# Patient Record
Sex: FEMALE | Race: WHITE | NOT HISPANIC OR LATINO | Employment: OTHER | URBAN - METROPOLITAN AREA
[De-identification: names, ages, dates, MRNs, and addresses within clinical notes are randomized per-mention and may not be internally consistent; named-entity substitution may affect disease eponyms.]

---

## 2017-01-04 ENCOUNTER — COMMUNICATION - HEALTHEAST (OUTPATIENT)
Dept: RHEUMATOLOGY | Facility: CLINIC | Age: 67
End: 2017-01-04

## 2017-01-04 ENCOUNTER — COMMUNICATION - HEALTHEAST (OUTPATIENT)
Dept: INTERNAL MEDICINE | Facility: CLINIC | Age: 67
End: 2017-01-04

## 2017-01-04 DIAGNOSIS — F32.A DEPRESSION: ICD-10-CM

## 2017-01-04 DIAGNOSIS — R10.13 DYSPEPSIA: ICD-10-CM

## 2017-01-04 DIAGNOSIS — M05.9 SEROPOSITIVE RHEUMATOID ARTHRITIS (H): ICD-10-CM

## 2017-01-09 ENCOUNTER — COMMUNICATION - HEALTHEAST (OUTPATIENT)
Dept: INTERNAL MEDICINE | Facility: CLINIC | Age: 67
End: 2017-01-09

## 2017-01-09 DIAGNOSIS — E11.9 DIABETES MELLITUS, TYPE II (H): ICD-10-CM

## 2017-01-11 ENCOUNTER — OFFICE VISIT - HEALTHEAST (OUTPATIENT)
Dept: INTERNAL MEDICINE | Facility: CLINIC | Age: 67
End: 2017-01-11

## 2017-01-11 DIAGNOSIS — E11.9 DIABETES MELLITUS, TYPE 2 (H): ICD-10-CM

## 2017-01-15 ENCOUNTER — COMMUNICATION - HEALTHEAST (OUTPATIENT)
Dept: INTERNAL MEDICINE | Facility: CLINIC | Age: 67
End: 2017-01-15

## 2017-01-15 DIAGNOSIS — E11.9 DIABETES MELLITUS, TYPE 2 (H): ICD-10-CM

## 2017-01-16 ENCOUNTER — COMMUNICATION - HEALTHEAST (OUTPATIENT)
Dept: INTERNAL MEDICINE | Facility: CLINIC | Age: 67
End: 2017-01-16

## 2017-01-16 DIAGNOSIS — E11.9 DIABETES MELLITUS, TYPE 2 (H): ICD-10-CM

## 2017-01-22 ENCOUNTER — HOSPITAL ENCOUNTER (OUTPATIENT)
Dept: LAB | Age: 67
Setting detail: SPECIMEN
Discharge: HOME | End: 2017-01-22

## 2017-01-22 ENCOUNTER — OFFICE VISIT - HEALTHEAST (OUTPATIENT)
Dept: FAMILY MEDICINE | Facility: CLINIC | Age: 67
End: 2017-01-22

## 2017-01-22 DIAGNOSIS — R05.9 COUGH: ICD-10-CM

## 2017-01-22 DIAGNOSIS — R07.0 THROAT PAIN: ICD-10-CM

## 2017-02-13 ENCOUNTER — AMBULATORY - HEALTHEAST (OUTPATIENT)
Dept: LAB | Facility: CLINIC | Age: 67
End: 2017-02-13

## 2017-02-13 ENCOUNTER — AMBULATORY - HEALTHEAST (OUTPATIENT)
Dept: INTERNAL MEDICINE | Facility: CLINIC | Age: 67
End: 2017-02-13

## 2017-02-13 ENCOUNTER — COMMUNICATION - HEALTHEAST (OUTPATIENT)
Dept: INTERNAL MEDICINE | Facility: CLINIC | Age: 67
End: 2017-02-13

## 2017-02-13 DIAGNOSIS — M05.9 SEROPOSITIVE RHEUMATOID ARTHRITIS (H): ICD-10-CM

## 2017-02-13 LAB
ALT SERPL W P-5'-P-CCNC: 15 U/L (ref 0–45)
CREAT SERPL-MCNC: 1.24 MG/DL (ref 0.6–1.1)
GFR SERPL CREATININE-BSD FRML MDRD: 43 ML/MIN/1.73M2

## 2017-02-15 ENCOUNTER — OFFICE VISIT - HEALTHEAST (OUTPATIENT)
Dept: RHEUMATOLOGY | Facility: CLINIC | Age: 67
End: 2017-02-15

## 2017-02-15 ENCOUNTER — RECORDS - HEALTHEAST (OUTPATIENT)
Dept: GENERAL RADIOLOGY | Age: 67
End: 2017-02-15

## 2017-02-15 DIAGNOSIS — Z96.652 S/P TKR (TOTAL KNEE REPLACEMENT) USING CEMENT, LEFT: ICD-10-CM

## 2017-02-15 DIAGNOSIS — M05.9 RHEUMATOID ARTHRITIS WITH RHEUMATOID FACTOR, UNSPECIFIED (H): ICD-10-CM

## 2017-02-15 DIAGNOSIS — Z79.899 HIGH RISK MEDICATION USE: ICD-10-CM

## 2017-02-15 DIAGNOSIS — N18.9 CHRONIC KIDNEY DISEASE, UNSPECIFIED: ICD-10-CM

## 2017-02-15 DIAGNOSIS — M05.9 SEROPOSITIVE RHEUMATOID ARTHRITIS (H): ICD-10-CM

## 2017-02-16 ENCOUNTER — COMMUNICATION - HEALTHEAST (OUTPATIENT)
Dept: NURSING | Facility: CLINIC | Age: 67
End: 2017-02-16

## 2017-02-27 ENCOUNTER — OFFICE VISIT - HEALTHEAST (OUTPATIENT)
Dept: CARDIOLOGY | Facility: CLINIC | Age: 67
End: 2017-02-27

## 2017-02-27 DIAGNOSIS — I25.10 ATHEROSCLEROTIC CARDIOVASCULAR DISEASE: ICD-10-CM

## 2017-02-27 DIAGNOSIS — R00.2 PALPITATIONS: ICD-10-CM

## 2017-02-27 DIAGNOSIS — E78.2 MIXED HYPERLIPIDEMIA: ICD-10-CM

## 2017-02-27 DIAGNOSIS — I10 ESSENTIAL HYPERTENSION WITH GOAL BLOOD PRESSURE LESS THAN 130/85: ICD-10-CM

## 2017-02-27 ASSESSMENT — MIFFLIN-ST. JEOR: SCORE: 1696.56

## 2017-03-08 ENCOUNTER — COMMUNICATION - HEALTHEAST (OUTPATIENT)
Dept: INTERNAL MEDICINE | Facility: CLINIC | Age: 67
End: 2017-03-08

## 2017-03-10 ENCOUNTER — HOSPITAL ENCOUNTER (OUTPATIENT)
Dept: CARDIOLOGY | Facility: HOSPITAL | Age: 67
Discharge: HOME | End: 2017-03-10
Attending: INTERNAL MEDICINE

## 2017-03-10 DIAGNOSIS — I10 ESSENTIAL HYPERTENSION WITH GOAL BLOOD PRESSURE LESS THAN 130/85: ICD-10-CM

## 2017-03-10 DIAGNOSIS — R00.2 PALPITATIONS: ICD-10-CM

## 2017-03-10 LAB
BSA FOR ECHO PROCEDURE: 2.34 M2
CV BLOOD PRESSURE: NORMAL MMHG
CV ECHO HEIGHT: 72 IN
CV ECHO WEIGHT: 238 LBS
DOP CALC LVOT AREA: 3.3 CM2
DOP CALC LVOT DIAMETER: 2.05 CM
DOP CALC LVOT STROKE VOLUME: 70.6 CM3
DOP CALCLVOT PEAK VEL VTI: 21.4 CM
EJECTION FRACTION: 60 % (ref 55–75)
FRACTIONAL SHORTENING: 30.4 % (ref 28–44)
INTERVENTRICULAR SEPTUM IN END DIASTOLE: 1.14 CM (ref 0.6–0.9)
IVS/PW RATIO: 1.4
LA AREA 1: 20 CM2
LA AREA 2: 18 CM2
LEFT ATRIUM LENGTH: 4.7 CM
LEFT ATRIUM SIZE: 3.81 CM
LEFT ATRIUM VOLUME INDEX: 27.8 ML/M2
LEFT ATRIUM VOLUME: 65.1 CM3
LEFT VENTRICLE CARDIAC INDEX: 2.3 L/MIN/M2
LEFT VENTRICLE CARDIAC OUTPUT: 5.4 L/MIN
LEFT VENTRICLE DIASTOLIC VOLUME INDEX: 40.6 CM3/M2 (ref 34–74)
LEFT VENTRICLE DIASTOLIC VOLUME: 95 CM3 (ref 46–106)
LEFT VENTRICLE HEART RATE: 77 BPM
LEFT VENTRICLE MASS INDEX: 77.9 G/M2
LEFT VENTRICLE SYSTOLIC VOLUME INDEX: 16.2 CM3/M2 (ref 11–31)
LEFT VENTRICLE SYSTOLIC VOLUME: 37.8 CM3 (ref 14–42)
LEFT VENTRICULAR INTERNAL DIMENSION IN DIASTOLE: 5.14 CM (ref 3.8–5.2)
LEFT VENTRICULAR INTERNAL DIMENSION IN SYSTOLE: 3.58 CM (ref 2.2–3.5)
LEFT VENTRICULAR MASS: 182.3 G
LEFT VENTRICULAR OUTFLOW TRACT MEAN GRADIENT: 1.94 MMHG
LEFT VENTRICULAR OUTFLOW TRACT MEAN VELOCITY: 66.8 CM/S
LEFT VENTRICULAR OUTFLOW TRACT PEAK GRADIENT: 3.23 MMHG
LEFT VENTRICULAR POSTERIOR WALL IN END DIASTOLE: 0.8 CM (ref 0.6–0.9)
LV STROKE VOLUME INDEX: 30.2 ML/M2
MITRAL VALVE E/A RATIO: 0.7
MV AVERAGE E/E' RATIO: 13.5 CM/S
MV DECELERATION TIME: 0.2 S
MV E'TISSUE VEL-LAT: 5.13 CM/S
MV E'TISSUE VEL-MED: 4.92 CM/S
MV LATERAL E/E' RATIO: 13.2
MV MEDIAL E/E' RATIO: 13.8
MV PEAK A VELOCITY: 92 CM/S
MV PEAK E VELOCITY: 67.8 CM/S
NUC REST DIASTOLIC VOLUME INDEX: 3808 LBS
NUC REST SYSTOLIC VOLUME INDEX: 72 IN
TRICUSPID VALVE ANULAR PLANE SYSTOLIC EXCURSION: 2.3 CM

## 2017-03-10 ASSESSMENT — MIFFLIN-ST. JEOR: SCORE: 1696.56

## 2017-03-22 ENCOUNTER — COMMUNICATION - HEALTHEAST (OUTPATIENT)
Dept: INTERNAL MEDICINE | Facility: CLINIC | Age: 67
End: 2017-03-22

## 2017-03-22 DIAGNOSIS — F32.A DEPRESSION: ICD-10-CM

## 2017-04-03 ENCOUNTER — COMMUNICATION - HEALTHEAST (OUTPATIENT)
Dept: CARDIOLOGY | Facility: CLINIC | Age: 67
End: 2017-04-03

## 2017-04-03 DIAGNOSIS — I10 HTN (HYPERTENSION): ICD-10-CM

## 2017-04-03 DIAGNOSIS — R94.39 ABNORMAL NUCLEAR STRESS TEST: ICD-10-CM

## 2017-04-03 DIAGNOSIS — R06.09 DOE (DYSPNEA ON EXERTION): ICD-10-CM

## 2017-04-11 ENCOUNTER — RECORDS - HEALTHEAST (OUTPATIENT)
Dept: GENERAL RADIOLOGY | Age: 67
End: 2017-04-11

## 2017-04-11 ENCOUNTER — COMMUNICATION - HEALTHEAST (OUTPATIENT)
Dept: INTERNAL MEDICINE | Facility: CLINIC | Age: 67
End: 2017-04-11

## 2017-04-11 ENCOUNTER — OFFICE VISIT - HEALTHEAST (OUTPATIENT)
Dept: INTERNAL MEDICINE | Facility: CLINIC | Age: 67
End: 2017-04-11

## 2017-04-11 DIAGNOSIS — E11.9 DIABETES MELLITUS, TYPE 2 (H): ICD-10-CM

## 2017-04-11 DIAGNOSIS — M25.532 PAIN IN LEFT WRIST: ICD-10-CM

## 2017-04-11 DIAGNOSIS — G89.29 CHRONIC PAIN: ICD-10-CM

## 2017-04-11 DIAGNOSIS — M25.532 LEFT WRIST PAIN: ICD-10-CM

## 2017-04-11 DIAGNOSIS — M54.16 LUMBAR RADICULOPATHY, CHRONIC: ICD-10-CM

## 2017-04-11 LAB — HBA1C MFR BLD: 6.9 % (ref 3.5–6)

## 2017-04-13 ENCOUNTER — RECORDS - HEALTHEAST (OUTPATIENT)
Dept: ADMINISTRATIVE | Facility: OTHER | Age: 67
End: 2017-04-13

## 2017-04-13 ENCOUNTER — COMMUNICATION - HEALTHEAST (OUTPATIENT)
Dept: INTERNAL MEDICINE | Facility: CLINIC | Age: 67
End: 2017-04-13

## 2017-04-17 ENCOUNTER — COMMUNICATION - HEALTHEAST (OUTPATIENT)
Dept: NURSING | Facility: CLINIC | Age: 67
End: 2017-04-17

## 2017-04-17 ENCOUNTER — COMMUNICATION - HEALTHEAST (OUTPATIENT)
Dept: RHEUMATOLOGY | Facility: CLINIC | Age: 67
End: 2017-04-17

## 2017-04-17 DIAGNOSIS — M05.9 SEROPOSITIVE RHEUMATOID ARTHRITIS (H): ICD-10-CM

## 2017-04-18 ENCOUNTER — COMMUNICATION - HEALTHEAST (OUTPATIENT)
Dept: RHEUMATOLOGY | Facility: CLINIC | Age: 67
End: 2017-04-18

## 2017-04-18 DIAGNOSIS — M05.9 SEROPOSITIVE RHEUMATOID ARTHRITIS (H): ICD-10-CM

## 2017-04-27 ENCOUNTER — RECORDS - HEALTHEAST (OUTPATIENT)
Dept: ADMINISTRATIVE | Facility: OTHER | Age: 67
End: 2017-04-27

## 2017-05-05 ENCOUNTER — AMBULATORY - HEALTHEAST (OUTPATIENT)
Dept: LAB | Facility: CLINIC | Age: 67
End: 2017-05-05

## 2017-05-05 DIAGNOSIS — M05.9 SEROPOSITIVE RHEUMATOID ARTHRITIS (H): ICD-10-CM

## 2017-05-05 LAB
ALT SERPL W P-5'-P-CCNC: 22 U/L (ref 0–45)
CREAT SERPL-MCNC: 1.13 MG/DL (ref 0.6–1.1)
GFR SERPL CREATININE-BSD FRML MDRD: 48 ML/MIN/1.73M2

## 2017-05-09 ENCOUNTER — COMMUNICATION - HEALTHEAST (OUTPATIENT)
Dept: INTERNAL MEDICINE | Facility: CLINIC | Age: 67
End: 2017-05-09

## 2017-05-10 ENCOUNTER — COMMUNICATION - HEALTHEAST (OUTPATIENT)
Dept: INTERNAL MEDICINE | Facility: CLINIC | Age: 67
End: 2017-05-10

## 2017-05-10 ENCOUNTER — OFFICE VISIT - HEALTHEAST (OUTPATIENT)
Dept: RHEUMATOLOGY | Facility: CLINIC | Age: 67
End: 2017-05-10

## 2017-05-10 DIAGNOSIS — M05.9 SEROPOSITIVE RHEUMATOID ARTHRITIS (H): ICD-10-CM

## 2017-05-10 DIAGNOSIS — M79.641 RIGHT HAND PAIN: ICD-10-CM

## 2017-05-10 DIAGNOSIS — N18.9 CHRONIC KIDNEY DISEASE, UNSPECIFIED: ICD-10-CM

## 2017-05-10 ASSESSMENT — MIFFLIN-ST. JEOR: SCORE: 1712.89

## 2017-05-15 ENCOUNTER — COMMUNICATION - HEALTHEAST (OUTPATIENT)
Dept: INTERNAL MEDICINE | Facility: CLINIC | Age: 67
End: 2017-05-15

## 2017-05-22 ENCOUNTER — COMMUNICATION - HEALTHEAST (OUTPATIENT)
Dept: INTERNAL MEDICINE | Facility: CLINIC | Age: 67
End: 2017-05-22

## 2017-05-22 DIAGNOSIS — G47.00 INSOMNIA: ICD-10-CM

## 2017-05-30 ENCOUNTER — COMMUNICATION - HEALTHEAST (OUTPATIENT)
Dept: RHEUMATOLOGY | Facility: CLINIC | Age: 67
End: 2017-05-30

## 2017-05-30 DIAGNOSIS — M05.9 SEROPOSITIVE RHEUMATOID ARTHRITIS (H): ICD-10-CM

## 2017-06-12 ENCOUNTER — COMMUNICATION - HEALTHEAST (OUTPATIENT)
Dept: INTERNAL MEDICINE | Facility: CLINIC | Age: 67
End: 2017-06-12

## 2017-06-16 ENCOUNTER — COMMUNICATION - HEALTHEAST (OUTPATIENT)
Dept: NURSING | Facility: CLINIC | Age: 67
End: 2017-06-16

## 2017-06-21 ENCOUNTER — COMMUNICATION - HEALTHEAST (OUTPATIENT)
Dept: NURSING | Facility: CLINIC | Age: 67
End: 2017-06-21

## 2017-06-29 ENCOUNTER — COMMUNICATION - HEALTHEAST (OUTPATIENT)
Dept: NURSING | Facility: CLINIC | Age: 67
End: 2017-06-29

## 2017-07-07 ENCOUNTER — RECORDS - HEALTHEAST (OUTPATIENT)
Dept: ADMINISTRATIVE | Facility: OTHER | Age: 67
End: 2017-07-07

## 2017-07-07 ENCOUNTER — COMMUNICATION - HEALTHEAST (OUTPATIENT)
Dept: INTERNAL MEDICINE | Facility: CLINIC | Age: 67
End: 2017-07-07

## 2017-07-11 ENCOUNTER — OFFICE VISIT - HEALTHEAST (OUTPATIENT)
Dept: INTERNAL MEDICINE | Facility: CLINIC | Age: 67
End: 2017-07-11

## 2017-07-11 DIAGNOSIS — I87.2 VENOUS (PERIPHERAL) INSUFFICIENCY: ICD-10-CM

## 2017-07-11 DIAGNOSIS — R26.81 GAIT INSTABILITY: ICD-10-CM

## 2017-07-11 DIAGNOSIS — G89.29 CHRONIC PAIN: ICD-10-CM

## 2017-07-11 DIAGNOSIS — M54.2 NECK PAIN: ICD-10-CM

## 2017-07-11 DIAGNOSIS — K21.9 GERD (GASTROESOPHAGEAL REFLUX DISEASE): ICD-10-CM

## 2017-07-11 DIAGNOSIS — E11.9 DIABETES MELLITUS, TYPE 2 (H): ICD-10-CM

## 2017-07-11 LAB — HBA1C MFR BLD: 9.3 % (ref 3.5–6)

## 2017-07-12 ENCOUNTER — COMMUNICATION - HEALTHEAST (OUTPATIENT)
Dept: PALLIATIVE MEDICINE | Facility: OTHER | Age: 67
End: 2017-07-12

## 2017-07-12 DIAGNOSIS — M46.1 SACROILIITIS (H): ICD-10-CM

## 2017-07-21 ENCOUNTER — RECORDS - HEALTHEAST (OUTPATIENT)
Dept: ADMINISTRATIVE | Facility: OTHER | Age: 67
End: 2017-07-21

## 2017-07-21 ENCOUNTER — OFFICE VISIT - HEALTHEAST (OUTPATIENT)
Dept: RHEUMATOLOGY | Facility: CLINIC | Age: 67
End: 2017-07-21

## 2017-07-21 DIAGNOSIS — Z79.899 HIGH RISK MEDICATION USE: ICD-10-CM

## 2017-07-21 DIAGNOSIS — M79.641 RIGHT HAND PAIN: ICD-10-CM

## 2017-07-21 DIAGNOSIS — M05.9 SEROPOSITIVE RHEUMATOID ARTHRITIS (H): ICD-10-CM

## 2017-07-21 DIAGNOSIS — M16.0 BILATERAL PRIMARY OSTEOARTHRITIS OF HIP: ICD-10-CM

## 2017-07-21 LAB
ALT SERPL W P-5'-P-CCNC: 36 U/L (ref 0–45)
CREAT SERPL-MCNC: 1.12 MG/DL (ref 0.6–1.1)
GFR SERPL CREATININE-BSD FRML MDRD: 48 ML/MIN/1.73M2

## 2017-07-26 ENCOUNTER — COMMUNICATION - HEALTHEAST (OUTPATIENT)
Dept: PALLIATIVE MEDICINE | Facility: OTHER | Age: 67
End: 2017-07-26

## 2017-07-27 ENCOUNTER — HOSPITAL ENCOUNTER (OUTPATIENT)
Dept: PALLIATIVE MEDICINE | Facility: OTHER | Age: 67
Discharge: HOME | End: 2017-07-27
Attending: PAIN MEDICINE

## 2017-07-27 DIAGNOSIS — M54.16 LUMBAR RADICULOPATHY: ICD-10-CM

## 2017-07-27 ASSESSMENT — MIFFLIN-ST. JEOR: SCORE: 1678.42

## 2017-07-28 ENCOUNTER — OFFICE VISIT - HEALTHEAST (OUTPATIENT)
Dept: PHYSICAL THERAPY | Facility: REHABILITATION | Age: 67
End: 2017-07-28

## 2017-07-28 DIAGNOSIS — E66.9 OBESITY, UNSPECIFIED: ICD-10-CM

## 2017-07-28 DIAGNOSIS — M54.16 LUMBAR RADICULOPATHY, CHRONIC: ICD-10-CM

## 2017-07-28 DIAGNOSIS — M05.9 SEROPOSITIVE RHEUMATOID ARTHRITIS (H): ICD-10-CM

## 2017-07-28 DIAGNOSIS — M16.0 BILATERAL PRIMARY OSTEOARTHRITIS OF HIP: ICD-10-CM

## 2017-07-28 DIAGNOSIS — G47.00 INSOMNIA, UNSPECIFIED: ICD-10-CM

## 2017-07-28 DIAGNOSIS — M54.2 CHRONIC NECK PAIN: ICD-10-CM

## 2017-07-28 DIAGNOSIS — M71.50: ICD-10-CM

## 2017-07-28 DIAGNOSIS — R29.3 POOR POSTURE: ICD-10-CM

## 2017-07-28 DIAGNOSIS — D69.6 THROMBOCYTOPENIA (H): ICD-10-CM

## 2017-07-28 DIAGNOSIS — N18.9 CHRONIC KIDNEY DISEASE, UNSPECIFIED: ICD-10-CM

## 2017-07-28 DIAGNOSIS — M62.81 GENERALIZED MUSCLE WEAKNESS: ICD-10-CM

## 2017-07-28 DIAGNOSIS — Z96.652 S/P TKR (TOTAL KNEE REPLACEMENT) USING CEMENT, LEFT: ICD-10-CM

## 2017-07-28 DIAGNOSIS — I87.2 VENOUS (PERIPHERAL) INSUFFICIENCY: ICD-10-CM

## 2017-07-28 DIAGNOSIS — G89.29 CHRONIC NECK PAIN: ICD-10-CM

## 2017-08-02 ENCOUNTER — COMMUNICATION - HEALTHEAST (OUTPATIENT)
Dept: RHEUMATOLOGY | Facility: CLINIC | Age: 67
End: 2017-08-02

## 2017-08-02 DIAGNOSIS — M05.9 SEROPOSITIVE RHEUMATOID ARTHRITIS (H): ICD-10-CM

## 2017-08-08 ENCOUNTER — COMMUNICATION - HEALTHEAST (OUTPATIENT)
Dept: PALLIATIVE MEDICINE | Facility: OTHER | Age: 67
End: 2017-08-08

## 2017-08-09 ENCOUNTER — HOSPITAL ENCOUNTER (OUTPATIENT)
Dept: PALLIATIVE MEDICINE | Facility: OTHER | Age: 67
Discharge: HOME | End: 2017-08-09
Attending: PAIN MEDICINE

## 2017-08-09 DIAGNOSIS — M46.1 SACROILIITIS (H): ICD-10-CM

## 2017-08-09 ASSESSMENT — MIFFLIN-ST. JEOR: SCORE: 1678.42

## 2017-08-10 ENCOUNTER — COMMUNICATION - HEALTHEAST (OUTPATIENT)
Dept: INTERNAL MEDICINE | Facility: CLINIC | Age: 67
End: 2017-08-10

## 2017-08-25 ENCOUNTER — COMMUNICATION - HEALTHEAST (OUTPATIENT)
Dept: NURSING | Facility: CLINIC | Age: 67
End: 2017-08-25

## 2017-08-25 ENCOUNTER — OFFICE VISIT - HEALTHEAST (OUTPATIENT)
Dept: PHYSICAL THERAPY | Facility: REHABILITATION | Age: 67
End: 2017-08-25

## 2017-08-25 DIAGNOSIS — M54.2 CHRONIC NECK PAIN: ICD-10-CM

## 2017-08-25 DIAGNOSIS — M54.16 LUMBAR RADICULOPATHY, CHRONIC: ICD-10-CM

## 2017-08-25 DIAGNOSIS — M05.9 SEROPOSITIVE RHEUMATOID ARTHRITIS (H): ICD-10-CM

## 2017-08-25 DIAGNOSIS — M71.50: ICD-10-CM

## 2017-08-25 DIAGNOSIS — E66.9 OBESITY, UNSPECIFIED: ICD-10-CM

## 2017-08-25 DIAGNOSIS — Z96.652 S/P TKR (TOTAL KNEE REPLACEMENT) USING CEMENT, LEFT: ICD-10-CM

## 2017-08-25 DIAGNOSIS — N18.9 CHRONIC KIDNEY DISEASE, UNSPECIFIED: ICD-10-CM

## 2017-08-25 DIAGNOSIS — M16.0 BILATERAL PRIMARY OSTEOARTHRITIS OF HIP: ICD-10-CM

## 2017-08-25 DIAGNOSIS — G47.00 INSOMNIA, UNSPECIFIED: ICD-10-CM

## 2017-08-25 DIAGNOSIS — D69.6 THROMBOCYTOPENIA (H): ICD-10-CM

## 2017-08-25 DIAGNOSIS — M62.81 GENERALIZED MUSCLE WEAKNESS: ICD-10-CM

## 2017-08-25 DIAGNOSIS — R29.3 POOR POSTURE: ICD-10-CM

## 2017-08-25 DIAGNOSIS — I87.2 VENOUS (PERIPHERAL) INSUFFICIENCY: ICD-10-CM

## 2017-08-25 DIAGNOSIS — G89.29 CHRONIC NECK PAIN: ICD-10-CM

## 2017-08-30 ENCOUNTER — COMMUNICATION - HEALTHEAST (OUTPATIENT)
Dept: RHEUMATOLOGY | Facility: CLINIC | Age: 67
End: 2017-08-30

## 2017-08-30 DIAGNOSIS — M05.9 SEROPOSITIVE RHEUMATOID ARTHRITIS (H): ICD-10-CM

## 2017-09-01 ENCOUNTER — COMMUNICATION - HEALTHEAST (OUTPATIENT)
Dept: NURSING | Facility: CLINIC | Age: 67
End: 2017-09-01

## 2017-09-06 ENCOUNTER — COMMUNICATION - HEALTHEAST (OUTPATIENT)
Dept: NURSING | Facility: CLINIC | Age: 67
End: 2017-09-06

## 2017-09-06 ENCOUNTER — COMMUNICATION - HEALTHEAST (OUTPATIENT)
Dept: INTERNAL MEDICINE | Facility: CLINIC | Age: 67
End: 2017-09-06

## 2017-09-06 DIAGNOSIS — I10 HTN (HYPERTENSION): ICD-10-CM

## 2017-09-11 ENCOUNTER — COMMUNICATION - HEALTHEAST (OUTPATIENT)
Dept: PALLIATIVE MEDICINE | Facility: OTHER | Age: 67
End: 2017-09-11

## 2017-09-11 ENCOUNTER — COMMUNICATION - HEALTHEAST (OUTPATIENT)
Dept: INTERNAL MEDICINE | Facility: CLINIC | Age: 67
End: 2017-09-11

## 2017-09-18 ENCOUNTER — COMMUNICATION - HEALTHEAST (OUTPATIENT)
Dept: PALLIATIVE MEDICINE | Facility: OTHER | Age: 67
End: 2017-09-18

## 2017-09-19 ENCOUNTER — HOSPITAL ENCOUNTER (OUTPATIENT)
Dept: PALLIATIVE MEDICINE | Facility: OTHER | Age: 67
Discharge: HOME | End: 2017-09-19
Attending: PAIN MEDICINE

## 2017-09-19 DIAGNOSIS — M79.18 MYOFASCIAL PAIN: ICD-10-CM

## 2017-09-19 ASSESSMENT — MIFFLIN-ST. JEOR: SCORE: 1678.42

## 2017-09-20 ENCOUNTER — COMMUNICATION - HEALTHEAST (OUTPATIENT)
Dept: PALLIATIVE MEDICINE | Facility: OTHER | Age: 67
End: 2017-09-20

## 2017-09-27 ENCOUNTER — OFFICE VISIT - HEALTHEAST (OUTPATIENT)
Dept: RHEUMATOLOGY | Facility: CLINIC | Age: 67
End: 2017-09-27

## 2017-09-27 DIAGNOSIS — M05.9 SEROPOSITIVE RHEUMATOID ARTHRITIS (H): ICD-10-CM

## 2017-09-27 DIAGNOSIS — Z79.899 HIGH RISK MEDICATION USE: ICD-10-CM

## 2017-09-27 LAB
ALT SERPL W P-5'-P-CCNC: 51 U/L (ref 0–45)
CREAT SERPL-MCNC: 1.23 MG/DL (ref 0.6–1.1)
GFR SERPL CREATININE-BSD FRML MDRD: 43 ML/MIN/1.73M2

## 2017-09-27 ASSESSMENT — MIFFLIN-ST. JEOR: SCORE: 1651.2

## 2017-10-02 ENCOUNTER — COMMUNICATION - HEALTHEAST (OUTPATIENT)
Dept: NURSING | Facility: CLINIC | Age: 67
End: 2017-10-02

## 2017-10-02 ENCOUNTER — COMMUNICATION - HEALTHEAST (OUTPATIENT)
Dept: ADMINISTRATIVE | Facility: CLINIC | Age: 67
End: 2017-10-02

## 2017-10-04 ENCOUNTER — AMBULATORY - HEALTHEAST (OUTPATIENT)
Dept: CARE COORDINATION | Facility: CLINIC | Age: 67
End: 2017-10-04

## 2017-10-04 ENCOUNTER — AMBULATORY - HEALTHEAST (OUTPATIENT)
Dept: INTERNAL MEDICINE | Facility: CLINIC | Age: 67
End: 2017-10-04

## 2017-10-04 ENCOUNTER — COMMUNICATION - HEALTHEAST (OUTPATIENT)
Dept: ADMINISTRATIVE | Facility: CLINIC | Age: 67
End: 2017-10-04

## 2017-10-06 ENCOUNTER — COMMUNICATION - HEALTHEAST (OUTPATIENT)
Dept: NURSING | Facility: CLINIC | Age: 67
End: 2017-10-06

## 2017-10-11 ENCOUNTER — OFFICE VISIT - HEALTHEAST (OUTPATIENT)
Dept: INTERNAL MEDICINE | Facility: CLINIC | Age: 67
End: 2017-10-11

## 2017-10-11 ENCOUNTER — COMMUNICATION - HEALTHEAST (OUTPATIENT)
Dept: NURSING | Facility: CLINIC | Age: 67
End: 2017-10-11

## 2017-10-11 DIAGNOSIS — E78.2 MIXED HYPERLIPIDEMIA: ICD-10-CM

## 2017-10-11 DIAGNOSIS — L85.3 DRY SKIN: ICD-10-CM

## 2017-10-11 DIAGNOSIS — H61.21 IMPACTED CERUMEN OF RIGHT EAR: ICD-10-CM

## 2017-10-11 DIAGNOSIS — I10 HTN (HYPERTENSION): ICD-10-CM

## 2017-10-11 DIAGNOSIS — G89.29 CHRONIC PAIN: ICD-10-CM

## 2017-10-11 DIAGNOSIS — E11.9 DIABETES MELLITUS, TYPE 2 (H): ICD-10-CM

## 2017-10-11 DIAGNOSIS — Z12.11 COLON CANCER SCREENING: ICD-10-CM

## 2017-10-11 LAB — HBA1C MFR BLD: 9.9 % (ref 3.5–6)

## 2017-10-12 ENCOUNTER — COMMUNICATION - HEALTHEAST (OUTPATIENT)
Dept: INTERNAL MEDICINE | Facility: CLINIC | Age: 67
End: 2017-10-12

## 2017-10-18 ENCOUNTER — COMMUNICATION - HEALTHEAST (OUTPATIENT)
Dept: RHEUMATOLOGY | Facility: CLINIC | Age: 67
End: 2017-10-18

## 2017-10-18 ENCOUNTER — AMBULATORY - HEALTHEAST (OUTPATIENT)
Dept: EDUCATION SERVICES | Facility: CLINIC | Age: 67
End: 2017-10-18

## 2017-10-18 DIAGNOSIS — E11.9 DIABETES MELLITUS, TYPE 2 (H): ICD-10-CM

## 2017-10-18 DIAGNOSIS — M05.9 SEROPOSITIVE RHEUMATOID ARTHRITIS (H): ICD-10-CM

## 2017-10-23 ENCOUNTER — COMMUNICATION - HEALTHEAST (OUTPATIENT)
Dept: NURSING | Facility: CLINIC | Age: 67
End: 2017-10-23

## 2017-10-24 ENCOUNTER — AMBULATORY - HEALTHEAST (OUTPATIENT)
Dept: CARE COORDINATION | Facility: CLINIC | Age: 67
End: 2017-10-24

## 2017-10-24 ENCOUNTER — COMMUNICATION - HEALTHEAST (OUTPATIENT)
Dept: NURSING | Facility: CLINIC | Age: 67
End: 2017-10-24

## 2017-10-25 ENCOUNTER — AMBULATORY - HEALTHEAST (OUTPATIENT)
Dept: LAB | Facility: CLINIC | Age: 67
End: 2017-10-25

## 2017-10-25 ENCOUNTER — OFFICE VISIT - HEALTHEAST (OUTPATIENT)
Dept: EDUCATION SERVICES | Facility: CLINIC | Age: 67
End: 2017-10-25

## 2017-10-25 DIAGNOSIS — M05.9 SEROPOSITIVE RHEUMATOID ARTHRITIS (H): ICD-10-CM

## 2017-10-25 DIAGNOSIS — E11.9 DIABETES MELLITUS, TYPE 2 (H): ICD-10-CM

## 2017-10-25 LAB
ALT SERPL W P-5'-P-CCNC: 39 U/L (ref 0–45)
CREAT SERPL-MCNC: 0.97 MG/DL (ref 0.6–1.1)
GFR SERPL CREATININE-BSD FRML MDRD: 57 ML/MIN/1.73M2

## 2017-11-05 ENCOUNTER — RECORDS - HEALTHEAST (OUTPATIENT)
Dept: ADMINISTRATIVE | Facility: OTHER | Age: 67
End: 2017-11-05

## 2017-11-06 ENCOUNTER — COMMUNICATION - HEALTHEAST (OUTPATIENT)
Dept: INTERNAL MEDICINE | Facility: CLINIC | Age: 67
End: 2017-11-06

## 2017-11-13 ENCOUNTER — COMMUNICATION - HEALTHEAST (OUTPATIENT)
Dept: INTERNAL MEDICINE | Facility: CLINIC | Age: 67
End: 2017-11-13

## 2017-11-13 DIAGNOSIS — K21.9 GERD (GASTROESOPHAGEAL REFLUX DISEASE): ICD-10-CM

## 2017-11-20 ENCOUNTER — OFFICE VISIT - HEALTHEAST (OUTPATIENT)
Dept: EDUCATION SERVICES | Facility: CLINIC | Age: 67
End: 2017-11-20

## 2017-11-20 DIAGNOSIS — E11.9 DIABETES MELLITUS, TYPE 2 (H): ICD-10-CM

## 2017-11-27 ENCOUNTER — AMBULATORY - HEALTHEAST (OUTPATIENT)
Dept: LAB | Facility: CLINIC | Age: 67
End: 2017-11-27

## 2017-11-27 DIAGNOSIS — M05.9 SEROPOSITIVE RHEUMATOID ARTHRITIS (H): ICD-10-CM

## 2017-11-27 LAB
ALT SERPL W P-5'-P-CCNC: 33 U/L (ref 0–45)
CREAT SERPL-MCNC: 1.08 MG/DL (ref 0.6–1.1)
GFR SERPL CREATININE-BSD FRML MDRD: 50 ML/MIN/1.73M2

## 2017-11-29 ENCOUNTER — OFFICE VISIT - HEALTHEAST (OUTPATIENT)
Dept: RHEUMATOLOGY | Facility: CLINIC | Age: 67
End: 2017-11-29

## 2017-11-29 DIAGNOSIS — Z79.899 HIGH RISK MEDICATION USE: ICD-10-CM

## 2017-11-29 DIAGNOSIS — M05.9 SEROPOSITIVE RHEUMATOID ARTHRITIS (H): ICD-10-CM

## 2017-11-29 DIAGNOSIS — M25.561 ARTHRALGIA OF RIGHT LOWER LEG: ICD-10-CM

## 2017-11-29 ASSESSMENT — MIFFLIN-ST. JEOR: SCORE: 1651.2

## 2017-12-12 ENCOUNTER — COMMUNICATION - HEALTHEAST (OUTPATIENT)
Dept: INTERNAL MEDICINE | Facility: CLINIC | Age: 67
End: 2017-12-12

## 2017-12-20 ENCOUNTER — COMMUNICATION - HEALTHEAST (OUTPATIENT)
Dept: NURSING | Facility: CLINIC | Age: 67
End: 2017-12-20

## 2017-12-31 ENCOUNTER — HOME CARE/HOSPICE - HEALTHEAST (OUTPATIENT)
Dept: HOME HEALTH SERVICES | Facility: HOME HEALTH | Age: 67
End: 2017-12-31

## 2018-01-05 ENCOUNTER — COMMUNICATION - HEALTHEAST (OUTPATIENT)
Dept: INTERNAL MEDICINE | Facility: CLINIC | Age: 68
End: 2018-01-05

## 2018-01-08 ENCOUNTER — AMBULATORY - HEALTHEAST (OUTPATIENT)
Dept: GERIATRICS | Facility: CLINIC | Age: 68
End: 2018-01-08

## 2018-01-08 ENCOUNTER — COMMUNICATION - HEALTHEAST (OUTPATIENT)
Dept: INTERNAL MEDICINE | Facility: CLINIC | Age: 68
End: 2018-01-08

## 2018-01-08 ENCOUNTER — OFFICE VISIT - HEALTHEAST (OUTPATIENT)
Dept: GERIATRICS | Facility: CLINIC | Age: 68
End: 2018-01-08

## 2018-01-08 DIAGNOSIS — N17.9 ACUTE KIDNEY INJURY (H): ICD-10-CM

## 2018-01-08 DIAGNOSIS — M62.82 RHABDOMYOLYSIS: ICD-10-CM

## 2018-01-08 DIAGNOSIS — E11.9 DIABETES MELLITUS (H): ICD-10-CM

## 2018-01-08 DIAGNOSIS — N39.0 URINARY TRACT INFECTION: ICD-10-CM

## 2018-01-08 DIAGNOSIS — I10 ESSENTIAL HYPERTENSION: ICD-10-CM

## 2018-01-08 DIAGNOSIS — G89.29 ENCOUNTER FOR CHRONIC PAIN MANAGEMENT: ICD-10-CM
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
## 2018-01-09 ENCOUNTER — RECORDS - HEALTHEAST (OUTPATIENT)
Dept: LAB | Facility: CLINIC | Age: 68
End: 2018-01-09

## 2018-01-09 LAB
ALT SERPL W P-5'-P-CCNC: 30 U/L (ref 0–45)
ANION GAP SERPL CALCULATED.3IONS-SCNC: 6 MMOL/L (ref 5–18)
AST SERPL W P-5'-P-CCNC: 22 U/L (ref 0–40)
BUN SERPL-MCNC: 18 MG/DL (ref 8–22)
CALCIUM SERPL-MCNC: 9 MG/DL (ref 8.5–10.5)
CHLORIDE BLD-SCNC: 104 MMOL/L (ref 98–107)
CK SERPL-CCNC: 23 U/L (ref 30–190)
CO2 SERPL-SCNC: 29 MMOL/L (ref 22–31)
CREAT SERPL-MCNC: 1.19 MG/DL (ref 0.6–1.1)
GFR SERPL CREATININE-BSD FRML MDRD: 45 ML/MIN/1.73M2
GLUCOSE BLD-MCNC: 98 MG/DL (ref 70–125)
POTASSIUM BLD-SCNC: 4 MMOL/L (ref 3.5–5)
SODIUM SERPL-SCNC: 139 MMOL/L (ref 136–145)

## 2018-01-11 ENCOUNTER — OFFICE VISIT - HEALTHEAST (OUTPATIENT)
Dept: GERIATRICS | Facility: CLINIC | Age: 68
End: 2018-01-11

## 2018-01-11 DIAGNOSIS — N17.9 ACUTE KIDNEY INJURY (H): ICD-10-CM

## 2018-01-11 DIAGNOSIS — I10 ESSENTIAL HYPERTENSION: ICD-10-CM

## 2018-01-11 DIAGNOSIS — D46.4 RA (REFRACTORY ANEMIA) (H): ICD-10-CM

## 2018-01-11 DIAGNOSIS — N39.0 URINARY TRACT INFECTION: ICD-10-CM

## 2018-01-11 DIAGNOSIS — G89.29 CHRONIC BACK PAIN: ICD-10-CM

## 2018-01-11 DIAGNOSIS — J10.1 INFLUENZA A: ICD-10-CM

## 2018-01-11 DIAGNOSIS — E11.9 DIABETES MELLITUS (H): ICD-10-CM

## 2018-01-11 DIAGNOSIS — M54.9 CHRONIC BACK PAIN: ICD-10-CM

## 2018-01-16 ENCOUNTER — OFFICE VISIT - HEALTHEAST (OUTPATIENT)
Dept: GERIATRICS | Facility: CLINIC | Age: 68
End: 2018-01-16

## 2018-01-16 DIAGNOSIS — M06.9 RHEUMATOID ARTHRITIS (H): ICD-10-CM

## 2018-01-16 DIAGNOSIS — J10.1 INFLUENZA A: ICD-10-CM

## 2018-01-16 DIAGNOSIS — E11.9 DIABETES MELLITUS (H): ICD-10-CM

## 2018-01-16 DIAGNOSIS — G89.29 CHRONIC BACK PAIN: ICD-10-CM

## 2018-01-16 DIAGNOSIS — M54.9 CHRONIC BACK PAIN: ICD-10-CM

## 2018-01-16 DIAGNOSIS — I10 ESSENTIAL HYPERTENSION: ICD-10-CM

## 2018-01-18 ENCOUNTER — OFFICE VISIT - HEALTHEAST (OUTPATIENT)
Dept: GERIATRICS | Facility: CLINIC | Age: 68
End: 2018-01-18

## 2018-01-18 DIAGNOSIS — E11.9 DIABETES MELLITUS (H): ICD-10-CM

## 2018-01-18 DIAGNOSIS — J10.1 INFLUENZA A: ICD-10-CM

## 2018-01-18 DIAGNOSIS — I10 ESSENTIAL HYPERTENSION: ICD-10-CM

## 2018-01-18 DIAGNOSIS — M06.9 RHEUMATOID ARTHRITIS (H): ICD-10-CM

## 2018-01-18 DIAGNOSIS — M54.9 CHRONIC BACK PAIN: ICD-10-CM

## 2018-01-18 DIAGNOSIS — G89.29 CHRONIC BACK PAIN: ICD-10-CM

## 2018-01-22 ENCOUNTER — AMBULATORY - HEALTHEAST (OUTPATIENT)
Dept: GERIATRICS | Facility: CLINIC | Age: 68
End: 2018-01-22

## 2018-01-23 ENCOUNTER — COMMUNICATION - HEALTHEAST (OUTPATIENT)
Dept: GERIATRICS | Facility: CLINIC | Age: 68
End: 2018-01-23

## 2018-01-23 ENCOUNTER — COMMUNICATION - HEALTHEAST (OUTPATIENT)
Dept: SCHEDULING | Facility: CLINIC | Age: 68
End: 2018-01-23

## 2018-01-24 ENCOUNTER — AMBULATORY - HEALTHEAST (OUTPATIENT)
Dept: INTERNAL MEDICINE | Facility: CLINIC | Age: 68
End: 2018-01-24

## 2018-01-24 DIAGNOSIS — E11.9 DIABETES MELLITUS, TYPE 2 (H): ICD-10-CM

## 2018-01-25 ENCOUNTER — COMMUNICATION - HEALTHEAST (OUTPATIENT)
Dept: ENDOCRINOLOGY | Facility: CLINIC | Age: 68
End: 2018-01-25

## 2018-01-25 ENCOUNTER — RECORDS - HEALTHEAST (OUTPATIENT)
Dept: ADMINISTRATIVE | Facility: OTHER | Age: 68
End: 2018-01-25

## 2018-02-01 ENCOUNTER — COMMUNICATION - HEALTHEAST (OUTPATIENT)
Dept: NURSING | Facility: CLINIC | Age: 68
End: 2018-02-01

## 2018-02-07 ENCOUNTER — COMMUNICATION - HEALTHEAST (OUTPATIENT)
Dept: NURSING | Facility: CLINIC | Age: 68
End: 2018-02-07

## 2018-02-07 ENCOUNTER — OFFICE VISIT - HEALTHEAST (OUTPATIENT)
Dept: INTERNAL MEDICINE | Facility: CLINIC | Age: 68
End: 2018-02-07

## 2018-02-07 DIAGNOSIS — G89.29 CHRONIC PAIN: ICD-10-CM
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
## 2018-02-07 DIAGNOSIS — R53.1 WEAKNESS: ICD-10-CM

## 2018-02-07 DIAGNOSIS — Z09 HOSPITAL DISCHARGE FOLLOW-UP: ICD-10-CM

## 2018-02-07 DIAGNOSIS — M16.0 BILATERAL PRIMARY OSTEOARTHRITIS OF HIP: ICD-10-CM

## 2018-02-07 DIAGNOSIS — M19.90 OSTEOARTHRITIS: ICD-10-CM

## 2018-02-07 DIAGNOSIS — K21.9 GERD (GASTROESOPHAGEAL REFLUX DISEASE): ICD-10-CM

## 2018-02-07 DIAGNOSIS — I10 HTN (HYPERTENSION): ICD-10-CM

## 2018-02-07 DIAGNOSIS — E11.9 DIABETES MELLITUS, TYPE 2 (H): ICD-10-CM
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
## 2018-02-07 DIAGNOSIS — M05.9 SEROPOSITIVE RHEUMATOID ARTHRITIS (H): ICD-10-CM

## 2018-02-07 LAB — HBA1C MFR BLD: 5.9 % (ref 3.5–6)

## 2018-02-09 ENCOUNTER — COMMUNICATION - HEALTHEAST (OUTPATIENT)
Dept: INTERNAL MEDICINE | Facility: CLINIC | Age: 68
End: 2018-02-09

## 2018-02-13 ENCOUNTER — COMMUNICATION - HEALTHEAST (OUTPATIENT)
Dept: INTERNAL MEDICINE | Facility: CLINIC | Age: 68
End: 2018-02-13

## 2018-02-14 ENCOUNTER — OFFICE VISIT - HEALTHEAST (OUTPATIENT)
Dept: RHEUMATOLOGY | Facility: CLINIC | Age: 68
End: 2018-02-14

## 2018-02-14 DIAGNOSIS — Z79.899 HIGH RISK MEDICATION USE: ICD-10-CM

## 2018-02-14 DIAGNOSIS — M05.9 SEROPOSITIVE RHEUMATOID ARTHRITIS (H): ICD-10-CM

## 2018-02-14 DIAGNOSIS — N18.30 STAGE 3 CHRONIC KIDNEY DISEASE (H): ICD-10-CM

## 2018-02-14 ASSESSMENT — MIFFLIN-ST. JEOR: SCORE: 1614.91

## 2018-02-16 ENCOUNTER — COMMUNICATION - HEALTHEAST (OUTPATIENT)
Dept: INTERNAL MEDICINE | Facility: CLINIC | Age: 68
End: 2018-02-16

## 2018-02-21 ENCOUNTER — COMMUNICATION - HEALTHEAST (OUTPATIENT)
Dept: INTERNAL MEDICINE | Facility: CLINIC | Age: 68
End: 2018-02-21

## 2018-02-26 ENCOUNTER — RECORDS - HEALTHEAST (OUTPATIENT)
Dept: ADMINISTRATIVE | Facility: OTHER | Age: 68
End: 2018-02-26

## 2018-03-07 ENCOUNTER — HOSPITAL ENCOUNTER (OUTPATIENT)
Dept: PALLIATIVE MEDICINE | Facility: OTHER | Age: 68
Discharge: HOME | End: 2018-03-07
Attending: PAIN MEDICINE

## 2018-03-07 DIAGNOSIS — M47.816 LUMBAR SPONDYLOSIS: ICD-10-CM

## 2018-03-08 ENCOUNTER — COMMUNICATION - HEALTHEAST (OUTPATIENT)
Dept: PALLIATIVE MEDICINE | Facility: OTHER | Age: 68
End: 2018-03-08

## 2018-03-14 ENCOUNTER — AMBULATORY - HEALTHEAST (OUTPATIENT)
Dept: LAB | Facility: CLINIC | Age: 68
End: 2018-03-14

## 2018-03-14 DIAGNOSIS — M05.9 SEROPOSITIVE RHEUMATOID ARTHRITIS (H): ICD-10-CM

## 2018-03-14 LAB
ALBUMIN SERPL-MCNC: 3.2 G/DL (ref 3.5–5)
ALT SERPL W P-5'-P-CCNC: 28 U/L (ref 0–45)
CREAT SERPL-MCNC: 1.23 MG/DL (ref 0.6–1.1)
ERYTHROCYTE [DISTWIDTH] IN BLOOD BY AUTOMATED COUNT: 11.3 % (ref 11–14.5)
GFR SERPL CREATININE-BSD FRML MDRD: 43 ML/MIN/1.73M2
HCT VFR BLD AUTO: 41.1 % (ref 35–47)
HGB BLD-MCNC: 14 G/DL (ref 12–16)
MCH RBC QN AUTO: 31.4 PG (ref 27–34)
MCHC RBC AUTO-ENTMCNC: 33.9 G/DL (ref 32–36)
MCV RBC AUTO: 93 FL (ref 80–100)
PLATELET # BLD AUTO: 132 THOU/UL (ref 140–440)
PMV BLD AUTO: 8.5 FL (ref 7–10)
RBC # BLD AUTO: 4.44 MILL/UL (ref 3.8–5.4)
WBC: 6.7 THOU/UL (ref 4–11)

## 2018-03-21 ENCOUNTER — COMMUNICATION - HEALTHEAST (OUTPATIENT)
Dept: RHEUMATOLOGY | Facility: CLINIC | Age: 68
End: 2018-03-21

## 2018-03-21 ENCOUNTER — COMMUNICATION - HEALTHEAST (OUTPATIENT)
Dept: INTERNAL MEDICINE | Facility: CLINIC | Age: 68
End: 2018-03-21

## 2018-03-21 DIAGNOSIS — K21.9 GERD (GASTROESOPHAGEAL REFLUX DISEASE): ICD-10-CM

## 2018-03-21 DIAGNOSIS — M05.9 SEROPOSITIVE RHEUMATOID ARTHRITIS (H): ICD-10-CM

## 2018-03-22 ENCOUNTER — COMMUNICATION - HEALTHEAST (OUTPATIENT)
Dept: INTERNAL MEDICINE | Facility: CLINIC | Age: 68
End: 2018-03-22

## 2018-03-22 DIAGNOSIS — E78.2 MIXED HYPERLIPIDEMIA: ICD-10-CM

## 2018-03-26 ENCOUNTER — COMMUNICATION - HEALTHEAST (OUTPATIENT)
Dept: INTERNAL MEDICINE | Facility: CLINIC | Age: 68
End: 2018-03-26

## 2018-03-26 DIAGNOSIS — G89.29 CHRONIC PAIN: ICD-10-CM

## 2018-04-06 ENCOUNTER — COMMUNICATION - HEALTHEAST (OUTPATIENT)
Dept: NURSING | Facility: CLINIC | Age: 68
End: 2018-04-06

## 2018-04-09 ENCOUNTER — COMMUNICATION - HEALTHEAST (OUTPATIENT)
Dept: NURSING | Facility: CLINIC | Age: 68
End: 2018-04-09

## 2018-04-11 ENCOUNTER — COMMUNICATION - HEALTHEAST (OUTPATIENT)
Dept: NURSING | Facility: CLINIC | Age: 68
End: 2018-04-11

## 2018-04-16 ENCOUNTER — RECORDS - HEALTHEAST (OUTPATIENT)
Dept: ADMINISTRATIVE | Facility: OTHER | Age: 68
End: 2018-04-16

## 2018-04-17 ENCOUNTER — COMMUNICATION - HEALTHEAST (OUTPATIENT)
Dept: ADMINISTRATIVE | Facility: CLINIC | Age: 68
End: 2018-04-17

## 2018-05-02 ENCOUNTER — COMMUNICATION - HEALTHEAST (OUTPATIENT)
Dept: INTERNAL MEDICINE | Facility: CLINIC | Age: 68
End: 2018-05-02

## 2018-05-02 DIAGNOSIS — G89.29 CHRONIC PAIN: ICD-10-CM

## 2018-05-07 ENCOUNTER — OFFICE VISIT - HEALTHEAST (OUTPATIENT)
Dept: INTERNAL MEDICINE | Facility: CLINIC | Age: 68
End: 2018-05-07

## 2018-05-07 DIAGNOSIS — F11.90 CHRONIC, CONTINUOUS USE OF OPIOIDS: ICD-10-CM

## 2018-05-07 DIAGNOSIS — I10 HTN (HYPERTENSION): ICD-10-CM

## 2018-05-07 DIAGNOSIS — G89.29 CHRONIC PAIN: ICD-10-CM

## 2018-05-07 DIAGNOSIS — K21.9 GERD (GASTROESOPHAGEAL REFLUX DISEASE): ICD-10-CM

## 2018-05-07 DIAGNOSIS — M54.16 LUMBAR RADICULOPATHY, CHRONIC: ICD-10-CM

## 2018-05-07 DIAGNOSIS — E11.9 DIABETES MELLITUS, TYPE 2 (H): ICD-10-CM

## 2018-05-07 DIAGNOSIS — M05.9 SEROPOSITIVE RHEUMATOID ARTHRITIS (H): ICD-10-CM

## 2018-05-07 DIAGNOSIS — E66.3 OVERWEIGHT (BMI 25.0-29.9): ICD-10-CM

## 2018-05-07 DIAGNOSIS — F32.A DEPRESSION: ICD-10-CM

## 2018-05-07 LAB
ALBUMIN SERPL-MCNC: 3.3 G/DL (ref 3.5–5)
ALT SERPL W P-5'-P-CCNC: 15 U/L (ref 0–45)
CREAT SERPL-MCNC: 1.18 MG/DL (ref 0.6–1.1)
ERYTHROCYTE [DISTWIDTH] IN BLOOD BY AUTOMATED COUNT: 11.9 % (ref 11–14.5)
GFR SERPL CREATININE-BSD FRML MDRD: 45 ML/MIN/1.73M2
HBA1C MFR BLD: 7.3 % (ref 3.5–6)
HCT VFR BLD AUTO: 40.7 % (ref 35–47)
HGB BLD-MCNC: 13.6 G/DL (ref 12–16)
MCH RBC QN AUTO: 31.1 PG (ref 27–34)
MCHC RBC AUTO-ENTMCNC: 33.4 G/DL (ref 32–36)
MCV RBC AUTO: 93 FL (ref 80–100)
PLATELET # BLD AUTO: 104 THOU/UL (ref 140–440)
PMV BLD AUTO: 7.8 FL (ref 7–10)
RBC # BLD AUTO: 4.38 MILL/UL (ref 3.8–5.4)
WBC: 6.5 THOU/UL (ref 4–11)

## 2018-05-08 ENCOUNTER — COMMUNICATION - HEALTHEAST (OUTPATIENT)
Dept: INTERNAL MEDICINE | Facility: CLINIC | Age: 68
End: 2018-05-08

## 2018-05-08 ENCOUNTER — COMMUNICATION - HEALTHEAST (OUTPATIENT)
Dept: FAMILY MEDICINE | Facility: CLINIC | Age: 68
End: 2018-05-08

## 2018-05-18 ENCOUNTER — RECORDS - HEALTHEAST (OUTPATIENT)
Dept: ADMINISTRATIVE | Facility: OTHER | Age: 68
End: 2018-05-18

## 2018-05-29 ENCOUNTER — COMMUNICATION - HEALTHEAST (OUTPATIENT)
Dept: RHEUMATOLOGY | Facility: CLINIC | Age: 68
End: 2018-05-29

## 2018-05-29 DIAGNOSIS — M05.9 SEROPOSITIVE RHEUMATOID ARTHRITIS (H): ICD-10-CM

## 2018-06-18 ENCOUNTER — COMMUNICATION - HEALTHEAST (OUTPATIENT)
Dept: NURSING | Facility: CLINIC | Age: 68
End: 2018-06-18

## 2018-06-25 ENCOUNTER — RECORDS - HEALTHEAST (OUTPATIENT)
Dept: ADMINISTRATIVE | Facility: OTHER | Age: 68
End: 2018-06-25

## 2018-06-25 ENCOUNTER — COMMUNICATION - HEALTHEAST (OUTPATIENT)
Dept: INTERNAL MEDICINE | Facility: CLINIC | Age: 68
End: 2018-06-25

## 2018-06-28 ENCOUNTER — COMMUNICATION - HEALTHEAST (OUTPATIENT)
Dept: INTERNAL MEDICINE | Facility: CLINIC | Age: 68
End: 2018-06-28

## 2018-06-28 DIAGNOSIS — G47.00 INSOMNIA: ICD-10-CM

## 2018-06-28 DIAGNOSIS — I10 HTN (HYPERTENSION): ICD-10-CM

## 2018-07-02 ENCOUNTER — OFFICE VISIT - HEALTHEAST (OUTPATIENT)
Dept: INTERNAL MEDICINE | Facility: CLINIC | Age: 68
End: 2018-07-02

## 2018-07-02 ENCOUNTER — COMMUNICATION - HEALTHEAST (OUTPATIENT)
Dept: INTERNAL MEDICINE | Facility: CLINIC | Age: 68
End: 2018-07-02

## 2018-07-02 DIAGNOSIS — R29.898 BILATERAL LEG WEAKNESS: ICD-10-CM

## 2018-07-02 DIAGNOSIS — I10 HTN (HYPERTENSION): ICD-10-CM

## 2018-07-02 DIAGNOSIS — R26.81 GAIT INSTABILITY: ICD-10-CM

## 2018-07-02 DIAGNOSIS — R74.8 ELEVATED CK: ICD-10-CM

## 2018-07-02 DIAGNOSIS — E78.5 HLD (HYPERLIPIDEMIA): ICD-10-CM

## 2018-07-02 DIAGNOSIS — Z12.31 VISIT FOR SCREENING MAMMOGRAM: ICD-10-CM

## 2018-07-02 DIAGNOSIS — E11.9 DM TYPE 2 (DIABETES MELLITUS, TYPE 2) (H): ICD-10-CM

## 2018-07-02 LAB
C REACTIVE PROTEIN LHE: <0.1 MG/DL (ref 0–0.8)
CK SERPL-CCNC: 40 U/L (ref 30–190)
ERYTHROCYTE [SEDIMENTATION RATE] IN BLOOD BY WESTERGREN METHOD: 13 MM/HR (ref 0–20)

## 2018-07-04 LAB — ALDOLASE SERPL-CCNC: 4.4 U/L (ref 1.5–8.1)

## 2018-07-09 ENCOUNTER — OFFICE VISIT - HEALTHEAST (OUTPATIENT)
Dept: INTERNAL MEDICINE | Facility: CLINIC | Age: 68
End: 2018-07-09

## 2018-07-09 DIAGNOSIS — R26.81 GAIT INSTABILITY: ICD-10-CM

## 2018-07-09 DIAGNOSIS — G89.29 CHRONIC PAIN: ICD-10-CM

## 2018-07-09 DIAGNOSIS — E11.9 DIABETES MELLITUS, TYPE 2 (H): ICD-10-CM

## 2018-07-09 DIAGNOSIS — K21.9 GERD (GASTROESOPHAGEAL REFLUX DISEASE): ICD-10-CM

## 2018-07-09 DIAGNOSIS — R29.898 WEAKNESS OF BOTH LOWER EXTREMITIES: ICD-10-CM

## 2018-07-16 ENCOUNTER — OFFICE VISIT - HEALTHEAST (OUTPATIENT)
Dept: RHEUMATOLOGY | Facility: CLINIC | Age: 68
End: 2018-07-16

## 2018-07-16 DIAGNOSIS — Z96.652 S/P TKR (TOTAL KNEE REPLACEMENT) USING CEMENT, LEFT: ICD-10-CM

## 2018-07-16 DIAGNOSIS — M05.9 SEROPOSITIVE RHEUMATOID ARTHRITIS (H): ICD-10-CM

## 2018-07-16 DIAGNOSIS — Z79.899 HIGH RISK MEDICATION USE: ICD-10-CM

## 2018-07-16 DIAGNOSIS — M54.16 LUMBAR RADICULOPATHY, CHRONIC: ICD-10-CM

## 2018-07-16 DIAGNOSIS — N18.30 STAGE 3 CHRONIC KIDNEY DISEASE (H): ICD-10-CM

## 2018-07-16 LAB
ALBUMIN SERPL-MCNC: 3.5 G/DL (ref 3.5–5)
ALT SERPL W P-5'-P-CCNC: 19 U/L (ref 0–45)
CREAT SERPL-MCNC: 1.25 MG/DL (ref 0.6–1.1)
ERYTHROCYTE [DISTWIDTH] IN BLOOD BY AUTOMATED COUNT: 11.5 % (ref 11–14.5)
GFR SERPL CREATININE-BSD FRML MDRD: 42 ML/MIN/1.73M2
HCT VFR BLD AUTO: 39.1 % (ref 35–47)
HGB BLD-MCNC: 13.2 G/DL (ref 12–16)
MCH RBC QN AUTO: 31.8 PG (ref 27–34)
MCHC RBC AUTO-ENTMCNC: 33.7 G/DL (ref 32–36)
MCV RBC AUTO: 94 FL (ref 80–100)
PLATELET # BLD AUTO: 133 THOU/UL (ref 140–440)
PMV BLD AUTO: 7.9 FL (ref 7–10)
RBC # BLD AUTO: 4.14 MILL/UL (ref 3.8–5.4)
WBC: 8.6 THOU/UL (ref 4–11)

## 2018-07-23 ENCOUNTER — HOSPITAL ENCOUNTER (OUTPATIENT)
Dept: PHYSICAL MEDICINE AND REHAB | Facility: CLINIC | Age: 68
Discharge: HOME | End: 2018-07-23
Attending: PHYSICAL MEDICINE & REHABILITATION

## 2018-07-23 DIAGNOSIS — R29.898 WEAKNESS OF BOTH LOWER EXTREMITIES: ICD-10-CM

## 2018-07-24 ENCOUNTER — COMMUNICATION - HEALTHEAST (OUTPATIENT)
Dept: INTERNAL MEDICINE | Facility: CLINIC | Age: 68
End: 2018-07-24

## 2018-07-27 ENCOUNTER — COMMUNICATION - HEALTHEAST (OUTPATIENT)
Dept: RHEUMATOLOGY | Facility: CLINIC | Age: 68
End: 2018-07-27

## 2018-07-27 DIAGNOSIS — M05.9 SEROPOSITIVE RHEUMATOID ARTHRITIS (H): ICD-10-CM

## 2018-08-07 ENCOUNTER — OFFICE VISIT - HEALTHEAST (OUTPATIENT)
Dept: INTERNAL MEDICINE | Facility: CLINIC | Age: 68
End: 2018-08-07

## 2018-08-07 DIAGNOSIS — G89.29 CHRONIC PAIN: ICD-10-CM

## 2018-08-07 DIAGNOSIS — Z51.81 MEDICATION MONITORING ENCOUNTER: ICD-10-CM

## 2018-08-07 DIAGNOSIS — Z78.0 POST-MENOPAUSAL: ICD-10-CM

## 2018-08-07 DIAGNOSIS — M19.90 OSTEOARTHRITIS: ICD-10-CM

## 2018-08-07 DIAGNOSIS — I10 HTN (HYPERTENSION): ICD-10-CM

## 2018-08-07 DIAGNOSIS — B37.2 CANDIDIASIS OF SKIN: ICD-10-CM

## 2018-08-07 DIAGNOSIS — E11.9 DIABETES MELLITUS, TYPE 2 (H): ICD-10-CM

## 2018-08-07 LAB
AMPHETAMINES UR QL SCN: ABNORMAL
BARBITURATES UR QL: ABNORMAL
BENZODIAZ UR QL: ABNORMAL
CANNABINOIDS UR QL SCN: ABNORMAL
COCAINE UR QL: ABNORMAL
CREAT UR-MCNC: 121.4 MG/DL
CREAT UR-MCNC: 121.4 MG/DL
HBA1C MFR BLD: 6.4 % (ref 3.5–6)
MICROALBUMIN UR-MCNC: 5.9 MG/DL (ref 0–1.99)
MICROALBUMIN/CREAT UR: 48.6 MG/G
OPIATES UR QL SCN: ABNORMAL
OXYCODONE UR QL: ABNORMAL
PCP UR QL SCN: ABNORMAL

## 2018-08-08 ENCOUNTER — COMMUNICATION - HEALTHEAST (OUTPATIENT)
Dept: INTERNAL MEDICINE | Facility: CLINIC | Age: 68
End: 2018-08-08

## 2018-08-23 ENCOUNTER — HOSPITAL ENCOUNTER (OUTPATIENT)
Dept: PALLIATIVE MEDICINE | Facility: OTHER | Age: 68
Discharge: HOME | End: 2018-08-23
Attending: PAIN MEDICINE

## 2018-08-23 DIAGNOSIS — M54.16 LUMBAR RADICULOPATHY, CHRONIC: ICD-10-CM

## 2018-08-23 ASSESSMENT — MIFFLIN-ST. JEOR: SCORE: 1651.2

## 2018-08-24 ENCOUNTER — COMMUNICATION - HEALTHEAST (OUTPATIENT)
Dept: CARE COORDINATION | Facility: CLINIC | Age: 68
End: 2018-08-24

## 2018-08-24 ENCOUNTER — COMMUNICATION - HEALTHEAST (OUTPATIENT)
Dept: NURSING | Facility: CLINIC | Age: 68
End: 2018-08-24

## 2018-08-24 ENCOUNTER — COMMUNICATION - HEALTHEAST (OUTPATIENT)
Dept: INTERNAL MEDICINE | Facility: CLINIC | Age: 68
End: 2018-08-24

## 2018-09-18 ENCOUNTER — COMMUNICATION - HEALTHEAST (OUTPATIENT)
Dept: NURSING | Facility: CLINIC | Age: 68
End: 2018-09-18

## 2018-09-18 ENCOUNTER — AMBULATORY - HEALTHEAST (OUTPATIENT)
Dept: NURSING | Facility: CLINIC | Age: 68
End: 2018-09-18

## 2018-10-03 ENCOUNTER — COMMUNICATION - HEALTHEAST (OUTPATIENT)
Dept: CARE COORDINATION | Facility: CLINIC | Age: 68
End: 2018-10-03

## 2018-10-15 ENCOUNTER — AMBULATORY - HEALTHEAST (OUTPATIENT)
Dept: LAB | Facility: CLINIC | Age: 68
End: 2018-10-15

## 2018-10-15 DIAGNOSIS — M05.9 SEROPOSITIVE RHEUMATOID ARTHRITIS (H): ICD-10-CM

## 2018-10-15 LAB
ALBUMIN SERPL-MCNC: 3.6 G/DL (ref 3.5–5)
ALT SERPL W P-5'-P-CCNC: 14 U/L (ref 0–45)
CREAT SERPL-MCNC: 1.37 MG/DL (ref 0.6–1.1)
ERYTHROCYTE [DISTWIDTH] IN BLOOD BY AUTOMATED COUNT: 11.3 % (ref 11–14.5)
GFR SERPL CREATININE-BSD FRML MDRD: 38 ML/MIN/1.73M2
HCT VFR BLD AUTO: 40 % (ref 35–47)
HGB BLD-MCNC: 13.8 G/DL (ref 12–16)
MCH RBC QN AUTO: 31.8 PG (ref 27–34)
MCHC RBC AUTO-ENTMCNC: 34.5 G/DL (ref 32–36)
MCV RBC AUTO: 92 FL (ref 80–100)
PLATELET # BLD AUTO: 134 THOU/UL (ref 140–440)
PMV BLD AUTO: 8.2 FL (ref 7–10)
RBC # BLD AUTO: 4.34 MILL/UL (ref 3.8–5.4)
WBC: 8.1 THOU/UL (ref 4–11)

## 2018-10-26 ENCOUNTER — COMMUNICATION - HEALTHEAST (OUTPATIENT)
Dept: NURSING | Facility: CLINIC | Age: 68
End: 2018-10-26

## 2018-11-02 ENCOUNTER — COMMUNICATION - HEALTHEAST (OUTPATIENT)
Dept: INTERNAL MEDICINE | Facility: CLINIC | Age: 68
End: 2018-11-02

## 2018-11-02 DIAGNOSIS — G89.29 CHRONIC PAIN: ICD-10-CM

## 2018-11-07 ENCOUNTER — COMMUNICATION - HEALTHEAST (OUTPATIENT)
Dept: INTERNAL MEDICINE | Facility: CLINIC | Age: 68
End: 2018-11-07

## 2018-11-07 ENCOUNTER — OFFICE VISIT - HEALTHEAST (OUTPATIENT)
Dept: INTERNAL MEDICINE | Facility: CLINIC | Age: 68
End: 2018-11-07

## 2018-11-07 DIAGNOSIS — E66.3 OVERWEIGHT (BMI 25.0-29.9): ICD-10-CM

## 2018-11-07 DIAGNOSIS — E11.9 DIABETES MELLITUS, TYPE 2 (H): ICD-10-CM

## 2018-11-07 DIAGNOSIS — R29.898 LEG WEAKNESS: ICD-10-CM

## 2018-11-07 DIAGNOSIS — F32.A DEPRESSION: ICD-10-CM

## 2018-11-07 DIAGNOSIS — G89.29 CHRONIC PAIN: ICD-10-CM

## 2018-11-07 LAB — HBA1C MFR BLD: 6.7 % (ref 3.5–6)

## 2018-11-21 ENCOUNTER — RECORDS - HEALTHEAST (OUTPATIENT)
Dept: ADMINISTRATIVE | Facility: OTHER | Age: 68
End: 2018-11-21

## 2018-12-13 ENCOUNTER — COMMUNICATION - HEALTHEAST (OUTPATIENT)
Dept: CARE COORDINATION | Facility: CLINIC | Age: 68
End: 2018-12-13

## 2018-12-27 ENCOUNTER — COMMUNICATION - HEALTHEAST (OUTPATIENT)
Dept: NURSING | Facility: CLINIC | Age: 68
End: 2018-12-27

## 2019-01-03 ENCOUNTER — COMMUNICATION - HEALTHEAST (OUTPATIENT)
Dept: PALLIATIVE MEDICINE | Facility: OTHER | Age: 69
End: 2019-01-03

## 2019-01-03 ENCOUNTER — HOSPITAL ENCOUNTER (OUTPATIENT)
Dept: PALLIATIVE MEDICINE | Facility: OTHER | Age: 69
Discharge: HOME | End: 2019-01-03
Attending: PAIN MEDICINE

## 2019-01-03 DIAGNOSIS — M54.16 LUMBAR RADICULITIS: ICD-10-CM

## 2019-01-03 DIAGNOSIS — M54.16 LUMBAR RADICULOPATHY: ICD-10-CM

## 2019-01-07 ENCOUNTER — COMMUNICATION - HEALTHEAST (OUTPATIENT)
Dept: INTERNAL MEDICINE | Facility: CLINIC | Age: 69
End: 2019-01-07

## 2019-01-07 DIAGNOSIS — G89.29 CHRONIC PAIN: ICD-10-CM

## 2019-01-17 ENCOUNTER — COMMUNICATION - HEALTHEAST (OUTPATIENT)
Dept: PALLIATIVE MEDICINE | Facility: OTHER | Age: 69
End: 2019-01-17

## 2019-01-28 ENCOUNTER — COMMUNICATION - HEALTHEAST (OUTPATIENT)
Dept: NURSING | Facility: CLINIC | Age: 69
End: 2019-01-28

## 2019-01-30 ENCOUNTER — COMMUNICATION - HEALTHEAST (OUTPATIENT)
Dept: INTERNAL MEDICINE | Facility: CLINIC | Age: 69
End: 2019-01-30

## 2019-01-30 DIAGNOSIS — B37.2 CANDIDIASIS OF SKIN: ICD-10-CM

## 2019-02-01 ENCOUNTER — COMMUNICATION - HEALTHEAST (OUTPATIENT)
Dept: INTERNAL MEDICINE | Facility: CLINIC | Age: 69
End: 2019-02-01

## 2019-02-01 DIAGNOSIS — G89.29 CHRONIC PAIN: ICD-10-CM

## 2019-02-04 ENCOUNTER — COMMUNICATION - HEALTHEAST (OUTPATIENT)
Dept: CARE COORDINATION | Facility: CLINIC | Age: 69
End: 2019-02-04

## 2019-02-06 ENCOUNTER — OFFICE VISIT - HEALTHEAST (OUTPATIENT)
Dept: INTERNAL MEDICINE | Facility: CLINIC | Age: 69
End: 2019-02-06

## 2019-02-06 ENCOUNTER — COMMUNICATION - HEALTHEAST (OUTPATIENT)
Dept: NURSING | Facility: CLINIC | Age: 69
End: 2019-02-06

## 2019-02-06 DIAGNOSIS — M54.16 LUMBAR RADICULOPATHY, CHRONIC: ICD-10-CM

## 2019-02-06 DIAGNOSIS — M05.9 SEROPOSITIVE RHEUMATOID ARTHRITIS (H): ICD-10-CM

## 2019-02-06 DIAGNOSIS — Z12.31 ENCOUNTER FOR SCREENING MAMMOGRAM FOR BREAST CANCER: ICD-10-CM

## 2019-02-06 DIAGNOSIS — E11.8 TYPE 2 DIABETES MELLITUS WITH COMPLICATION, WITH LONG-TERM CURRENT USE OF INSULIN (H): ICD-10-CM

## 2019-02-06 DIAGNOSIS — E78.2 MIXED HYPERLIPIDEMIA: ICD-10-CM

## 2019-02-06 DIAGNOSIS — G89.4 CHRONIC PAIN SYNDROME: ICD-10-CM

## 2019-02-06 DIAGNOSIS — Z79.4 TYPE 2 DIABETES MELLITUS WITH COMPLICATION, WITH LONG-TERM CURRENT USE OF INSULIN (H): ICD-10-CM

## 2019-02-06 DIAGNOSIS — I10 ESSENTIAL HYPERTENSION: ICD-10-CM

## 2019-02-06 DIAGNOSIS — R29.898 WEAKNESS OF BOTH LOWER EXTREMITIES: ICD-10-CM

## 2019-02-06 LAB
ALBUMIN SERPL-MCNC: 3.5 G/DL (ref 3.5–5)
ALT SERPL W P-5'-P-CCNC: 12 U/L (ref 0–45)
CREAT SERPL-MCNC: 1.31 MG/DL (ref 0.6–1.1)
ERYTHROCYTE [DISTWIDTH] IN BLOOD BY AUTOMATED COUNT: 11.7 % (ref 11–14.5)
GFR SERPL CREATININE-BSD FRML MDRD: 40 ML/MIN/1.73M2
HCT VFR BLD AUTO: 41.5 % (ref 35–47)
HGB BLD-MCNC: 13.9 G/DL (ref 12–16)
MCH RBC QN AUTO: 31.9 PG (ref 27–34)
MCHC RBC AUTO-ENTMCNC: 33.5 G/DL (ref 32–36)
MCV RBC AUTO: 95 FL (ref 80–100)
PLATELET # BLD AUTO: 139 THOU/UL (ref 140–440)
PMV BLD AUTO: 7.7 FL (ref 7–10)
RBC # BLD AUTO: 4.35 MILL/UL (ref 3.8–5.4)
WBC: 10.5 THOU/UL (ref 4–11)

## 2019-02-07 ENCOUNTER — COMMUNICATION - HEALTHEAST (OUTPATIENT)
Dept: INTERNAL MEDICINE | Facility: CLINIC | Age: 69
End: 2019-02-07

## 2019-02-15 ENCOUNTER — OFFICE VISIT - HEALTHEAST (OUTPATIENT)
Dept: INTERNAL MEDICINE | Facility: CLINIC | Age: 69
End: 2019-02-15

## 2019-02-15 ENCOUNTER — COMMUNICATION - HEALTHEAST (OUTPATIENT)
Dept: PALLIATIVE MEDICINE | Facility: OTHER | Age: 69
End: 2019-02-15

## 2019-02-15 DIAGNOSIS — M25.531 RIGHT WRIST PAIN: ICD-10-CM

## 2019-02-15 DIAGNOSIS — M79.641 PAIN OF RIGHT HAND: ICD-10-CM

## 2019-02-15 ASSESSMENT — MIFFLIN-ST. JEOR: SCORE: 1678.42

## 2019-03-04 ENCOUNTER — COMMUNICATION - HEALTHEAST (OUTPATIENT)
Dept: PALLIATIVE MEDICINE | Facility: OTHER | Age: 69
End: 2019-03-04

## 2019-03-11 ENCOUNTER — COMMUNICATION - HEALTHEAST (OUTPATIENT)
Dept: INTERNAL MEDICINE | Facility: CLINIC | Age: 69
End: 2019-03-11

## 2019-03-11 DIAGNOSIS — G89.29 CHRONIC PAIN: ICD-10-CM

## 2019-03-15 ENCOUNTER — COMMUNICATION - HEALTHEAST (OUTPATIENT)
Dept: PALLIATIVE MEDICINE | Facility: OTHER | Age: 69
End: 2019-03-15

## 2019-03-25 ENCOUNTER — RECORDS - HEALTHEAST (OUTPATIENT)
Dept: ADMINISTRATIVE | Facility: OTHER | Age: 69
End: 2019-03-25

## 2019-03-25 ENCOUNTER — COMMUNICATION - HEALTHEAST (OUTPATIENT)
Dept: INTERNAL MEDICINE | Facility: CLINIC | Age: 69
End: 2019-03-25

## 2019-03-25 DIAGNOSIS — G47.00 INSOMNIA: ICD-10-CM

## 2019-03-26 ENCOUNTER — COMMUNICATION - HEALTHEAST (OUTPATIENT)
Dept: PALLIATIVE MEDICINE | Facility: OTHER | Age: 69
End: 2019-03-26

## 2019-03-26 DIAGNOSIS — M54.16 LUMBAR RADICULOPATHY: ICD-10-CM

## 2019-04-03 ENCOUNTER — COMMUNICATION - HEALTHEAST (OUTPATIENT)
Dept: PALLIATIVE MEDICINE | Facility: OTHER | Age: 69
End: 2019-04-03

## 2019-04-03 DIAGNOSIS — M54.16 LUMBAR RADICULOPATHY: ICD-10-CM

## 2019-04-04 ENCOUNTER — COMMUNICATION - HEALTHEAST (OUTPATIENT)
Dept: NURSING | Facility: CLINIC | Age: 69
End: 2019-04-04

## 2019-04-05 ENCOUNTER — COMMUNICATION - HEALTHEAST (OUTPATIENT)
Dept: INTERNAL MEDICINE | Facility: CLINIC | Age: 69
End: 2019-04-05

## 2019-04-05 DIAGNOSIS — M05.9 SEROPOSITIVE RHEUMATOID ARTHRITIS (H): ICD-10-CM

## 2019-04-09 ENCOUNTER — COMMUNICATION - HEALTHEAST (OUTPATIENT)
Dept: INTERNAL MEDICINE | Facility: CLINIC | Age: 69
End: 2019-04-09

## 2019-04-09 DIAGNOSIS — E78.2 MIXED HYPERLIPIDEMIA: ICD-10-CM

## 2019-04-09 DIAGNOSIS — I10 HTN (HYPERTENSION): ICD-10-CM

## 2019-04-10 ENCOUNTER — OFFICE VISIT - HEALTHEAST (OUTPATIENT)
Dept: RHEUMATOLOGY | Facility: CLINIC | Age: 69
End: 2019-04-10

## 2019-04-10 ENCOUNTER — RECORDS - HEALTHEAST (OUTPATIENT)
Dept: GENERAL RADIOLOGY | Facility: CLINIC | Age: 69
End: 2019-04-10

## 2019-04-10 DIAGNOSIS — M15.9 OSTEOARTHRITIS OF MULTIPLE JOINTS, UNSPECIFIED OSTEOARTHRITIS TYPE: ICD-10-CM

## 2019-04-10 DIAGNOSIS — Z79.899 OTHER LONG TERM (CURRENT) DRUG THERAPY: ICD-10-CM

## 2019-04-10 DIAGNOSIS — M05.9 SEROPOSITIVE RHEUMATOID ARTHRITIS (H): ICD-10-CM

## 2019-04-10 DIAGNOSIS — G89.29 CHRONIC NECK AND BACK PAIN: ICD-10-CM

## 2019-04-10 DIAGNOSIS — M15.9 POLYOSTEOARTHRITIS, UNSPECIFIED: ICD-10-CM

## 2019-04-10 DIAGNOSIS — M79.642 PAIN IN BOTH HANDS: ICD-10-CM

## 2019-04-10 DIAGNOSIS — M79.641 PAIN IN RIGHT HAND: ICD-10-CM

## 2019-04-10 DIAGNOSIS — M79.642 PAIN IN LEFT HAND: ICD-10-CM

## 2019-04-10 DIAGNOSIS — M54.9 CHRONIC NECK AND BACK PAIN: ICD-10-CM

## 2019-04-10 DIAGNOSIS — M54.2 CHRONIC NECK AND BACK PAIN: ICD-10-CM

## 2019-04-10 DIAGNOSIS — F11.90 OPIOID USE: ICD-10-CM

## 2019-04-10 DIAGNOSIS — Z79.899 HIGH RISK MEDICATION USE: ICD-10-CM

## 2019-04-10 DIAGNOSIS — N18.30 STAGE 3 CHRONIC KIDNEY DISEASE (H): ICD-10-CM

## 2019-04-10 DIAGNOSIS — M05.9 RHEUMATOID ARTHRITIS WITH RHEUMATOID FACTOR, UNSPECIFIED (H): ICD-10-CM

## 2019-04-10 DIAGNOSIS — M79.641 PAIN IN BOTH HANDS: ICD-10-CM

## 2019-04-10 DIAGNOSIS — E66.3 OVERWEIGHT: ICD-10-CM

## 2019-04-10 LAB
ALBUMIN SERPL-MCNC: 3.5 G/DL (ref 3.5–5)
ALT SERPL W P-5'-P-CCNC: 19 U/L (ref 0–45)
AST SERPL W P-5'-P-CCNC: 17 U/L (ref 0–40)
C REACTIVE PROTEIN LHE: <0.1 MG/DL (ref 0–0.8)
CREAT SERPL-MCNC: 1.24 MG/DL (ref 0.6–1.1)
ERYTHROCYTE [DISTWIDTH] IN BLOOD BY AUTOMATED COUNT: 12.2 % (ref 11–14.5)
ERYTHROCYTE [SEDIMENTATION RATE] IN BLOOD BY WESTERGREN METHOD: 16 MM/HR (ref 0–20)
GFR SERPL CREATININE-BSD FRML MDRD: 43 ML/MIN/1.73M2
HCT VFR BLD AUTO: 40.4 % (ref 35–47)
HGB BLD-MCNC: 13.6 G/DL (ref 12–16)
MCH RBC QN AUTO: 31 PG (ref 27–34)
MCHC RBC AUTO-ENTMCNC: 33.6 G/DL (ref 32–36)
MCV RBC AUTO: 92 FL (ref 80–100)
PLATELET # BLD AUTO: 159 THOU/UL (ref 140–440)
PMV BLD AUTO: 7.7 FL (ref 7–10)
RBC # BLD AUTO: 4.38 MILL/UL (ref 3.8–5.4)
WBC: 12.5 THOU/UL (ref 4–11)

## 2019-04-10 ASSESSMENT — MIFFLIN-ST. JEOR: SCORE: 1678.42

## 2019-04-12 ENCOUNTER — COMMUNICATION - HEALTHEAST (OUTPATIENT)
Dept: NURSING | Facility: CLINIC | Age: 69
End: 2019-04-12

## 2019-04-12 ENCOUNTER — COMMUNICATION - HEALTHEAST (OUTPATIENT)
Dept: PALLIATIVE MEDICINE | Facility: OTHER | Age: 69
End: 2019-04-12

## 2019-04-12 LAB
GAMMA INTERFERON BACKGROUND BLD IA-ACNC: 0.09 IU/ML
M TB IFN-G BLD-IMP: NEGATIVE
MITOGEN IGNF BCKGRD COR BLD-ACNC: -0.01 IU/ML
MITOGEN IGNF BCKGRD COR BLD-ACNC: -0.01 IU/ML
QTF INTERPRETATION: NORMAL
QTF MITOGEN - NIL: 8.43 IU/ML

## 2019-04-15 ENCOUNTER — OFFICE VISIT - HEALTHEAST (OUTPATIENT)
Dept: INTERNAL MEDICINE | Facility: CLINIC | Age: 69
End: 2019-04-15

## 2019-04-15 DIAGNOSIS — N18.30 STAGE 3 CHRONIC KIDNEY DISEASE (H): ICD-10-CM

## 2019-04-15 DIAGNOSIS — M05.9 SEROPOSITIVE RHEUMATOID ARTHRITIS (H): ICD-10-CM

## 2019-04-15 DIAGNOSIS — F51.01 PRIMARY INSOMNIA: ICD-10-CM

## 2019-04-15 DIAGNOSIS — G89.4 CHRONIC PAIN SYNDROME: ICD-10-CM

## 2019-04-15 DIAGNOSIS — R29.898 WEAKNESS OF BOTH LOWER EXTREMITIES: ICD-10-CM

## 2019-04-15 DIAGNOSIS — E11.00 TYPE 2 DIABETES MELLITUS WITH HYPEROSMOLARITY WITHOUT COMA, WITHOUT LONG-TERM CURRENT USE OF INSULIN (H): ICD-10-CM

## 2019-04-15 DIAGNOSIS — I10 ESSENTIAL HYPERTENSION: ICD-10-CM

## 2019-04-15 DIAGNOSIS — Z01.818 PREOP GENERAL PHYSICAL EXAM: ICD-10-CM

## 2019-04-15 DIAGNOSIS — E78.2 MIXED HYPERLIPIDEMIA: ICD-10-CM

## 2019-04-15 DIAGNOSIS — M54.16 LUMBAR RADICULOPATHY, CHRONIC: ICD-10-CM

## 2019-04-15 LAB
ATRIAL RATE - MUSE: 60 BPM
DIASTOLIC BLOOD PRESSURE - MUSE: NORMAL MMHG
INTERPRETATION ECG - MUSE: NORMAL
P AXIS - MUSE: 36 DEGREES
PR INTERVAL - MUSE: 174 MS
QRS DURATION - MUSE: 142 MS
QT - MUSE: 474 MS
QTC - MUSE: 474 MS
R AXIS - MUSE: 113 DEGREES
SYSTOLIC BLOOD PRESSURE - MUSE: NORMAL MMHG
T AXIS - MUSE: 74 DEGREES
VENTRICULAR RATE- MUSE: 60 BPM

## 2019-04-16 ENCOUNTER — COMMUNICATION - HEALTHEAST (OUTPATIENT)
Dept: RHEUMATOLOGY | Facility: CLINIC | Age: 69
End: 2019-04-16

## 2019-04-17 ENCOUNTER — COMMUNICATION - HEALTHEAST (OUTPATIENT)
Dept: INTERNAL MEDICINE | Facility: CLINIC | Age: 69
End: 2019-04-17

## 2019-04-17 DIAGNOSIS — G89.29 CHRONIC PAIN: ICD-10-CM

## 2019-04-23 ASSESSMENT — MIFFLIN-ST. JEOR: SCORE: 1670.48

## 2019-04-24 ENCOUNTER — ANESTHESIA - HEALTHEAST (OUTPATIENT)
Dept: SURGERY | Facility: AMBULATORY SURGERY CENTER | Age: 69
End: 2019-04-24

## 2019-04-24 ENCOUNTER — SURGERY - HEALTHEAST (OUTPATIENT)
Dept: SURGERY | Facility: CLINIC | Age: 69
End: 2019-04-24

## 2019-04-25 ENCOUNTER — SURGERY - HEALTHEAST (OUTPATIENT)
Dept: SURGERY | Facility: AMBULATORY SURGERY CENTER | Age: 69
End: 2019-04-25

## 2019-04-25 ASSESSMENT — MIFFLIN-ST. JEOR: SCORE: 1670.48

## 2019-05-02 ENCOUNTER — HOSPITAL ENCOUNTER (OUTPATIENT)
Dept: PALLIATIVE MEDICINE | Facility: OTHER | Age: 69
Discharge: HOME | End: 2019-05-02
Attending: PAIN MEDICINE

## 2019-05-02 DIAGNOSIS — M54.16 LUMBAR RADICULOPATHY: ICD-10-CM

## 2019-05-02 ASSESSMENT — MIFFLIN-ST. JEOR: SCORE: 1662.55

## 2019-05-14 ENCOUNTER — RECORDS - HEALTHEAST (OUTPATIENT)
Dept: ADMINISTRATIVE | Facility: OTHER | Age: 69
End: 2019-05-14

## 2019-05-20 ENCOUNTER — OFFICE VISIT - HEALTHEAST (OUTPATIENT)
Dept: CARDIOLOGY | Facility: CLINIC | Age: 69
End: 2019-05-20

## 2019-05-20 DIAGNOSIS — I10 ESSENTIAL HYPERTENSION: ICD-10-CM

## 2019-05-20 DIAGNOSIS — I25.10 ATHEROSCLEROTIC CARDIOVASCULAR DISEASE: ICD-10-CM

## 2019-05-20 DIAGNOSIS — E78.2 MIXED HYPERLIPIDEMIA: ICD-10-CM

## 2019-05-20 ASSESSMENT — MIFFLIN-ST. JEOR: SCORE: 1648.94

## 2019-05-21 ENCOUNTER — RECORDS - HEALTHEAST (OUTPATIENT)
Dept: ADMINISTRATIVE | Facility: OTHER | Age: 69
End: 2019-05-21

## 2019-05-21 ENCOUNTER — RECORDS - HEALTHEAST (OUTPATIENT)
Dept: HEALTH INFORMATION MANAGEMENT | Facility: CLINIC | Age: 69
End: 2019-05-21

## 2019-05-30 ENCOUNTER — HOSPITAL ENCOUNTER (OUTPATIENT)
Dept: CT IMAGING | Facility: HOSPITAL | Age: 69
Discharge: HOME | End: 2019-05-30
Attending: PSYCHIATRY & NEUROLOGY

## 2019-05-30 ENCOUNTER — RECORDS - HEALTHEAST (OUTPATIENT)
Dept: LAB | Facility: HOSPITAL | Age: 69
End: 2019-05-30

## 2019-05-30 DIAGNOSIS — R20.0 LEG NUMBNESS: ICD-10-CM

## 2019-05-30 DIAGNOSIS — G62.89 AXONAL NEUROPATHY: ICD-10-CM

## 2019-05-30 DIAGNOSIS — R27.0 ATAXIA: ICD-10-CM

## 2019-05-30 DIAGNOSIS — R29.898 LEG WEAKNESS, BILATERAL: ICD-10-CM

## 2019-05-30 LAB
CK SERPL-CCNC: 238 U/L (ref 30–190)
TSH SERPL DL<=0.005 MIU/L-ACNC: 1.93 UIU/ML (ref 0.3–5)
VIT B12 SERPL-MCNC: 1821 PG/ML (ref 213–816)

## 2019-06-03 ENCOUNTER — COMMUNICATION - HEALTHEAST (OUTPATIENT)
Dept: INTERNAL MEDICINE | Facility: CLINIC | Age: 69
End: 2019-06-03

## 2019-06-03 DIAGNOSIS — G89.29 CHRONIC PAIN: ICD-10-CM

## 2019-06-05 ENCOUNTER — COMMUNICATION - HEALTHEAST (OUTPATIENT)
Dept: INTERNAL MEDICINE | Facility: CLINIC | Age: 69
End: 2019-06-05

## 2019-06-05 DIAGNOSIS — G89.29 CHRONIC PAIN: ICD-10-CM

## 2019-06-06 ENCOUNTER — COMMUNICATION - HEALTHEAST (OUTPATIENT)
Dept: INTERNAL MEDICINE | Facility: CLINIC | Age: 69
End: 2019-06-06

## 2019-06-06 DIAGNOSIS — K21.9 GERD (GASTROESOPHAGEAL REFLUX DISEASE): ICD-10-CM

## 2019-06-12 ENCOUNTER — COMMUNICATION - HEALTHEAST (OUTPATIENT)
Dept: INTERNAL MEDICINE | Facility: CLINIC | Age: 69
End: 2019-06-12

## 2019-06-14 ENCOUNTER — COMMUNICATION - HEALTHEAST (OUTPATIENT)
Dept: NURSING | Facility: CLINIC | Age: 69
End: 2019-06-14

## 2019-06-27 ENCOUNTER — RECORDS - HEALTHEAST (OUTPATIENT)
Dept: LAB | Facility: CLINIC | Age: 69
End: 2019-06-27

## 2019-06-27 ENCOUNTER — AMBULATORY - HEALTHEAST (OUTPATIENT)
Dept: CARE COORDINATION | Facility: CLINIC | Age: 69
End: 2019-06-27

## 2019-06-27 LAB
ANION GAP SERPL CALCULATED.3IONS-SCNC: 10 MMOL/L (ref 5–18)
BUN SERPL-MCNC: 14 MG/DL (ref 8–28)
CALCIUM SERPL-MCNC: 8.8 MG/DL (ref 8.5–10.5)
CHLORIDE BLD-SCNC: 108 MMOL/L (ref 98–107)
CO2 SERPL-SCNC: 24 MMOL/L (ref 22–31)
CREAT SERPL-MCNC: 0.86 MG/DL (ref 0.6–1.1)
ERYTHROCYTE [DISTWIDTH] IN BLOOD BY AUTOMATED COUNT: 14.4 % (ref 11–14.5)
GFR SERPL CREATININE-BSD FRML MDRD: >60 ML/MIN/1.73M2
GLUCOSE BLD-MCNC: 50 MG/DL (ref 70–125)
HCT VFR BLD AUTO: 34.3 % (ref 35–47)
HGB BLD-MCNC: 11.6 G/DL (ref 12–16)
MCH RBC QN AUTO: 31.4 PG (ref 27–34)
MCHC RBC AUTO-ENTMCNC: 33.8 G/DL (ref 32–36)
MCV RBC AUTO: 93 FL (ref 80–100)
PLATELET # BLD AUTO: 196 THOU/UL (ref 140–440)
PMV BLD AUTO: 10.1 FL (ref 8.5–12.5)
POTASSIUM BLD-SCNC: 3.8 MMOL/L (ref 3.5–5)
RBC # BLD AUTO: 3.7 MILL/UL (ref 3.8–5.4)
SODIUM SERPL-SCNC: 142 MMOL/L (ref 136–145)
WBC: 14.3 THOU/UL (ref 4–11)

## 2019-07-01 ENCOUNTER — OFFICE VISIT - HEALTHEAST (OUTPATIENT)
Dept: GERIATRICS | Facility: CLINIC | Age: 69
End: 2019-07-01

## 2019-07-01 ENCOUNTER — COMMUNICATION - HEALTHEAST (OUTPATIENT)
Dept: NURSING | Facility: CLINIC | Age: 69
End: 2019-07-01

## 2019-07-01 DIAGNOSIS — I10 ESSENTIAL HYPERTENSION: ICD-10-CM

## 2019-07-01 DIAGNOSIS — G89.29 CHRONIC LOW BACK PAIN, UNSPECIFIED BACK PAIN LATERALITY, WITH SCIATICA PRESENCE UNSPECIFIED: ICD-10-CM

## 2019-07-01 DIAGNOSIS — Z79.4 TYPE 2 DIABETES MELLITUS WITH COMPLICATION, WITH LONG-TERM CURRENT USE OF INSULIN (H): ICD-10-CM

## 2019-07-01 DIAGNOSIS — M54.5 CHRONIC LOW BACK PAIN, UNSPECIFIED BACK PAIN LATERALITY, WITH SCIATICA PRESENCE UNSPECIFIED: ICD-10-CM

## 2019-07-01 DIAGNOSIS — Z87.898 HISTORY OF BACTEREMIA: ICD-10-CM

## 2019-07-01 DIAGNOSIS — N30.00 ACUTE CYSTITIS WITHOUT HEMATURIA: ICD-10-CM

## 2019-07-01 DIAGNOSIS — M06.9 RHEUMATOID ARTHRITIS INVOLVING MULTIPLE SITES, UNSPECIFIED RHEUMATOID FACTOR PRESENCE: ICD-10-CM

## 2019-07-01 DIAGNOSIS — E11.8 TYPE 2 DIABETES MELLITUS WITH COMPLICATION, WITH LONG-TERM CURRENT USE OF INSULIN (H): ICD-10-CM

## 2019-07-03 ENCOUNTER — RECORDS - HEALTHEAST (OUTPATIENT)
Dept: LAB | Facility: CLINIC | Age: 69
End: 2019-07-03

## 2019-07-03 ENCOUNTER — COMMUNICATION - HEALTHEAST (OUTPATIENT)
Dept: INTERNAL MEDICINE | Facility: CLINIC | Age: 69
End: 2019-07-03

## 2019-07-03 LAB
ANION GAP SERPL CALCULATED.3IONS-SCNC: 8 MMOL/L (ref 5–18)
BUN SERPL-MCNC: 18 MG/DL (ref 8–28)
CALCIUM SERPL-MCNC: 9.8 MG/DL (ref 8.5–10.5)
CHLORIDE BLD-SCNC: 105 MMOL/L (ref 98–107)
CO2 SERPL-SCNC: 28 MMOL/L (ref 22–31)
CREAT SERPL-MCNC: 1.11 MG/DL (ref 0.6–1.1)
ERYTHROCYTE [DISTWIDTH] IN BLOOD BY AUTOMATED COUNT: 13.9 % (ref 11–14.5)
GFR SERPL CREATININE-BSD FRML MDRD: 48 ML/MIN/1.73M2
GLUCOSE BLD-MCNC: 152 MG/DL (ref 70–125)
HCT VFR BLD AUTO: 38.7 % (ref 35–47)
HGB BLD-MCNC: 12.7 G/DL (ref 12–16)
MCH RBC QN AUTO: 31 PG (ref 27–34)
MCHC RBC AUTO-ENTMCNC: 32.8 G/DL (ref 32–36)
MCV RBC AUTO: 94 FL (ref 80–100)
PLATELET # BLD AUTO: 313 THOU/UL (ref 140–440)
PMV BLD AUTO: 9.4 FL (ref 8.5–12.5)
POTASSIUM BLD-SCNC: 4.1 MMOL/L (ref 3.5–5)
RBC # BLD AUTO: 4.1 MILL/UL (ref 3.8–5.4)
SODIUM SERPL-SCNC: 141 MMOL/L (ref 136–145)
WBC: 11.8 THOU/UL (ref 4–11)

## 2019-07-08 ENCOUNTER — RECORDS - HEALTHEAST (OUTPATIENT)
Dept: LAB | Facility: CLINIC | Age: 69
End: 2019-07-08

## 2019-07-09 LAB
ANION GAP SERPL CALCULATED.3IONS-SCNC: 8 MMOL/L (ref 5–18)
BASOPHILS # BLD AUTO: 0.1 THOU/UL (ref 0–0.2)
BASOPHILS NFR BLD AUTO: 1 % (ref 0–2)
BUN SERPL-MCNC: 23 MG/DL (ref 8–28)
CALCIUM SERPL-MCNC: 9.7 MG/DL (ref 8.5–10.5)
CHLORIDE BLD-SCNC: 106 MMOL/L (ref 98–107)
CO2 SERPL-SCNC: 30 MMOL/L (ref 22–31)
CREAT SERPL-MCNC: 1.16 MG/DL (ref 0.6–1.1)
EOSINOPHIL # BLD AUTO: 0.4 THOU/UL (ref 0–0.4)
EOSINOPHIL NFR BLD AUTO: 4 % (ref 0–6)
ERYTHROCYTE [DISTWIDTH] IN BLOOD BY AUTOMATED COUNT: 13.9 % (ref 11–14.5)
GFR SERPL CREATININE-BSD FRML MDRD: 46 ML/MIN/1.73M2
GLUCOSE BLD-MCNC: 75 MG/DL (ref 70–125)
HCT VFR BLD AUTO: 33.8 % (ref 35–47)
HGB BLD-MCNC: 10.9 G/DL (ref 12–16)
LYMPHOCYTES # BLD AUTO: 3.2 THOU/UL (ref 0.8–4.4)
LYMPHOCYTES NFR BLD AUTO: 33 % (ref 20–40)
MCH RBC QN AUTO: 30.6 PG (ref 27–34)
MCHC RBC AUTO-ENTMCNC: 32.2 G/DL (ref 32–36)
MCV RBC AUTO: 95 FL (ref 80–100)
MONOCYTES # BLD AUTO: 0.7 THOU/UL (ref 0–0.9)
MONOCYTES NFR BLD AUTO: 7 % (ref 2–10)
NEUTROPHILS # BLD AUTO: 5.2 THOU/UL (ref 2–7.7)
NEUTROPHILS NFR BLD AUTO: 54 % (ref 50–70)
PLATELET # BLD AUTO: 182 THOU/UL (ref 140–440)
PMV BLD AUTO: 9.9 FL (ref 8.5–12.5)
POTASSIUM BLD-SCNC: 4.1 MMOL/L (ref 3.5–5)
RBC # BLD AUTO: 3.56 MILL/UL (ref 3.8–5.4)
SODIUM SERPL-SCNC: 144 MMOL/L (ref 136–145)
WBC: 9.7 THOU/UL (ref 4–11)

## 2019-07-10 ENCOUNTER — OFFICE VISIT - HEALTHEAST (OUTPATIENT)
Dept: GERIATRICS | Facility: CLINIC | Age: 69
End: 2019-07-10

## 2019-07-10 ENCOUNTER — RECORDS - HEALTHEAST (OUTPATIENT)
Dept: LAB | Facility: CLINIC | Age: 69
End: 2019-07-10

## 2019-07-10 DIAGNOSIS — I87.2 VENOUS (PERIPHERAL) INSUFFICIENCY: ICD-10-CM

## 2019-07-10 DIAGNOSIS — E11.00 TYPE 2 DIABETES MELLITUS WITH HYPEROSMOLARITY WITHOUT COMA, UNSPECIFIED WHETHER LONG TERM INSULIN USE (H): ICD-10-CM

## 2019-07-10 DIAGNOSIS — N30.00 ACUTE CYSTITIS WITHOUT HEMATURIA: ICD-10-CM

## 2019-07-10 DIAGNOSIS — I10 ESSENTIAL HYPERTENSION: ICD-10-CM

## 2019-07-11 LAB
ANION GAP SERPL CALCULATED.3IONS-SCNC: 7 MMOL/L (ref 5–18)
BUN SERPL-MCNC: 20 MG/DL (ref 8–28)
CALCIUM SERPL-MCNC: 9.6 MG/DL (ref 8.5–10.5)
CHLORIDE BLD-SCNC: 106 MMOL/L (ref 98–107)
CO2 SERPL-SCNC: 28 MMOL/L (ref 22–31)
CREAT SERPL-MCNC: 1.07 MG/DL (ref 0.6–1.1)
ERYTHROCYTE [DISTWIDTH] IN BLOOD BY AUTOMATED COUNT: 13.9 % (ref 11–14.5)
GFR SERPL CREATININE-BSD FRML MDRD: 51 ML/MIN/1.73M2
GLUCOSE BLD-MCNC: 78 MG/DL (ref 70–125)
HCT VFR BLD AUTO: 35.6 % (ref 35–47)
HGB BLD-MCNC: 11.5 G/DL (ref 12–16)
MCH RBC QN AUTO: 30.5 PG (ref 27–34)
MCHC RBC AUTO-ENTMCNC: 32.3 G/DL (ref 32–36)
MCV RBC AUTO: 94 FL (ref 80–100)
PLATELET # BLD AUTO: 161 THOU/UL (ref 140–440)
PMV BLD AUTO: 10 FL (ref 8.5–12.5)
POTASSIUM BLD-SCNC: 4.2 MMOL/L (ref 3.5–5)
RBC # BLD AUTO: 3.77 MILL/UL (ref 3.8–5.4)
SODIUM SERPL-SCNC: 141 MMOL/L (ref 136–145)
WBC: 9.6 THOU/UL (ref 4–11)

## 2019-07-12 ENCOUNTER — AMBULATORY - HEALTHEAST (OUTPATIENT)
Dept: GERIATRICS | Facility: CLINIC | Age: 69
End: 2019-07-12

## 2019-07-15 ENCOUNTER — COMMUNICATION - HEALTHEAST (OUTPATIENT)
Dept: GERIATRICS | Facility: CLINIC | Age: 69
End: 2019-07-15

## 2019-07-15 ENCOUNTER — COMMUNICATION - HEALTHEAST (OUTPATIENT)
Dept: INTERNAL MEDICINE | Facility: CLINIC | Age: 69
End: 2019-07-15

## 2019-07-16 ENCOUNTER — COMMUNICATION - HEALTHEAST (OUTPATIENT)
Dept: INTERNAL MEDICINE | Facility: CLINIC | Age: 69
End: 2019-07-16

## 2019-07-17 ENCOUNTER — COMMUNICATION - HEALTHEAST (OUTPATIENT)
Dept: NURSING | Facility: CLINIC | Age: 69
End: 2019-07-17

## 2019-07-17 ENCOUNTER — COMMUNICATION - HEALTHEAST (OUTPATIENT)
Dept: SCHEDULING | Facility: CLINIC | Age: 69
End: 2019-07-17

## 2019-07-18 ENCOUNTER — COMMUNICATION - HEALTHEAST (OUTPATIENT)
Dept: FAMILY MEDICINE | Facility: CLINIC | Age: 69
End: 2019-07-18

## 2019-07-24 ENCOUNTER — COMMUNICATION - HEALTHEAST (OUTPATIENT)
Dept: INTERNAL MEDICINE | Facility: CLINIC | Age: 69
End: 2019-07-24

## 2019-07-24 DIAGNOSIS — G89.29 CHRONIC PAIN: ICD-10-CM

## 2019-07-25 ENCOUNTER — AMBULATORY - HEALTHEAST (OUTPATIENT)
Dept: NURSING | Facility: CLINIC | Age: 69
End: 2019-07-25

## 2019-07-25 ENCOUNTER — AMBULATORY - HEALTHEAST (OUTPATIENT)
Dept: LAB | Facility: CLINIC | Age: 69
End: 2019-07-25

## 2019-07-25 DIAGNOSIS — M25.561 ARTHRALGIA OF RIGHT LOWER LEG: ICD-10-CM

## 2019-07-25 DIAGNOSIS — G89.4 CHRONIC PAIN SYNDROME: ICD-10-CM

## 2019-07-25 DIAGNOSIS — Z79.899 HIGH RISK MEDICATION USE: ICD-10-CM

## 2019-07-25 DIAGNOSIS — M05.9 SEROPOSITIVE RHEUMATOID ARTHRITIS (H): ICD-10-CM

## 2019-07-25 DIAGNOSIS — M06.9 RHEUMATOID ARTHRITIS INVOLVING MULTIPLE SITES, UNSPECIFIED RHEUMATOID FACTOR PRESENCE: ICD-10-CM

## 2019-07-25 DIAGNOSIS — M19.90 OSTEOARTHRITIS: ICD-10-CM

## 2019-07-25 LAB
ALBUMIN SERPL-MCNC: 3.6 G/DL (ref 3.5–5)
ALT SERPL W P-5'-P-CCNC: 12 U/L (ref 0–45)
AST SERPL W P-5'-P-CCNC: 14 U/L (ref 0–40)
C REACTIVE PROTEIN LHE: <0.1 MG/DL (ref 0–0.8)
CREAT SERPL-MCNC: 1.19 MG/DL (ref 0.6–1.1)
ERYTHROCYTE [DISTWIDTH] IN BLOOD BY AUTOMATED COUNT: 12.7 % (ref 11–14.5)
ERYTHROCYTE [SEDIMENTATION RATE] IN BLOOD BY WESTERGREN METHOD: 43 MM/HR (ref 0–20)
GFR SERPL CREATININE-BSD FRML MDRD: 45 ML/MIN/1.73M2
HCT VFR BLD AUTO: 38.3 % (ref 35–47)
HGB BLD-MCNC: 12.8 G/DL (ref 12–16)
MCH RBC QN AUTO: 30.9 PG (ref 27–34)
MCHC RBC AUTO-ENTMCNC: 33.5 G/DL (ref 32–36)
MCV RBC AUTO: 92 FL (ref 80–100)
PLATELET # BLD AUTO: 150 THOU/UL (ref 140–440)
PMV BLD AUTO: 7.3 FL (ref 7–10)
RBC # BLD AUTO: 4.14 MILL/UL (ref 3.8–5.4)
WBC: 9.5 THOU/UL (ref 4–11)

## 2019-07-30 ENCOUNTER — RECORDS - HEALTHEAST (OUTPATIENT)
Dept: ADMINISTRATIVE | Facility: OTHER | Age: 69
End: 2019-07-30

## 2019-07-31 ENCOUNTER — RECORDS - HEALTHEAST (OUTPATIENT)
Dept: ADMINISTRATIVE | Facility: OTHER | Age: 69
End: 2019-07-31

## 2019-08-01 ENCOUNTER — COMMUNICATION - HEALTHEAST (OUTPATIENT)
Dept: RHEUMATOLOGY | Facility: CLINIC | Age: 69
End: 2019-08-01

## 2019-08-01 ENCOUNTER — COMMUNICATION - HEALTHEAST (OUTPATIENT)
Dept: NURSING | Facility: CLINIC | Age: 69
End: 2019-08-01

## 2019-08-06 ENCOUNTER — RECORDS - HEALTHEAST (OUTPATIENT)
Dept: ADMINISTRATIVE | Facility: OTHER | Age: 69
End: 2019-08-06

## 2019-08-08 ENCOUNTER — COMMUNICATION - HEALTHEAST (OUTPATIENT)
Dept: INTERNAL MEDICINE | Facility: CLINIC | Age: 69
End: 2019-08-08

## 2019-08-08 ENCOUNTER — RECORDS - HEALTHEAST (OUTPATIENT)
Dept: ADMINISTRATIVE | Facility: OTHER | Age: 69
End: 2019-08-08

## 2019-08-15 ENCOUNTER — COMMUNICATION - HEALTHEAST (OUTPATIENT)
Dept: NURSING | Facility: CLINIC | Age: 69
End: 2019-08-15

## 2019-08-15 DIAGNOSIS — E78.2 MIXED HYPERLIPIDEMIA: ICD-10-CM

## 2019-08-15 DIAGNOSIS — I10 ESSENTIAL HYPERTENSION: ICD-10-CM

## 2019-08-15 DIAGNOSIS — G89.29 CHRONIC PAIN: ICD-10-CM

## 2019-08-20 ENCOUNTER — COMMUNICATION - HEALTHEAST (OUTPATIENT)
Dept: INTERNAL MEDICINE | Facility: CLINIC | Age: 69
End: 2019-08-20

## 2019-08-21 ENCOUNTER — OFFICE VISIT - HEALTHEAST (OUTPATIENT)
Dept: INTERNAL MEDICINE | Facility: CLINIC | Age: 69
End: 2019-08-21

## 2019-08-21 DIAGNOSIS — G89.29 CHRONIC PAIN: ICD-10-CM

## 2019-08-21 DIAGNOSIS — F11.20 UNCOMPLICATED OPIOID DEPENDENCE (H): ICD-10-CM

## 2019-08-21 DIAGNOSIS — Z12.11 COLON CANCER SCREENING: ICD-10-CM

## 2019-08-21 DIAGNOSIS — N39.0 SEPSIS SECONDARY TO UTI (H): ICD-10-CM

## 2019-08-21 DIAGNOSIS — Z51.81 ENCOUNTER FOR THERAPEUTIC DRUG MONITORING: ICD-10-CM

## 2019-08-21 DIAGNOSIS — A41.9 SEPSIS SECONDARY TO UTI (H): ICD-10-CM

## 2019-08-21 DIAGNOSIS — E11.00 TYPE 2 DIABETES MELLITUS WITH HYPEROSMOLARITY WITHOUT COMA, WITH LONG-TERM CURRENT USE OF INSULIN (H): ICD-10-CM

## 2019-08-21 DIAGNOSIS — R19.5 POSITIVE FIT (FECAL IMMUNOCHEMICAL TEST): ICD-10-CM

## 2019-08-21 DIAGNOSIS — I10 ESSENTIAL HYPERTENSION: ICD-10-CM

## 2019-08-21 DIAGNOSIS — Z79.899 CONTROLLED SUBSTANCE AGREEMENT SIGNED: ICD-10-CM

## 2019-08-21 DIAGNOSIS — J30.81 CAT ALLERGIES: ICD-10-CM

## 2019-08-21 DIAGNOSIS — E78.2 MIXED HYPERLIPIDEMIA: ICD-10-CM

## 2019-08-21 DIAGNOSIS — Z79.4 TYPE 2 DIABETES MELLITUS WITH HYPEROSMOLARITY WITHOUT COMA, WITH LONG-TERM CURRENT USE OF INSULIN (H): ICD-10-CM

## 2019-08-21 LAB
AMPHETAMINES UR QL SCN: ABNORMAL
BARBITURATES UR QL: ABNORMAL
BENZODIAZ UR QL: ABNORMAL
CANNABINOIDS UR QL SCN: ABNORMAL
CHOLEST SERPL-MCNC: 121 MG/DL
COCAINE UR QL: ABNORMAL
CREAT UR-MCNC: 291.7 MG/DL
CREAT UR-MCNC: 291.7 MG/DL
FASTING STATUS PATIENT QL REPORTED: NO
HBA1C MFR BLD: 6.2 % (ref 3.5–6)
HDLC SERPL-MCNC: 40 MG/DL
LDLC SERPL CALC-MCNC: 53 MG/DL
MICROALBUMIN UR-MCNC: 12.23 MG/DL (ref 0–1.99)
MICROALBUMIN/CREAT UR: 41.9 MG/G
OPIATES UR QL SCN: ABNORMAL
OXYCODONE UR QL: ABNORMAL
PCP UR QL SCN: ABNORMAL
TRIGL SERPL-MCNC: 140 MG/DL

## 2019-08-25 LAB
AMPHET UR-MCNC: <50 NG/ML
MDA UR-MCNC: <200 NG/ML
MDEA UR-MCNC: <200 NG/ML
MDMA UR-MCNC: <200 NG/ML
METHAMPHET UR-MCNC: <200 NG/ML
PHENTERMINE UR CFM-MCNC: <200 NG/ML

## 2019-08-26 ENCOUNTER — RECORDS - HEALTHEAST (OUTPATIENT)
Dept: ADMINISTRATIVE | Facility: OTHER | Age: 69
End: 2019-08-26

## 2019-08-27 ENCOUNTER — COMMUNICATION - HEALTHEAST (OUTPATIENT)
Dept: PALLIATIVE MEDICINE | Facility: OTHER | Age: 69
End: 2019-08-27

## 2019-08-27 ENCOUNTER — COMMUNICATION - HEALTHEAST (OUTPATIENT)
Dept: INTERNAL MEDICINE | Facility: CLINIC | Age: 69
End: 2019-08-27

## 2019-08-30 ENCOUNTER — COMMUNICATION - HEALTHEAST (OUTPATIENT)
Dept: RHEUMATOLOGY | Facility: CLINIC | Age: 69
End: 2019-08-30

## 2019-08-30 DIAGNOSIS — M05.9 SEROPOSITIVE RHEUMATOID ARTHRITIS (H): ICD-10-CM

## 2019-09-13 ENCOUNTER — OFFICE VISIT - HEALTHEAST (OUTPATIENT)
Dept: RHEUMATOLOGY | Facility: CLINIC | Age: 69
End: 2019-09-13

## 2019-09-13 DIAGNOSIS — Z96.652 S/P TKR (TOTAL KNEE REPLACEMENT) USING CEMENT, LEFT: ICD-10-CM

## 2019-09-13 DIAGNOSIS — Z79.899 HIGH RISK MEDICATION USE: ICD-10-CM

## 2019-09-13 DIAGNOSIS — G89.29 CHRONIC PAIN OF RIGHT KNEE: ICD-10-CM

## 2019-09-13 DIAGNOSIS — M05.9 SEROPOSITIVE RHEUMATOID ARTHRITIS (H): ICD-10-CM

## 2019-09-13 DIAGNOSIS — M25.561 CHRONIC PAIN OF RIGHT KNEE: ICD-10-CM

## 2019-09-13 DIAGNOSIS — G89.29 CHRONIC BILATERAL LOW BACK PAIN, WITH SCIATICA PRESENCE UNSPECIFIED: ICD-10-CM

## 2019-09-13 DIAGNOSIS — M54.5 CHRONIC BILATERAL LOW BACK PAIN, WITH SCIATICA PRESENCE UNSPECIFIED: ICD-10-CM

## 2019-09-13 DIAGNOSIS — M15.9 OSTEOARTHRITIS OF MULTIPLE JOINTS, UNSPECIFIED OSTEOARTHRITIS TYPE: ICD-10-CM

## 2019-09-13 ASSESSMENT — MIFFLIN-ST. JEOR: SCORE: 1639.86

## 2019-09-17 ENCOUNTER — COMMUNICATION - HEALTHEAST (OUTPATIENT)
Dept: NURSING | Facility: CLINIC | Age: 69
End: 2019-09-17

## 2019-09-26 ENCOUNTER — COMMUNICATION - HEALTHEAST (OUTPATIENT)
Dept: INTERNAL MEDICINE | Facility: CLINIC | Age: 69
End: 2019-09-26

## 2019-09-26 DIAGNOSIS — G89.29 CHRONIC PAIN: ICD-10-CM

## 2019-10-08 ENCOUNTER — COMMUNICATION - HEALTHEAST (OUTPATIENT)
Dept: SCHEDULING | Facility: CLINIC | Age: 69
End: 2019-10-08

## 2019-10-08 ENCOUNTER — OFFICE VISIT - HEALTHEAST (OUTPATIENT)
Dept: INTERNAL MEDICINE | Facility: CLINIC | Age: 69
End: 2019-10-08

## 2019-10-08 DIAGNOSIS — R10.32 ABDOMINAL PAIN, ACUTE, BILATERAL LOWER QUADRANT: ICD-10-CM

## 2019-10-08 DIAGNOSIS — R10.31 ABDOMINAL PAIN, ACUTE, BILATERAL LOWER QUADRANT: ICD-10-CM

## 2019-10-08 DIAGNOSIS — R19.7 ACUTE DIARRHEA: ICD-10-CM

## 2019-10-08 LAB
ALBUMIN SERPL-MCNC: 3.8 G/DL (ref 3.5–5)
ALP SERPL-CCNC: 69 U/L (ref 45–120)
ALT SERPL W P-5'-P-CCNC: 21 U/L (ref 0–45)
ANION GAP SERPL CALCULATED.3IONS-SCNC: 9 MMOL/L (ref 5–18)
AST SERPL W P-5'-P-CCNC: 18 U/L (ref 0–40)
BASOPHILS # BLD AUTO: 0.1 THOU/UL (ref 0–0.2)
BASOPHILS NFR BLD AUTO: 1 % (ref 0–2)
BILIRUB SERPL-MCNC: 0.6 MG/DL (ref 0–1)
BUN SERPL-MCNC: 19 MG/DL (ref 8–28)
C REACTIVE PROTEIN LHE: 0.2 MG/DL (ref 0–0.8)
CALCIUM SERPL-MCNC: 9.6 MG/DL (ref 8.5–10.5)
CHLORIDE BLD-SCNC: 106 MMOL/L (ref 98–107)
CO2 SERPL-SCNC: 25 MMOL/L (ref 22–31)
CREAT SERPL-MCNC: 1.37 MG/DL (ref 0.6–1.1)
EOSINOPHIL # BLD AUTO: 0.4 THOU/UL (ref 0–0.4)
EOSINOPHIL NFR BLD AUTO: 4 % (ref 0–6)
ERYTHROCYTE [DISTWIDTH] IN BLOOD BY AUTOMATED COUNT: 12 % (ref 11–14.5)
ERYTHROCYTE [SEDIMENTATION RATE] IN BLOOD BY WESTERGREN METHOD: 30 MM/HR (ref 0–20)
GFR SERPL CREATININE-BSD FRML MDRD: 38 ML/MIN/1.73M2
GLUCOSE BLD-MCNC: 224 MG/DL (ref 70–125)
HCT VFR BLD AUTO: 42.5 % (ref 35–47)
HGB BLD-MCNC: 14.4 G/DL (ref 12–16)
LYMPHOCYTES # BLD AUTO: 2.6 THOU/UL (ref 0.8–4.4)
LYMPHOCYTES NFR BLD AUTO: 27 % (ref 20–40)
MCH RBC QN AUTO: 30.5 PG (ref 27–34)
MCHC RBC AUTO-ENTMCNC: 33.8 G/DL (ref 32–36)
MCV RBC AUTO: 90 FL (ref 80–100)
MONOCYTES # BLD AUTO: 0.8 THOU/UL (ref 0–0.9)
MONOCYTES NFR BLD AUTO: 9 % (ref 2–10)
NEUTROPHILS # BLD AUTO: 5.8 THOU/UL (ref 2–7.7)
NEUTROPHILS NFR BLD AUTO: 59 % (ref 50–70)
PLATELET # BLD AUTO: 170 THOU/UL (ref 140–440)
PMV BLD AUTO: 7.7 FL (ref 7–10)
POTASSIUM BLD-SCNC: 3.8 MMOL/L (ref 3.5–5)
PROT SERPL-MCNC: 7.3 G/DL (ref 6–8)
RBC # BLD AUTO: 4.71 MILL/UL (ref 3.8–5.4)
SODIUM SERPL-SCNC: 140 MMOL/L (ref 136–145)
WBC: 9.7 THOU/UL (ref 4–11)

## 2019-10-08 ASSESSMENT — MIFFLIN-ST. JEOR: SCORE: 1592.23

## 2019-10-10 ENCOUNTER — COMMUNICATION - HEALTHEAST (OUTPATIENT)
Dept: INTERNAL MEDICINE | Facility: CLINIC | Age: 69
End: 2019-10-10

## 2019-10-10 DIAGNOSIS — E11.9 DIABETES MELLITUS, TYPE 2 (H): ICD-10-CM

## 2019-10-10 RX ORDER — GLUCOSAMINE HCL/CHONDROITIN SU 500-400 MG
CAPSULE ORAL
Qty: 400 STRIP | Refills: 3 | Status: SHIPPED | OUTPATIENT
Start: 2019-10-10 | End: 2022-03-14

## 2019-10-14 ENCOUNTER — AMBULATORY - HEALTHEAST (OUTPATIENT)
Dept: LAB | Facility: CLINIC | Age: 69
End: 2019-10-14

## 2019-10-14 DIAGNOSIS — E11.00 TYPE 2 DIABETES MELLITUS WITH HYPEROSMOLARITY WITHOUT COMA, WITHOUT LONG-TERM CURRENT USE OF INSULIN (H): ICD-10-CM

## 2019-10-14 DIAGNOSIS — Z79.899 HIGH RISK MEDICATION USE: ICD-10-CM

## 2019-10-14 LAB
ALBUMIN SERPL-MCNC: 3.4 G/DL (ref 3.5–5)
ALT SERPL W P-5'-P-CCNC: 37 U/L (ref 0–45)
AST SERPL W P-5'-P-CCNC: 22 U/L (ref 0–40)
CREAT SERPL-MCNC: 1.25 MG/DL (ref 0.6–1.1)
ERYTHROCYTE [DISTWIDTH] IN BLOOD BY AUTOMATED COUNT: 11.7 % (ref 11–14.5)
GFR SERPL CREATININE-BSD FRML MDRD: 42 ML/MIN/1.73M2
HBA1C MFR BLD: 7 % (ref 3.5–6)
HCT VFR BLD AUTO: 39.7 % (ref 35–47)
HGB BLD-MCNC: 13.7 G/DL (ref 12–16)
MCH RBC QN AUTO: 31 PG (ref 27–34)
MCHC RBC AUTO-ENTMCNC: 34.4 G/DL (ref 32–36)
MCV RBC AUTO: 90 FL (ref 80–100)
PLATELET # BLD AUTO: 228 THOU/UL (ref 140–440)
PMV BLD AUTO: 7.7 FL (ref 7–10)
RBC # BLD AUTO: 4.41 MILL/UL (ref 3.8–5.4)
WBC: 7.9 THOU/UL (ref 4–11)

## 2019-10-15 ENCOUNTER — COMMUNICATION - HEALTHEAST (OUTPATIENT)
Dept: PALLIATIVE MEDICINE | Facility: OTHER | Age: 69
End: 2019-10-15

## 2019-10-16 ENCOUNTER — COMMUNICATION - HEALTHEAST (OUTPATIENT)
Dept: INTERNAL MEDICINE | Facility: CLINIC | Age: 69
End: 2019-10-16

## 2019-10-16 ENCOUNTER — HOSPITAL ENCOUNTER (OUTPATIENT)
Dept: PALLIATIVE MEDICINE | Facility: OTHER | Age: 69
Discharge: HOME | End: 2019-10-16
Attending: PAIN MEDICINE

## 2019-10-16 DIAGNOSIS — M54.16 LUMBAR RADICULOPATHY: ICD-10-CM

## 2019-10-16 ASSESSMENT — MIFFLIN-ST. JEOR: SCORE: 1592.23

## 2019-10-24 ENCOUNTER — COMMUNICATION - HEALTHEAST (OUTPATIENT)
Dept: NURSING | Facility: CLINIC | Age: 69
End: 2019-10-24

## 2019-10-29 ENCOUNTER — COMMUNICATION - HEALTHEAST (OUTPATIENT)
Dept: INTERNAL MEDICINE | Facility: CLINIC | Age: 69
End: 2019-10-29

## 2019-10-29 DIAGNOSIS — G89.29 CHRONIC PAIN: ICD-10-CM

## 2019-10-31 ENCOUNTER — COMMUNICATION - HEALTHEAST (OUTPATIENT)
Dept: NURSING | Facility: CLINIC | Age: 69
End: 2019-10-31

## 2019-11-01 ENCOUNTER — COMMUNICATION - HEALTHEAST (OUTPATIENT)
Dept: INTERNAL MEDICINE | Facility: CLINIC | Age: 69
End: 2019-11-01

## 2019-11-01 DIAGNOSIS — F32.A DEPRESSION: ICD-10-CM

## 2019-11-01 DIAGNOSIS — E66.3 OVERWEIGHT (BMI 25.0-29.9): ICD-10-CM

## 2019-11-05 ENCOUNTER — COMMUNICATION - HEALTHEAST (OUTPATIENT)
Dept: PALLIATIVE MEDICINE | Facility: OTHER | Age: 69
End: 2019-11-05

## 2019-11-06 ENCOUNTER — HOSPITAL ENCOUNTER (OUTPATIENT)
Dept: PALLIATIVE MEDICINE | Facility: OTHER | Age: 69
Discharge: HOME | End: 2019-11-06
Attending: PAIN MEDICINE | Admitting: PAIN MEDICINE

## 2019-11-06 DIAGNOSIS — M54.16 LUMBAR RADICULOPATHY: ICD-10-CM

## 2019-11-06 ASSESSMENT — MIFFLIN-ST. JEOR: SCORE: 1621.26

## 2019-11-07 ENCOUNTER — COMMUNICATION - HEALTHEAST (OUTPATIENT)
Dept: PALLIATIVE MEDICINE | Facility: OTHER | Age: 69
End: 2019-11-07

## 2019-11-15 ENCOUNTER — COMMUNICATION - HEALTHEAST (OUTPATIENT)
Dept: PALLIATIVE MEDICINE | Facility: OTHER | Age: 69
End: 2019-11-15

## 2019-11-21 ENCOUNTER — COMMUNICATION - HEALTHEAST (OUTPATIENT)
Dept: INTERNAL MEDICINE | Facility: CLINIC | Age: 69
End: 2019-11-21

## 2019-11-21 ENCOUNTER — OFFICE VISIT - HEALTHEAST (OUTPATIENT)
Dept: INTERNAL MEDICINE | Facility: CLINIC | Age: 69
End: 2019-11-21

## 2019-11-21 DIAGNOSIS — Z79.899 CONTROLLED SUBSTANCE AGREEMENT SIGNED: ICD-10-CM

## 2019-11-21 DIAGNOSIS — R19.5 POSITIVE FIT (FECAL IMMUNOCHEMICAL TEST): ICD-10-CM

## 2019-11-21 DIAGNOSIS — J30.81 ALLERGIC RHINITIS DUE TO CATS: ICD-10-CM

## 2019-11-21 DIAGNOSIS — G89.29 CHRONIC PAIN: ICD-10-CM

## 2019-11-21 DIAGNOSIS — I10 ESSENTIAL HYPERTENSION: ICD-10-CM

## 2019-11-21 DIAGNOSIS — F11.20 UNCOMPLICATED OPIOID DEPENDENCE (H): ICD-10-CM

## 2019-11-21 DIAGNOSIS — M06.9 RHEUMATOID ARTHRITIS INVOLVING MULTIPLE SITES, UNSPECIFIED RHEUMATOID FACTOR PRESENCE: ICD-10-CM

## 2019-11-21 DIAGNOSIS — Z96.89 S/P INSERTION OF SPINAL CORD STIMULATOR: ICD-10-CM

## 2019-11-21 DIAGNOSIS — M54.16 LUMBAR RADICULOPATHY: ICD-10-CM

## 2019-11-21 DIAGNOSIS — Z79.4 TYPE 2 DIABETES MELLITUS WITH HYPEROSMOLARITY WITHOUT COMA, WITH LONG-TERM CURRENT USE OF INSULIN (H): ICD-10-CM

## 2019-11-21 DIAGNOSIS — E11.00 TYPE 2 DIABETES MELLITUS WITH HYPEROSMOLARITY WITHOUT COMA, WITH LONG-TERM CURRENT USE OF INSULIN (H): ICD-10-CM

## 2019-11-21 LAB — HBA1C MFR BLD: 6.9 % (ref 3.5–6)

## 2019-11-21 ASSESSMENT — PATIENT HEALTH QUESTIONNAIRE - PHQ9: SUM OF ALL RESPONSES TO PHQ QUESTIONS 1-9: 3

## 2019-11-27 ENCOUNTER — COMMUNICATION - HEALTHEAST (OUTPATIENT)
Dept: CARE COORDINATION | Facility: CLINIC | Age: 69
End: 2019-11-27

## 2019-12-09 ENCOUNTER — COMMUNICATION - HEALTHEAST (OUTPATIENT)
Dept: INTERNAL MEDICINE | Facility: CLINIC | Age: 69
End: 2019-12-09

## 2019-12-09 DIAGNOSIS — E78.2 MIXED HYPERLIPIDEMIA: ICD-10-CM

## 2019-12-10 ENCOUNTER — COMMUNICATION - HEALTHEAST (OUTPATIENT)
Dept: NURSING | Facility: CLINIC | Age: 69
End: 2019-12-10

## 2020-01-02 ENCOUNTER — COMMUNICATION - HEALTHEAST (OUTPATIENT)
Dept: CARE COORDINATION | Facility: CLINIC | Age: 70
End: 2020-01-02

## 2020-01-06 ENCOUNTER — COMMUNICATION - HEALTHEAST (OUTPATIENT)
Dept: NURSING | Facility: CLINIC | Age: 70
End: 2020-01-06

## 2020-01-07 ENCOUNTER — COMMUNICATION - HEALTHEAST (OUTPATIENT)
Dept: INTERNAL MEDICINE | Facility: CLINIC | Age: 70
End: 2020-01-07

## 2020-01-07 DIAGNOSIS — G89.29 CHRONIC PAIN: ICD-10-CM

## 2020-01-16 ENCOUNTER — COMMUNICATION - HEALTHEAST (OUTPATIENT)
Dept: NURSING | Facility: CLINIC | Age: 70
End: 2020-01-16

## 2020-01-16 ENCOUNTER — HOME CARE/HOSPICE - HEALTHEAST (OUTPATIENT)
Dept: HOME HEALTH SERVICES | Facility: HOME HEALTH | Age: 70
End: 2020-01-16

## 2020-01-16 DIAGNOSIS — E66.01 MORBID OBESITY (H): ICD-10-CM

## 2020-01-16 DIAGNOSIS — Z79.4 TYPE 2 DIABETES MELLITUS WITH HYPEROSMOLARITY WITHOUT COMA, WITH LONG-TERM CURRENT USE OF INSULIN (H): ICD-10-CM

## 2020-01-16 DIAGNOSIS — E11.00 TYPE 2 DIABETES MELLITUS WITH HYPEROSMOLARITY WITHOUT COMA, WITH LONG-TERM CURRENT USE OF INSULIN (H): ICD-10-CM

## 2020-01-16 DIAGNOSIS — I10 ESSENTIAL HYPERTENSION: ICD-10-CM

## 2020-01-17 ENCOUNTER — COMMUNICATION - HEALTHEAST (OUTPATIENT)
Dept: INTERNAL MEDICINE | Facility: CLINIC | Age: 70
End: 2020-01-17

## 2020-01-20 ENCOUNTER — COMMUNICATION - HEALTHEAST (OUTPATIENT)
Dept: INTERNAL MEDICINE | Facility: CLINIC | Age: 70
End: 2020-01-20

## 2020-01-20 DIAGNOSIS — G47.00 INSOMNIA: ICD-10-CM

## 2020-01-20 DIAGNOSIS — I10 HTN (HYPERTENSION): ICD-10-CM

## 2020-01-21 ENCOUNTER — OFFICE VISIT - HEALTHEAST (OUTPATIENT)
Dept: INTERNAL MEDICINE | Facility: CLINIC | Age: 70
End: 2020-01-21

## 2020-01-21 DIAGNOSIS — R29.6 RECURRENT FALLS: ICD-10-CM

## 2020-01-21 DIAGNOSIS — R26.81 UNSTEADY GAIT: ICD-10-CM

## 2020-01-21 DIAGNOSIS — E11.649 TYPE 2 DIABETES MELLITUS WITH HYPOGLYCEMIA WITHOUT COMA, WITH LONG-TERM CURRENT USE OF INSULIN (H): ICD-10-CM

## 2020-01-21 DIAGNOSIS — Z79.4 TYPE 2 DIABETES MELLITUS WITH HYPOGLYCEMIA WITHOUT COMA, WITH LONG-TERM CURRENT USE OF INSULIN (H): ICD-10-CM

## 2020-01-21 DIAGNOSIS — E66.3 OVERWEIGHT (BMI 25.0-29.9): ICD-10-CM

## 2020-01-21 DIAGNOSIS — M54.16 LUMBAR RADICULOPATHY: ICD-10-CM

## 2020-01-21 DIAGNOSIS — I10 ESSENTIAL HYPERTENSION: ICD-10-CM

## 2020-01-21 DIAGNOSIS — F11.20 UNCOMPLICATED OPIOID DEPENDENCE (H): ICD-10-CM

## 2020-01-21 DIAGNOSIS — R19.5 POSITIVE FIT (FECAL IMMUNOCHEMICAL TEST): ICD-10-CM

## 2020-01-21 DIAGNOSIS — J30.81 ALLERGIC RHINITIS DUE TO CATS: ICD-10-CM

## 2020-01-22 ENCOUNTER — COMMUNICATION - HEALTHEAST (OUTPATIENT)
Dept: ADMINISTRATIVE | Facility: CLINIC | Age: 70
End: 2020-01-22

## 2020-01-22 DIAGNOSIS — M06.9 RHEUMATOID ARTHRITIS INVOLVING MULTIPLE SITES, UNSPECIFIED RHEUMATOID FACTOR PRESENCE: ICD-10-CM

## 2020-01-22 DIAGNOSIS — Z79.899 HIGH RISK MEDICATION USE: ICD-10-CM

## 2020-02-03 ENCOUNTER — COMMUNICATION - HEALTHEAST (OUTPATIENT)
Dept: INTERNAL MEDICINE | Facility: CLINIC | Age: 70
End: 2020-02-03

## 2020-02-06 ENCOUNTER — COMMUNICATION - HEALTHEAST (OUTPATIENT)
Dept: INTERNAL MEDICINE | Facility: CLINIC | Age: 70
End: 2020-02-06

## 2020-02-06 DIAGNOSIS — G89.29 CHRONIC PAIN: ICD-10-CM

## 2020-02-11 ENCOUNTER — COMMUNICATION - HEALTHEAST (OUTPATIENT)
Dept: NURSING | Facility: CLINIC | Age: 70
End: 2020-02-11

## 2020-02-13 ENCOUNTER — COMMUNICATION - HEALTHEAST (OUTPATIENT)
Dept: INTERNAL MEDICINE | Facility: CLINIC | Age: 70
End: 2020-02-13

## 2020-02-25 ENCOUNTER — COMMUNICATION - HEALTHEAST (OUTPATIENT)
Dept: NURSING | Facility: CLINIC | Age: 70
End: 2020-02-25

## 2020-02-26 ENCOUNTER — OFFICE VISIT - HEALTHEAST (OUTPATIENT)
Dept: INTERNAL MEDICINE | Facility: CLINIC | Age: 70
End: 2020-02-26

## 2020-02-26 DIAGNOSIS — R19.5 POSITIVE FIT (FECAL IMMUNOCHEMICAL TEST): ICD-10-CM

## 2020-02-26 DIAGNOSIS — Z79.899 HIGH RISK MEDICATION USE: ICD-10-CM

## 2020-02-26 DIAGNOSIS — R19.7 VOMITING AND DIARRHEA: ICD-10-CM

## 2020-02-26 DIAGNOSIS — M06.9 RHEUMATOID ARTHRITIS INVOLVING MULTIPLE SITES, UNSPECIFIED RHEUMATOID FACTOR PRESENCE: ICD-10-CM

## 2020-02-26 DIAGNOSIS — R11.10 VOMITING AND DIARRHEA: ICD-10-CM

## 2020-02-26 DIAGNOSIS — I10 ESSENTIAL HYPERTENSION: ICD-10-CM

## 2020-02-26 DIAGNOSIS — E66.01 MORBID OBESITY (H): ICD-10-CM

## 2020-02-26 DIAGNOSIS — E11.649 TYPE 2 DIABETES MELLITUS WITH HYPOGLYCEMIA WITHOUT COMA, WITH LONG-TERM CURRENT USE OF INSULIN (H): ICD-10-CM

## 2020-02-26 DIAGNOSIS — Z12.31 VISIT FOR SCREENING MAMMOGRAM: ICD-10-CM

## 2020-02-26 DIAGNOSIS — Z79.4 TYPE 2 DIABETES MELLITUS WITH HYPOGLYCEMIA WITHOUT COMA, WITH LONG-TERM CURRENT USE OF INSULIN (H): ICD-10-CM

## 2020-02-26 DIAGNOSIS — N18.30 CHRONIC KIDNEY DISEASE, STAGE 3 (MODERATE): ICD-10-CM

## 2020-02-26 DIAGNOSIS — M05.9 SEROPOSITIVE RHEUMATOID ARTHRITIS (H): ICD-10-CM

## 2020-02-26 LAB
ALBUMIN SERPL-MCNC: 3.6 G/DL (ref 3.5–5)
ALT SERPL W P-5'-P-CCNC: 19 U/L (ref 0–45)
ANION GAP SERPL CALCULATED.3IONS-SCNC: 13 MMOL/L (ref 5–18)
AST SERPL W P-5'-P-CCNC: 19 U/L (ref 0–40)
BUN SERPL-MCNC: 21 MG/DL (ref 8–28)
C REACTIVE PROTEIN LHE: 0.4 MG/DL (ref 0–0.8)
CALCIUM SERPL-MCNC: 9.2 MG/DL (ref 8.5–10.5)
CHLORIDE BLD-SCNC: 102 MMOL/L (ref 98–107)
CO2 SERPL-SCNC: 27 MMOL/L (ref 22–31)
CREAT SERPL-MCNC: 1.25 MG/DL (ref 0.6–1.1)
ERYTHROCYTE [DISTWIDTH] IN BLOOD BY AUTOMATED COUNT: 11.8 % (ref 11–14.5)
ERYTHROCYTE [SEDIMENTATION RATE] IN BLOOD BY WESTERGREN METHOD: 20 MM/HR (ref 0–20)
GFR SERPL CREATININE-BSD FRML MDRD: 42 ML/MIN/1.73M2
GLUCOSE BLD-MCNC: 125 MG/DL (ref 70–125)
HBA1C MFR BLD: 6.2 % (ref 3.5–6)
HCT VFR BLD AUTO: 42.1 % (ref 35–47)
HGB BLD-MCNC: 14.3 G/DL (ref 12–16)
MCH RBC QN AUTO: 32.1 PG (ref 27–34)
MCHC RBC AUTO-ENTMCNC: 33.9 G/DL (ref 32–36)
MCV RBC AUTO: 95 FL (ref 80–100)
PLATELET # BLD AUTO: 132 THOU/UL (ref 140–440)
PMV BLD AUTO: 8.1 FL (ref 7–10)
POTASSIUM BLD-SCNC: 3.8 MMOL/L (ref 3.5–5)
RBC # BLD AUTO: 4.44 MILL/UL (ref 3.8–5.4)
SODIUM SERPL-SCNC: 142 MMOL/L (ref 136–145)
WBC: 11.3 THOU/UL (ref 4–11)

## 2020-02-27 ENCOUNTER — COMMUNICATION - HEALTHEAST (OUTPATIENT)
Dept: INTERNAL MEDICINE | Facility: CLINIC | Age: 70
End: 2020-02-27

## 2020-03-04 ENCOUNTER — AMBULATORY - HEALTHEAST (OUTPATIENT)
Dept: RHEUMATOLOGY | Facility: CLINIC | Age: 70
End: 2020-03-04

## 2020-03-04 DIAGNOSIS — Z79.899 HIGH RISK MEDICATION USE: ICD-10-CM

## 2020-03-04 DIAGNOSIS — M05.9 SEROPOSITIVE RHEUMATOID ARTHRITIS (H): ICD-10-CM

## 2020-03-09 ENCOUNTER — COMMUNICATION - HEALTHEAST (OUTPATIENT)
Dept: INTERNAL MEDICINE | Facility: CLINIC | Age: 70
End: 2020-03-09

## 2020-03-09 DIAGNOSIS — G89.29 CHRONIC PAIN: ICD-10-CM

## 2020-03-10 ENCOUNTER — COMMUNICATION - HEALTHEAST (OUTPATIENT)
Dept: PALLIATIVE MEDICINE | Facility: OTHER | Age: 70
End: 2020-03-10

## 2020-03-11 ENCOUNTER — OFFICE VISIT - HEALTHEAST (OUTPATIENT)
Dept: INTERNAL MEDICINE | Facility: CLINIC | Age: 70
End: 2020-03-11

## 2020-03-11 DIAGNOSIS — Z79.4 TYPE 2 DIABETES MELLITUS WITH HYPOGLYCEMIA WITHOUT COMA, WITH LONG-TERM CURRENT USE OF INSULIN (H): ICD-10-CM

## 2020-03-11 DIAGNOSIS — G89.29 CHRONIC BILATERAL LOW BACK PAIN WITHOUT SCIATICA: ICD-10-CM

## 2020-03-11 DIAGNOSIS — M54.50 CHRONIC BILATERAL LOW BACK PAIN WITHOUT SCIATICA: ICD-10-CM

## 2020-03-11 DIAGNOSIS — Z79.899 CONTROLLED SUBSTANCE AGREEMENT SIGNED: ICD-10-CM

## 2020-03-11 DIAGNOSIS — I10 ESSENTIAL HYPERTENSION: ICD-10-CM

## 2020-03-11 DIAGNOSIS — R19.5 POSITIVE FIT (FECAL IMMUNOCHEMICAL TEST): ICD-10-CM

## 2020-03-11 DIAGNOSIS — E11.649 TYPE 2 DIABETES MELLITUS WITH HYPOGLYCEMIA WITHOUT COMA, WITH LONG-TERM CURRENT USE OF INSULIN (H): ICD-10-CM

## 2020-03-17 ENCOUNTER — HOSPITAL ENCOUNTER (OUTPATIENT)
Dept: PALLIATIVE MEDICINE | Facility: OTHER | Age: 70
Discharge: HOME | End: 2020-03-17
Attending: PAIN MEDICINE

## 2020-03-17 DIAGNOSIS — M54.16 LUMBAR RADICULOPATHY: ICD-10-CM

## 2020-03-18 ENCOUNTER — COMMUNICATION - HEALTHEAST (OUTPATIENT)
Dept: INTERNAL MEDICINE | Facility: CLINIC | Age: 70
End: 2020-03-18

## 2020-03-18 DIAGNOSIS — I10 ESSENTIAL HYPERTENSION: ICD-10-CM

## 2020-03-24 ENCOUNTER — COMMUNICATION - HEALTHEAST (OUTPATIENT)
Dept: NURSING | Facility: CLINIC | Age: 70
End: 2020-03-24

## 2020-03-26 ENCOUNTER — OFFICE VISIT - HEALTHEAST (OUTPATIENT)
Dept: RHEUMATOLOGY | Facility: CLINIC | Age: 70
End: 2020-03-26

## 2020-03-26 DIAGNOSIS — M79.641 PAIN IN BOTH HANDS: ICD-10-CM

## 2020-03-26 DIAGNOSIS — M79.642 PAIN IN BOTH HANDS: ICD-10-CM

## 2020-03-26 DIAGNOSIS — D69.6 THROMBOCYTOPENIA (H): ICD-10-CM

## 2020-03-26 DIAGNOSIS — M05.9 SEROPOSITIVE RHEUMATOID ARTHRITIS (H): ICD-10-CM

## 2020-03-26 DIAGNOSIS — M15.9 OSTEOARTHRITIS OF MULTIPLE JOINTS, UNSPECIFIED OSTEOARTHRITIS TYPE: ICD-10-CM

## 2020-03-26 DIAGNOSIS — Z79.899 HIGH RISK MEDICATION USE: ICD-10-CM

## 2020-03-26 RX ORDER — VITAMIN B COMPLEX
1000 TABLET ORAL DAILY
Status: SHIPPED | COMMUNITY
Start: 2020-03-26

## 2020-03-27 ENCOUNTER — COMMUNICATION - HEALTHEAST (OUTPATIENT)
Dept: INTERNAL MEDICINE | Facility: CLINIC | Age: 70
End: 2020-03-27

## 2020-03-27 ENCOUNTER — COMMUNICATION - HEALTHEAST (OUTPATIENT)
Dept: NURSING | Facility: CLINIC | Age: 70
End: 2020-03-27

## 2020-04-01 ENCOUNTER — OFFICE VISIT - HEALTHEAST (OUTPATIENT)
Dept: INTERNAL MEDICINE | Facility: CLINIC | Age: 70
End: 2020-04-01

## 2020-04-01 DIAGNOSIS — G89.29 CHRONIC PAIN: ICD-10-CM

## 2020-04-01 DIAGNOSIS — M25.552 HIP PAIN, LEFT: ICD-10-CM
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
## 2020-04-01 DIAGNOSIS — Z79.4 TYPE 2 DIABETES MELLITUS WITH HYPOGLYCEMIA WITHOUT COMA, WITH LONG-TERM CURRENT USE OF INSULIN (H): ICD-10-CM
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
## 2020-04-01 DIAGNOSIS — M25.551 HIP PAIN, RIGHT: ICD-10-CM

## 2020-04-01 DIAGNOSIS — E11.649 TYPE 2 DIABETES MELLITUS WITH HYPOGLYCEMIA WITHOUT COMA, WITH LONG-TERM CURRENT USE OF INSULIN (H): ICD-10-CM

## 2020-04-13 ENCOUNTER — COMMUNICATION - HEALTHEAST (OUTPATIENT)
Dept: INTERNAL MEDICINE | Facility: CLINIC | Age: 70
End: 2020-04-13

## 2020-04-13 DIAGNOSIS — G89.29 CHRONIC PAIN: ICD-10-CM

## 2020-04-14 ENCOUNTER — RECORDS - HEALTHEAST (OUTPATIENT)
Dept: ADMINISTRATIVE | Facility: OTHER | Age: 70
End: 2020-04-14

## 2020-04-27 ENCOUNTER — COMMUNICATION - HEALTHEAST (OUTPATIENT)
Dept: NURSING | Facility: CLINIC | Age: 70
End: 2020-04-27

## 2020-05-14 ENCOUNTER — COMMUNICATION - HEALTHEAST (OUTPATIENT)
Dept: INTERNAL MEDICINE | Facility: CLINIC | Age: 70
End: 2020-05-14

## 2020-05-14 DIAGNOSIS — G89.29 CHRONIC PAIN: ICD-10-CM

## 2020-05-19 ENCOUNTER — AMBULATORY - HEALTHEAST (OUTPATIENT)
Dept: LAB | Facility: CLINIC | Age: 70
End: 2020-05-19

## 2020-05-19 DIAGNOSIS — Z79.899 HIGH RISK MEDICATION USE: ICD-10-CM

## 2020-05-19 LAB
ALBUMIN SERPL-MCNC: 3.4 G/DL (ref 3.5–5)
ALT SERPL W P-5'-P-CCNC: 15 U/L (ref 0–45)
AST SERPL W P-5'-P-CCNC: 17 U/L (ref 0–40)
BASOPHILS # BLD AUTO: 0.1 THOU/UL (ref 0–0.2)
BASOPHILS NFR BLD AUTO: 1 % (ref 0–2)
CREAT SERPL-MCNC: 1.18 MG/DL (ref 0.6–1.1)
EOSINOPHIL # BLD AUTO: 0.4 THOU/UL (ref 0–0.4)
EOSINOPHIL NFR BLD AUTO: 4 % (ref 0–6)
ERYTHROCYTE [DISTWIDTH] IN BLOOD BY AUTOMATED COUNT: 11.9 % (ref 11–14.5)
GFR SERPL CREATININE-BSD FRML MDRD: 45 ML/MIN/1.73M2
HCT VFR BLD AUTO: 38.9 % (ref 35–47)
HGB BLD-MCNC: 13.4 G/DL (ref 12–16)
LYMPHOCYTES # BLD AUTO: 2.2 THOU/UL (ref 0.8–4.4)
LYMPHOCYTES NFR BLD AUTO: 23 % (ref 20–40)
MCH RBC QN AUTO: 32.2 PG (ref 27–34)
MCHC RBC AUTO-ENTMCNC: 34.3 G/DL (ref 32–36)
MCV RBC AUTO: 94 FL (ref 80–100)
MONOCYTES # BLD AUTO: 0.7 THOU/UL (ref 0–0.9)
MONOCYTES NFR BLD AUTO: 8 % (ref 2–10)
NEUTROPHILS # BLD AUTO: 6.2 THOU/UL (ref 2–7.7)
NEUTROPHILS NFR BLD AUTO: 64 % (ref 50–70)
PLATELET # BLD AUTO: 132 THOU/UL (ref 140–440)
PMV BLD AUTO: 7.8 FL (ref 7–10)
RBC # BLD AUTO: 4.15 MILL/UL (ref 3.8–5.4)
WBC: 9.6 THOU/UL (ref 4–11)

## 2020-05-27 ENCOUNTER — COMMUNICATION - HEALTHEAST (OUTPATIENT)
Dept: SCHEDULING | Facility: CLINIC | Age: 70
End: 2020-05-27

## 2020-05-27 ENCOUNTER — COMMUNICATION - HEALTHEAST (OUTPATIENT)
Dept: NURSING | Facility: CLINIC | Age: 70
End: 2020-05-27

## 2020-05-27 DIAGNOSIS — E78.2 MIXED HYPERLIPIDEMIA: ICD-10-CM

## 2020-05-27 DIAGNOSIS — K21.9 GASTROESOPHAGEAL REFLUX DISEASE WITHOUT ESOPHAGITIS: ICD-10-CM

## 2020-06-02 ENCOUNTER — COMMUNICATION - HEALTHEAST (OUTPATIENT)
Dept: SCHEDULING | Facility: CLINIC | Age: 70
End: 2020-06-02

## 2020-06-02 DIAGNOSIS — I10 ESSENTIAL HYPERTENSION: ICD-10-CM

## 2020-06-03 ENCOUNTER — COMMUNICATION - HEALTHEAST (OUTPATIENT)
Dept: INTERNAL MEDICINE | Facility: CLINIC | Age: 70
End: 2020-06-03

## 2020-06-03 DIAGNOSIS — M54.16 LUMBAR RADICULOPATHY: ICD-10-CM

## 2020-06-10 ENCOUNTER — COMMUNICATION - HEALTHEAST (OUTPATIENT)
Dept: INTERNAL MEDICINE | Facility: CLINIC | Age: 70
End: 2020-06-10

## 2020-06-10 DIAGNOSIS — G89.29 CHRONIC PAIN: ICD-10-CM

## 2020-06-29 ENCOUNTER — COMMUNICATION - HEALTHEAST (OUTPATIENT)
Dept: ADMINISTRATIVE | Facility: CLINIC | Age: 70
End: 2020-06-29

## 2020-06-29 DIAGNOSIS — M05.9 SEROPOSITIVE RHEUMATOID ARTHRITIS (H): ICD-10-CM

## 2020-06-30 ENCOUNTER — COMMUNICATION - HEALTHEAST (OUTPATIENT)
Dept: NURSING | Facility: CLINIC | Age: 70
End: 2020-06-30

## 2020-07-02 ENCOUNTER — COMMUNICATION - HEALTHEAST (OUTPATIENT)
Dept: CARE COORDINATION | Facility: CLINIC | Age: 70
End: 2020-07-02

## 2020-07-09 ENCOUNTER — COMMUNICATION - HEALTHEAST (OUTPATIENT)
Dept: INTERNAL MEDICINE | Facility: CLINIC | Age: 70
End: 2020-07-09

## 2020-07-09 DIAGNOSIS — G89.29 CHRONIC PAIN: ICD-10-CM

## 2020-07-10 ENCOUNTER — RECORDS - HEALTHEAST (OUTPATIENT)
Dept: ADMINISTRATIVE | Facility: OTHER | Age: 70
End: 2020-07-10

## 2020-07-30 ENCOUNTER — OFFICE VISIT - HEALTHEAST (OUTPATIENT)
Dept: RHEUMATOLOGY | Facility: CLINIC | Age: 70
End: 2020-07-30

## 2020-07-30 DIAGNOSIS — D69.6 THROMBOCYTOPENIA (H): ICD-10-CM

## 2020-07-30 DIAGNOSIS — Z79.899 HIGH RISK MEDICATION USE: ICD-10-CM

## 2020-07-30 DIAGNOSIS — M79.642 PAIN IN BOTH HANDS: ICD-10-CM

## 2020-07-30 DIAGNOSIS — M15.9 OSTEOARTHRITIS OF MULTIPLE JOINTS, UNSPECIFIED OSTEOARTHRITIS TYPE: ICD-10-CM

## 2020-07-30 DIAGNOSIS — N18.30 STAGE 3 CHRONIC KIDNEY DISEASE (H): ICD-10-CM

## 2020-07-30 DIAGNOSIS — M05.9 SEROPOSITIVE RHEUMATOID ARTHRITIS (H): ICD-10-CM

## 2020-07-30 DIAGNOSIS — Z78.0 POST-MENOPAUSAL: ICD-10-CM

## 2020-07-30 DIAGNOSIS — M79.641 PAIN IN BOTH HANDS: ICD-10-CM

## 2020-07-30 DIAGNOSIS — Z79.52 CURRENT CHRONIC USE OF SYSTEMIC STEROIDS: ICD-10-CM

## 2020-08-07 ENCOUNTER — COMMUNICATION - HEALTHEAST (OUTPATIENT)
Dept: INTERNAL MEDICINE | Facility: CLINIC | Age: 70
End: 2020-08-07

## 2020-08-07 DIAGNOSIS — B37.2 CANDIDAL INTERTRIGO: ICD-10-CM

## 2020-08-07 DIAGNOSIS — G89.29 CHRONIC PAIN: ICD-10-CM

## 2020-08-07 DIAGNOSIS — I10 ESSENTIAL HYPERTENSION: ICD-10-CM

## 2020-08-07 DIAGNOSIS — M54.16 LUMBAR RADICULOPATHY: ICD-10-CM

## 2020-08-11 RX ORDER — LIDOCAINE 40 MG/G
CREAM TOPICAL
Qty: 30 G | Refills: 3 | Status: ON HOLD | OUTPATIENT
Start: 2020-08-11 | End: 2021-10-29

## 2020-08-12 ENCOUNTER — OFFICE VISIT - HEALTHEAST (OUTPATIENT)
Dept: FAMILY MEDICINE | Facility: CLINIC | Age: 70
End: 2020-08-12

## 2020-08-12 ENCOUNTER — AMBULATORY - HEALTHEAST (OUTPATIENT)
Dept: LAB | Facility: CLINIC | Age: 70
End: 2020-08-12

## 2020-08-12 DIAGNOSIS — B37.2 YEAST INFECTION OF THE SKIN: ICD-10-CM

## 2020-08-12 DIAGNOSIS — Z79.899 HIGH RISK MEDICATION USE: ICD-10-CM

## 2020-08-12 DIAGNOSIS — M05.9 SEROPOSITIVE RHEUMATOID ARTHRITIS (H): ICD-10-CM

## 2020-08-12 DIAGNOSIS — Z79.52 CURRENT CHRONIC USE OF SYSTEMIC STEROIDS: ICD-10-CM

## 2020-08-12 DIAGNOSIS — Z78.0 POST-MENOPAUSAL: ICD-10-CM

## 2020-08-12 LAB
ALBUMIN SERPL-MCNC: 3.7 G/DL (ref 3.5–5)
ALT SERPL W P-5'-P-CCNC: 15 U/L (ref 0–45)
AST SERPL W P-5'-P-CCNC: 13 U/L (ref 0–40)
CALCIUM SERPL-MCNC: 9.3 MG/DL (ref 8.5–10.5)
CREAT SERPL-MCNC: 1.26 MG/DL (ref 0.6–1.1)
ERYTHROCYTE [DISTWIDTH] IN BLOOD BY AUTOMATED COUNT: 12.2 % (ref 11–14.5)
GFR SERPL CREATININE-BSD FRML MDRD: 42 ML/MIN/1.73M2
HCT VFR BLD AUTO: 39.5 % (ref 35–47)
HGB BLD-MCNC: 13.8 G/DL (ref 12–16)
MCH RBC QN AUTO: 31.7 PG (ref 27–34)
MCHC RBC AUTO-ENTMCNC: 35.1 G/DL (ref 32–36)
MCV RBC AUTO: 90 FL (ref 80–100)
PLATELET # BLD AUTO: 170 THOU/UL (ref 140–440)
PMV BLD AUTO: 8.3 FL (ref 7–10)
RBC # BLD AUTO: 4.37 MILL/UL (ref 3.8–5.4)
WBC: 12.9 THOU/UL (ref 4–11)

## 2020-08-12 ASSESSMENT — MIFFLIN-ST. JEOR: SCORE: 1626.71

## 2020-08-12 ASSESSMENT — PATIENT HEALTH QUESTIONNAIRE - PHQ9: SUM OF ALL RESPONSES TO PHQ QUESTIONS 1-9: 1

## 2020-08-13 LAB
25(OH)D3 SERPL-MCNC: 70.9 NG/ML (ref 30–80)
25(OH)D3 SERPL-MCNC: 70.9 NG/ML (ref 30–80)

## 2020-08-14 ENCOUNTER — COMMUNICATION - HEALTHEAST (OUTPATIENT)
Dept: INTERNAL MEDICINE | Facility: CLINIC | Age: 70
End: 2020-08-14

## 2020-08-14 DIAGNOSIS — I10 HTN (HYPERTENSION): ICD-10-CM

## 2020-08-14 DIAGNOSIS — E66.3 OVERWEIGHT: ICD-10-CM

## 2020-08-14 DIAGNOSIS — F32.A DEPRESSION: ICD-10-CM

## 2020-08-16 RX ORDER — BUPROPION HYDROCHLORIDE 100 MG/1
TABLET ORAL
Qty: 90 TABLET | Refills: 3 | Status: SHIPPED | OUTPATIENT
Start: 2020-08-16 | End: 2021-08-02

## 2020-08-16 RX ORDER — LISINOPRIL 10 MG/1
TABLET ORAL
Qty: 90 TABLET | Refills: 3 | Status: SHIPPED | OUTPATIENT
Start: 2020-08-16 | End: 2021-09-16

## 2020-08-17 ENCOUNTER — COMMUNICATION - HEALTHEAST (OUTPATIENT)
Dept: ADMINISTRATIVE | Facility: CLINIC | Age: 70
End: 2020-08-17

## 2020-08-17 DIAGNOSIS — M05.9 SEROPOSITIVE RHEUMATOID ARTHRITIS (H): ICD-10-CM

## 2020-08-25 ENCOUNTER — AMBULATORY - HEALTHEAST (OUTPATIENT)
Dept: SCHEDULING | Facility: CLINIC | Age: 70
End: 2020-08-25

## 2020-08-25 DIAGNOSIS — Z78.0 POST-MENOPAUSAL: ICD-10-CM

## 2020-08-26 ENCOUNTER — COMMUNICATION - HEALTHEAST (OUTPATIENT)
Dept: RHEUMATOLOGY | Facility: CLINIC | Age: 70
End: 2020-08-26

## 2020-08-26 DIAGNOSIS — M05.9 SEROPOSITIVE RHEUMATOID ARTHRITIS (H): ICD-10-CM

## 2020-09-08 ENCOUNTER — COMMUNICATION - HEALTHEAST (OUTPATIENT)
Dept: SCHEDULING | Facility: CLINIC | Age: 70
End: 2020-09-08

## 2020-09-08 ENCOUNTER — COMMUNICATION - HEALTHEAST (OUTPATIENT)
Dept: INTERNAL MEDICINE | Facility: CLINIC | Age: 70
End: 2020-09-08

## 2020-09-08 DIAGNOSIS — G89.29 CHRONIC PAIN: ICD-10-CM

## 2020-09-09 ENCOUNTER — OFFICE VISIT - HEALTHEAST (OUTPATIENT)
Dept: INTERNAL MEDICINE | Facility: CLINIC | Age: 70
End: 2020-09-09

## 2020-09-09 DIAGNOSIS — G89.29 CHRONIC PAIN: ICD-10-CM

## 2020-09-09 DIAGNOSIS — N30.00 ACUTE CYSTITIS WITHOUT HEMATURIA: ICD-10-CM

## 2020-09-10 ENCOUNTER — COMMUNICATION - HEALTHEAST (OUTPATIENT)
Dept: NURSING | Facility: CLINIC | Age: 70
End: 2020-09-10

## 2020-09-16 ENCOUNTER — COMMUNICATION - HEALTHEAST (OUTPATIENT)
Dept: CARE COORDINATION | Facility: CLINIC | Age: 70
End: 2020-09-16

## 2020-09-29 ENCOUNTER — COMMUNICATION - HEALTHEAST (OUTPATIENT)
Dept: RHEUMATOLOGY | Facility: CLINIC | Age: 70
End: 2020-09-29

## 2020-10-07 ENCOUNTER — COMMUNICATION - HEALTHEAST (OUTPATIENT)
Dept: ADMINISTRATIVE | Facility: CLINIC | Age: 70
End: 2020-10-07

## 2020-10-07 DIAGNOSIS — M05.9 SEROPOSITIVE RHEUMATOID ARTHRITIS (H): ICD-10-CM

## 2020-10-08 ENCOUNTER — COMMUNICATION - HEALTHEAST (OUTPATIENT)
Dept: INTERNAL MEDICINE | Facility: CLINIC | Age: 70
End: 2020-10-08

## 2020-10-08 DIAGNOSIS — G89.29 CHRONIC PAIN: ICD-10-CM

## 2020-10-20 ENCOUNTER — OFFICE VISIT - HEALTHEAST (OUTPATIENT)
Dept: INTERNAL MEDICINE | Facility: CLINIC | Age: 70
End: 2020-10-20

## 2020-10-20 ENCOUNTER — COMMUNICATION - HEALTHEAST (OUTPATIENT)
Dept: INTERNAL MEDICINE | Facility: CLINIC | Age: 70
End: 2020-10-20

## 2020-10-20 ENCOUNTER — AMBULATORY - HEALTHEAST (OUTPATIENT)
Dept: LAB | Facility: CLINIC | Age: 70
End: 2020-10-20

## 2020-10-20 DIAGNOSIS — Z79.899 HIGH RISK MEDICATION USE: ICD-10-CM

## 2020-10-20 DIAGNOSIS — M16.0 BILATERAL PRIMARY OSTEOARTHRITIS OF HIP: ICD-10-CM

## 2020-10-20 DIAGNOSIS — R19.5 POSITIVE FIT (FECAL IMMUNOCHEMICAL TEST): ICD-10-CM

## 2020-10-20 DIAGNOSIS — M05.9 SEROPOSITIVE RHEUMATOID ARTHRITIS (H): ICD-10-CM

## 2020-10-20 DIAGNOSIS — E11.649 TYPE 2 DIABETES MELLITUS WITH HYPOGLYCEMIA WITHOUT COMA, WITH LONG-TERM CURRENT USE OF INSULIN (H): ICD-10-CM

## 2020-10-20 DIAGNOSIS — E78.2 MIXED HYPERLIPIDEMIA: ICD-10-CM

## 2020-10-20 DIAGNOSIS — Z12.11 ENCOUNTER FOR SCREENING COLONOSCOPY: ICD-10-CM

## 2020-10-20 DIAGNOSIS — Z79.4 TYPE 2 DIABETES MELLITUS WITH HYPOGLYCEMIA WITHOUT COMA, WITH LONG-TERM CURRENT USE OF INSULIN (H): ICD-10-CM

## 2020-10-20 LAB
ALBUMIN SERPL-MCNC: 3.6 G/DL (ref 3.5–5)
ALT SERPL W P-5'-P-CCNC: 16 U/L (ref 0–45)
AST SERPL W P-5'-P-CCNC: 16 U/L (ref 0–40)
CHOLEST SERPL-MCNC: 121 MG/DL
CREAT SERPL-MCNC: 1.28 MG/DL (ref 0.6–1.1)
CREAT UR-MCNC: 181 MG/DL
ERYTHROCYTE [DISTWIDTH] IN BLOOD BY AUTOMATED COUNT: 12.1 % (ref 11–14.5)
FASTING STATUS PATIENT QL REPORTED: YES
GFR SERPL CREATININE-BSD FRML MDRD: 41 ML/MIN/1.73M2
HBA1C MFR BLD: 6 %
HCT VFR BLD AUTO: 40.3 % (ref 35–47)
HDLC SERPL-MCNC: 49 MG/DL
HGB BLD-MCNC: 13.8 G/DL (ref 12–16)
LDLC SERPL CALC-MCNC: 54 MG/DL
MCH RBC QN AUTO: 31.9 PG (ref 27–34)
MCHC RBC AUTO-ENTMCNC: 34.2 G/DL (ref 32–36)
MCV RBC AUTO: 93 FL (ref 80–100)
MICROALBUMIN UR-MCNC: 2.82 MG/DL (ref 0–1.99)
MICROALBUMIN/CREAT UR: 15.6 MG/G
PLATELET # BLD AUTO: 129 THOU/UL (ref 140–440)
PMV BLD AUTO: 7.9 FL (ref 7–10)
RBC # BLD AUTO: 4.31 MILL/UL (ref 3.8–5.4)
TRIGL SERPL-MCNC: 88 MG/DL
WBC: 11.7 THOU/UL (ref 4–11)

## 2020-10-22 ENCOUNTER — COMMUNICATION - HEALTHEAST (OUTPATIENT)
Dept: INTERNAL MEDICINE | Facility: CLINIC | Age: 70
End: 2020-10-22

## 2020-10-22 DIAGNOSIS — E11.9 DIABETES MELLITUS, TYPE 2 (H): ICD-10-CM

## 2020-11-03 ENCOUNTER — COMMUNICATION - HEALTHEAST (OUTPATIENT)
Dept: INTERNAL MEDICINE | Facility: CLINIC | Age: 70
End: 2020-11-03

## 2020-11-03 ENCOUNTER — COMMUNICATION - HEALTHEAST (OUTPATIENT)
Dept: RHEUMATOLOGY | Facility: CLINIC | Age: 70
End: 2020-11-03

## 2020-11-03 ENCOUNTER — RECORDS - HEALTHEAST (OUTPATIENT)
Dept: ADMINISTRATIVE | Facility: OTHER | Age: 70
End: 2020-11-03

## 2020-11-20 ENCOUNTER — COMMUNICATION - HEALTHEAST (OUTPATIENT)
Dept: SCHEDULING | Facility: CLINIC | Age: 70
End: 2020-11-20

## 2020-11-20 ENCOUNTER — COMMUNICATION - HEALTHEAST (OUTPATIENT)
Dept: INTERNAL MEDICINE | Facility: CLINIC | Age: 70
End: 2020-11-20

## 2020-11-20 DIAGNOSIS — G89.29 CHRONIC PAIN: ICD-10-CM

## 2020-11-24 ENCOUNTER — OFFICE VISIT - HEALTHEAST (OUTPATIENT)
Dept: RHEUMATOLOGY | Facility: CLINIC | Age: 70
End: 2020-11-24

## 2020-11-24 DIAGNOSIS — M25.50 CHRONIC PAIN OF MULTIPLE JOINTS: ICD-10-CM

## 2020-11-24 DIAGNOSIS — M05.9 SEROPOSITIVE RHEUMATOID ARTHRITIS (H): ICD-10-CM

## 2020-11-24 DIAGNOSIS — G89.29 CHRONIC PAIN OF MULTIPLE JOINTS: ICD-10-CM

## 2020-11-24 DIAGNOSIS — Z79.899 HIGH RISK MEDICATION USE: ICD-10-CM

## 2020-11-24 DIAGNOSIS — M15.9 OSTEOARTHRITIS OF MULTIPLE JOINTS, UNSPECIFIED OSTEOARTHRITIS TYPE: ICD-10-CM

## 2020-11-28 ENCOUNTER — AMBULATORY - HEALTHEAST (OUTPATIENT)
Dept: OTHER | Facility: CLINIC | Age: 70
End: 2020-11-28

## 2020-12-01 ENCOUNTER — COMMUNICATION - HEALTHEAST (OUTPATIENT)
Dept: RHEUMATOLOGY | Facility: CLINIC | Age: 70
End: 2020-12-01

## 2020-12-02 ENCOUNTER — COMMUNICATION - HEALTHEAST (OUTPATIENT)
Dept: INTERNAL MEDICINE | Facility: CLINIC | Age: 70
End: 2020-12-02

## 2020-12-02 DIAGNOSIS — G47.00 INSOMNIA: ICD-10-CM

## 2020-12-03 ENCOUNTER — COMMUNICATION - HEALTHEAST (OUTPATIENT)
Dept: NEUROSURGERY | Facility: CLINIC | Age: 70
End: 2020-12-03

## 2020-12-03 DIAGNOSIS — M54.12 CERVICAL RADICULOPATHY: ICD-10-CM

## 2020-12-06 ENCOUNTER — RECORDS - HEALTHEAST (OUTPATIENT)
Dept: LAB | Facility: CLINIC | Age: 70
End: 2020-12-06

## 2020-12-07 LAB
ANION GAP SERPL CALCULATED.3IONS-SCNC: 8 MMOL/L (ref 5–18)
BASOPHILS # BLD AUTO: 0.1 THOU/UL (ref 0–0.2)
BASOPHILS NFR BLD AUTO: 1 % (ref 0–2)
BUN SERPL-MCNC: 17 MG/DL (ref 8–28)
CALCIUM SERPL-MCNC: 8.6 MG/DL (ref 8.5–10.5)
CHLORIDE BLD-SCNC: 105 MMOL/L (ref 98–107)
CO2 SERPL-SCNC: 29 MMOL/L (ref 22–31)
CREAT SERPL-MCNC: 0.93 MG/DL (ref 0.6–1.1)
EOSINOPHIL # BLD AUTO: 0.3 THOU/UL (ref 0–0.4)
EOSINOPHIL NFR BLD AUTO: 3 % (ref 0–6)
ERYTHROCYTE [DISTWIDTH] IN BLOOD BY AUTOMATED COUNT: 14 % (ref 11–14.5)
GFR SERPL CREATININE-BSD FRML MDRD: 59 ML/MIN/1.73M2
GLUCOSE BLD-MCNC: 123 MG/DL (ref 70–125)
HBA1C MFR BLD: 6.6 %
HCT VFR BLD AUTO: 39.8 % (ref 35–47)
HGB BLD-MCNC: 12.7 G/DL (ref 12–16)
IMM GRANULOCYTES # BLD: 0.2 THOU/UL
IMM GRANULOCYTES NFR BLD: 2 %
LYMPHOCYTES # BLD AUTO: 2.1 THOU/UL (ref 0.8–4.4)
LYMPHOCYTES NFR BLD AUTO: 22 % (ref 20–40)
MCH RBC QN AUTO: 29.8 PG (ref 27–34)
MCHC RBC AUTO-ENTMCNC: 31.9 G/DL (ref 32–36)
MCV RBC AUTO: 93 FL (ref 80–100)
MONOCYTES # BLD AUTO: 0.7 THOU/UL (ref 0–0.9)
MONOCYTES NFR BLD AUTO: 7 % (ref 2–10)
NEUTROPHILS # BLD AUTO: 6.5 THOU/UL (ref 2–7.7)
NEUTROPHILS NFR BLD AUTO: 66 % (ref 50–70)
PLATELET # BLD AUTO: 245 THOU/UL (ref 140–440)
PMV BLD AUTO: 10.7 FL (ref 8.5–12.5)
POTASSIUM BLD-SCNC: 4.1 MMOL/L (ref 3.5–5)
RBC # BLD AUTO: 4.26 MILL/UL (ref 3.8–5.4)
SODIUM SERPL-SCNC: 142 MMOL/L (ref 136–145)
WBC: 9.9 THOU/UL (ref 4–11)

## 2020-12-08 ENCOUNTER — COMMUNICATION - HEALTHEAST (OUTPATIENT)
Dept: NURSING | Facility: CLINIC | Age: 70
End: 2020-12-08

## 2020-12-10 ENCOUNTER — COMMUNICATION - HEALTHEAST (OUTPATIENT)
Dept: RHEUMATOLOGY | Facility: CLINIC | Age: 70
End: 2020-12-10

## 2020-12-10 DIAGNOSIS — M05.9 SEROPOSITIVE RHEUMATOID ARTHRITIS (H): ICD-10-CM

## 2020-12-15 ENCOUNTER — COMMUNICATION - HEALTHEAST (OUTPATIENT)
Dept: RHEUMATOLOGY | Facility: CLINIC | Age: 70
End: 2020-12-15

## 2020-12-15 ENCOUNTER — COMMUNICATION - HEALTHEAST (OUTPATIENT)
Dept: INFECTIOUS DISEASES | Facility: CLINIC | Age: 70
End: 2020-12-15

## 2020-12-15 DIAGNOSIS — M05.9 SEROPOSITIVE RHEUMATOID ARTHRITIS (H): ICD-10-CM

## 2020-12-15 RX ORDER — HYDROXYCHLOROQUINE SULFATE 200 MG/1
200 TABLET, FILM COATED ORAL 2 TIMES DAILY
Qty: 180 TABLET | Refills: 0 | Status: SHIPPED | OUTPATIENT
Start: 2020-12-15 | End: 2021-09-30

## 2020-12-16 ENCOUNTER — RECORDS - HEALTHEAST (OUTPATIENT)
Dept: LAB | Facility: CLINIC | Age: 70
End: 2020-12-16

## 2020-12-17 LAB
BASOPHILS # BLD AUTO: 0.1 THOU/UL (ref 0–0.2)
BASOPHILS NFR BLD AUTO: 1 % (ref 0–2)
C REACTIVE PROTEIN LHE: <0.1 MG/DL (ref 0–0.8)
EOSINOPHIL # BLD AUTO: 0.3 THOU/UL (ref 0–0.4)
EOSINOPHIL NFR BLD AUTO: 4 % (ref 0–6)
ERYTHROCYTE [DISTWIDTH] IN BLOOD BY AUTOMATED COUNT: 14.9 % (ref 11–14.5)
ERYTHROCYTE [SEDIMENTATION RATE] IN BLOOD BY WESTERGREN METHOD: 27 MM/HR (ref 0–20)
HCT VFR BLD AUTO: 38.6 % (ref 35–47)
HGB BLD-MCNC: 12.3 G/DL (ref 12–16)
IMM GRANULOCYTES # BLD: 0 THOU/UL
IMM GRANULOCYTES NFR BLD: 1 %
LYMPHOCYTES # BLD AUTO: 1.8 THOU/UL (ref 0.8–4.4)
LYMPHOCYTES NFR BLD AUTO: 25 % (ref 20–40)
MCH RBC QN AUTO: 30.1 PG (ref 27–34)
MCHC RBC AUTO-ENTMCNC: 31.9 G/DL (ref 32–36)
MCV RBC AUTO: 94 FL (ref 80–100)
MONOCYTES # BLD AUTO: 0.5 THOU/UL (ref 0–0.9)
MONOCYTES NFR BLD AUTO: 7 % (ref 2–10)
NEUTROPHILS # BLD AUTO: 4.5 THOU/UL (ref 2–7.7)
NEUTROPHILS NFR BLD AUTO: 63 % (ref 50–70)
PLATELET # BLD AUTO: 116 THOU/UL (ref 140–440)
PMV BLD AUTO: 11 FL (ref 8.5–12.5)
RBC # BLD AUTO: 4.09 MILL/UL (ref 3.8–5.4)
WBC: 7.2 THOU/UL (ref 4–11)

## 2020-12-22 ENCOUNTER — RECORDS - HEALTHEAST (OUTPATIENT)
Dept: LAB | Facility: CLINIC | Age: 70
End: 2020-12-22

## 2020-12-23 LAB
ALBUMIN SERPL-MCNC: 2.8 G/DL (ref 3.5–5)
ALP SERPL-CCNC: 44 U/L (ref 45–120)
ALT SERPL W P-5'-P-CCNC: 12 U/L (ref 0–45)
ANION GAP SERPL CALCULATED.3IONS-SCNC: 7 MMOL/L (ref 5–18)
ANION GAP SERPL CALCULATED.3IONS-SCNC: 7 MMOL/L (ref 5–18)
AST SERPL W P-5'-P-CCNC: 15 U/L (ref 0–40)
BASOPHILS # BLD AUTO: 0.1 THOU/UL (ref 0–0.2)
BASOPHILS NFR BLD AUTO: 1 % (ref 0–2)
BILIRUB SERPL-MCNC: 0.4 MG/DL (ref 0–1)
BUN SERPL-MCNC: 23 MG/DL (ref 8–28)
BUN SERPL-MCNC: 23 MG/DL (ref 8–28)
C REACTIVE PROTEIN LHE: <0.1 MG/DL (ref 0–0.8)
CALCIUM SERPL-MCNC: 8.9 MG/DL (ref 8.5–10.5)
CALCIUM SERPL-MCNC: 8.9 MG/DL (ref 8.5–10.5)
CHLORIDE BLD-SCNC: 106 MMOL/L (ref 98–107)
CHLORIDE BLD-SCNC: 106 MMOL/L (ref 98–107)
CO2 SERPL-SCNC: 28 MMOL/L (ref 22–31)
CO2 SERPL-SCNC: 28 MMOL/L (ref 22–31)
CREAT SERPL-MCNC: 0.95 MG/DL (ref 0.6–1.1)
CREAT SERPL-MCNC: 0.95 MG/DL (ref 0.6–1.1)
EOSINOPHIL # BLD AUTO: 0.4 THOU/UL (ref 0–0.4)
EOSINOPHIL NFR BLD AUTO: 7 % (ref 0–6)
ERYTHROCYTE [DISTWIDTH] IN BLOOD BY AUTOMATED COUNT: 15.2 % (ref 11–14.5)
ERYTHROCYTE [SEDIMENTATION RATE] IN BLOOD BY WESTERGREN METHOD: 22 MM/HR (ref 0–20)
GFR SERPL CREATININE-BSD FRML MDRD: 58 ML/MIN/1.73M2
GFR SERPL CREATININE-BSD FRML MDRD: 58 ML/MIN/1.73M2
GLUCOSE BLD-MCNC: 65 MG/DL (ref 70–125)
GLUCOSE BLD-MCNC: 65 MG/DL (ref 70–125)
HCT VFR BLD AUTO: 35.3 % (ref 35–47)
HGB BLD-MCNC: 11.6 G/DL (ref 12–16)
IMM GRANULOCYTES # BLD: 0 THOU/UL
IMM GRANULOCYTES NFR BLD: 1 %
LYMPHOCYTES # BLD AUTO: 1.9 THOU/UL (ref 0.8–4.4)
LYMPHOCYTES NFR BLD AUTO: 31 % (ref 20–40)
MCH RBC QN AUTO: 30.7 PG (ref 27–34)
MCHC RBC AUTO-ENTMCNC: 32.9 G/DL (ref 32–36)
MCV RBC AUTO: 93 FL (ref 80–100)
MONOCYTES # BLD AUTO: 0.5 THOU/UL (ref 0–0.9)
MONOCYTES NFR BLD AUTO: 8 % (ref 2–10)
NEUTROPHILS # BLD AUTO: 3.2 THOU/UL (ref 2–7.7)
NEUTROPHILS NFR BLD AUTO: 53 % (ref 50–70)
PLATELET # BLD AUTO: 127 THOU/UL (ref 140–440)
PMV BLD AUTO: 10.3 FL (ref 8.5–12.5)
POTASSIUM BLD-SCNC: 4 MMOL/L (ref 3.5–5)
POTASSIUM BLD-SCNC: 4 MMOL/L (ref 3.5–5)
PROT SERPL-MCNC: 5.8 G/DL (ref 6–8)
RBC # BLD AUTO: 3.78 MILL/UL (ref 3.8–5.4)
SODIUM SERPL-SCNC: 141 MMOL/L (ref 136–145)
SODIUM SERPL-SCNC: 141 MMOL/L (ref 136–145)
WBC: 6.1 THOU/UL (ref 4–11)

## 2020-12-26 ENCOUNTER — RECORDS - HEALTHEAST (OUTPATIENT)
Dept: LAB | Facility: CLINIC | Age: 70
End: 2020-12-26

## 2020-12-29 ENCOUNTER — TELEPHONE (OUTPATIENT)
Dept: NEUROSURGERY | Facility: CLINIC | Age: 70
End: 2020-12-29

## 2020-12-29 NOTE — TELEPHONE ENCOUNTER
Health Call Center    Phone Message    May a detailed message be left on voicemail: yes     Reason for Call: Other: Kandace at [facility] in [facility] called to [facility] follow up to IP visit from 11/27/20 - 12/4/20. She states Pt was to be seen in Neurosurgery as follow up after hospitalization at [facility].    Please call to advise.    Action Taken: Message routed to:  [facility] ([facility]): Neurosurgery    Travel Screening: Not Applicable

## 2020-12-30 ENCOUNTER — OFFICE VISIT - HEALTHEAST (OUTPATIENT)
Dept: INFECTIOUS DISEASES | Facility: CLINIC | Age: 70
End: 2020-12-30

## 2020-12-30 DIAGNOSIS — M00.88: ICD-10-CM

## 2020-12-30 DIAGNOSIS — R79.82 CRP ELEVATED: ICD-10-CM

## 2020-12-30 DIAGNOSIS — M47.812 ARTHRITIS OF FACET JOINT OF CERVICAL SPINE: ICD-10-CM

## 2020-12-30 DIAGNOSIS — Z79.2 RECEIVING INTRAVENOUS ANTIBIOTIC TREATMENT AS OUTPATIENT: ICD-10-CM

## 2020-12-30 LAB
ALBUMIN SERPL-MCNC: 3.2 G/DL (ref 3.5–5)
ALBUMIN SERPL-MCNC: 3.2 G/DL (ref 3.5–5)
ALP SERPL-CCNC: 52 U/L (ref 45–120)
ALP SERPL-CCNC: 52 U/L (ref 45–120)
ALT SERPL W P-5'-P-CCNC: 17 U/L (ref 0–45)
ALT SERPL W P-5'-P-CCNC: 17 U/L (ref 0–45)
ANION GAP SERPL CALCULATED.3IONS-SCNC: 7 MMOL/L (ref 5–18)
ANION GAP SERPL CALCULATED.3IONS-SCNC: 7 MMOL/L (ref 5–18)
AST SERPL W P-5'-P-CCNC: 24 U/L (ref 0–40)
AST SERPL W P-5'-P-CCNC: 24 U/L (ref 0–40)
BASOPHILS # BLD AUTO: 0.1 THOU/UL (ref 0–0.2)
BASOPHILS NFR BLD AUTO: 1 % (ref 0–2)
BILIRUB DIRECT SERPL-MCNC: 0.2 MG/DL
BILIRUB SERPL-MCNC: 0.5 MG/DL (ref 0–1)
BILIRUB SERPL-MCNC: 0.5 MG/DL (ref 0–1)
BUN SERPL-MCNC: 18 MG/DL (ref 8–28)
BUN SERPL-MCNC: 18 MG/DL (ref 8–28)
C REACTIVE PROTEIN LHE: <0.1 MG/DL (ref 0–0.8)
CALCIUM SERPL-MCNC: 8.8 MG/DL (ref 8.5–10.5)
CALCIUM SERPL-MCNC: 8.8 MG/DL (ref 8.5–10.5)
CHLORIDE BLD-SCNC: 100 MMOL/L (ref 98–107)
CHLORIDE BLD-SCNC: 100 MMOL/L (ref 98–107)
CO2 SERPL-SCNC: 28 MMOL/L (ref 22–31)
CO2 SERPL-SCNC: 28 MMOL/L (ref 22–31)
CREAT SERPL-MCNC: 1.07 MG/DL (ref 0.6–1.1)
CREAT SERPL-MCNC: 1.07 MG/DL (ref 0.6–1.1)
EOSINOPHIL # BLD AUTO: 0.3 THOU/UL (ref 0–0.4)
EOSINOPHIL NFR BLD AUTO: 4 % (ref 0–6)
ERYTHROCYTE [DISTWIDTH] IN BLOOD BY AUTOMATED COUNT: 15 % (ref 11–14.5)
ERYTHROCYTE [SEDIMENTATION RATE] IN BLOOD BY WESTERGREN METHOD: 17 MM/HR (ref 0–20)
GFR SERPL CREATININE-BSD FRML MDRD: 50 ML/MIN/1.73M2
GFR SERPL CREATININE-BSD FRML MDRD: 50 ML/MIN/1.73M2
GLUCOSE BLD-MCNC: 175 MG/DL (ref 70–125)
GLUCOSE BLD-MCNC: 175 MG/DL (ref 70–125)
HCT VFR BLD AUTO: 38.1 % (ref 35–47)
HGB BLD-MCNC: 12.3 G/DL (ref 12–16)
IMM GRANULOCYTES # BLD: 0 THOU/UL
IMM GRANULOCYTES NFR BLD: 0 %
LYMPHOCYTES # BLD AUTO: 1.7 THOU/UL (ref 0.8–4.4)
LYMPHOCYTES NFR BLD AUTO: 22 % (ref 20–40)
MCH RBC QN AUTO: 30.3 PG (ref 27–34)
MCHC RBC AUTO-ENTMCNC: 32.3 G/DL (ref 32–36)
MCV RBC AUTO: 94 FL (ref 80–100)
MONOCYTES # BLD AUTO: 0.5 THOU/UL (ref 0–0.9)
MONOCYTES NFR BLD AUTO: 7 % (ref 2–10)
NEUTROPHILS # BLD AUTO: 4.9 THOU/UL (ref 2–7.7)
NEUTROPHILS NFR BLD AUTO: 67 % (ref 50–70)
PLATELET # BLD AUTO: 148 THOU/UL (ref 140–440)
PMV BLD AUTO: 10.7 FL (ref 8.5–12.5)
POTASSIUM BLD-SCNC: 3.8 MMOL/L (ref 3.5–5)
POTASSIUM BLD-SCNC: 3.8 MMOL/L (ref 3.5–5)
PROT SERPL-MCNC: 6.4 G/DL (ref 6–8)
PROT SERPL-MCNC: 6.4 G/DL (ref 6–8)
RBC # BLD AUTO: 4.06 MILL/UL (ref 3.8–5.4)
SODIUM SERPL-SCNC: 135 MMOL/L (ref 136–145)
SODIUM SERPL-SCNC: 135 MMOL/L (ref 136–145)
WBC: 7.4 THOU/UL (ref 4–11)

## 2021-01-03 ENCOUNTER — RECORDS - HEALTHEAST (OUTPATIENT)
Dept: LAB | Facility: CLINIC | Age: 71
End: 2021-01-03

## 2021-01-05 NOTE — TELEPHONE ENCOUNTER
1/5/21 LMTCB at [facility] in [place] ([phone]). Kandace was out today and they transferred me to medical records voicemail. MRI has not been scheduled yet at [facility]. Pt still needs appt scheduled with Dr. Das at [facility] ([phone]).

## 2021-01-06 ENCOUNTER — COMMUNICATION - HEALTHEAST (OUTPATIENT)
Dept: NEUROSURGERY | Facility: CLINIC | Age: 71
End: 2021-01-06

## 2021-01-06 ENCOUNTER — RECORDS - HEALTHEAST (OUTPATIENT)
Dept: LAB | Facility: CLINIC | Age: 71
End: 2021-01-06

## 2021-01-06 LAB
ALBUMIN SERPL-MCNC: 3 G/DL (ref 3.5–5)
ALBUMIN SERPL-MCNC: 3 G/DL (ref 3.5–5)
ALP SERPL-CCNC: 47 U/L (ref 45–120)
ALP SERPL-CCNC: 47 U/L (ref 45–120)
ALT SERPL W P-5'-P-CCNC: 18 U/L (ref 0–45)
ALT SERPL W P-5'-P-CCNC: 18 U/L (ref 0–45)
ANION GAP SERPL CALCULATED.3IONS-SCNC: 8 MMOL/L (ref 5–18)
ANION GAP SERPL CALCULATED.3IONS-SCNC: 8 MMOL/L (ref 5–18)
AST SERPL W P-5'-P-CCNC: 21 U/L (ref 0–40)
AST SERPL W P-5'-P-CCNC: 21 U/L (ref 0–40)
BASOPHILS # BLD AUTO: 0.1 THOU/UL (ref 0–0.2)
BASOPHILS NFR BLD AUTO: 1 % (ref 0–2)
BILIRUB DIRECT SERPL-MCNC: 0.2 MG/DL
BILIRUB SERPL-MCNC: 0.4 MG/DL (ref 0–1)
BILIRUB SERPL-MCNC: 0.4 MG/DL (ref 0–1)
BUN SERPL-MCNC: 21 MG/DL (ref 8–28)
BUN SERPL-MCNC: 21 MG/DL (ref 8–28)
C REACTIVE PROTEIN LHE: <0.1 MG/DL (ref 0–0.8)
CALCIUM SERPL-MCNC: 8.7 MG/DL (ref 8.5–10.5)
CALCIUM SERPL-MCNC: 8.7 MG/DL (ref 8.5–10.5)
CHLORIDE BLD-SCNC: 106 MMOL/L (ref 98–107)
CHLORIDE BLD-SCNC: 106 MMOL/L (ref 98–107)
CO2 SERPL-SCNC: 28 MMOL/L (ref 22–31)
CO2 SERPL-SCNC: 28 MMOL/L (ref 22–31)
CREAT SERPL-MCNC: 1.03 MG/DL (ref 0.6–1.1)
CREAT SERPL-MCNC: 1.03 MG/DL (ref 0.6–1.1)
EOSINOPHIL # BLD AUTO: 0.2 THOU/UL (ref 0–0.4)
EOSINOPHIL NFR BLD AUTO: 3 % (ref 0–6)
ERYTHROCYTE [DISTWIDTH] IN BLOOD BY AUTOMATED COUNT: 14.6 % (ref 11–14.5)
ERYTHROCYTE [SEDIMENTATION RATE] IN BLOOD BY WESTERGREN METHOD: 11 MM/HR (ref 0–20)
GFR SERPL CREATININE-BSD FRML MDRD: 53 ML/MIN/1.73M2
GFR SERPL CREATININE-BSD FRML MDRD: 53 ML/MIN/1.73M2
GLUCOSE BLD-MCNC: 189 MG/DL (ref 70–125)
GLUCOSE BLD-MCNC: 189 MG/DL (ref 70–125)
HCT VFR BLD AUTO: 37 % (ref 35–47)
HGB BLD-MCNC: 11.7 G/DL (ref 12–16)
IMM GRANULOCYTES # BLD: 0 THOU/UL
IMM GRANULOCYTES NFR BLD: 1 %
LYMPHOCYTES # BLD AUTO: 1.6 THOU/UL (ref 0.8–4.4)
LYMPHOCYTES NFR BLD AUTO: 24 % (ref 20–40)
MCH RBC QN AUTO: 30.3 PG (ref 27–34)
MCHC RBC AUTO-ENTMCNC: 31.6 G/DL (ref 32–36)
MCV RBC AUTO: 96 FL (ref 80–100)
MONOCYTES # BLD AUTO: 0.4 THOU/UL (ref 0–0.9)
MONOCYTES NFR BLD AUTO: 7 % (ref 2–10)
NEUTROPHILS # BLD AUTO: 4.2 THOU/UL (ref 2–7.7)
NEUTROPHILS NFR BLD AUTO: 65 % (ref 50–70)
PLATELET # BLD AUTO: 125 THOU/UL (ref 140–440)
PMV BLD AUTO: 10.5 FL (ref 8.5–12.5)
POTASSIUM BLD-SCNC: 4.1 MMOL/L (ref 3.5–5)
POTASSIUM BLD-SCNC: 4.1 MMOL/L (ref 3.5–5)
PROT SERPL-MCNC: 5.9 G/DL (ref 6–8)
PROT SERPL-MCNC: 5.9 G/DL (ref 6–8)
RBC # BLD AUTO: 3.86 MILL/UL (ref 3.8–5.4)
SODIUM SERPL-SCNC: 142 MMOL/L (ref 136–145)
SODIUM SERPL-SCNC: 142 MMOL/L (ref 136–145)
WBC: 6.5 THOU/UL (ref 4–11)

## 2021-01-07 LAB
ANION GAP SERPL CALCULATED.3IONS-SCNC: 11 MMOL/L (ref 5–18)
BUN SERPL-MCNC: 18 MG/DL (ref 8–28)
C REACTIVE PROTEIN LHE: <0.1 MG/DL (ref 0–0.8)
CALCIUM SERPL-MCNC: 9.3 MG/DL (ref 8.5–10.5)
CHLORIDE BLD-SCNC: 105 MMOL/L (ref 98–107)
CO2 SERPL-SCNC: 28 MMOL/L (ref 22–31)
CREAT SERPL-MCNC: 0.96 MG/DL (ref 0.6–1.1)
ERYTHROCYTE [DISTWIDTH] IN BLOOD BY AUTOMATED COUNT: 14.7 % (ref 11–14.5)
ERYTHROCYTE [SEDIMENTATION RATE] IN BLOOD BY WESTERGREN METHOD: 14 MM/HR (ref 0–20)
GFR SERPL CREATININE-BSD FRML MDRD: 57 ML/MIN/1.73M2
GLUCOSE BLD-MCNC: 93 MG/DL (ref 70–125)
HCT VFR BLD AUTO: 42.2 % (ref 35–47)
HGB BLD-MCNC: 13.3 G/DL (ref 12–16)
MCH RBC QN AUTO: 30.4 PG (ref 27–34)
MCHC RBC AUTO-ENTMCNC: 31.5 G/DL (ref 32–36)
MCV RBC AUTO: 96 FL (ref 80–100)
PLATELET # BLD AUTO: 147 THOU/UL (ref 140–440)
PMV BLD AUTO: 10.8 FL (ref 8.5–12.5)
POTASSIUM BLD-SCNC: 3.7 MMOL/L (ref 3.5–5)
RBC # BLD AUTO: 4.38 MILL/UL (ref 3.8–5.4)
SODIUM SERPL-SCNC: 144 MMOL/L (ref 136–145)
WBC: 7.6 THOU/UL (ref 4–11)

## 2021-01-08 ENCOUNTER — COMMUNICATION - HEALTHEAST (OUTPATIENT)
Dept: CARE COORDINATION | Facility: CLINIC | Age: 71
End: 2021-01-08

## 2021-01-11 ENCOUNTER — COMMUNICATION - HEALTHEAST (OUTPATIENT)
Dept: INTERNAL MEDICINE | Facility: CLINIC | Age: 71
End: 2021-01-11

## 2021-01-13 ENCOUNTER — HOSPITAL ENCOUNTER (OUTPATIENT)
Dept: MRI IMAGING | Facility: HOSPITAL | Age: 71
Discharge: HOME | End: 2021-01-13
Attending: NEUROLOGICAL SURGERY

## 2021-01-13 DIAGNOSIS — M54.12 CERVICAL RADICULOPATHY: ICD-10-CM

## 2021-01-20 ENCOUNTER — COMMUNICATION - HEALTHEAST (OUTPATIENT)
Dept: INTERNAL MEDICINE | Facility: CLINIC | Age: 71
End: 2021-01-20

## 2021-01-20 ENCOUNTER — COMMUNICATION - HEALTHEAST (OUTPATIENT)
Dept: ADMINISTRATIVE | Facility: CLINIC | Age: 71
End: 2021-01-20

## 2021-01-20 DIAGNOSIS — G89.29 CHRONIC PAIN: ICD-10-CM

## 2021-01-20 DIAGNOSIS — I10 ESSENTIAL HYPERTENSION: ICD-10-CM

## 2021-01-21 PROBLEM — G82.20 PARAPARESIS (H): Status: ACTIVE | Noted: 2021-01-21

## 2021-01-21 PROBLEM — G62.89 AXONAL NEUROPATHY: Status: ACTIVE | Noted: 2021-01-21

## 2021-01-21 PROBLEM — R27.0 ATAXIA: Status: ACTIVE | Noted: 2021-01-21

## 2021-01-21 PROBLEM — E11.9 DIABETES MELLITUS (H): Status: ACTIVE | Noted: 2021-01-21

## 2021-01-21 RX ORDER — AMLODIPINE BESYLATE 10 MG/1
TABLET ORAL
Qty: 90 TABLET | Refills: 2 | Status: SHIPPED | OUTPATIENT
Start: 2021-01-21 | End: 2021-09-16

## 2021-01-22 ENCOUNTER — HOSPITAL ENCOUNTER (OUTPATIENT)
Dept: RADIOLOGY | Facility: HOSPITAL | Age: 71
Discharge: HOME | End: 2021-01-22
Attending: NEUROLOGICAL SURGERY

## 2021-01-22 ENCOUNTER — HOSPITAL ENCOUNTER (OUTPATIENT)
Dept: MRI IMAGING | Facility: HOSPITAL | Age: 71
Discharge: HOME | End: 2021-01-22
Attending: NEUROLOGICAL SURGERY

## 2021-01-22 DIAGNOSIS — M54.12 CERVICAL RADICULOPATHY: ICD-10-CM

## 2021-01-26 ENCOUNTER — AMBULATORY - HEALTHEAST (OUTPATIENT)
Dept: LAB | Facility: CLINIC | Age: 71
End: 2021-01-26

## 2021-01-26 DIAGNOSIS — Z79.899 HIGH RISK MEDICATION USE: ICD-10-CM

## 2021-01-26 DIAGNOSIS — M05.9 SEROPOSITIVE RHEUMATOID ARTHRITIS (H): ICD-10-CM

## 2021-01-26 LAB
ALBUMIN SERPL-MCNC: 3.5 G/DL (ref 3.5–5)
ALT SERPL W P-5'-P-CCNC: 22 U/L (ref 0–45)
AST SERPL W P-5'-P-CCNC: 20 U/L (ref 0–40)
CREAT SERPL-MCNC: 1.19 MG/DL (ref 0.6–1.1)
ERYTHROCYTE [DISTWIDTH] IN BLOOD BY AUTOMATED COUNT: 13 % (ref 11–14.5)
GFR SERPL CREATININE-BSD FRML MDRD: 45 ML/MIN/1.73M2
HCT VFR BLD AUTO: 39.9 % (ref 35–47)
HGB BLD-MCNC: 13.5 G/DL (ref 12–16)
MCH RBC QN AUTO: 30.6 PG (ref 27–34)
MCHC RBC AUTO-ENTMCNC: 33.7 G/DL (ref 32–36)
MCV RBC AUTO: 91 FL (ref 80–100)
PLATELET # BLD AUTO: 147 THOU/UL (ref 140–440)
PMV BLD AUTO: 7.9 FL (ref 7–10)
RBC # BLD AUTO: 4.4 MILL/UL (ref 3.8–5.4)
WBC: 9.1 THOU/UL (ref 4–11)

## 2021-02-09 ENCOUNTER — COMMUNICATION - HEALTHEAST (OUTPATIENT)
Dept: CARE COORDINATION | Facility: CLINIC | Age: 71
End: 2021-02-09

## 2021-02-10 ENCOUNTER — COMMUNICATION - HEALTHEAST (OUTPATIENT)
Dept: RHEUMATOLOGY | Facility: CLINIC | Age: 71
End: 2021-02-10

## 2021-02-10 DIAGNOSIS — N28.9 RENAL INSUFFICIENCY: ICD-10-CM

## 2021-03-01 ENCOUNTER — AMBULATORY - HEALTHEAST (OUTPATIENT)
Dept: MULTI SPECIALTY CLINIC | Facility: CLINIC | Age: 71
End: 2021-03-01

## 2021-03-01 LAB — RETINOPATHY: NORMAL

## 2021-03-02 ENCOUNTER — COMMUNICATION - HEALTHEAST (OUTPATIENT)
Dept: INTERNAL MEDICINE | Facility: CLINIC | Age: 71
End: 2021-03-02

## 2021-03-02 DIAGNOSIS — G89.29 CHRONIC PAIN: ICD-10-CM

## 2021-03-03 ENCOUNTER — COMMUNICATION - HEALTHEAST (OUTPATIENT)
Dept: INTERNAL MEDICINE | Facility: CLINIC | Age: 71
End: 2021-03-03

## 2021-03-03 DIAGNOSIS — I10 ESSENTIAL HYPERTENSION: ICD-10-CM

## 2021-03-04 RX ORDER — METOPROLOL SUCCINATE 50 MG/1
TABLET, EXTENDED RELEASE ORAL
Qty: 90 TABLET | Refills: 2 | Status: SHIPPED | OUTPATIENT
Start: 2021-03-04 | End: 2021-09-16

## 2021-03-24 ENCOUNTER — COMMUNICATION - HEALTHEAST (OUTPATIENT)
Dept: CARE COORDINATION | Facility: CLINIC | Age: 71
End: 2021-03-24

## 2021-04-01 ENCOUNTER — COMMUNICATION - HEALTHEAST (OUTPATIENT)
Dept: INTERNAL MEDICINE | Facility: CLINIC | Age: 71
End: 2021-04-01

## 2021-04-01 DIAGNOSIS — G89.29 CHRONIC PAIN: ICD-10-CM

## 2021-04-03 ENCOUNTER — RECORDS - HEALTHEAST (OUTPATIENT)
Dept: ADMINISTRATIVE | Facility: OTHER | Age: 71
End: 2021-04-03

## 2021-04-03 LAB — RETINOPATHY: NEGATIVE

## 2021-04-05 ENCOUNTER — OFFICE VISIT - HEALTHEAST (OUTPATIENT)
Dept: INTERNAL MEDICINE | Facility: CLINIC | Age: 71
End: 2021-04-05

## 2021-04-05 ENCOUNTER — COMMUNICATION - HEALTHEAST (OUTPATIENT)
Dept: LAB | Facility: CLINIC | Age: 71
End: 2021-04-05

## 2021-04-05 DIAGNOSIS — N18.31 STAGE 3A CHRONIC KIDNEY DISEASE (H): ICD-10-CM

## 2021-04-05 DIAGNOSIS — M06.9 RHEUMATOID ARTHRITIS, INVOLVING UNSPECIFIED SITE, UNSPECIFIED WHETHER RHEUMATOID FACTOR PRESENT (H): ICD-10-CM

## 2021-04-05 DIAGNOSIS — E66.01 MORBID OBESITY (H): ICD-10-CM

## 2021-04-05 DIAGNOSIS — M54.16 LUMBAR RADICULOPATHY: ICD-10-CM

## 2021-04-05 DIAGNOSIS — Z12.31 VISIT FOR SCREENING MAMMOGRAM: ICD-10-CM

## 2021-04-05 DIAGNOSIS — G89.4 CHRONIC PAIN SYNDROME: ICD-10-CM

## 2021-04-05 DIAGNOSIS — Z79.899 CONTROLLED SUBSTANCE AGREEMENT SIGNED: ICD-10-CM

## 2021-04-05 DIAGNOSIS — N18.31 TYPE 2 DIABETES MELLITUS WITH STAGE 3A CHRONIC KIDNEY DISEASE, WITH LONG-TERM CURRENT USE OF INSULIN (H): ICD-10-CM

## 2021-04-05 DIAGNOSIS — E11.22 TYPE 2 DIABETES MELLITUS WITH STAGE 3A CHRONIC KIDNEY DISEASE, WITH LONG-TERM CURRENT USE OF INSULIN (H): ICD-10-CM

## 2021-04-05 DIAGNOSIS — Z51.81 ENCOUNTER FOR THERAPEUTIC DRUG MONITORING: ICD-10-CM

## 2021-04-05 DIAGNOSIS — Z79.4 TYPE 2 DIABETES MELLITUS WITH STAGE 3A CHRONIC KIDNEY DISEASE, WITH LONG-TERM CURRENT USE OF INSULIN (H): ICD-10-CM

## 2021-04-05 DIAGNOSIS — L65.9 HAIR LOSS: ICD-10-CM

## 2021-04-07 ENCOUNTER — COMMUNICATION - HEALTHEAST (OUTPATIENT)
Dept: CARE COORDINATION | Facility: CLINIC | Age: 71
End: 2021-04-07

## 2021-04-09 ENCOUNTER — RECORDS - HEALTHEAST (OUTPATIENT)
Dept: HEALTH INFORMATION MANAGEMENT | Facility: CLINIC | Age: 71
End: 2021-04-09

## 2021-04-26 ENCOUNTER — RECORDS - HEALTHEAST (OUTPATIENT)
Dept: ADMINISTRATIVE | Facility: OTHER | Age: 71
End: 2021-04-26

## 2021-04-27 ENCOUNTER — COMMUNICATION - HEALTHEAST (OUTPATIENT)
Dept: FAMILY MEDICINE | Facility: CLINIC | Age: 71
End: 2021-04-27

## 2021-04-27 DIAGNOSIS — M05.9 SEROPOSITIVE RHEUMATOID ARTHRITIS (H): ICD-10-CM

## 2021-04-27 DIAGNOSIS — J30.81 ALLERGIC RHINITIS DUE TO CATS: ICD-10-CM

## 2021-04-27 DIAGNOSIS — E78.2 MIXED HYPERLIPIDEMIA: ICD-10-CM

## 2021-04-27 DIAGNOSIS — F51.01 PRIMARY INSOMNIA: ICD-10-CM

## 2021-04-27 DIAGNOSIS — G89.29 CHRONIC PAIN: ICD-10-CM

## 2021-04-28 RX ORDER — ATORVASTATIN CALCIUM 40 MG/1
40 TABLET, FILM COATED ORAL AT BEDTIME
Qty: 90 TABLET | Refills: 2 | Status: SHIPPED | OUTPATIENT
Start: 2021-04-28 | End: 2021-11-30

## 2021-04-28 RX ORDER — LORATADINE 10 MG/1
10 TABLET ORAL AT BEDTIME
Qty: 90 TABLET | Refills: 3 | Status: SHIPPED | OUTPATIENT
Start: 2021-04-28 | End: 2022-01-21

## 2021-04-28 RX ORDER — TRAZODONE HYDROCHLORIDE 50 MG/1
50 TABLET, FILM COATED ORAL AT BEDTIME
Qty: 90 TABLET | Refills: 1 | Status: SHIPPED | OUTPATIENT
Start: 2021-04-28 | End: 2021-09-16

## 2021-05-07 ENCOUNTER — COMMUNICATION - HEALTHEAST (OUTPATIENT)
Dept: INTERNAL MEDICINE | Facility: CLINIC | Age: 71
End: 2021-05-07

## 2021-05-07 ENCOUNTER — AMBULATORY - HEALTHEAST (OUTPATIENT)
Dept: MULTI SPECIALTY CLINIC | Facility: CLINIC | Age: 71
End: 2021-05-07

## 2021-05-07 ENCOUNTER — OFFICE VISIT - HEALTHEAST (OUTPATIENT)
Dept: INTERNAL MEDICINE | Facility: CLINIC | Age: 71
End: 2021-05-07

## 2021-05-07 DIAGNOSIS — Z12.11 SPECIAL SCREENING FOR MALIGNANT NEOPLASMS, COLON: ICD-10-CM

## 2021-05-07 DIAGNOSIS — N18.31 STAGE 3A CHRONIC KIDNEY DISEASE (H): ICD-10-CM

## 2021-05-07 DIAGNOSIS — E11.649 TYPE 2 DIABETES MELLITUS WITH HYPOGLYCEMIA WITHOUT COMA, WITH LONG-TERM CURRENT USE OF INSULIN (H): ICD-10-CM

## 2021-05-07 DIAGNOSIS — M54.16 LUMBAR RADICULOPATHY: ICD-10-CM

## 2021-05-07 DIAGNOSIS — G89.29 CHRONIC PAIN: ICD-10-CM

## 2021-05-07 DIAGNOSIS — N18.31 TYPE 2 DIABETES MELLITUS WITH STAGE 3A CHRONIC KIDNEY DISEASE, WITH LONG-TERM CURRENT USE OF INSULIN (H): ICD-10-CM

## 2021-05-07 DIAGNOSIS — Z76.89 ENCOUNTER TO ESTABLISH CARE: ICD-10-CM

## 2021-05-07 DIAGNOSIS — Z79.4 TYPE 2 DIABETES MELLITUS WITH STAGE 3A CHRONIC KIDNEY DISEASE, WITH LONG-TERM CURRENT USE OF INSULIN (H): ICD-10-CM

## 2021-05-07 DIAGNOSIS — M05.9 SEROPOSITIVE RHEUMATOID ARTHRITIS (H): ICD-10-CM

## 2021-05-07 DIAGNOSIS — L65.9 HAIR LOSS: ICD-10-CM

## 2021-05-07 DIAGNOSIS — Z87.891 HISTORY OF NICOTINE DEPENDENCE: ICD-10-CM

## 2021-05-07 DIAGNOSIS — Z79.899 HIGH RISK MEDICATION USE: ICD-10-CM

## 2021-05-07 DIAGNOSIS — Z79.4 TYPE 2 DIABETES MELLITUS WITH HYPOGLYCEMIA WITHOUT COMA, WITH LONG-TERM CURRENT USE OF INSULIN (H): ICD-10-CM

## 2021-05-07 DIAGNOSIS — N28.9 RENAL INSUFFICIENCY: ICD-10-CM

## 2021-05-07 DIAGNOSIS — Z12.31 VISIT FOR SCREENING MAMMOGRAM: ICD-10-CM

## 2021-05-07 DIAGNOSIS — E11.22 TYPE 2 DIABETES MELLITUS WITH STAGE 3A CHRONIC KIDNEY DISEASE, WITH LONG-TERM CURRENT USE OF INSULIN (H): ICD-10-CM

## 2021-05-07 DIAGNOSIS — M54.2 CERVICAL PAIN (NECK): ICD-10-CM

## 2021-05-07 DIAGNOSIS — I10 ESSENTIAL HYPERTENSION: ICD-10-CM

## 2021-05-07 LAB
ALBUMIN SERPL-MCNC: 3.6 G/DL (ref 3.5–5)
ALT SERPL W P-5'-P-CCNC: <9 U/L (ref 0–45)
AST SERPL W P-5'-P-CCNC: 10 U/L (ref 0–40)
CREAT SERPL-MCNC: 1.31 MG/DL (ref 0.6–1.1)
ERYTHROCYTE [DISTWIDTH] IN BLOOD BY AUTOMATED COUNT: 13.1 % (ref 11–14.5)
GFR SERPL CREATININE-BSD FRML MDRD: 40 ML/MIN/1.73M2
HBA1C MFR BLD: 9.5 %
HCT VFR BLD AUTO: 41.3 % (ref 35–47)
HGB BLD-MCNC: 14 G/DL (ref 12–16)
IRON SATN MFR SERPL: 32 % (ref 20–50)
IRON SERPL-MCNC: 89 UG/DL (ref 42–175)
MCH RBC QN AUTO: 29.9 PG (ref 27–34)
MCHC RBC AUTO-ENTMCNC: 33.9 G/DL (ref 32–36)
MCV RBC AUTO: 88 FL (ref 80–100)
PLATELET # BLD AUTO: 124 THOU/UL (ref 140–440)
PMV BLD AUTO: 10.2 FL (ref 7–10)
RBC # BLD AUTO: 4.68 MILL/UL (ref 3.8–5.4)
TIBC SERPL-MCNC: 274 UG/DL (ref 313–563)
TRANSFERRIN SERPL-MCNC: 220 MG/DL (ref 212–360)
TSH SERPL DL<=0.005 MIU/L-ACNC: 1.93 UIU/ML (ref 0.3–5)
WBC: 9.5 THOU/UL (ref 4–11)

## 2021-05-07 RX ORDER — BLOOD PRESSURE TEST KIT
1 KIT MISCELLANEOUS DAILY
Qty: 1 EACH | Refills: 0 | Status: SHIPPED | OUTPATIENT
Start: 2021-05-07 | End: 2021-08-06

## 2021-05-07 RX ORDER — FLASH GLUCOSE SENSOR
1 KIT MISCELLANEOUS
Qty: 2 KIT | Refills: 11 | Status: SHIPPED | OUTPATIENT
Start: 2021-05-07

## 2021-05-07 RX ORDER — FLASH GLUCOSE SCANNING READER
1 EACH MISCELLANEOUS SEE ADMIN INSTRUCTIONS
Qty: 1 EACH | Refills: 0 | Status: SHIPPED | OUTPATIENT
Start: 2021-05-07

## 2021-05-07 ASSESSMENT — MIFFLIN-ST. JEOR: SCORE: 1651.2

## 2021-05-10 LAB
CAP COMMENT: NORMAL
LAB AP CHARGES (HE HISTORICAL CONVERSION): NORMAL
LAB MED GENERAL PATH INTERP (HE HISTORICAL CONVERSION): NORMAL
PATH REPORT.COMMENTS IMP SPEC: NORMAL
PATH REPORT.FINAL DX SPEC: NORMAL
PATH REPORT.MICROSCOPIC SPEC OTHER STN: NORMAL
PATH REPORT.RELEVANT HX SPEC: NORMAL
RESULT FLAG (HE HISTORICAL CONVERSION): NORMAL
SPECIMEN DESCRIPTION: NORMAL

## 2021-05-11 ENCOUNTER — TRANSCRIBE ORDERS (OUTPATIENT)
Dept: OTHER | Age: 71
End: 2021-05-11

## 2021-05-11 DIAGNOSIS — N18.30 CKD (CHRONIC KIDNEY DISEASE) STAGE 3, GFR 30-59 ML/MIN (H): Primary | ICD-10-CM

## 2021-05-15 LAB — COLOGUARD INSUFFICIENT SPECIMEN: NORMAL

## 2021-05-26 ASSESSMENT — PATIENT HEALTH QUESTIONNAIRE - PHQ9: SUM OF ALL RESPONSES TO PHQ QUESTIONS 1-9: 3

## 2021-05-27 VITALS
HEIGHT: 72 IN | DIASTOLIC BLOOD PRESSURE: 82 MMHG | OXYGEN SATURATION: 96 % | WEIGHT: 228 LBS | RESPIRATION RATE: 24 BRPM | TEMPERATURE: 97.1 F | SYSTOLIC BLOOD PRESSURE: 132 MMHG | HEART RATE: 78 BPM | BODY MASS INDEX: 30.88 KG/M2

## 2021-05-27 VITALS — HEART RATE: 68 BPM | SYSTOLIC BLOOD PRESSURE: 128 MMHG | DIASTOLIC BLOOD PRESSURE: 70 MMHG

## 2021-05-27 ASSESSMENT — PATIENT HEALTH QUESTIONNAIRE - PHQ9: SUM OF ALL RESPONSES TO PHQ QUESTIONS 1-9: 1

## 2021-05-27 NOTE — PROGRESS NOTES
I had the pleasure to speak with Ebonie today.  [facility] her battery in her back stimulator.  Ebonie has an pre-op appointment with Marichuy on 4/15. Patient asked to meet with me in person this day. I will add her to my schedule.  She asked about reducing her [employer] meals to every other week as she is going out to eat several times a week and her meals are adding up. She has also been cooking dinner for her son who is not back at her apartment.  I advised Ebonie to call Jazzy or Mellisa asking if she is able to do every other and how that would impact her benefits now and in the future if she needed to go back to every other week. She understood.  Ebonie told me she has been having Basia from [employer] come to her home every other Thursday, but she had canceled on 4/5 and had rescheduled for 4/11, but Ebonie said, she never showed or called yesterday. We discussed her trying to call Basia on Monday if she doesn't hear anything today. Ebonie is worried she will lose the services because she has to meet with Basia a least once every month to not be enrolled.     Next Outreach: 4/15 Meeting patient in person

## 2021-05-27 NOTE — TELEPHONE ENCOUNTER
RN cannot approve Refill Request    RN can NOT refill this medication PCP messaged that patient is overdue for Labs.     Selma Lyon, [facility] Triage/Med Refill 4/10/2019    Requested Prescriptions   Pending Prescriptions Disp Refills     lisinopril (PRINIVIL,ZESTRIL) 10 MG tablet [Pharmacy Med Name: LISINOPRIL 10 MG Tablet] 90 tablet 2     Sig: TAKE 1 TABLET AT BEDTIME       Ace Inhibitors Refill Protocol Failed - 4/9/2019  1:28 PM        Failed - Serum Potassium in past 12 months     No results found for: LN-POTASSIUM          Passed - PCP or prescribing provider visit in past 12 months       Last office visit with prescriber/PCP: 2/6/2019 Marichuy Rubio FNP OR same dept: 2/15/2019 Oma Fallon CNP OR same specialty: 2/15/2019 Oma Fallon CNP  Last physical: Visit date not found Last MTM visit: 5/26/2015 Monroe Patino MD   Next visit within 3 mo: Visit date not found  Next physical within 3 mo: Visit date not found  Prescriber OR PCP: ARSLAN Vasquez  Last diagnosis associated with med order: 1. Mixed hyperlipidemia  - atorvastatin (LIPITOR) 40 MG tablet; TAKE 1 TABLET AT BEDTIME  Dispense: 90 tablet; Refill: 2    2. HTN (hypertension)  - lisinopril (PRINIVIL,ZESTRIL) 10 MG tablet [Pharmacy Med Name: LISINOPRIL 10 MG Tablet]; TAKE 1 TABLET AT BEDTIME  Dispense: 90 tablet; Refill: 2    If protocol passes may refill for 12 months if within 3 months of last provider visit (or a total of 15 months).             Passed - Blood pressure filed in past 12 months     BP Readings from Last 1 Encounters:   02/15/19 132/90             Passed - Serum Creatinine in past 12 months     Creatinine   Date Value Ref Range Status   02/06/2019 1.31 (H) 0.60 - 1.10 mg/dL Final           Signed Prescriptions Disp Refills    atorvastatin (LIPITOR) 40 MG tablet 90 tablet 2     Sig: TAKE 1 TABLET AT BEDTIME       Statins Refill Protocol (Hmg CoA Reductase Inhibitors) Passed - 4/9/2019   1:28 PM        Passed - PCP or prescribing provider visit in past 12 months      Last office visit with prescriber/PCP: 2/6/2019 Marichuy Rubio FNP OR same dept: 2/15/2019 Oma Fallon CNP OR same specialty: 2/15/2019 Oma Fallon CNP  Last physical: Visit date not found Last MTM visit: 5/26/2015 Monroe Patino MD   Next visit within 3 mo: Visit date not found  Next physical within 3 mo: Visit date not found  Prescriber OR PCP: ARSLAN Vasquez  Last diagnosis associated with med order: 1. Mixed hyperlipidemia  - atorvastatin (LIPITOR) 40 MG tablet; TAKE 1 TABLET AT BEDTIME  Dispense: 90 tablet; Refill: 2    2. HTN (hypertension)  - lisinopril (PRINIVIL,ZESTRIL) 10 MG tablet [Pharmacy Med Name: LISINOPRIL 10 MG Tablet]; TAKE 1 TABLET AT BEDTIME  Dispense: 90 tablet; Refill: 2    If protocol passes may refill for 12 months if within 3 months of last provider visit (or a total of 15 months).

## 2021-05-27 NOTE — PROGRESS NOTES
Sobeida Pott who presents today with a chief complaint of  Consult (Rheumatoid Arthritis )      Joint Pains:  Yes  Location:  Hands mainly, elbows  Onset: chronic  Intensity:  10/10  AM Stiffness:nones  Alleviating/Aggravating Factors: Percocet 3-4 tab daily and  mtx helpful  Tolerating Meds: yes  Other:      ROS:  Has some dry eyes no: dry mouth,  patchy alopecia, photosensitivity, raynaud's, personal history or family history of psoriasis, history of inflammatory bowel disease, history of inflammatory eye disease (uveitis, iritis, etc), history of peptic ulcer disease, history of seizures, anxiety, + at times depression.    Denies regular alcohol beverage intake and smoking history (dc'd > 10 yrs ago).  Denies having any active: chest pain, shortness of breath, cough, abdominal pain, nausea, vomiting, rashes, fevers, oral ulcers and recent infections.  Patient admits to having a good appetite.        Problem List:  Patient Active Problem List   Diagnosis     Osteoarthritis     Venous Insufficiency     HTN (hypertension)     Insomnia     Joint Pain, Localized In The Knee     Chronic kidney disease     Hyperlipidemia     Diabetes mellitus, type 2 (H)     Joint Pain, Localized In The Hip     Depression     Seropositive rheumatoid arthritis (H)     Lumbar radiculopathy, chronic     S/P TKR (total knee replacement) using cement, left     High risk medication use     Chronic pain     GERD (gastroesophageal reflux disease)     Bilateral primary osteoarthritis of hip     Atherosclerotic cardiovascular disease, seen on abd CT 2016         PMH:   Past Medical History:   Diagnosis Date     BBB (bundle branch block)      Chronic back pain      Chronic kidney disease     stage 3     Chronic Kidney Disease (NKF Classification)     Created by Conversion      Depression      Diabetes (H)     Type 2     Diabetes mellitus, type 2 (H)     Created by Conversion      Frequent headaches      Ganglion Of The Left Wrist     Created by  Conversion      GERD (gastroesophageal reflux disease)      HLD (hyperlipidemia)      Hypertension     Per H&P dated 1/03/2013     Incarcerated ventral hernia     Per H&P dated 1/03/2013     Osteoarthritis      Rheumatoid arthritis (H)      Thrombocytopenia (H)      Venous insufficiency        Surgical History:  Past Surgical History:   Procedure Laterality Date     BACK SURGERY       CHOLECYSTECTOMY       FATTY TUMOR  1996    LT SHOULDER BLADE     HYSTERECTOMY  1984     JOINT REPLACEMENT Left     knee     CO ABDOMEN SURGERY PROC UNLISTED      Description: Hernia Repair;  Recorded: 10/23/2013;     SPINAL CORD STIMULATOR IMPLANT       Two left wrist  1990, 1995       Family History:  Family History   Problem Relation Age of Onset     Acute Myocardial Infarction Father 62     Cancer Father      Heart disease Father      COPD Father      Diabetes Sister      Diabetes Brother      Melanoma Son      Diabetes Maternal Grandmother        Social History:   reports that she quit smoking about 10 years ago. She has never used smokeless tobacco. She reports that she does not drink alcohol or use drugs.    Allergies:  Allergies   Allergen Reactions     Penicillins Hives     Sulfa (Sulfonamide Antibiotics) Hives        Current Medications:  Current Outpatient Medications   Medication Sig Dispense Refill     amLODIPine (NORVASC) 5 MG tablet Take 1 tablet (5 mg total) by mouth daily.  0     ascorbic acid (ASCORBIC ACID WITH LATRICIA HIPS) 500 MG tablet Take 500 mg by mouth every evening.        aspirin 81 MG EC tablet Take 81 mg by mouth every evening.       atorvastatin (LIPITOR) 40 MG tablet TAKE 1 TABLET AT BEDTIME 90 tablet 2     blood glucose test strips Check blood sugar four times daily. Dispense brand per patient's insurance at pharmacy discretion. Dx: e11.9 200 strip 11     blood-glucose meter Misc Dispense one meter. Use as directed. Dx: e11.9 1 each 0     buPROPion (WELLBUTRIN) 100 MG tablet Take 1 tablet (100 mg total) by  mouth daily. 90 tablet 3     [START ON 4/18/2019] chlorhexidine (HIBICLENS) 4 % external liquid Use as directed 2 times daily beginning 7 days before surgery 473 mL 0     cholecalciferol, vitamin D3, 1,000 unit tablet Take 1,000 Units by mouth every evening.        [START ON 4/24/2019] clindamycin (CLEOCIN) 300 MG capsule Take 1 capsule 3 times daily beginning the day before surgery. 24 capsule 0     cyanocobalamin (VITAMIN B-12) 250 MCG tablet Take 250 mcg by mouth every evening.        docusate sodium (COLACE) 100 MG capsule Take 100 mg by mouth 2 (two) times a day.       DULoxetine (CYMBALTA) 30 MG capsule Take 1 capsule (30 mg total) by mouth daily. 30 capsule 1     folic acid (FOLVITE) 800 MCG tablet Take 1,200 mcg by mouth every evening.        glucose 4 GM chewable tablet Chew 4 tablets (16 g total) as needed for low blood sugar. 120 tablet 0     insulin regular (NOVOLIN R) 100 unit/mL injection Inject 24 Units under the skin 3 (three) times a day before meals. Inject 24 units subcutaneous TID with meals plus sliding scale        insulin syringe-needle U-100 (BD INSULIN SYRINGE ULT-FINE II) 1 mL 31 gauge x 5/16 Syrg USE 4 TIMES DAILY AS DIRECTED 500 each 1     lancing device (ACCU-CHEK SOFTCLIX) Misc Use 1 applicator As Directed 4 (four) times a day. 300 each 3     lidocaine (XYLOCAINE) 5 % ointment Apply to affected area QID prn pain 35.44 g 0     lisinopril (PRINIVIL,ZESTRIL) 10 MG tablet TAKE 1 TABLET AT BEDTIME 90 tablet 2     loratadine (CLARITIN) 10 mg tablet Take 1 tablet (10 mg total) by mouth at bedtime. 90 tablet 2     magnesium oxide (MAG-OX) 400 mg tablet Take 800 mg by mouth at bedtime. With food        methotrexate 2.5 MG tablet TAKE 4 TABLETS (10 MG) ONE TIME WEEKLY  ON  FRIDAY 48 tablet 0     metoprolol succinate (TOPROL XL) 50 MG 24 hr tablet Take 1 tablet (50 mg total) by mouth daily. 90 tablet 3     MULTIVITAMIN ORAL Take 1 tablet by mouth every evening.        NOVOLIN N NPH U-100 INSULIN  100 unit/mL injection Inject 33 Units under the skin 2 (two) times a day before meals. 10 mL 11     nystatin (MYCOSTATIN) powder APPLY TO THE AFFECTED AREA THREE TIMES DAILY FOR 10 TO 14 DAYS OR AT LEAST 3 DAYS AFTER RESOLUTION OF SYMPTOMS 15 g 0     omega-3 fatty acids-vitamin E (FISH OIL) 1,000 mg cap Take 1,000 mg by mouth every evening. 90 each 3     oxyCODONE-acetaminophen (PERCOCET) 5-325 mg per tablet Take 1-2 tablets by mouth every 8 (eight) hours as needed for pain (maximum of 6 tablets per day). For 3/11-4/10 180 tablet 0     ranitidine (ZANTAC) 150 MG tablet TAKE 1 TABLET (150 MG) BY MOUTH 2 (TWO) TIMES A DAY. 180 tablet 3     traZODone (DESYREL) 150 MG tablet TAKE 1 TABLET AT BEDTIME 90 tablet 3     No current facility-administered medications for this visit.            Physical Exam:  /88 (Patient Position: Sitting, Cuff Size: Adult Regular) Comment (Patient Site): right wrist  Pulse 66   Ht 6' (1.829 m)   Wt (!) 234 lb (106.1 kg)   LMP 09/27/1984   BMI 31.74 kg/m    General: A & O x 3 in NAD  HEENT: EOMI, Non injected/non icteric sclera, no oral lesions noted  CV: s1s2 with RRR, no rubs appreciated   Lungs: CTA B/L, no wheezing , rales or rhonci appreciated  GI: Soft, NT/ND, no rebound, no guarding noted  MS: Puffiness noted involving the dorsum of the hands, may be contributing to mild fullness over the MCPs.  Palpating MCP/PIP joints did not reproduce any pains.  Mild hypertrophic changes noted involving hand joints.  Mild limitation in C-spine range of motion testing.  Negative straight leg raising bilaterally.  Lying supine reproduces low back pain, nonradiating.  Otherwise patient demonstrated good passive/active ROM over other joints with no warmth, erythema, tenderness or synovitis noted over these joints.      Summary/Assessment:    Was a patient of Dr. Schofield, last seen July 2018.    History that includes seropositive rheumatoid arthritis, osteoarthritis, renal  insufficiency.    Presents today complaining of having occasional hand pains which overall is mild and tolerable.    Takes Percocet on average 3-4 tablets daily (includes 2 prior to bedtime), which provides relief.    On methotrexate 10 mg weekly, dose limited given renal insufficiency.  States the methotrexate has been helpful.  Takes over-the-counter folic acid daily.    No clear signs of synovitis noted on exam today.    Please see below for management plan.      Pertinent rheumatology/past medical history (please refer to above for more detailed history):      Seropositive rheumatoid arthritis    High risk medication use    Osteoarthritis, multiple joints    Hx left knee replacement    Chronic neck and back pain (managed by another provider)    Renal insufficiency    Chronic opioid use    Diabetes, type II      Rheumatology medications provided/suggested:    Methotrexate  Folic acid      Pertinent medication from other providers or from otc (please refer to above for more detailed med list):    Percocet  Insulin    Pertinent medications already tried:     Humira (worsened joint pains)      Pertinent lab history:    Positive: CCP antibody    Negative: Rheumatoid factor, HUGH, c- ANCA      Pertinent imaging/test history:    X-ray of right hand from February 2019 that showed some signs of degenerative joint disease, no erosive changes identified.      Other:    Standing order for labs placed every 3 months good through April 2020    Denies tobacco use or regular alcohol beverage intake.  Quit smoking greater than 10 years ago.    Plan:      Continue methotrexate 10 mg weekly.  Side effect profile discussed at length with the patient.    Continue over-the-counter folic acid daily, made aware to avoid taking on the day when taking methotrexate.    Takes Percocet 3 to 4 tablets daily (includes 2 tablets prior to bedtime).    Will obtain x-ray of left hand and chest x-ray.,  Made comment on chest x-ray order to compare  with chest x-ray from 2017 showing some signs of possible fibrosis.  He currently denies shortness of breath or cough.    Will check labs today and every 12 weeks thereafter.    Follow-up in 3 months.      Procedure note:          Spent 45 minutes with greater than half of this time spent with the patient going over differential diagnosis, prognosis, treatment plan, medication side effects and  answering questions.    The patient is new to me however, is a follow-up to our rheumatology clinic.      This note was transcribed using Dragon voice recognition software as a result unintentional grammatical errors or word substitutions may have occurred. Please contact our Rheumatology department if you need any clarification or if you have any related inquiries.    Major side effect profile of medications provided were discussed with the patient.      Raheem Londono ....................  4/10/2019   1:22 PM

## 2021-05-27 NOTE — PATIENT INSTRUCTIONS - HE
Summary of Your Rheumatology Visit    Next Appointment:  3 Months    Medications:    Continue Methotrexate provided, as discussed.      Referrals:      Tests:     Please have labs and x-rays that were ordered performed.     Please have labs performed again just prior to next visit or on day of next visit.       Injections:

## 2021-05-27 NOTE — PROGRESS NOTES
Assessment/Plan:      Visit for Preoperative Exam.     1. Preop general physical exam  2. Lumbar radiculopathy, chronic  3. Chronic pain syndrome  - Patient is approved for surgery  - Stop taking aspirin for 1 week before surgery (starting on Thursday 4/18)  - Do not take methotrexate on Friday 4/19 and Friday 4/26  - On the day of surgery, do not take your morning NPH insulin dose until after the procedure   - Take your other normal morning pills the day of the procedure but just with a small sip of water     4. Type 2 diabetes mellitus with hyperosmolarity without coma, without long-term current use of insulin (H)  - Glycosylated Hemoglobin A1c; Future: will plan to do this test in July with rheumatology labs     5. Seropositive rheumatoid arthritis (H)  - Followed by rheumatology     6. Mixed hyperlipidemia  - continues statin    7. Stage 3 chronic kidney disease (H)  - stable     8. Essential hypertension  - stable     9. Primary insomnia  - Continue trazodone    10. LE weakness  - Recent evaluation with Neurology. EMG is planned due to progression of symptoms since having EMG in the past and the will follow up with neurology.         Subjective:     Scheduled Procedure: LEFT FLANK INCISION REPLACE NEUROSTIMULATOR  Surgery Date:  4/25/2019  Surgery Location:  [facility] / [facility]  Surgeon:  Dr Pretty  HPI: Ebonie Bowman is a 69-year-old female who presents to the office today for preoperative physical.  She was initially in favor of having the stimulator in her back explanted, however insurance approval was for a battery replacement at this time and for explantation she would need further documentation.  He decided to have the battery replaced and is hopeful that this will potentially give her more relief of her chronic low back pain.  She also takes duloxetine for osteoarthritis pain.  Additionally, she is prescribed Percocet 1-2 tablets every 8 hours as needed.    We reviewed diabetes.  She notes stability  in her home blood sugar readings but did not bring her meter with her and does not recall specific recent readings.    She recently saw a different rheumatologist at her request.  She has been happy with these appointments.  There have been no changes made to her current medication regimen.    Attention and denies any lightheadedness or dizziness associated with her treatment.    Hyperlipidemia was reviewed.  She is taking atorvastatin 40 mg daily denies any myalgias.  She has been under evaluation for lower extremity weakness but holding this medication did not make any changes in the symptoms.    Lower extremity weakness was recently evaluated with neurology.  They do plan to repeat an EMG nerve study and follow-up with her in the office.    She continues to take bupropion for weight management.  She finds that this suppresses her appetite.  She declines discontinuation of the medication.    Insomnia was reviewed and she continues trazodone 150 mg at bedtime.    Current Outpatient Medications   Medication Sig Dispense Refill     amLODIPine (NORVASC) 5 MG tablet Take 1 tablet (5 mg total) by mouth daily.  0     ascorbic acid (ASCORBIC ACID WITH LATRICIA HIPS) 500 MG tablet Take 500 mg by mouth every evening.        aspirin 81 MG EC tablet Take 81 mg by mouth every evening.       atorvastatin (LIPITOR) 40 MG tablet TAKE 1 TABLET AT BEDTIME 90 tablet 2     blood glucose test strips Check blood sugar four times daily. Dispense brand per patient's insurance at pharmacy discretion. Dx: e11.9 200 strip 11     blood-glucose meter Misc Dispense one meter. Use as directed. Dx: e11.9 1 each 0     buPROPion (WELLBUTRIN) 100 MG tablet Take 1 tablet (100 mg total) by mouth daily. 90 tablet 3     [START ON 4/18/2019] chlorhexidine (HIBICLENS) 4 % external liquid Use as directed 2 times daily beginning 7 days before surgery 473 mL 0     cholecalciferol, vitamin D3, 1,000 unit tablet Take 1,000 Units by mouth every evening.         [START ON 4/24/2019] clindamycin (CLEOCIN) 300 MG capsule Take 1 capsule 3 times daily beginning the day before surgery. 24 capsule 0     cyanocobalamin (VITAMIN B-12) 250 MCG tablet Take 250 mcg by mouth every evening.        docusate sodium (COLACE) 100 MG capsule Take 100 mg by mouth 2 (two) times a day.       DULoxetine (CYMBALTA) 30 MG capsule Take 1 capsule (30 mg total) by mouth daily. 30 capsule 1     folic acid (FOLVITE) 800 MCG tablet Take 1,200 mcg by mouth every evening.        glucose 4 GM chewable tablet Chew 4 tablets (16 g total) as needed for low blood sugar. 120 tablet 0     insulin regular (NOVOLIN R) 100 unit/mL injection Inject 24 Units under the skin 3 (three) times a day before meals. Inject 24 units subcutaneous TID with meals plus sliding scale        insulin syringe-needle U-100 (BD INSULIN SYRINGE ULT-FINE II) 1 mL 31 gauge x 5/16 Syrg USE 4 TIMES DAILY AS DIRECTED 500 each 1     lancing device (ACCU-CHEK SOFTCLIX) Misc Use 1 applicator As Directed 4 (four) times a day. 300 each 3     lidocaine (XYLOCAINE) 5 % ointment Apply to affected area QID prn pain 35.44 g 0     lisinopril (PRINIVIL,ZESTRIL) 10 MG tablet TAKE 1 TABLET AT BEDTIME 90 tablet 2     loratadine (CLARITIN) 10 mg tablet Take 1 tablet (10 mg total) by mouth at bedtime. 90 tablet 2     magnesium oxide (MAG-OX) 400 mg tablet Take 800 mg by mouth at bedtime. With food        methotrexate 2.5 MG tablet Take 4 tablets po once a week (hold dose if you come down with an infection, until infection clears). 52 tablet 0     metoprolol succinate (TOPROL XL) 50 MG 24 hr tablet Take 1 tablet (50 mg total) by mouth daily. 90 tablet 3     MULTIVITAMIN ORAL Take 1 tablet by mouth every evening.        NOVOLIN N NPH U-100 INSULIN 100 unit/mL injection Inject 33 Units under the skin 2 (two) times a day before meals. 10 mL 11     nystatin (MYCOSTATIN) powder APPLY TO THE AFFECTED AREA THREE TIMES DAILY FOR 10 TO 14 DAYS OR AT LEAST 3 DAYS AFTER  RESOLUTION OF SYMPTOMS 15 g 0     omega-3 fatty acids-vitamin E (FISH OIL) 1,000 mg cap Take 1,000 mg by mouth every evening. 90 each 3     oxyCODONE-acetaminophen (PERCOCET) 5-325 mg per tablet Take 1-2 tablets by mouth every 8 (eight) hours as needed for pain (maximum of 6 tablets per day). For 3/11-4/10 180 tablet 0     ranitidine (ZANTAC) 150 MG tablet TAKE 1 TABLET (150 MG) BY MOUTH 2 (TWO) TIMES A DAY. 180 tablet 3     traZODone (DESYREL) 150 MG tablet TAKE 1 TABLET AT BEDTIME 90 tablet 3     No current facility-administered medications for this visit.        Allergies   Allergen Reactions     Penicillins Hives     Sulfa (Sulfonamide Antibiotics) Hives       Immunization History   Administered Date(s) Administered     Influenza [id number], 02/04/2010     Influenza high dose, seasonal 09/13/2016, 10/12/2017     Influenza, Seasonal, Inj PF IIV3 02/04/2010, 09/14/2010, 09/04/2015     Influenza, inj, historic,unspecified 02/04/2010, 09/14/2010, 10/24/2011     Influenza, seasonal,quad inj 6-35 mos 09/12/2012, 10/02/2013, 10/06/2014     Influenza,seasonal, Inj IIV3 10/24/2011, 09/12/2012, 10/02/2013, 10/06/2014     Pneumo Conj 13-V (2010&after) 09/04/2015     Pneumo Polysac 23-V 12/28/2007, 09/23/2010, 09/13/2016     Pneumococcal Vaccine, Unspecified 09/23/2010     Td, adult adsorbed, PF 12/28/2007     Tdap 12/01/2007, 07/18/2014     ZOSTER, LIVE 11/05/2014       Patient Active Problem List   Diagnosis     Osteoarthritis     Venous Insufficiency     HTN (hypertension)     Insomnia     Joint Pain, Localized In The Knee     Chronic kidney disease     Hyperlipidemia     Diabetes mellitus, type 2 (H)     Joint Pain, Localized In The Hip     Depression     Seropositive rheumatoid arthritis (H)     Lumbar radiculopathy, chronic     S/P TKR (total knee replacement) using cement, left     High risk medication use     Chronic pain     GERD (gastroesophageal reflux disease)     Bilateral primary osteoarthritis of hip      Atherosclerotic cardiovascular disease, seen on abd CT 2016        Past Medical History:   Diagnosis Date     BBB (bundle branch block)      Chronic back pain      Chronic kidney disease     stage 3     Chronic Kidney Disease (NKF Classification)     Created by Conversion      Depression      Diabetes (H)     Type 2     Diabetes mellitus, type 2 (H)     Created by Conversion      Frequent headaches      Ganglion Of The Left Wrist     Created by Conversion      GERD (gastroesophageal reflux disease)      HLD (hyperlipidemia)      Hypertension     Per H&P dated 1/03/2013     Incarcerated ventral hernia     Per H&P dated 1/03/2013     Osteoarthritis      Rheumatoid arthritis (H)      Thrombocytopenia (H)      Venous insufficiency        Social History     Socioeconomic History     Marital status:      Spouse name: Not on file     Number of children: Not on file     Years of education: Not on file     Highest education level: Not on file   Occupational History     Not on file   Social Needs     Financial resource strain: Not on file     Food insecurity:     Worry: Not on file     Inability: Not on file     Transportation needs:     Medical: Not on file     Non-medical: Not on file   Tobacco Use     Smoking status: Former Smoker     Last attempt to quit: 8/1/2008     Years since quitting: 10.7     Smokeless tobacco: Never Used     Tobacco comment: smells of cigarrette smoke   Substance and Sexual Activity     Alcohol use: No     Drug use: No     Sexual activity: Not on file   Lifestyle     Physical activity:     Days per week: Not on file     Minutes per session: Not on file     Stress: Not on file   Relationships     Social connections:     Talks on phone: Not on file     Gets together: Not on file     Attends Yarsani service: Not on file     Active member of club or organization: Not on file     Attends meetings of clubs or organizations: Not on file     Relationship status: Not on file     Intimate partner  violence:     Fear of current or ex partner: Not on file     Emotionally abused: Not on file     Physically abused: Not on file     Forced sexual activity: Not on file   Other Topics Concern     Not on file   Social History Narrative    Lives with her son. Lost one son when he was age 28 due to melanoma. Moved around growing up since her father was in the        Past Surgical History:   Procedure Laterality Date     BACK SURGERY       CHOLECYSTECTOMY       FATTY TUMOR  1996    LT SHOULDER BLADE     HYSTERECTOMY  1984     JOINT REPLACEMENT Left     knee     ME ABDOMEN SURGERY PROC UNLISTED      Description: Hernia Repair;  Recorded: 10/23/2013;     SPINAL CORD STIMULATOR IMPLANT       Two left wrist  1990, 1995           History of Present Illness  Recent Health  Fever: no  Chills: no  Fatigue: no  Chest Pain: no  Cough: no  Dyspnea: no  Urinary Frequency: no  Nausea: no  Vomiting: no  Diarrhea: no  Abdominal Pain: no  Easy Bruising: no  Lower Extremity Swelling: no  Poor Exercise Tolerance: no    Pertinent History  Prior Anesthesia: yes  Previous Anesthesia Reaction:  no  Diabetes: yes  Cardiovascular Disease: no  Pulmonary Disease: no  Renal Disease: no  GI Disease: no  Sleep Apnea: no  Thromboembolic Problems: no  Clotting Disorder: no  Bleeding Disorder: no  Transfusion Reaction: n/a  Impaired Immunity: no  Steroid use in the last 6 months: no  Frequent Aspirin use: no  Dentures/partial plates: no    Family history: There is no family history of abnormal reaction to anesthesia or sudden unexplained death    Review of Systems: Positives in bold  Constitutional: Fever, chills, night sweats, fainting, weight change, fatigue, seizures, dizziness, sleeping difficulties, loud snoring/pauses in breathing  Eyes: change in vision, blurred or double vision, redness/eye pain  Ears, nose, mouth, throat: change in hearing, ear pain, hoarseness, difficulty swallowing, sores in the mouth or throat  Respiratory:  shortness of breath, cough, bloody sputum, wheezing  Cardiovascular: chest pain, palpitations   Gastrointestinal: abdominal pain, heartburn/indigestion, nausea/vomiting, change in appetite, change in bowel habits, constipation or diarrhea, rectal bleeding/dark stools, difficulty swallowing  Urinary: painful urination, frequent urination, urinary urgency/incontinence, blood in urine/dark urine, nocturia  Genital: vaginal discharge or odor, bleeding/pain with intercourse, pelvic pain, vulvar/vaginal itching or burning, excessive menstrual bleeding, problems with sexual function  Musculoskeletal: backache/back pain, weakness, joint pain/stiffness (new or increasing), muscle cramps, swelling of hands, feet, ankles, leg pain/redness  Skin: change in moles/freckles, rash, nodules  Hematologic/lymphatic: swollen lymph glands, abnormal bruising/bleeding  Endocrine: excessive thirst/urination, cold or heat intolerance  Breast: breast lump, breast pain, nipple discharge/skin changes  Neurologic/emotional: worrisome memory change, numbness/tingling, anxiety, mood swings      Objective:       Vitals:    04/15/19 1128   Pulse: 60   Weight: (!) 234 lb (106.1 kg)         Physical Exam:  General Appearance: Alert, cooperative, no distress, appears stated age  Head: Normocephalic, without obvious abnormality, atraumatic  Eyes: PERRL, conjunctiva/corneas clear, EOM's intact  Ears: Normal TM's and external ear canals, both ears  Throat: Lips, mucosa, and tongue normal; teeth and gums normal  Neck: Supple, symmetrical, trachea midline, no adenopathy  Lungs: Clear to auscultation bilaterally, respirations unlabored  Heart: Regular rate and rhythm, S1 and S2 normal, no murmur, rub, or gallop  Abdomen: Soft, non-tender, bowel sounds active all four quadrants,  no masses, no organomegaly  Extremities: Extremities normal, atraumatic, no cyanosis or edema  Skin: Skin color, texture, turgor normal, no rashes or lesions  Lymph nodes: Cervical,  supraclavicular nodes normal  Neurologic: Normal        Recent Results (from the past 240 hour(s))   QTF-Mycobacterium tuberculosis by QuantiFERON-TB Gold Plus   Result Value Ref Range    QTF RESULT Negative Negative    QTF INTREPRETATION       No interferon-gamma response to M. tuberculosis antigens was detected.  Infecton with M. tuberculosis is unlikely.  A negative result alone does not exclude infection with M. tuberculosis    QTF NIL 0.09 IU/mL    QTF ANTIGEN TB1-NIL -0.01 IU/mL    QTF ANTIGEN TB2 - NIL -0.01 IU/mL    QTF MITOGEN-NIL 8.43 IU/mL   HM2(CBC w/o Differential)   Result Value Ref Range    WBC 12.5 (H) 4.0 - 11.0 thou/uL    RBC 4.38 3.80 - 5.40 mill/uL    Hemoglobin 13.6 12.0 - 16.0 g/dL    Hematocrit 40.4 35.0 - 47.0 %    MCV 92 80 - 100 fL    MCH 31.0 27.0 - 34.0 pg    MCHC 33.6 32.0 - 36.0 g/dL    RDW 12.2 11.0 - 14.5 %    Platelets 159 140 - 440 thou/uL    MPV 7.7 7.0 - 10.0 fL   Creatinine   Result Value Ref Range    Creatinine 1.24 (H) 0.60 - 1.10 mg/dL    GFR MDRD Af Amer 52 (L) >60 mL/min/1.73m2    GFR MDRD Non Af Amer 43 (L) >60 mL/min/1.73m2   ALT (SGPT)   Result Value Ref Range    ALT 19 0 - 45 U/L   AST (SGOT)   Result Value Ref Range    AST 17 0 - 40 U/L   Albumin   Result Value Ref Range    Albumin 3.5 3.5 - 5.0 g/dL   Erythrocyte Sedimentation Rate   Result Value Ref Range    Sed Rate 16 0 - 20 mm/hr   C-Reactive Protein   Result Value Ref Range    CRP <0.1 0.0 - 0.8 mg/dL   Electrocardiogram Perform and Read   Result Value Ref Range    SYSTOLIC BLOOD PRESSURE  mmHg    DIASTOLIC BLOOD PRESSURE  mmHg    VENTRICULAR RATE 60 BPM    ATRIAL RATE 60 BPM    P-R INTERVAL 174 ms    QRS DURATION 142 ms    Q-T INTERVAL 474 ms    QTC CALCULATION (BEZET) 474 ms    P Axis 36 degrees    R AXIS 113 degrees    T AXIS 74 degrees    MUSE DIAGNOSIS       Normal sinus rhythm  Right bundle branch block  Abnormal ECG  When compared with ECG of 05-JAN-2018 08:51,  No significant change was found  Confirmed by  WINIFRED CELESTE, MARY ALICE LOC:JN ([id number]) on 4/15/2019 3:41:12 PM             Marichuy Rubio, CNP  [facility]

## 2021-05-27 NOTE — TELEPHONE ENCOUNTER
Pt calls asking for address of [facility].   Tried to call back but msg states pt unavailable. Not able to leave  msg.

## 2021-05-27 NOTE — PROGRESS NOTES
Attempt 2: Care Guide called patient.  If this patient is returning my call, please transfer to [facility] at ext [phone].    Next outreach: 4/23/19

## 2021-05-27 NOTE — TELEPHONE ENCOUNTER
Refill Approved    Rx renewed per Medication Renewal Policy. Medication was last renewed on 3/22/18.    Selma Lyon, Care Connection Triage/Med Refill 4/10/2019     Requested Prescriptions   Pending Prescriptions Disp Refills     atorvastatin (LIPITOR) 40 MG tablet [Pharmacy Med Name: ATORVASTATIN CALCIUM 40 MG Tablet] 90 tablet 3     Sig: TAKE 1 TABLET AT BEDTIME       Statins Refill Protocol (Hmg CoA Reductase Inhibitors) Passed - 4/9/2019  1:28 PM        Passed - PCP or prescribing provider visit in past 12 months      Last office visit with prescriber/PCP: 2/6/2019 Marichuy Rubio FNP OR same dept: 2/15/2019 Oma Fallon CNP OR same specialty: 2/15/2019 Oma Fallon CNP  Last physical: Visit date not found Last MTM visit: 5/26/2015 Monroe Patino MD   Next visit within 3 mo: Visit date not found  Next physical within 3 mo: Visit date not found  Prescriber OR PCP: ARSLAN Vasquez  Last diagnosis associated with med order: 1. Mixed hyperlipidemia  - atorvastatin (LIPITOR) 40 MG tablet [Pharmacy Med Name: ATORVASTATIN CALCIUM 40 MG Tablet]; TAKE 1 TABLET AT BEDTIME  Dispense: 90 tablet; Refill: 3    2. HTN (hypertension)  - lisinopril (PRINIVIL,ZESTRIL) 10 MG tablet [Pharmacy Med Name: LISINOPRIL 10 MG Tablet]; TAKE 1 TABLET AT BEDTIME  Dispense: 90 tablet; Refill: 2    If protocol passes may refill for 12 months if within 3 months of last provider visit (or a total of 15 months).             lisinopril (PRINIVIL,ZESTRIL) 10 MG tablet [Pharmacy Med Name: LISINOPRIL 10 MG Tablet] 90 tablet 2     Sig: TAKE 1 TABLET AT BEDTIME       Ace Inhibitors Refill Protocol Failed - 4/9/2019  1:28 PM        Failed - Serum Potassium in past 12 months     No results found for: LN-POTASSIUM          Passed - PCP or prescribing provider visit in past 12 months       Last office visit with prescriber/PCP: 2/6/2019 Marichuy Rubio FNP OR same dept: 2/15/2019 Oma Fallon CNP OR  same specialty: 2/15/2019 Oma Fallon CNP  Last physical: Visit date not found Last MTM visit: 5/26/2015 Monroe Patino MD   Next visit within 3 mo: Visit date not found  Next physical within 3 mo: Visit date not found  Prescriber OR PCP: ARSLAN Vasquez  Last diagnosis associated with med order: 1. Mixed hyperlipidemia  - atorvastatin (LIPITOR) 40 MG tablet [Pharmacy Med Name: ATORVASTATIN CALCIUM 40 MG Tablet]; TAKE 1 TABLET AT BEDTIME  Dispense: 90 tablet; Refill: 3    2. HTN (hypertension)  - lisinopril (PRINIVIL,ZESTRIL) 10 MG tablet [Pharmacy Med Name: LISINOPRIL 10 MG Tablet]; TAKE 1 TABLET AT BEDTIME  Dispense: 90 tablet; Refill: 2    If protocol passes may refill for 12 months if within 3 months of last provider visit (or a total of 15 months).             Passed - Blood pressure filed in past 12 months     BP Readings from Last 1 Encounters:   02/15/19 132/90             Passed - Serum Creatinine in past 12 months     Creatinine   Date Value Ref Range Status   02/06/2019 1.31 (H) 0.60 - 1.10 mg/dL Final

## 2021-05-27 NOTE — TELEPHONE ENCOUNTER
Tried calling again today but msg states to try again later and not able to leave msg.  Pt called back again questioning where she should go for surgery.  Tried calling her back but got vm again.

## 2021-05-27 NOTE — PROGRESS NOTES
Attempt 1: Care Guide called patient.  If this patient is returning my call, please transfer to [facility] [email or web address]] at ext [phone].      Next outreach: 4/11/19

## 2021-05-27 NOTE — TELEPHONE ENCOUNTER
Refill Approved    Rx renewed per Medication Renewal Policy. Medication was last renewed on 6/28/18  #90 R-2.    Last OV 2/15/19    Carolina Zuluaga, [facility] Connection Triage/Med Refill 3/26/2019     Requested Prescriptions   Pending Prescriptions Disp Refills     traZODone (DESYREL) 150 MG tablet [Pharmacy Med Name: TRAZODONE HYDROCHLORIDE 150 MG Tablet] 90 tablet 2     Sig: TAKE 1 TABLET AT BEDTIME    Tricyclics/Misc Antidepressant/Antianxiety Meds Refill Protocol Passed - 3/25/2019  1:33 PM       Passed - PCP or prescribing provider visit in last year    Last office visit with prescriber/PCP: 2/6/2019 Marichuy Rubio FNP OR same dept: 2/15/2019 Oma Fallon CNP OR same specialty: 2/15/2019 Oma Fallon CNP  Last physical: Visit date not found Last MTM visit: 5/26/2015 Monroe Patino MD   Next visit within 3 mo: Visit date not found  Next physical within 3 mo: Visit date not found  Prescriber OR PCP: ARSLAN Vasquez  Last diagnosis associated with med order: 1. Insomnia  - traZODone (DESYREL) 150 MG tablet [Pharmacy Med Name: TRAZODONE HYDROCHLORIDE 150 MG Tablet]; TAKE 1 TABLET AT BEDTIME  Dispense: 90 tablet; Refill: 2    If protocol passes may refill for 12 months if within 3 months of last provider visit (or a total of 15 months).

## 2021-05-27 NOTE — TELEPHONE ENCOUNTER
Refill Request  Did you contact pharmacy: No  Medication name: Order Providers     Prescribing Provider Encounter Provider   Marichuy Rubio FNP Roth, Megan Carissa, FNP   Outpatient Medication Detail      Disp Refills Start End    oxyCODONE-acetaminophen (PERCOCET) 5-325 mg per tablet 180 tablet 0 3/11/2019     Sig - Route: Take 1-2 tablets by mouth every 8 (eight) hours as needed for pain (maximum of 6 tablets per day). For 3/11-4/10 - Oral    Sent to pharmacy as: oxyCODONE-acetaminophen (PERCOCET) 5-325 mg per tablet    Earliest Fill Date: 3/11/2019    E-Prescribing Status: Receipt confirmed by pharmacy (3/11/2019  1:46 PM CDT)    Associated Diagnoses     Chronic pain       Pharmacy     [facility] [address]       Requested Prescriptions      No prescriptions requested or ordered in this encounter     Who prescribed the medication: Marichuy Rubio  Pharmacy Name and Location: [facility]  Is patient out of medication: No.  7 days left  Patient notified refills processed in 72 hours:  yes  Okay to leave a detailed message: yes

## 2021-05-27 NOTE — PATIENT INSTRUCTIONS - HE
- Stop taking aspirin for 1 week before surgery (starting on Thursday 4/18)  - Do not take methotrexate on Friday 4/19 and Friday 4/26  - On the day of surgery, do not take your morning NPH insulin dose until after the procedure   - Take your other normal morning pills the day of the procedure but just with a small sip of water

## 2021-05-27 NOTE — TELEPHONE ENCOUNTER
Called pt to schedule SCS Battery Replacement for 4/25/19 TF a 0700 SCS Implant. [employer] Rep notified, meds ordered through [employer] mail order. Pt has instruction sheet and it was reviewed and questions answered. She is aware of need for pre-op physical. Advised pt to call back if questions.

## 2021-05-28 NOTE — PROGRESS NOTES
Post-op Spinal Cord Stimulator IPG Replacement  5/2/2019    Ms. Bowman had her spinal cord stimulator IPG replaced last week.  She had no complaints of fever or side effects other than some discomfort at the incision site.  Her stimulator was at a low intensity so that it would make it to this visit.  She did not notice much relief from the stimulation.  We will be turning it up and giving her alternate programs to use today.    The incision site is healing well.  There is no redness, swelling, drainage or bleeding noted.  The Dermabond is almost completely gone.    The [employer] representative is here and is programming her stimulator.  He is also educating her on using different programs.  She will call back if she is having any difficulty with the programming or the charging.  She will return if she has any discomfort, bleeding or drainage from the incision site.  She will also return if she has any increased pain or difficulties with the stimulator.

## 2021-05-28 NOTE — TELEPHONE ENCOUNTER
Called and spoke with pt. She has had pre-op instructions and voices understanding of the location of [facility], pre-op ointment, Hibiclens and antibiotic ordered by Pain Center.

## 2021-05-28 NOTE — PROGRESS NOTES
Pt is scheduled for post-op visit and to have her SCS turned on and programmed by Alfonso, from [employer]. Denies chills, fevers or incisional drainage. Incision appears to be healing well and no redness or drainage is noted. States she has almost completed her antibiotic script.

## 2021-05-28 NOTE — ANESTHESIA CARE TRANSFER NOTE
Last vitals:   Vitals:    04/25/19 1102   BP: 116/56   Pulse: 76   Resp: 16   Temp: 36.9  C (98.4  F)   SpO2: 93%     Patient's level of consciousness is drowsy  Spontaneous respirations: yes  Maintains airway independently: yes  Dentition unchanged: yes  Oropharynx: oropharynx clear of all foreign objects    QCDR Measures:  ASA# 20 - Surgical Safety Checklist: WHO surgical safety checklist completed prior to induction    PQRS# 430 - Adult PONV Prevention: 4558F - Pt received => 2 anti-emetic agents (different classes) preop & intraop  ASA# 8 - Peds PONV Prevention: NA - Not pediatric patient, not GA or 2 or more risk factors NOT present  PQRS# 424 - Berenice-op Temp Management: 4559F - At least one body temp DOCUMENTED => 35.5C or 95.9F within required timeframe  PQRS# 426 - PACU Transfer Protocol: - Transfer of care checklist used  ASA# 14 - Acute Post-op Pain: ASA14B - Patient did NOT experience pain >= 7 out of 10

## 2021-05-28 NOTE — ANESTHESIA POSTPROCEDURE EVALUATION
Patient: Ebonie Bowman  LEFT FLANK INCISION REPLACE NEUROSTIMULATOR  Anesthesia type: MAC    Patient location: Phase II Recovery  Last vitals:   Vitals Value Taken Time   /67 4/25/2019 11:30 AM   Temp 36.9  C (98.4  F) 4/25/2019 11:02 AM   Pulse 76 4/25/2019 11:35 AM   Resp 16 4/25/2019 11:30 AM   SpO2 96 % 4/25/2019 11:35 AM   Vitals shown include unvalidated device data.  Post vital signs: stable  Level of consciousness: awake and responds to simple questions  Post-anesthesia pain: pain controlled  Post-anesthesia nausea and vomiting: no  Pulmonary: unassisted, return to baseline  Cardiovascular: stable and blood pressure at baseline  Hydration: adequate  Anesthetic events: no    QCDR Measures:  ASA# 11 - Berenice-op Cardiac Arrest: ASA11B - Patient did NOT experience unanticipated cardiac arrest  ASA# 12 - Berenice-op Mortality Rate: ASA12B - Patient did NOT die  ASA# 13 - PACU Re-Intubation Rate: NA - No ETT / LMA used for case  ASA# 10 - Composite Anes Safety: ASA10A - No serious adverse event    Additional Notes:

## 2021-05-28 NOTE — PATIENT INSTRUCTIONS - HE
Post-op visit with Dr Pretty today  Spinal Cord Stimulator turned on and programmed by Alfonso, from [facility]

## 2021-05-28 NOTE — ANESTHESIA PREPROCEDURE EVALUATION
Anesthesia Evaluation      Patient summary reviewed   No history of anesthetic complications     Airway   Mallampati: II  Neck ROM: full   Pulmonary - normal exam   (+) a smoker (quit 2008)                         Cardiovascular - normal exam  (+) hypertension well controlled, , hypercholesterolemia,     ECG reviewed        Neuro/Psych    (+) neuromuscular disease (lumbar radiculopathy),  depression, chronic pain    Endo/Other    (+) diabetes mellitus type 2 well controlled using insulin, arthritis (DJD; RA on MTX),      GI/Hepatic/Renal    (+) GERD well controlled,   chronic renal disease CRI,           Dental    (+) poor dentition and chipped                       Anesthesia Plan  Planned anesthetic: MAC  Versed/fentanyl/propofol  Ketamine PRN  Decadron/zofran    ASA 3   Induction: intravenous   Anesthetic plan and risks discussed with: patient  Anesthesia plan special considerations: antiemetics,   Post-op plan: routine recovery      Results for orders placed or performed in visit on 04/15/19   Electrocardiogram Perform and Read   Result Value Ref Range    SYSTOLIC BLOOD PRESSURE  mmHg    DIASTOLIC BLOOD PRESSURE  mmHg    VENTRICULAR RATE 60 BPM    ATRIAL RATE 60 BPM    P-R INTERVAL 174 ms    QRS DURATION 142 ms    Q-T INTERVAL 474 ms    QTC CALCULATION (BEZET) 474 ms    P Axis 36 degrees    R AXIS 113 degrees    T AXIS 74 degrees    MUSE DIAGNOSIS       Normal sinus rhythm  Right bundle branch block  Abnormal ECG  When compared with ECG of 05-JAN-2018 08:51,  No significant change was found  Confirmed by WINIFRED CELESTE, MARY ALICE LOC:JN ([id number]) on 4/15/2019 3:41:12 PM       Glucose 242      Chemistry        Component Value Date/Time     01/09/2018 0630    K 4.0 01/09/2018 0630     01/09/2018 0630    CO2 29 01/09/2018 0630    BUN 18 01/09/2018 0630    CREATININE 1.24 (H) 04/10/2019 1406    GLU 98 01/09/2018 0630        Component Value Date/Time    CALCIUM 9.0 01/09/2018 0630    ALKPHOS 48 01/06/2018 0630    AST  17 04/10/2019 1406    ALT 19 04/10/2019 1406    BILITOT 0.6 01/06/2018 0630        Lab Results   Component Value Date    WBC 12.5 (H) 04/10/2019    HGB 13.6 04/10/2019    HCT 40.4 04/10/2019    MCV 92 04/10/2019     04/10/2019

## 2021-05-28 NOTE — TELEPHONE ENCOUNTER
Patient called again this afternoon. Needs to know when needs to arrive for surgery and the address for surgery.

## 2021-05-29 NOTE — TELEPHONE ENCOUNTER
Controlled Substance Refill Request  Medication Name: Oxycodone-acetaminophen 5-325 mg - Patient is out of medication 2-3 days  Requested Prescriptions      No prescriptions requested or ordered in this encounter     Date Last Fill: 4/17  Pharmacy: [facility]      [place]  Submit electronically to pharmacy  Controlled Substance Agreement Date Scanned:   Encounter-Level CSA Scan Date:    There are no encounter-level csa scan date.       Last office visit with prescriber/PCP: 2/6/2019 Marichuy Rubio FNP OR same dept: 2/15/2019 Oma Fallon CNP OR same specialty: 2/15/2019 Oma Fallon CNP  Last physical: 4/15/2019 Last MTM visit: 5/26/2015 Monroe Patino MD

## 2021-05-29 NOTE — TELEPHONE ENCOUNTER
Patient calling stating this RX went to her mail order but needs to get resent to her local pharmacy, please resent to local pharmacy of [facility].  Patient is wondering if this can be sent into [facility] today.  oxyCODONE-acetaminophen (PERCOCET) 5-325 mg per tablet [[id number]]   Electronically signed by: Marichuy Rubio FNP on 06/03/19 1522 Status: Active   Ordering user: Marichuy Rubio FNP 06/03/19 1522 Authorized by: Marichuy Rubio FNP   PRN reasons: pain   PRN Comment: maximum of 6 tablets per day   Frequency: Q8H PRN 06/03/19 - Until Discontinued Released by: Marichuy Rubio FNP 06/03/19 1522   Diagnoses  Chronic pain [G89.29]   Order Transmission Method   E-Prescribed   Associated Diagnoses   Chronic pain [G89.29]       Pharmacy   [facility] - [facility] [place] [address]

## 2021-05-29 NOTE — TELEPHONE ENCOUNTER
Relayed message to pt and pt would like to follow up in office - has zoya scheduled for 06/24/19 and will discuss at that visit.

## 2021-05-29 NOTE — PROGRESS NOTES
Replaced back stimulator battery.   She is getting bored of the same exercises. Would like some other easy options.  Please revisit exercise goals.   Legs are unsteady- uses cane- Concern is being able to not use her cane and be more stable.  She is receiving meals, but is still making meals for her son  I reminded her to schedule a follow up with her neurologist and pain clinic.     RN assessment on 6/27 @1pm      Receives  [employer]  [employer]  [employer]- Covered by Waiver   Alternative waiver

## 2021-05-29 NOTE — TELEPHONE ENCOUNTER
Controlled Substance Refill Request  Medication:   Requested Prescriptions     Pending Prescriptions Disp Refills     oxyCODONE-acetaminophen (PERCOCET) 5-325 mg per tablet 180 tablet 0     Sig: Take 1-2 tablets by mouth every 8 (eight) hours as needed for pain (maximum of 6 tablets per day). For 4/17-5/17     Date Last Fill: 4.17.19 #180  Pharmacy: [facility] pharmacy   Submit electronically to pharmacy  Controlled Substance Agreement on File:   Encounter-Level CSA Scan Date:    There are no encounter-level csa scan date.       Last office visit: Last office visit pertaining to requested medication was 2.15.19.

## 2021-05-29 NOTE — TELEPHONE ENCOUNTER
Call pt-   I received notification that her colon cancer screening test, the FIT test, was positive. This does NOT mean that she has colon cancer, however, we do need to follow up with this and do a colonoscopy to make sure that there are no abnormal polyps or signs of colon cancer. If she is okay with this, please let me know and I will place the order. If she wants to discuss this in more detail, I can see her in the office.

## 2021-05-30 VITALS — WEIGHT: 241.6 LBS | BODY MASS INDEX: 32.77 KG/M2

## 2021-05-30 VITALS — WEIGHT: 241.6 LBS | BODY MASS INDEX: 32.72 KG/M2 | HEIGHT: 72 IN

## 2021-05-30 VITALS — BODY MASS INDEX: 32.23 KG/M2 | HEIGHT: 72 IN | WEIGHT: 238 LBS

## 2021-05-30 VITALS — WEIGHT: 243.4 LBS | BODY MASS INDEX: 33.01 KG/M2

## 2021-05-30 VITALS — WEIGHT: 245.6 LBS | BODY MASS INDEX: 33.31 KG/M2

## 2021-05-30 VITALS — WEIGHT: 238 LBS | BODY MASS INDEX: 32.23 KG/M2 | HEIGHT: 72 IN

## 2021-05-30 VITALS — BODY MASS INDEX: 32.5 KG/M2 | WEIGHT: 239.6 LBS

## 2021-05-30 NOTE — TELEPHONE ENCOUNTER
Medication Request  Medication name: oxycodone-APAP 5-325 mg tab - take 1-2 tabs by mouth every 6 hours as needed  Pharmacy Name and Location: [facility]  Reason for request: Refill. This Rx was last prescribed in the hospital.  When did you use medication last?:  Yesterday  Patient offered appointment:  Yes  Okay to leave a detailed message: yes  [phone]

## 2021-05-30 NOTE — TELEPHONE ENCOUNTER
Pt's home care RN Patricia calling to report pt has not picked up amlodipine since discharged from TCU. Patricia called pharmacy and it is waiting for pt. Pt is seeing Marichuykimberlyn Rubio tomorrow. Patricia would like clinic to follow up with pt to see if she picked up her amlodipien. BP was 120/80 at 10 am today. Pt has been taking all her other medications.        Reason for Disposition    [1] Follow-up call from patient regarding patient's clinical status AND [2] information NON-URGENT    Protocols used: PCP CALL - NO TRIAGE-A-

## 2021-05-30 NOTE — TELEPHONE ENCOUNTER
Orders being requested:   Physical Therapy  2x a week for 3 weeks  1x a week for 1 week  Reason service is needed/diagnosis: Fall Risk  When are orders needed by: Asap  Where to send Orders: Verbal to Juan M with [employer], [phone]  Okay to leave detailed message?  Yes

## 2021-05-30 NOTE — TELEPHONE ENCOUNTER
Call pt- she needs to be seen for hospital follow up. Please schedule for when I am back from vacation (week of Aug 5-8)

## 2021-05-30 NOTE — PROGRESS NOTES
"[facility]      Facility:    [facility] [[id number]]  Code Status: FULL CODE      Chief Complaint/Reason for Visit:  Chief Complaint   Patient presents with     Follow Up       HPI:   Ebonie is a 69 y.o. female who has a past medical history for type 2 diabetes, hypertension, rheumatoid arthritis, chronic pain with a spine stimulator, RA, CKD, HLD, GERD.  She was hospitalized at [facility] from 6/22/2019 to 6/26/2019 for UTI with subsequent bacteremia.  Her urine was cultured for E. coli and she was started on IV antibiotics.  She did have metabolic encephalopathy which did improve with IV fluids and the antibiotics.  She was switched to Keflex with an end date of 7/5/2019.  Her methotrexate was held due to infection.  She was then transferred to TCU for rehab.    Today, she reports feeling well without any urinary symptoms.  She reports she did not have urinary symptoms before however she did have a lack of appetite and fatigue.  She reports her appetite is improving and feels that she is eating \"too much\".  Her blood sugars are mostly stable with her noon blood sugar being in the low 200s, she continues on NPH 70/30 33 units twice daily, and sliding scale insulin.  Apparently she adjusts her insulin including her basal based on her blood sugars.  Her blood pressure has been slightly elevated with most SBP's in the 150s-160s.  She does have issues with chronic low back pain in which she has a spine stimulator and takes Percocet 1-2 tabs every 6 hours at home.  She denies any shortness of breath, chest pain, bowel or bladder issues.  Past Medical History:  Past Medical History:   Diagnosis Date     BBB (bundle branch block)      Chronic back pain      Chronic kidney disease     stage 3     Chronic Kidney Disease (NKF Classification)     Created by Conversion      Depression      Diabetes (H)     Type 2     Diabetes mellitus, type 2 (H)     Created by " Conversion      Frequent headaches      Ganglion Of The Left Wrist     Created by Conversion      GERD (gastroesophageal reflux disease)      HLD (hyperlipidemia)      Hypertension     Per H&P dated 1/03/2013     Incarcerated ventral hernia     Per H&P dated 1/03/2013     Osteoarthritis      Rheumatoid arthritis (H)      Shortness of breath      Thrombocytopenia (H)      Venous insufficiency            Surgical History:  Past Surgical History:   Procedure Laterality Date     BACK SURGERY       CHOLECYSTECTOMY       FATTY TUMOR  1996    LT SHOULDER BLADE     HYSTERECTOMY  1984     JOINT REPLACEMENT Left     knee     OR ABDOMEN SURGERY PROC UNLISTED      Description: Hernia Repair;  Recorded: 10/23/2013;     OR IMPLANT SPINAL NEUROSTIM/ Left 4/25/2019    Procedure: LEFT FLANK INCISION REPLACE NEUROSTIMULATOR;  Surgeon: Vishal Pretty MD;  Location: [facility];  Service: Pain     SPINAL CORD STIMULATOR IMPLANT       Two left wrist  1990, 1995       Family History:   Family History   Problem Relation Age of Onset     Acute Myocardial Infarction Father 62     Cancer Father      Heart disease Father      COPD Father      Diabetes Sister      Diabetes Brother      Melanoma Son      Diabetes Maternal Grandmother        Social History:    Social History     Socioeconomic History     Marital status:      Spouse name: Not on file     Number of children: Not on file     Years of education: Not on file     Highest education level: Not on file   Occupational History     Not on file   Social Needs     Financial resource strain: Not on file     Food insecurity:     Worry: Not on file     Inability: Not on file     Transportation needs:     Medical: Not on file     Non-medical: Not on file   Tobacco Use     Smoking status: Former Smoker     Last attempt to quit: 8/1/2008     Years since quitting: 10.9     Smokeless tobacco: Never Used     Tobacco comment: smells of cigarrette smoke   Substance and  Sexual Activity     Alcohol use: No     Drug use: No     Comment: takes percocet for chronic pain     Sexual activity: Not on file   Lifestyle     Physical activity:     Days per week: Not on file     Minutes per session: Not on file     Stress: Not on file   Relationships     Social connections:     Talks on phone: Not on file     Gets together: Not on file     Attends Yazidi service: Not on file     Active member of club or organization: Not on file     Attends meetings of clubs or organizations: Not on file     Relationship status: Not on file     Intimate partner violence:     Fear of current or ex partner: Not on file     Emotionally abused: Not on file     Physically abused: Not on file     Forced sexual activity: Not on file   Other Topics Concern     Not on file   Social History Narrative    Lives with her son. Lost one son when he was age 30 due to melanoma. Moved around growing up since her father was in the           Review of Systems   Patient denies fever, chills, headache, lightheadedness, dizziness, rhinorrhea, cough, congestion, shortness of breath, chest pain, palpitations, abdominal pain, n/v, diarrhea, constipation, change in appetite, dysuria, frequency, burning or pain with urination.  Other than stated in HPI all other review of systems is negative.           Physical Exam   Vital signs: /84, heart rate 65, respiratory 18, temp 97.0.  GENERAL APPEARANCE: Well developed, well nourished, in no acute distress.  HEENT: normocephalic, atraumatic  PERRL, sclerae anicteric, conjunctivae clear and moist, EOM intact  LUNGS: Lung sounds CTA, no adventitious sounds, respiratory effort normal.  CARD: RRR, S1, S2, without murmurs, gallops, rubs,  ABD: Soft and nontender with normal bowel sounds.   MSK: Muscle strength and tone were normal.  EXTREMITIES: No cyanosis, clubbing or edema.  NEURO: Alert and oriented x 3.  Face is symmetric.  SKIN: Inspection of the skin reveals no rashes,  ulcerations or petechiae.  PSYCH: euthymic          Medication List:  Current Outpatient Medications   Medication Sig     methotrexate 2.5 MG tablet Take by mouth once a week. Take on Fridays     acetaminophen (TYLENOL) 500 MG tablet Take 1 tablet (500 mg total) by mouth every 4 (four) hours as needed.     amLODIPine (NORVASC) 5 MG tablet Take 1 tablet (5 mg total) by mouth daily.     ascorbic acid (ASCORBIC ACID WITH LATRICIA HIPS) 500 MG tablet Take 500 mg by mouth every evening.      aspirin 81 MG EC tablet Take 81 mg by mouth every evening.     atorvastatin (LIPITOR) 40 MG tablet TAKE 1 TABLET AT BEDTIME     b complex vitamins tablet Take 1 tablet by mouth daily.     blood glucose test strips Check blood sugar four times daily. Dispense brand per patient's insurance at pharmacy discretion. Dx: e11.9     blood-glucose meter Misc Dispense one meter. Use as directed. Dx: e11.9     buPROPion (WELLBUTRIN) 100 MG tablet Take 1 tablet (100 mg total) by mouth daily.     cephalexin (KEFLEX) 500 MG capsule Take 1 capsule (500 mg total) by mouth 3 (three) times a day for 9 days.     cholecalciferol, vitamin D3, 1,000 unit tablet Take 1,000 Units by mouth every evening.      folic acid (FOLVITE) 800 MCG tablet Take 800 mcg by mouth every evening.            insulin syringe-needle U-100 (BD INSULIN SYRINGE ULT-FINE II) 1 mL 31 gauge x 5/16 Syrg USE 4 TIMES DAILY AS DIRECTED     lancing device (ACCU-CHEK SOFTCLIX) Misc Use 1 applicator As Directed 4 (four) times a day.     lisinopril (PRINIVIL,ZESTRIL) 10 MG tablet TAKE 1 TABLET AT BEDTIME     loratadine (CLARITIN) 10 mg tablet Take 1 tablet (10 mg total) by mouth at bedtime.     magnesium oxide (MAG-OX) 400 mg tablet Take 400 mg by mouth at bedtime. With food            metoprolol succinate (TOPROL XL) 50 MG 24 hr tablet Take 1 tablet (50 mg total) by mouth daily.     MULTIVITAMIN ORAL Take 1 tablet by mouth every evening.      NOVOLIN N NPH U-100 INSULIN 100 unit/mL injection  Inject 33 Units under the skin 2 (two) times a day before meals.     NOVOLIN R REGULAR U-100 INSULN 100 unit/mL injection Accu-Chek coverage (3 times a day before meals and at bedtime)  <70 call MD to adjust meds  Less than 100-   0 units NovoLin-R  101-175   2 units  176-225   4 units  226-300   6 units  301- 375 8 units  376- 425  10 units  > 426  12 units call MD     omega-3 fatty acids-vitamin E (FISH OIL) 1,000 mg cap Take 1,000 mg by mouth every evening.     oxyCODONE-acetaminophen (PERCOCET) 5-325 mg per tablet Take 1-2 tablets by mouth every 6 (six) hours as needed for pain (maximum of 6 tablets per day). For 6/3/19-7/3/19     ranitidine (ZANTAC) 150 MG tablet TAKE 1 TABLET TWICE DAILY     traZODone (DESYREL) 150 MG tablet TAKE 1 TABLET AT BEDTIME       Labs:  Recent Results (from the past 240 hour(s))   Urinalysis-UC if Indicated   Result Value Ref Range    Color, UA Yellow Colorless, Yellow, Straw, Light Yellow    Clarity, UA Turbid (!) Clear    Glucose, UA Negative Negative    Bilirubin, UA Negative Negative    Ketones, UA Trace (!) Negative    Specific Gravity, UA 1.031 (H) 1.001 - 1.030    Blood, UA Moderate (!) Negative    pH, UA 6.0 4.5 - 8.0    Protein,  mg/dL (!) Negative mg/dL    Urobilinogen, UA <2.0 E.U./dL <2.0 E.U./dL, 2.0 E.U./dL    Nitrite, UA Negative Negative    Leukocytes, UA Large (!) Negative    Bacteria, UA Few (!) None Seen hpf    RBC, UA 5-10 (!) None Seen, 0-2 hpf    WBC, UA >100 (!) None Seen, 0-5 hpf    Squam Epithel, UA 0-5 None Seen, 0-5 lpf    WBC Clumps Present (!) None Seen   Culture, Urine   Result Value Ref Range    Culture 50,000-100,000 col/ml Escherichia coli (!)        Susceptibility    Escherichia coli - BETSY     Amoxicillin + Clavulanate <=4/2 Sensitive      Ampicillin <=4 Sensitive      Cefazolin <=1 Sensitive      Cefepime <=1 Sensitive      Ceftriaxone <=1 Sensitive      Ciprofloxacin <=0.5 Sensitive      Gentamicin <=2 Sensitive      Levofloxacin <=1 Sensitive       Meropenem <=0.25 Sensitive      Nitrofurantoin <=16 Sensitive      Tetracycline <=2 Sensitive      Tobramycin <=2 Sensitive      Trimethoprim + Sulfamethoxazole <=0.5 Sensitive    Blood culture from PERIPHERAL SITE   Result Value Ref Range    Anaerobic Blood Culture Bottle No Growth No Growth, No organisms seen, bottle returned to instrument, Specimen not received, No Growth at 24 hours, No Growth at 48 hours, No Growth at 72 hours, No Growth at 96 hours, No Growth at 120 hours    Aerobic Blood Culture Bottle No Growth No Growth, No organisms seen, bottle returned to instrument, Specimen not received, No Growth at 24 hours, No Growth at 120 hours, No Growth at 48 hours, No Growth at 72 hours, No Growth at 96 hours   Blood Culture from PERIPHERAL SITE (2nd one)   Result Value Ref Range    Anaerobic Blood Culture Bottle Positive after less than 24 hours incubation (!) No Growth, No organisms seen, bottle returned to instrument, Specimen not received, No Growth at 24 hours, No Growth at 48 hours, No Growth at 72 hours, No Growth at 96 hours, No Growth at 120 hours    Aerobic Blood Culture Bottle No Growth No Growth, No organisms seen, bottle returned to instrument, Specimen not received, No Growth at 24 hours, No Growth at 120 hours, No Growth at 48 hours, No Growth at 72 hours, No Growth at 96 hours   Culture, Blood Positive Work-up   Result Value Ref Range    Culture Escherichia coli (!)     Gram Stain Result Gram negative bacilli        Susceptibility    Escherichia coli - BETSY     Amoxicillin + Clavulanate <=4/2 Sensitive      Ampicillin <=4 Sensitive      Cefazolin <=1 Sensitive      Cefepime <=1 Sensitive      Ceftriaxone <=1 Sensitive      Ciprofloxacin <=0.5 Sensitive      Gentamicin <=2 Sensitive      Levofloxacin <=1 Sensitive      Meropenem <=0.25 Sensitive      Piperacillin + Tazobactam <=2/4 Sensitive      Tetracycline <=2 Sensitive      Tobramycin <=2 Sensitive      Trimethoprim + Sulfamethoxazole  <=0.5 Sensitive    Comprehensive Metabolic Panel   Result Value Ref Range    Sodium 136 136 - 145 mmol/L    Potassium 3.5 3.5 - 5.0 mmol/L    Chloride 102 98 - 107 mmol/L    CO2 23 22 - 31 mmol/L    Anion Gap, Calculation 11 5 - 18 mmol/L    Glucose 259 (H) 70 - 125 mg/dL    BUN 14 8 - 28 mg/dL    Creatinine 1.28 (H) 0.60 - 1.10 mg/dL    GFR MDRD Af Amer 50 (L) >60 mL/min/1.73m2    GFR MDRD Non Af Amer 41 (L) >60 mL/min/1.73m2    Bilirubin, Total 1.3 (H) 0.0 - 1.0 mg/dL    Calcium 9.3 8.5 - 10.5 mg/dL    Protein, Total 6.4 6.0 - 8.0 g/dL    Albumin 3.2 (L) 3.5 - 5.0 g/dL    Alkaline Phosphatase 52 45 - 120 U/L    AST 13 0 - 40 U/L    ALT 10 0 - 45 U/L   Troponin I   Result Value Ref Range    Troponin I 0.08 0.00 - 0.29 ng/mL   Lactic Acid   Result Value Ref Range    Lactic Acid 1.8 0.5 - 2.2 mmol/L   HM1 (CBC with Diff)   Result Value Ref Range    WBC 13.5 (H) 4.0 - 11.0 thou/uL    RBC 4.00 3.80 - 5.40 mill/uL    Hemoglobin 12.5 12.0 - 16.0 g/dL    Hematocrit 37.1 35.0 - 47.0 %    MCV 93 80 - 100 fL    MCH 31.3 27.0 - 34.0 pg    MCHC 33.7 32.0 - 36.0 g/dL    RDW 14.4 11.0 - 14.5 %    Platelets 117 (L) 140 - 440 thou/uL    MPV 10.1 8.5 - 12.5 fL    Neutrophils % 85 (H) 50 - 70 %    Lymphocytes % 7 (L) 20 - 40 %    Monocytes % 8 2 - 10 %    Eosinophils % 0 0 - 6 %    Basophils % 0 0 - 2 %    Neutrophils Absolute 11.4 (H) 2.0 - 7.7 thou/uL    Lymphocytes Absolute 1.0 0.8 - 4.4 thou/uL    Monocytes Absolute 1.0 (H) 0.0 - 0.9 thou/uL    Eosinophils Absolute 0.0 0.0 - 0.4 thou/uL    Basophils Absolute 0.0 0.0 - 0.2 thou/uL   POCT Glucose   Result Value Ref Range    Glucose 224 (H) 70 - 139 mg/dL   POCT Glucose   Result Value Ref Range    Glucose 220 (H) 70 - 139 mg/dL   POCT Glucose   Result Value Ref Range    Glucose 207 (H) 70 - 139 mg/dL   Comprehensive Metabolic Panel   Result Value Ref Range    Sodium 137 136 - 145 mmol/L    Potassium 3.5 3.5 - 5.0 mmol/L    Chloride 106 98 - 107 mmol/L    CO2 23 22 - 31 mmol/L     Anion Gap, Calculation 8 5 - 18 mmol/L    Glucose 124 70 - 125 mg/dL    BUN 13 8 - 28 mg/dL    Creatinine 0.90 0.60 - 1.10 mg/dL    GFR MDRD Af Amer >60 >60 mL/min/1.73m2    GFR MDRD Non Af Amer >60 >60 mL/min/1.73m2    Bilirubin, Total 0.7 0.0 - 1.0 mg/dL    Calcium 8.3 (L) 8.5 - 10.5 mg/dL    Protein, Total 5.5 (L) 6.0 - 8.0 g/dL    Albumin 2.6 (L) 3.5 - 5.0 g/dL    Alkaline Phosphatase 43 (L) 45 - 120 U/L    AST 12 0 - 40 U/L    ALT 11 0 - 45 U/L   Magnesium   Result Value Ref Range    Magnesium 1.4 (L) 1.8 - 2.6 mg/dL   HM1 (CBC with Diff)   Result Value Ref Range    WBC 11.8 (H) 4.0 - 11.0 thou/uL    RBC 3.52 (L) 3.80 - 5.40 mill/uL    Hemoglobin 11.0 (L) 12.0 - 16.0 g/dL    Hematocrit 32.7 (L) 35.0 - 47.0 %    MCV 93 80 - 100 fL    MCH 31.3 27.0 - 34.0 pg    MCHC 33.6 32.0 - 36.0 g/dL    RDW 14.5 11.0 - 14.5 %    Platelets 98 (L) 140 - 440 thou/uL    MPV 10.3 8.5 - 12.5 fL    Neutrophils % 79 (H) 50 - 70 %    Lymphocytes % 11 (L) 20 - 40 %    Monocytes % 9 2 - 10 %    Eosinophils % 1 0 - 6 %    Basophils % 0 0 - 2 %    Neutrophils Absolute 9.3 (H) 2.0 - 7.7 thou/uL    Lymphocytes Absolute 1.2 0.8 - 4.4 thou/uL    Monocytes Absolute 1.1 (H) 0.0 - 0.9 thou/uL    Eosinophils Absolute 0.1 0.0 - 0.4 thou/uL    Basophils Absolute 0.0 0.0 - 0.2 thou/uL   POCT Glucose   Result Value Ref Range    Glucose 144 (H) 70 - 139 mg/dL   Blood culture from PERIPHERAL SITE   Result Value Ref Range    Anaerobic Blood Culture Bottle No Growth No Growth, No organisms seen, bottle returned to instrument, Specimen not received, No Growth at 24 hours, No Growth at 48 hours, No Growth at 72 hours, No Growth at 96 hours, No Growth at 120 hours    Aerobic Blood Culture Bottle No Growth No Growth, No organisms seen, bottle returned to instrument, Specimen not received, No Growth at 24 hours, No Growth at 120 hours, No Growth at 48 hours, No Growth at 72 hours, No Growth at 96 hours   Blood Culture from PERIPHERAL SITE (2nd one)   Result  Value Ref Range    Anaerobic Blood Culture Bottle No Growth No Growth, No organisms seen, bottle returned to instrument, Specimen not received, No Growth at 24 hours, No Growth at 48 hours, No Growth at 72 hours, No Growth at 96 hours, No Growth at 120 hours    Aerobic Blood Culture Bottle No Growth No Growth, No organisms seen, bottle returned to instrument, Specimen not received, No Growth at 24 hours, No Growth at 120 hours, No Growth at 48 hours, No Growth at 72 hours, No Growth at 96 hours   POCT Glucose - 4 times daily before meals and at bedtime   Result Value Ref Range    Glucose 164 (H) 70 - 139 mg/dL   POCT Glucose - 4 times daily before meals and at bedtime   Result Value Ref Range    Glucose 146 (H) 70 - 139 mg/dL   POCT Glucose - 4 times daily before meals and at bedtime   Result Value Ref Range    Glucose 170 (H) 70 - 139 mg/dL   HM1 (CBC with Diff)   Result Value Ref Range    WBC 11.2 (H) 4.0 - 11.0 thou/uL    RBC 3.82 3.80 - 5.40 mill/uL    Hemoglobin 11.8 (L) 12.0 - 16.0 g/dL    Hematocrit 34.5 (L) 35.0 - 47.0 %    MCV 90 80 - 100 fL    MCH 30.9 27.0 - 34.0 pg    MCHC 34.2 32.0 - 36.0 g/dL    RDW 14.1 11.0 - 14.5 %    Platelets 112 (L) 140 - 440 thou/uL    MPV 9.9 8.5 - 12.5 fL    Neutrophils % 76 (H) 50 - 70 %    Lymphocytes % 13 (L) 20 - 40 %    Monocytes % 9 2 - 10 %    Eosinophils % 2 0 - 6 %    Basophils % 0 0 - 2 %    Neutrophils Absolute 8.5 (H) 2.0 - 7.7 thou/uL    Lymphocytes Absolute 1.4 0.8 - 4.4 thou/uL    Monocytes Absolute 1.0 (H) 0.0 - 0.9 thou/uL    Eosinophils Absolute 0.2 0.0 - 0.4 thou/uL    Basophils Absolute 0.0 0.0 - 0.2 thou/uL   POCT Glucose - 4 times daily before meals and at bedtime   Result Value Ref Range    Glucose 111 70 - 139 mg/dL   POCT Glucose - 4 times daily before meals and at bedtime   Result Value Ref Range    Glucose 216 (H) 70 - 139 mg/dL   POCT Glucose - 4 times daily before meals and at bedtime   Result Value Ref Range    Glucose 121 70 - 139 mg/dL   POCT  Glucose - 4 times daily before meals and at bedtime   Result Value Ref Range    Glucose 107 70 - 139 mg/dL   POCT Glucose - 4 times daily before meals and at bedtime   Result Value Ref Range    Glucose 201 (H) 70 - 139 mg/dL   POCT Glucose - 4 times daily before meals and at bedtime   Result Value Ref Range    Glucose 100 70 - 139 mg/dL   POCT Glucose - 4 times daily before meals and at bedtime   Result Value Ref Range    Glucose 149 (H) 70 - 139 mg/dL   Platelet Count - every other day x 3   Result Value Ref Range    Platelets 148 140 - 440 thou/uL   POCT Glucose - 4 times daily before meals and at bedtime   Result Value Ref Range    Glucose 93 70 - 139 mg/dL   Basic Metabolic Panel   Result Value Ref Range    Sodium 142 136 - 145 mmol/L    Potassium 3.8 3.5 - 5.0 mmol/L    Chloride 108 (H) 98 - 107 mmol/L    CO2 24 22 - 31 mmol/L    Anion Gap, Calculation 10 5 - 18 mmol/L    Glucose 50 (LL) 70 - 125 mg/dL    Calcium 8.8 8.5 - 10.5 mg/dL    BUN 14 8 - 28 mg/dL    Creatinine 0.86 0.60 - 1.10 mg/dL    GFR MDRD Af Amer >60 >60 mL/min/1.73m2    GFR MDRD Non Af Amer >60 >60 mL/min/1.73m2   HM2(CBC w/o Differential)   Result Value Ref Range    WBC 14.3 (H) 4.0 - 11.0 thou/uL    RBC 3.70 (L) 3.80 - 5.40 mill/uL    Hemoglobin 11.6 (L) 12.0 - 16.0 g/dL    Hematocrit 34.3 (L) 35.0 - 47.0 %    MCV 93 80 - 100 fL    MCH 31.4 27.0 - 34.0 pg    MCHC 33.8 32.0 - 36.0 g/dL    RDW 14.4 11.0 - 14.5 %    Platelets 196 140 - 440 thou/uL    MPV 10.1 8.5 - 12.5 fL         Assessment:    ICD-10-CM    1. Acute cystitis without hematuria N30.00    2. History of bacteremia Z87.898    3. Rheumatoid arthritis involving multiple sites, unspecified rheumatoid factor presence (H) M06.9    4. Essential hypertension I10    5. Type 2 diabetes mellitus with complication, with long-term current use of insulin (H) E11.8     Z79.4    6. Chronic low back pain, unspecified back pain laterality, with sciatica presence unspecified M54.5     G89.29         Plan:  UTI: Continue Keflex through 7/5/2019.  Observe for signs and symptoms including encephalopathy.    Rheumatoid arthritis: Will restart methotrexate this Friday at 7.5 mg weekly.    Hypertension: Elevated blood pressures will increase lisinopril 20 mg daily and continue metoprolol succinate 50 mg daily and amlodipine 5 mg daily.    Diabetes: At this time we will just observe for trends of elevating blood sugars as she is covered with NovoLog sliding scale.  Also continue with NPH at 33 units twice daily    Low back pain: Continue with Percocet 5/325 1 tab every 6 hours as needed.  She adjust her stimulator related to the intensity of her pain.    Counseled on signs and symptoms of UTI and how methotrexate can decrease immunity making it difficult for her to fight an infection.  And this is why this medication was held.    35 total minutes spent with 20 spent face-to-face with patient in counseling and coordination regarding her rheumatoid arthritis and methotrexate in the picture of an infection., chronic pain medications and discovery of diabetic regimen.      Electronically signed by: Leigh Ann Romero, CNP

## 2021-05-30 NOTE — PROGRESS NOTES
[facility] Patient Outreach:     Discharge Date::  7/11/19      Reason for TCU stay (discharge diagnosis)::  UTI with bacteremia, chronic pain      Are you feeling better, the same or worse since your discharge?:  Patient is feeling better          As part of your discharge plan, did they discuss home care with you?: Yes        Have your seen them yet, or are they scheduled to visit?: Yes                Do you have any follow up visits scheduled with your PCP or Specialist?:  Yes, with PCP      (RN) Is it scheduled soon enough (3-5 days)?: Yes (Having trouble with phone, and messages ok to text but can't text back. )

## 2021-05-30 NOTE — TELEPHONE ENCOUNTER
Called again - phone number states that the wireless customer is not available please try again later.

## 2021-05-30 NOTE — TELEPHONE ENCOUNTER
Call pt-  She missed her appointment today to follow up after her hospitalization. Please schedule with me. Also, as a reminder she should bring ALL of her current medications to her appointment so these can be reviewed.

## 2021-05-30 NOTE — TELEPHONE ENCOUNTER
Called to pt but unable to leave a message- pt does not have a hospital follow up.  She was informed on 07/03/19 to schedule a follow up, pt stated that she would schedule upon d/c from TCU.      oxyCODONE-acetaminophen (PERCOCET) 5-325 mg per tablet 60 tablet 0 6/26/2019  No   Sig - Route: Take 1-2 tablets by mouth every 6 (six) hours as needed for pain (maximum of 6 tablets per day). For 6/3/19-7/3/19 - Oral   Class: Print   Earliest Fill Date: 6/26/2019       Would you like an appointment for follow up?

## 2021-05-30 NOTE — PROGRESS NOTES
[facility]    Facility:   [facility] [[id number]]   Code Status: FULL CODE  PCP: Marichuy Rubio FNP   Phone: [phone]   Fax: [phone]      CHIEF COMPLAINT/REASON FOR VISIT:  Chief Complaint   Patient presents with     Discharge Summary       HISTORY COURSE:  Ebonie is a 69 y.o. female who has a past medical history for type 2 diabetes, hypertension, rheumatoid arthritis, chronic pain with a spine stimulator, RA, CKD, HLD, GERD.  She was hospitalized at [facility] from 6/22/2019 to 6/26/2019 for UTI with subsequent bacteremia.  Her urine was cultured for E. coli and she was started on IV antibiotics.  She did have metabolic encephalopathy which did improve with IV fluids and the antibiotics.  She was switched to Keflex with an end date of 7/5/2019.  Her methotrexate was held due to infection.  She was then transferred to TCU for rehab.    Today she is being seen as a first routine visit and face-to-face for discharge.  She will discharge to home on 7/11/2019 with current medications and treatments.  She will also have PT OT home health aide and RN.  Currently she is ambulating  300 feet with a walker and has completed steps.  She denies chest pain or shortness of breath denies constipation or diarrhea.  Her creatinine and GFR are slightly elevated from prior lab.  Creatinine is at 1.16 up from 1.11 in GFR is 46 down from 48.  Hemoglobin on 7 9 was 10.9.  Is recommended she follow-up with her primary MD within 7 days of discharge from the transitional care unit.  Her blood sugars have been stable and blood pressures have been ranging from 130s to 150s over 70-80's.      Review of Systems   Constitutional: Positive for fatigue. Negative for activity change, appetite change and fever.   HENT: Negative for congestion.    Respiratory: Negative for cough, shortness of breath and wheezing.    Cardiovascular: Negative for chest pain and leg swelling.    Gastrointestinal: Negative for abdominal distention, abdominal pain, constipation, diarrhea and nausea.   Genitourinary: Negative for dysuria.   Musculoskeletal: Negative for arthralgias and back pain.   Skin: Negative for color change and wound.   Neurological: Negative for dizziness.   Psychiatric/Behavioral: Negative for agitation, behavioral problems and confusion.       Vitals:    07/10/19 0926   BP: 136/76   Pulse: 67   Resp: 16   Temp: 97.7  F (36.5  C)   SpO2: 96%   Weight: (!) 227 lb 9.6 oz (103.2 kg)       Physical Exam   Constitutional: She is oriented to person, place, and time. She appears well-developed and well-nourished.   HENT:   Head: Normocephalic and atraumatic.   Eyes: Pupils are equal, round, and reactive to light. Conjunctivae are normal.   Neck: Normal range of motion. Neck supple.   Cardiovascular: Normal rate, regular rhythm and normal heart sounds.   No murmur heard.  Pulmonary/Chest: Effort normal and breath sounds normal. She has no wheezes. She has no rales.   Abdominal: Soft. Bowel sounds are normal. She exhibits no distension. There is no tenderness.   Musculoskeletal: Normal range of motion. She exhibits no edema.   Neurological: She is alert and oriented to person, place, and time.   Skin: Skin is warm and dry.   Psychiatric: She has a normal mood and affect. Her behavior is normal.       MEDICATION LIST:  Current Outpatient Medications   Medication Sig     acetaminophen (TYLENOL) 500 MG tablet Take 1 tablet (500 mg total) by mouth every 4 (four) hours as needed.     amLODIPine (NORVASC) 5 MG tablet Take 1 tablet (5 mg total) by mouth daily.     ascorbic acid (ASCORBIC ACID WITH LATRICIA HIPS) 500 MG tablet Take 500 mg by mouth every evening.      aspirin 81 MG EC tablet Take 81 mg by mouth every evening.     atorvastatin (LIPITOR) 40 MG tablet TAKE 1 TABLET AT BEDTIME     b complex vitamins tablet Take 1 tablet by mouth daily.     blood glucose test strips Check blood sugar four  times daily. Dispense brand per patient's insurance at pharmacy discretion. Dx: e11.9     blood-glucose meter Misc Dispense one meter. Use as directed. Dx: e11.9     buPROPion (WELLBUTRIN) 100 MG tablet Take 1 tablet (100 mg total) by mouth daily.     cholecalciferol, vitamin D3, 1,000 unit tablet Take 1,000 Units by mouth every evening.      folic acid (FOLVITE) 800 MCG tablet Take 800 mcg by mouth every evening.            insulin regular (NOVOLIN R) 100 unit/mL injection Inject 24 Units under the skin 3 (three) times a day before meals.     insulin syringe-needle U-100 (BD INSULIN SYRINGE ULT-FINE II) 1 mL 31 gauge x 5/16 Syrg USE 4 TIMES DAILY AS DIRECTED     lancing device (ACCU-CHEK SOFTCLIX) Misc Use 1 applicator As Directed 4 (four) times a day.     lisinopril (PRINIVIL,ZESTRIL) 10 MG tablet TAKE 1 TABLET AT BEDTIME     loratadine (CLARITIN) 10 mg tablet Take 1 tablet (10 mg total) by mouth at bedtime.     magnesium oxide (MAG-OX) 400 mg tablet Take 400 mg by mouth at bedtime. With food            methotrexate 2.5 MG tablet Take by mouth once a week. Take on Fridays     metoprolol succinate (TOPROL XL) 50 MG 24 hr tablet Take 1 tablet (50 mg total) by mouth daily.     MULTIVITAMIN ORAL Take 1 tablet by mouth every evening.      NOVOLIN N NPH U-100 INSULIN 100 unit/mL injection Inject 33 Units under the skin 2 (two) times a day before meals.     NOVOLIN R REGULAR U-100 INSULN 100 unit/mL injection Accu-Chek coverage (3 times a day before meals and at bedtime)  <70 call MD to adjust meds  Less than 100-   0 units NovoLin-R  101-175   2 units  176-225   4 units  226-300   6 units  301- 375 8 units  376- 425  10 units  > 426  12 units call MD     omega-3 fatty acids-vitamin E (FISH OIL) 1,000 mg cap Take 1,000 mg by mouth every evening.     oxyCODONE-acetaminophen (PERCOCET) 5-325 mg per tablet Take 1-2 tablets by mouth every 6 (six) hours as needed for pain (maximum of 6 tablets per day). For 6/3/19-7/3/19      polyethylene glycol (MIRALAX) 17 gram packet Take 17 g by mouth daily as needed.     ranitidine (ZANTAC) 150 MG tablet TAKE 1 TABLET TWICE DAILY     traZODone (DESYREL) 150 MG tablet TAKE 1 TABLET AT BEDTIME       DISCHARGE DIAGNOSIS:    ICD-10-CM    1. Essential hypertension I10    2. Acute cystitis without hematuria N30.00    3. Type 2 diabetes mellitus with hyperosmolarity without coma, unspecified whether long term insulin use (H) E11.00    4. Venous Insufficiency I87.2        MEDICAL EQUIPMENT NEEDS:  None   DISCHARGE PLAN/FACE TO FACE:  I certify that services are/were furnished while this patient was under the care of a physician and that a physician or an allowed non-physician practitioner (NPP), had a face-to-face encounter that meets the physician face-to-face encounter requirements. The encounter was in whole, or in part, related to the primary reason for home health. The patient is confined to his/her home and needs intermittent skilled nursing, physical therapy, speech-language pathology, or the continued need for occupational therapy. A plan of care has been established by a physician and is periodically reviewed by a physician.  Date of Face-to-Face Encounter: 7/10/19    I certify that, based on my findings, the following services are medically necessary home health services: PT OT home health aide and RN    My clinical findings support the need for the above skilled services because: PT OT for continued strength and endurance, home health aide to assist with activities of daily living, RN for vital signs and medication management    This patient is homebound because: She is deconditioned and easily fatigued due to recent hospitalization for UTI with bacteremia.    The patient is, or has been, under my care and I have initiated the establishment of the plan of care. This patient will be followed by a physician who will periodically review the plan of care.    Schedule follow up visit with primary care  provider within 7 days to reestablish care.    Electronically signed by: Rosalie Childress CNP     Electronically signed by: Lyla Miller MD

## 2021-05-30 NOTE — TELEPHONE ENCOUNTER
Orders being requested: Skilled nursing 1 x 1 week (first session was 7/13/2019), 2 x 2 weeks, 1 x 2 weeks  Reason service is needed/diagnosis: As requested by [facility] to work on pain management, medication management, bowel management and diabetic education.   When are orders needed by: As able  Where to send Orders: Phone:  [phone]  Okay to leave detailed message?  Yes

## 2021-05-30 NOTE — TELEPHONE ENCOUNTER
Apply cream twice a day to rash   Continue Hydroxyzine 25 mg every hs for itching  Referral to dermatology  Continue all current medications  Follow up with Dr. Hughes as scheduled    Noted  Marichuy Rubio, FNP

## 2021-05-30 NOTE — TELEPHONE ENCOUNTER
Pt is scheduled for next Thursday - states that she was told the provider was cancelling the appt and she had to reschedule.      Pt was reminded to bring In medications

## 2021-05-30 NOTE — PROGRESS NOTES
Reviewed patients chart today. She was recently in the hospital and missed her RN assessment.   Ebonie is currently at [facility].  Will check back in a few weeks.     Next Outreach: 7/22/19        Delegation from Radha RN    Care Guide Delegation:  Due Date: Within 2 weeks from today's date 6/27/2019  Delegation: No Show for RN Appointment. Please Follow unsuccessful outreach, no show standard work.     Patient was just dc'd from the hospital yesterday.  Per hospital notes; it appears patient may be at a TCU.  Please inquire if patient would like to reschedule RN Re-Assessment for a telephone assessment when she is home and feeling up to it.     Thank you

## 2021-05-30 NOTE — PROGRESS NOTES
[employer]    Open Encounter today.  Episodes created, care management status validated and encounters linked    No Showed RN assessment on 6/27/2019    Rescheduled for Assessment on 7/25/19 @11am

## 2021-05-30 NOTE — TELEPHONE ENCOUNTER
Orders being requested: Occupational home visits two times a week for three weeks  Reason service is needed/diagnosis: post hospitalization for UTI/sepsis . Working on fall prevention as has had multiple falls. Also walker safety, environmental safety and ADL's to decrease fall risk.  When are orders needed by: ASAPWhere to send Orders: Phone:  [facility] [phone]  Okay to leave detailed message?  Yes

## 2021-05-30 NOTE — PROGRESS NOTES
Care Guide Delegation:  Due Date: Within 2 weeks from today's date 6/27/2019  Delegation: No Show for RN Appointment. Please Follow unsuccessful outreach, no show standard work.    Patient was just dc'd from the hospital yesterday.  Per hospital notes; it appears patient may be at a TCU.  Please inquire if patient would like to reschedule RN Re-Assessment for a telephone assessment when she is home and feeling up to it.    Thank you

## 2021-05-31 VITALS — BODY MASS INDEX: 30.88 KG/M2 | HEIGHT: 72 IN | WEIGHT: 228 LBS

## 2021-05-31 VITALS — HEIGHT: 72 IN | WEIGHT: 234 LBS | BODY MASS INDEX: 31.69 KG/M2

## 2021-05-31 VITALS — BODY MASS INDEX: 29.8 KG/M2 | WEIGHT: 220 LBS | HEIGHT: 72 IN

## 2021-05-31 VITALS — WEIGHT: 228 LBS | BODY MASS INDEX: 30.92 KG/M2

## 2021-05-31 VITALS — BODY MASS INDEX: 29.05 KG/M2 | WEIGHT: 214.2 LBS

## 2021-05-31 VITALS — WEIGHT: 228 LBS | HEIGHT: 72 IN | BODY MASS INDEX: 30.88 KG/M2

## 2021-05-31 VITALS — BODY MASS INDEX: 31.69 KG/M2 | HEIGHT: 72 IN | WEIGHT: 234 LBS

## 2021-05-31 VITALS — BODY MASS INDEX: 29.84 KG/M2 | WEIGHT: 220 LBS

## 2021-05-31 VITALS — WEIGHT: 234 LBS | BODY MASS INDEX: 31.74 KG/M2

## 2021-05-31 VITALS — BODY MASS INDEX: 31.69 KG/M2 | WEIGHT: 234 LBS | HEIGHT: 72 IN

## 2021-05-31 VITALS — BODY MASS INDEX: 31.74 KG/M2 | WEIGHT: 234 LBS

## 2021-05-31 VITALS — WEIGHT: 214.2 LBS | BODY MASS INDEX: 29.05 KG/M2

## 2021-05-31 NOTE — PATIENT INSTRUCTIONS - HE
- You will get a call to schedule the colonoscopy. I recommend that you do this in the hospital  - Call rheumatology to make an appointment with Dr. Londono   - Make an appointment to follow up with Dr. Salas   - Do not restart metoprolol. Your heart rate looks better without this medicine

## 2021-05-31 NOTE — TELEPHONE ENCOUNTER
Who is calling:  [facility]  Reason for Call:  Patient's care plan needs to be signed and faxed back to [phone]. Patient had been discharged from care, so Myah is asking if the paperwork can be sent as soon as possible. Myah is willing to stop by if it is easier for Dr. Nino  Date of last appointment with primary care: 8/21/19  Okay to leave a detailed message: Yes [phone]

## 2021-05-31 NOTE — TELEPHONE ENCOUNTER
Who is calling:  Ebonie Lacey  Reason for Call:  Caller states that they did not received Home Health Certification and Plan of Care Order, Therapy orders. Caller requesting to fax [phone].  Date of last appointment with primary care: 4/15/19  Okay to leave a detailed message: No

## 2021-05-31 NOTE — TELEPHONE ENCOUNTER
Refill Request  Did you contact pharmacy: No  Medication name:   OxyCodone has enough for today and tomorrow  Amlodipine 5 mg Less than a week left  Atorvastatin 40 mg- Out completely   Metoprolol- ER said to hold until discussed with PCP to determine dosage or next step.     Who prescribed the medication: Marichuy Rubio  Pharmacy Name and Location: [facility]  Is patient out of medication: See Above  Patient notified refills processed in 72 hours:  yes  Okay to leave a detailed message: yes    Patient is scheduled for a follow up on 8/21. Please call patient if you have any question or need further information.

## 2021-05-31 NOTE — TELEPHONE ENCOUNTER
Called to pt and pt states that she is really overwhelmed with everything going on and she would like to wait until thing calm down a bit.  Pt has appts with many providers and is confused who she has seen and at this time declines to schedule- will call back to schedule    Informed that she should call prior to running out of medication and pt states that she will see where she is at for meds.  Pt will call back if needed

## 2021-05-31 NOTE — TELEPHONE ENCOUNTER
Provider Communication  Who is calling:  [facility]  Facility in which provider is associated:  [facility]  Reason for call:  FYI-Patient has been discharged from home care with all of her goals met  Urgency for return call:  none needed  Okay to leave detailed message?:  Yes

## 2021-05-31 NOTE — TELEPHONE ENCOUNTER
Orders being requested: PT signed orders from 7/15 and 7/16/19 Verbal ok the signed orders were not. Patient had been discharged from care, so Myah is asking if the paperwork can be sent as soon as possible.  Reason service is needed/diagnosis: Fall risk  When are orders needed by: As soon as possible  Where to send Orders: Fax: [phone]  Okay to leave detailed message?  Yes, [facility] with [facility], [phone].    Myah is willing to stop by if it is easier for Dr. Nino

## 2021-05-31 NOTE — PROGRESS NOTES
Internal Medicine Office Visit  [facility]  Patient Name: Ebonie Bowman  Patient Age: 69 y.o.  YOB: 1950  MRN: [id number]    Date of Visit: 2019  Reason for Office Visit:   Chief Complaint   Patient presents with     Hospital Visit Follow Up           Assessment / Plan / Medical Decision Makin. Essential hypertension  - stable without metoprolol and HR is normal. She is not experiencing heart palpitations. No need to restart metoprolol   - amLODIPine (NORVASC) 5 MG tablet; Take 1 tablet (5 mg total) by mouth daily.  Dispense: 90 tablet; Refill: 1    2. Type 2 diabetes mellitus with hyperosmolarity without coma, with long-term current use of insulin (H)  - she is strongly encouraged to bring meter or log book to appointments to review home readings  - Glycosylated Hemoglobin A1c  - Microalbumin, Random Urine    3. Encounter for therapeutic drug monitoring  4. Chronic pain  5. Uncomplicated opioid dependence (H)  - Drugs of Abuse 1,Urine  - CSA updated today  - Continue 2 tablets two times a day and 1 tablet midday as needed for severe pain     6. Colon cancer screening  7. Positive FIT (fecal immunochemical test)  - Ambulatory referral for Colonoscopy  - Recommended that this is done inpatient to prevent diabetes related complications during preparation and patient agrees     8. Mixed hyperlipidemia  - Lipid Profile    9. Sepsis secondary to UTI (H)  - reviewed UTI prevention including increased fluid intake, monitoring for symptoms including family involvement if cognition changes. We discussed topical estrace cream, she will check on cost   - estradiol (ESTRACE) 0.01 % (0.1 mg/gram) vaginal cream; Apply a pea-sized amount vaginally daily x 1 week then reduce to 3 times per week thereafter  Dispense: 42.5 g; Refill: 1    10. Cat allergies  - encouraged her to keep cat out of the bedroom but she cannot agree to this. Advised flonase nasal spray          Health Maintenance Review  Health Maintenance   Topic Date Due     MEDICARE ANNUAL WELLNESS VISIT  03/17/2013     ZOSTER VACCINES (2 of 3) 12/31/2014     MAMMOGRAM  09/27/2018     DEPRESSION FOLLOW UP  05/07/2019     DIABETES FOOT EXAM  05/07/2019     FALL RISK ASSESSMENT  07/02/2019     INFLUENZA VACCINE RULE BASED (1) 08/01/2019     DIABETES FOLLOW-UP  08/06/2019     DIABETES HEMOGLOBIN A1C  02/21/2020     DIABETES OPHTHALMOLOGY EXAM  05/14/2020     DIABETES URINE MICROALBUMIN  08/21/2020     DXA SCAN  08/21/2020     ADVANCE CARE PLANNING  12/14/2021     TD 18+ HE  07/18/2024     COLONOSCOPY  12/14/2026     HEPATITIS C SCREENING  Completed     PNEUMOCOCCAL POLYSACCHARIDE VACCINE AGE 65 AND OVER  Completed     PNEUMOCOCCAL CONJUGATE VACCINE FOR ADULTS (PCV13 OR PREVNAR)  Addressed         I have discontinued Ebonie Bowman's NovoLIN R Regular U-100 Insuln. I have also changed her oxyCODONE-acetaminophen. Additionally, I am having her start on estradiol. Lastly, I am having her maintain her magnesium oxide, ascorbic acid (vitamin C), cholecalciferol (vitamin D3), aspirin, omega-3 fatty acids-vitamin E, lancing device, MULTIVITAMIN ORAL, folic acid, insulin syringe-needle U-100, blood-glucose meter, blood glucose test, NovoLIN N NPH U-100 Insulin, buPROPion, loratadine, metoprolol succinate, traZODone, lisinopril, ranitidine, b complex vitamins, acetaminophen, methotrexate, polyethylene glycol, atorvastatin, amLODIPine, and insulin regular.      HPI:  Ebonie Bowman is a 69 y.o. year old who presents to the office today for follow up of several hospitalizations and ER visits. She was seen on 5/22 with right LE pain and sciatica. Discharged to home with lidocaine patches.     She was admitted from 6/22-6/26 for weakness and confusion, she was found to have a UTI. She discharged to TCU where she stayed until 7/12. She is now at home and feels that her strength has improved. She was previously seen for episodes of  sudden leg weakness, she states that these episodes have stopped. She is concerned about getting a UTI again, she gets this annually and has minimal symptoms.     Chronic back pain was reviewed. She has a spinal stimulator. She continues chronic narcotics and takes 2 tablets in the am and pm with 1 tablet midday. She denies excessive drowsiness or constipation associated with the medication.     RA reviewed, she is overdue for follow up with rheumatology and intends to schedule this appointment.     She was seen in the emergency department due to a complaint of feeling lightheaded.  On arrival EMS found her glucose to be 47.  She was given D50 and glucose improved to 181.  She also had noted bradycardia. She is unable to provide any details as to his episode, does not recall that she skipped eating, took a different insulin dose. She is now eating a snack at bedtime to help prevent hypoglycemia. Her glucose AM readings are 123 but she does not recall her afternoon readings and did not bring her meter today.     She has a cat that sleeps with her. Her throat feels raw and has more congestion with the cat near her.     We reviewed her positive FIT test for colon cancer screening. She denies stool changes.     Review of Systems- pertinent positive in bold:  Constitutional: Fever, chills, night sweats, fainting, weight change, fatigue, seizures, dizziness, sleeping difficulties, loud snoring/pauses in breathing  Eyes: change in vision, blurred or double vision, redness/eye pain  Ears, nose, mouth, throat: change in hearing, ear pain, hoarseness, difficulty swallowing, sores in the mouth or throat  Respiratory: shortness of breath, cough, bloody sputum, wheezing  Cardiovascular: chest pain, palpitations   Gastrointestinal: abdominal pain, heartburn/indigestion, nausea/vomiting, change in appetite, change in bowel habits, constipation or diarrhea, rectal bleeding/dark stools, difficulty swallowing  Urinary: painful  urination, frequent urination, urinary urgency/incontinence, blood in urine/dark urine, nocturia  Musculoskeletal: backache/back pain (new or increasing), weakness, joint pain/stiffness (new or increasing), muscle cramps, swelling of hands, feet, ankles, leg pain/redness  Skin: change in moles/freckles, rash, nodules  Hematologic/lymphatic: swollen lymph glands, abnormal bruising/bleeding  Endocrine: excessive thirst/urination, cold or heat intolerance  Neurologic/emotional: worrisome memory change, numbness/tingling, anxiety, mood swings      Current Scheduled Meds:  Outpatient Encounter Medications as of 8/21/2019   Medication Sig Dispense Refill     acetaminophen (TYLENOL) 500 MG tablet Take 1 tablet (500 mg total) by mouth every 4 (four) hours as needed.  0     ascorbic acid (ASCORBIC ACID WITH LATRICIA HIPS) 500 MG tablet Take 500 mg by mouth every evening.        aspirin 81 MG EC tablet Take 81 mg by mouth every evening.       atorvastatin (LIPITOR) 40 MG tablet Take 1 tablet (40 mg total) by mouth at bedtime. 90 tablet 0     b complex vitamins tablet Take 1 tablet by mouth daily.       blood glucose test strips Check blood sugar four times daily. Dispense brand per patient's insurance at pharmacy discretion. Dx: e11.9 200 strip 11     blood-glucose meter Misc Dispense one meter. Use as directed. Dx: e11.9 1 each 0     buPROPion (WELLBUTRIN) 100 MG tablet Take 1 tablet (100 mg total) by mouth daily. 90 tablet 3     cholecalciferol, vitamin D3, 1,000 unit tablet Take 1,000 Units by mouth every evening.        folic acid (FOLVITE) 800 MCG tablet Take 800 mcg by mouth every evening.              insulin regular (NOVOLIN R) 100 unit/mL injection Inject 24 Units under the skin 3 (three) times a day before meals.       insulin syringe-needle U-100 (BD INSULIN SYRINGE ULT-FINE II) 1 mL 31 gauge x 5/16 Syrg USE 4 TIMES DAILY AS DIRECTED 500 each 1     lancing device (ACCU-CHEK SOFTCLIX) Misc Use 1 applicator As Directed 4  (four) times a day. 300 each 3     lisinopril (PRINIVIL,ZESTRIL) 10 MG tablet TAKE 1 TABLET AT BEDTIME 90 tablet 2     loratadine (CLARITIN) 10 mg tablet Take 1 tablet (10 mg total) by mouth at bedtime. 90 tablet 2     magnesium oxide (MAG-OX) 400 mg tablet Take 400 mg by mouth at bedtime. With food              methotrexate 2.5 MG tablet Take by mouth once a week. Take on Fridays       metoprolol succinate (TOPROL XL) 50 MG 24 hr tablet Take 1 tablet (50 mg total) by mouth daily. 90 tablet 3     MULTIVITAMIN ORAL Take 1 tablet by mouth every evening.        NOVOLIN N NPH U-100 INSULIN 100 unit/mL injection Inject 33 Units under the skin 2 (two) times a day before meals. 10 mL 11     omega-3 fatty acids-vitamin E (FISH OIL) 1,000 mg cap Take 1,000 mg by mouth every evening. 90 each 3     oxyCODONE-acetaminophen (PERCOCET) 5-325 mg per tablet Take 1-2 tablets by mouth every 6 (six) hours as needed for pain (maximum of 6 tablets per day). 180 tablet 0     polyethylene glycol (MIRALAX) 17 gram packet Take 17 g by mouth daily as needed.       ranitidine (ZANTAC) 150 MG tablet TAKE 1 TABLET TWICE DAILY 180 tablet 3     traZODone (DESYREL) 150 MG tablet TAKE 1 TABLET AT BEDTIME 90 tablet 3     [DISCONTINUED] amLODIPine (NORVASC) 5 MG tablet Take 1 tablet (5 mg total) by mouth daily. 90 tablet 0     [DISCONTINUED] insulin regular (NOVOLIN R) 100 unit/mL injection Inject 24 Units under the skin 3 (three) times a day before meals.       [DISCONTINUED] NOVOLIN R REGULAR U-100 INSULN 100 unit/mL injection Accu-Chek coverage (3 times a day before meals and at bedtime)  <70 call MD to adjust meds  Less than 100-   0 units NovoLin-R  101-175   2 units  176-225   4 units  226-300   6 units  301- 375 8 units  376- 425  10 units  > 426  12 units call MD 10 mL PRN     [DISCONTINUED] oxyCODONE-acetaminophen (PERCOCET) 5-325 mg per tablet Take 1-2 tablets by mouth every 6 (six) hours as needed for pain (maximum of 6 tablets per day).  Needs appointment for further refills 42 tablet 0     amLODIPine (NORVASC) 5 MG tablet Take 1 tablet (5 mg total) by mouth daily. 90 tablet 1     estradiol (ESTRACE) 0.01 % (0.1 mg/gram) vaginal cream Apply a pea-sized amount vaginally daily x 1 week then reduce to 3 times per week thereafter 42.5 g 1     No facility-administered encounter medications on file as of 2019.      Past Medical History:   Diagnosis Date     BBB (bundle branch block)      Chronic back pain      Chronic kidney disease     stage 3     Chronic Kidney Disease (NKF Classification)     Created by Conversion      Depression      Diabetes (H)     Type 2     Diabetes mellitus, type 2 (H)     Created by Conversion      Frequent headaches      Ganglion Of The Left Wrist     Created by Conversion      GERD (gastroesophageal reflux disease)      HLD (hyperlipidemia)      Hypertension     Per H&P dated 2013     Incarcerated ventral hernia     Per H&P dated 2013     Osteoarthritis      Rheumatoid arthritis (H)      Shortness of breath      Thrombocytopenia (H)      Venous insufficiency      Past Surgical History:   Procedure Laterality Date     BACK SURGERY       CHOLECYSTECTOMY       FATTY TUMOR      LT SHOULDER BLADE     HYSTERECTOMY       JOINT REPLACEMENT Left     knee     NH ABDOMEN SURGERY PROC UNLISTED      Description: Hernia Repair;  Recorded: 10/23/2013;     NH IMPLANT SPINAL NEUROSTIM/ Left 2019    Procedure: LEFT FLANK INCISION REPLACE NEUROSTIMULATOR;  Surgeon: Vishal Pretty MD;  Location: [facility];  Service: Pain     SPINAL CORD STIMULATOR IMPLANT       Two left wrist  ,      Social History     Tobacco Use     Smoking status: Former Smoker     Last attempt to quit: 2008     Years since quittin.0     Smokeless tobacco: Never Used     Tobacco comment: smells of cigarrette smoke   Substance Use Topics     Alcohol use: No     Drug use: No     Comment: takes percocet for  chronic pain       Objective / Physical Examination:  Vitals:    08/21/19 1132   BP: 130/62   Pulse: 78   Weight: (!) 231 lb (104.8 kg)     Wt Readings from Last 3 Encounters:   08/21/19 (!) 231 lb (104.8 kg)   08/14/19 (!) 231 lb (104.8 kg)   07/10/19 (!) 227 lb 9.6 oz (103.2 kg)     Body mass index is 31.33 kg/m .     Constitutional: In no apparent distress  Respiratory: Clear to auscultation bilaterally. Normal inspiratory and expiratory effort  Cardiovascular: Regular rate and rhythm. No murmurs, rubs, or gallops. No edema. No carotid bruits.   Gastrointestinal: Bowel sounds active all four quadrants. Soft, non-tender.   Skin: Warm and dry. Ecchymosis left arm   Neuro: Normal gait  Psych: Alert and oriented x3.     Orders Placed This Encounter   Procedures     Drugs of Abuse 1,Urine     Glycosylated Hemoglobin A1c     Microalbumin, Random Urine     Lipid Profile     Amphetamines, Urine, Quantitative     Ambulatory referral for Colonoscopy   Followup: Return in about 3 months (around 11/21/2019) for Recheck. earlier if needed.        Marichuy Rubio, CNP

## 2021-05-31 NOTE — TELEPHONE ENCOUNTER
Name of form/paperwork: Other:  Home Health Certification and Plan of Care Order, Therapy orders  Have you been seen for this request: N/A  Do we have the form: Yes- forms were faxed on 07/22/19 and 08/05/19  When is form needed by: as soon as possible  How would you like the form returned: Fax  Fax Number: [phone]  Patient Notified form requests are processed in 3-5 business days: Yes  (If patient needs form sooner, please note that in this message.)  Okay to leave a detailed message? Yes. States they have Dr. Nino listed as pcp. Forms need to be signed by a MD.

## 2021-05-31 NOTE — TELEPHONE ENCOUNTER
Who is calling:  [facility]  Reason for Call:  Ebonie Tadeo [facility] would like the home care orders and physical therapy orders from 7/16/2019 re faxed to her. They can be faxed to [phone]. Please call her if there are any questions. She can be reached at [phone].   Date of last appointment with primary care: 5/20/2019  Okay to leave a detailed message: No

## 2021-05-31 NOTE — TELEPHONE ENCOUNTER
Call pt- I received notice that she has now discharged from home care. I would like to see her in the office after her hospital/TCU stay and she is due for follow up for further medication refills. Please schedule.

## 2021-05-31 NOTE — TELEPHONE ENCOUNTER
Please mention to the patient that unfortunately cannot provide a 90-day supply of methotrexate, recommend she schedule an appointment within 1 month's time, can provide 1 month refill of methotrexate.  Patient's on methotrexate typically need to be reassessed no later than every 4 months.

## 2021-05-31 NOTE — PROGRESS NOTES
I had confirmed that PCP sent Seated Walker referral.   When I called Ebonie today, she said, she hadn't heard any update.  However,she had just got a new phone. We called [facility] together and were advised a they haven't received the order.  I have re-faxed the order with the facesheet today.  Ebonie is hoping for a blue seated walker. I added this to the coversheet.     Scheduled a Follow up with PCP today for 8/21. Patient was in the ER last night due to low Blood sugar around 47. She mentioned she felt dizzy and had to call her son for help.  Ebonie states she normally eats around 5-6 and then sometimes has a snack at night. She mentioned that the ER PCP advised her to have something to eat before bed. She is feeling better today.     Next Outreach: 9/17/2019  Outreach Interval: 1 month

## 2021-05-31 NOTE — TELEPHONE ENCOUNTER
Last ov- 4/10/19  Last labs- 7/25/19    Future appt- no appt scheduled, no showed appt on 7/8/19    Please advise refill, pt was to follow up in 3 months after April appt, pt no showed appt on 7/8/19. Asked schedulers to call to reschedule follow up appt, please advise MTX refill. Pt desires to have 90 day supply via mail order pharmacy

## 2021-06-01 ENCOUNTER — COMMUNICATION - HEALTHEAST (OUTPATIENT)
Dept: RHEUMATOLOGY | Facility: CLINIC | Age: 71
End: 2021-06-01

## 2021-06-01 ENCOUNTER — RECORDS - HEALTHEAST (OUTPATIENT)
Dept: ADMINISTRATIVE | Facility: CLINIC | Age: 71
End: 2021-06-01

## 2021-06-01 VITALS — WEIGHT: 228 LBS | BODY MASS INDEX: 30.92 KG/M2

## 2021-06-01 VITALS — WEIGHT: 221 LBS | BODY MASS INDEX: 29.97 KG/M2

## 2021-06-01 VITALS — BODY MASS INDEX: 29.43 KG/M2 | WEIGHT: 217 LBS

## 2021-06-01 VITALS — WEIGHT: 227 LBS | BODY MASS INDEX: 30.79 KG/M2

## 2021-06-01 VITALS — WEIGHT: 228 LBS | HEIGHT: 72 IN | BODY MASS INDEX: 30.88 KG/M2

## 2021-06-01 VITALS — BODY MASS INDEX: 30.52 KG/M2 | WEIGHT: 225 LBS

## 2021-06-01 DIAGNOSIS — N28.9 RENAL INSUFFICIENCY: ICD-10-CM

## 2021-06-01 NOTE — TELEPHONE ENCOUNTER
Last office visit with prescriber/PCP: 8/21/2019 Marichuy Rubio FNP OR same dept: 8/21/2019 Marichuy Rubio FNP OR same specialty: 8/21/2019 Marichuy Rubio FNP  Last physical: 4/15/2019 Last MTM visit: Visit date not found

## 2021-06-01 NOTE — PROGRESS NOTES
Ebonie Bowman who presents today with a chief complaint of  Follow-up      Joint Pains: Yes  Location: lower back, both hands and knees  Onset: chronic many years   Intensity: 8 /10  AM Stiffness: yes, 0 Minutes  Alleviating/Aggravating Factors: yes  Tolerating Meds: Yes  Other:      ROS:  Patient denies having any active: chest pain, shortness of breath, cough, abdominal pain, nausea, vomiting,+ rashes 1 month under belly, documented fevers,+ oral ulcers and recent infections UTI infection 1 month ago.+Patient admits to having a good appetite.  Information gathered by medical assistant incorporated into this note, was reviewed and discussed with the patient.    Problem List:  Patient Active Problem List   Diagnosis     Osteoarthritis     Venous Insufficiency     HTN (hypertension)     Insomnia     Joint Pain, Localized In The Knee     Chronic kidney disease     Hyperlipidemia     Diabetes mellitus, type 2 (H)     Joint Pain, Localized In The Hip     Morbid obesity (H)     Depression     Rheumatoid arthritis (H)     Seropositive rheumatoid arthritis (H)     Lumbar radiculopathy     S/P TKR (total knee replacement) using cement, left     High risk medication use     Chronic pain     GERD (gastroesophageal reflux disease)     Bilateral primary osteoarthritis of hip     Atherosclerotic cardiovascular disease, seen on abd CT 2016      Sepsis (H)     Metabolic encephalopathy     Bacteremia     S/P insertion of spinal cord stimulator     Uncomplicated opioid dependence (H)     Controlled substance agreement signed, oxycodone. 8/21/19. UDS 8/21/19        PMH:   Past Medical History:   Diagnosis Date     BBB (bundle branch block)      Chronic back pain      Chronic kidney disease     stage 3     Chronic Kidney Disease (NKF Classification)     Created by Conversion      Depression      Diabetes (H)     Type 2     Diabetes mellitus, type 2 (H)     Created by Conversion      Frequent headaches      Ganglion Of The Left Wrist      Created by Conversion      GERD (gastroesophageal reflux disease)      HLD (hyperlipidemia)      Hypertension     Per H&P dated 1/03/2013     Incarcerated ventral hernia     Per H&P dated 1/03/2013     Osteoarthritis      Rheumatoid arthritis (H)      Shortness of breath      Thrombocytopenia (H)      Venous insufficiency        Surgical History:  Past Surgical History:   Procedure Laterality Date     BACK SURGERY       CHOLECYSTECTOMY       FATTY TUMOR  1996    LT SHOULDER BLADE     HYSTERECTOMY  1984     JOINT REPLACEMENT Left     knee     GA ABDOMEN SURGERY PROC UNLISTED      Description: Hernia Repair;  Recorded: 10/23/2013;     GA IMPLANT SPINAL NEUROSTIM/ Left 4/25/2019    Procedure: LEFT FLANK INCISION REPLACE NEUROSTIMULATOR;  Surgeon: Vishal Pretty MD;  Location: [facility];  Service: Pain     SPINAL CORD STIMULATOR IMPLANT       Two left wrist  1990, 1995       Family History:  Family History   Problem Relation Age of Onset     Acute Myocardial Infarction Father 62     Cancer Father      Heart disease Father      COPD Father      Diabetes Sister      Diabetes Brother      Melanoma Son      Diabetes Maternal Grandmother        Social History:   reports that she quit smoking about 11 years ago. She has never used smokeless tobacco. She reports that she does not drink alcohol or use drugs.    Allergies:  Allergies   Allergen Reactions     Penicillins Hives     Sulfa (Sulfonamide Antibiotics) Hives        Current Medications:  Current Outpatient Medications   Medication Sig Dispense Refill     amLODIPine (NORVASC) 5 MG tablet Take 1 tablet (5 mg total) by mouth daily. 90 tablet 1     ascorbic acid (ASCORBIC ACID WITH LATRICIA HIPS) 500 MG tablet Take 500 mg by mouth every evening.        aspirin 81 MG EC tablet Take 81 mg by mouth every evening.       atorvastatin (LIPITOR) 40 MG tablet Take 1 tablet (40 mg total) by mouth at bedtime. 90 tablet 0     buPROPion (WELLBUTRIN) 100 MG tablet  Take 1 tablet (100 mg total) by mouth daily. 90 tablet 3     cholecalciferol, vitamin D3, 1,000 unit tablet Take 1,000 Units by mouth every evening.        folic acid (FOLVITE) 800 MCG tablet Take 800 mcg by mouth every evening.              insulin regular (NOVOLIN R) 100 unit/mL injection Inject 24 Units under the skin 3 (three) times a day before meals.       insulin syringe-needle U-100 (BD INSULIN SYRINGE ULT-FINE II) 1 mL 31 gauge x 5/16 Syrg USE 4 TIMES DAILY AS DIRECTED 500 each 1     lisinopril (PRINIVIL,ZESTRIL) 10 MG tablet TAKE 1 TABLET AT BEDTIME 90 tablet 2     loratadine (CLARITIN) 10 mg tablet Take 1 tablet (10 mg total) by mouth at bedtime. 90 tablet 2     magnesium oxide (MAG-OX) 400 mg tablet Take 400 mg by mouth at bedtime. With food              methotrexate 2.5 MG tablet Take 4 tablets (10 mg total) by mouth once a week. Take on Fridays 16 tablet 0     metoprolol succinate (TOPROL XL) 50 MG 24 hr tablet Take 1 tablet (50 mg total) by mouth daily. 90 tablet 3     MULTIVITAMIN ORAL Take 1 tablet by mouth every evening.        NOVOLIN N NPH U-100 INSULIN 100 unit/mL injection Inject 33 Units under the skin 2 (two) times a day before meals. 10 mL 11     omega-3 fatty acids-vitamin E (FISH OIL) 1,000 mg cap Take 1,000 mg by mouth every evening. 90 each 3     oxyCODONE-acetaminophen (PERCOCET) 5-325 mg per tablet Take 1-2 tablets by mouth every 6 (six) hours as needed for pain (maximum of 6 tablets per day). 180 tablet 0     ranitidine (ZANTAC) 150 MG tablet TAKE 1 TABLET TWICE DAILY 180 tablet 3     traZODone (DESYREL) 150 MG tablet TAKE 1 TABLET AT BEDTIME 90 tablet 3     b complex vitamins tablet Take 1 tablet by mouth daily.       blood glucose test strips Check blood sugar four times daily. Dispense brand per patient's insurance at pharmacy discretion. Dx: e11.9 200 strip 11     blood-glucose meter Misc Dispense one meter. Use as directed. Dx: e11.9 1 each 0     estradiol (ESTRACE) 0.01 % (0.1  mg/gram) vaginal cream Apply a pea-sized amount vaginally daily x 1 week then reduce to 3 times per week thereafter 42.5 g 1     lancing device (ACCU-CHEK SOFTCLIX) Misc Use 1 applicator As Directed 4 (four) times a day. 300 each 3     polyethylene glycol (MIRALAX) 17 gram packet Take 17 g by mouth daily as needed.       No current facility-administered medications for this visit.            Physical Exam:  /78   Pulse 84   Ht 6' (1.829 m)   Wt (!) 225 lb 8 oz (102.3 kg)   LMP 09/27/1984   BMI 30.58 kg/m    General: A & O x 3 in NAD  HEENT: EOMI, Non injected/non icteric sclera, no oral lesions noted  CV: s1s2 with RRR, no rubs appreciated   Lungs: CTA B/L, no wheezing , rales or rhonci appreciated  GI: Soft, NT/ND, no rebound, no guarding noted  MS: Some subtle fullness with tenderness noted involving multiple MCP joints (second through 5), bilaterally.  Mild discomfort involving right knee on passive flexion/extension, no warmth erythema or synovitis noted.  Passive flexion/extension of left knee did not reproduce any pains, no warmth erythema or synovitis noted.  Straight leg raising reproduce some low back pain, non-radiating.  Otherwise patient demonstrated good passive/active ROM over other joints with no warmth, erythema, tenderness or synovitis noted over these joints.          Summary/Assessment:      History that includes seropositive rheumatoid arthritis, osteoarthritis, renal insufficiency.    Presents today complaining of having some hand pains, some subtle fullness noted involving MCP joints which were tender to palpation.    Also complains of having bilateral knee pains. History of left knee replacement.  Desires to try right knee cortisone injection with higher dosing.    Also complains of having low back pain.  History of implanted stimulator.    Claims compliance with methotrexate 10 tablets weekly.  Does not recall being on a higher dose in the past.    States hospitalized about 3  months ago with urosepsis.      Takes Percocet on average 5-6 tablets daily,, which provides relief.    No clear signs of synovitis noted on exam today.    Please see below for management plan.      Pertinent rheumatology/past medical history (please refer to above for more detailed history):      Seropositive rheumatoid arthritis    High risk medication use    Osteoarthritis, multiple joints    Hx left knee replacement    Chronic neck and back pain (managed by another provider)    Renal insufficiency    Chronic opioid use    Diabetes, type II      Rheumatology medications provided/suggested:    Methotrexate  Folic acid      Pertinent medication from other providers or from otc (please refer to above for more detailed med list):    Percocet  Insulin    Pertinent medications already tried:     Humira (worsened joint pains)      Pertinent lab history:    Positive: CCP antibody    Negative: Rheumatoid factor, HUGH, c- ANCA      Pertinent imaging/test history:    X-ray of right hand from February 2019 that showed some signs of degenerative joint disease, no erosive changes identified.      Other:    Standing order for labs placed every 3 months good through April 2020    Denies tobacco use or regular alcohol beverage intake.  Quit smoking greater than 10 years ago.    Was a patient of Dr. Schofield, last seen July 2018.      Plan:      We will increase methotrexate to 5 tablets weekly (from 4 tablets weekly), limiting dose given mild renal insufficiency.    Continue over-the-counter folic acid daily, made aware to avoid taking on the day when taking methotrexate.    We will inject right knee with 60 mg of Kenalog today.    Takes Percocet 5 to 6 tablets daily (includes 2 tablets prior to bedtime).    Check labs in about 1 month.    Follow-up in 3-4 months.      Procedure note:     Consent to treat form signed by the patient.  Patient's right knee was prepped in a sterile manner with an alcohol swab and ChloraPrep applicator.   Injected 1 mL of lidocaine followed by Kenalog 60 mg into right knee joint.  Patient tolerated the procedure well with no complications noted.  Patient was advised to place ice over injection sites if sore over the next 24-48 hours. Riley JIMENEZ assisted with procedure.         Spent 25  minutes with greater than half of this time spent with the patient going over differential diagnosis, prognosis, treatment plan, medication side effects and  answering questions.    Above time noted, does not include time involved with procedure(s).    This note was transcribed using Dragon voice recognition software as a result unintentional grammatical errors or word substitutions may have occurred. Please contact our Rheumatology department if you need any clarification or if you have any related inquiries.    Major side effect profile of medications provided were discussed with the patient.      Raheem Londono DO....................  9/13/2019   2:15 PM

## 2021-06-01 NOTE — PROGRESS NOTES
Situation:  [facility] outreach follow up.    Background:   Patient want a seated walker and was interest in getting Assisted Living information.  Patient also interest in PCA service.     Assessment:  Patient report she received her seated walker from [facility] [place] [facility] already.      In regard to to PCA service she used to had was through transitional care.  Now, she doesn't have anymore, the transitional care was only for short term.  Patient indicate she need help with bathing and shower.  Patient is on Medicare and her secondary cheikh insurance is not through [place], CHW will route to [facility] for resource and if patient will have to pay out of pocket or qualify for elderly waiver through alternative care for PCA service.      CHW follow up with patient on her Assisted Living goal and patient report that she haven't decide at this time.  Per patient, CHW will follow up in October and if she decide on Assisted Living,CHW will schedule her with [facility] for Assisted living information.    Recommendation:  Route to [facility] to look into if pt qualify for elderly waiver since her health insurance is through Medicare and private pay secondary health insurance through [employer].     CHW will do [facility] outreach as scheduled.    [facility] Follow up outreach: October 24, 2019

## 2021-06-01 NOTE — PATIENT INSTRUCTIONS - HE
Summary of Your Rheumatology Visit    Next Appointment:  3-4 Months    Medications:    Increase methotrexate to 5 tablets weekly.    Referrals:      Tests:     Please have labs performed in about 1 month.     Injections:        Other:

## 2021-06-01 NOTE — TELEPHONE ENCOUNTER
Controlled Substance Refill Request  Medication Name:   Requested Prescriptions     Pending Prescriptions Disp Refills     oxyCODONE-acetaminophen (PERCOCET) 5-325 mg per tablet 180 tablet 0     Sig: Take 1-2 tablets by mouth every 6 (six) hours as needed for pain (maximum of 6 tablets per day).     Date Last Fill: 8/21/2019  Pharmacy: Patient will have someone  in clinic since there is no ability to send electronically at this time.        Controlled Substance Agreement Date Scanned:   Encounter-Level CSA Scan Date:    There are no encounter-level csa scan date.       Last office visit with prescriber/PCP: 8/21/2019 Marichuy Rubio FNP OR [place] dept: 8/21/2019 Marichuy Rubio FNP OR same specialty: 8/21/2019 Marichuy Rubio FNP  Last physical: 4/15/2019 Last MTM visit: Visit date not found

## 2021-06-02 VITALS — WEIGHT: 234 LBS | BODY MASS INDEX: 31.74 KG/M2

## 2021-06-02 VITALS — WEIGHT: 229 LBS | BODY MASS INDEX: 31.06 KG/M2

## 2021-06-02 VITALS — HEIGHT: 72 IN | WEIGHT: 234 LBS | BODY MASS INDEX: 31.69 KG/M2

## 2021-06-02 VITALS — WEIGHT: 237 LBS | BODY MASS INDEX: 32.14 KG/M2

## 2021-06-02 VITALS — BODY MASS INDEX: 31.69 KG/M2 | WEIGHT: 234 LBS | HEIGHT: 72 IN

## 2021-06-02 VITALS — BODY MASS INDEX: 32.76 KG/M2 | HEIGHT: 71 IN | WEIGHT: 234 LBS

## 2021-06-02 VITALS — BODY MASS INDEX: 32.34 KG/M2 | WEIGHT: 231 LBS | HEIGHT: 71 IN

## 2021-06-02 NOTE — PROGRESS NOTES
Situation:   [facility] follow up outreach attempt 2.    Background:  Patient was interest in Assist Living when she did the Annual RN Re-assessment on July 25,2019.   Patient want to know who she can talk to regard getting PCA.     Assessment:  During outreach, patient report she no longer interest in assist living and the goal complete today.  Patient will call CHW to schedule with [facility] if she should change her mind about assist Living.    Patient report she have someone come to the home once per week for help with cleaning.  Patient report she now need help with bathing and showering and need a PCA to assist her.  Patient doesn't know who to call or talk with regard getting PCA services.      CHW check patient health insurance and as indicate below, she has Alternative Care Program.  CHW assist pt by doing conference call to Amanda but the phone was busy.  CHW reach out to [facility] NIYAH Thomas for assistance. [facility] call Jazzy and left him to message regard PCA services.     Patient will also call Amanda at the contact number she has.     Recommendation:  CHW will do outreach as schedule     Patient will call CHW as need for further assistance    Major Programs   This subscriber has eligibility for AC: Alternative Care Program, Service Agreement Required. AC covers skilled nursing care in the home, but does not include skilled nursing facility coverage.   Elig Type AC: Alternative care  Eligibility Begin Date: 10/17/2018  Eligibility End Date: 10/01/2020  This subscriber is eligible for the following service types: Durable Medical Equipment Purchase , Durable Medical Equipment Rental , Home Health Care , Chemotherapy , Skilled Nursing Care   Prepaid Health Plan None Other Eligibility Information   No Special Transportation.   This subscriber's eligibility is not determined for Long term Care and waiver services.   No Hospice.   Refer to Health Care Programs and Services [facility] for a list of covered  services.  Waivers None  Subscriber Responsibility Information   An office visit copay of 3 dollars may exist for this subscriber.   A copay of 3.50 dollars for Non-Emergency ER visits may exist for this subscriber.  Restricted Recipient Program None Medicare Medicare ID: [id number] Part A Effective Date: 08/01/2008 Part B Effective Date: 08/01/2008     [facility] Follow Up Out reach: December 10, 2019

## 2021-06-02 NOTE — TELEPHONE ENCOUNTER
Last office visit with prescriber/PCP: 8/21/2019 Marichuy Rubio FNP OR same dept: 10/8/2019 Ha Castillo MD OR same specialty: 10/8/2019 Ha Castillo MD  Last physical: 4/15/2019 Last MTM visit: Visit date not found       Patient reports she has 1 day left of medication. Please send a new prescription to her pharmacy asap!

## 2021-06-02 NOTE — PROGRESS NOTES
[facility]  Internal Medicine - Office Visit    Patient: Ebonie Bowman   MRN: [id number]   Date of Service: 10/08/19   Patient Care Team:  Marichuy Rubio FNP as PCP - General (Nurse Practitioner)  Vishal Pretty MD as Physician (Anesthesiology)  Raheem Patiño DO as Physician (Rheumatology)  Monroe Salas MD as Physician (Neurology)  Oriana Sarabia, DANIEL as Lead Care Coordinator (Primary Care - CC)  Marichuy Rubio FNP as Assigned PCP  Laure Melendez CHW as Community Health Worker (Primary Care - CC)    ASSESSMENT/PLAN     Ebonie Bowman is 68 y/o woman with hx of HTN, DM, rheumatoid arthritis on methotrexate, CKD, and chronic back pain on chronic narcotics here with diarrhea x 8 days.    Diagnoses and all orders for this visit:    Acute diarrhea  8 days duration. She has lost about 6# in the last weight per her report, objectively here about 10 pounds in the last month. She is not nauseated or vomiting, however has just been scared to eat/drink as it triggers her diarrhea. She has no bloody diarrhea or recent antibiotics/travel, however given that she is on methotrexate and there recent increase in methotrexate from 10mg to 12.5mg weekly back on 9/13, concerning for either MTX related side effect versus infection in setting of immunosuppression. Will check stool culture, parasitic infections, C diff. Will also check blood counts, CMP, and inflammatory markers just given her significant weight loss. She also had pain on palpation in bilateral LLQ, so will obtain imaging per below. Since she is able to drink and keep solids down, do not feel she needs to be immediately hospitalized. I discussed with her she needs to significantly increase her fluid intake; can try sports drinks/pedialyte to get electrolytes. Provided her a list of bland foods like crackers/noodles/bread and broths that she can try. Recommend very close follow-up in 3 days to ensure she  is improving.  -     HM1(CBC and Differential)  -     Comprehensive Metabolic Panel  -     Sedimentation Rate  -     C-Reactive Protein(CRP)  -     Culture, Stool; Future  -     Ova and Parasite, Stool; Future  -     Cryptosporidium/Giardia Combo, Stool; Future  -     HM1 (CBC with Diff)  -     C. difficile Toxigenic by PCR; Future    Abdominal pain, acute, bilateral lower quadrant  -     CT Abdomen Pelvis With Oral With IV Contrast; Future; Expected date: 10/08/2019  -     CT Abdomen Pelvis With Oral With IV Contrast    ADDENDUM 10/8: CT abdomen without any acute abnormalities, just mild sigmoid diverticulosis. CBC completely normal. CMP showing Cr up just mildly from baseline 1.13 to 1.37, but no other electrolyte abnormalities. CRP 0.2, ESR 30 normal for age. Stool studies still pending. D/w the patient that we will wait on her stool studies. She should follow up before weekend to make sure her PO intake is improving.     I will also staff message her rheumatologist as MTX can cause GI side effects and with recent dose increase in MTX, could be contributing?    RTC in 3 days to reassess.    Ha Castillo MD  Internal Medicine and Pediatrics  [facility]  Pager [phone]    SUBJECTIVE     Ebonie Bowman is 68 y/o woman with hx of HTN, DM, rheumatoid arthritis on methotrexate, CKD, and chronic back pain on chronic narcotics here with diarrhea.    Diarrhea started 8 days ago. She goes about 4 times a day, watery and brown. No form. No blood or melena. No abdominal pain. 2-3 days ago she had chill in bed, but none since. No nausea. No vomiting. She has lost 6 pounds in the last week.     She has been drinking only 8 ounces of water a day because she is scared that her diarrhea will get worse if she drinks more. She hasn't eaten in last 3 days because everytime she eats it goes right through her. She did eat some crackers and peanut butter today per nurse triage recommendation and was  able to keep that down    She denies any recent travel. No recent antibiotics. No new food exposures. No known sick contacts or anyone with similar symptoms.    She normally gets a bowel movement every couple of days. Takes about 5-6 percocet tabs a day for chronic back pain.     Her rheumatologist increased her methotrexate from 10mg weekly to 12.5mg weekly about 3 weeks ago.    No lightheaeddnes or dizziness. She did stop taking her blood pressure medications because she is worried all the pills will just come right through her.    Review of Systems  Pertinent items are noted in HPI    Past Medical/Surgical History  Reviewed and updated as appropriate    Immunizations  Reviewed.    Medications    Current Outpatient Medications:      amLODIPine (NORVASC) 5 MG tablet, Take 1 tablet (5 mg total) by mouth daily., Disp: 90 tablet, Rfl: 1     ascorbic acid (ASCORBIC ACID WITH LATRICIA HIPS) 500 MG tablet, Take 500 mg by mouth every evening. , Disp: , Rfl:      aspirin 81 MG EC tablet, Take 81 mg by mouth every evening., Disp: , Rfl:      atorvastatin (LIPITOR) 40 MG tablet, Take 1 tablet (40 mg total) by mouth at bedtime., Disp: 90 tablet, Rfl: 0     b complex vitamins tablet, Take 1 tablet by mouth daily., Disp: , Rfl:      blood glucose test strips, Check blood sugar four times daily. Dispense brand per patient's insurance at pharmacy discretion. Dx: e11.9, Disp: 200 strip, Rfl: 11     blood-glucose meter Misc, Dispense one meter. Use as directed. Dx: e11.9, Disp: 1 each, Rfl: 0     buPROPion (WELLBUTRIN) 100 MG tablet, Take 1 tablet (100 mg total) by mouth daily., Disp: 90 tablet, Rfl: 3     cholecalciferol, vitamin D3, 1,000 unit tablet, Take 1,000 Units by mouth every evening. , Disp: , Rfl:      estradiol (ESTRACE) 0.01 % (0.1 mg/gram) vaginal cream, Apply a pea-sized amount vaginally daily x 1 week then reduce to 3 times per week thereafter, Disp: 42.5 g, Rfl: 1     folic acid (FOLVITE) 800 MCG tablet, Take 800 mcg  by mouth every evening.    , Disp: , Rfl:      insulin regular (NOVOLIN R) 100 unit/mL injection, Inject 24 Units under the skin 3 (three) times a day before meals., Disp: , Rfl:      insulin syringe-needle U-100 (BD INSULIN SYRINGE ULT-FINE II) 1 mL 31 gauge x 5/16 Syrg, USE 4 TIMES DAILY AS DIRECTED, Disp: 500 each, Rfl: 1     lancing device (ACCU-CHEK SOFTCLIX) Misc, Use 1 applicator As Directed 4 (four) times a day., Disp: 300 each, Rfl: 3     lisinopril (PRINIVIL,ZESTRIL) 10 MG tablet, TAKE 1 TABLET AT BEDTIME, Disp: 90 tablet, Rfl: 2     loratadine (CLARITIN) 10 mg tablet, Take 1 tablet (10 mg total) by mouth at bedtime., Disp: 90 tablet, Rfl: 2     magnesium oxide (MAG-OX) 400 mg tablet, Take 400 mg by mouth at bedtime. With food    , Disp: , Rfl:      methotrexate 2.5 MG tablet, Take 5 tablets po once a week (hold dose if you come down with an infection, until infection clears)., Disp: 60 tablet, Rfl: 0     metoprolol succinate (TOPROL XL) 50 MG 24 hr tablet, Take 1 tablet (50 mg total) by mouth daily., Disp: 90 tablet, Rfl: 3     MULTIVITAMIN ORAL, Take 1 tablet by mouth every evening. , Disp: , Rfl:      NOVOLIN N NPH U-100 INSULIN 100 unit/mL injection, Inject 33 Units under the skin 2 (two) times a day before meals., Disp: 10 mL, Rfl: 11     omega-3 fatty acids-vitamin E (FISH OIL) 1,000 mg cap, Take 1,000 mg by mouth every evening., Disp: 90 each, Rfl: 3     oxyCODONE-acetaminophen (PERCOCET) 5-325 mg per tablet, Take 1-2 tablets by mouth every 6 (six) hours as needed for pain (maximum of 6 tablets per day)., Disp: 180 tablet, Rfl: 0     polyethylene glycol (MIRALAX) 17 gram packet, Take 17 g by mouth daily as needed., Disp: , Rfl:      ranitidine (ZANTAC) 150 MG tablet, TAKE 1 TABLET TWICE DAILY, Disp: 180 tablet, Rfl: 3     traZODone (DESYREL) 150 MG tablet, TAKE 1 TABLET AT BEDTIME, Disp: 90 tablet, Rfl: 3    Allergies  Allergies   Allergen Reactions     Penicillins Hives     Sulfa (Sulfonamide  Antibiotics) Hives       Family History  Reviewed and updated as appropriate.     Social History  Reviewed and updated as appropriate.          OBJECTIVE       /70 (Patient Site: Right Arm, Patient Position: Sitting, Cuff Size: Adult Regular)   Pulse 100   Temp 98.6  F (37  C) (Oral)   Resp 16   Ht 6' (1.829 m)   Wt 215 lb (97.5 kg)   LMP 09/27/1984   SpO2 97%   BMI 29.16 kg/m      General Appearance:    Elderly woman, appears mildly fatigued but not in any acute distress   Throat:   Moist oral mucosa   Lungs:     Clear to auscultation bilaterally, respirations unlabored    Heart:    Regular rate and rhythm, S1 and S2 normal, no murmur, rub    or gallop   Abdomen:     Soft, non-distended, tender in the RLQ and LLQ on palpation, no rebound tenderness or guarding, BS are present throughout     Labs/imaging/studies:  See above

## 2021-06-02 NOTE — PROGRESS NOTES
Situation:  [facility] follow up outreach.    Background:  Patient already received her seated walker from [facility] and       Assessment:  Scheduled Follow Up Call: Attempt 1    Community Health Worker called and left a message for the patient. If the patient is returning my call, please transfer the patient to [facility] at ext. [phone].      Recommendation:  [facility] follow up outreach attempt 2 as schedule.    [facility] follow up outreach attempt 2:  10-31-19

## 2021-06-02 NOTE — PROGRESS NOTES
Patient here with level 8 low back pain. She stated she has had more pain for about a month.Activities are limited as unable to bend and stand without pain. Site intact with no redness . Spinal Cord Stimulator has been turned on and not working as well when in bed or walking.

## 2021-06-02 NOTE — TELEPHONE ENCOUNTER
RN Triage:     Patient is calling in stating she has had diarrhea for 8 days. Patient stated she is having 5 episodes a day. NO black tarry. Per patient she has not used any antibiotics, no abdominal pain. Patient will come in today to be seen today. She will try some pepto before her appointment so she is not incontinent in the office as she is concerned about that.   Kiana Rodriguez RN, BSN Care Connection Triage Nurse      Reason for Disposition    MODERATE diarrhea (e.g., 4-6 times / day more than normal) and present > 48 hours (2 days)    Protocols used: DIARRHEA-A-OH

## 2021-06-02 NOTE — TELEPHONE ENCOUNTER
Refill Approved    Rx renewed per Medication Renewal Policy.    Nettie Young, Care Connection Triage/Med Refill 10/10/2019     Requested Prescriptions   Pending Prescriptions Disp Refills     blood glucose test strips 200 strip 11     Sig: Check blood sugar four times daily. Dispense brand per patient's insurance at pharmacy discretion. Dx: e11.9       Diabetic Supplies Refill Protocol Passed - 10/10/2019  3:27 PM        Passed - Visit with PCP or prescribing provider visit in last 6 months     Last office visit with prescriber/PCP: 8/21/2019 Marichuy Rubio FNP OR same dept: 10/8/2019 Ha Castillo MD OR same specialty: 10/8/2019 Ha Castillo MD  Last physical: 4/15/2019 Last MTM visit: Visit date not found   Next visit within 3 mo: Visit date not found  Next physical within 3 mo: Visit date not found  Prescriber OR PCP: ARSLAN Vasquez  Last diagnosis associated with med order: 1. Diabetes mellitus, type 2 (H)  - blood glucose test strips; Check blood sugar four times daily. Dispense brand per patient's insurance at pharmacy discretion. Dx: e11.9  Dispense: 200 strip; Refill: 11    If protocol passes may refill for 12 months if within 3 months of last provider visit (or a total of 15 months).             Passed - A1C in last 6 months     Hemoglobin A1c   Date Value Ref Range Status   08/21/2019 6.2 (H) 3.5 - 6.0 % Final

## 2021-06-02 NOTE — TELEPHONE ENCOUNTER
Refill Approved    Rx renewed per Medication Renewal Policy. Medication was last renewed on 11/7/18.    Polina Herron, [facility] Connection Triage/Med Refill 11/2/2019     Requested Prescriptions   Pending Prescriptions Disp Refills     buPROPion (WELLBUTRIN) 100 MG tablet [Pharmacy Med Name: BUPROPION  MG Tablet] 90 tablet 3     Sig: TAKE 1 TABLET (100 MG TOTAL) BY MOUTH DAILY.       Tricyclics/Misc Antidepressant/Antianxiety Meds Refill Protocol Passed - 11/1/2019  3:19 PM        Passed - PCP or prescribing provider visit in last year     Last office visit with prescriber/PCP: 8/21/2019 Marichuy Rubio FNP OR same dept: 10/8/2019 Ha Castillo MD OR same specialty: 10/8/2019 Ha Castillo MD  Last physical: 4/15/2019 Last MTM visit: Visit date not found   Next visit within 3 mo: Visit date not found  Next physical within 3 mo: Visit date not found  Prescriber OR PCP: ARLSAN Vasquez  Last diagnosis associated with med order: 1. Overweight (BMI 25.0-29.9)  - buPROPion (WELLBUTRIN) 100 MG tablet [Pharmacy Med Name: BUPROPION  MG Tablet]; Take 1 tablet (100 mg total) by mouth daily.  Dispense: 90 tablet; Refill: 3    2. Depression  - buPROPion (WELLBUTRIN) 100 MG tablet [Pharmacy Med Name: BUPROPION  MG Tablet]; Take 1 tablet (100 mg total) by mouth daily.  Dispense: 90 tablet; Refill: 3    If protocol passes may refill for 12 months if within 3 months of last provider visit (or a total of 15 months).

## 2021-06-02 NOTE — PROGRESS NOTES
Clinic Care Coordination Contact    SW received return phone call from pt's , Jazzy Claudio, at [facility] who states that the pt does not qualify for PCA services due to no dependencies reported at time of assessment 1.5 months ago.      will follow up with pt directly about PCA services and homemaking. Pt should be redirected to  with questions/concerns regarding these services.    Plan:  1.) All goals completed.  2.) Recommend pt be moved to [facility] maintenance plan pending approval by [facility] RN.

## 2021-06-03 ENCOUNTER — RECORDS - HEALTHEAST (OUTPATIENT)
Dept: ADMINISTRATIVE | Facility: CLINIC | Age: 71
End: 2021-06-03

## 2021-06-03 VITALS
SYSTOLIC BLOOD PRESSURE: 128 MMHG | WEIGHT: 221 LBS | DIASTOLIC BLOOD PRESSURE: 74 MMHG | HEART RATE: 84 BPM | BODY MASS INDEX: 29.97 KG/M2

## 2021-06-03 VITALS
SYSTOLIC BLOOD PRESSURE: 130 MMHG | HEART RATE: 100 BPM | OXYGEN SATURATION: 97 % | WEIGHT: 215 LBS | RESPIRATION RATE: 16 BRPM | DIASTOLIC BLOOD PRESSURE: 70 MMHG | HEIGHT: 72 IN | TEMPERATURE: 98.6 F | BODY MASS INDEX: 29.12 KG/M2

## 2021-06-03 VITALS
DIASTOLIC BLOOD PRESSURE: 78 MMHG | SYSTOLIC BLOOD PRESSURE: 120 MMHG | HEART RATE: 84 BPM | HEIGHT: 72 IN | WEIGHT: 225.5 LBS | BODY MASS INDEX: 30.54 KG/M2

## 2021-06-03 VITALS — HEIGHT: 72 IN | WEIGHT: 215 LBS | BODY MASS INDEX: 29.12 KG/M2

## 2021-06-03 VITALS — WEIGHT: 227.6 LBS | BODY MASS INDEX: 30.87 KG/M2

## 2021-06-03 VITALS — HEIGHT: 72 IN | WEIGHT: 221.4 LBS | BODY MASS INDEX: 29.99 KG/M2

## 2021-06-03 VITALS — BODY MASS INDEX: 31.33 KG/M2 | WEIGHT: 231 LBS

## 2021-06-03 NOTE — PROGRESS NOTES
Internal Medicine Office Visit  [facility]  Patient Name: Ebonie Bowman  Patient Age: 69 y.o.  YOB: 1950  MRN: [id number]    Date of Visit: 2019  Reason for Office Visit:   Chief Complaint   Patient presents with     Follow-up           Assessment / Plan / Medical Decision Makin. Essential hypertension  - stable without metoprolol and HR is normal. She is not experiencing heart palpitations. No need to restart metoprolol   - amLODIPine (NORVASC) 5 MG tablet; Take 1 tablet (5 mg total) by mouth daily.  Dispense: 90 tablet; Refill: 1    2. Type 2 diabetes mellitus with hyperosmolarity without coma, with long-term current use of insulin (H)  - she is strongly encouraged to bring meter or log book to appointments to review home readings. We discussed registering her for [facility] next spring to improve diet  - Glycosylated Hemoglobin A1c  - Microalbumin, Random Urine    3. Encounter for therapeutic drug monitoring  4. Chronic pain  5. Uncomplicated opioid dependence (H)  Lumbar radiculopathy  S/p spinal cord stimulator   - Continue hydrocodone 2 tablets two times a day and 1 tablet midday as needed for severe pain   - START: gabapentin 100 mg at bedtime increasing by 100 mg every 1 week to a maximum dose of 300 mg at bedtime   - She was given the option to see the pain clinic for pain management as well     6. Colon cancer screening  7. Positive FIT (fecal immunochemical test)  - she is not ready to schedule a colonoscopy at this time. Will continue to remind patient     8. Mixed hyperlipidemia  - Lipid Profile    9. Cat allergies  - encouraged her to keep cat out of the bedroom but she cannot agree to this. Advised flonase nasal spray     10. RA  - Continue to see rheumatology          Health Maintenance Review  Health Maintenance   Topic Date Due     MEDICARE ANNUAL WELLNESS VISIT  2013     ZOSTER VACCINES (2 of 3) 2014     MAMMOGRAM   09/27/2018     DEPRESSION FOLLOW UP  05/07/2019     DIABETIC FOOT EXAM  05/07/2019     FALL RISK ASSESSMENT  07/02/2019     INFLUENZA VACCINE RULE BASED (1) 08/01/2019     DIABETIC EYE EXAM  05/14/2020     A1C  05/21/2020     LIPID  08/21/2020     MICROALBUMIN  08/21/2020     DXA SCAN  08/21/2020     BMP  10/08/2020     ADVANCE CARE PLANNING  12/14/2021     TD 18+ HE  07/18/2024     COLONOSCOPY  12/14/2026     HEPATITIS C SCREENING  Completed     PNEUMOCOCCAL IMMUNIZATION 65+ HIGH/HIGHEST RISK  Completed         I have changed Ebonie Bowman's oxyCODONE-acetaminophen. I am also having her start on gabapentin. Additionally, I am having her maintain her magnesium oxide, ascorbic acid (vitamin C), cholecalciferol (vitamin D3), aspirin, omega-3 fatty acids-vitamin E, lancing device, MULTIVITAMIN ORAL, folic acid, insulin syringe-needle U-100, blood-glucose meter, NovoLIN N NPH U-100 Insulin, metoprolol succinate, traZODone, lisinopril, ranitidine, b complex vitamins, polyethylene glycol, atorvastatin, amLODIPine, insulin regular, estradiol, methotrexate, blood glucose test, buPROPion, and loratadine.      HPI:  Ebonie Bowman is a 69 y.o. year old who presents to the office today for follow up.    Her son is going through evaluation for a kidney transplant. She asks some questions about this.     She has a rash in the groin bilaterally. It has been there for a month, not painful or itchy but is irritated when she moves. She wears briefs. She has been using a prescription antifungal cream and powder.     She has a history of lumbar low back pain with radiation to the anterior thigh and calf bilaterally. Sometimes she has pain in the feet as well. Her last MRI was in 2014. She had a recent JOSE ANTONIO, has not had any relief from this. She feels like the pain medication is not effective in relieving her pain, however, at higher doses she has experienced confusion and falls so I am reluctant to increase her dose of narcotic which we  discussed today. She has a spinal stimulator. She continues chronic narcotics and takes 2 tablets in the am and pm with 1 tablet midday. She denies excessive drowsiness or constipation associated with the current medication.     In October she was evaluated for diarrhea. She states that this has resolved. She has not scheduled a colonoscopy due to transportation and financial concerns. She had a positive FIT test earlier this year.     RA reviewed, she is followed by rheumatology and recently increased her dose of MTX.     Diabetes reviewed. Her son has been bringing her more [employer] which she loves, he brings her 25 donut holes at a time and she eats these over 2 days. After eating donuts she once had a reading as high as 400. Her usual range is 150-175 but she is uncertain whether these readings are before or after eating.     Review of Systems- pertinent positive in bold:  Constitutional: Fever, chills, night sweats, fainting, weight change, fatigue, seizures, dizziness, sleeping difficulties, loud snoring/pauses in breathing  Eyes: change in vision, blurred or double vision, redness/eye pain  Ears, nose, mouth, throat: change in hearing, ear pain, hoarseness, difficulty swallowing, sores in the mouth or throat  Respiratory: shortness of breath, cough, bloody sputum, wheezing  Cardiovascular: chest pain, palpitations   Gastrointestinal: abdominal pain, heartburn/indigestion, nausea/vomiting, change in appetite, change in bowel habits, constipation or diarrhea, rectal bleeding/dark stools, difficulty swallowing  Urinary: painful urination, frequent urination, urinary urgency/incontinence, blood in urine/dark urine, nocturia  Musculoskeletal: backache/back pain (new or increasing), weakness, joint pain/stiffness (new or increasing), muscle cramps, swelling of hands, feet, ankles, leg pain/redness  Skin: change in moles/freckles, rash, nodules  Hematologic/lymphatic: swollen lymph glands, abnormal  bruising/bleeding  Endocrine: excessive thirst/urination, cold or heat intolerance  Neurologic/emotional: worrisome memory change, numbness/tingling, anxiety, mood swings      Current Scheduled Meds:  Outpatient Encounter Medications as of 11/21/2019   Medication Sig Dispense Refill     amLODIPine (NORVASC) 5 MG tablet Take 1 tablet (5 mg total) by mouth daily. 90 tablet 1     ascorbic acid (ASCORBIC ACID WITH LATRICIA HIPS) 500 MG tablet Take 500 mg by mouth every evening.        aspirin 81 MG EC tablet Take 81 mg by mouth every evening.       atorvastatin (LIPITOR) 40 MG tablet Take 1 tablet (40 mg total) by mouth at bedtime. 90 tablet 0     b complex vitamins tablet Take 1 tablet by mouth daily.       blood glucose test strips Check blood sugar four times daily. Dispense brand per patient's insurance at pharmacy discretion. Dx: e11.9 400 strip 3     blood-glucose meter Misc Dispense one meter. Use as directed. Dx: e11.9 1 each 0     buPROPion (WELLBUTRIN) 100 MG tablet Take 1 tablet (100 mg total) by mouth daily. 90 tablet 3     cholecalciferol, vitamin D3, 1,000 unit tablet Take 1,000 Units by mouth every evening.        estradiol (ESTRACE) 0.01 % (0.1 mg/gram) vaginal cream Apply a pea-sized amount vaginally daily x 1 week then reduce to 3 times per week thereafter 42.5 g 1     folic acid (FOLVITE) 800 MCG tablet Take 800 mcg by mouth every evening.              insulin regular (NOVOLIN R) 100 unit/mL injection Inject 24 Units under the skin 3 (three) times a day before meals.       insulin syringe-needle U-100 (BD INSULIN SYRINGE ULT-FINE II) 1 mL 31 gauge x 5/16 Syrg USE 4 TIMES DAILY AS DIRECTED 500 each 1     lancing device (ACCU-CHEK SOFTCLIX) Misc Use 1 applicator As Directed 4 (four) times a day. 300 each 3     lisinopril (PRINIVIL,ZESTRIL) 10 MG tablet TAKE 1 TABLET AT BEDTIME 90 tablet 2     loratadine (CLARITIN) 10 mg tablet Take 1 tablet (10 mg total) by mouth at bedtime. 90 tablet 2     magnesium oxide  (MAG-OX) 400 mg tablet Take 400 mg by mouth at bedtime. With food              methotrexate 2.5 MG tablet Take 5 tablets po once a week (hold dose if you come down with an infection, until infection clears). 60 tablet 0     metoprolol succinate (TOPROL XL) 50 MG 24 hr tablet Take 1 tablet (50 mg total) by mouth daily. 90 tablet 3     MULTIVITAMIN ORAL Take 1 tablet by mouth every evening.        NOVOLIN N NPH U-100 INSULIN 100 unit/mL injection Inject 33 Units under the skin 2 (two) times a day before meals. 10 mL 11     omega-3 fatty acids-vitamin E (FISH OIL) 1,000 mg cap Take 1,000 mg by mouth every evening. 90 each 3     oxyCODONE-acetaminophen (PERCOCET) 5-325 mg per tablet Take 1-2 tablets by mouth every 6 (six) hours as needed for pain (maximum of 6 tablets per day). 11/30-12/30 180 tablet 0     polyethylene glycol (MIRALAX) 17 gram packet Take 17 g by mouth daily as needed.       ranitidine (ZANTAC) 150 MG tablet TAKE 1 TABLET TWICE DAILY 180 tablet 3     traZODone (DESYREL) 150 MG tablet TAKE 1 TABLET AT BEDTIME 90 tablet 3     [DISCONTINUED] loratadine (CLARITIN) 10 mg tablet Take 1 tablet (10 mg total) by mouth at bedtime. 90 tablet 2     [DISCONTINUED] oxyCODONE-acetaminophen (PERCOCET) 5-325 mg per tablet Take 1-2 tablets by mouth every 6 (six) hours as needed for pain (maximum of 6 tablets per day). 180 tablet 0     gabapentin (NEURONTIN) 100 MG capsule Take 100 mg by mouth at bedtime for 7 days, THEN 200 mg at bedtime for 7 days, THEN 300 mg at bedtime. 90 capsule 3     No facility-administered encounter medications on file as of 11/21/2019.      Past Medical History:   Diagnosis Date     BBB (bundle branch block)      Chronic back pain      Chronic kidney disease     stage 3     Chronic Kidney Disease (NKF Classification)     Created by Conversion      Depression      Diabetes (H)     Type 2     Diabetes mellitus, type 2 (H)     Created by Conversion      Frequent headaches      Ganglion Of The Left  Wrist     Created by Conversion      GERD (gastroesophageal reflux disease)      HLD (hyperlipidemia)      Hypertension     Per H&P dated 2013     Incarcerated ventral hernia     Per H&P dated 2013     Osteoarthritis      Rheumatoid arthritis (H)      Shortness of breath      Thrombocytopenia (H)      Venous insufficiency      Past Surgical History:   Procedure Laterality Date     BACK SURGERY       CHOLECYSTECTOMY       FATTY TUMOR      LT SHOULDER BLADE     HYSTERECTOMY  1984     JOINT REPLACEMENT Left     knee     [place] ABDOMEN SURGERY PROC UNLISTED      Description: Hernia Repair;  Recorded: 10/23/2013;     KY IMPLANT SPINAL NEUROSTIM/ Left 2019    Procedure: LEFT FLANK INCISION REPLACE NEUROSTIMULATOR;  Surgeon: Vishal Pretty MD;  [facility];  Service: Pain     SPINAL CORD STIMULATOR IMPLANT       Two left wrist  ,      Social History     Tobacco Use     Smoking status: Former Smoker     Last attempt to quit: 2008     Years since quittin.3     Smokeless tobacco: Never Used     Tobacco comment: smells of cigarrette smoke   Substance Use Topics     Alcohol use: No     Drug use: No     Comment: takes percocet for chronic pain       Objective / Physical Examination:  Vitals:    19 1250   BP: 128/74   Pulse: 84   Weight: 221 lb (100.2 kg)     Wt Readings from Last 3 Encounters:   19 221 lb (100.2 kg)   19 221 lb 6.4 oz (100.4 kg)   10/16/19 215 lb (97.5 kg)     Body mass index is 29.97 kg/m .     Constitutional: In no apparent distress  Respiratory: Clear to auscultation bilaterally. Normal inspiratory and expiratory effort  Cardiovascular: Regular rate and rhythm. No murmurs, rubs, or gallops. No edema. No carotid bruits.   Gastrointestinal: Bowel sounds active all four quadrants. Soft, non-tender.   Skin: Warm and dry. Inguinal area with mild redness. No raised.   Neuro: Normal gait  Psych: Alert and oriented x3.     Orders  Placed This Encounter   Procedures     Glycosylated Hemoglobin A1c   Followup: Return in about 3 months (around 2/21/2020) for Next scheduled follow up. earlier if needed.        Marichuy Rubio, CNP

## 2021-06-03 NOTE — TELEPHONE ENCOUNTER
Patient just left [facility] for Dr Pretty. Wants him to know th shot didn't work. Wants to Thank him however.

## 2021-06-04 VITALS
DIASTOLIC BLOOD PRESSURE: 78 MMHG | WEIGHT: 221 LBS | BODY MASS INDEX: 29.97 KG/M2 | SYSTOLIC BLOOD PRESSURE: 132 MMHG | HEART RATE: 76 BPM

## 2021-06-04 VITALS
BODY MASS INDEX: 29.84 KG/M2 | TEMPERATURE: 98.1 F | WEIGHT: 220 LBS | SYSTOLIC BLOOD PRESSURE: 120 MMHG | DIASTOLIC BLOOD PRESSURE: 72 MMHG | HEART RATE: 82 BPM

## 2021-06-04 VITALS
HEART RATE: 82 BPM | DIASTOLIC BLOOD PRESSURE: 60 MMHG | BODY MASS INDEX: 29.84 KG/M2 | WEIGHT: 220 LBS | SYSTOLIC BLOOD PRESSURE: 126 MMHG

## 2021-06-04 VITALS
HEART RATE: 59 BPM | OXYGEN SATURATION: 97 % | DIASTOLIC BLOOD PRESSURE: 68 MMHG | WEIGHT: 222.6 LBS | SYSTOLIC BLOOD PRESSURE: 122 MMHG | HEIGHT: 72 IN | BODY MASS INDEX: 30.15 KG/M2

## 2021-06-04 NOTE — TELEPHONE ENCOUNTER
Refill Approved    Rx renewed per Medication Renewal Policy. Medication was last renewed on 8/15/19.    Polina Herron, [facility] Connection Triage/Med Refill 12/9/2019     Requested Prescriptions   Pending Prescriptions Disp Refills     atorvastatin (LIPITOR) 40 MG tablet [Pharmacy Med Name: ATORVASTATIN CALCIUM 40 MG Tablet] 90 tablet 0     Sig: TAKE 1 TABLET (40 MG TOTAL) BY MOUTH AT BEDTIME.       Statins Refill Protocol (Hmg CoA Reductase Inhibitors) Passed - 12/9/2019  2:05 PM        Passed - PCP or prescribing provider visit in past 12 months      Last office visit with prescriber/PCP: 11/21/2019 Marichuy Rubio FNP OR same dept: 11/21/2019 Marichuy Rubio FNP OR same specialty: 11/21/2019 Marichuy Rubio FNP  Last physical: 4/15/2019 Last MTM visit: Visit date not found   Next visit within 3 mo: Visit date not found  Next physical within 3 mo: Visit date not found  Prescriber OR PCP: ARSLAN Vasquez  Last diagnosis associated with med order: 1. Mixed hyperlipidemia  - atorvastatin (LIPITOR) 40 MG tablet [Pharmacy Med Name: ATORVASTATIN CALCIUM 40 MG Tablet]; Take 1 tablet (40 mg total) by mouth at bedtime.  Dispense: 90 tablet; Refill: 0    If protocol passes may refill for 12 months if within 3 months of last provider visit (or a total of 15 months).

## 2021-06-04 NOTE — PROGRESS NOTES
I had the pleasure to speak with Ebonie today.  She has been doing okay. Her back has been bothering her.  She did have an incident happen last week wed. Her and her son were going to go out for lunch.  She went to get dressed and noticed her legs were weak. She went and told her son that she wouldn't be able to go to lunch.  She isn't sure what fully happened after, but knows that her son called the paramedics.  She mentioned that she didn't notice anything besides her legs getting week. Her blood sugars had dropped to 64 and her son said, she wasn't responding to him. When the paramedics arrived, her son gave her a candy bar and a drink and her sugars went up to 200. Ebonie says, I was told that I need to be eating more.  She has been now trying to eat lunch and dinner and a snack when she has them available.  She hasn't been eating breakfast. She wasn't transported to the hospital.  So far she has felt that she has been able to manage her sugars.     We talked about her goals being completed today and that its been recommended to move to maintenance.  With the blood sugar we scheduled a re-assessment for 1/2 @1pm with Oriana.  Patient is uncomfortable with moving to maintenance and states she doesn't remember her goals being completed.     I will forward this message to PCP and Oriana as an FYI.  Pt is scheduled to follow up with Marichuy on 2/26/2020    Next Outreach: 1/10/2020  Outreach Interval: 1 month

## 2021-06-04 NOTE — PROGRESS NOTES
Clinic Care Coordination Contact    Community Health Worker Delegation:  Due Date: Within 2 weeks from today's date 1/2/2020    Delegation: No Show for RN Appointment. Please Follow unsuccessful outreach, no show standard work.    Writer attempted calling patient x2 but no one answer the phone.

## 2021-06-04 NOTE — PROGRESS NOTES
Scheduled Follow Up Call: Attempt 1 Community Health Worker called and left a message for the patient. If the patient is returning my call, please transfer the patient to Bisi at ext. [phone].      Next Outreach: 12/18/2019  Outreach Interval: 1 month  Note states to move patient to Maint.  Patient hasn't completed weight goal of 200. She is currently at 221.

## 2021-06-05 VITALS
HEART RATE: 80 BPM | WEIGHT: 222 LBS | BODY MASS INDEX: 30.11 KG/M2 | SYSTOLIC BLOOD PRESSURE: 126 MMHG | DIASTOLIC BLOOD PRESSURE: 74 MMHG

## 2021-06-05 NOTE — TELEPHONE ENCOUNTER
Controlled Substance Refill Request  Medication Name:   Requested Prescriptions     Pending Prescriptions Disp Refills     oxyCODONE-acetaminophen (PERCOCET) 5-325 mg per tablet 180 tablet 0     Sig: Take 1-2 tablets by mouth every 6 (six) hours as needed for pain (maximum of 6 tablets per day). 11/30-12/30     Date Last Fill: 11/21/19  Is patient out of medication?: No, 3 days left  Patient notified refills processed within 3 business days:  Yes  Requested Pharmacy: [facility]  Submit electronically to pharmacy  Controlled Substance Agreement on file:   Encounter-Level CSA Scan Date:    There are no encounter-level csa scan date.        Last office visit:    11/21/19

## 2021-06-05 NOTE — TELEPHONE ENCOUNTER
Refill Approved    Rx renewed per Medication Renewal Policy. Medication was last renewed on 3/26/19.    Selma Lyon, Care Connection Triage/Med Refill 1/21/2020     Requested Prescriptions   Pending Prescriptions Disp Refills     lisinopril (PRINIVIL,ZESTRIL) 10 MG tablet [Pharmacy Med Name: LISINOPRIL 10 MG Tablet] 90 tablet 2     Sig: TAKE 1 TABLET AT BEDTIME       Ace Inhibitors Refill Protocol Passed - 1/20/2020  1:23 PM        Passed - PCP or prescribing provider visit in past 12 months       Last office visit with prescriber/PCP: 11/21/2019 Marichuy Rubio FNP OR same dept: 11/21/2019 Marichuy Rubio FNP OR same specialty: 11/21/2019 Marichuy Rubio FNP  Last physical: 4/15/2019 Last MTM visit: Visit date not found   Next visit within 3 mo: 1/21/2020 Marichuy Rubio FNP  Next physical within 3 mo: Visit date not found  Prescriber OR PCP: ARSLAN Vasquez  Last diagnosis associated with med order: 1. HTN (hypertension)  - lisinopril (PRINIVIL,ZESTRIL) 10 MG tablet [Pharmacy Med Name: LISINOPRIL 10 MG Tablet]; TAKE 1 TABLET AT BEDTIME  Dispense: 90 tablet; Refill: 2    2. Insomnia  - traZODone (DESYREL) 150 MG tablet [Pharmacy Med Name: TRAZODONE HYDROCHLORIDE 150 MG Tablet]; TAKE 1 TABLET AT BEDTIME  Dispense: 90 tablet; Refill: 3    If protocol passes may refill for 12 months if within 3 months of last provider visit (or a total of 15 months).             Passed - Serum Potassium in past 12 months     Lab Results   Component Value Date    Potassium 3.8 10/08/2019             Passed - Blood pressure filed in past 12 months     BP Readings from Last 1 Encounters:   01/21/20 132/78             Passed - Serum Creatinine in past 12 months     Creatinine   Date Value Ref Range Status   10/14/2019 1.25 (H) 0.60 - 1.10 mg/dL Final             traZODone (DESYREL) 150 MG tablet [Pharmacy Med Name: TRAZODONE HYDROCHLORIDE 150 MG Tablet] 90 tablet 3     Sig: TAKE 1 TABLET AT BEDTIME        Tricyclics/Misc Antidepressant/Antianxiety Meds Refill Protocol Passed - 1/20/2020  1:23 PM        Passed - PCP or prescribing provider visit in last year     Last office visit with prescriber/PCP: 11/21/2019 Marichuy Rubio FNP OR same dept: 11/21/2019 Marichuy Rubio FNP OR same specialty: 11/21/2019 Marichuy Rubio FNP  Last physical: 4/15/2019 Last MTM visit: Visit date not found   Next visit within 3 mo: 1/21/2020 Marichuy Rubio FNP  Next physical within 3 mo: Visit date not found  Prescriber OR PCP: ARSLAN Vasquez  Last diagnosis associated with med order: 1. HTN (hypertension)  - lisinopril (PRINIVIL,ZESTRIL) 10 MG tablet [Pharmacy Med Name: LISINOPRIL 10 MG Tablet]; TAKE 1 TABLET AT BEDTIME  Dispense: 90 tablet; Refill: 2    2. Insomnia  - traZODone (DESYREL) 150 MG tablet [Pharmacy Med Name: TRAZODONE HYDROCHLORIDE 150 MG Tablet]; TAKE 1 TABLET AT BEDTIME  Dispense: 90 tablet; Refill: 3    If protocol passes may refill for 12 months if within 3 months of last provider visit (or a total of 15 months).

## 2021-06-05 NOTE — TELEPHONE ENCOUNTER
Last Office Visit  11/21/2019 Marichuy Rubio FNP  Notes:  1. Essential hypertension  - stable without metoprolol and HR is normal. She is not experiencing heart palpitations. No need to restart metoprolol   - amLODIPine (NORVASC) 5 MG tablet; Take 1 tablet (5 mg total) by mouth daily.  Dispense: 90 tablet; Refill: 1   2. Type 2 diabetes mellitus with hyperosmolarity without coma, with long-term current use of insulin (H)  - she is strongly encouraged to bring meter or log book to appointments to review home readings. We discussed registering her for [facility] next spring to improve diet  - Glycosylated Hemoglobin A1c  - Microalbumin, Random Urine   3. Encounter for therapeutic drug monitoring  4. Chronic pain  5. Uncomplicated opioid dependence (H)  Lumbar radiculopathy  S/p spinal cord stimulator   - Continue hydrocodone 2 tablets two times a day and 1 tablet midday as needed for severe pain   - START: gabapentin 100 mg at bedtime increasing by 100 mg every 1 week to a maximum dose of 300 mg at bedtime   - She was given the option to see the pain clinic for pain management as well     Last Filled:11/21/2019  Next OV:  2/26/2020 Marichuy Rubio FNP        Medication teed up for provider signature

## 2021-06-05 NOTE — TELEPHONE ENCOUNTER
This will need to wait for Marichuy Rubio NP to address.    Terry Nino MD  General Internal Medicine  [facility]  2/4/2020, 11:40 AM

## 2021-06-05 NOTE — TELEPHONE ENCOUNTER
Patient has been notified - however, her schedule is very busy so she will be calling us back to schedule when she knows her availability.

## 2021-06-05 NOTE — TELEPHONE ENCOUNTER
Controlled Substance Refill Request  Medication Name:   Requested Prescriptions     Pending Prescriptions Disp Refills     oxyCODONE-acetaminophen (PERCOCET) 5-325 mg per tablet 180 tablet 0     Sig: Take 1-2 tablets by mouth every 6 (six) hours as needed for pain (maximum of 6 tablets per day). 1/7-2/6     Date Last Fill: 1/7/2020  Requested Pharmacy: [facility]  Submit electronically to pharmacy  Controlled Substance Agreement on file:   Encounter-Level CSA Scan Date:    There are no encounter-level csa scan date.        Last office visit:  1/21/2020

## 2021-06-05 NOTE — TELEPHONE ENCOUNTER
I see that a commode chair is teed up.  Is patient needing a walker?  Do we have any notes from physical therapy that has been at her home to assess her for a walker/cane/commode chair?

## 2021-06-05 NOTE — PROGRESS NOTES
Internal Medicine Office Visit  [facility]   Patient Name: Ebonie Bowman  Patient Age: 69 y.o.  YOB: 1950  MRN: [id number]    Date of Visit: 2020  Reason for Office Visit:   Chief Complaint   Patient presents with     Face to Face           Assessment / Plan / Medical Decision Makin. Unsteady gait  2. Lumbar radiculopathy  3. Recurrent falls  4. Overweight (BMI 25.0-29.9)  - Reviewed instructions for gabapentin and printout provided today with instructions: gabapentin (NEURONTIN) 100 MG capsule; Take 100 mg by mouth at bedtime for 7 days, THEN 200 mg at bedtime for 7 days, THEN 300 mg at bedtime.  Dispense: 111 capsule; Refill: 0  - continue home PT/OT   - Encouraged use of walker rather than cane     5. Essential hypertension  - BP is elevated today and reportedly high with home checks as well.   - INCREASE from 5 mg to: amLODIPine (NORVASC) 10 MG tablet; Take 1 tablet (10 mg total) by mouth daily.  Dispense: 90 tablet; Refill: 1    6. Positive FIT (fecal immunochemical test)  - Ambulatory referral for Colonoscopy    7. Uncomplicated opioid dependence (H)  - Continue Percocet up to 4 times a day for pain as prescribed (6 tabs/day max)    8. Type 2 diabetes mellitus with hypoglycemia without coma, with long-term current use of insulin (H)  - well controlled, continue current medications     9. Allergic rhinitis due to cats  - loratadine (CLARITIN) 10 mg tablet; Take 1 tablet (10 mg total) by mouth at bedtime.  Dispense: 90 tablet; Refill: 3        Health Maintenance Review  Health Maintenance   Topic Date Due     DEPRESSION ACTION PLAN  1950     MEDICARE ANNUAL WELLNESS VISIT  2013     ZOSTER VACCINES (2 of 3) 2014     MAMMOGRAM  2018     DIABETIC FOOT EXAM  2019     FALL RISK ASSESSMENT  2019     INFLUENZA VACCINE RULE BASED (1) 2019     DIABETIC EYE EXAM  2020     A1C  2020     LIPID  2020     MICROALBUMIN   08/21/2020     DXA SCAN  08/21/2020     BMP  10/08/2020     ADVANCE CARE PLANNING  12/14/2021     TD 18+ HE  07/18/2024     COLONOSCOPY  12/14/2026     HEPATITIS C SCREENING  Completed     PNEUMOCOCCAL IMMUNIZATION 65+ HIGH/HIGHEST RISK  Completed         I have discontinued Ebonie Bowman's metoprolol succinate and amLODIPine. I am also having her start on amLODIPine. Additionally, I am having her maintain her magnesium oxide, ascorbic acid (vitamin C), cholecalciferol (vitamin D3), aspirin, omega-3 fatty acids-vitamin E, lancing device, MULTIVITAMIN ORAL, folic acid, insulin syringe-needle U-100, blood-glucose meter, NovoLIN N NPH U-100 Insulin, ranitidine, b complex vitamins, polyethylene glycol, insulin regular, estradiol, methotrexate, blood glucose test, buPROPion, atorvastatin, oxyCODONE-acetaminophen, loratadine, and gabapentin.      HPI:  Ebonie Bowman is a 69 y.o. year old who presents to the office today for face-to-face visit for home care. She spoke with a clinic care coordinator nurse and noted risk of falling with unsteady gait. She notes that she uses the walls of her apartment and furniture to get around. Today she is walking with a cane and noted to be very unsteady. She has a walker but does not always remember to use this. No recent falls.     She was also referred for reported sores in the groin. The nurse examined these and informed Ebonie there were no sores.     Her home blood pressure readings have been as high as 200 systolic according to her report. Today's initial blood pressure check is elevated as well. She denies chest pain or dyspnea.     She has chronic low back pain. She did not start gabapentin after her last visit.     We reviewed a history of a positive FIT test. Due to her history of insulin dependent diabetes as well as high risk for falls, inpatient colonoscopy preparation was recommended. She does intend to schedule the colonoscopy but states that she has not heard from scheduling  regarding this.     Review of Systems- pertinent positive in bold:  Pertinent findings as in HPI       Current Scheduled Meds:  Outpatient Encounter Medications as of 1/21/2020   Medication Sig Dispense Refill     ascorbic acid (ASCORBIC ACID WITH LATRICIA HIPS) 500 MG tablet Take 500 mg by mouth every evening.        aspirin 81 MG EC tablet Take 81 mg by mouth every evening.       atorvastatin (LIPITOR) 40 MG tablet Take 1 tablet (40 mg total) by mouth at bedtime. 90 tablet 3     b complex vitamins tablet Take 1 tablet by mouth daily.       blood glucose test strips Check blood sugar four times daily. Dispense brand per patient's insurance at pharmacy discretion. Dx: e11.9 400 strip 3     blood-glucose meter Misc Dispense one meter. Use as directed. Dx: e11.9 1 each 0     buPROPion (WELLBUTRIN) 100 MG tablet Take 1 tablet (100 mg total) by mouth daily. 90 tablet 3     cholecalciferol, vitamin D3, 1,000 unit tablet Take 1,000 Units by mouth every evening.        estradiol (ESTRACE) 0.01 % (0.1 mg/gram) vaginal cream Apply a pea-sized amount vaginally daily x 1 week then reduce to 3 times per week thereafter 42.5 g 1     folic acid (FOLVITE) 800 MCG tablet Take 800 mcg by mouth every evening.              insulin regular (NOVOLIN R) 100 unit/mL injection Inject 24 Units under the skin 3 (three) times a day before meals.       insulin syringe-needle U-100 (BD INSULIN SYRINGE ULT-FINE II) 1 mL 31 gauge x 5/16 Syrg USE 4 TIMES DAILY AS DIRECTED 500 each 1     lancing device (ACCU-CHEK SOFTCLIX) Misc Use 1 applicator As Directed 4 (four) times a day. 300 each 3     loratadine (CLARITIN) 10 mg tablet Take 1 tablet (10 mg total) by mouth at bedtime. 90 tablet 3     magnesium oxide (MAG-OX) 400 mg tablet Take 400 mg by mouth at bedtime. With food              methotrexate 2.5 MG tablet Take 5 tablets po once a week (hold dose if you come down with an infection, until infection clears). 60 tablet 0     MULTIVITAMIN ORAL Take 1  tablet by mouth every evening.        NOVOLIN N NPH U-100 INSULIN 100 unit/mL injection Inject 33 Units under the skin 2 (two) times a day before meals. 10 mL 11     omega-3 fatty acids-vitamin E (FISH OIL) 1,000 mg cap Take 1,000 mg by mouth every evening. 90 each 3     oxyCODONE-acetaminophen (PERCOCET) 5-325 mg per tablet Take 1-2 tablets by mouth every 6 (six) hours as needed for pain (maximum of 6 tablets per day). 1/7-2/6 180 tablet 0     polyethylene glycol (MIRALAX) 17 gram packet Take 17 g by mouth daily as needed.       ranitidine (ZANTAC) 150 MG tablet TAKE 1 TABLET TWICE DAILY 180 tablet 3     [DISCONTINUED] amLODIPine (NORVASC) 5 MG tablet Take 1 tablet (5 mg total) by mouth daily. 90 tablet 1     [DISCONTINUED] lisinopril (PRINIVIL,ZESTRIL) 10 MG tablet TAKE 1 TABLET AT BEDTIME 90 tablet 2     [DISCONTINUED] loratadine (CLARITIN) 10 mg tablet Take 1 tablet (10 mg total) by mouth at bedtime. 90 tablet 2     [DISCONTINUED] metoprolol succinate (TOPROL XL) 50 MG 24 hr tablet Take 1 tablet (50 mg total) by mouth daily. 90 tablet 3     [DISCONTINUED] traZODone (DESYREL) 150 MG tablet TAKE 1 TABLET AT BEDTIME 90 tablet 3     amLODIPine (NORVASC) 10 MG tablet Take 1 tablet (10 mg total) by mouth daily. 90 tablet 1     gabapentin (NEURONTIN) 100 MG capsule Take 100 mg by mouth at bedtime for 7 days, THEN 200 mg at bedtime for 7 days, THEN 300 mg at bedtime. 111 capsule 0     No facility-administered encounter medications on file as of 1/21/2020.        Past Medical History:   Diagnosis Date     BBB (bundle branch block)      Chronic back pain      Chronic kidney disease     stage 3     Chronic Kidney Disease (NKF Classification)     Created by Conversion      Depression      Diabetes (H)     Type 2     Diabetes mellitus, type 2 (H)     Created by Conversion      Frequent headaches      Ganglion Of The Left Wrist     Created by Conversion      GERD (gastroesophageal reflux disease)      HLD (hyperlipidemia)       Hypertension     Per H&P dated 2013     Incarcerated ventral hernia     Per H&P dated 2013     Osteoarthritis      Rheumatoid arthritis (H)      Shortness of breath      Thrombocytopenia (H)      Venous insufficiency      Past Surgical History:   Procedure Laterality Date     BACK SURGERY       CHOLECYSTECTOMY       FATTY TUMOR      LT SHOULDER BLADE     HYSTERECTOMY  1984     JOINT REPLACEMENT Left     knee     TN ABDOMEN SURGERY PROC UNLISTED      Description: Hernia Repair;  Recorded: 10/23/2013;     TN IMPLANT SPINAL NEUROSTIM/ Left 2019    Procedure: LEFT FLANK INCISION REPLACE NEUROSTIMULATOR;  Surgeon: Vishal Pretty MD;  Location: [facility];  Service: Pain     SPINAL CORD STIMULATOR IMPLANT       Two left wrist  ,      Social History     Tobacco Use     Smoking status: Former Smoker     Last attempt to quit: 2008     Years since quittin.4     Smokeless tobacco: Never Used     Tobacco comment: smells of cigarrette smoke   Substance Use Topics     Alcohol use: No     Drug use: No     Comment: takes percocet for chronic pain       Objective / Physical Examination:  Vitals:    20 1119 20 1151   BP: 144/80 132/78   Pulse: 76    Weight: 221 lb (100.2 kg)      Wt Readings from Last 3 Encounters:   20 221 lb (100.2 kg)   19 221 lb (100.2 kg)   19 221 lb 6.4 oz (100.4 kg)     Body mass index is 29.97 kg/m .     Constitutional: In no apparent distress  Respiratory: Clear to auscultation bilaterally. Normal inspiratory and expiratory effort  Cardiovascular: Regular rate and rhythm. No murmurs, rubs, or gallops. No edema.   Neuro: Gait is unsteady. Pushes with arms to stand from chair and takes about 15 seconds to become steady on her feet.   Psych: Alert and oriented x3 but forgetful and a poor historian     Orders Placed This Encounter   Procedures     Ambulatory referral for Colonoscopy   Followup: Return in about 4 weeks  (around 2/18/2020) for Recheck, Next scheduled follow up. earlier if needed.        Marichuy Rubio, CNP

## 2021-06-05 NOTE — TELEPHONE ENCOUNTER
Order teed up for requested commode   Orders being requested:      Adaptive Equipment  1. Commode- Bedside     Reason service is needed/diagnosis:   Safety   Fall prevention     When are orders needed by :  ASAP     Where to send Orders:   [facility]    Ph: [phone]     Please call to obtain fax number, RN didn't have it available     Okay to leave detailed message?    Yes       Form also received - placed on Dr Padilla Desk for signature

## 2021-06-05 NOTE — TELEPHONE ENCOUNTER
Orders being requested:     Adaptive Equipment  1. Commode- Bedside    Reason service is needed/diagnosis:   Safety   Fall prevention    When are orders needed by :  ASAP    Where to send Orders:   [facility]    Ph: [phone]    Please call to obtain fax number, RN didn't have it available    Okay to leave detailed message?    Yes

## 2021-06-05 NOTE — TELEPHONE ENCOUNTER
Last OV 9/13/19  Last lab 10/14/2019  Future appt 3/26/20    Please inform that pt is due for lab prior to refill.     There were already standing order placed.

## 2021-06-05 NOTE — PROGRESS NOTES
Delegation: No Show for RN Appointment. Please Follow unsuccessful outreach, no show standard work.- Reschedule 1/16/2020 @ 1pm by phone    No concerns today. She had forgotten about the appointment.    Next Outreach: 2/6/2020  Outreach Interval: 1 month

## 2021-06-05 NOTE — TELEPHONE ENCOUNTER
Who is calling:  Kimmie  Reason for Call:  Caller stated if she can get a confirmation that Marichuy Rubio FNP will follow for home care.  Date of last appointment with primary care: 11/21/19  Okay to leave a detailed message: Yes  [phone]

## 2021-06-05 NOTE — TELEPHONE ENCOUNTER
Who is calling:  Patient    Reason for Call:    Checking on status of pain meds.  States she been without her meds for 5 days and is in a great amount of pain.    Date of last appointment with primary care: 01/21/2020    Okay to leave a detailed message: Yes

## 2021-06-05 NOTE — TELEPHONE ENCOUNTER
Received form from [facility] for commode chair. Has been placed on Marichuy's desk to fill out

## 2021-06-05 NOTE — TELEPHONE ENCOUNTER
1/21/2020 Marichuy Rubio, ARSLAN  Ebonie Bowman is a 69 y.o. year old who presents to the office today for face-to-face visit for home care. She spoke with a clinic care coordinator nurse and noted risk of falling with unsteady gait. She notes that she uses the walls of her apartment and furniture to get around. Today she is walking with a cane and noted to be very unsteady. She has a walker but does not always remember to use this. No recent falls.     Equipment teed up-adjust as needed

## 2021-06-06 NOTE — PROGRESS NOTES
Clinic Care Coordination Contact    Follow Up Progress Note         Goals addressed this encounter:   Goals Addressed                 This Visit's Progress       General      I will see a dentist in the next 60 days. (pt-stated)        Action steps to achieve this goal  1. I will speak with CHW at outreach calls (Complete)  2. I will call the dental clinic to make an appt in the next 4 weeks.    3. I will call Bisi if I with any concerns or questions      Updated: 2/25/2020  Date goal set:  1/16/2020        I will see an eye doctor in the next 60 days.  (pt-stated)        Action steps to achieve this goal    1. I will go to [employer] for my yearly exam within the next month.   2. I will call [facility] with any concerns or questions.     Updated: 2/25/2020  Date goal set:  1/16/2020        I would attend an appt with my Rheumatologist in the next 30 days.  (pt-stated)        Action steps to achieve this goal  1. I will attend my rheumatology appointment on 3/26/2020.  2. I will call the rheumatology clinic to make f/u appt (completed)  3. I will call CHW with any concerns or questions.       Updated: 2/25/2020  Date goal set:  1/16/2020        I would like to establish care with home care in the next 7 days.  (pt-stated)        Action steps to achieve this goal  1. I will attend in home physical therapy 2 times a week.  2. I will start using my walker at home verses the cane.       Update: 2/25/2020  Date goal set:  1/16/2020        I would like to have a colonoscopy in the next 90 days.  (pt-stated)        Action steps to achieve this goal  1. I will let Bisi know if I need help getting my appointment rescheeuled  2. I will reschedule my colonoscopy at [facility] within the next month due to having to cancel my last appointment.     Updated: 2/25/2020  Date goal set:  1/16/2020        I would like to like to loose 10# in the next 6 months.  (pt-stated)        Action steps to achieve this goal  1. I will complete  chair exercise for 30 mins on days I don't have in home physical therapy.   2. I will work towards cutting down on carbs and snacks from 3 to 2 times a day.       Updated: 2/25/2020  Date goal set:  1/16/2020             Intervention/Education provided during outreach:    Bright Loomis recently fell on 2/22/2020.  She mentions she is sore and has a bump on her end. She stated she hasn't been using her walker at home, only outside her home. We discussed starting to use her walker at home to help getting around safely. She has had barriers to bending over to do things or pick things up.     Patient already has an appointment scheduled for 2/26/2020 @ 12:00  for 3 month follow up       Home services    Physical therapy   Alternative Waiver      Plan:   At Next outreach, she if patient has a grabber to assist with picking things up from the floor.    CHW will follow up on goals at next outreach  CHW will route to CCC RN due to recently fall       Care Coordinator will follow up in 1 month (Around 3/24/2020)

## 2021-06-06 NOTE — PROGRESS NOTES
Home care was arriving as we were connecting today.  I will follow up with patient in about a week.         Note:  Is she receiving home care through [facility]?   Did she scheduled an eye, dental, rheumatology and colonoscopy appointment?  Did she get her commode?        PMI: [phone]  This subscriber has eligibility for AC: Alternative Care Program, Service Agreement Required. AC covers skilled nursing care in the home, but does not include skilled nursing facility coverage.   Elig Type AC: Alternative care   Eligibility Begin Date: 10/17/2018   Eligibility End Date: 10/01/2020     This subscriber is eligible for the following service types: Durable Medical Equipment Purchase , Durable Medical Equipment Rental , Home Health Care , Chemotherapy , Skilled Nursing Care       Next Outreach: 2/19/2020  Outreach Interval: 1 month

## 2021-06-06 NOTE — TELEPHONE ENCOUNTER
Controlled Substance Refill Request  Medication Name:   Requested Prescriptions     Pending Prescriptions Disp Refills     oxyCODONE-acetaminophen (PERCOCET) 5-325 mg per tablet 180 tablet 0     Sig: Take 1-2 tablets by mouth every 6 (six) hours as needed for pain (maximum of 6 tablets per day). 2/10-3/9     Date Last Fill: 02/10/2020  Requested Pharmacy: [facility] [address] [place] [address] E   Submit electronically to pharmacy  Controlled Substance Agreement on file:   Encounter-Level CSA Scan Date:    There are no encounter-level csa scan date.        Last office visit:  02/26/2020

## 2021-06-06 NOTE — TELEPHONE ENCOUNTER
Who is calling:  Juliet  Reason for Call:  Caller stated that they are needing Skill nursing added to the form for the face to face physician encounter.   Date of last appointment with primary care: n/a  Okay to leave a detailed message: Yes  [phone]

## 2021-06-06 NOTE — PROGRESS NOTES
Internal Medicine Office Visit  [facility]   Patient Name: Ebonie Bowman  Patient Age: 69 y.o.  YOB: 1950  MRN: [id number]    Date of Visit: 2020  Reason for Office Visit:   Chief Complaint   Patient presents with     Follow-up           Assessment / Plan / Medical Decision Makin. Vomiting and diarrhea  - suspect that she may have a viral gastroenteritis as weight is stable, she does not have abdominal pain, and symptom onset was sudden. Will do labs today as well as occult stool test due to report of dark stools. Suspect that dark stool due to taking Pepto bismol. Hydration recommended with half strength Gatorade and water. Clear liquids recommended until nausea improves. Seek emergent care if unable to remain hydrated. Skip MTX this week if not feeling well still on Friday when she would usually take this medication   - Basic Metabolic Panel  - Occult Blood(ICT); Future    2. Visit for screening mammogram  - Mammo Screening Bilateral; Future    3. Rheumatoid arthritis involving multiple sites, unspecified rheumatoid factor presence (H)  - followed by rheumatology. See note regarding MTX above      4. Morbid obesity (H)  - weight loss encouraged r/t diabetes     5. Chronic kidney disease, stage 3 (moderate) (H)  - repeat BMP today     6. Type 2 diabetes mellitus with hypoglycemia without coma, with long-term current use of insulin (H)  - Glycosylated Hemoglobin A1c    7. Essential hypertension  Stable     8. Positive FIT (fecal immunochemical test)  She had to reschedule colonoscopy that was to be this week, is now scheduled for April    Follow up in 2 weeks to assure resolution of symptoms as well as for follow up of chronic medical conditions not covered in detail today due to illness         Health Maintenance Review  Health Maintenance   Topic Date Due     DEPRESSION ACTION PLAN  1950     MEDICARE ANNUAL WELLNESS VISIT  2013     ZOSTER VACCINES (2 of  3) 12/31/2014     MAMMOGRAM  09/27/2018     DIABETIC FOOT EXAM  05/07/2019     FALL RISK ASSESSMENT  07/02/2019     INFLUENZA VACCINE RULE BASED (1) 08/01/2019     DIABETIC EYE EXAM  05/14/2020     LIPID  08/21/2020     MICROALBUMIN  08/21/2020     DXA SCAN  08/21/2020     A1C  08/26/2020     BMP  02/26/2021     ADVANCE CARE PLANNING  12/14/2021     TD 18+ HE  07/18/2024     COLONOSCOPY  12/14/2026     HEPATITIS C SCREENING  Completed     PNEUMOCOCCAL IMMUNIZATION 65+ HIGH/HIGHEST RISK  Completed         I am having Ebonie Bowman maintain her magnesium oxide, ascorbic acid (vitamin C), cholecalciferol (vitamin D3), aspirin, omega-3 fatty acids-vitamin E, lancing device, MULTIVITAMIN ORAL, folic acid, insulin syringe-needle U-100, blood-glucose meter, NovoLIN N NPH U-100 Insulin, ranitidine, b complex vitamins, polyethylene glycol, insulin regular, estradioL, methotrexate, blood glucose test, buPROPion, atorvastatin, lisinopriL, traZODone, loratadine, amLODIPine, gabapentin, and oxyCODONE-acetaminophen.      HPI:  Ebonie Bowman is a 69 y.o. year old who presents to the office today for 3-month follow-up but is actually feeling ill today. 2 days ago she began to have diarrhea, stool urgency, and vomiting. She has had 4-5 stools per day. No recent changes to any of her medications. She denies abdominal pain. Today she has not yet vomited but feels nauseated and has had 1 loose stool.  Her stools appear dark but she has been taking Pepto Bismol, she denies bloody stools. Appetite is poor but she is drinking water. No sick contacts.      She had an emergency department visit on 2/22 after a fall. We reviewed the recent emergency room visit.  She was bending over and lost her balance falling onto her right side. In the imaging done of the spine she had bilateral hydronephrosis but an abdominal CT of the kidneys was normal. The imaging of the right knee, right shoulder, CT of the head, cervical, thoracic and lumbar spine were  all without acute findings. The CT of the abdomen and pelvis was normal and did not demonstrate the hydronephrosis noted on spinal imaging. Urine culture had 10-50K CFU but non UTI sx so this was not treated. Her pain is slowly improving.     Diabetes reviewed, she did not bring her glucose log with her today.     She was scheduled for colonoscopy this week but had to reschedule as she did not  the prep kit soon enough but is also not feeling well. She is scheduled for April. She had a positive FIT test.     Review of Systems- pertinent positive in bold:  Pertinent findings as in HPI       Current Scheduled Meds:  Outpatient Encounter Medications as of 2/26/2020   Medication Sig Dispense Refill     amLODIPine (NORVASC) 10 MG tablet Take 1 tablet (10 mg total) by mouth daily. 90 tablet 1     ascorbic acid (ASCORBIC ACID WITH LATRICIA HIPS) 500 MG tablet Take 500 mg by mouth every evening.        aspirin 81 MG EC tablet Take 81 mg by mouth every evening.       atorvastatin (LIPITOR) 40 MG tablet Take 1 tablet (40 mg total) by mouth at bedtime. 90 tablet 3     b complex vitamins tablet Take 1 tablet by mouth daily.       blood glucose test strips Check blood sugar four times daily. Dispense brand per patient's insurance at pharmacy discretion. Dx: e11.9 400 strip 3     blood-glucose meter Misc Dispense one meter. Use as directed. Dx: e11.9 1 each 0     buPROPion (WELLBUTRIN) 100 MG tablet Take 1 tablet (100 mg total) by mouth daily. 90 tablet 3     cholecalciferol, vitamin D3, 1,000 unit tablet Take 1,000 Units by mouth every evening.        estradiol (ESTRACE) 0.01 % (0.1 mg/gram) vaginal cream Apply a pea-sized amount vaginally daily x 1 week then reduce to 3 times per week thereafter 42.5 g 1     folic acid (FOLVITE) 800 MCG tablet Take 800 mcg by mouth every evening.              gabapentin (NEURONTIN) 100 MG capsule Take 100 mg by mouth at bedtime for 7 days, THEN 200 mg at bedtime for 7 days, THEN 300 mg at  bedtime. 111 capsule 0     insulin regular (NOVOLIN R) 100 unit/mL injection Inject 24 Units under the skin 3 (three) times a day before meals.       insulin syringe-needle U-100 (BD INSULIN SYRINGE ULT-FINE II) 1 mL 31 gauge x 5/16 Syrg USE 4 TIMES DAILY AS DIRECTED 500 each 1     lancing device (ACCU-CHEK SOFTCLIX) Misc Use 1 applicator As Directed 4 (four) times a day. 300 each 3     lisinopril (PRINIVIL,ZESTRIL) 10 MG tablet TAKE 1 TABLET AT BEDTIME 90 tablet 2     loratadine (CLARITIN) 10 mg tablet Take 1 tablet (10 mg total) by mouth at bedtime. 90 tablet 3     magnesium oxide (MAG-OX) 400 mg tablet Take 400 mg by mouth at bedtime. With food              methotrexate 2.5 MG tablet Take 5 tablets po once a week (hold dose if you come down with an infection, until infection clears). 60 tablet 0     MULTIVITAMIN ORAL Take 1 tablet by mouth every evening.        NOVOLIN N NPH U-100 INSULIN 100 unit/mL injection Inject 33 Units under the skin 2 (two) times a day before meals. 10 mL 11     omega-3 fatty acids-vitamin E (FISH OIL) 1,000 mg cap Take 1,000 mg by mouth every evening. 90 each 3     oxyCODONE-acetaminophen (PERCOCET) 5-325 mg per tablet Take 1-2 tablets by mouth every 6 (six) hours as needed for pain (maximum of 6 tablets per day). 2/10-3/9 180 tablet 0     polyethylene glycol (MIRALAX) 17 gram packet Take 17 g by mouth daily as needed.       ranitidine (ZANTAC) 150 MG tablet TAKE 1 TABLET TWICE DAILY 180 tablet 3     traZODone (DESYREL) 150 MG tablet TAKE 1 TABLET AT BEDTIME 90 tablet 3     No facility-administered encounter medications on file as of 2/26/2020.        Past Medical History:   Diagnosis Date     BBB (bundle branch block)      Chronic back pain      Chronic kidney disease     stage 3     Chronic Kidney Disease (NKF Classification)     Created by Conversion      Depression      Diabetes (H)     Type 2     Diabetes mellitus, type 2 (H)     Created by Conversion      Frequent headaches       Ganglion Of The Left Wrist     Created by Conversion      GERD (gastroesophageal reflux disease)      HLD (hyperlipidemia)      Hypertension     Per H&P dated 2013     Incarcerated ventral hernia     Per H&P dated 2013     Osteoarthritis      Rheumatoid arthritis (H)      Shortness of breath      Thrombocytopenia (H)      Venous insufficiency      Past Surgical History:   Procedure Laterality Date     BACK SURGERY       CHOLECYSTECTOMY       FATTY TUMOR      LT SHOULDER BLADE     HYSTERECTOMY  1984     JOINT REPLACEMENT Left     knee     CT ABDOMEN SURGERY PROC UNLISTED      Description: Hernia Repair;  Recorded: 10/23/2013;     CT IMPLANT SPINAL NEUROSTIM/ Left 2019    Procedure: LEFT FLANK INCISION REPLACE NEUROSTIMULATOR;  Surgeon: Vishal Pretty MD;  Location: [facility];  Service: Pain     SPINAL CORD STIMULATOR IMPLANT       Two left wrist  ,      Social History     Tobacco Use     Smoking status: Former Smoker     Last attempt to quit: 2008     Years since quittin.5     Smokeless tobacco: Never Used     Tobacco comment: smells of cigarrette smoke   Substance Use Topics     Alcohol use: No     Drug use: No     Comment: takes percocet for chronic pain       Objective / Physical Examination:  Study Result     EXAM: CT ABDOMEN PELVIS WO ORAL W IV CONTRAST  LOCATION: [facility]  DATE/TIME: 2020 7:31 PM     INDICATION: Abdominal pain status post fall.  COMPARISON: 10/08/2019.  TECHNIQUE: CT scan of the abdomen and pelvis was performed following injection of IV contrast. Multiplanar reformats were obtained. Dose reduction techniques were used.  CONTRAST: Iohexol (Omni) 75 mL.     FINDINGS:   LOWER CHEST: Normal.     HEPATOBILIARY: Cholecystectomy with mild bile duct dilatation.     PANCREAS: Normal.     SPLEEN: Normal.     ADRENAL GLANDS: Normal.     KIDNEYS/BLADDER: Normal. Simple cysts right kidney.      BOWEL: Normal.     LYMPH NODES:  Normal.     VASCULATURE: Unremarkable.     PELVIC ORGANS: Hysterectomy. Small amount of free fluid in the pelvis.     MUSCULOSKELETAL: Lumbar degenerative change. Thoracic intrathecal catheter.     IMPRESSION:   1.  Cholecystectomy and hysterectomy.     2.  Simple cyst right kidney. No follow-up needed.     3.  Thoracic intrathecal catheter.        Study Result     EXAM: CT LUMBAR SPINE WO CONTRAST  LOCATION: [facility]  DATE/TIME: 2/22/2020 5:09 PM     INDICATION: Pain after fall  COMPARISON: CT abdomen and pelvis dated 10/08/2019  TECHNIQUE: Routine without IV contrast. Multiplanar reformats.  Dose reduction techniques were used.      FINDINGS:  VERTEBRA: Mild left apex curvature of the lumbar spine. Trace retrolisthesis of L4 on L5. The bones are diffusely demineralized. Vertebral body heights are preserved. No fracture or posttraumatic subluxation.      CANAL/FORAMINA: Multilevel degenerative changes throughout the lumbar spine, with at least mild canal stenosis at L1-L2, moderate stenosis at L2-L3, L3-L4, and L4-L5. There is bilateral neural foraminal narrowing, greatest at L1-L2, L4-L5, and L5-S1   where there is at least moderate stenosis.     PARASPINAL: Scattered atherosclerotic plaque in the abdominal aorta and branching vessels. There are fluid intensity lesions within the right kidney that are consistent with cysts. There is mild bilateral hydronephrosis that is new from the prior CT. No   visualized obstructing renal calculi.     IMPRESSION:   1.  No evidence of an acute osseous abnormality.  2.  Multilevel degenerative changes of the lumbar spine, with at least moderate canal stenosis at L2-L3, L3-L4, and L4-L5.  3.  Diffuse bony demineralization, suggestive of osteoporosis.  4.  Bilateral hydronephrosis, new from the prior CT of the abdomen and pelvis and of uncertain etiology. No evidence of an obstructing renal calculus.     Study Result     EXAM: CT THORACIC SPINE WO CONTRAST  LOCATION:  [facility]  DATE/TIME: 2/22/2020 5:08 PM     INDICATION: Pain after fall  COMPARISON: None.  TECHNIQUE: Routine without IV contrast. Multiplanar reformats.  Dose reduction techniques were used.      FINDINGS:  VERTEBRA: There is right apex curvature of the thoracic spine. No fracture or posttraumatic subluxation. Body heights are maintained. There is diffuse bony demineralization. There is a spine stimulator in place extending along the posterior epidural   space. The lead tips terminating at approximately T5.      CANAL/FORAMINA: Multilevel degenerative changes throughout the lumbar spine, with at least mild spinal canal stenosis at T11-T12 due to a disc osteophyte complex. Multilevel bilateral neural foraminal narrowing is greatest on the right from T1 through T4   with there is mild to moderate stenosis.     PARASPINAL: Scattered atherosclerotic calcification and coronary artery disease.     IMPRESSION:   1.  Degenerative changes of the thoracic spine, without evidence of an acute osseous abnormality.  2.  Spine stimulator leads terminating at approximately the T5 vertebral body.     Study Result     EXAM: CT CERVICAL SPINE WO CONTRAST  LOCATION: [facility]  DATE/TIME: 2/22/2020 5:07 PM     INDICATION: Pain after fall  COMPARISON: None.  TECHNIQUE: Routine without IV contrast. Multiplanar reformats. Dose reduction techniques were used.     FINDINGS:  VERTEBRA: Straightening of the cervical spine lordosis. Vertebral body heights are preserved. No fracture or posttraumatic subluxation.      CANAL/FORAMINA: There are multilevel degenerative changes of the cervical spine with at least mild canal stenosis at C4-C5, C5-C6, and C6-C7. Multilevel bilateral neural foraminal narrowing, greatest at C3-C4 where there is at least moderate left and   mild right neural foraminal narrowing.     PARASPINAL: Scattered atherosclerotic calcification. There is a subcentimeter nodule in the right thyroid lobe.  Further follow-up is not indicated by size criteria.     IMPRESSION:   1.  Multilevel degenerative changes of the cervical spine, without evidence of an acute osseous abnormality.     Study Result     EXAM: CT HEAD WO CONTRAST  LOCATION: [facility]  DATE/TIME: 2/22/2020 5:07 PM     INDICATION: Pain after fall  COMPARISON: CT head dated 05/30/2019  TECHNIQUE: Routine without IV contrast. Multiplanar reformats. Dose reduction techniques were used.     FINDINGS:  INTRACRANIAL CONTENTS: No intracranial hemorrhage, extraaxial collection, or mass effect.  No CT evidence of acute infarct. Mild presumed chronic small vessel ischemic changes. Normal ventricles and sulci. Atherosclerotic calcification of the carotid   siphons and left vertebral artery.     VISUALIZED ORBITS/SINUSES/MASTOIDS: Prior bilateral cataract surgery. Visualized portions of the orbits are otherwise unremarkable. Mild mucosal thickening scattered about the paranasal sinuses. No middle ear or mastoid effusion.     BONES/SOFT TISSUES: No acute abnormality.     IMPRESSION:   1.  No acute intracranial process.  2.  Mild presumed chronic microvascular ischemic disease.     Study Result     EXAM: XR SHOULDER RIGHT 2 OR MORE VWS  LOCATION: [facility]  DATE/TIME: 2/22/2020 4:46 PM     INDICATION: Fall with shoulder injury.  COMPARISON: None.     IMPRESSION:   Normal joint spaces and alignment. No fracture.      Study Result     EXAM: XR KNEE RIGHT 1 OR 2 VWS  LOCATION: [facility]  DATE/TIME: 2/22/2020 4:46 PM     INDICATION: fall with injury.  COMPARISON: None.     IMPRESSION:   Normal joint spaces and alignment. No fracture or joint effusion.         Vitals:    02/26/20 1154   BP: 120/72   Pulse: 82   Temp: 98.1  F (36.7  C)   TempSrc: Oral   Weight: 220 lb (99.8 kg)     Wt Readings from Last 3 Encounters:   02/26/20 220 lb (99.8 kg)   02/22/20 216 lb (98 kg)   01/21/20 221 lb (100.2 kg)     Body mass index is 29.84 kg/m .      Constitutional: In no apparent distress  ENT:  Lips and mucosa moist  Respiratory: Clear to auscultation bilaterally. Normal inspiratory and expiratory effort  Cardiovascular: Regular rate and rhythm. No murmurs, rubs, or gallops. No edema.   Gastrointestinal: Bowel sounds active all four quadrants. Soft, non-tender. No rebound tenderness or guarding. At one point during visit she gags and felt that she might vomit but did not    Orders Placed This Encounter   Procedures     Mammo Screening Bilateral     Glycosylated Hemoglobin A1c     Basic Metabolic Panel     Occult Blood(ICT)   Followup: Return in about 2 weeks (around 3/11/2020) for Recheck, Next scheduled follow up. earlier if needed.          Marichuy Rubio, CNP

## 2021-06-06 NOTE — TELEPHONE ENCOUNTER
To whom it may concern    Patient Garrett Jr Jason. is currently a patient of mine for postoperative hypothyroidism and is being treated with levothyroxine    Patient used to be on testosterone products from a commercial entity known as [id number] and as per the patient they were giving him DHEA, T3 plus T4 combination as well as testosterone and he recalls his testosterone levels used to be greater than 1000 ng on several occasions    Patient was admitted to the hospital  in December 2018 with chest pain and underwent cardiac catheterization with stent placement for STEMI patient also had previous history of atrial fibrillation as well as ventricular fibrillation in the past    In my professional opinion it is more likely than not that the high levels of testosterone and high levels of T3 and T4 replacement has contributed to his cardiac disease and heart attack    Sincerely       Received call from Alfonso from [employer] regarding patient having issue with implant not working.  Alfonso called patient he walked her through with trying another out let and see if that would make a charge to implant device. Pt did tell Alfonso that it was charging.  Pt will contact Alfonso later today 3/10/20 to see if she is having anymore issues and would need to keep the up coming appt that Patient made to see Dr. Pretty and Alfonso from [employer].

## 2021-06-06 NOTE — PROGRESS NOTES
Internal Medicine Office Visit  [facility]   Patient Name: Ebonie Bowman  Patient Age: 69 y.o.  YOB: 1950  MRN: [id number]    Date of Visit: 3/11/2020  Reason for Office Visit:   Chief Complaint   Patient presents with     Follow-up           Assessment / Plan / Medical Decision Makin. Chronic bilateral low back pain without sciatica  2. Controlled substance agreement signed, oxycodone. 19. UDS 19  Continue current pain medications as well as spinal cord stimulator use and home exercises     3. Positive FIT (fecal immunochemical test)  Colonoscopy scheduled in April     4. Type 2 diabetes mellitus with hypoglycemia without coma, with long-term current use of insulin (H)  Stable with current medications     5. Essential hypertension  stable    Patient is advised that if she intends to move, encouraged her to request her medical records and set an appointment to establish care with a new PCP prior to leaving so that this is already in motion    Health Maintenance Review  Health Maintenance   Topic Date Due     DEPRESSION ACTION PLAN  1950     MEDICARE ANNUAL WELLNESS VISIT  2013     ZOSTER VACCINES (2 of 3) 2014     MAMMOGRAM  2018     FALL RISK ASSESSMENT  2019     INFLUENZA VACCINE RULE BASED (1) 2020 (Originally 2019)     DIABETIC EYE EXAM  2020     LIPID  2020     MICROALBUMIN  2020     DXA SCAN  2020     A1C  2020     BMP  2021     DIABETIC FOOT EXAM  2021     ADVANCE CARE PLANNING  2021     TD 18+ HE  2024     COLORECTAL CANCER SCREENING  2026     HEPATITIS C SCREENING  Completed     PNEUMOCOCCAL IMMUNIZATION 65+ HIGH/HIGHEST RISK  Completed         I am having Ebonie Bowman maintain her magnesium oxide, ascorbic acid (vitamin C), cholecalciferol (vitamin D3), aspirin, omega-3 fatty acids-vitamin E, lancing device, MULTIVITAMIN ORAL, folic acid, insulin  syringe-needle U-100, blood-glucose meter, NovoLIN N NPH U-100 Insulin, ranitidine, b complex vitamins, polyethylene glycol, insulin regular, estradioL, methotrexate, blood glucose test, buPROPion, atorvastatin, lisinopriL, traZODone, loratadine, amLODIPine, and oxyCODONE-acetaminophen.      HPI:  Ebonie Bowman is a 69 y.o. year old who presents to the office today for follow up.     She was last seen for follow up but had gastroenteritis. She is feeling better, no nausea, vomiting, diarrhea, or abdominal pain.     Diabetes reviewed, she forgot her log book. States her readings have been stable.     She had a positive FIT test and rescheduled her colonoscopy for April 26, she denies stool changes.     We reviewed a fall that occurred in February. The pain from the fall has improved. She has some right lateral hip pain with pressure applied to the area but generally the pain  Has resolved. She has chronic back pain and continues Percocet for this, the medication improves her function.     She may be moving to [place] in order to stay with her son who intends to move there at the end of next month.     Review of Systems- pertinent positive in bold:  Pertinent findings as in HPI      Current Scheduled Meds:  Outpatient Encounter Medications as of 3/11/2020   Medication Sig Dispense Refill     amLODIPine (NORVASC) 10 MG tablet Take 1 tablet (10 mg total) by mouth daily. 90 tablet 1     ascorbic acid (ASCORBIC ACID WITH LATRICIA HIPS) 500 MG tablet Take 500 mg by mouth every evening.        aspirin 81 MG EC tablet Take 81 mg by mouth every evening.       atorvastatin (LIPITOR) 40 MG tablet Take 1 tablet (40 mg total) by mouth at bedtime. 90 tablet 3     b complex vitamins tablet Take 1 tablet by mouth daily.       blood glucose test strips Check blood sugar four times daily. Dispense brand per patient's insurance at pharmacy discretion. Dx: e11.9 400 strip 3     blood-glucose meter Misc Dispense one meter. Use as directed. Dx:  e11.9 1 each 0     buPROPion (WELLBUTRIN) 100 MG tablet Take 1 tablet (100 mg total) by mouth daily. 90 tablet 3     cholecalciferol, vitamin D3, 1,000 unit tablet Take 1,000 Units by mouth every evening.        estradiol (ESTRACE) 0.01 % (0.1 mg/gram) vaginal cream Apply a pea-sized amount vaginally daily x 1 week then reduce to 3 times per week thereafter 42.5 g 1     folic acid (FOLVITE) 800 MCG tablet Take 800 mcg by mouth every evening.              insulin regular (NOVOLIN R) 100 unit/mL injection Inject 24 Units under the skin 3 (three) times a day before meals.       insulin syringe-needle U-100 (BD INSULIN SYRINGE ULT-FINE II) 1 mL 31 gauge x 5/16 Syrg USE 4 TIMES DAILY AS DIRECTED 500 each 1     lancing device (ACCU-CHEK SOFTCLIX) Misc Use 1 applicator As Directed 4 (four) times a day. 300 each 3     lisinopril (PRINIVIL,ZESTRIL) 10 MG tablet TAKE 1 TABLET AT BEDTIME 90 tablet 2     loratadine (CLARITIN) 10 mg tablet Take 1 tablet (10 mg total) by mouth at bedtime. 90 tablet 3     magnesium oxide (MAG-OX) 400 mg tablet Take 400 mg by mouth at bedtime. With food              methotrexate 2.5 MG tablet Take 5 tablets po once a week (hold dose if you come down with an infection, until infection clears). 60 tablet 0     MULTIVITAMIN ORAL Take 1 tablet by mouth every evening.        NOVOLIN N NPH U-100 INSULIN 100 unit/mL injection Inject 33 Units under the skin 2 (two) times a day before meals. 10 mL 11     omega-3 fatty acids-vitamin E (FISH OIL) 1,000 mg cap Take 1,000 mg by mouth every evening. 90 each 3     oxyCODONE-acetaminophen (PERCOCET) 5-325 mg per tablet Take 1-2 tablets by mouth every 6 (six) hours as needed for pain (maximum of 6 tablets per day). 3/9-4/8 180 tablet 0     polyethylene glycol (MIRALAX) 17 gram packet Take 17 g by mouth daily as needed.       ranitidine (ZANTAC) 150 MG tablet TAKE 1 TABLET TWICE DAILY 180 tablet 3     traZODone (DESYREL) 150 MG tablet TAKE 1 TABLET AT BEDTIME 90  tablet 3     No facility-administered encounter medications on file as of 3/11/2020.        Past Medical History:   Diagnosis Date     BBB (bundle branch block)      Chronic back pain      Chronic kidney disease     stage 3     Chronic Kidney Disease (NKF Classification)     Created by Conversion      Depression      Diabetes (H)     Type 2     Diabetes mellitus, type 2 (H)     Created by Conversion      Frequent headaches      Ganglion Of The Left Wrist     Created by Conversion      GERD (gastroesophageal reflux disease)      HLD (hyperlipidemia)      Hypertension     Per H&P dated 2013     Incarcerated ventral hernia     Per H&P dated 2013     Osteoarthritis      Rheumatoid arthritis (H)      Shortness of breath      Thrombocytopenia (H)      Venous insufficiency      Past Surgical History:   Procedure Laterality Date     BACK SURGERY       CHOLECYSTECTOMY       FATTY TUMOR      LT SHOULDER BLADE     HYSTERECTOMY  1984     JOINT REPLACEMENT Left     knee     DE ABDOMEN SURGERY PROC UNLISTED      Description: Hernia Repair;  Recorded: 10/23/2013;     DE IMPLANT SPINAL NEUROSTIM/ Left 2019    Procedure: LEFT FLANK INCISION REPLACE NEUROSTIMULATOR;  Surgeon: Vishal Pretty MD;  Location: [facility];  Service: Pain     SPINAL CORD STIMULATOR IMPLANT       Two left wrist  ,      Social History     Tobacco Use     Smoking status: Former Smoker     Last attempt to quit: 2008     Years since quittin.6     Smokeless tobacco: Never Used     Tobacco comment: smells of cigarrette smoke   Substance Use Topics     Alcohol use: No     Drug use: No     Comment: takes percocet for chronic pain       Objective / Physical Examination:  Vitals:    20 1311   BP: 126/60   Pulse: 82   Weight: 220 lb (99.8 kg)     Wt Readings from Last 3 Encounters:   20 220 lb (99.8 kg)   20 220 lb (99.8 kg)   20 216 lb (98 kg)     Body mass index is 29.84 kg/m .      Constitutional: In no apparent distress  Respiratory: Clear to auscultation bilaterally. Normal inspiratory and expiratory effort  Cardiovascular: Regular rate and rhythm. No edema.   Psych: Alert and oriented x3.  Diabetic foot exam:   Left: Filament test present 2/6 location   Right: 5th toe crossover deformity    Filament test present 2/6 location       No orders of the defined types were placed in this encounter.  Followup: Return in about 3 months (around 6/11/2020) for Recheck. earlier if needed.        Marichuy Rubio, CNP

## 2021-06-06 NOTE — PROGRESS NOTES
"Spinal Cord Stimulator follow-up with [employer]  3/17/2020    Ms. Bowman had a spinal cord stimulator battery replaced about a year and a half ago.  This had been working fairly well.  Recently she has been having some trouble with charging the stimulator and getting more erratic pain relief.  She is here today with the [employer] representative.  They are assessing her stimulator and the .  She says that the remote unit would say that she needs to charge her stimulator.  She would plug it in and the stimulator would be charged up to about 60% when it would stop.  The [employer] representative replaced the battery in the remote unit, thereby giving it a \"hard boot\".  She also gave her 2 new programs to try for better stimulation.  She will try the first program for the next week.  If this does not give her adequate relief she will then try the second program for another week.  If neither program helps better she will revert to the program she has been using which was giving some benefit.  If she has trouble with charging the unit, she will take a picture of what the remote is telling her as far as why the battery needs to be recharged.  She will bring that picture with to the next visit.  "

## 2021-06-06 NOTE — TELEPHONE ENCOUNTER
Last Office Visit  2/26/2020 Marichuy Rubio FNP    Notes:       She had an emergency department visit on 2/22 after a fall. We reviewed the recent emergency room visit.  She was bending over and lost her balance falling onto her right side. In the imaging done of the spine she had bilateral hydronephrosis but an abdominal CT of the kidneys was normal. The imaging of the right knee, right shoulder, CT of the head, cervical, thoracic and lumbar spine were all without acute findings. The CT of the abdomen and pelvis was normal and did not demonstrate the hydronephrosis noted on spinal imaging. Urine culture had 10-50K CFU but non UTI sx so this was not treated. Her pain is slowly improving.     Last Filled:  02/10/2020    Lab Results   Component Value Date    AMPHET (!) 08/21/2019     Screen Positive (Confirmation available on request)    HEBENZODIAZ Screen Negative 08/21/2019    OPIATES (!) 08/21/2019     Screen Positive (Confirmation available on request)    PCP Screen Negative 08/21/2019    THC Screen Negative 08/21/2019    BARBIT Screen Negative 08/21/2019    COCAINEMETAB Screen Negative 08/21/2019    OXYCODONE (!) 08/21/2019     Screen Positive (Confirmation available on request)    CREAUR 291.7 08/21/2019    CREAUR 291.7 08/21/2019         Next OV:  3/11/2020 Marichuy Rubio FNP      Encounter-Level CSA Scan Date:   08/21/2019      reviewed and results are as follows:    Reviewed 02/06/2019 by PCP appropriate results    Medication teed up for provider signature - please adjust as appropriate.

## 2021-06-07 ENCOUNTER — COMMUNICATION - HEALTHEAST (OUTPATIENT)
Dept: INTERNAL MEDICINE | Facility: CLINIC | Age: 71
End: 2021-06-07

## 2021-06-07 DIAGNOSIS — G89.29 CHRONIC PAIN: ICD-10-CM

## 2021-06-07 RX ORDER — OXYCODONE AND ACETAMINOPHEN 5; 325 MG/1; MG/1
1-2 TABLET ORAL EVERY 6 HOURS PRN
Qty: 180 TABLET | Refills: 0 | Status: SHIPPED | OUTPATIENT
Start: 2021-06-07 | End: 2021-07-06

## 2021-06-07 NOTE — TELEPHONE ENCOUNTER
Please let Ebonie know that her pharmacy is looking to resolve this issue and that it is related to insurance, not to the prescription. She could contact her insurance to put some pressure on them to get this pushed through so she can fill the prescription.

## 2021-06-07 NOTE — TELEPHONE ENCOUNTER
RN cannot approve Refill Request    RN can NOT refill this medication medication not on med list. Last office visit: 3/11/2020 Marichuy Rubio FNP Last Physical: 4/15/2019 Last MTM visit: Visit date not found Last visit same specialty: 3/11/2020 Marichuy Rubio FNP.  Next visit within 3 mo: Visit date not found  Next physical within 3 mo: Visit date not found      Cynthia Jones, [facility] Triage/Med Refill 3/19/2020    Requested Prescriptions   Pending Prescriptions Disp Refills     metoprolol succinate (TOPROL-XL) 50 MG 24 hr tablet [Pharmacy Med Name: METOPROLOL SUCCINATE ER 50 MG Tablet Extended Release 24 Hour] 90 tablet 0     Sig: TAKE 1 TABLET (50 MG TOTAL) BY MOUTH DAILY.       Beta-Blockers Refill Protocol Passed - 3/18/2020 10:48 PM        Passed - PCP or prescribing provider visit in past 12 months or next 3 months     Last office visit with prescriber/PCP: 3/11/2020 Marichuy Rubio FNP OR same dept: 3/11/2020 Marichuy Ruboi FNP OR same specialty: 3/11/2020 Marichuy Rubio FNP  Last physical: 4/15/2019 Last MTM visit: Visit date not found   Next visit within 3 mo: Visit date not found  Next physical within 3 mo: Visit date not found  Prescriber OR PCP: ARSLAN Vasquez  Last diagnosis associated with med order: There are no diagnoses linked to this encounter.  If protocol passes may refill for 12 months if within 3 months of last provider visit (or a total of 15 months).             Passed - Blood pressure filed in past 12 months     BP Readings from Last 1 Encounters:   03/11/20 126/60

## 2021-06-07 NOTE — TELEPHONE ENCOUNTER
Called and spoke with patient- this was filled on 4/1. Patient stated that the pharmacy told her that insurance will not cover this until 4/16. Patient stated that she is out of medication and not sure what to do. Patient questioned if she could get a small refill until the 16. Advised patient to contact insurance to see the reason for it not being covered until the 16th. Patient wanted message to be sent to PCP.     oxyCODONE-acetaminophen (PERCOCET) 5-325 mg per tablet  180 tablet  0  4/1/2020 5/1/2020  No    Sig - Route: Take 1-2 tablets by mouth every 6 (six) hours as needed for pain (maximum of 6 tablets per day). 4/8-5/8 - Oral    Sent to pharmacy as: oxyCODONE-acetaminophen 5 mg-325 mg tablet (Percocet)    Earliest Fill Date: 4/1/2020    E-Prescribing Status: Receipt confirmed by pharmacy (4/1/2020 11:32 AM CDT)

## 2021-06-07 NOTE — PROGRESS NOTES
Ebonie Bowman who presents today with a chief complaint of  Follow-up      Joint Pains: Yes  Location: multiple joint pain, mainly both hands   Onset: chronic many years   Intensity: 8 /10  AM Stiffness: yes, 0 Minutes  Alleviating/Aggravating Factors: yes Medications helpful  Tolerating Meds: Yes tolerate med.   Other:      ROS:  Patient denies having any active: chest pain, shortness of breath, cough, abdominal pain, nausea, vomiting, rashes, documented fevers, oral ulcers and recent infections. Patient admits to having a good appetite.  Information gathered by medical assistant incorporated into this note, was reviewed and discussed with the patient.    Problem List:  Patient Active Problem List   Diagnosis     Osteoarthritis     Venous Insufficiency     HTN (hypertension)     Insomnia     Joint Pain, Localized In The Knee     Chronic kidney disease, stage 3 (moderate) (H)     Hyperlipidemia     Type 2 diabetes mellitus with hypoglycemia without coma, with long-term current use of insulin (H)     Joint Pain, Localized In The Hip     Morbid obesity (H)     Depression     Rheumatoid arthritis (H)     Seropositive rheumatoid arthritis (H)     Lumbar radiculopathy     S/P TKR (total knee replacement) using cement, left     High risk medication use     Chronic pain     GERD (gastroesophageal reflux disease)     Bilateral primary osteoarthritis of hip     Atherosclerotic cardiovascular disease, seen on abd CT 2016      S/P insertion of spinal cord stimulator     Uncomplicated opioid dependence (H)     Controlled substance agreement signed, oxycodone. 8/21/19. UDS 8/21/19     Allergic rhinitis due to cats     Positive FIT (fecal immunochemical test)        PMH:   Past Medical History:   Diagnosis Date     BBB (bundle branch block)      Chronic back pain      Chronic kidney disease     stage 3     Chronic Kidney Disease (NKF Classification)     Created by Conversion      Depression      Diabetes (H)     Type 2     Diabetes  mellitus, type 2 (H)     Created by Conversion      Frequent headaches      Ganglion Of The Left Wrist     Created by Conversion      GERD (gastroesophageal reflux disease)      HLD (hyperlipidemia)      Hypertension     Per H&P dated 1/03/2013     Incarcerated ventral hernia     Per H&P dated 1/03/2013     Osteoarthritis      Rheumatoid arthritis (H)      Shortness of breath      Thrombocytopenia (H)      Venous insufficiency        Surgical History:  Past Surgical History:   Procedure Laterality Date     BACK SURGERY       CHOLECYSTECTOMY       FATTY TUMOR  1996    LT SHOULDER BLADE     HYSTERECTOMY  1984     JOINT REPLACEMENT Left     knee     NE ABDOMEN SURGERY PROC UNLISTED      Description: Hernia Repair;  Recorded: 10/23/2013;     NE IMPLANT SPINAL NEUROSTIM/ Left 4/25/2019    Procedure: LEFT FLANK INCISION REPLACE NEUROSTIMULATOR;  Surgeon: Vishal Pretty MD;  Location: [facility];  Service: Pain     SPINAL CORD STIMULATOR IMPLANT       Two left wrist  1990, 1995       Family History:  Family History   Problem Relation Age of Onset     Acute Myocardial Infarction Father 62     Cancer Father      Heart disease Father      COPD Father      Diabetes Sister      Diabetes Brother      Melanoma Son      Diabetes Maternal Grandmother        Social History:   reports that she quit smoking about 11 years ago. She has never used smokeless tobacco. She reports that she does not drink alcohol or use drugs.    Allergies:  Allergies   Allergen Reactions     Penicillins Hives     Sulfa (Sulfonamide Antibiotics) Hives        Current Medications:  Current Outpatient Medications   Medication Sig Dispense Refill     ascorbic acid (ASCORBIC ACID WITH LATRICIA HIPS) 500 MG tablet Take 500 mg by mouth every evening.        aspirin 81 MG EC tablet Take 81 mg by mouth every evening.       atorvastatin (LIPITOR) 40 MG tablet Take 1 tablet (40 mg total) by mouth at bedtime. 90 tablet 3     b complex vitamins  tablet Take 1 tablet by mouth daily.       blood glucose test strips Check blood sugar four times daily. Dispense brand per patient's insurance at pharmacy discretion. Dx: e11.9 400 strip 3     blood-glucose meter Misc Dispense one meter. Use as directed. Dx: e11.9 1 each 0     buPROPion (WELLBUTRIN) 100 MG tablet Take 1 tablet (100 mg total) by mouth daily. 90 tablet 3     cholecalciferol, vitamin D3, (VITAMIN D3 ORAL) Take 125 mcg by mouth daily.       folic acid (FOLVITE) 800 MCG tablet Take 800 mcg by mouth every evening.              insulin regular (NOVOLIN R) 100 unit/mL injection Inject 24 Units under the skin 3 (three) times a day before meals.       insulin syringe-needle U-100 (BD INSULIN SYRINGE ULT-FINE II) 1 mL 31 gauge x 5/16 Syrg USE 4 TIMES DAILY AS DIRECTED 500 each 1     lancing device (ACCU-CHEK SOFTCLIX) Misc Use 1 applicator As Directed 4 (four) times a day. 300 each 3     lisinopril (PRINIVIL,ZESTRIL) 10 MG tablet TAKE 1 TABLET AT BEDTIME 90 tablet 2     loratadine (CLARITIN) 10 mg tablet Take 1 tablet (10 mg total) by mouth at bedtime. 90 tablet 3     magnesium oxide (MAG-OX) 400 mg tablet Take 400 mg by mouth at bedtime. With food              methotrexate 2.5 MG tablet Take 5 tablets po once a week (hold dose if you come down with an infection, until infection clears). 60 tablet 0     metoprolol succinate (TOPROL-XL) 50 MG 24 hr tablet TAKE 1 TABLET (50 MG TOTAL) BY MOUTH DAILY. 90 tablet 1     MULTIVITAMIN ORAL Take 1 tablet by mouth every evening.        NOVOLIN N NPH U-100 INSULIN 100 unit/mL injection Inject 33 Units under the skin 2 (two) times a day before meals. 10 mL 11     omega-3 fatty acids-vitamin E (FISH OIL) 1,000 mg cap Take 1,000 mg by mouth every evening. 90 each 3     oxyCODONE-acetaminophen (PERCOCET) 5-325 mg per tablet Take 1-2 tablets by mouth every 6 (six) hours as needed for pain (maximum of 6 tablets per day). 3/9-4/8 180 tablet 0     ranitidine (ZANTAC) 150 MG  tablet TAKE 1 TABLET TWICE DAILY 180 tablet 3     traZODone (DESYREL) 150 MG tablet TAKE 1 TABLET AT BEDTIME 90 tablet 3     amLODIPine (NORVASC) 10 MG tablet Take 1 tablet (10 mg total) by mouth daily. 90 tablet 1     estradiol (ESTRACE) 0.01 % (0.1 mg/gram) vaginal cream Apply a pea-sized amount vaginally daily x 1 week then reduce to 3 times per week thereafter 42.5 g 1     polyethylene glycol (MIRALAX) 17 gram packet Take 17 g by mouth daily as needed.       No current facility-administered medications for this visit.            Physical Exam:  LMP 09/27/1984   Following up today via phone visit, per Covid-19 pandemic requirements.    Verbal consent has been obtained for this service by a care team member.      Summary/Assessment:      History that includes seropositive rheumatoid arthritis, osteoarthritis, renal insufficiency.    States has been experiencing some discomfort with swelling involving right third/fourth MCP joint regions.    Claims compliance with methotrexate 5 tablets weekly.    Some mild thrombocytopenia noted.    Is avoiding NSAIDs.    Has renal insufficiency, history of diabetes.    States cortisone injection involving right knee with 60 mg of Kenalog was not very beneficial.  Does not recall trying viscous fluid gel injections.    Takes Percocet on average 5-6 tablets daily,, which provides relief.    Denies having any chest pain, shortness of breath, cough or fevers.  Is staying homebound except for essentials due to coronavirus pandemic.    Denies any history of macular degeneration, retinal disease or glaucoma.    Please see below for management plan.      Pertinent rheumatology/past medical history (please refer to above for more detailed history):      Seropositive rheumatoid arthritis    High risk medication use    Osteoarthritis, multiple joints    Hx left knee replacement    Chronic right knee pain (cortisone injection not helpful)    Chronic neck and back pain (managed by another  provider)    Renal insufficiency    Chronic opioid use    Diabetes, type II    History of urosepsis      Rheumatology medications provided/suggested:    Methotrexate  Folic acid  Plaquenil  Prednisone      Pertinent medication from other providers or from otc (please refer to above for more detailed med list):    Percocet  Insulin    Pertinent medications already tried:     Humira (worsened joint pains)      Pertinent lab history:    Positive: CCP antibody    Negative: Rheumatoid factor, HUGH, c- ANCA      Pertinent imaging/test history:    X-ray of right hand from February 2019 that showed some signs of degenerative joint disease, no erosive changes identified.      Other:    Standing order for labs placed every 3 months good through April 2020    Denies tobacco use or regular alcohol beverage intake.  Quit smoking greater than 10 years ago.    Was a patient of Dr. Schofield, last seen July 2018.      Plan:      For active right hand pain/arthritis, will add prednisone 10 mg daily for 1 week then 5 mg daily for 1 week then 2.5 mg daily for 1 week.    Continue methotrexate 5 tablets weekly ( limiting dose given mild renal insufficiency, thrombocytopenia) .  Hold if develops any infection.    Continue over-the-counter folic acid daily, made aware to avoid taking on the day when taking methotrexate.    Will add Plaquenil 200 mg twice a day.  Follow-up with ophthalmology every 6 months to 1 year.    Takes Percocet 5 to 6 tablets daily (includes 2 tablets prior to bedtime).  With regards to coronavirus, patient made aware that if develops any shortness of breath or progressive cough or very high fever then should seek immediate medical attention.  If develops other URI like symptoms then should self quarantine for 14 days or try to get tested.    Check labs in about 6 weeks    Follow-up in 4 months.      Procedure note:          Spent 21 minutes over phone visit with patient discussing patient's concerns, labs/test results  and plan.    This note was transcribed using Dragon voice recognition software as a result unintentional grammatical errors or word substitutions may have occurred. Please contact our Rheumatology department if you need any clarification or if you have any related inquiries.    Major side effect profile of medications provided were discussed with the patient.      Raheem Londono DO     ....................  3/26/2020   3:20 PM

## 2021-06-07 NOTE — PROGRESS NOTES
Clinic Care Coordination Contact    Community Health Worker Follow Up    Goals:   Goals Addressed                 This Visit's Progress       General      I will see a dentist in the next 60 days. (pt-stated)   Not on track     Action steps to achieve this goal  1. I will speak with CHW at outreach calls (Complete)  2. I will call to reschedule the dental clinic  in the next 2 months  3. I will call Bisi if I with any concerns or questions      Note: Hold due to COVID    Updated: 4/27/2020  Date goal set:  1/16/2020        I will see an eye doctor in the next 60 days.  (pt-stated)   Not on track     Action steps to achieve this goal    1. I will go to reschedule my appointment with [employer] for my yearly exam in the next 2 months   2. I will call Bisi with any concerns or questions.     Note: Hold due to COVID    Updated: 4/27/2020  Date goal set:  1/16/2020        COMPLETED: I would like to establish care with home care in the next 7 days.  (pt-stated)   On track     Personal Action  I have received home care and this has been discontinued.    Update: 4/27/2020  Date goal set:  1/16/2020        I would like to have a colonoscopy in the next 90 days.  (pt-stated)   Not on track     Action steps to achieve this goal  1. I will reach out to Bisi if I have questions or concerns.   2. I will attend my colonoscopy at [facility] in April if able.      Note: Hold due to COVID    Updated: 4/27/2020  Date goal set:  1/16/2020        I would like to like to loose 10# in the next 6 months.  (pt-stated)   On track     Action steps to achieve this goal  1. I will complete chair exercise throughout the day around 30-40 minutes on days.  2. I will continue working towards cutting down on carbs and snacks from 3 to 2 times a day.     Updated: 4/27/2020   Date goal set:  1/16/2020          Intervention and Eduction during outreach    Ebonie is doing good. She has been using her seated walker since we last talked. She stated,  that she hasn't had any falls since we last talked. She didn't want to admit that she needed the seat walker but has noticed its been helping. Ebonie is now sitting down on the walker seat when she needs to take a moment.     Ebonie mentions she is getting bored mores so since her son is in [place].  She has been playing games on her phone and watching TV. She would like to go outside, but can't go without the help of her son.      Ebonie maybe moving to [place] with her son once they are able to find a place.  COVID has been affecting this move.          CHW Plan:  CHW will follow up on goals in 1 month

## 2021-06-07 NOTE — TELEPHONE ENCOUNTER
"Called and spoke with patient.  She states that she fell 2 times this week ( Tuesday and Wednesday).  Patient was bending over the reach an item. She landed on right hip -Tuesday and left hip-Wednesday.   She called the fire department both times to assist with getting up.  Patient reports intermittent achy pain to left hip area with some relief with current pain management.  She reports having bruising to various parts of her body.  Patient denies any other symptoms or concerns at this time.    Patient was advised on fall prevention, pain management and to call the clinic if the symptoms persist or worsen.    Patient verbalized understanding and is agreeable with the plan of care.        Reason for Disposition    Hip pain    Answer Assessment - Initial Assessment Questions  1. LOCATION and RADIATION: \"Where is the pain located?\"       Left hip  2. QUALITY: \"What does the pain feel like?\"  (e.g., sharp, dull, aching, burning)      Intermittent dull ache  3. SEVERITY: \"How bad is the pain?\" \"What does it keep you from doing?\"   (Scale 1-10; or mild, moderate, severe)    -  MILD (1-3): doesn't interfere with normal activities     -  MODERATE (4-7): interferes with normal activities (e.g., work or school) or awakens from sleep, limping     -  SEVERE (8-10): excruciating pain, unable to do any normal activities, unable to walk      Mild to moderate  4. ONSET: \"When did the pain start?\" \"Does it come and go, or is it there all the time?\"      After fall X 2 ( Tuesday-3/24/20 and Wednesday-3/25/20)  5. WORK OR EXERCISE: \"Has there been any recent work or exercise that involved this part of the body?\"       No  6. CAUSE: \"What do you think is causing the hip pain?\"       falls  7. AGGRAVATING FACTORS: \"What makes the hip pain worse?\" (e.g., walking, climbing stairs, running)      activities  8. OTHER SYMPTOMS: \"Do you have any other symptoms?\" (e.g., back pain, pain shooting down leg,  fever, rash)      No    Protocols " used: HIP PAIN-A-OH

## 2021-06-07 NOTE — PROGRESS NOTES
Clinic Care Coordination Contact    Follow Up Progress Note          Goals addressed this encounter:   Goals Addressed                 This Visit's Progress       General      I will see a dentist in the next 60 days. (pt-stated)        Action steps to achieve this goal  1. I will speak with CHW at outreach calls (Complete)  2. I will call to reschedule the dental clinic  in the next 2 months  3. I will call Bisi if I with any concerns or questions      Updated: 3/27/2020  Date goal set:  1/16/2020        I will see an eye doctor in the next 60 days.  (pt-stated)        Action steps to achieve this goal    1. I will go to reschedule my appointment with [employer] for my yearly exam in the next 2 months   2. I will call Bisi with any concerns or questions.     Updated: 3/27/2020  Date goal set:  1/16/2020        COMPLETED: I would attend an appt with my Rheumatologist in the next 30 days.  (pt-stated)        Personal action step    I will continue following up with Rheumatology as recommended.      Updated: 3/27/2020  Date goal set:  1/16/2020        I would like to have a colonoscopy in the next 90 days.  (pt-stated)        Action steps to achieve this goal  1. I will reach out to Bisi if I have questions or concerns.   2. I will attend my colonoscopy at [facility] in April if able.    Updated: 3/27/2020  Date goal set:  1/16/2020        I would like to like to loose 10# in the next 6 months.  (pt-stated)        Action steps to achieve this goal  1. I will complete chair exercise for 30 mins on days I don't have in home physical therapy.   2. I will work towards cutting down on carbs and snacks from 3 to 2 times a day.     Not discussed today    Updated: 3/27/2020  Date goal set:  1/16/2020             Intervention/Education provided during outreach:     Bright x2 this week  Followed up with Ebonie today after our call Tuesday. She said, she fell again on Wed.  She mentioned that the emergency department checked  her out both times.  Ebonie states she is sore but okay. She declined a follow up with PCP    Walker  Ebonie stated she hasn't been using her walker.. I encouraged her to start using her walker to help prevent falls.  We talked about how her walker has a seat that would help her take a minute when she starts feeling tired. She said, oh yea. I'll take it out.    Appointments  All her upcoming appointments have been canceled due to COVID. She will reschedule when able. (Dental, [facility])    Food  Ebonie doesn't have concerns at the moment about being able to receive food, but states she hasn't been out of her place in a while. I provided her second harvest contact information today to assist if she needs help.  [phone]         Plan:   CHW will follow up on goals at next outreach  CHW routed note to PCP as a FYI    Care Coordinator will follow up in 1 month

## 2021-06-07 NOTE — PROGRESS NOTES
Ebonie Bowman is a 70 y.o. female who is being evaluated via a billable telephone visit.        Patient has given verbal consent to a Telephone visit? Yes    Ebonie Bowman complains of    Chief Complaint   Patient presents with     Fall       I have reviewed and updated the patient's Past Medical History, Social History, Family History and Medication List.    ALLERGIES  Penicillins and Sulfa (sulfonamide antibiotics)    Ebonie Bowman was contacted today for a telephone visit. This method of visit utilized due to COVID-19 pandemic and initiative to reduce risk for patient/provider exposure. She had two falls at the end of March. Both times she was bending for something and with her knees bent lost her balance. She fell on the left hip and next the right hip. She thinks that with the second fall she may have lost consciousness but isn't sure. She has difficulty describing the location of her pain but it sounds like it is in the posterolateral hip bilaterally. She was sore the day after the falls but this has been slowly improving. She is able to evenly bear weight on both legs without issue. She has a walker. Takes Percocet for chronic back pain and has also been taking this for the acute hip pain. She denies confusion, headaches, dizziness since the falls which sound mechanical in nature.     She recently did home physical therapy for balance and strengthening, completed this the middle of March.  She estimates she is doing about half of the exercises that she was given in handouts, she forgets to do them regularly.     She asks about a continuous glucose meter. She checks glucose 4 times daily with 5 insulin injections per day.     Assessment/Plan:  1. Fall in home, initial encounter  2. Hip pain, left  3. Hip pain, right  4. Chronic pain  - Patient is ambulating well, does not have difficulty bearing weight and pain has improved since falls. She does not feel that she needs to come in for an office visit particularly in  light of Coronavirus outbreak and I agree that she does not need to. She is advised to use a grab device to reduce need to bend forward to pick things up. She will use her walker in the home. We discussed restarting home PT after the virus outbreak risk is reduced. She will continue medications for chronic back pain as prescribed   - oxyCODONE-acetaminophen (PERCOCET) 5-325 mg per tablet; Take 1-2 tablets by mouth every 6 (six) hours as needed for pain (maximum of 6 tablets per day). 4/8-5/8  Dispense: 180 tablet; Refill: 0    5. Type 2 diabetes mellitus with hypoglycemia without coma, with long-term current use of insulin (H)  - Patient would benefit from a continuous glucose meter due to risk of hypoglycemia (history of this), and frequency of both glucose checks (4/day) and insulin injections (5/day).   - flash glucose scanning reader (FREESTYLE BRE 14 DAY READER) Misc; Use 1 each As Directed see administration instructions. Use as directed with sensor  Dispense: 1 each; Refill: 0  - flash glucose sensor (FREESTYLE BRE 14 DAY SENSOR) Kit; Use 1 each As Directed every 14 (fourteen) days.  Dispense: 2 kit; Refill: 11        Phone call duration:  17 minutes    Marianna Cantu, [facility]

## 2021-06-07 NOTE — TELEPHONE ENCOUNTER
Controlled Substance Refill Request  Medication Name:   Requested Prescriptions     Pending Prescriptions Disp Refills     oxyCODONE-acetaminophen (PERCOCET) 5-325 mg per tablet 180 tablet 0     Sig: Take 1-2 tablets by mouth every 6 (six) hours as needed for pain (maximum of 6 tablets per day). 4/8-5/8     Date Last Fill: 04/01/2020  Is patient out of medication?:  Yes  Patient notified refills processed within 3 business days: No  Requested Pharmacy: [facility]  Submit electronically to pharmacy  Controlled Substance Agreement on file:   Encounter-Level CSA Scan Date:    There are no encounter-level csa scan date.        Last office visit:  04/01/2020

## 2021-06-07 NOTE — PATIENT INSTRUCTIONS - HE
Summary of Your Rheumatology Visit    Next Appointment:  4 Months      Medications:    Please follow directives on pill bottle on how to take medication(s) provided.    As discussed, hold Methotrexate if you develop any symptoms consistent with having an infection,   Can restart once infection completely clears.      Referrals:      Tests:     Please have labs performed in about 6 weeks.    Injections:        Other:

## 2021-06-07 NOTE — PROGRESS NOTES
Clinic Care Coordination Contact    Follow Up Progress Note          Goals addressed this encounter:   Goals Addressed    None          Intervention/Education provided during outreach:     Called Ebonie today, she said she was doing good but was feeling like she was getting cabin fever with staying home.     While we were talking, Ebonie stated she just fell.  I asked if she was okay, she said, I think so and said, she was putting me down a second.  After a couple minutes of no response back, I called 9-1-1 to check on her, notified CCC RN and PCP.       Plan:   CHW notified PCP, CCC RN   CHW will follow up with goals at next outreach as able.    Care Coordinator will follow up in 1-3 days

## 2021-06-07 NOTE — TELEPHONE ENCOUNTER
reviewed. She last filled the prescription for this medication on 3/9 so I don't see any reason that she cannot fill the medication now. Please contact the pharmacy to get more information.

## 2021-06-07 NOTE — TELEPHONE ENCOUNTER
Last Office Visit  3/11/2020 Marichuy Rubio FNP  Notes:  Assessment / Plan / Medical Decision Makin. Chronic bilateral low back pain without sciatica  2. Controlled substance agreement signed, oxycodone. 19. UDS 19  Continue current pain medications as well as spinal cord stimulator use and home exercises      3. Positive FIT (fecal immunochemical test)  Colonoscopy scheduled in April      4. Type 2 diabetes mellitus with hypoglycemia without coma, with long-term current use of insulin (H)  Stable with current medications      5. Essential hypertension  stable     Patient is advised that if she intends to move, encouraged her to request her medical records and set an appointment to establish care with a new PCP prior to leaving so that this is already in motion       Last Filled:    metoprolol succinate 24 hr tablet 50 mg (TOPROL-XL)   [[id number]]   Order Details   Ordered Dose: 50 mg  Route: Oral  Frequency: DAILY    Administration Dose: 50 mg       Scheduled Start Date/Time: 19 1200  End Date/Time: 19 1303         Note to Pharmacy:    PTA Sig:Take 1 tablet (50 mg total) by mouth daily.              Next OV:  Visit date not found        Medication teed up for provider signature

## 2021-06-07 NOTE — TELEPHONE ENCOUNTER
Called to pharmacy- pharmacy verified last fill and new rx that was sent in. Pharmacy stated that its a [employer] insurance issue that they are trying to resolve

## 2021-06-08 NOTE — PROGRESS NOTES
Outreach changed to 2 months.     Oldest Son Ben passed- Still is hard during holidays, birthdays etc.  Was having a hard time with son dating. They use to do everything together and now she doesn't really see him. It was helpful talking about it today.  No pain clinic as insurance won't pay due to stimulation.

## 2021-06-08 NOTE — TELEPHONE ENCOUNTER
Central PA team  [phone]  Pool: HE PA MED ([id number])          PA has been initiated.       PA form completed and faxed insurance via Cover My Meds     Key:  [id number]     Medication:  Lidocaine (LIDODERM) 5%    Insurance:  luci        Response will be received via fax and may take up to 5-10 business days depending on plan

## 2021-06-08 NOTE — TELEPHONE ENCOUNTER
Let her know that we will switch the medication to famotidine 20 mg two times a day. I sent this to her pharmacy

## 2021-06-08 NOTE — TELEPHONE ENCOUNTER
Last Office Visit  4/01/2020 Marichuy Rubio, ARSLAN    Notes:  1. Fall in home, initial encounter  2. Hip pain, left  3. Hip pain, right  4. Chronic pain  - Patient is ambulating well, does not have difficulty bearing weight and pain has improved since falls. She does not feel that she needs to come in for an office visit particularly in light of Coronavirus outbreak and I agree that she does not need to. She is advised to use a grab device to reduce need to bend forward to pick things up. She will use her walker in the home. We discussed restarting home PT after the virus outbreak risk is reduced. She will continue medications for chronic back pain as prescribed   - oxyCODONE-acetaminophen (PERCOCET) 5-325 mg per tablet; Take 1-2 tablets by mouth every 6 (six) hours as needed for pain (maximum of 6 tablets per day). 4/8-5/8  Dispense: 180 tablet; Refill: 0     5. Type 2 diabetes mellitus with hypoglycemia without coma, with long-term current use of insulin (H)  - Patient would benefit from a continuous glucose meter due to risk of hypoglycemia (history of this), and frequency of both glucose checks (4/day) and insulin injections (5/day).   - flash glucose scanning reader (FREESTYLE BRE 14 DAY READER) Misc; Use 1 each As Directed see administration instructions. Use as directed with sensor  Dispense: 1 each; Refill: 0  - flash glucose sensor (FREESTYLE BRE 14 DAY SENSOR) Kit; Use 1 each As Directed every 14 (fourteen) days.  Dispense: 2 kit; Refill: 11       Last Filled:  oxyCODONE-acetaminophen (PERCOCET) 5-325 mg per tablet  180 tablet  0  5/15/2020  6/14/2020  No    Sig - Route: Take 1-2 tablets by mouth every 6 (six) hours as needed for pain (maximum of 6 tablets per day). For 5/15/20-6/14/20 - Oral    Sent to pharmacy as: oxyCODONE-acetaminophen 5 mg-325 mg tablet (Percocet)    Earliest Fill Date: 5/15/2020    E-Prescribing Status: Receipt confirmed by pharmacy (5/15/2020 10:23 AM CDT)        Lab  Results   Component Value Date    AMPHET (!) 08/21/2019     Screen Positive (Confirmation available on request)    HEBENZODIAZ Screen Negative 08/21/2019    OPIATES (!) 08/21/2019     Screen Positive (Confirmation available on request)    PCP Screen Negative 08/21/2019    THC Screen Negative 08/21/2019    BARBIT Screen Negative 08/21/2019    COCAINEMETAB Screen Negative 08/21/2019    OXYCODONE (!) 08/21/2019     Screen Positive (Confirmation available on request)    CREAUR 291.7 08/21/2019    CREAUR 291.7 08/21/2019         Next OV:  Visit date not found      Encounter-Level CSA Scan Date:    Scan on 08/22/2019          reviewed and results are as follows:     reviewed 4/13/2020    Medication teed up for provider signature - please adjust as appropriate.

## 2021-06-08 NOTE — PROGRESS NOTES
Chief Complaint   Patient presents with     Cough     started 10 days ago.       HPI:    Patient is here for 10 days of a cough that is sometimes productive, associated with subjective fever, chest tenderness, and shortness of breath. She has runny nose but no congestion. No hx of asthma. No smoking. She took OTC cough meds without relief.     ROS: Pertinent ROS noted in HPI.     Allergies   Allergen Reactions     Penicillins Hives     Sulfa (Sulfonamide Antibiotics) Hives       Patient Active Problem List   Diagnosis     Osteoarthritis     Venous Insufficiency     Lumbar Radiculopathy     Hypertension     Insomnia     Joint Pain, Localized In The Knee     Bursitis     Obesity     Chronic Kidney Disease (NKF Classification)     Hyperlipidemia     Diabetes mellitus, type 2     Joint Pain, Localized In The Hip     Morbid obesity     Depression     Knee osteoarthritis     Thrombocytopenia     Seropositive rheumatoid arthritis     Lumbar radiculopathy, chronic     S/P TKR (total knee replacement) using cement, left       Family History   Problem Relation Age of Onset     Acute Myocardial Infarction Father 62     Cancer Father      Heart disease Father      COPD Father      Diabetes Sister      Diabetes Brother      Cancer Son      Diabetes Maternal Grandmother        Social History     Social History     Marital status:      Spouse name: N/A     Number of children: N/A     Years of education: N/A     Occupational History     Not on file.     Social History Main Topics     Smoking status: Former Smoker     Quit date: 8/1/2008     Smokeless tobacco: Never Used      Comment: smells of cigarrette smoke     Alcohol use No     Drug use: No     Sexual activity: Not on file     Other Topics Concern     Not on file     Social History Narrative         Objective:    Vitals:    01/22/17 1326   BP: 112/70   Pulse: 80   Resp: 18   Temp: 98.2  F (36.8  C)   SpO2: 95%       Gen:NAD  Throat: normal  Ears: normal TMs and  canals  Nose: no discharge  Neck No adenopathy  CV: RRR, no M, R, G  Pulm: CTAB, normal effort    Recent Results (from the past 24 hour(s))   Rapid Strep A Screen-Throat   Result Value Ref Range    Rapid Strep A Antigen No Group A Strep detected No Group A Strep detected       CXR - ordered and reviewed by me, small interstitial infiltrates at left lung base noted. Radiologist thought this to be related to fibrosis, discussed during visit.       Cough  -     XR Chest PA and Lateral  -     codeine-guaiFENesin (GUAIFENESIN AC)  mg/5 mL liquid; Take 5 mL by mouth 2 (two) times a day as needed for cough.    Throat pain  -     Rapid Strep A Screen-Throat  -     Group A Strep, RNA Direct Detection, Throat

## 2021-06-08 NOTE — TELEPHONE ENCOUNTER
Medication Question or Clarification  Who is calling: Patient  What medication are you calling about (include dose and sig)?:   ranitidine (ZANTAC) 150 MG tablet  180 tablet  3  6/7/2019      Sig: TAKE 1 TABLET TWICE DAILY     Sent to pharmacy as: ranitidine (ZANTAC) 150 MG tablet     E-Prescribing Status: Receipt confirmed by pharmacy (6/7/2019  6:39 AM CDT)        Who prescribed the medication?: Marichuy Rubio FNP  What is your question/concern?: The jpatient has been hearing about this medication possibly cases cancer, she is asking Dr Rubio if she should continue with this medication, or get a different medication for use.  Please advise., as needs a refill if is to continue with this current medication.  [facility]  Okay to leave a detailed message?: Yes

## 2021-06-08 NOTE — PROGRESS NOTES
Clinic Care Coordination Contact    Community Health Worker Follow Up    Goals:   Goals Addressed                 This Visit's Progress       General      I will see a dentist in the next 60 days. (pt-stated)   On track     Action steps to achieve this goal  1. I will speak with CHW at outreach calls (Complete)  2. I will call to schedule an appointment in the next 2 months.  3. I will call Bisi if I with any concerns or questions  4. I will have my son help me choose a dentist in the next 2 months.    Note: Hold due to COVID    Updated: 5/27/2020  Date goal set:  1/16/2020        I will see an eye doctor in the next 60 days.  (pt-stated)   On track     Action steps to achieve this goal    1. I will go to reschedule my appointment with [facility] for my yearly exam in the next month.   2. I will let Bisi know at next outreach when my appointments scheduled.    Note: Hold due to COVID    Updated: 5/27/2020  Date goal set:  1/16/2020        I would like to have a colonoscopy in the next 90 days.  (pt-stated)   Not on track     Action steps to achieve this goal  1. I will reach out to Bisi if I have questions or concerns.   2. I will attend my colonoscopy at [facility] in April if able.      Note: Hold due to COVID    Updated: 5/27/2020  Date goal set:  1/16/2020        I would like to like to loose 10# in the next 6 months.  (pt-stated)   On track     Action steps to achieve this goal  1. I will complete chair exercise with my arms, neck, legs, throughout the day around 30-40 minutes in the next 2 months.  2. I will continue working towards cutting down on carbs and snacks from 3 to 2 times a day.  (continuous)      Updated: 5/27/2020  Date goal set:  1/16/2020            Intervention and education during outreach:  Ebonie is doing good. She states today is a bad because her bones are hurting.  She thought they would've moved by now to [place]  but she isn't sure what's going on. She is waiting for her son to let  her know.     We looked up [facility] today to see if they were now open for appointments. It looks like they maybe accepting appointments. Ebonie is going to try giving them a call and she if she can get in. I asked Ebonie who her dentist is so we can look them up, she mentioned she doesn't have one. I tried assisting with finding places today, but I was unable to. Ebonie said,she will have her son help with a place and will try calling to see if they are now accepting non-emergency visits.         CHW Plan:  CHW will follow up in 1 month

## 2021-06-08 NOTE — TELEPHONE ENCOUNTER
Medication Request  Medication name: lidocaine (LIDODERM) 5 %   Requested Pharmacy: [id number]  Reason for request: Patient is requesting for refill   When did you use medication last?:  Unknown   Patient offered appointment:  N/A - electronic request  Okay to leave a detailed message: no

## 2021-06-08 NOTE — PROGRESS NOTES
ASSESSMENT AND PLAN:  Ebonie Bowman 66 y.o. female  has sero positive rheumatoid arthritis, and widespread osteoarthritis status post left knee replacement.  She has renal impairment.  She is now hurting more in her hands especially the right side.  She noted pain in her right third MCP.  She has tolerated methotrexate and a modest dose initially of the renal impairment and the leflunomide.  We'll increase the leflunomide to 20 mg daily continue methotrexate as now.  Folic acid 2.  Reduce prednisone from 5-2.5 mg and alternate days and stop once she runs out of the new prescription.  Under aseptic technique 10 mg of methylprednisolone injected in the right third MCP.  I have asked her to have a follow-up creatinine in 3 months with labs every 2.      Diagnoses and all orders for this visit:    Seropositive rheumatoid arthritis  -     leflunomide (ARAVA) 20 MG tablet; Take 1 tablet (20 mg total) by mouth daily.  Dispense: 90 tablet; Refill: 0  -     methotrexate 2.5 MG tablet; Take 4 tablets (10 mg total) by mouth once a week.  Dispense: 48 tablet; Refill: 0  -     predniSONE (DELTASONE) 2.5 MG tablet; Take 1 tablet (2.5 mg total) by mouth every other day.  Dispense: 45 tablet; Refill: 0  -     methylPREDNISolone acetate injection 10 mg (DEPO-MEDROL); Inject 0.25 mL (10 mg total) into the joint once.  -     XR Hands Bilateral 3 or More VWS; Future; Expected date: 2/15/17  -     Cancel: ALT (SGPT)  -     Cancel: Albumin  -     Cancel: Creatinine  -     Cancel: HM2(CBC w/o Differential)    S/P TKR (total knee replacement) using cement, left    Chronic Kidney Disease (NKF Classification)    High risk medication use          HISTORY OF PRESENTING ILLNESS:  Ebonie Bowman, 66 y.o., female is here follow-up of seropositive rheumatoid arthritis.  She has noted worsening, severe especially in the right side, in the painful hands.  She has continued to have low back pain.  She is on Percocet for that.  She was seen here in 2013.   As she noted that over the past 1 year other painful hands become worse.  She is stiff in the morning for 15 minutes or so.  She was appointed to the MCP areas as source of her pain she noted that there is some swelling to both in the 2 and 3 MCP regions worse on the left side she noted pain in her thumbs.  The more she uses the hands the more pain she has especially true for the left hand.  She also noted on the pain in her knees.  Her symptoms are moderately severe.  In the past she has had knee injections with corticosteroids with good effect.  She has noted dryness of her nose mouth, high blood pressure history, diabetes, swelling of the ankles, heartburn,.  She reports no other rash except the inframammary depending upon the weather, she has no history of psoriasis ulcerative colitis Crohn's disease.  Her mom had rheumatoid arthritis.  She is nonsmoker and does not take alcohol no regular exercise.  She has had multiple musculoskeletal surgeries in the past she had left wrist surgery 1995 and 94 she had a injury there is a bucket fell, she had back surgery 2 and 87 and 1990.  And she is had the left knee surgery first hemiarthroplasty and then total knee replacement most recently in 2010.  Further historical information and ADL limitations as noted in the multidimensional health assessment questionnaire attached in the EMR.     ALLERGIES:Penicillins and Sulfa (sulfonamide antibiotics)    PAST MEDICAL/ACTIVE PROBLEMS/MEDICATION/ FAMILY HISTORY/SOCIAL DATA:  The patient has a family history of  Past Medical History:   Diagnosis Date     BBB (bundle branch block)      Chronic back pain      Chronic kidney disease     stage 3     Chronic Kidney Disease (NKF Classification)     Created by Conversion      Depression      Diabetes     Type 2     Diabetes mellitus, type 2     Created by Conversion      Frequent headaches      Ganglion Of The Left Wrist     Created by Conversion      GERD (gastroesophageal reflux  disease)      HLD (hyperlipidemia)      Hypertension     Per H&P dated 1/03/2013     Incarcerated ventral hernia     Per H&P dated 1/03/2013     Osteoarthritis      Rheumatoid arthritis      Thrombocytopenia      Venous insufficiency      History   Smoking Status     Former Smoker     Quit date: 8/1/2008   Smokeless Tobacco     Never Used     Comment: smells of cigarrette smoke     Patient Active Problem List   Diagnosis     Osteoarthritis     Venous Insufficiency     Lumbar Radiculopathy     Hypertension     Insomnia     Joint Pain, Localized In The Knee     Bursitis     Obesity     Chronic Kidney Disease (NKF Classification)     Hyperlipidemia     Diabetes mellitus, type 2     Joint Pain, Localized In The Hip     Morbid obesity     Depression     Knee osteoarthritis     Thrombocytopenia     Seropositive rheumatoid arthritis     Lumbar radiculopathy, chronic     S/P TKR (total knee replacement) using cement, left     Current Outpatient Prescriptions   Medication Sig Dispense Refill     ascorbic acid (ASCORBIC ACID WITH LATRICIA HIPS) 500 MG tablet Take 500 mg by mouth every evening.        aspirin 81 MG EC tablet Take 81 mg by mouth every evening.       atorvastatin (LIPITOR) 40 MG tablet Take 40 mg by mouth.       blood glucose test (ACCU-CHEK FRANCISCO PLUS TEST STRP) Strp TEST FOUR TIMES DAILY 400 strip 3     buPROPion (WELLBUTRIN SR) 100 MG 12 hr tablet Take 1 tablet (100 mg total) by mouth daily. 90 tablet 0     cholecalciferol, vitamin D3, 1,000 unit tablet Take 1,000 Units by mouth every evening.        cyanocobalamin (VITAMIN B-12) 250 MCG tablet Take 250 mcg by mouth every evening.        docusate sodium (COLACE) 100 MG capsule Take 100 mg by mouth 2 (two) times a day.       folic acid (FOLVITE) 800 MCG tablet Take 1,200 mcg by mouth daily.       insulin NPH (NOVOLIN) 100 unit/mL injection Inject 40 Units under the skin bedtime.        insulin NPH human recomb (HUMULIN N PEN) 100 unit/mL (3 mL) pen Give 60 units  subcutaneously every morning and 40 units subcutaneously every evening 3 mL 11     insulin syringe-needle U-100 (BD INSULIN SYRINGE ULT-FINE II) 1 mL 31 gauge x 5/16 Syrg USE 4 TIMES DAILY AS DIRECTED 500 each 1     lancing device (ACCU-CHEK SOFTCLIX) Misc Use 1 applicator As Directed 4 (four) times a day. 300 each 3     leflunomide (ARAVA) 10 MG tablet Take 1 tablet (10 mg total) by mouth daily. 90 tablet 0     lisinopril (PRINIVIL,ZESTRIL) 10 MG tablet Take 1 tablet (10 mg total) by mouth bedtime. 90 tablet 3     loratadine (CLARITIN) 10 mg tablet Take 10 mg by mouth bedtime.        magnesium oxide (MAG-OX) 400 mg tablet Take 400 mg by mouth bedtime. With food        methotrexate 2.5 MG tablet Take 4 tablets (10 mg total) by mouth once a week. 48 tablet 0     metoprolol succinate (TOPROL-XL) 25 MG Take 25 mg by mouth bedtime.       metoprolol succinate (TOPROL-XL) 25 MG TAKE ONE TABLET BY MOUTH ONCE DAILY 90 tablet 0     MULTIVITAMIN ORAL Take 1 tablet by mouth daily.       NOVOLOG 100 unit/mL injection Check blood sugar three (3) times daily before meals.  Treat as directed.;250.01 IDDDM; 10 mL 0     omega-3 fatty acids-vitamin E (FISH OIL) 1,000 mg cap Take 1,000 mg by mouth every evening. 90 each 3     omeprazole (PRILOSEC) 20 MG capsule TAKE 1 CAPSULE TWICE DAILY 180 capsule 2     oxyCODONE-acetaminophen (PERCOCET)  mg per tablet Take 1 tablet by mouth every 8 (eight) hours as needed for pain (Do not exceed 3 tablets per day). 90 tablet 0     predniSONE (DELTASONE) 5 MG tablet Take 1 tablet (5 mg total) by mouth every other day. 45 tablet 0     traZODone (DESYREL) 150 MG tablet Take 1 tablet (150 mg total) by mouth bedtime. 90 tablet 3     No current facility-administered medications for this visit.      DETAILED EXAMINATION (six area) :  Vitals:    02/15/17 1302   BP: 142/82   Patient Site: Right Arm   Patient Position: Sitting   Cuff Size: Adult Regular   Pulse: 72   Weight: (!) 239 lb 9.6 oz (108.7  kg)     Alert oriented. Head including the face is examined for malar rash, heliotropes, scarring, lupus pernio. Eyes examined for redness such as in episcleritis/scleritis, periorbital lesions.   Neck examined  for lymph nodes, range of motion Both upper and lower extremities (all four) examined for swollen, warm &/or  tender joints, range of motion, rash, muscle weakness, edema. The salient normal / abnormal findings are appended.    She has left knee arthroplasty scar.  She has right knee joint line tenderness, no effusion or warmth.  She has synovitis in MCPs on right side particularly # 3.        LAB / IMAGING DATA:  ALT   Date Value Ref Range Status   02/13/2017 15 0 - 45 U/L Final   12/07/2016 10 0 - 45 U/L Final   11/09/2016 11 0 - 45 U/L Final     ALBUMIN   Date Value Ref Range Status   02/13/2017 3.4 (L) 3.5 - 5.0 g/dL Final   12/07/2016 3.4 (L) 3.5 - 5.0 g/dL Final   11/09/2016 3.2 (L) 3.5 - 5.0 g/dL Final     CREATININE   Date Value Ref Range Status   02/13/2017 1.24 (H) 0.60 - 1.10 mg/dL Final   12/07/2016 1.32 (H) 0.60 - 1.10 mg/dL Final   11/09/2016 1.19 (H) 0.60 - 1.10 mg/dL Final       WBC   Date Value Ref Range Status   02/13/2017 12.2 (H) 4.0 - 11.0 thou/uL Final   12/07/2016 13.6 (H) 4.0 - 11.0 thou/uL Final   08/12/2015 11.9 (H) 4.0 - 11.0 thou/uL Final   07/01/2015 12.8 (H) 4.0 - 11.0 thou/uL Final     HEMOGLOBIN   Date Value Ref Range Status   02/13/2017 14.8 12.0 - 16.0 g/dL Final   12/07/2016 14.1 12.0 - 16.0 g/dL Final   11/09/2016 14.0 12.0 - 16.0 g/dL Final     PLATELETS   Date Value Ref Range Status   02/13/2017 118 (L) 140 - 440 thou/uL Final   12/07/2016 133 (L) 140 - 440 thou/uL Final   11/09/2016 118 (L) 140 - 440 thou/uL Final       Lab Results   Component Value Date    RF <15.0 05/13/2015    SEDRATE 23 (H) 05/13/2015

## 2021-06-08 NOTE — TELEPHONE ENCOUNTER
Controlled Substance Refill Request  Medication Name:   Requested Prescriptions     Pending Prescriptions Disp Refills     oxyCODONE-acetaminophen (PERCOCET) 5-325 mg per tablet 180 tablet 0     Sig: Take 1-2 tablets by mouth every 6 (six) hours as needed for pain (maximum of 6 tablets per day). 4/8-5/8     Date Last Fill: 04/01/2020  Is patient out of medication?:  Yes  Patient notified refills processed within 3 business days:  Yes  Requested Pharmacy: [facility]  Submit electronically to pharmacy  Controlled Substance Agreement on file:   Encounter-Level CSA Scan Date:    There are no encounter-level csa scan date.        Last office visit:  04/01/2020

## 2021-06-08 NOTE — TELEPHONE ENCOUNTER
Last Office Visit  4/01/2020 Marichuy Rubio, ARSLAN  Notes:  1. Fall in home, initial encounter  2. Hip pain, left  3. Hip pain, right  4. Chronic pain  - Patient is ambulating well, does not have difficulty bearing weight and pain has improved since falls. She does not feel that she needs to come in for an office visit particularly in light of Coronavirus outbreak and I agree that she does not need to. She is advised to use a grab device to reduce need to bend forward to pick things up. She will use her walker in the home. We discussed restarting home PT after the virus outbreak risk is reduced. She will continue medications for chronic back pain as prescribed   - oxyCODONE-acetaminophen (PERCOCET) 5-325 mg per tablet; Take 1-2 tablets by mouth every 6 (six) hours as needed for pain (maximum of 6 tablets per day). 4/8-5/8  Dispense: 180 tablet; Refill: 0     5. Type 2 diabetes mellitus with hypoglycemia without coma, with long-term current use of insulin (H)  - Patient would benefit from a continuous glucose meter due to risk of hypoglycemia (history of this), and frequency of both glucose checks (4/day) and insulin injections (5/day).   - flash glucose scanning reader (FREESTYLE BRE 14 DAY READER) Misc; Use 1 each As Directed see administration instructions. Use as directed with sensor  Dispense: 1 each; Refill: 0  - flash glucose sensor (FREESTYLE BRE 14 DAY SENSOR) Kit; Use 1 each As Directed every 14 (fourteen) days.  Dispense: 2 kit; Refill: 11    Last Filled: not on active med list     lidocaine (LIDODERM) 5 % (Discontinued)  5 patch  0  5/22/2019 6/22/2019  --    Sig: Remove & Discard patch within 12 hours or as directed by MD    Class: Print    Reason for Discontinue: Therapy completed       Next OV:  Visit date not found        Medication teed up for provider signature

## 2021-06-08 NOTE — TELEPHONE ENCOUNTER
Refill Approved    Rx renewed per Medication Renewal Policy. Medication was last renewed on 12/9/19, last OV 4/1/20.    Indira Pike, [facility] Triage/Med Refill 5/31/2020     Requested Prescriptions   Pending Prescriptions Disp Refills     atorvastatin (LIPITOR) 40 MG tablet 90 tablet 3     Sig: Take 1 tablet (40 mg total) by mouth at bedtime.       Statins Refill Protocol (Hmg CoA Reductase Inhibitors) Passed - 5/27/2020 10:41 AM        Passed - PCP or prescribing provider visit in past 12 months      Last office visit with prescriber/PCP: 3/11/2020 Marichuy Rubio FNP OR same dept: Visit date not found OR same specialty: Visit date not found  Last physical: 4/15/2019 Last MTM visit: 9/30/2016 Bita Birmingham, PharmD   Next visit within 3 mo: Visit date not found  Next physical within 3 mo: Visit date not found  Prescriber OR PCP: ARSLAN Vasquez  Last diagnosis associated with med order: 1. Mixed hyperlipidemia  - atorvastatin (LIPITOR) 40 MG tablet; Take 1 tablet (40 mg total) by mouth at bedtime.  Dispense: 90 tablet; Refill: 3    If protocol passes may refill for 12 months if within 3 months of last provider visit (or a total of 15 months).

## 2021-06-08 NOTE — PROGRESS NOTES
PATIENT STORY- DO NOT DELETE    What you should know about me:    Back and Arthritis are my biggest concerns  1 dog- Foxy (girl)  2 cats- Hawk (girl), Hira (girl)  Difficult subject to talk about=Oldest Son Ben passed    What is most important to me:    Karri- My son

## 2021-06-08 NOTE — TELEPHONE ENCOUNTER
Prior Authorization Request  Who s requesting:  Pharmacy  Pharmacy Name and Location: [facility] #[id number]  Medication Name: lidocaine (LIDODERM) 5 %   Insurance Plan: More than one insurance listed under the Verify Rx Benefits tab.   Insurance Member ID Number:  More than one insurance listed under the Verify Rx Benefits tab.   CoverMyMeds Key: [id number]  Informed patient that prior authorizations can take up to 10 business days for response:   NA  Okay to leave a detailed message: No

## 2021-06-08 NOTE — TELEPHONE ENCOUNTER
Controlled Substance Refill Request  Medication Name:   Requested Prescriptions     Pending Prescriptions Disp Refills     oxyCODONE-acetaminophen (PERCOCET) 5-325 mg per tablet 180 tablet 0     Sig: Take 1-2 tablets by mouth every 6 (six) hours as needed for pain (maximum of 6 tablets per day). For 5/15/20-6/14/20     Date Last Fill: 5/15/2020  Requested Pharmacy: [facility] #[id number]  Submit electronically to pharmacy  Controlled Substance Agreement on file:   Encounter-Level CSA Scan Date:    There are no encounter-level csa scan date.        Last office visit:  4/1/2020

## 2021-06-08 NOTE — PROGRESS NOTES
Internal Medicine Office Visit  Patient Name: Ebonie Bowman  Patient Age: 66 y.o.  YOB: 1950  MRN: [id number]  ?  Date of Visit: 2017  Reason for Office Visit:   Chief Complaint   Patient presents with     Follow-up     on pain pills. question on folic acid.       Assessment / Plan / Medical Decision Makin. Lumbar radiculopathy, chronic  - She will check with in her insurance to find out where is a covered pain clinic for her to go and let me know where she would like me to send the referral if she chooses to see a pain clinic   - She can continue oxycodone-apap 10/325 1-2 tablets every 8 hours as needed (do not take more than 3 tablets per day)     2. DM type 2  - Reminded to bring meter or log to next appointment. Symptoms of hypoglycemia have now improved but I question whether she is checking her blood sugar regularly. For now continue NPH 40 units nightly and 60 units in the morning with novolog 16 units each meal plus sliding scale. As she was leaving today she asked about how to prevent going into the donut hole and I suggested that at her next visit we could discuss changing her to an OTC 70/30 insulin. I am going to refer her for continuous glucose monitoring also to see if she is experiencing hypoglycemia and when this occurs  - Follow up in 3 months         Health Maintenance Review  Health Maintenance   Topic Date Due     FALL RISK ASSESSMENT  2017     MAMMOGRAM  2018     ADVANCE DIRECTIVES DISCUSSED WITH PATIENT  2021     TD 18+ HE  2024     COLONOSCOPY  2026     PNEUMOCOCCAL POLYSACCHARIDE VACCINE AGE 65 AND OVER  Completed     INFLUENZA VACCINE RULE BASED  Addressed     PNEUMOCOCCAL CONJUGATE VACCINE FOR ADULTS (PCV13 OR PREVNAR)  Addressed     ZOSTER VACCINE  Completed       I have changed Ms. Bowman's oxyCODONE-acetaminophen. I am also having her maintain her magnesium oxide, ascorbic acid (vitamin C), cholecalciferol (vitamin D3), aspirin,  cyanocobalamin, omega-3 fatty acids-vitamin E, blood glucose test, lancing device, MULTIVITAMIN ORAL, docusate sodium, traZODone, insulin NPH, insulin NPH, loratadine, metoprolol succinate, atorvastatin, folic acid, insulin syringe-needle U-100, leflunomide, methotrexate, lisinopril, metoprolol succinate, omeprazole, predniSONE, buPROPion, and NovoLOG.     HPI:   Encounter Diagnoses   Name Primary?     Diabetes mellitus, type 2 Yes      Ebonie Bowman 66-year-old female who presents Today for follow-up of diabetes and pain management.    She did not bring her meter or log with her today but reports from memory that her a.m. fastings readings have been less than 150, pre lunch/dinner readings from 720-200.  She has not had any symptoms of hypoglycemia since she changed her dose of NPH insulin to a lower dose. She is taking NovoLog 16 units plus a sliding scale with each meal.  Her last A1c was 5.7% 12/2016.    Chronic pain- Continues to see Pain Management through [facility] who manages her spinal cord stimulator. She states that the adjustments made recently have made a difference but the back pain is still very debilitating.  He continues to rely on narcotic medications to help with her pain.  We had discussed her last appointment that she is taking doses above the recommended daily total and direct taper schedule for her.  She is currently taking 4 tablets per day and her pain is somewhat worse than it had been when she was taking the larger doses.  She did not find a pain clinic that takes her insurance at this time.    Review of Systems: As in HPI    Current Scheduled Meds:  Outpatient Encounter Prescriptions as of 1/11/2017   Medication Sig Dispense Refill     ascorbic acid (ASCORBIC ACID WITH LATRICIA HIPS) 500 MG tablet Take 500 mg by mouth every evening.        aspirin 81 MG EC tablet Take 81 mg by mouth every evening.       atorvastatin (LIPITOR) 40 MG tablet Take 40 mg by mouth.       blood glucose test (ACCU-CHEK FRANCISCO  PLUS TEST STRP) Strp TEST FOUR TIMES DAILY 400 strip 3     buPROPion (WELLBUTRIN SR) 100 MG 12 hr tablet Take 1 tablet (100 mg total) by mouth daily. 90 tablet 0     cholecalciferol, vitamin D3, 1,000 unit tablet Take 1,000 Units by mouth every evening.        cyanocobalamin (VITAMIN B-12) 250 MCG tablet Take 250 mcg by mouth every evening.        docusate sodium (COLACE) 100 MG capsule Take 100 mg by mouth 2 (two) times a day.       folic acid (FOLVITE) 800 MCG tablet Take 1,200 mcg by mouth daily.       insulin NPH (NOVOLIN) 100 unit/mL injection Inject 60 Units under the skin Daily before breakfast.        insulin NPH (NOVOLIN) 100 unit/mL injection Inject 40 Units under the skin bedtime.        insulin syringe-needle U-100 (BD INSULIN SYRINGE ULT-FINE II) 1 mL 31 gauge x 5/16 Syrg USE 4 TIMES DAILY AS DIRECTED 500 each 1     lancing device (ACCU-CHEK SOFTCLIX) Misc Use 1 applicator As Directed 4 (four) times a day. 300 each 3     leflunomide (ARAVA) 10 MG tablet Take 1 tablet (10 mg total) by mouth daily. 90 tablet 0     lisinopril (PRINIVIL,ZESTRIL) 10 MG tablet Take 1 tablet (10 mg total) by mouth bedtime. 90 tablet 3     loratadine (CLARITIN) 10 mg tablet Take 10 mg by mouth bedtime.        magnesium oxide (MAG-OX) 400 mg tablet Take 400 mg by mouth bedtime. With food        methotrexate 2.5 MG tablet Take 4 tablets (10 mg total) by mouth once a week. 48 tablet 0     metoprolol succinate (TOPROL-XL) 25 MG Take 25 mg by mouth bedtime.       metoprolol succinate (TOPROL-XL) 25 MG TAKE ONE TABLET BY MOUTH ONCE DAILY 90 tablet 0     MULTIVITAMIN ORAL Take 1 tablet by mouth daily.       NOVOLOG 100 unit/mL injection Check blood sugar three (3) times daily before meals.  Treat as directed.;250.01 IDDDM; 10 mL 0     omega-3 fatty acids-vitamin E (FISH OIL) 1,000 mg cap Take 1,000 mg by mouth every evening. 90 each 3     omeprazole (PRILOSEC) 20 MG capsule TAKE 1 CAPSULE TWICE DAILY 180 capsule 2      oxyCODONE-acetaminophen (PERCOCET)  mg per tablet Take 1 tablet by mouth every 8 (eight) hours as needed for pain (Do not exceed 3 tablets per day). 90 tablet 0     predniSONE (DELTASONE) 5 MG tablet Take 1 tablet (5 mg total) by mouth every other day. 45 tablet 0     traZODone (DESYREL) 150 MG tablet Take 1 tablet (150 mg total) by mouth bedtime. 90 tablet 3     [DISCONTINUED] oxyCODONE-acetaminophen (PERCOCET)  mg per tablet Take 1-2 tablets by mouth every 6 (six) hours as needed for pain. No further refills of this medication after this fill 240 tablet 0     No facility-administered encounter medications on file as of 1/11/2017.      Past Medical History   Diagnosis Date     BBB (bundle branch block)      Chronic back pain      Chronic kidney disease      stage 3     Chronic Kidney Disease (NKF Classification)      Created by Conversion      Depression      Diabetes      Type 2     Diabetes mellitus, type 2      Created by Conversion      Frequent headaches      Ganglion Of The Left Wrist      Created by Conversion      GERD (gastroesophageal reflux disease)      HLD (hyperlipidemia)      Hypertension      Per H&P dated 1/03/2013     Incarcerated ventral hernia      Per H&P dated 1/03/2013     Osteoarthritis      Rheumatoid arthritis      Thrombocytopenia      Venous insufficiency      Past Surgical History   Procedure Laterality Date     Pr abdomen surgery proc unlisted       Description: Hernia Repair;  Recorded: 10/23/2013;     Two left wrist  1990, 1995     Fatty tumor  1996     LT SHOULDER BLADE     Back surgery       Cholecystectomy       Joint replacement Left      knee     Hysterectomy  1984     Social History   Substance Use Topics     Smoking status: Former Smoker     Quit date: 8/1/2008     Smokeless tobacco: Never Used      Comment: smells of cigarrette smoke     Alcohol use No       Objective / Physical Examination:  Vitals:    01/11/17 1317   BP: 118/50   Patient Site: Right Arm    Patient Position: Sitting   Cuff Size: Adult Large   Pulse: 63   Weight: (!) 243 lb 6.4 oz (110.4 kg)     Wt Readings from Last 3 Encounters:   01/11/17 (!) 243 lb 6.4 oz (110.4 kg)   12/14/16 (!) 246 lb 9.6 oz (111.9 kg)   11/17/16 (!) 246 lb (111.6 kg)     Body mass index is 33.01 kg/(m^2).    General Appearance: Alert and oriented, cooperative, affect appropriate, speech clear, in no apparent distress    Orders Placed This Encounter   Procedures     Glycosylated Hemoglobin A1c     Ambulatory referral to Diabetes Education (Existing Diagnosis)   Followup: Return in about 3 months (around 4/11/2017) for Recheck. earlier if needed.    Total time spent with patient was 25 minutes with >50% of time spent in face-to-face counseling regarding the above plan and review of the current recommendations for narcotic management and options for continued management      Marichuy Rubio, CNP  [facility]

## 2021-06-08 NOTE — TELEPHONE ENCOUNTER
Refill Approved    Rx renewed per Medication Renewal Policy. Medication was last renewed on 1/21/20.    Selma Lyon, [facility] Triage/Med Refill 6/4/2020     Requested Prescriptions   Pending Prescriptions Disp Refills     amLODIPine (NORVASC) 10 MG tablet [Pharmacy Med Name: AMLODIPINE BESYLATE 10 MG Tablet] 90 tablet 1     Sig: TAKE 1 TABLET (10 MG TOTAL) BY MOUTH DAILY.       Calcium-Channel Blockers Protocol Passed - 6/2/2020  9:58 PM        Passed - PCP or prescribing provider visit in past 12 months or next 3 months     Last office visit with prescriber/PCP: 3/11/2020 Marichuy Rubio FNP OR same dept: Visit date not found OR same specialty: Visit date not found  Last physical: 4/15/2019 Last MTM visit: 9/30/2016 Bita Birmingham, PharmD   Next visit within 3 mo: Visit date not found  Next physical within 3 mo: Visit date not found  Prescriber OR PCP: ARSLAN Vasquez  Last diagnosis associated with med order: 1. Essential hypertension  - amLODIPine (NORVASC) 10 MG tablet [Pharmacy Med Name: AMLODIPINE BESYLATE 10 MG Tablet]; Take 1 tablet (10 mg total) by mouth daily.  Dispense: 90 tablet; Refill: 1    If protocol passes may refill for 12 months if within 3 months of last provider visit (or a total of 15 months).             Passed - Blood pressure filed in past 12 months     BP Readings from Last 1 Encounters:   03/11/20 126/60

## 2021-06-08 NOTE — TELEPHONE ENCOUNTER
[facility] REP CALLED TO CONFIRM THE DIRECTIONS FOR USAGE AND QUANTITY AND DAYS SUPPLY.

## 2021-06-08 NOTE — TELEPHONE ENCOUNTER
Last Office Visit  4/01/2020 Marichuy Rubio, ARSLAN    Notes:  1. Fall in home, initial encounter  2. Hip pain, left  3. Hip pain, right  4. Chronic pain  - Patient is ambulating well, does not have difficulty bearing weight and pain has improved since falls. She does not feel that she needs to come in for an office visit particularly in light of Coronavirus outbreak and I agree that she does not need to. She is advised to use a grab device to reduce need to bend forward to pick things up. She will use her walker in the home. We discussed restarting home PT after the virus outbreak risk is reduced. She will continue medications for chronic back pain as prescribed   - oxyCODONE-acetaminophen (PERCOCET) 5-325 mg per tablet; Take 1-2 tablets by mouth every 6 (six) hours as needed for pain (maximum of 6 tablets per day). 4/8-5/8  Dispense: 180 tablet; Refill: 0     5. Type 2 diabetes mellitus with hypoglycemia without coma, with long-term current use of insulin (H)  - Patient would benefit from a continuous glucose meter due to risk of hypoglycemia (history of this), and frequency of both glucose checks (4/day) and insulin injections (5/day).   - flash glucose scanning reader (FREESTYLE BRE 14 DAY READER) Misc; Use 1 each As Directed see administration instructions. Use as directed with sensor  Dispense: 1 each; Refill: 0  - flash glucose sensor (FREESTYLE BRE 14 DAY SENSOR) Kit; Use 1 each As Directed every 14 (fourteen) days.  Dispense: 2 kit; Refill: 11       Last Filled:  oxyCODONE-acetaminophen (PERCOCET) 5-325 mg per tablet  180 tablet  0  4/1/2020 5/1/2020  No    Sig - Route: Take 1-2 tablets by mouth every 6 (six) hours as needed for pain (maximum of 6 tablets per day). 4/8-5/8 - Oral    Sent to pharmacy as: oxyCODONE-acetaminophen 5 mg-325 mg tablet (Percocet)    Earliest Fill Date: 4/1/2020    E-Prescribing Status: Receipt confirmed by pharmacy (4/1/2020 11:32 AM CDT)        Lab Results   Component  Value Date    AMPHET (!) 08/21/2019     Screen Positive (Confirmation available on request)    HEBENZODIAZ Screen Negative 08/21/2019    OPIATES (!) 08/21/2019     Screen Positive (Confirmation available on request)    PCP Screen Negative 08/21/2019    THC Screen Negative 08/21/2019    BARBIT Screen Negative 08/21/2019    COCAINEMETAB Screen Negative 08/21/2019    OXYCODONE (!) 08/21/2019     Screen Positive (Confirmation available on request)    CREAUR 291.7 08/21/2019    CREAUR 291.7 08/21/2019         Next OV:  Visit date not found      Encounter-Level CSA Scan Date:    8/22/2019          reviewed and results are as follows:     reviewed 4/13/2020    Medication teed up for provider signature - please adjust as appropriate.

## 2021-06-09 NOTE — TELEPHONE ENCOUNTER
Patient called. She needs a refill on the hydroxychloroquine (PLAQUENIL) medication.     Please send to [email or web address] on file     Ebonie @ [phone]

## 2021-06-09 NOTE — TELEPHONE ENCOUNTER
Last Office Visit  04/01/2020 Marichuy Fitzgerald FNP    Notes:  fall    Last Filled:  06/11/2020    Lab Results   Component Value Date    AMPHET (!) 08/21/2019     Screen Positive (Confirmation available on request)    HEBENZODIAZ Screen Negative 08/21/2019    OPIATES (!) 08/21/2019     Screen Positive (Confirmation available on request)    PCP Screen Negative 08/21/2019    THC Screen Negative 08/21/2019    BARBIT Screen Negative 08/21/2019    COCAINEMETAB Screen Negative 08/21/2019    OXYCODONE (!) 08/21/2019     Screen Positive (Confirmation available on request)    CREAUR 291.7 08/21/2019    CREAUR 291.7 08/21/2019         Next OV:  Visit date not found      Encounter-Level CSA Scan Date:    There are no encounter-level csa scan date.          reviewed and results are as follows:    Reviewed 04/13/2020    Prescription Monitoring Program activity reviewed with no discrepancies noted.      Medication teed up for provider signature - please adjust as appropriate.

## 2021-06-09 NOTE — TELEPHONE ENCOUNTER
Controlled Substance Refill Request  Medication Name:   Requested Prescriptions     Pending Prescriptions Disp Refills     oxyCODONE-acetaminophen (PERCOCET) 5-325 mg per tablet 180 tablet 0     Sig: Take 1-2 tablets by mouth every 6 (six) hours as needed for pain (maximum of 6 tablets per day). For 6/14/20-7/14/20     Date Last Fill: 06/11/20  Requested Pharmacy: [facility]  Submit electronically to pharmacy  Controlled Substance Agreement on file:   Encounter-Level CSA Scan Date:    There are no encounter-level csa scan date.        Last office visit:

## 2021-06-09 NOTE — PROGRESS NOTES
Clinic Care Coordination Contact  Patient has completed all goals with Clinic Care Coordination.  Please review the chart and confirm if maintenance  is approved.    CHW spoke with patent who expressed no new concern at this time and no additional coordination assistance needed. Patient stated that she's scheduled to see specialists, eye doctor colonoscopy procedure and dentis is still on hold due to COVID-19 and she all specialists' contact that are needed.     CCC RN reviewed chart and approved for maintenance.   This Maintenance Wellness Plan provides private information in regard to the work I have done with my Care Team at my Primary Care Clinic.  This document provides insight on the goals I have worked hard to achieve.  My Care Team congratulates me on my journey to become well.  With the assistance of the Clinic Care Coordination Team and my Primary Care Provider, I have succeeded in improving areas of my health that I identified as barriers to becoming well.  I will continue to seek wellness and use the skills I have obtained to further my journey.  My Community Health Worker will follow up with me in 2 months.  In the meantime, if I should have any questions or concerns I will contact my Community Health Worker.

## 2021-06-09 NOTE — PROGRESS NOTES
Clinic Care Coordination Contact    Situation: Patient chart reviewed by care coordinator.    Background:           Clinic Care Coordination Contact  Patient has completed all goals with Clinic Care Coordination.  Please review the chart and confirm if maintenance  is approved.     CHW spoke with patent who expressed no new concern at this time and no additional coordination assistance needed. Patient stated that she's scheduled to see specialists, eye doctor colonoscopy procedure and dentis is still on hold due to COVID-19 and she all specialists' contact that are needed.           Assessment:   Chart review completed today.   No ED visits or hospitalization the past 60 days.   No falls the past 60 days.   Had virtual visit with PCP on 4/1/2020 with no major concerns.   Patient is able to advocate for herself and no barriers noted.       Plan/Recommendations:   Okay to step down to maintenance.

## 2021-06-10 NOTE — PROGRESS NOTES
I had 2 falls this month.  1- I was cleaning out the cat box and I feel. There was nothing around the area where I could catch my balance. I believe that my blood sugar may have been low. I did crawl to the bathroom to be able to get up.  2- I fell on a wet floor. I did connect with Marichuy regarding my wrist. My leg hurts but I can walk and I'm doing fine. Patient knows to call the clinic if she has concerns.     _________________________________________________________________________    Oldest Son Ben passed- Still is hard during holidays, birthdays etc.  Was having a hard time with son dating. They use to do everything together and now she doesn't really see him. It was helpful talking about it today.  No pain clinic as insurance won't pay due to stimulation.  Outreach 2 months

## 2021-06-10 NOTE — PATIENT INSTRUCTIONS - HE
Summary of Your Rheumatology Visit    Next Appointment:  4 Months    Medications:    Please follow directives on pill bottle on how to take medication(s) provided.    Referrals:      Tests:     Please have labs performed in 3 weeks.        Injections:        Other:

## 2021-06-10 NOTE — TELEPHONE ENCOUNTER
Received fax from [facility] stating she would like the lidocaine cream versus the patch due to the price.

## 2021-06-10 NOTE — PROGRESS NOTES
Internal Medicine Office Visit  Patient Name: Ebonie Bowman  Patient Age: 67 y.o.  YOB: 1950  MRN: [id number]  ?  Date of Visit: 2017  Reason for Office Visit:   Chief Complaint   Patient presents with     Follow-up     3 months DM. A1C done. cant pressure on hand to get up hurts her wrist on the left wrist pain also goes into the thumb with certain movements.       Assessment / Plan / Medical Decision Makin. Diabetes mellitus, type 2  2. Left wrist pain  3. Chronic pain  4. Lumbar radiculopathy, chronic  -Continue oxycodone-acetaminophen 10/325 1 tablet every 8 hours for chronic back pain  -Again reiterated the importance of good glycemic control with minimization of risk of hypoglycemia.  Reassured patient that the increase in her A1c is not as concerning as her previously reported hypoglycemic episodes.  - X-ray of wrist: no wrist fracture after recent fall. May wear wrist brace as needed   -Continue current dose of NPH insulin.  Due to cost, patient will switch to over-the-counter Novolin R regular insulin for mealtime injections.  -Blood pressure recheck in 2 weeks  -Diabetes and chronic pain follow-up in 3 months      Health Maintenance Review  Health Maintenance   Topic Date Due     DEPRESSION FOLLOW UP  1950     DIABETES FOLLOW-UP  1950     DIABETES FOOT EXAM  1958     DIABETES OPHTHALMOLOGY EXAM  1958     DXA SCAN  2013     DIABETES URINE MICROALBUMIN  2014     FALL RISK ASSESSMENT  2017     DIABETES HEMOGLOBIN A1C  10/11/2017     MAMMOGRAM  2018     ADVANCE DIRECTIVES DISCUSSED WITH PATIENT  2021     TD 18+ HE  2024     COLONOSCOPY  2026     PNEUMOCOCCAL POLYSACCHARIDE VACCINE AGE 65 AND OVER  Completed     INFLUENZA VACCINE RULE BASED  Addressed     PNEUMOCOCCAL CONJUGATE VACCINE FOR ADULTS (PCV13 OR PREVNAR)  Addressed     ZOSTER VACCINE  Completed           I have discontinued Ms. Bowman's NovoLOG. I am also having  her start on insulin regular. Additionally, I am having her maintain her magnesium oxide, ascorbic acid (vitamin C), cholecalciferol (vitamin D3), aspirin, cyanocobalamin, omega-3 fatty acids-vitamin E, blood glucose test, lancing device, MULTIVITAMIN ORAL, docusate sodium, traZODone, insulin NPH, loratadine, folic acid, insulin syringe-needle U-100, lisinopril, omeprazole, insulin NPH human recomb, methotrexate, predniSONE, buPROPion, atorvastatin, metoprolol succinate, and oxyCODONE-acetaminophen.     HPI:   Encounter Diagnoses   Name Primary?     Left wrist pain Yes     Diabetes mellitus, type 2      Chronic pain      Lumbar radiculopathy, chronic       The patient is a 67-year-old female who presents to the office today for follow-up of diabetes mellitus type 2.  She reduced her dose of insulin after instruction at her last visit due to risks of hypoglycemia with an A1c previously around 5.7%.  Her dose of NPH insulin was reduced and today's A1c is 6.9%.  She reports that her readings range from .  She again forgot to bring her meter with her today so these reports are by memory.    She has a history of chronic back pain.  She is taking oxycodone-acetaminophen for this.  Last urine drug screen done 12/2016.  She continues to be followed by pain management for interventional pain management.  She has had some improvement in pain relief with a spinal cord stimulator.  Her pain is typically over the SI region bilaterally and in the lumbosacral junction.  She continues to ambulate with a cane.    Reports that approximately 1 week ago she was cleaning out her cat litter box and bending to do so.  She was stooping very low and then had a difficult time standing back up and lost her balance falling backward.  She is uncertain how she landed and did not have any immediate pain after the fall but now has pain in her left wrist particularly when she pushes up from a chair.  No loss of range of motion in the  wrist.    Review of Systems: Negative for symptoms of hypoglycemia.  No polyuria or polydipsia.    Current Scheduled Meds:  Outpatient Encounter Prescriptions as of 4/11/2017   Medication Sig Dispense Refill     ascorbic acid (ASCORBIC ACID WITH LATRICIA HIPS) 500 MG tablet Take 500 mg by mouth every evening.        aspirin 81 MG EC tablet Take 81 mg by mouth every evening.       atorvastatin (LIPITOR) 40 MG tablet Take 1 tablet (40 mg total) by mouth bedtime. 90 tablet 3     blood glucose test (ACCU-CHEK FRANCISCO PLUS TEST STRP) Strp TEST FOUR TIMES DAILY 400 strip 3     buPROPion (WELLBUTRIN SR) 100 MG 12 hr tablet TAKE 1 TABLET EVERY DAY 90 tablet 1     cholecalciferol, vitamin D3, 1,000 unit tablet Take 1,000 Units by mouth every evening.        cyanocobalamin (VITAMIN B-12) 250 MCG tablet Take 250 mcg by mouth every evening.        docusate sodium (COLACE) 100 MG capsule Take 100 mg by mouth 2 (two) times a day.       folic acid (FOLVITE) 800 MCG tablet Take 1,200 mcg by mouth daily.       insulin NPH (NOVOLIN) 100 unit/mL injection Inject 40 Units under the skin bedtime.        insulin NPH human recomb (HUMULIN N PEN) 100 unit/mL (3 mL) pen Give 60 units subcutaneously every morning and 40 units subcutaneously every evening 3 mL 11     insulin syringe-needle U-100 (BD INSULIN SYRINGE ULT-FINE II) 1 mL 31 gauge x 5/16 Syrg USE 4 TIMES DAILY AS DIRECTED 500 each 1     lancing device (ACCU-CHEK SOFTCLIX) Misc Use 1 applicator As Directed 4 (four) times a day. 300 each 3     lisinopril (PRINIVIL,ZESTRIL) 10 MG tablet Take 1 tablet (10 mg total) by mouth bedtime. 90 tablet 3     loratadine (CLARITIN) 10 mg tablet Take 10 mg by mouth bedtime.        magnesium oxide (MAG-OX) 400 mg tablet Take 400 mg by mouth bedtime. With food        methotrexate 2.5 MG tablet Take 4 tablets (10 mg total) by mouth once a week. 48 tablet 0     metoprolol succinate (TOPROL-XL) 25 MG TAKE ONE TABLET BY MOUTH ONCE DAILY 90 tablet 2      MULTIVITAMIN ORAL Take 1 tablet by mouth daily.       omega-3 fatty acids-vitamin E (FISH OIL) 1,000 mg cap Take 1,000 mg by mouth every evening. 90 each 3     omeprazole (PRILOSEC) 20 MG capsule TAKE 1 CAPSULE TWICE DAILY 180 capsule 2     oxyCODONE-acetaminophen (PERCOCET)  mg per tablet Take 1 tablet by mouth every 8 (eight) hours as needed for pain (Do not exceed 3 tablets per day). 90 tablet 0     predniSONE (DELTASONE) 2.5 MG tablet Take 1 tablet (2.5 mg total) by mouth every other day. 45 tablet 0     traZODone (DESYREL) 150 MG tablet Take 1 tablet (150 mg total) by mouth bedtime. 90 tablet 3     [DISCONTINUED] leflunomide (ARAVA) 20 MG tablet Take 1 tablet (20 mg total) by mouth daily. 90 tablet 0     [DISCONTINUED] metoprolol succinate (TOPROL-XL) 25 MG Take 25 mg by mouth bedtime.       [DISCONTINUED] NOVOLOG 100 unit/mL injection Check blood sugar three (3) times daily before meals.  Treat as directed.;250.01 IDDDM; 10 mL 0     [DISCONTINUED] oxyCODONE-acetaminophen (PERCOCET)  mg per tablet Take 1 tablet by mouth every 8 (eight) hours as needed for pain (Do not exceed 3 tablets per day). 90 tablet 0     insulin regular (NOVOLIN R) 100 unit/mL injection Inject 16 units subcutaneous TID with meals plus sliding scale 10 mL 6     No facility-administered encounter medications on file as of 4/11/2017.      Past Medical History:   Diagnosis Date     BBB (bundle branch block)      Chronic back pain      Chronic kidney disease     stage 3     Chronic Kidney Disease (NKF Classification)     Created by Conversion      Depression      Diabetes     Type 2     Diabetes mellitus, type 2     Created by Conversion      Frequent headaches      Ganglion Of The Left Wrist     Created by Conversion      GERD (gastroesophageal reflux disease)      HLD (hyperlipidemia)      Hypertension     Per H&P dated 1/03/2013     Incarcerated ventral hernia     Per H&P dated 1/03/2013     Osteoarthritis      Rheumatoid  arthritis      Thrombocytopenia      Venous insufficiency      Past Surgical History:   Procedure Laterality Date     BACK SURGERY       CHOLECYSTECTOMY       FATTY TUMOR  1996    LT SHOULDER BLADE     HYSTERECTOMY  1984     JOINT REPLACEMENT Left     knee     UT ABDOMEN SURGERY PROC UNLISTED      Description: Hernia Repair;  Recorded: 10/23/2013;     Two left wrist  1990, 1995     Social History   Substance Use Topics     Smoking status: Former Smoker     Quit date: 8/1/2008     Smokeless tobacco: Never Used      Comment: smells of cigarrette smoke     Alcohol use No       Objective / Physical Examination:  Vitals:    04/11/17 1135 04/11/17 1214   BP: 140/90 (!) 150/92   Patient Site: Left Arm    Patient Position: Sitting    Cuff Size: Adult Large    Pulse: 72    Weight: (!) 241 lb 9.6 oz (109.6 kg)      Wt Readings from Last 3 Encounters:   04/11/17 (!) 241 lb 9.6 oz (109.6 kg)   03/10/17 (!) 238 lb (108 kg)   02/27/17 (!) 238 lb (108 kg)     Body mass index is 32.77 kg/(m^2).     General Appearance: Alert and oriented, cooperative, affect appropriate, speech clear, in no apparent distress  Lungs: Clear to auscultation bilaterally. Normal inspiratory and expiratory effort  Cardiovascular: Regular rate, normal S1, S2  Extremities: Pulses 2+ and equal throughout. No edema.  MSK: No swelling, bruising in wrist. Tenderness over distal radius. Full range of motion both wrists. Left wrist with pain on the dorsal surface of the wrist with flexion. Strength 4/5 in left wrist, limited by pain.   Diabetic foot exam:   Left: Filament test present  Right: Filament test present        Orders Placed This Encounter   Procedures     XR Wrist Left 3 or More VWS   Followup: No Follow-up on file. earlier if needed.      Marichuy Rubio, CNP  [facility]

## 2021-06-10 NOTE — PROGRESS NOTES
HPI:  Ebonie Bowman is a 70 y.o. female who is seen for   Chief Complaint   Patient presents with     Rash     on lower belly and possible on butt- comes and goes worse when hot    Ebonie Bowman has an itchy and hot feeling rash on her pelvic floor skin and under her pannus on her abdomen. This has been present for several days. She denies pain, pusules, skin breaks, abscesses.  ROS: negative for fever, cough, malaise, fatigue, myalgias and as in HPI.      Lab Results   Component Value Date    HGBA1C 6.2 (H) 02/26/2020    HGBA1C 6.9 (H) 11/21/2019    HGBA1C 7.0 (H) 10/14/2019     Lab Results   Component Value Date    MICROALBUR 12.23 (H) 08/21/2019    LDLCALC 53 08/21/2019    CREATININE 1.18 (H) 05/19/2020     Patient Active Problem List   Diagnosis     Osteoarthritis     Venous Insufficiency     HTN (hypertension)     Insomnia     Joint Pain, Localized In The Knee     Chronic kidney disease, stage 3 (moderate) (H)     Hyperlipidemia     Type 2 diabetes mellitus with hypoglycemia without coma, with long-term current use of insulin (H)     Joint Pain, Localized In The Hip     Morbid obesity (H)     Depression     Rheumatoid arthritis (H)     Seropositive rheumatoid arthritis (H)     Lumbar radiculopathy     S/P TKR (total knee replacement) using cement, left     High risk medication use     Chronic pain     GERD (gastroesophageal reflux disease)     Bilateral primary osteoarthritis of hip     Atherosclerotic cardiovascular disease, seen on abd CT 2016      S/P insertion of spinal cord stimulator     Uncomplicated opioid dependence (H)     Controlled substance agreement signed, oxycodone. 8/21/19. UDS 8/21/19     Allergic rhinitis due to cats     Positive FIT (fecal immunochemical test)     Family History   Problem Relation Age of Onset     Acute Myocardial Infarction Father 62     Cancer Father      Heart disease Father      COPD Father      Diabetes Sister      Diabetes Brother      Melanoma Son      Diabetes Maternal  Grandmother      Social History     Socioeconomic History     Marital status:      Spouse name: None     Number of children: None     Years of education: None     Highest education level: None   Occupational History     None   Social Needs     Financial resource strain: None     Food insecurity     Worry: None     Inability: None     Transportation needs     Medical: None     Non-medical: None   Tobacco Use     Smoking status: Former Smoker     Last attempt to quit: 2008     Years since quittin.0     Smokeless tobacco: Never Used     Tobacco comment: smells of cigarrette smoke   Substance and Sexual Activity     Alcohol use: No     Drug use: No     Comment: takes percocet for chronic pain     Sexual activity: None   Lifestyle     Physical activity     Days per week: None     Minutes per session: None     Stress: None   Relationships     Social connections     Talks on phone: None     Gets together: None     Attends Judaism service: None     Active member of club or organization: None     Attends meetings of clubs or organizations: None     Relationship status: None     Intimate partner violence     Fear of current or ex partner: None     Emotionally abused: None     Physically abused: None     Forced sexual activity: None   Other Topics Concern     None   Social History Narrative    Lives with her son. Lost one son when he was age 30 due to melanoma. Moved around growing up since her father was in the      Past Surgical History:   Procedure Laterality Date     BACK SURGERY       CHOLECYSTECTOMY       FATTY TUMOR      LT SHOULDER BLADE     HYSTERECTOMY  1984     JOINT REPLACEMENT Left     knee     NE ABDOMEN SURGERY PROC UNLISTED      Description: Hernia Repair;  Recorded: 10/23/2013;     NE IMPLANT SPINAL NEUROSTIM/ Left 2019    Procedure: LEFT FLANK INCISION REPLACE NEUROSTIMULATOR;  Surgeon: Vishal Pretty MD;  Location: [facility];  Service: Pain      SPINAL CORD STIMULATOR IMPLANT       Two left wrist  1990, 1995     Current Outpatient Medications on File Prior to Visit   Medication Sig Dispense Refill     amLODIPine (NORVASC) 10 MG tablet TAKE 1 TABLET (10 MG TOTAL) BY MOUTH DAILY. 90 tablet 2     ascorbic acid (ASCORBIC ACID WITH LATRICIA HIPS) 500 MG tablet Take 500 mg by mouth every evening.        aspirin 81 MG EC tablet Take 81 mg by mouth every evening.       atorvastatin (LIPITOR) 40 MG tablet Take 1 tablet (40 mg total) by mouth at bedtime. 90 tablet 2     b complex vitamins tablet Take 1 tablet by mouth daily.       blood glucose test strips Check blood sugar four times daily. Dispense brand per patient's insurance at pharmacy discretion. Dx: e11.9 400 strip 3     blood-glucose meter Misc Dispense one meter. Use as directed. Dx: e11.9 1 each 0     buPROPion (WELLBUTRIN) 100 MG tablet Take 1 tablet (100 mg total) by mouth daily. 90 tablet 3     cholecalciferol, vitamin D3, (VITAMIN D3 ORAL) Take 125 mcg by mouth daily.       estradiol (ESTRACE) 0.01 % (0.1 mg/gram) vaginal cream Apply a pea-sized amount vaginally daily x 1 week then reduce to 3 times per week thereafter 42.5 g 1     famotidine (PEPCID) 20 MG tablet Take 1 tablet (20 mg total) by mouth 2 (two) times a day. 180 tablet 3     flash glucose sensor (FREESTYLE BRE 14 DAY SENSOR) Kit Use 1 each As Directed every 14 (fourteen) days. 2 kit 11     folic acid (FOLVITE) 800 MCG tablet Take 800 mcg by mouth every evening.              hydrOXYchloroQUINE (PLAQUENIL) 200 mg tablet Take 1 tablet p.o. twice daily. 180 tablet 0     insulin regular (NOVOLIN R) 100 unit/mL injection Inject 24 Units under the skin 3 (three) times a day before meals.       insulin syringe-needle U-100 (BD INSULIN SYRINGE ULT-FINE II) 1 mL 31 gauge x 5/16 Syrg USE 4 TIMES DAILY AS DIRECTED 500 each 1     lancing device (ACCU-CHEK SOFTCLIX) Misc Use 1 applicator As Directed 4 (four) times a day. 300 each 3     lidocaine (LMX) 4 %  cream Apply to affected area twice daily as needed 30 g 3     lisinopril (PRINIVIL,ZESTRIL) 10 MG tablet TAKE 1 TABLET AT BEDTIME 90 tablet 2     loratadine (CLARITIN) 10 mg tablet Take 1 tablet (10 mg total) by mouth at bedtime. 90 tablet 3     magnesium oxide (MAG-OX) 400 mg tablet Take 400 mg by mouth at bedtime. With food              methotrexate 2.5 MG tablet Take 5 tablets po once a week (hold dose if you come down with an infection, until infection clears). 65 tablet 0     metoprolol succinate (TOPROL-XL) 50 MG 24 hr tablet TAKE 1 TABLET EVERY DAY 90 tablet 1     MULTIVITAMIN ORAL Take 1 tablet by mouth every evening.        NOVOLIN N NPH U-100 INSULIN 100 unit/mL injection Inject 33 Units under the skin 2 (two) times a day before meals. 10 mL 11     nystatin (MYCOSTATIN) powder Apply to affected area 3 times daily for 2 weeks. 60 g 0     omega-3 fatty acids-vitamin E (FISH OIL) 1,000 mg cap Take 1,000 mg by mouth every evening. 90 each 3     oxyCODONE-acetaminophen (PERCOCET) 5-325 mg per tablet Take 1-2 tablets by mouth every 6 (six) hours as needed for pain (maximum of 6 tablets per day). For 8/13/20-9/12/20 180 tablet 0     traZODone (DESYREL) 150 MG tablet TAKE 1 TABLET AT BEDTIME 90 tablet 3     flash glucose scanning reader (PlovghSTNerdies BRE 14 DAY READER) Misc Use 1 each As Directed see administration instructions. Use as directed with sensor 1 each 0     lidocaine (LIDODERM) 5 % Apply patch to effected area. Remove & Discard patch within 12 hours 30 patch 5     polyethylene glycol (MIRALAX) 17 gram packet Take 17 g by mouth daily as needed.       predniSONE (DELTASONE) 2.5 MG tablet Take 4 tab p.o. qd for 7 days then decrease by 1 tab q7 days, remaining on 2.5 mg daily.. 130 tablet 0     predniSONE (DELTASONE) 5 MG tablet Take 2 tab p.o. qd for 7 days then 1 tab qd for 7 days then 1/2 tab qd for 7 days to off.. 25 tablet 0     No current facility-administered medications on file prior to visit.       Allergies   Allergen Reactions     Penicillins Hives     Sulfa (Sulfonamide Antibiotics) Hives     OB History        2    Para   2    Term   2            AB        Living           SAB        TAB        Ectopic        Multiple        Live Births                   I have reviewed the patient's medical history in detail and updated the computerized patient record.  OBJECTIVE:  Wt Readings from Last 3 Encounters:   20 (!) 222 lb 9.6 oz (101 kg)   20 220 lb (99.8 kg)   20 220 lb (99.8 kg)     Temp Readings from Last 3 Encounters:   20 98.1  F (36.7  C) (Oral)   20 98.4  F (36.9  C) (Oral)   19 97.6  F (36.4  C) (Oral)     BP Readings from Last 3 Encounters:   20 122/68   20 126/60   20 120/72     Pulse Readings from Last 3 Encounters:   20 (!) 59   20 82   20 82     Body mass index is 30.19 kg/m .     Alert, cooperative, well-hydrated. Appears well.  Eyes: Pupils equal, round, reactive to light.  HEENT: Sclera white,  Lungs: equal chest rise, no retractions, normal respiratory rate.  Abdomen: pink rash under the pannus without skin break down or fissures.  Extremities: no tenderness to palpation of gastrocnemius, bilaterally.  Skin: no increased warmth, edema, moderate beefy erythema of the pelvic floor skin with some desquamation, no ulcerations and no pressure ulcers in the rectal or gluteal area.  Rectum: no fistula, hemorrhoids or abscesses.      Labs:  Lab on 2020   Component Date Value     Creatinine 2020 1.18*     GFR MDRD Af Amer 2020 55*     GFR MDRD Non Af Amer 2020 45*     ALT 2020 15      AST 2020 17      Albumin 2020 3.4*     WBC 2020 9.6      RBC 2020 4.15      Hemoglobin 2020 13.4      Hematocrit 2020 38.9      MCV 2020 94      MCH 2020 32.2      MCHC 2020 34.3      RDW 2020 11.9      Platelets 2020 132*     MPV 2020  7.8      Neutrophils % 05/19/2020 64      Lymphocytes % 05/19/2020 23      Monocytes % 05/19/2020 8      Eosinophils % 05/19/2020 4      Basophils % 05/19/2020 1      Neutrophils Absolute 05/19/2020 6.2      Lymphocytes Absolute 05/19/2020 2.2      Monocytes Absolute 05/19/2020 0.7      Eosinophils Absolute 05/19/2020 0.4      Basophils Absolute 05/19/2020 0.1      ASSESMENT/PLAN:  1. Yeast infection of the skin  nystatin (MYCOSTATIN) ointment     Johanny Harris, MS, PA-C 08/12/20

## 2021-06-10 NOTE — PROGRESS NOTES
Ebonie Bowman who presents today with a chief complaint of  Follow-up      Joint Pains: Yes  Location: multiple joint pain, mainly b/l wrist and shoulder  Onset: chronic couple months   Intensity: 6 /10  AM Stiffness: yes, 5-10 Minutes  Alleviating/Aggravating Factors: Medications not helpful  Tolerating Meds: yes tolerate med.   Other:      ROS:  Patient denies having any active: chest pain, shortness of breath, cough, abdominal pain, nausea, vomiting, rashes, documented fevers, oral ulcers and recent infections. +Patient admits to having a good appetite  Information gathered by medical assistant incorporated into this note, was reviewed and discussed with the patient.    Problem List:  Patient Active Problem List   Diagnosis     Osteoarthritis     Venous Insufficiency     HTN (hypertension)     Insomnia     Joint Pain, Localized In The Knee     Chronic kidney disease, stage 3 (moderate) (H)     Hyperlipidemia     Type 2 diabetes mellitus with hypoglycemia without coma, with long-term current use of insulin (H)     Joint Pain, Localized In The Hip     Morbid obesity (H)     Depression     Rheumatoid arthritis (H)     Seropositive rheumatoid arthritis (H)     Lumbar radiculopathy     S/P TKR (total knee replacement) using cement, left     High risk medication use     Chronic pain     GERD (gastroesophageal reflux disease)     Bilateral primary osteoarthritis of hip     Atherosclerotic cardiovascular disease, seen on abd CT 2016      S/P insertion of spinal cord stimulator     Uncomplicated opioid dependence (H)     Controlled substance agreement signed, oxycodone. 8/21/19. UDS 8/21/19     Allergic rhinitis due to cats     Positive FIT (fecal immunochemical test)        PMH:   Past Medical History:   Diagnosis Date     BBB (bundle branch block)      Chronic back pain      Chronic kidney disease     stage 3     Chronic Kidney Disease (NKF Classification)     Created by Conversion      Depression      Diabetes (H)      Type 2     Diabetes mellitus, type 2 (H)     Created by Conversion      Frequent headaches      Ganglion Of The Left Wrist     Created by Conversion      GERD (gastroesophageal reflux disease)      HLD (hyperlipidemia)      Hypertension     Per H&P dated 1/03/2013     Incarcerated ventral hernia     Per H&P dated 1/03/2013     Osteoarthritis      Rheumatoid arthritis (H)      Shortness of breath      Thrombocytopenia (H)      Venous insufficiency        Surgical History:  Past Surgical History:   Procedure Laterality Date     BACK SURGERY       CHOLECYSTECTOMY       FATTY TUMOR  1996    LT SHOULDER BLADE     HYSTERECTOMY  1984     JOINT REPLACEMENT Left     knee     MT ABDOMEN SURGERY PROC UNLISTED      Description: Hernia Repair;  Recorded: 10/23/2013;     MT IMPLANT SPINAL NEUROSTIM/ Left 4/25/2019    Procedure: LEFT FLANK INCISION REPLACE NEUROSTIMULATOR;  Surgeon: Vishal Pretty MD;  Location: [facility];  Service: Pain     SPINAL CORD STIMULATOR IMPLANT       Two left wrist  1990, 1995       Family History:  Family History   Problem Relation Age of Onset     Acute Myocardial Infarction Father 62     Cancer Father      Heart disease Father      COPD Father      Diabetes Sister      Diabetes Brother      Melanoma Son      Diabetes Maternal Grandmother        Social History:   reports that she quit smoking about 12 years ago. She has never used smokeless tobacco. She reports that she does not drink alcohol or use drugs.    Allergies:  Allergies   Allergen Reactions     Penicillins Hives     Sulfa (Sulfonamide Antibiotics) Hives        Current Medications:  Current Outpatient Medications   Medication Sig Dispense Refill     amLODIPine (NORVASC) 10 MG tablet TAKE 1 TABLET (10 MG TOTAL) BY MOUTH DAILY. 90 tablet 2     ascorbic acid (ASCORBIC ACID WITH LATRICIA HIPS) 500 MG tablet Take 500 mg by mouth every evening.        aspirin 81 MG EC tablet Take 81 mg by mouth every evening.        atorvastatin (LIPITOR) 40 MG tablet Take 1 tablet (40 mg total) by mouth at bedtime. 90 tablet 2     b complex vitamins tablet Take 1 tablet by mouth daily.       blood glucose test strips Check blood sugar four times daily. Dispense brand per patient's insurance at pharmacy discretion. Dx: e11.9 400 strip 3     blood-glucose meter Misc Dispense one meter. Use as directed. Dx: e11.9 1 each 0     buPROPion (WELLBUTRIN) 100 MG tablet Take 1 tablet (100 mg total) by mouth daily. 90 tablet 3     cholecalciferol, vitamin D3, (VITAMIN D3 ORAL) Take 125 mcg by mouth daily.       estradiol (ESTRACE) 0.01 % (0.1 mg/gram) vaginal cream Apply a pea-sized amount vaginally daily x 1 week then reduce to 3 times per week thereafter 42.5 g 1     famotidine (PEPCID) 20 MG tablet Take 1 tablet (20 mg total) by mouth 2 (two) times a day. 180 tablet 3     flash glucose scanning reader (FREESTYLE BRE 14 DAY READER) Misc Use 1 each As Directed see administration instructions. Use as directed with sensor 1 each 0     flash glucose sensor (FREESTYLE BRE 14 DAY SENSOR) Kit Use 1 each As Directed every 14 (fourteen) days. 2 kit 11     folic acid (FOLVITE) 800 MCG tablet Take 800 mcg by mouth every evening.              hydrOXYchloroQUINE (PLAQUENIL) 200 mg tablet Take 1 tablet p.o. twice daily. 180 tablet 0     insulin regular (NOVOLIN R) 100 unit/mL injection Inject 24 Units under the skin 3 (three) times a day before meals.       insulin syringe-needle U-100 (BD INSULIN SYRINGE ULT-FINE II) 1 mL 31 gauge x 5/16 Syrg USE 4 TIMES DAILY AS DIRECTED 500 each 1     lancing device (ACCU-CHEK SOFTCLIX) Misc Use 1 applicator As Directed 4 (four) times a day. 300 each 3     lidocaine (LIDODERM) 5 % Apply patch to effected area. Remove & Discard patch within 12 hours 30 patch 5     lisinopril (PRINIVIL,ZESTRIL) 10 MG tablet TAKE 1 TABLET AT BEDTIME 90 tablet 2     loratadine (CLARITIN) 10 mg tablet Take 1 tablet (10 mg total) by mouth at  bedtime. 90 tablet 3     magnesium oxide (MAG-OX) 400 mg tablet Take 400 mg by mouth at bedtime. With food              methotrexate 2.5 MG tablet Take 5 tablets po once a week (hold dose if you come down with an infection, until infection clears). 65 tablet 0     metoprolol succinate (TOPROL-XL) 50 MG 24 hr tablet TAKE 1 TABLET (50 MG TOTAL) BY MOUTH DAILY. 90 tablet 1     MULTIVITAMIN ORAL Take 1 tablet by mouth every evening.        NOVOLIN N NPH U-100 INSULIN 100 unit/mL injection Inject 33 Units under the skin 2 (two) times a day before meals. 10 mL 11     omega-3 fatty acids-vitamin E (FISH OIL) 1,000 mg cap Take 1,000 mg by mouth every evening. 90 each 3     oxyCODONE-acetaminophen (PERCOCET) 5-325 mg per tablet Take 1-2 tablets by mouth every 6 (six) hours as needed for pain (maximum of 6 tablets per day). For 7/14/20-8/13/20 180 tablet 0     polyethylene glycol (MIRALAX) 17 gram packet Take 17 g by mouth daily as needed.       traZODone (DESYREL) 150 MG tablet TAKE 1 TABLET AT BEDTIME 90 tablet 3     predniSONE (DELTASONE) 5 MG tablet Take 2 tab p.o. qd for 7 days then 1 tab qd for 7 days then 1/2 tab qd for 7 days to off.. 25 tablet 0     No current facility-administered medications for this visit.            Physical Exam:  [facility] [birth year]1984     Following up today via phone visit, per Covid-19 pandemic requirements.    Verbal consent has been obtained for this service by a care team member.    Phone call start time: 12:51 PM (initially called at 12:40 PM failed video visit attempt sent at 12:44 PM, after discussing on how to perform, stated had smart phone)    Phone call end time: 1:12 PM    Phone number utilized: [phone]        Summary/Assessment:      History that includes seropositive rheumatoid arthritis, osteoarthritis, renal insufficiency.    States has some discomfort with some swelling involving some of her MCP joints.    Low-dose prednisone taper provided on past visit was beneficial, currently  off of prednisone.  States blood sugar levels were fine while on prednisone.    Claims compliance with methotrexate 5 tablets weekly and Plaquenil.  Claims to have seen the eye doctor within the past year.    Has renal insufficiency, history of diabetes.    Takes Percocet on average 6-8 tablets daily,, which provides relief.    Please see below for management plan.      Pertinent rheumatology/past medical history (please refer to above for more detailed history):      Seropositive rheumatoid arthritis    High risk medication use    Osteoarthritis, multiple joints    Hx left knee replacement    Chronic right knee pain (cortisone injection not helpful)    Chronic neck and back pain (managed by another provider)    Renal insufficiency    Chronic opioid use    Diabetes, type II    History of urosepsis      Rheumatology medications provided/suggested:    Methotrexate  Folic acid  Plaquenil  Prednisone      Pertinent medication from other providers or from otc (please refer to above for more detailed med list):    Percocet  Insulin    Pertinent medications already tried:     Humira (worsened joint pains)      Pertinent lab history:    Positive: CCP antibody    Negative: Rheumatoid factor, HUGH, c- ANCA      Pertinent imaging/test history:    X-ray of right hand from February 2019 that showed some signs of degenerative joint disease, no erosive changes identified.      Other:    Standing order for labs placed every 3 months good through April 2020    Denies tobacco use or regular alcohol beverage intake.  Quit smoking greater than 10 years ago.    Was a patient of Dr. Schofield, last seen July 2018.    Denies any history of macular degeneration, retinal disease or glaucoma.    Plan:      For active hand pain/arthritis, will add prednisone 10 mg daily to 2.5 mg daily, remain on 2.5 mg daily.  States checks fingersticks.  Made aware that if levels are persistently in the high 100 range or greater she should contact her PCP to  help adjust diabetic meds.     We will repeat bone density test, last performed 2018 was normal then.  Claims to be taking 4 tablets of vitamin D daily (unsure of dose, our med list has 125 mcg equivalent of 5000 units, suggested she look into and contact us), currently not taking calcium.  Suggest taking 600 mg of calcium daily either via diet or supplements.      Continue methotrexate 5 tablets weekly ( limiting dose given mild renal insufficiency, thrombocytopenia) .  Hold if develops any infection.    Continue over-the-counter folic acid daily, made aware to avoid taking on the day when taking methotrexate.    Continue Plaquenil 200 mg twice a day.  Follow-up with ophthalmology every 6 months to 1 year.    Takes Percocet 6-8 tablets daily.  Suggested if still experiencing pain to contact her pain clinic doctor.    Check labs in about 3 weeks    Follow-up in 4 months.      Procedure note:       This note was transcribed using Dragon voice recognition software as a result unintentional grammatical errors or word substitutions may have occurred. Please contact our Rheumatology department if you need any clarification or if you have any related inquiries.    Major side effect profile of medications provided were discussed with the patient.      Raheem Londono DO     ....................  7/30/2020   11:39 AM

## 2021-06-10 NOTE — PROGRESS NOTES
ASSESSMENT AND PLAN:  Ebonei Bowman 67 y.o. female  has sero positive rheumatoid arthritis, and widespread osteoarthritis status post left knee replacement.  She has renal impairment.  She is now hurting more in her hands especially the right side.  She has tolerated the methotrexate and leflunomide.  The end of the day.  She has synovitis like changes in her MCPs.  Clarify this situation further workup is indicated check factor an MRI of the right hand.    Diagnoses and all orders for this visit:    Seropositive rheumatoid arthritis  -     MR Hand Without Contrast Right; Future; Expected date: 5/10/17  -     vectra da - Misc. Lab Test    Chronic Kidney Disease (NKF Classification)  -     MR Hand Without Contrast Right; Future; Expected date: 5/10/17    Right hand pain  -     MR Hand Without Contrast Right; Future; Expected date: 5/10/17  -     vectra da - Misc. Lab Test          HISTORY OF PRESENTING ILLNESS:  Ebonie Bowman, 67 y.o., female is here follow-up of seropositive rheumatoid arthritis.  She has noted worsening,in the hands, more so on the right side.  She has continued to have low back pain.  She is on Percocet for that.  She was seen here in 2013.  As she noted that over the past 1 year other painful hands become worse.  She is stiff in the morning for 15 minutes or so.  She was appointed to the MCP areas as source of her pain she noted that there is some swelling to both in the 2 and 3 MCP regions worse on the left side she noted pain in her thumbs.  The more she uses the hands the more pain she has especially true for the left hand.  She also noted on the pain in her knees.  Her symptoms are moderately severe.  In the past she has had knee injections with corticosteroids with good effect.  She has noted dryness of her nose mouth, high blood pressure history, diabetes, swelling of the ankles, heartburn,.  She reports no other rash except the inframammary depending upon the weather, she has no history of  psoriasis ulcerative colitis Crohn's disease.  Her mom had rheumatoid arthritis.  She is nonsmoker and does not take alcohol no regular exercise.  She has had multiple musculoskeletal surgeries in the past she had left wrist surgery 1995 and 94 she had a injury there is a bucket fell, she had back surgery 2 and 87 and 1990.  And she is had the left knee surgery first hemiarthroplasty and then total knee replacement most recently in 2010.  Further historical information and ADL limitations as noted in the multidimensional health assessment questionnaire attached in the EMR.     ALLERGIES:Penicillins and Sulfa (sulfonamide antibiotics)    PAST MEDICAL/ACTIVE PROBLEMS/MEDICATION/ FAMILY HISTORY/SOCIAL DATA:  The patient has a family history of  Past Medical History:   Diagnosis Date     BBB (bundle branch block)      Chronic back pain      Chronic kidney disease     stage 3     Chronic Kidney Disease (NKF Classification)     Created by Conversion      Depression      Diabetes     Type 2     Diabetes mellitus, type 2     Created by Conversion      Frequent headaches      Ganglion Of The Left Wrist     Created by Conversion      GERD (gastroesophageal reflux disease)      HLD (hyperlipidemia)      Hypertension     Per H&P dated 1/03/2013     Incarcerated ventral hernia     Per H&P dated 1/03/2013     Osteoarthritis      Rheumatoid arthritis      Thrombocytopenia      Venous insufficiency      History   Smoking Status     Former Smoker     Quit date: 8/1/2008   Smokeless Tobacco     Never Used     Comment: smells of cigarrette smoke     Patient Active Problem List   Diagnosis     Osteoarthritis     Venous Insufficiency     Hypertension     Insomnia     Joint Pain, Localized In The Knee     Bursitis     Obesity     Chronic Kidney Disease (NKF Classification)     Hyperlipidemia     Diabetes mellitus, type 2     Joint Pain, Localized In The Hip     Morbid obesity     Depression     Thrombocytopenia     Seropositive  rheumatoid arthritis     Lumbar radiculopathy, chronic     S/P TKR (total knee replacement) using cement, left     High risk medication use     Chronic pain     Current Outpatient Prescriptions   Medication Sig Dispense Refill     ascorbic acid (ASCORBIC ACID WITH LATRICIA HIPS) 500 MG tablet Take 500 mg by mouth every evening.        aspirin 81 MG EC tablet Take 81 mg by mouth every evening.       atorvastatin (LIPITOR) 40 MG tablet Take 1 tablet (40 mg total) by mouth bedtime. 90 tablet 3     blood glucose test (ACCU-CHEK FRANCISCO PLUS TEST STRP) Strp TEST FOUR TIMES DAILY 400 strip 3     buPROPion (WELLBUTRIN SR) 100 MG 12 hr tablet TAKE 1 TABLET EVERY DAY 90 tablet 1     cholecalciferol, vitamin D3, 1,000 unit tablet Take 1,000 Units by mouth every evening.        cyanocobalamin (VITAMIN B-12) 250 MCG tablet Take 250 mcg by mouth every evening.        docusate sodium (COLACE) 100 MG capsule Take 100 mg by mouth 2 (two) times a day.       folic acid (FOLVITE) 800 MCG tablet Take 1,200 mcg by mouth daily.       insulin NPH (NOVOLIN) 100 unit/mL injection Inject 40 Units under the skin bedtime.        insulin NPH human recomb (HUMULIN N PEN) 100 unit/mL (3 mL) pen Give 60 units subcutaneously every morning and 40 units subcutaneously every evening 3 mL 11     insulin regular (NOVOLIN R) 100 unit/mL injection Inject 16 units subcutaneous TID with meals plus sliding scale 10 mL 6     insulin syringe-needle U-100 (BD INSULIN SYRINGE ULT-FINE II) 1 mL 31 gauge x 5/16 Syrg USE 4 TIMES DAILY AS DIRECTED 500 each 1     lancing device (ACCU-CHEK SOFTCLIX) Misc Use 1 applicator As Directed 4 (four) times a day. 300 each 3     leflunomide (ARAVA) 20 MG tablet TAKE 1 TABLET EVERY DAY 90 tablet 0     lisinopril (PRINIVIL,ZESTRIL) 10 MG tablet Take 1 tablet (10 mg total) by mouth bedtime. 90 tablet 3     loratadine (CLARITIN) 10 mg tablet Take 10 mg by mouth bedtime.        magnesium oxide (MAG-OX) 400 mg tablet Take 400 mg by mouth  bedtime. With food        methotrexate 2.5 MG tablet Take 4 tablets (10 mg total) by mouth once a week. 48 tablet 0     metoprolol succinate (TOPROL-XL) 25 MG TAKE ONE TABLET BY MOUTH ONCE DAILY 90 tablet 2     MULTIVITAMIN ORAL Take 1 tablet by mouth daily.       omega-3 fatty acids-vitamin E (FISH OIL) 1,000 mg cap Take 1,000 mg by mouth every evening. 90 each 3     omeprazole (PRILOSEC) 20 MG capsule TAKE 1 CAPSULE TWICE DAILY 180 capsule 2     oxyCODONE-acetaminophen (PERCOCET)  mg per tablet Take 1 tablet by mouth every 8 (eight) hours as needed for pain (Do not exceed 3 tablets per day). 90 tablet 0     predniSONE (DELTASONE) 2.5 MG tablet Take 1 tablet (2.5 mg total) by mouth every other day. 45 tablet 0     traZODone (DESYREL) 150 MG tablet Take 1 tablet (150 mg total) by mouth bedtime. 90 tablet 3     No current facility-administered medications for this visit.          DETAILED EXAMINATION  05/10/17  :  Vitals:    05/10/17 1548   Pulse: 84   Weight: (!) 241 lb 9.6 oz (109.6 kg)   Height: 6' (1.829 m)     Alert oriented. Head including the face is examined for malar rash, heliotropes, scarring, lupus pernio. Eyes examined for redness such as in episcleritis/scleritis, periorbital lesions.   Neck examined  for lymph nodes, range of motion Both upper and lower extremities (all four) examined for swollen, warm &/or  tender joints, range of motion, rash, muscle weakness, edema. The salient normal / abnormal findings are appended. She has left knee arthroplasty scar.  She has right knee joint line tenderness, no effusion or warmth.  She has synovial thickening and tenderness in MCPs on right side particularly #2, 3.           LAB / IMAGING DATA:  ALT   Date Value Ref Range Status   05/05/2017 22 0 - 45 U/L Final   02/13/2017 15 0 - 45 U/L Final   12/07/2016 10 0 - 45 U/L Final     Albumin   Date Value Ref Range Status   05/05/2017 3.3 (L) 3.5 - 5.0 g/dL Final   02/13/2017 3.4 (L) 3.5 - 5.0 g/dL Final    12/07/2016 3.4 (L) 3.5 - 5.0 g/dL Final     Creatinine   Date Value Ref Range Status   05/05/2017 1.13 (H) 0.60 - 1.10 mg/dL Final   02/13/2017 1.24 (H) 0.60 - 1.10 mg/dL Final   12/07/2016 1.32 (H) 0.60 - 1.10 mg/dL Final       WBC   Date Value Ref Range Status   05/05/2017 9.5 4.0 - 11.0 thou/uL Final   02/13/2017 12.2 (H) 4.0 - 11.0 thou/uL Final   08/12/2015 11.9 (H) 4.0 - 11.0 thou/uL Final   07/01/2015 12.8 (H) 4.0 - 11.0 thou/uL Final     Hemoglobin   Date Value Ref Range Status   05/05/2017 14.3 12.0 - 16.0 g/dL Final   02/13/2017 14.8 12.0 - 16.0 g/dL Final   12/07/2016 14.1 12.0 - 16.0 g/dL Final     Platelets   Date Value Ref Range Status   05/05/2017 131 (L) 140 - 440 thou/uL Final   02/13/2017 118 (L) 140 - 440 thou/uL Final   12/07/2016 133 (L) 140 - 440 thou/uL Final       Lab Results   Component Value Date    RF <15.0 05/13/2015    SEDRATE 23 (H) 05/13/2015

## 2021-06-10 NOTE — TELEPHONE ENCOUNTER
Refills Approved x 2    Rx renewed per Medication Renewal Policy. Medication was last renewed on 01/21/2020-lisinopril  11/02/2019-bupropion  Last office visit was 08/12/2020 with PCP office.    Nikki Villanueva, [facility] Triage/Med Refill 8/16/2020     Requested Prescriptions   Pending Prescriptions Disp Refills     buPROPion (WELLBUTRIN) 100 MG tablet [Pharmacy Med Name: BUPROPION  MG Tablet] 90 tablet 3     Sig: TAKE 1 TABLET EVERY DAY       Tricyclics/Misc Antidepressant/Antianxiety Meds Refill Protocol Passed - 8/14/2020  4:56 PM        Passed - PCP or prescribing provider visit in last year     Last office visit with prescriber/PCP: 3/11/2020 Marichuy Rubio FNP OR same dept: 3/11/2020 Marichuy Rubio FNP OR same specialty: 3/11/2020 Marichuy Rubio FNP  Last physical: 4/15/2019 Last MTM visit: Visit date not found   Next visit within 3 mo: Visit date not found  Next physical within 3 mo: Visit date not found  Prescriber OR PCP: ARSLAN Vasquez  Last diagnosis associated with med order: 1. Overweight  - buPROPion (WELLBUTRIN) 100 MG tablet [Pharmacy Med Name: BUPROPION  MG Tablet]; TAKE 1 TABLET EVERY DAY  Dispense: 90 tablet; Refill: 3    2. Depression  - buPROPion (WELLBUTRIN) 100 MG tablet [Pharmacy Med Name: BUPROPION  MG Tablet]; TAKE 1 TABLET EVERY DAY  Dispense: 90 tablet; Refill: 3    3. HTN (hypertension)  - lisinopriL (PRINIVIL,ZESTRIL) 10 MG tablet [Pharmacy Med Name: LISINOPRIL 10 MG Tablet]; TAKE 1 TABLET AT BEDTIME  Dispense: 90 tablet; Refill: 2    If protocol passes may refill for 12 months if within 3 months of last provider visit (or a total of 15 months).                lisinopriL (PRINIVIL,ZESTRIL) 10 MG tablet [Pharmacy Med Name: LISINOPRIL 10 MG Tablet] 90 tablet 2     Sig: TAKE 1 TABLET AT BEDTIME       Ace Inhibitors Refill Protocol Passed - 8/14/2020  4:56 PM        Passed - PCP or prescribing provider visit in past 12 months       Last  office visit with prescriber/PCP: 3/11/2020 Marichuy Rubio FNP OR same dept: 3/11/2020 Marichuy Rubio FNP OR same specialty: 3/11/2020 Marichuy Rubio FNP  Last physical: 4/15/2019 Last MTM visit: Visit date not found   Next visit within 3 mo: Visit date not found  Next physical within 3 mo: Visit date not found  Prescriber OR PCP: ARSLAN Vasquez  Last diagnosis associated with med order: 1. Overweight  - buPROPion (WELLBUTRIN) 100 MG tablet [Pharmacy Med Name: BUPROPION  MG Tablet]; TAKE 1 TABLET EVERY DAY  Dispense: 90 tablet; Refill: 3    2. Depression  - buPROPion (WELLBUTRIN) 100 MG tablet [Pharmacy Med Name: BUPROPION  MG Tablet]; TAKE 1 TABLET EVERY DAY  Dispense: 90 tablet; Refill: 3    3. HTN (hypertension)  - lisinopriL (PRINIVIL,ZESTRIL) 10 MG tablet [Pharmacy Med Name: LISINOPRIL 10 MG Tablet]; TAKE 1 TABLET AT BEDTIME  Dispense: 90 tablet; Refill: 2    If protocol passes may refill for 12 months if within 3 months of last provider visit (or a total of 15 months).             Passed - Serum Potassium in past 12 months     Lab Results   Component Value Date    Potassium 3.8 02/26/2020             Passed - Blood pressure filed in past 12 months     BP Readings from Last 1 Encounters:   08/12/20 122/68             Passed - Serum Creatinine in past 12 months     Creatinine   Date Value Ref Range Status   08/12/2020 1.26 (H) 0.60 - 1.10 mg/dL Final

## 2021-06-10 NOTE — TELEPHONE ENCOUNTER
Refill Approved    Rx renewed per Medication Renewal Policy. Medication was last renewed on 3/20/2020.    Freya Chan, [facility] Triage/Med Refill 8/7/2020     Requested Prescriptions   Pending Prescriptions Disp Refills     metoprolol succinate (TOPROL-XL) 50 MG 24 hr tablet [Pharmacy Med Name: METOPROLOL SUCCINATE ER 50 MG Tablet Extended Release 24 Hour] 90 tablet 1     Sig: TAKE 1 TABLET EVERY DAY       Beta-Blockers Refill Protocol Passed - 8/7/2020  4:57 PM        Passed - PCP or prescribing provider visit in past 12 months or next 3 months     Last office visit with prescriber/PCP: 3/11/2020 Marichuy Rubio FNP OR same dept: 3/11/2020 Marichuy Rubio FNP OR same specialty: 3/11/2020 Marichuy Rubio FNP  Last physical: 4/15/2019 Last MTM visit: Visit date not found   Next visit within 3 mo: Visit date not found  Next physical within 3 mo: Visit date not found  Prescriber OR PCP: ARSLAN Vasquez  Last diagnosis associated with med order: 1. Essential hypertension  - metoprolol succinate (TOPROL-XL) 50 MG 24 hr tablet [Pharmacy Med Name: METOPROLOL SUCCINATE ER 50 MG Tablet Extended Release 24 Hour]; TAKE 1 TABLET EVERY DAY  Dispense: 90 tablet; Refill: 1    If protocol passes may refill for 12 months if within 3 months of last provider visit (or a total of 15 months).             Passed - Blood pressure filed in past 12 months     BP Readings from Last 1 Encounters:   03/11/20 126/60

## 2021-06-10 NOTE — TELEPHONE ENCOUNTER
Refill request received from [facility] for plaquenil.     Last OV 7/30/2020  Last lab 8/12/2020  Next appt 11/24/2020

## 2021-06-10 NOTE — TELEPHONE ENCOUNTER
Medication Request  Medication name:   nystatin (MYCOSTATIN) powder (Discontinued)  15 g  0  8/7/2018 1/30/2019  --    Sig: Apply to affected area 3 times daily x 10-14 days or at least 3 days after resolution of symptoms    Sent to pharmacy as: nystatin (MYCOSTATIN) powder    Reason for Discontinue: Reorder    E-Prescribing Status: Receipt confirmed by pharmacy (8/7/2018  1:08 PM CDT)        Requested Pharmacy: [facility]  Reason for request: wanting refills   When did you use medication last?:    Patient offered appointment:  patient declined  Okay to leave a detailed message: yes

## 2021-06-10 NOTE — TELEPHONE ENCOUNTER
Last Office Visit  4/01/2020 Marichuy Rubio, ARSLAN  Notes:  1. Fall in home, initial encounter  2. Hip pain, left  3. Hip pain, right  4. Chronic pain  - Patient is ambulating well, does not have difficulty bearing weight and pain has improved since falls. She does not feel that she needs to come in for an office visit particularly in light of Coronavirus outbreak and I agree that she does not need to. She is advised to use a grab device to reduce need to bend forward to pick things up. She will use her walker in the home. We discussed restarting home PT after the virus outbreak risk is reduced. She will continue medications for chronic back pain as prescribed   - oxyCODONE-acetaminophen (PERCOCET) 5-325 mg per tablet; Take 1-2 tablets by mouth every 6 (six) hours as needed for pain (maximum of 6 tablets per day). 4/8-5/8  Dispense: 180 tablet; Refill: 0     5. Type 2 diabetes mellitus with hypoglycemia without coma, with long-term current use of insulin (H)  - Patient would benefit from a continuous glucose meter due to risk of hypoglycemia (history of this), and frequency of both glucose checks (4/day) and insulin injections (5/day).   - flash glucose scanning reader (FREESTYLE BRE 14 DAY READER) Misc; Use 1 each As Directed see administration instructions. Use as directed with sensor  Dispense: 1 each; Refill: 0  - flash glucose sensor (FREESTYLE BRE 14 DAY SENSOR) Kit; Use 1 each As Directed every 14 (fourteen) days.  Dispense: 2 kit; Refill: 11    Last Filled: Not on active med list:    nystatin (MYCOSTATIN) powder (Discontinued)  15 g  0  1/30/2019 6/22/2019  No    Sig: APPLY TO THE AFFECTED AREA THREE TIMES DAILY FOR 10 TO 14 DAYS OR AT LEAST 3 DAYS AFTER RESOLUTION OF SYMPTOMS    Sent to pharmacy as: nystatin (MYCOSTATIN) powder    Reason for Discontinue: Therapy completed    E-Prescribing Status: Receipt confirmed by pharmacy (1/30/2019  3:44 PM CST)        Next OV:  8/12/2020 Johanny Harris  PA-C        Medication teed up for provider signature

## 2021-06-10 NOTE — TELEPHONE ENCOUNTER
RN cannot approve Refill Request    RN can NOT refill this medication med is not covered by policy/route to provider     . Last office visit: 3/11/2020 Marichuy Rubio FNP Last Physical: 4/15/2019 Last MTM visit: Visit date not found Last visit same specialty: 3/11/2020 Marichuy Rubio FNP.  Next visit within 3 mo: Visit date not found  Next physical within 3 mo: Visit date not found      Selma Lyon, [facility] Triage/Med Refill 8/7/2020    Requested Prescriptions   Pending Prescriptions Disp Refills     lidocaine (LIDODERM) 5 % 30 patch 5     Sig: Apply patch to effected area. Remove & Discard patch within 12 hours       There is no refill protocol information for this order

## 2021-06-10 NOTE — TELEPHONE ENCOUNTER
Controlled Substance Refill Request  Medication Name:   Requested Prescriptions     Pending Prescriptions Disp Refills     oxyCODONE-acetaminophen (PERCOCET) 5-325 mg per tablet 180 tablet 0     Sig: Take 1-2 tablets by mouth every 6 (six) hours as needed for pain (maximum of 6 tablets per day). For 7/14/20-8/13/20     Date Last Fill: 07/09/20  Requested Pharmacy: [facility]  Submit electronically to pharmacy  Controlled Substance Agreement on file:   Encounter-Level CSA Scan Date:    There are no encounter-level csa scan date.        Last office visit:

## 2021-06-11 NOTE — PROGRESS NOTES
Called patient to discuss that  services would be a pay for services as her insurance won't cover this. Unfortunately, Ebonie can't afford to pay for help. We looked to see if she would qualify for state help and she is over qualified. She receives Social security disability for $2100 a month averaging out to be $25,200 a year.  She however was able to get someone to take her to the laundry mat to do 4 loads of laundry. Her washer/dryer in the building is on the first floor and its hard for her to get up and down. I will consult with NIYAH, RN and NP at next care conference to see if there is anything we can do to help support her.

## 2021-06-11 NOTE — TELEPHONE ENCOUNTER
Last Office Visit  8/12/2020 Johanny Harris PA-C- rash    Last visit with PCP 4/1/2020 Marichuy Rubio FNP    Notes:  1. Fall in home, initial encounter  2. Hip pain, left  3. Hip pain, right  4. Chronic pain  - Patient is ambulating well, does not have difficulty bearing weight and pain has improved since falls. She does not feel that she needs to come in for an office visit particularly in light of Coronavirus outbreak and I agree that she does not need to. She is advised to use a grab device to reduce need to bend forward to pick things up. She will use her walker in the home. We discussed restarting home PT after the virus outbreak risk is reduced. She will continue medications for chronic back pain as prescribed   - oxyCODONE-acetaminophen (PERCOCET) 5-325 mg per tablet; Take 1-2 tablets by mouth every 6 (six) hours as needed for pain (maximum of 6 tablets per day). 4/8-5/8  Dispense: 180 tablet; Refill: 0     5. Type 2 diabetes mellitus with hypoglycemia without coma, with long-term current use of insulin (H)  - Patient would benefit from a continuous glucose meter due to risk of hypoglycemia (history of this), and frequency of both glucose checks (4/day) and insulin injections (5/day).   - flash glucose scanning reader (FREESTYLE BRE 14 DAY READER) Misc; Use 1 each As Directed see administration instructions. Use as directed with sensor  Dispense: 1 each; Refill: 0  - flash glucose sensor (FREESTYLE BRE 14 DAY SENSOR) Kit; Use 1 each As Directed every 14 (fourteen) days.  Dispense: 2 kit; Refill: 11       Last Filled:  oxyCODONE-acetaminophen (PERCOCET) 5-325 mg per tablet  180 tablet  0  8/8/2020 9/7/2020  No    Sig - Route: Take 1-2 tablets by mouth every 6 (six) hours as needed for pain (maximum of 6 tablets per day). For 8/13/20-9/12/20 - Oral    Sent to pharmacy as: oxyCODONE-acetaminophen 5 mg-325 mg tablet (Percocet)    Earliest Fill Date: 8/8/2020    E-Prescribing Status: Receipt confirmed by pharmacy  (8/8/2020 11:04 AM CDT)        Lab Results   Component Value Date    AMPHET (!) 08/21/2019     Screen Positive (Confirmation available on request)    HEBENZODIAZ Screen Negative 08/21/2019    OPIATES (!) 08/21/2019     Screen Positive (Confirmation available on request)    PCP Screen Negative 08/21/2019    THC Screen Negative 08/21/2019    BARBIT Screen Negative 08/21/2019    COCAINEMETAB Screen Negative 08/21/2019    OXYCODONE (!) 08/21/2019     Screen Positive (Confirmation available on request)    CREAUR 291.7 08/21/2019    CREAUR 291.7 08/21/2019         Next OV:  9/9/2020 Marichuy Rubio FNP      Encounter-Level CSA Scan Date:    8/22/2019          reviewed and results are as follows:     was pulled and reviewed on 8/7/2020    Medication teed up for provider signature - please adjust as appropriate.

## 2021-06-11 NOTE — PROGRESS NOTES
"Ebonei Bowman is a 70 y.o. female who is being evaluated via a billable telephone visit.      The patient has been notified of following:     \"This telephone visit will be conducted via a call between you and your physician/provider. We have found that certain health care needs can be provided without the need for a physical exam.  This service lets us provide the care you need with a short phone conversation.  If a prescription is necessary we can send it directly to your pharmacy.  If lab work is needed we can place an order for that and you can then stop by our lab to have the test done at a later time.    Telephone visits are billed at different rates depending on your insurance coverage. During this emergency period, for some insurers they may be billed the same as an in-person visit.  Please reach out to your insurance provider with any questions.    If during the course of the call the physician/provider feels a telephone visit is not appropriate, you will not be charged for this service.\"    Patient has given verbal consent to a Telephone visit? Yes    What phone number would you like to be contacted at? [phone]     Patient would like to receive their AVS by AVS Preference: Mail a copy.    Internal Medicine Office Visit- TELEPHONE VISIT  [facility]   Patient Name: Ebonie Bowman  Patient Age: 70 y.o.  YOB: 1950  MRN: [id number]    Date of Visit: 2020  Reason for Telephone Visit:   Chief Complaint   Patient presents with     Possible UTI     hurts to void       Assessment / Plan / Medical Decision Makin. Acute cystitis without hematuria  - I would like for her to come in to leave a UA sample, however, she is reliant on public transportation/metro mobility so it will take 2 days to arrange a ride. In light of this, I will treat empirically with an antibiotic. If symptoms worsen or fail to improve with this treatment she will schedule a face to face appointment "   - nitrofurantoin, macrocrystal-monohydrate, (MACROBID) 100 MG capsule; Take 1 capsule (100 mg total) by mouth 2 (two) times a day for 5 days.  Dispense: 10 capsule; Refill: 0    2. Chronic pain  - oxyCODONE-acetaminophen (PERCOCET) 5-325 mg per tablet; Take 1-2 tablets by mouth every 6 (six) hours as needed for pain (maximum of 6 tablets per day). For 9/12-10/12  Dispense: 180 tablet; Refill: 0  - due for face to face visit to update UDS/CSA       Telephone visit duration: 7 minutes     Health Maintenance Review  Health Maintenance   Topic Date Due     DEPRESSION ACTION PLAN  1950     HEPATITIS B VACCINES (1 of 3 - Risk 3-dose series) 03/17/1967     MEDICARE ANNUAL WELLNESS VISIT  03/17/2013     ZOSTER VACCINES (2 of 3) 12/31/2014     MAMMOGRAM  09/27/2018     DIABETIC EYE EXAM  05/14/2020     INFLUENZA VACCINE RULE BASED (1) 08/01/2020     LIPID  08/21/2020     MICROALBUMIN  08/21/2020     DXA SCAN  08/21/2020     A1C  08/26/2020     BMP  02/26/2021     DIABETIC FOOT EXAM  03/11/2021     FALL RISK ASSESSMENT  08/12/2021     ADVANCE CARE PLANNING  12/14/2021     TD 18+ HE  07/18/2024     COLORECTAL CANCER SCREENING  12/14/2026     HEPATITIS C SCREENING  Completed     PNEUMOCOCCAL IMMUNIZATION 65+ HIGH/HIGHEST RISK  Completed         I have changed Ebonie Bowman's oxyCODONE-acetaminophen. I am also having her start on nitrofurantoin (macrocrystal-monohydrate). Additionally, I am having her maintain her magnesium oxide, ascorbic acid (vitamin C), aspirin, omega-3 fatty acids-vitamin E, lancing device, MULTIVITAMIN ORAL, folic acid, insulin syringe-needle U-100, blood-glucose meter, NovoLIN N NPH U-100 Insulin, b complex vitamins, insulin regular, estradioL, blood glucose test, traZODone, loratadine, (cholecalciferol, vitamin D3, (VITAMIN D3 ORAL)), FreeStyle [place] 14 Day [place], FreeStyle [place] 14 Day Sensor, atorvastatin, famotidine, amLODIPine, nystatin, metoprolol succinate, lidocaine, nystatin, buPROPion,  "lisinopriL, methotrexate, and hydrOXYchloroQUINE.      HPI:  Ebonie Bowman is 70 y.o. year old and was contacted today for a telephone visit. For the past 1 month she has had pain with urination. No hematuria, fever, flank pain. The urine sometimes looks \"smoky\" to her.     Chronic back pain reviewed. Her pain worsens when the temperatures drop so she is having more arthralgias at this time. She continues oxycodone-acetaminophen for chronic pain.     Review of Systems- pertinent positive in bold:  Pertinent findings as in HPI       Current Scheduled Meds:  Outpatient Encounter Medications as of 9/9/2020   Medication Sig Dispense Refill     amLODIPine (NORVASC) 10 MG tablet TAKE 1 TABLET (10 MG TOTAL) BY MOUTH DAILY. 90 tablet 2     ascorbic acid (ASCORBIC ACID WITH LATRICIA HIPS) 500 MG tablet Take 500 mg by mouth every evening.        aspirin 81 MG EC tablet Take 81 mg by mouth every evening.       atorvastatin (LIPITOR) 40 MG tablet Take 1 tablet (40 mg total) by mouth at bedtime. 90 tablet 2     b complex vitamins tablet Take 1 tablet by mouth daily.       blood glucose test strips Check blood sugar four times daily. Dispense brand per patient's insurance at pharmacy discretion. Dx: e11.9 400 strip 3     blood-glucose meter Misc Dispense one meter. Use as directed. Dx: e11.9 1 each 0     buPROPion (WELLBUTRIN) 100 MG tablet TAKE 1 TABLET EVERY DAY 90 tablet 3     cholecalciferol, vitamin D3, (VITAMIN D3 ORAL) Take 125 mcg by mouth daily.       estradiol (ESTRACE) 0.01 % (0.1 mg/gram) vaginal cream Apply a pea-sized amount vaginally daily x 1 week then reduce to 3 times per week thereafter 42.5 g 1     famotidine (PEPCID) 20 MG tablet Take 1 tablet (20 mg total) by mouth 2 (two) times a day. 180 tablet 3     flash glucose scanning reader (FREESTYLE BRE 14 DAY READER) Misc Use 1 each As Directed see administration instructions. Use as directed with sensor 1 each 0     flash glucose sensor (FREESTYLE BRE 14 DAY " SENSOR) Kit Use 1 each As Directed every 14 (fourteen) days. 2 kit 11     folic acid (FOLVITE) 800 MCG tablet Take 800 mcg by mouth every evening.              hydrOXYchloroQUINE (PLAQUENIL) 200 mg tablet Take 1 tablet p.o. twice daily. 180 tablet 0     insulin regular (NOVOLIN R) 100 unit/mL injection Inject 24 Units under the skin 3 (three) times a day before meals.       insulin syringe-needle U-100 (BD INSULIN SYRINGE ULT-FINE II) 1 mL 31 gauge x 5/16 Syrg USE 4 TIMES DAILY AS DIRECTED 500 each 1     lancing device (ACCU-CHEK SOFTCLIX) Misc Use 1 applicator As Directed 4 (four) times a day. 300 each 3     lidocaine (LMX) 4 % cream Apply to affected area twice daily as needed 30 g 3     lisinopriL (PRINIVIL,ZESTRIL) 10 MG tablet TAKE 1 TABLET AT BEDTIME 90 tablet 3     loratadine (CLARITIN) 10 mg tablet Take 1 tablet (10 mg total) by mouth at bedtime. 90 tablet 3     magnesium oxide (MAG-OX) 400 mg tablet Take 400 mg by mouth at bedtime. With food              methotrexate 2.5 MG tablet Take 5 tablets po once a week (hold dose if you come down with an infection, until infection clears). 65 tablet 0     metoprolol succinate (TOPROL-XL) 50 MG 24 hr tablet TAKE 1 TABLET EVERY DAY 90 tablet 1     MULTIVITAMIN ORAL Take 1 tablet by mouth every evening.        NOVOLIN N NPH U-100 INSULIN 100 unit/mL injection Inject 33 Units under the skin 2 (two) times a day before meals. 10 mL 11     nystatin (MYCOSTATIN) ointment Apply to sage-anal area and pelvic floor skin twice daily for 14 days. 60 g 3     nystatin (MYCOSTATIN) powder Apply to affected area 3 times daily for 2 weeks. 60 g 0     omega-3 fatty acids-vitamin E (FISH OIL) 1,000 mg cap Take 1,000 mg by mouth every evening. 90 each 3     traZODone (DESYREL) 150 MG tablet TAKE 1 TABLET AT BEDTIME 90 tablet 3     nitrofurantoin, macrocrystal-monohydrate, (MACROBID) 100 MG capsule Take 1 capsule (100 mg total) by mouth 2 (two) times a day for 5 days. 10 capsule 0      oxyCODONE-acetaminophen (PERCOCET) 5-325 mg per tablet Take 1-2 tablets by mouth every 6 (six) hours as needed for pain (maximum of 6 tablets per day). For -10/12 180 tablet 0     [DISCONTINUED] oxyCODONE-acetaminophen (PERCOCET) 5-325 mg per tablet Take 1-2 tablets by mouth every 6 (six) hours as needed for pain (maximum of 6 tablets per day). For 20-20 180 tablet 0     No facility-administered encounter medications on file as of 2020.        Past Medical History:   Diagnosis Date     BBB (bundle branch block)      Chronic back pain      Chronic kidney disease     stage 3     Chronic Kidney Disease (NKF Classification)     Created by Conversion      Depression      Diabetes (H)     Type 2     Diabetes mellitus, type 2 (H)     Created by Conversion      Frequent headaches      Ganglion Of The Left Wrist     Created by Conversion      GERD (gastroesophageal reflux disease)      HLD (hyperlipidemia)      Hypertension     Per H&P dated 2013     Incarcerated ventral hernia     Per H&P dated 2013     Osteoarthritis      Rheumatoid arthritis (H)      Shortness of breath      Thrombocytopenia (H)      Venous insufficiency      Past Surgical History:   Procedure Laterality Date     BACK SURGERY       CHOLECYSTECTOMY       FATTY TUMOR      LT SHOULDER BLADE     HYSTERECTOMY  1984     JOINT REPLACEMENT Left     knee     AK ABDOMEN SURGERY PROC UNLISTED      Description: Hernia Repair;  Recorded: 10/23/2013;     AK IMPLANT SPINAL NEUROSTIM/ Left 2019    Procedure: LEFT FLANK INCISION REPLACE NEUROSTIMULATOR;  Surgeon: Vishal Pretty MD;  Location: [facility];  Service: Pain     SPINAL CORD STIMULATOR IMPLANT       Two left wrist  ,      Social History     Tobacco Use     Smoking status: Former Smoker     Last attempt to quit: 2008     Years since quittin.1     Smokeless tobacco: Never Used     Tobacco comment: smells of cigarrette smoke   Substance  Use Topics     Alcohol use: No     Drug use: No     Comment: takes percocet for chronic pain       Objective / Physical Examination:  Insight is good. Thought process is logical. Patient is speaking in full sentences.     No orders of the defined types were placed in this encounter.  Followup: Return in about 4 weeks (around 10/7/2020) for Next scheduled follow up. earlier if needed.

## 2021-06-11 NOTE — TELEPHONE ENCOUNTER
Ebonie has pain with urination. She has no other symptoms.  She would like to be seen on Friday around another appointment she has for xrays. She states it is hard for her to get to appointments.  Had her talk to  to arrange this is possible.  She can be seen sooner in urgent care or call back for sooner appointment as is necessary.  She agrees to these plans.     Reason for Disposition    Age > 50 years    Additional Information    Negative: Side (flank) or lower back pain present    Negative: [1] Can't control passage of urine (i.e., urinary incontinence) AND [2] new onset (< 2 weeks) or worsening    Negative: Urinating more frequently than usual (i.e., frequency)    Negative: Bad or foul-smelling urine    [1] Discomfort (pain, burning or stinging) when passing urine AND [2] female    Negative: [1] Unable to urinate (or only a few drops) > 4 hours AND [2] bladder feels very full (e.g., palpable bladder or strong urge to urinate)    Negative: Vomiting    Negative: Patient sounds very sick or weak to the triager    Negative: [1] SEVERE pain with urination  (e.g., excruciating) AND [2] not improved after 2 hours of pain medicine and Sitz bath    Negative: Fever > 100.5 F (38.1 C)    Negative: Side (flank) or lower back pain present    Negative: Diabetes mellitus or weak immune system (e.g., HIV positive, cancer chemo, splenectomy, organ transplant, chronic steroids)    Negative: Unusual vaginal discharge (e.g., bad smelling, yellow, green, or foamy-white)    Negative: Patient is worried about sexually transmitted disease (STD)    Negative: Possibility of pregnancy    Negative: Blood in urine (red, pink, or tea-colored)    Negative: Bedridden (e.g., nursing home patient, CVA, chronic illness, recovering from surgery)    Negative: Artificial heart valve or artificial joint    Protocols used: URINATION PAIN - FEMALE-A-AH, URINARY SYMPTOMS-A-AH

## 2021-06-11 NOTE — PROGRESS NOTES
Internal Medicine Office Visit  Patient Name: Ebonie Bowman  Patient Age: 67 y.o.  YOB: 1950  MRN: [id number]  ?  Date of Visit: 2017  Reason for Office Visit:   Chief Complaint   Patient presents with     Follow-up     3 month       Assessment / Plan / Medical Decision Makin. Diabetes mellitus, type 2  2. Gait instability  3. Neck pain  4. Chronic pain  5. Venous Insufficiency  6. GERD (gastroesophageal reflux disease)  -Increase NPH insulin to 60 units in the morning and 45 units at night.  She is advised to closely monitor her blood sugar with this insulin dose change as she experiences hypoglycemia without feeling symptoms.  She is advised to eat regularly and avoid skipping meals.  -Wean off of PPI.  She will alternate with an H2 blocker for 1 week then start ranitidine 150 twice daily.  Follow-up in 3 months to see if this was effective.  Reviewed the risks of long-term EPI  -Order written for compression stockings  -PT/OT for unsteadiness, risk for falls, neck pain  -Recommend follow-up with pain management.  She was unable to have an injection in the past due to insurance requirement to have the stimulator adjusted first.  She has not had any relief of this pain since this adjustment was made and so is encouraged to follow-up as she may now be able to proceed with injection  -Continue chronic pain medications, Percocet 10 mg 3 times daily        Health Maintenance Review  Health Maintenance   Topic Date Due     DEPRESSION FOLLOW UP  1950     DIABETES FOLLOW-UP  1950     DIABETES FOOT EXAM  1958     DIABETES OPHTHALMOLOGY EXAM  1958     DXA SCAN  2013     DIABETES URINE MICROALBUMIN  2014     INFLUENZA VACCINE RULE BASED (1) 2017     FALL RISK ASSESSMENT  2017     DIABETES HEMOGLOBIN A1C  2018     MAMMOGRAM  2018     ADVANCE DIRECTIVES DISCUSSED WITH PATIENT  2021     TD 18+ HE  2024     COLONOSCOPY  2026  "    PNEUMOCOCCAL POLYSACCHARIDE VACCINE AGE 65 AND OVER  Completed     PNEUMOCOCCAL CONJUGATE VACCINE FOR ADULTS (PCV13 OR PREVNAR)  Addressed     ZOSTER VACCINE  Completed         I am having Ms. Bowman start on glucose and ranitidine. I am also having her maintain her magnesium oxide, ascorbic acid (vitamin C), cholecalciferol (vitamin D3), aspirin, cyanocobalamin, omega-3 fatty acids-vitamin E, blood glucose test, lancing device, MULTIVITAMIN ORAL, docusate sodium, insulin NPH, loratadine, folic acid, insulin syringe-needle U-100, lisinopril, omeprazole, insulin NPH human recomb, predniSONE, buPROPion, atorvastatin, metoprolol succinate, insulin regular, leflunomide, traZODone, methotrexate, and oxyCODONE-acetaminophen.     HPI:   Encounter Diagnoses   Name Primary?     Diabetes mellitus, type 2 Yes     Gait instability      Neck pain      Chronic pain      Venous Insufficiency      GERD (gastroesophageal reflux disease)       Ebonie Bowman a 67-year-old female who presents to the office today for follow-up.    We first reviewed her history of chronic pain.  She continues to have low back back pain, states that her oxycodone last approximately 4 hours then she starts to have breakthrough pain.  She does not wish to see the pain clinic regarding this would like to continue her current oxycodone which does make the pain more tolerable.  She has been seen by pain management in the past for interventional procedures.  She last had her stimulator adjusted 11/2016.  She was unable to have an injection as the stimulator adjustment needed to occur first for her insurance to cover the injection.  She has not followed up again regarding this.    Reports that she has been unsteady and feels at risk for a fall.  She has had some lower extremity weakness and when she bends over feels her legs \"shaking\".  She has been avoiding tasks that involve a lot of bending such as vacuuming and bending to empty the .  She does " "occasional leg exercises but not regularly.  She may be interested in physical therapy but has financial concerns regarding this.  At a minimum, she would do some appointments for this.    She has a history of diabetes.  Again, did not bring her meter or log with her today.  From memory her a.m. blood sugars range from 190-200.  Her lunch readings sometimes \"go high\".  Sometimes she skips lunch.  Blood sugar readings may be as high as 313.  Dinner readings are typically less than 200.  Denies any hypoglycemia which had previously been a concern.    GERD is well controlled.  She has not needed any as needed medications for this.  We reviewed the risks of long-term PPI use, she is interested and willing to try transitioning to an H2 blocker if this is an effective treatment.    She complains of lower extremity edema which has worsened.  She is unable to afford stockings at the current time but would take a prescription today.  She typically buys things at the beginning of the month.    Reports new concern of neck pain which worsens after reading or looking down.  No radiation to the arms or hands.  No injury to the neck.    Review of Systems: If positive, the following ROS is bolded:  Constitutional: Fever, chills, night sweats fainting, unexplained weight change  Eyes: Visual changes, eye pain, double vision  HENT: Changes in hearing, ear pain, tinnitus, hoarseness, difficulty swallowing  Respiratory: Wheeze, shortness of breath, cough, and exercise intolerance   Cardiovascular: Chest pain, dyspnea, tachycardia, palpitations, syncope, dizziness or lightheadedness  Gastrointestinal: Nausea, vomiting, diarrhea, dyspepsia, irregular BMs, and melena   Genitourinary: Dysuria, frequency, hematuria, nocturia  Integumentary: Pruritis, rashes, lesions, wounds   Musculoskeletal: Weakness, joint, back pain as in HPI  Neurological: Changes in balance, mental status, paresthesias in feet, weakness, headache  Behavioral/Psych: " Difficulty concentrating, excessive moodiness, depression or anxiety, insomnia    Current Scheduled Meds:  Outpatient Encounter Prescriptions as of 7/11/2017   Medication Sig Dispense Refill     ascorbic acid (ASCORBIC ACID WITH LATRICIA HIPS) 500 MG tablet Take 500 mg by mouth every evening.        aspirin 81 MG EC tablet Take 81 mg by mouth every evening.       atorvastatin (LIPITOR) 40 MG tablet Take 1 tablet (40 mg total) by mouth bedtime. 90 tablet 3     blood glucose test (ACCU-CHEK FRANCISCO PLUS TEST STRP) Strp TEST FOUR TIMES DAILY 400 strip 3     buPROPion (WELLBUTRIN SR) 100 MG 12 hr tablet TAKE 1 TABLET EVERY DAY 90 tablet 1     cholecalciferol, vitamin D3, 1,000 unit tablet Take 1,000 Units by mouth every evening.        cyanocobalamin (VITAMIN B-12) 250 MCG tablet Take 250 mcg by mouth every evening.        docusate sodium (COLACE) 100 MG capsule Take 100 mg by mouth 2 (two) times a day.       folic acid (FOLVITE) 800 MCG tablet Take 1,200 mcg by mouth daily.       insulin NPH (NOVOLIN) 100 unit/mL injection Inject 40 Units under the skin bedtime.        insulin NPH human recomb (HUMULIN N PEN) 100 unit/mL (3 mL) pen Give 60 units subcutaneously every morning and 40 units subcutaneously every evening 3 mL 11     insulin syringe-needle U-100 (BD INSULIN SYRINGE ULT-FINE II) 1 mL 31 gauge x 5/16 Syrg USE 4 TIMES DAILY AS DIRECTED 500 each 1     lancing device (ACCU-CHEK SOFTCLIX) Misc Use 1 applicator As Directed 4 (four) times a day. 300 each 3     leflunomide (ARAVA) 20 MG tablet TAKE 1 TABLET EVERY DAY 90 tablet 0     lisinopril (PRINIVIL,ZESTRIL) 10 MG tablet Take 1 tablet (10 mg total) by mouth bedtime. 90 tablet 3     loratadine (CLARITIN) 10 mg tablet Take 10 mg by mouth bedtime.        magnesium oxide (MAG-OX) 400 mg tablet Take 400 mg by mouth bedtime. With food        methotrexate 2.5 MG tablet TAKE 4 TABLETS ONCE A WEEK. 48 tablet 0     metoprolol succinate (TOPROL-XL) 25 MG TAKE ONE TABLET BY MOUTH  ONCE DAILY 90 tablet 2     MULTIVITAMIN ORAL Take 1 tablet by mouth daily.       omega-3 fatty acids-vitamin E (FISH OIL) 1,000 mg cap Take 1,000 mg by mouth every evening. 90 each 3     omeprazole (PRILOSEC) 20 MG capsule TAKE 1 CAPSULE TWICE DAILY 180 capsule 2     oxyCODONE-acetaminophen (PERCOCET)  mg per tablet Take 1 tablet by mouth every 8 (eight) hours as needed for pain (Do not exceed 3 tablets per day). For use from 7/12/17 to 8/11/17. 90 tablet 0     traZODone (DESYREL) 150 MG tablet Take 1 tablet (150 mg total) by mouth at bedtime. 90 tablet 3     glucose 4 GM chewable tablet Chew 4 tablets (16 g total) as needed for low blood sugar. 120 tablet 0     insulin regular (NOVOLIN R) 100 unit/mL injection Inject 16 units subcutaneous TID with meals plus sliding scale 10 mL 6     predniSONE (DELTASONE) 2.5 MG tablet Take 1 tablet (2.5 mg total) by mouth every other day. 45 tablet 0     ranitidine (ZANTAC) 150 MG tablet Take 1 tablet (150 mg total) by mouth 2 (two) times a day. 60 tablet 3     No facility-administered encounter medications on file as of 7/11/2017.      Past Medical History:   Diagnosis Date     BBB (bundle branch block)      Chronic back pain      Chronic kidney disease     stage 3     Chronic Kidney Disease (NKF Classification)     Created by Conversion      Depression      Diabetes     Type 2     Diabetes mellitus, type 2     Created by Conversion      Frequent headaches      Ganglion Of The Left Wrist     Created by Conversion      GERD (gastroesophageal reflux disease)      HLD (hyperlipidemia)      Hypertension     Per H&P dated 1/03/2013     Incarcerated ventral hernia     Per H&P dated 1/03/2013     Osteoarthritis      Rheumatoid arthritis      Thrombocytopenia      Venous insufficiency      Past Surgical History:   Procedure Laterality Date     BACK SURGERY       CHOLECYSTECTOMY       FATTY TUMOR  1996    LT SHOULDER BLADE     HYSTERECTOMY  1984     JOINT REPLACEMENT Left     knee      AR ABDOMEN SURGERY PROC UNLISTED      Description: Hernia Repair;  Recorded: 10/23/2013;     Two left wrist  1990, 1995     Social History   Substance Use Topics     Smoking status: Former Smoker     Quit date: 8/1/2008     Smokeless tobacco: Never Used      Comment: smells of cigarrette smoke     Alcohol use No       Objective / Physical Examination:  Vitals:    07/11/17 1249 07/11/17 1341   BP: 146/88 134/74   Pulse: 76    Weight: (!) 234 lb (106.1 kg)      Wt Readings from Last 3 Encounters:   07/11/17 (!) 234 lb (106.1 kg)   05/10/17 (!) 241 lb 9.6 oz (109.6 kg)   04/11/17 (!) 241 lb 9.6 oz (109.6 kg)     Body mass index is 31.74 kg/(m^2).     General Appearance: Alert and oriented, cooperative, affect somewhat anxious, speech clear, in no apparent distress  Neck: Supple, trachea midline. No carotid bruits   Back: Lumbar spinous process tenderness. Surgical incision lumbar spine.   Lungs: Clear to auscultation bilaterally. Normal inspiratory and expiratory effort  Cardiovascular: Regular rate, normal S1, S2  Extremities: Trace edema  Diabetic foot exam:   Left: Filament test present  Right: Filament test present        Orders Placed This Encounter   Procedures     Glycosylated Hemoglobin A1c     Ambulatory referral to PT/OT   Followup: Return in about 3 months (around 10/11/2017). earlier if needed.      Marichuy Rubio, CNP  [facility]

## 2021-06-11 NOTE — PROGRESS NOTES
Clinic Care Coordination Contact    Assessment: Care Coordinator contacted patient for 2 month follow up.  Patient has continued to follow the plan of care and assessment is negative for any new needs or concerns.    Enrollment status: Graduated.      Plan: No further outreaches at this time.  Patient will continue to follow the plan of care.  If new needs arise a new Care Coordination referral may be placed.  FYI to PCP    Clinic Care Coordination Contact    Situation: Patient chart reviewed by care coordinator.    Background:     Richie Gil CHW 6 days ago          Clinic Care Coordination Contact  Patient has completed all goals with Clinic Care Coordination.  Please review the chart and confirm if graduation is approved.     CHW spoke with patient who expressed no new concern and no new goal addressed at this time and patient stated she's doing well and no coordination assistance needed          Assessment:   Chart review completed today.   No ED visits or hospitalization the past 60 days.   Had a virtual visit with PCP on 9/9/2020 due to possible UTI. Started on Macrobid 100mg two times a day x 5 days.   No new goals to work on. Patient is able to reach out to clinic with her concerns.    Plan/Recommendations:   Okay to graduate.

## 2021-06-11 NOTE — TELEPHONE ENCOUNTER
Controlled Substance Refill Request  Medication Name:   Requested Prescriptions     Pending Prescriptions Disp Refills     oxyCODONE-acetaminophen (PERCOCET) 5-325 mg per tablet 180 tablet 0     Sig: Take 1-2 tablets by mouth every 6 (six) hours as needed for pain (maximum of 6 tablets per day). For 8/13/20-9/12/20     Date Last Fill: 08/08/2020  Requested Pharmacy: [facility]  Submit electronically to pharmacy  Controlled Substance Agreement on file:   Encounter-Level CSA Scan Date:    There are no encounter-level csa scan date.        Last office visit:  08/12/2020         Patient is requesting refill be expedited. Patient only has one day of medication remaining.

## 2021-06-11 NOTE — PROGRESS NOTES
Clinic Care Coordination Contact  Patient has completed all goals with Clinic Care Coordination.  Please review the chart and confirm if graduation is approved.    CHW spoke with patient who expressed no new concern and no new goal addressed at this time and patient stated she's doing well and no coordination assistance needed.

## 2021-06-11 NOTE — PROGRESS NOTES
Patient stated she hasn't had any falls since we last talked but she has been unsteady. She has been able to catch herself before falling with furniture around. We discussed whether she uses a cane or walker. She doesn't use either of these devices at this time, but states maybe she needs to start using her cane soon. She doesn't think she needs to use a walker at this time. She does have a shower chair that she uses for bathing and no concerns with personal cares. She states, her one leg has been bothering her a lot because it shakes causing her to be unsteady. Its difficult for her to do chores around the home due to her leg and back. It takes her a while to complete chore such as dishes because she has to do them in stages. She is running out of clothes and doesn't have a way to go and get them cleaned. Her son use to help her but he isn't there much anymore to assist.  [facility] and I will mail this information out to her today.  I'm going to look into option for a homemaker for Ebonie as well as she could use help around the apartment.       Check to see if patient would qualify for State assistance. [place] elderly?

## 2021-06-11 NOTE — PROGRESS NOTES
I spoke with Marichuy Rubio today in regards to concerns with patient getting chores done around the apartment and the concern of falls. At this time, Marichuy will access her at the next visit to see if she could be considered homebound for a PCA assessment. Marichuy didn't get that impression at the last visit.  Ebonie is scheduled to see Marichuy on 7/11/2017

## 2021-06-12 ENCOUNTER — HEALTH MAINTENANCE LETTER (OUTPATIENT)
Age: 71
End: 2021-06-12

## 2021-06-12 NOTE — PROGRESS NOTES
"[facility]   Initial Evaluation    Patient Name: Ebonie Bowman   Patient preferred name: \"Ebonie Contreras\"  Date of evaluation: 7/28/2017  Referral Diagnosis: Gait instability  Referring provider: Marichuy Rubio FNP  Visit Diagnosis:     ICD-10-CM    1. Chronic neck pain M54.2     G89.29    2. Poor posture R29.3    3. Generalized muscle weakness M62.81    4. Bilateral primary osteoarthritis of hip M16.0    5. S/P TKR (total knee replacement) using cement, left Z96.652    6. Lumbar radiculopathy, chronic M54.16    7. Seropositive rheumatoid arthritis M05.9    8. Insomnia G47.00    9. Venous Insufficiency I87.2    10. Obesity E66.9    11. Bursitis M71.50    12. Thrombocytopenia D69.6    13. Chronic Kidney Disease (NKF Classification) N18.9        Assessment:      Ebonie Bowman is a 67 y.o. female who presents to therapy today with chief complaints of chronic neck pain. Pain began in the mid '80's.  Patient has a PMH that is positive for difficulty with balance, chronic low back pain, morbid obesity, venous sufficiency, insomnia, bursitis, CKD, DM2, depression, thrombocytopenia, seropositive RA, L TKA, chronic pain, GERD. Difficulty with reading, using her computer, walking, head movements due to pain. Pain symptoms are not improving and are constant. Patient demonstrates signs and sx consistent with poor posture and weakness. Due to high co-pay, patient is unable to come to more than a few sessions of PT, so her treatments will be limited. Patient is appropriate for skilled therapy services.    Goals:  Pt. will demonstrate/verbalize independence in self-management of condition in : 6 weeks  Pt. will be independent with home exercise program in : 6 weeks  Pt will: have decreased pain while using the computer and playing sodoku in order to improve her QOL within 8 weeks    Patient's prognosis is: fair    Barriers to Learning or Achieving Goals:  Chronicity of the problem.  Co-morbidities or other medical " "factors.  see [facility]  Financial situation. - not able to come to many therapy session       Plan / Patient Instructions:        Plan of Care:   Patient Related Instruction: Nature of Condition;Treatment plan and rationale;Self Care instruction;Basis of treatment;Posture;Next steps;Expected outcome  Times per Week: 1-2  Number of Weeks: 6-8  Number of Visits: 12  Therapeutic Exercise: Stretching;Strengthening;ROM  Neuromuscular Reeducation: posture;core  Equipment: theraband     Exercises:  Exercise #1: scap sets  Comment #1: x10 with 5\" holds  Exercise #2: isometrics- SB B and ext (no flexion- this increased low back pain; terminated)  Comment #2: x10 with 5\" holds  Exercise #3: doorway pec stretch  Comment #3: 30\" holds x3    Plan for next visit: band exs- rows, ext. Try flexion isometric again, abd sets     Subjective:         Social information:   Occupation:retired    Hobbies: games on computer, reading, [place]      History of Present Illness:    Ebonie is a 67 y.o. female who presents to therapy today with complaints of neck and low back pain, as well as balance difficulties. 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 Her neck pain began in  without injury. Symptoms in her neck are constant. She reports  a constant  history of similar symptoms. She describes their previous level of function as limited with stairs, walking, squatting, bending, left hand difficulties.. Due to patient only being able to come to therapy a couple times due to money constraints, she would like to focus on her neck for now in therapy.     Lumbar operations: one in  (\"fixed pinched nerve\") and then lumbar spinal cord stimulator in 2016    Pain Ratin  Pain rating at best: 5-6  Pain rating at worst: 9  Pain description: \"it's just there and it hurts\"    Functional limitations are described as occurring with: reading, using her computer, walking, head " movements      Patient reports benefit from:  using icyhot patches and special pillow       Objective:      Examination  1. Chronic neck pain     2. Poor posture     3. Generalized muscle weakness     4. Bilateral primary osteoarthritis of hip     5. S/P TKR (total knee replacement) using cement, left     6. Lumbar radiculopathy, chronic     7. Seropositive rheumatoid arthritis     8. Insomnia     9. Venous Insufficiency     10. Obesity     11. Bursitis     12. Thrombocytopenia     13. Chronic Kidney Disease (NKF Classification)       Involved side: midline  Posture Observation:      General sitting posture is  fair. Protracted head and shoulders    Cervical AROM:   Flex- WNL, increases pain in midline of posterior neck   Ext- min-mod limited, min increase in pain   R SB- WNL, pain-free   L SB- WNL, min pain in midline of low back   R rot- WNL, pain-free   L rot- WNL, min increase in occipital area    Sensation: intact per subjective in B UE    B shoulder AROM: WFL, symmetrical, no increase in neck pain    Negative median, radial, and ulnar nerve testing    Palpation: tenderness in all soft tissue areas in B UT, SS, middle traps, rhomboids, cervical and thoracic spinous processes    B UE strength: 4/5    Treatment Today     TREATMENT MINUTES COMMENTS   Evaluation 17    Self-care/ Home management     Manual therapy     Neuromuscular Re-education     Therapeutic Activity     Therapeutic Exercises 23 Answered patient questions. Discussed PT POC and pathology of condition. Began HEP- see flow sheet. Recommend patient really be aware of and work on her posture- as this is likely contributing to her symptoms. Also recommend she place ice on her neck when she's flared up to help decrease inflammation.   Gait training     Modality__________________                Total 40    Blank areas are intentional and mean the treatment did not include these items.     PT Evaluation Code: (Please list factors)  Patient  History/Comorbidities: difficulty with balance, chronic low back pain, morbid obesity, venous sufficiency, insomnia, bursitis, CKD, DM2, depression, thrombocytopenia, seropositive RA, L TKA, chronic pain, GERD  Examination: neck pain  Clinical Presentation: stable  Clinical Decision Making: low    Patient History/  Comorbidities Examination  (body structures and functions, activity limitations, and/or participation restrictions) Clinical Presentation Clinical Decision Making (Complexity)   No documented Comorbidities or personal factors 1-2 Elements Stable and/or uncomplicated Low   1-2 documented comorbidities or personal factor 3 Elements Evolving clinical presentation with changing characteristics Moderate   3-4 documented comorbidities or personal factors 4 or more Unstable and unpredictable High            Hayder Gale, PT, DPT  7/28/2017  2:17 PM

## 2021-06-12 NOTE — TELEPHONE ENCOUNTER
Last Office Visit  9/09/2020 Marichuy Rubio, ARSLAN    Notes:  1. Acute cystitis without hematuria  - I would like for her to come in to leave a UA sample, however, she is reliant on public transportation/metro mobility so it will take 2 days to arrange a ride. In light of this, I will treat empirically with an antibiotic. If symptoms worsen or fail to improve with this treatment she will schedule a face to face appointment   - nitrofurantoin, macrocrystal-monohydrate, (MACROBID) 100 MG capsule; Take 1 capsule (100 mg total) by mouth 2 (two) times a day for 5 days.  Dispense: 10 capsule; Refill: 0     2. Chronic pain  - oxyCODONE-acetaminophen (PERCOCET) 5-325 mg per tablet; Take 1-2 tablets by mouth every 6 (six) hours as needed for pain (maximum of 6 tablets per day). For 9/12-10/12  Dispense: 180 tablet; Refill: 0  - due for face to face visit to update UDS/CSA        Last Filled:  oxyCODONE-acetaminophen (PERCOCET) 5-325 mg per tablet 180 tablet 0 9/9/2020 10/9/2020 No   Sig - Route: Take 1-2 tablets by mouth every 6 (six) hours as needed for pain (maximum of 6 tablets per day). For 9/12-10/12 - Oral   Sent to pharmacy as: oxyCODONE-acetaminophen 5 mg-325 mg tablet (Percocet)   Earliest Fill Date: 9/9/2020   Notes to Pharmacy: Okay to fill today   E-Prescribing Status: Receipt confirmed by pharmacy (9/9/2020 11:23 AM CDT)         Lab Results   Component Value Date    AMPHET (!) 08/21/2019     Screen Positive (Confirmation available on request)    HEBENZODIAZ Screen Negative 08/21/2019    OPIATES (!) 08/21/2019     Screen Positive (Confirmation available on request)    PCP Screen Negative 08/21/2019    THC Screen Negative 08/21/2019    BARBIT Screen Negative 08/21/2019    COCAINEMETAB Screen Negative 08/21/2019    OXYCODONE (!) 08/21/2019     Screen Positive (Confirmation available on request)    CREAUR 291.7 08/21/2019    CREAUR 291.7 08/21/2019         Next OV:  Visit date not found      Encounter-Level CSA  Scan Date:    Scan 8/22/2019.          reviewed and results are as follows:    Pulled and reviewed on 8/7/2020    Medication teed up for provider signature - please adjust as appropriate.

## 2021-06-12 NOTE — PROGRESS NOTES
Internal Medicine Office Visit  [facility]   Patient Name: Ebonie Bowman  Patient Age: 70 y.o.  YOB: 1950  MRN: [id number]    Date of Visit: 10/20/2020  Reason for Office Visit:   Chief Complaint   Patient presents with     Face to Face     lift chair           Assessment / Plan / Medical Decision Makin. Bilateral primary osteoarthritis of hip  - patient would benefit from a lift chair. She is sometimes unable to stand without assistance related to low back osteoarthritis, hip osteoarthritis, and knee osteoarthritis. She is able to ambulate once in a standing position. A lift chair to help her to maintain her mobility and prevent deterioration of her condition  - Her  will be sending an order form to the clinic to complete and send to DME provider. Will await this form    2. Type 2 diabetes mellitus with hypoglycemia without coma, with long-term current use of insulin (H)  Stable based on home readings   - Glycosylated Hemoglobin A1c  - Microalbumin, Random Urine  - flash glucose scanning reader (FREESTYLE BRE 14 DAY READER) Misc; Use 1 each As Directed see administration instructions. Use as directed with sensor  Dispense: 1 each; Refill: 0  - flash glucose sensor (FREESTYLE BRE 14 DAY SENSOR) Kit; Use 1 each As Directed every 14 (fourteen) days.  Dispense: 2 kit; Refill: 11    3. Mixed hyperlipidemia  Continue statin   - Lipid [facility] FASTING    4. Positive FIT (fecal immunochemical test)  5. Encounter for screening colonoscopy  - Ambulatory referral for Colonoscopy        Health Maintenance Review  Health Maintenance   Topic Date Due     DEPRESSION ACTION PLAN  1950     MEDICARE ANNUAL WELLNESS VISIT  2013     ZOSTER VACCINES (2 of 3) 2014     MAMMOGRAM  2018     DIABETIC EYE EXAM  2020     INFLUENZA VACCINE RULE BASED (1) 2020     BMP  2021     DIABETIC FOOT EXAM  2021     A1C  2021     FALL RISK  ASSESSMENT  08/12/2021     LIPID  10/20/2021     MICROALBUMIN  10/20/2021     ADVANCE CARE PLANNING  12/14/2021     DXA SCAN  09/17/2022     TD 18+ HE  07/18/2024     COLORECTAL CANCER SCREENING  12/14/2026     HEPATITIS C SCREENING  Completed     Pneumococcal Vaccine: 65+ Years  Completed     Pneumococcal Vaccine: Pediatrics (0 to 5 Years) and At-Risk Patients (6 to 64 Years)  Aged Out     HEPATITIS B VACCINES  Discontinued         I am having Ebonie Bowman maintain her magnesium oxide, ascorbic acid (vitamin C), aspirin, omega-3 fatty acids-vitamin E, lancing device, MULTIVITAMIN ORAL, folic acid, insulin syringe-needle U-100, blood-glucose meter, b complex vitamins, insulin regular, estradioL, blood glucose test, traZODone, loratadine, (cholecalciferol, vitamin D3, (VITAMIN D3 ORAL)), atorvastatin, famotidine, amLODIPine, nystatin, metoprolol succinate, lidocaine, nystatin, buPROPion, lisinopriL, methotrexate, hydrOXYchloroQUINE, predniSONE, oxyCODONE-acetaminophen, FreeStyle Saida 14 Day Crosby, and FreeStyle Saida 14 Day Sensor.      HPI:  Ebonie Bowman is a 70 y.o. year old who presents to the office today for a mobility assessment and order for lift chair mechanism. She has a history of rheumatoid arthritis as well as widespread osteoarthritis which affects the hips and knees. She has severe degenerative arthritis in the lumbosacral junction. She has great difficulty in standing from a chair, there are days when she cannot stand on her own so it impacts her ability to maintain mobility when she is unable to get up from a seated position.  Once standing she is able to ambulate using a walker.  She has tried different therapeutic modalities to help with the hip pain including medication, physical therapy in 2017.     We reviewed a positive FIT test that she had many months ago and colonoscopy was recommended. She is now open to scheduling this procedure. No melena or hematochezia noted.     Review of Systems:  As  in HPI       Current Scheduled Meds:  Outpatient Encounter Medications as of 10/20/2020   Medication Sig Dispense Refill     amLODIPine (NORVASC) 10 MG tablet TAKE 1 TABLET (10 MG TOTAL) BY MOUTH DAILY. 90 tablet 2     ascorbic acid (ASCORBIC ACID WITH LATRICIA HIPS) 500 MG tablet Take 500 mg by mouth every evening.        aspirin 81 MG EC tablet Take 81 mg by mouth every evening.       atorvastatin (LIPITOR) 40 MG tablet Take 1 tablet (40 mg total) by mouth at bedtime. 90 tablet 2     b complex vitamins tablet Take 1 tablet by mouth daily.       blood glucose test strips Check blood sugar four times daily. Dispense brand per patient's insurance at pharmacy discretion. Dx: e11.9 400 strip 3     blood-glucose meter Misc Dispense one meter. Use as directed. Dx: e11.9 1 each 0     buPROPion (WELLBUTRIN) 100 MG tablet TAKE 1 TABLET EVERY DAY 90 tablet 3     cholecalciferol, vitamin D3, (VITAMIN D3 ORAL) Take 125 mcg by mouth daily.       estradiol (ESTRACE) 0.01 % (0.1 mg/gram) vaginal cream Apply a pea-sized amount vaginally daily x 1 week then reduce to 3 times per week thereafter 42.5 g 1     famotidine (PEPCID) 20 MG tablet Take 1 tablet (20 mg total) by mouth 2 (two) times a day. 180 tablet 3     flash glucose scanning reader (FREESTYLE BRE 14 DAY READER) Misc Use 1 each As Directed see administration instructions. Use as directed with sensor 1 each 0     flash glucose sensor (FREESTYLE BRE 14 DAY SENSOR) Kit Use 1 each As Directed every 14 (fourteen) days. 2 kit 11     folic acid (FOLVITE) 800 MCG tablet Take 800 mcg by mouth every evening.              hydrOXYchloroQUINE (PLAQUENIL) 200 mg tablet Take 1 tablet p.o. twice daily. 180 tablet 0     insulin regular (NOVOLIN R) 100 unit/mL injection Inject 24 Units under the skin 3 (three) times a day before meals.       insulin syringe-needle U-100 (BD INSULIN SYRINGE ULT-FINE II) 1 mL 31 gauge x 5/16 Syrg USE 4 TIMES DAILY AS DIRECTED 500 each 1     lancing device  (ACCU-CHEK SOFTCLIX) Misc Use 1 applicator As Directed 4 (four) times a day. 300 each 3     lidocaine (LMX) 4 % cream Apply to affected area twice daily as needed 30 g 3     lisinopriL (PRINIVIL,ZESTRIL) 10 MG tablet TAKE 1 TABLET AT BEDTIME 90 tablet 3     loratadine (CLARITIN) 10 mg tablet Take 1 tablet (10 mg total) by mouth at bedtime. 90 tablet 3     magnesium oxide (MAG-OX) 400 mg tablet Take 400 mg by mouth at bedtime. With food              methotrexate 2.5 MG tablet Take 5 tablets po once a week (hold dose if you come down with an infection, until infection clears). 65 tablet 0     metoprolol succinate (TOPROL-XL) 50 MG 24 hr tablet TAKE 1 TABLET EVERY DAY 90 tablet 1     MULTIVITAMIN ORAL Take 1 tablet by mouth every evening.        nystatin (MYCOSTATIN) ointment Apply to sage-anal area and pelvic floor skin twice daily for 14 days. 60 g 3     nystatin (MYCOSTATIN) powder Apply to affected area 3 times daily for 2 weeks. 60 g 0     omega-3 fatty acids-vitamin E (FISH OIL) 1,000 mg cap Take 1,000 mg by mouth every evening. 90 each 3     oxyCODONE-acetaminophen (PERCOCET) 5-325 mg per tablet Take 1-2 tablets by mouth every 6 (six) hours as needed for pain (maximum of 6 tablets per day). For 10/12-11/11 180 tablet 0     predniSONE (DELTASONE) 2.5 MG tablet Take 2.5 mg by mouth daily. 30 tablet 0     traZODone (DESYREL) 150 MG tablet TAKE 1 TABLET AT BEDTIME 90 tablet 3     [DISCONTINUED] flash glucose scanning reader (FREESTYLE BRE 14 DAY READER) Misc Use 1 each As Directed see administration instructions. Use as directed with sensor 1 each 0     [DISCONTINUED] flash glucose sensor (FREESTYLE BRE 14 DAY SENSOR) Kit Use 1 each As Directed every 14 (fourteen) days. 2 kit 11     [DISCONTINUED] NOVOLIN N NPH U-100 INSULIN 100 unit/mL injection Inject 33 Units under the skin 2 (two) times a day before meals. 10 mL 11     No facility-administered encounter medications on file as of 10/20/2020.          Past  Medical History:   Diagnosis Date     BBB (bundle branch block)      Chronic back pain      Chronic kidney disease     stage 3     Chronic Kidney Disease (NKF Classification)     Created by Conversion      Depression      Diabetes (H)     Type 2     Diabetes mellitus, type 2 (H)     Created by Conversion      Frequent headaches      Ganglion Of The Left Wrist     Created by Conversion      GERD (gastroesophageal reflux disease)      HLD (hyperlipidemia)      Hypertension     Per H&P dated 2013     Incarcerated ventral hernia     Per H&P dated 2013     Osteoarthritis      Rheumatoid arthritis (H)      Shortness of breath      Thrombocytopenia (H)      Venous insufficiency      Past Surgical History:   Procedure Laterality Date     BACK SURGERY       CHOLECYSTECTOMY       FATTY TUMOR      LT SHOULDER BLADE     HYSTERECTOMY  1984     JOINT REPLACEMENT Left     knee     FL ABDOMEN SURGERY PROC UNLISTED      Description: Hernia Repair;  Recorded: 10/23/2013;     FL IMPLANT SPINAL NEUROSTIM/ Left 2019    Procedure: LEFT FLANK INCISION REPLACE NEUROSTIMULATOR;  Surgeon: Vishal Pretty MD;  [facility]: [facility];  Service: Pain     SPINAL CORD STIMULATOR IMPLANT       Two left wrist  ,      Social History     Tobacco Use     Smoking status: Former Smoker     Quit date: 2008     Years since quittin.2     Smokeless tobacco: Never Used     Tobacco comment: smells of cigarrette smoke   Substance Use Topics     Alcohol use: No     Drug use: No     Comment: takes percocet for chronic pain       Objective / Physical Examination:  Vitals:    10/20/20 1342   BP: 128/70   Pulse: 68     Wt Readings from Last 3 Encounters:   20 (!) 222 lb 9.6 oz (101 kg)   20 220 lb (99.8 kg)   20 220 lb (99.8 kg)     There is no height or weight on file to calculate BMI.     Constitutional: In no apparent distress  Respiratory: Clear to auscultation bilaterally. Normal  inspiratory and expiratory effort  Cardiovascular: Regular rate and rhythm  MSK: Unable to stand from a chair without pushing with arms and rocking to reach a standing position. Gait is antalgic, walks with a 4 point walker     Orders Placed This Encounter   Procedures     Glycosylated Hemoglobin A1c     Microalbumin, Random Urine     Lipid [facility] FASTING     Ambulatory referral for Colonoscopy   Followup: Return in about 6 months (around 4/20/2021) for Next scheduled follow up. earlier if needed.          Marichuy Rubio, CNP

## 2021-06-12 NOTE — TELEPHONE ENCOUNTER
Who is calling:  Patient   Reason for Call:  Patient is calling in to check whether clinic received the the form for Lift chair . Per patient the Lift chair company faxed a form to be completed by provider . Patient is unable to give company name . Patient stated that the Lift chair company is going to fax to clinic now one more time . Patient requested to process the form and fax back to Lift chair company .  Date of last appointment with primary care: 10/20/20  Okay to leave a detailed message: No

## 2021-06-12 NOTE — PROGRESS NOTES
Pain Center Follow-up Note  7/27/2017    Ms. Bowman is a 67 year old woman with low back and leg pain.  She had a spinal cord stimulator place 7/8/2016 and feels this has been helpful.  She is noting increased pain in the sacroiliac area mainly on the right.  She had sacroiliac joint injections two years ago that had given her good relief.  She is here today so that the [employer] representative could adjust the stimulator to see if we could get more coverage of her area of pain.  They have adjusted it and she thinks it is doing better.  She does have tenderness over the right sacroiliac area and some muscle tenderness in the lumbar paraspinal areas bilaterally.  She is having no sedation or side effects from her medication.  Imp:  It appears that her spinal cord stimulator is functioning well.  We will review her setting on her return visit.  We will see her back for a sacroiliac joint injection, as soon as we get prior authorization for that.

## 2021-06-12 NOTE — TELEPHONE ENCOUNTER
Controlled Substance Refill Request  Medication Name:   Requested Prescriptions     Pending Prescriptions Disp Refills     oxyCODONE-acetaminophen (PERCOCET) 5-325 mg per tablet 180 tablet 0     Sig: Take 1-2 tablets by mouth every 6 (six) hours as needed for pain (maximum of 6 tablets per day). For 9/12-10/12     Date Last Fill: 09/09/20  Requested Pharmacy: [facility]  Submit electronically to pharmacy  Controlled Substance Agreement on file:   Encounter-Level CSA Scan Date:    There are no encounter-level csa scan date.        Last office visit:

## 2021-06-12 NOTE — PROGRESS NOTES
1. Scheduled F/U Call:  Attempt 1    Care Guide called patient.  If this patient is returning our call please transfer to [place]  at ext [phone].

## 2021-06-12 NOTE — PROGRESS NOTES
Scheduled F/U Call: Attempt 2   Care Guide called patient.  If this patient is returning our call please transfer to  [place] at ext [phone].

## 2021-06-12 NOTE — PROGRESS NOTES
[facility] Discharge Summary  Patient Name: Ebonie Bowman  Date: 8/22/2017  Referral Diagnosis: gait instability, neck pain  Referring provider: Marichuy Rubio FNP  Visit Diagnosis:   1. Chronic neck pain     2. Poor posture     3. Generalized muscle weakness     4. Bilateral primary osteoarthritis of hip     5. S/P TKR (total knee replacement) using cement, left     6. Lumbar radiculopathy, chronic     7. Seropositive rheumatoid arthritis     8. Insomnia     9. Venous Insufficiency     10. Obesity     11. Bursitis     12. Thrombocytopenia     13. Chronic Kidney Disease (NKF Classification)         Goals:  Pt. will demonstrate/verbalize independence in self-management of condition in : 6 weeks;Not Met  Pt. will be independent with home exercise program in : 6 weeks;Not Met  Pt will: have decreased pain while using the computer and playing sodoku in order to improve her QOL within 8 weeks (GOAL NOT MET)    Patient was seen for 1 visits.  She did not return to therapy after her first visit so status of her goals is unknown.    Therapy will be discontinued at this time.  The patient will need a new referral to resume.    Thank you for your referral.  Hayder Gale, PT, DPT  8/22/2017  2:15 PM

## 2021-06-12 NOTE — TELEPHONE ENCOUNTER
Dr Londoon    please refill: prednisone 2.5 mg    bottle states no more refills. please refill.    please send to: [id number] - [place], [place] - [address]

## 2021-06-12 NOTE — PROGRESS NOTES
[facility] Discharge Summary  Patient Name: Ebonie Bowman  Date: 9/18/2017  Referral Diagnosis: gait instability  Referring provider: Marichuy Rubio FNP  Visit Diagnosis:   1. Chronic neck pain     2. Poor posture     3. Generalized muscle weakness     4. Bilateral primary osteoarthritis of hip     5. S/P TKR (total knee replacement) using cement, left     6. Lumbar radiculopathy, chronic     7. Seropositive rheumatoid arthritis     8. Insomnia     9. Venous Insufficiency     10. Obesity     11. Bursitis     12. Thrombocytopenia     13. Chronic Kidney Disease (NKF Classification)         Goals:  Pt. will demonstrate/verbalize independence in self-management of condition in : 6 weeks;Not Met  Pt. will be independent with home exercise program in : 6 weeks;Not Met  Pt will: have decreased pain while using the computer and playing sodoku in order to improve her QOL within 8 weeks (GOAL NOT MET)    Patient was seen for 2 visits.   Patient was not compliant with PT POC or her HEP. See notes for further details.  She was given a HEP but wasn't doing it on her own.  Her PT goals were not met and final tests/measurements were not performed due to patient not returning to PT.    Therapy will be discontinued at this time.  The patient will need a new referral to resume.    Thank you for your referral.  Hayder Gale, PT, DPT  9/18/2017  3:16 PM    [facility] Daily Progress     Patient Name: Ebonie Bowman  Date: 9/18/2017  Visit #: 2  PTA visit #:    Referral Diagnosis: gait instability, neck pain  Referring provider: Marichuy Rubio FNP  Visit Diagnosis:     ICD-10-CM    1. Chronic neck pain M54.2     G89.29    2. Poor posture R29.3    3. Generalized muscle weakness M62.81    4. Bilateral primary osteoarthritis of hip M16.0    5. S/P TKR (total knee replacement) using cement, left Z96.652    6. Lumbar radiculopathy, chronic M54.16    7. Seropositive rheumatoid arthritis M05.9    8. Insomnia  "G47.00    9. Venous Insufficiency I87.2    10. Obesity E66.9    11. Bursitis M71.50    12. Thrombocytopenia D69.6    13. Chronic Kidney Disease (NKF Classification) N18.9          Assessment:   Mod-max A needed during review of HEP today. Patient states that she wasn't able to come to therapy for the past month because she had a lumbar injection and she states that he doctor told her to not come to therapy for her neck at that time. Patient needed quite a bit of encouragement to perform her HEP today- she does not appear motivated for physical therapy    HEP/POC compliance is  poor.  Patient demonstrates understanding/independence with home program.    Goal Status:  Pt. will demonstrate/verbalize independence in self-management of condition in : 6 weeks;Not Met  Pt. will be independent with home exercise program in : 6 weeks;Not Met  Pt will: have decreased pain while using the computer and playing sodoku in order to improve her QOL within 8 weeks (GOAL NOT MET)    Plan / Patient Education:     Continue with initial plan of care.  Progress with home program as tolerated.     Exercises:  Exercise #1: scap sets  Comment #1: x10 with 5\" holds  Exercise #2: isometrics- SB B and ext (no flexion- this increased low back pain; terminated)  Comment #2: x10 with 5\" holds  Exercise #3: doorway pec stretch  Comment #3: 30\" holds x3  Exercise #4: rows and shoulder ext with L2  Comment #4: x15 each, B (patient had to perform some in seated position)     Plan for next session: review HEP, try chin tucks, abd sets, upright rows or pull aparts    Subjective:     Pain Ratin-3/10     Doing her HEP 3x/day. Neck is getting better. Hasn't tried ice or heat. Has to spend most of her day looking down while her pets follow her around her home, while she's watching TB, or on the computer, so she isn't able to have good posture      Objective:     Patient stating that she doesn't want to do more reps of the band exercises during therapy " today, but was able to complete her set of 15 with rest breaks every 3-5 reps  Poor posture- patient reminded several times during session to look straight ahead and hold shoulders back.   Pt c/o of inability to perform 15 reps of the band exs at a time due to muscle pain, so some were performed in a chair. She then stated that sitting too long increased her low back pain so she then finished the exercises standing    Treatment Today     TREATMENT MINUTES COMMENTS   Evaluation     Self-care/ Home management     Manual therapy     Neuromuscular Re-education     Therapeutic Activity     Therapeutic Exercises 26 Discussed progress. Extensively reviewed HEP. Added new exercises- see flow sheet. Reminded patient of the importance of good posture   Gait training     Modality__________________                Total 26    Blank areas are intentional and mean the treatment did not include these items.       Hayder Gale, PT, DPT  9/18/2017

## 2021-06-12 NOTE — PROGRESS NOTES
ASSESSMENT AND PLAN:  Ebonie Bowman 67 y.o. female is here for follow-up of seropositive severe rheumatoid arthritis, widespread osteoarthritis, she is hurting in her hands.  She did not have MRI of the hand done because she is so claustrophobic.  For some reason she did not also do the Vectra.  Today's examination shows that she has continued to have a synovitis more prominent than before.  She may well be a candidate now for Biologics.  She is already been on methotrexate, leflunomide, she and hydroxychloroquine, allergic to sulfa.  Today's labs as noted once these data are reviewed further course to be charted.  Given her [employer] rheumat[employer] handout to review on tumor necrosis factor inhibitors.  We will meet here in 2 months.    Diagnoses and all orders for this visit:    Seropositive rheumatoid arthritis  -     QTF-Mycobacterium tuberculosis by QuantiFERON-TB Gold  -     vectra da - Misc. Lab Test    High risk medication use  -     ALT (SGPT)  -     Albumin  -     Creatinine  -     HM2(CBC w/o Differential)    Right hand pain    Bilateral primary osteoarthritis of hip        HISTORY OF PRESENTING ILLNESS:  Ebonie Bowman, 67 y.o., female is here follow-up of seropositive rheumatoid arthritis.  She has noted worsening,in the hands, more so on the right side.  This pain is bothersome throughout the day but worse in the morning.  She has noted severity moderately to severe in intensity.  She points to the MCP areas now the left hand is also beginning to hurt.  She wonders if there is some swelling but she is not sure.  There is some variability some days are worse than the others.  .  She has noted dryness of her nose mouth, high blood pressure history, diabetes, swelling of the ankles, heartburn,.  She reports no other rash except the inframammary depending upon the weather, she has no history of psoriasis ulcerative colitis Crohn's disease.  Her mom had rheumatoid arthritis.  She is nonsmoker and does  not take alcohol no regular exercise.  She has had multiple musculoskeletal surgeries in the past she had left wrist surgery 1995 and 94 she had a injury there is a bucket fell, she had back surgery 2 and 87 and 1990.  And she is had the left knee surgery first hemiarthroplasty and then total knee replacement most recently in 2010.  Further historical information and ADL limitations as noted in the multidimensional health assessment questionnaire attached in the EMR.     ALLERGIES:Penicillins and Sulfa (sulfonamide antibiotics)    PAST MEDICAL/ACTIVE PROBLEMS/MEDICATION/ FAMILY HISTORY/SOCIAL DATA:  The patient has a family history of  Past Medical History:   Diagnosis Date     BBB (bundle branch block)      Chronic back pain      Chronic kidney disease     stage 3     Chronic Kidney Disease (NKF Classification)     Created by Conversion      Depression      Diabetes     Type 2     Diabetes mellitus, type 2     Created by Conversion      Frequent headaches      Ganglion Of The Left Wrist     Created by Conversion      GERD (gastroesophageal reflux disease)      HLD (hyperlipidemia)      Hypertension     Per H&P dated 1/03/2013     Incarcerated ventral hernia     Per H&P dated 1/03/2013     Osteoarthritis      Rheumatoid arthritis      Thrombocytopenia      Venous insufficiency      History   Smoking Status     Former Smoker     Quit date: 8/1/2008   Smokeless Tobacco     Never Used     Comment: smells of cigarrette smoke     Patient Active Problem List   Diagnosis     Osteoarthritis     Venous Insufficiency     HTN (hypertension)     Insomnia     Joint Pain, Localized In The Knee     Bursitis     Obesity     Chronic Kidney Disease (NKF Classification)     Hyperlipidemia     Diabetes mellitus, type 2     Joint Pain, Localized In The Hip     Morbid obesity     Depression     Thrombocytopenia     Seropositive rheumatoid arthritis     Lumbar radiculopathy, chronic     S/P TKR (total knee replacement) using cement, left      High risk medication use     Chronic pain     Right hand pain     GERD (gastroesophageal reflux disease)     Current Outpatient Prescriptions   Medication Sig Dispense Refill     ascorbic acid (ASCORBIC ACID WITH LATRICIA HIPS) 500 MG tablet Take 500 mg by mouth every evening.        aspirin 81 MG EC tablet Take 81 mg by mouth every evening.       atorvastatin (LIPITOR) 40 MG tablet Take 1 tablet (40 mg total) by mouth bedtime. 90 tablet 3     blood glucose test (ACCU-CHEK FRANCISCO PLUS TEST STRP) Strp TEST FOUR TIMES DAILY 400 strip 3     buPROPion (WELLBUTRIN SR) 100 MG 12 hr tablet TAKE 1 TABLET EVERY DAY 90 tablet 1     cholecalciferol, vitamin D3, 1,000 unit tablet Take 1,000 Units by mouth every evening.        cyanocobalamin (VITAMIN B-12) 250 MCG tablet Take 250 mcg by mouth every evening.        docusate sodium (COLACE) 100 MG capsule Take 100 mg by mouth 2 (two) times a day.       folic acid (FOLVITE) 800 MCG tablet Take 1,200 mcg by mouth daily.       glucose 4 GM chewable tablet Chew 4 tablets (16 g total) as needed for low blood sugar. 120 tablet 0     insulin NPH (NOVOLIN) 100 unit/mL injection Inject 40 Units under the skin bedtime.        insulin NPH human recomb (HUMULIN N PEN) 100 unit/mL (3 mL) pen Give 60 units subcutaneously every morning and 40 units subcutaneously every evening 3 mL 11     insulin regular (NOVOLIN R) 100 unit/mL injection Inject 16 units subcutaneous TID with meals plus sliding scale 10 mL 6     insulin syringe-needle U-100 (BD INSULIN SYRINGE ULT-FINE II) 1 mL 31 gauge x 5/16 Syrg USE 4 TIMES DAILY AS DIRECTED 500 each 1     lancing device (ACCU-CHEK SOFTCLIX) Misc Use 1 applicator As Directed 4 (four) times a day. 300 each 3     leflunomide (ARAVA) 20 MG tablet TAKE 1 TABLET EVERY DAY 90 tablet 0     lisinopril (PRINIVIL,ZESTRIL) 10 MG tablet Take 1 tablet (10 mg total) by mouth bedtime. 90 tablet 3     loratadine (CLARITIN) 10 mg tablet Take 10 mg by mouth bedtime.         magnesium oxide (MAG-OX) 400 mg tablet Take 400 mg by mouth bedtime. With food        methotrexate 2.5 MG tablet TAKE 4 TABLETS ONCE A WEEK. 48 tablet 0     metoprolol succinate (TOPROL-XL) 25 MG TAKE ONE TABLET BY MOUTH ONCE DAILY 90 tablet 2     MULTIVITAMIN ORAL Take 1 tablet by mouth daily.       omega-3 fatty acids-vitamin E (FISH OIL) 1,000 mg cap Take 1,000 mg by mouth every evening. 90 each 3     omeprazole (PRILOSEC) 20 MG capsule TAKE 1 CAPSULE TWICE DAILY 180 capsule 2     oxyCODONE-acetaminophen (PERCOCET)  mg per tablet Take 1 tablet by mouth every 8 (eight) hours as needed for pain (Do not exceed 3 tablets per day). For use from 7/12/17 to 8/11/17. 90 tablet 0     ranitidine (ZANTAC) 150 MG tablet Take 1 tablet (150 mg total) by mouth 2 (two) times a day. 60 tablet 3     traZODone (DESYREL) 150 MG tablet Take 1 tablet (150 mg total) by mouth at bedtime. 90 tablet 3     predniSONE (DELTASONE) 2.5 MG tablet Take 1 tablet (2.5 mg total) by mouth every other day. 45 tablet 0     No current facility-administered medications for this visit.          DETAILED EXAMINATION  07/21/17  :  Vitals:    07/21/17 1556   BP: 148/78   Pulse: 88   Weight: (!) 234 lb (106.1 kg)     Alert oriented. Head including the face is examined for malar rash, heliotropes, scarring, lupus pernio. Eyes examined for redness such as in episcleritis/scleritis, periorbital lesions.   Neck examined  for lymph nodes, range of motion Both upper and lower extremities (all four) examined for swollen, warm &/or  tender joints, range of motion, rash, muscle weakness, edema. The salient normal / abnormal findings are appended. She has left knee arthroplasty scar.  She has right knee joint line tenderness, no effusion or warmth.  She has synovial thickening and tenderness in MCPs on right side particularly #2, 3 also on the left.           LAB / IMAGING DATA:  ALT   Date Value Ref Range Status   05/05/2017 22 0 - 45 U/L Final   02/13/2017 15  0 - 45 U/L Final   12/07/2016 10 0 - 45 U/L Final     Albumin   Date Value Ref Range Status   05/05/2017 3.3 (L) 3.5 - 5.0 g/dL Final   02/13/2017 3.4 (L) 3.5 - 5.0 g/dL Final   12/07/2016 3.4 (L) 3.5 - 5.0 g/dL Final     Creatinine   Date Value Ref Range Status   05/05/2017 1.13 (H) 0.60 - 1.10 mg/dL Final   02/13/2017 1.24 (H) 0.60 - 1.10 mg/dL Final   12/07/2016 1.32 (H) 0.60 - 1.10 mg/dL Final       WBC   Date Value Ref Range Status   05/05/2017 9.5 4.0 - 11.0 thou/uL Final   02/13/2017 12.2 (H) 4.0 - 11.0 thou/uL Final   08/12/2015 11.9 (H) 4.0 - 11.0 thou/uL Final   07/01/2015 12.8 (H) 4.0 - 11.0 thou/uL Final     Hemoglobin   Date Value Ref Range Status   05/05/2017 14.3 12.0 - 16.0 g/dL Final   02/13/2017 14.8 12.0 - 16.0 g/dL Final   12/07/2016 14.1 12.0 - 16.0 g/dL Final     Platelets   Date Value Ref Range Status   05/05/2017 131 (L) 140 - 440 thou/uL Final   02/13/2017 118 (L) 140 - 440 thou/uL Final   12/07/2016 133 (L) 140 - 440 thou/uL Final       Lab Results   Component Value Date    RF <15.0 05/13/2015    SEDRATE 23 (H) 05/13/2015

## 2021-06-12 NOTE — TELEPHONE ENCOUNTER
Left message for patient to call back. Appt she has scheduled today needs to be rescheduled for a in clinic appointment, as medicare will not accept a telephone visit for a lift chair. Certain requirements are needed for this and have to be done face to face. Please help patient re-schedule appt.

## 2021-06-13 NOTE — TELEPHONE ENCOUNTER
Refilled per Rheum RN refill protocol  Pt requesting refill be sent to [facility] pharmacy instead of local pharmacy

## 2021-06-13 NOTE — TELEPHONE ENCOUNTER
I refilled her medication. Is she open to scheduling a FTF office visit? If she is not feeling better I think she needs to be seen

## 2021-06-13 NOTE — PATIENT INSTRUCTIONS - HE
Summary of Your Rheumatology Visit    Next Appointment:  4 Months (please schedule a follow-up video visit for last patient of the day,  when anticipating to have son home from work to assist in video visit as performed on 11/24/2020 via smart phone).    Medications:    Please follow directives on pill bottle on how to take medication(s) provided.      Referrals:      Tests:     Please have labs performed in about 7-8 weeks.    Injections:        Other:

## 2021-06-13 NOTE — TELEPHONE ENCOUNTER
Talked with patient to try and schedule a consult visit with Dr. Das. She is currently recovering from Covid and not sure when she will be able to leave the nursing home. She requested we call her again around [phone] to see if she has any new information regarding being able to leave the facility.

## 2021-06-13 NOTE — PROGRESS NOTES
"Clinic Care Coordination RN Assessed Needs  Patient Centered Assessment Method-PCAM TOTAL SCORE: 30 (10/4/2017  6:46 PM)  Level 2:  A score of 25-36 indicates that the patient has a moderate initial need for RN or SW intervention at the discretion of the .  The RN will add this patient to her panel and follow closely in partnership with the care guide until stable.  She will reach out to the care guide for support in care coordination needs and graduate the patient to standard care guide outreach when appropriate.      Goals  Goals       CCC-Patient Stated \"I want to get information about other apartments that cost no more than $1000.00/month and are on the first floor.\" (pt-stated)            Action steps to achieve this goal  1.  I will meet with the [facility] SW to discuss possible apartment options  2.    3.      Date goal set:  10/4/17        CCC-Patient Stated \"I want to reach a goal weight of 200 lbs in a year.\" (pt-stated)            Action steps to achieve this goal  1.    2.    3.      Date goal set: 10/4/17        I would like to incorporate exercise into my daily routine.  (pt-stated)            Action steps to achieve this goal  1. I will continue to do chair exercises as able and will not do moves that I feel unsafe with. This has been getting hard due to her leg. We discuss that I would rather have her safe. - Not discussed today  2. I will Bisi know if I feel I needs support from the nurse. A RN Assessment has been scheduled for 10/4/2017  3. I will continue to watch my portion sizes and choose healthy options as able.  I've been doing fair. I did have a salad yesterday but I do need to go to the store.  4. I will establish care with Marichuy Rubio on 9/13/2016. (12/15/2016)    Note: My Goal weight is 200-220 pounds.       Updated:9/6/2017  Date goal set:  10/2/13            RN Recommendations and Referrals  See below for action plan    Action Plan    RN Will  Will add the patient to [facility] RN tracking " list  Be available to the patient as nursing needs arise    Care Guide Will    Due Date Delegation   At next Outreach Review goals set at RN assessment visit and create or add to action plan. Review prior action plan.    At next Outreach Check if pt still thinking of moving. If yes, schedule appointment with CCC NIYAH to review possible options. Pt wants maximum rent of $1000/month, not in assisted living or senior housing, with an elevator or first floor apartment.   At next Outreach Ask pt if she's agree to add a goal of reducing A1C to less than 8 in 6 months. If she agrees, add to goals.       PCAM (Patient Centered Assessment Method) HEALTH AND WELL-BEING  Physical Health Concerns: Chronic Kidney Disease  Other Physical Health Concerns:: Chronic pain, seropositive rheumatoid arthritis, osteoarthritis. Nerve stimulator implanted 1 year ago. Helped decrease neck/back pain by 50%. Had steriod injection to area about 2 weeks ago, that decreased pain more. Takes oxycodone-acteminophen 10/325 mg 3 tabs daily. Decreases overall body pain from 8/10 to 6/10. Completed recent course of PT for neck pain.  RN Assessment: Physical Health Needs: Mod to severe symptoms or problems that impact on daily life  RN Assessment: Physical Health Problems: Mod to severe impact upon mental well-being and preventing enjoyment of usual activities  Mental Health Concerns: Depression  Other Mental Health Concerns:: Reported having episodes of depression.   RN Assessment:Other Mental Well-Being Concern: Mild problems- don't interfere with function  Lifestyle/Habit Concerns: Diet, Exercise/Activity  Other Lifestyle/Habit Concerns:: Stopped smoking cigarettes in 2008. Rarely drinks alcohol. Doesn't use illegal drugs. Does some leg exercises, while sitting. A1C increased to 9.3 on 7/11/17 from 6.9 on 4/11/17.Drinks Pepsi, 3-4 20oz bottles daily. Used to drink 6/day. Drinking Pepsi is a comfort food for her. Eats a 1/2 candy bar about 3 times/wk.  "Rarely eats breakfast. Enjoys going out for lunch. Today had BLT and fries at [employer]. Plans to have pork loin for dinner with a couple, small red potatoes..   RN Assessment: Lifestyle Behaviors: Mod to severe impact on client's well-being, preventing enjoyment of usual activities  SOCIAL ENVIRONMENT  Home Environment Concerns: In home safety  Other Home Environment Concerns:: Lives in second floor apartment, with son. Doesn't like apartment r/t issues with bathroom mold, leaking refrigerator, buckling in kitchen floor. Apartment building has no elevator. Would like to move to a better apartment, but not ready for \"senior housing.\"   RN Assessment: Home Environment: Safety/Stability questionable  Other Daily Activities Concerns:: Plays computer games, reads, watches TV, enjoys eating in restaurants. Does drive a car. Doesn't have friends, but isn't lonely.  RN Assessment: Daily Activites: Restricted participation with some degree of social isolation  Social Network Concerns: Denies concerns that require further investigation  Other Social Network Concerns:: Lives with son for past 9 years. Has a new girlfriend, so son not home to help pt with heavy lifting, cleaning. Other son,  from cancer. Pt tears up talking about him.   RN Assessment: Social Network: Restricted participation with some degree of social isolation  Financial Status and Service Concerns: Disabled  Other Financial Status and Service Concerns:: Disability $2100.00/month. Only income. Son works, but doesn't pay rent. He buys his own food because \"He doesn't like the way I cook.\" Rent $900.00/month. Two garages $80.00/month, for son's 2 cars. Pays for electricity. Worries about having enough money for food, by end of month. Medication co-pays range from 0-$35.00.   RN Assessment: Financial Resources: Financially insecure, some resource challenges  HEALTH LITERACY AND COMMUNICATION  Understanding of Health and Wellbeing Concerns: Denies concerns that " require further investigation  RN Assessment: Health Literacy: Reasonable to good understanding and already engages in managing health or is willing to undertake better management  Engagement Concerns: Adequate communication, with or without minor barriers  RN Assessment: Engagement: Adequate communication, with or without minor barriers  Barriers to Compliance with Medical Recommendations: Financial  SERVICE COORDINATION  Other Services: Other care/services in place and adequate  Coordination of Services: Required care/services in place and adequately coordinated  PCAM TOTAL SCORE: 30      Emergency Plan  Managing Chronic Pain: Medicines  Medicines can help you live better with chronic pain. You may use over-the-counter or prescription medicines. It can take some time and trial and error to work out the best treatment plan for you. Work with your health care provider to find the best medicines for you, and to use them safely and effectively.  Tell your health care provider about all medicines you`re taking, including herbs and vitamins.   A part of your treatment plan  Depending on your situation and the type of pain, you may take medicines:    At times when pain is more intense than usual.    For pain relief throughout the day.    Before activities that tend to trigger pain, like going shopping or doing physical therapy.     To decrease sensitivity to pain and help you sleep.  There are 4 major groupings of medicines for the treatment of chronic pain:  Non-opioids. These include the commonly used medicine acetaminophen as well as the non-steroidal anti-inflammatory drugs (NSAIDs), like aspirin and ibuprofen, naproxen sodium, and ketoprofen. These all help control pain but NSAIDs also help relieve inflammation. These drugs are available over-the-counter. Some NSAIDS are available by prescription only.  Acetaminophen can cause liver damage if taken above the recommended dose. NSAIDs may cause stomach problems like  bleeding ulcers. Using them over the long-term can cause heart problems and stroke in a very small number of people. None of these drugs is addictive.  Opioids. This includes drugs, like morphine, oxycodone, codeine, fentanyl, and methadone. Opioids may be used to treat breakthrough pain or severe chronic pain. Opioids are available only by prescription. These medicines may be effective for managing chronic pain. But they are controversial because of their side effects and because they can be addictive.    Adjuvants. This group includes medicines that were originally made to treat other conditions, but were also found to relieve pain. Examples of adjuvant drugs include antidepressants and anticonvulsants.  Antidepressants. These help pain by working on the same brain chemicals that play a role in depression. They also help improve sleep. Tricyclic antidepressants are 1 group of antidepressants used for treating chronic pain caused by nerve injury (neuropathic pain). Examples include amitriptyline, nortriptyline, and desipramine. Serotonin-norepinephrine reuptake inhibitors (SNRIs), like duloxetine and milnacipran, are also used.  Some types of antidepressants are used in low doses for sleep problems. They may also be prescribed if you are very sensitive to pain or some kinds of nerve pain.  Anticonvulsants, developed to prevent seizures, can help certain pain conditions, particularly nerve (neuropathic) pain. Examples include gabapentin and pregabalin.  Other pain medicines    Topical. These medicines, like lidocaine or capsaicin, are applied to the skin to treat pain in one location.    Muscle relaxants. These may be used to stop painful muscle spasms. Drugs like cyclobenzaprine can be sedating.  Taking medicine safely    Take your medicine on time and in the right dose as prescribed.    Tell your health care professional if your medicine doesn't relieve your pain or work for a long enough time, or if you have side  effects.    Don't take other people's medicines. They may not be safe for you.  Avoid alcohol, tobacco, and illegal drugs. These may interact with your medicines causing you harm.

## 2021-06-13 NOTE — TELEPHONE ENCOUNTER
I called  for Ninfa informing of the below. Please schedule a f/u when Ninfa c/gladis. Virtual visit with Dr Gu.

## 2021-06-13 NOTE — PROGRESS NOTES
Internal Medicine Office Visit  [facility]  Patient Name: Ebonie Bowman  Patient Age: 67 y.o.  YOB: 1950  MRN: [id number]    Date of Visit: 10/11/2017  Reason for Office Visit:   Chief Complaint   Patient presents with     Follow-up     3 months           Assessment / Plan / Medical Decision Makin. Diabetes mellitus, type 2  2. Chronic pain  3. Colon cancer screening  4. Dry skin  5. Cerumen impaction, right ear  - Thyroid Stimulating Hormone (TSH)  - Discussed cancer screening options with her. She has had a colonoscopy but I was unable to locate a copy of this after she left the office, will request at next visit. She is advised that the best cancer prevention is to stay active and eat a healthy diet. Occult blood stool test today for colon cancer screening   - Increase insulin NPH to 60 units in the morning (the same) and 55 units at bedtime. In 1 week, if your fasting (morning) blood sugars are above 130, increase your dose at bedtime to 60 units   - Reminder to get your PCV13 (pneumonia vaccine) and flu shot, a print out was given to her as a reminder  - Referral to diabetes education for a glucose sensor placement. She has a tough time checking her glucose regularly and her diabetic management would benefit greatly from having more data to drive insulin dose changes  - Referral to the Pain Clinic for medication management. She is currently seen for interventional procedures only. She does not feel that the current pain medication regimen is effectively relieving her pain. Intake packet given to patient today   - Attempted to irrigate right ear, cerumen is too hard and dry. She will use Debrox ear drops and will repeat irrigation at next visit. Could also see ENT if she prefers   - OTC unscented lotion recommended for dry skin   - Follow up in 3 months     Health Maintenance Review  Health Maintenance   Topic Date Due     DEPRESSION FOLLOW UP  1950      DIABETES FOLLOW-UP  1950     DIABETES FOOT EXAM  03/17/1958     DIABETES OPHTHALMOLOGY EXAM  03/17/1958     DXA SCAN  03/17/2013     INFLUENZA VACCINE RULE BASED (1) 08/01/2017     FALL RISK ASSESSMENT  09/13/2017     DIABETES HEMOGLOBIN A1C  04/11/2018     MAMMOGRAM  09/27/2018     DIABETES URINE MICROALBUMIN  10/11/2018     ADVANCE DIRECTIVES DISCUSSED WITH PATIENT  12/14/2021     TD 18+ HE  07/18/2024     COLONOSCOPY  12/14/2026     PNEUMOCOCCAL POLYSACCHARIDE VACCINE AGE 65 AND OVER  Completed     PNEUMOCOCCAL CONJUGATE VACCINE FOR ADULTS (PCV13 OR PREVNAR)  Addressed     ZOSTER VACCINE  Completed         I have changed Ms. Bowman's insulin NPH human recomb and carbamide peroxide. I am also having her maintain her magnesium oxide, ascorbic acid (vitamin C), cholecalciferol (vitamin D3), aspirin, cyanocobalamin, omega-3 fatty acids-vitamin E, blood glucose test, lancing device, MULTIVITAMIN ORAL, docusate sodium, loratadine, folic acid, insulin syringe-needle U-100, predniSONE, atorvastatin, metoprolol succinate, insulin regular, traZODone, glucose, ranitidine, methotrexate, lisinopril, leflunomide, and oxyCODONE-acetaminophen.      HPI:  Ebonie Bowman is a 67 y.o. year old who presents to the office today for follow up     Low back pain- had about 50% pain relief with stimulator placement. She couldn't imagine not having the stimulator it has been so effective in relieving her pain. It has not reduced her pain medication use. She remains on Percocet 10 three times daily.     Diabetes, did not bring her meter with her today. From recall:  Fasting- 200-275  Lunch, dinner- 275-300s  Bedtime- 300s  She was previously on lantus but could not afford the medication. [facility] tried to reach out to her regarding medication assistance but the call could not be completed as dialed. She states that her phone has been an issue for her and she misses a lot of phone calls.     Has applied for medication assistance  for her Humira rheumatological medication.     She has right ear pain, it has been a while that this is going on but seems to be getting worse. The pain is constant and sharp. Started months ago, uncertain when. The pain is worse when there are loud piercing noises such as her chihuahua dog barking.     There is cancer in her family history. Her father had bladder cancer which metastasized to the kidney. She wonders what things she can do for cancer screening and prevention.     Review of Systems:  + bilateral low back pain. No loss of bowel or bladder control  No symptoms of hypoglycemia       Current Scheduled Meds:  Outpatient Encounter Prescriptions as of 10/11/2017   Medication Sig Dispense Refill     ascorbic acid (ASCORBIC ACID WITH LATRICIA HIPS) 500 MG tablet Take 500 mg by mouth every evening.        aspirin 81 MG EC tablet Take 81 mg by mouth every evening.       atorvastatin (LIPITOR) 40 MG tablet Take 1 tablet (40 mg total) by mouth bedtime. 90 tablet 3     blood glucose test (ACCU-CHEK FRANCISCO PLUS TEST STRP) Strp TEST FOUR TIMES DAILY 400 strip 3     cholecalciferol, vitamin D3, 1,000 unit tablet Take 1,000 Units by mouth every evening.        cyanocobalamin (VITAMIN B-12) 250 MCG tablet Take 250 mcg by mouth every evening.        docusate sodium (COLACE) 100 MG capsule Take 100 mg by mouth 2 (two) times a day.       folic acid (FOLVITE) 800 MCG tablet Take 1,200 mcg by mouth daily.       glucose 4 GM chewable tablet Chew 4 tablets (16 g total) as needed for low blood sugar. 120 tablet 0     insulin NPH human recomb (HUMULIN N PEN) 100 unit/mL (3 mL) pen Give 60 units subcutaneously every morning and 50 units subcutaneously every evening 3 mL 11     insulin regular (NOVOLIN R) 100 unit/mL injection Inject 16 units subcutaneous TID with meals plus sliding scale 10 mL 6     insulin syringe-needle U-100 (BD INSULIN SYRINGE ULT-FINE II) 1 mL 31 gauge x 5/16 Syrg USE 4 TIMES DAILY AS DIRECTED 500 each 1      lancing device (ACCU-CHEK SOFTCLIX) Misc Use 1 applicator As Directed 4 (four) times a day. 300 each 3     leflunomide (ARAVA) 20 MG tablet TAKE 1 TABLET EVERY DAY 30 tablet 0     lisinopril (PRINIVIL,ZESTRIL) 10 MG tablet TAKE 1 TABLET AT BEDTIME 90 tablet 3     loratadine (CLARITIN) 10 mg tablet Take 10 mg by mouth bedtime.        magnesium oxide (MAG-OX) 400 mg tablet Take 400 mg by mouth bedtime. With food        methotrexate 2.5 MG tablet TAKE 4 TABLETS (10 MG TOTAL) BY MOUTH ONCE A WEEK. 48 tablet 0     metoprolol succinate (TOPROL-XL) 25 MG TAKE ONE TABLET BY MOUTH ONCE DAILY 90 tablet 2     MULTIVITAMIN ORAL Take 1 tablet by mouth daily.       omega-3 fatty acids-vitamin E (FISH OIL) 1,000 mg cap Take 1,000 mg by mouth every evening. 90 each 3     oxyCODONE-acetaminophen (PERCOCET)  mg per tablet Take 1 tablet by mouth every 8 (eight) hours as needed for pain (Do not exceed 3 tablets per day). For use from 10/12-11/11 90 tablet 0     ranitidine (ZANTAC) 150 MG tablet Take 1 tablet (150 mg total) by mouth 2 (two) times a day. 60 tablet 3     traZODone (DESYREL) 150 MG tablet Take 1 tablet (150 mg total) by mouth at bedtime. 90 tablet 3     [DISCONTINUED] insulin NPH human recomb (HUMULIN N PEN) 100 unit/mL (3 mL) pen Give 60 units subcutaneously every morning and 40 units subcutaneously every evening 3 mL 11     carbamide peroxide (DEBROX) 6.5 % otic solution Administer 5 drops to the right ear 2 (two) times a day. 15 mL 2     predniSONE (DELTASONE) 2.5 MG tablet Take 1 tablet (2.5 mg total) by mouth every other day. 45 tablet 0     [DISCONTINUED] carbamide peroxide (DEBROX) 6.5 % otic solution Administer 5 drops into the left ear 2 (two) times a day. 15 mL 2     No facility-administered encounter medications on file as of 10/11/2017.      Past Medical History:   Diagnosis Date     BBB (bundle branch block)      Chronic back pain      Chronic kidney disease     stage 3     Chronic Kidney Disease (NKF  Classification)     Created by Conversion      Depression      Diabetes     Type 2     Diabetes mellitus, type 2     Created by Conversion      Frequent headaches      Ganglion Of The Left Wrist     Created by Conversion      GERD (gastroesophageal reflux disease)      HLD (hyperlipidemia)      Hypertension     Per H&P dated 1/03/2013     Incarcerated ventral hernia     Per H&P dated 1/03/2013     Osteoarthritis      Rheumatoid arthritis      Thrombocytopenia      Venous insufficiency      Past Surgical History:   Procedure Laterality Date     BACK SURGERY       CHOLECYSTECTOMY       FATTY TUMOR  1996    LT SHOULDER BLADE     HYSTERECTOMY  1984     JOINT REPLACEMENT Left     knee     MS ABDOMEN SURGERY PROC UNLISTED      Description: Hernia Repair;  Recorded: 10/23/2013;     Two left wrist  1990, 1995     Social History   Substance Use Topics     Smoking status: Former Smoker     Quit date: 8/1/2008     Smokeless tobacco: Never Used      Comment: smells of cigarrette smoke     Alcohol use No       Objective / Physical Examination:  Vitals:    10/11/17 1404   BP: 126/74   Pulse: 60   Weight: (!) 228 lb (103.4 kg)     Wt Readings from Last 3 Encounters:   10/11/17 (!) 228 lb (103.4 kg)   09/27/17 (!) 228 lb (103.4 kg)   09/19/17 (!) 234 lb (106.1 kg)     Body mass index is 30.92 kg/(m^2).     General Appearance: Alert and oriented, cooperative, affect appropriate, speech clear, in no apparent distress  Ears: Tympanic membrane clear with landmarks well visualized left ear. Right ear TM is occluded by cerumen   Neck: Supple, trachea midline. No carotid bruits   Lungs: Clear to auscultation bilaterally. Normal inspiratory and expiratory effort  Cardiovascular: Regular rate, normal S1, S2. No murmurs, rubs, or gallops  Extremities: No edema.    Orders Placed This Encounter   Procedures     Occult Blood(ICT)     Glycosylated Hemoglobin A1c     Thyroid Stimulating Hormone (TSH)     Microalbumin, Random Urine     Ambulatory  referral to Pain Clinic     Ambulatory referral to Diabetes Education (Existing Diagnosis)   Followup: No Follow-up on file. earlier if needed.      Time with patient: 30 minutes with >50% of time spent in face-to-face counseling and coordination of care     Marichuy Rubio, CNP

## 2021-06-13 NOTE — PROGRESS NOTES
"Assessment:   Ebonie has had type 2 diabetes since .  Currently taking Novolin N - 60 units at breakfast, 60 units at bedtime and Novolin R 16 units plus sliding scale, starting at 141-180 - 1 unit-.  She is here today for Freestyle Saida sensor tracings interpretation.  The sensor came off after one day.   She did keep food records.   She reports that she takes the mealtime insulin Novolin R after she finishes eating, this is when she includes the correction scale- she saw her grandmother do it this way and that's why she does it.   She doesn't always take 16 units Novolin R, \"it depends on what I am eating\", if I eat more bread then I will take more.      FBS:  181, 235, 192  immediately after eatin, 432, 349 mg/dl      She states she has been trying to eat healthier and doing the resistance exercises.      Plan:  Recommend that she take the Novolin R plus correction - 30 minutes prior to eating her meal. Spent some time discussing the rationale for this.   She did have one hypoglycemic episode \"years\" ago and is fearful of that happening again. Recommend that she use the resistant correction scale as noted below:    Blood Glucose  (mg/dl) *Resistant    (units)    0   141-180 3   181-220 6   221- 260 9   261-300 12   301-340 15   > 340 18       Discussed signs/treatment for hypoglcyemia.     Put another sensor on with tagaderm tape  She will return in one week for glucose tracing review.        Subjective and Objective:      Ebonie Bowman is referred by Marichuy Rubio for Diabetes Education.     Lab Results   Component Value Date    HGBA1C 9.9 (H) 10/11/2017         Goals        I want to reach a goal weight of 200 lbs in a year. (pt-stated)            Action steps to achieve this goal  1.  I will watch my portion sizes when it comes to protein. (Palm of my hand)    Updated: 10/24/2017  Date goal set: 10/4/17        I want to get information about other apartments that cost no more than $1000.00/month and " are on the first floor. (pt-stated)            Action steps to achieve this goal  1.  I will meet with the [facility] SW to discuss possible apartment options. (10/24/2017)  2.  I will call from the list of apartments Kati provided me and tour as able.  3.  I will notify Bisi if I have found an apartment to have her request a doctors letter to help me break my lease.     Updated: 10/24/2017  Date goal set:  10/4/17        I would like to incorporate exercise into my daily routine.  (pt-stated)            Action steps to achieve this goal  1. I will continue to do chair exercises as able and will not do moves that I feel unsafe with.-Not discussed today  2. I will Bisi know if I feel I needs support from the nurse. A RN Assessment has been scheduled for 10/4/2017 (10/24/2017)  3. I will establish care with Marichuy Rubio on 9/13/2016. (12/15/2016)    Note: My Goal weight is 200-220 pounds.       Updated:10/24/2017  Date goal set:  10/2/13            Follow up:   Diabetes classes for 1 week      Education:     Monitoring   Meter (per above goals): assessment, discussed, pt returned demonstration,  Monitoring: assessment, discussed, pt returned demonstration, literature provided   BG goals: assessment, discussed, pt returned demonstration, literature provided      Nutrition Management:  assessment, discussed, pt returned demo,  literature provided   Weight:assessment, discussed, pt returned demo,  literature provided   Portions/Balance: assessment, discussed, pt returned demo,  literature provided   Carb ID/Count: assessment, discussed, pt returned demo,  literature provided   Label Reading: assessment, discussed, pt returned demo,  literature provided   Heart Healthy Fats:assessment, discussed, pt returned demo,  literature provided   Menu Planning: assessment, discussed, pt returned demo,  literature provided   Dining Out: assessment, discussed,  literature provided   Physical Activity: assessment, discussed,   literature provided   Medications: assessment, discussed  Orals: assessment, discussed  Injected Medications: Discussed   Storage/Exp:Discussed   Site Rotation: Discussed   Sites Assessed: yes    Diabetes Disease Process: Discussed    Acute Complications: Prevent, Detect, Treat:  Hypoglycemia: Discussed and Literature provided  Hyperglycemia: Discussed and Literature provided  Sick Days: Literature provided    Chronic Complications  Foot Care: literature provided  Skin Care: Literature provided  Eye: Discussed and Literature provided  ABC: Literature provided  Teeth:Literature provided  Goal Setting and Problem Solving: Discussed  Barriers: Discussed and Needs instruction/review at follow-up  Psychosocial Adjustments: Literature provided      Time spent with the patient: 30 minutes for diabetes education and counseling.   Previous Education: yes  Visit Type:THELMA Bowie  10/25/2017

## 2021-06-13 NOTE — PROGRESS NOTES
Call couldn't be completed as dialed. I wanted to connect with Ebonie about whether she would like to scheduled with the SW. I also wanted to check if she needed help with RX assistance. Looks like they have also discussed this with her and mailed her the information.       Care Guide Will     Due Date Delegation   At next Outreach Review goals set at RN assessment visit and create or add to action plan. Review prior action plan.    At next Outreach Check if pt still thinking of moving. If yes, schedule appointment with [facility] SW to review possible options. Pt wants maximum rent of $1000/month, not in assisted living or senior housing, with an elevator or first floor apartment.   At next Outreach Ask pt if she's agree to add a goal of reducing A1C to less than 8 in 6 months. If she agrees, add to goals.

## 2021-06-13 NOTE — PROGRESS NOTES
Today we talked about portion sizes and I showed her a diagram.  It was hard with getting weight loss goals.  I offered suggestions, but didn't wasn't ones she was wanting to work on. We will start with portion sizes of protein.

## 2021-06-13 NOTE — TELEPHONE ENCOUNTER
12/21 - lvm x 2 to schedule hospital f/u with Dr Riccardo Gu is full 12/23. Ok to wait the week after?

## 2021-06-13 NOTE — TELEPHONE ENCOUNTER
Controlled Substance Refill Request  Medication Name:   Requested Prescriptions     Pending Prescriptions Disp Refills     oxyCODONE-acetaminophen (PERCOCET) 5-325 mg per tablet 180 tablet 0     Sig: Take 1-2 tablets by mouth every 6 (six) hours as needed for pain (maximum of 6 tablets per day). For 10/12-11/11     Date Last Fill: 10/08/20  Requested Pharmacy: [facility]  Submit electronically to pharmacy  Controlled Substance Agreement on file:   Encounter-Level CSA Scan Date:    There are no encounter-level csa scan date.        Last office visit:  10/20/20

## 2021-06-13 NOTE — TELEPHONE ENCOUNTER
Caller reports she developed vomiting  one week ago and it lasted for  3 days  with vomiting  2x daily. tolerated water and used Mucinex that seemed to help although she was not coughing.   Denies fever or abdominal pain  But  was starting to have loose stools;   Vomiting has stopped  gor  3 days  but now has watery diarrhea anytime she has oral intake. Tolerates water but anything else results in diarrhea; Able to void 2-3 x daily, dark yellow.   No abdominal pain or blood in stool.   Not using any anti diarrheals . Feels weak but is able to  do ADL's.  Is an insulin dependent diabetic only using Novolin N two times a day; not checking bG regularly.  current bG 289.  Not using regular insulin because she is not eating.  Appetite is poor and tries easily digestible food but get diarrhea within an hour. Temp is 99.3 oral  currently  but has sweating and  occasional chills.  Has not taken any of her meds besides  percocett for her back and trazodone at night; not taking  Plaquenil prednisone lisinopril Norvasc, Lipitor or Toprol a or any of her vitamins or supplements.  Triage protocol revieiwed ; Advised in home care and  To wait for further instructions regarding insulin and other medications.    Advised  being seen today; caller does not want to go to ED but can get transport to clinic or pharmacy.    Will route to  PCP for secondary triage    Annabel Mercer RN  FNA     Patient can be reached @ [phone]    COVID 19 Nurse Triage Plan/Patient Instructions    Please be aware that novel coronavirus (COVID-19) may be circulating in the community. If you develop symptoms such as fever, cough, or SOB or if you have concerns about the presence of another infection including coronavirus (COVID-19), please contact your health care provider or visit [email or web address].     Disposition/Instructions    Home care recommended. Follow home care protocol based instructions.    Thank you for taking steps to prevent the spread of  this virus.  o Limit your contact with others.  o Wear a simple mask to cover your cough.  o Wash your hands well and often.    Resources    [facility]: About COVID-19: [email or web address]    CDC: What to Do If You're Sick: www.cdc.gov/coronavirus/2019-ncov/about/steps-when-sick.html    CDC: Ending Home Isolation: www.cdc.gov/coronavirus/2019-ncov/hcp/disposition-in-home-patients.html     CDC: Caring for Someone: www.cdc.gov/coronavirus/2019-ncov/if-you-are-sick/care-for-someone.html     [facility]: Interim Guidance for Hospital Discharge to Home: www.[facility].Count includes the [facility].mn./diseases/coronavirus/hcp/hospdischarge.pdf    [facility] clinical trials (COVID-19 research studies): clinicalaffairs.[facility]-clinical-trials     Below are the COVID-19 hotlines at the [place] Department of [facility] ([facility]). Interpreters are available.   o For health questions: Call 847-777-3663 or 1-347.859.2697 (7 a.m. to 7 p.m.)  o For questions about schools and childcare: Call 185-268-7006 or 1-602.175.6280 (7 a.m. to 7 p.m.)     Reason for Disposition    MODERATE diarrhea (e.g., 4-6 times / day more than normal) and age > 70 years    MODERATE diarrhea (e.g., 4-6 times / day more than normal) and present > 48 hours (2 days)    Additional Information    Negative: Shock suspected (e.g., cold/pale/clammy skin, too weak to stand, low BP, rapid pulse)    Negative: Difficult to awaken or acting confused (e.g., disoriented, slurred speech)    Negative: Sounds like a life-threatening emergency to the triager    Negative: Vomiting also present and worse than the diarrhea    Negative: Blood in stool and without diarrhea    Negative: SEVERE abdominal pain (e.g., excruciating) and present > 1 hour    Negative: SEVERE abdominal pain and age > 60    Negative: Bloody, black, or tarry bowel movements    Negative: SEVERE diarrhea (e.g., 7 or more times / day more than normal) and age > 60 years    Negative: Constant abdominal pain  lasting > 2 hours    Negative: Drinking very little and has signs of dehydration (e.g., no urine > 12 hours, very dry mouth, very lightheaded)    Negative: Patient sounds very sick or weak to the triager    Protocols used: DIARRHEA-A-OH

## 2021-06-13 NOTE — PROGRESS NOTES
1) Visit Type: Housing  a. Is patient homeless or at-risk of becoming homeless in the next 1-2 months? No  b. Is the patient looking for other types housing resources?  [facility] SW to review possible options. Pt wants maximum rent of $1000/month, not in assisted living or senior housing, with an elevator or first floor apartment.

## 2021-06-13 NOTE — TELEPHONE ENCOUNTER
Last OV: 10/20/2020 Marichuy Rubio FNP last RF: 10/8/2020          No current follow up appts scheduled

## 2021-06-13 NOTE — PROGRESS NOTES
Clinic Care Coordination Contact  Care Coordination Transition Communication         Clinical Data: Patient was hospitalized at [facility]  from 11/27  to 12/4 with diagnosis of Covid 19.     Transition to Facility:              Facility Name: [facility]              Contact name and phone number/fax: Catarina Vila [phone]    Plan: RN/SW Care Coordinator will await notification from facility staff informing RN/SW Care Coordinator of patient's discharge plans/needs. RN/SW Care Coordinator will review chart and outreach to facility staff every 4 weeks and as needed.

## 2021-06-13 NOTE — TELEPHONE ENCOUNTER
Dr. Gu saw her recently, around 11/30.   Can you schedule a follow up appointment with Dr. Gu? He should be back after Dec 18.   If no follow up available with Dr. Gu, please extend abx therapy until 12/22, and contact Dr. Gu on 12/21 for recommendations.     Thank you     AR

## 2021-06-13 NOTE — PROGRESS NOTES
ASSESSMENT AND PLAN:  Ebonie Bowman 67 y.o. female is here for follow-up of seropositive severe uncontrolled rheumatoid arthritis, widespread osteoarthritis.  She reports pain in her metacarpophalangeal joints.  She has synovitis there.  She was due to start tumor necrosis factor inhibitors by now.  She has had TB screening was negative.  Her Vectra came back higher at 43.  She is on leflunomide and methotrexate.  She is going to check labs today.  Recommended that she consider starting Humira.  Return for follow-up in 2 months.      Diagnoses and all orders for this visit:    High risk medication use    Seropositive rheumatoid arthritis  -     leflunomide (ARAVA) 20 MG tablet; TAKE 1 TABLET EVERY DAY  Dispense: 30 tablet; Refill: 0        HISTORY OF PRESENTING ILLNESS:  Ebonie Bowman, 67 y.o., female is here follow-up of seropositive rheumatoid arthritis.  She continues to hurt in her hands particularly metacarpophalangeal joints.  This pain is bothersome throughout the day but worse in the morning.  She has noted severity moderately to severe in intensity.  She points to the MCP areas now the left hand is also beginning to hurt.  She is observed swelling in these MCP areas.:  MCP #2 3 bilaterally  There is some variability some days are worse than the others.  .  She has noted dryness of her nose mouth, high blood pressure history, diabetes, swelling of the ankles, heartburn,.  She reports no other rash except the inframammary depending upon the weather, she has no history of psoriasis ulcerative colitis Crohn's disease.  Her mom had rheumatoid arthritis.  She is nonsmoker and does not take alcohol no regular exercise.  She has had multiple musculoskeletal surgeries in the past she had left wrist surgery 1995 and 94 she had a injury there is a bucket fell, she had back surgery 2 and 87 and 1990.  And she is had the left knee surgery first hemiarthroplasty and then total knee replacement most recently in 2010.   Further historical information and ADL limitations as noted in the multidimensional health assessment questionnaire attached in the EMR.     ALLERGIES:Penicillins and Sulfa (sulfonamide antibiotics)    PAST MEDICAL/ACTIVE PROBLEMS/MEDICATION/ FAMILY HISTORY/SOCIAL DATA:  The patient has a family history of  Past Medical History:   Diagnosis Date     BBB (bundle branch block)      Chronic back pain      Chronic kidney disease     stage 3     Chronic Kidney Disease (NKF Classification)     Created by Conversion      Depression      Diabetes     Type 2     Diabetes mellitus, type 2     Created by Conversion      Frequent headaches      Ganglion Of The Left Wrist     Created by Conversion      GERD (gastroesophageal reflux disease)      HLD (hyperlipidemia)      Hypertension     Per H&P dated 1/03/2013     Incarcerated ventral hernia     Per H&P dated 1/03/2013     Osteoarthritis      Rheumatoid arthritis      Thrombocytopenia      Venous insufficiency      History   Smoking Status     Former Smoker     Quit date: 8/1/2008   Smokeless Tobacco     Never Used     Comment: smells of cigarrette smoke     Patient Active Problem List   Diagnosis     Osteoarthritis     Venous Insufficiency     HTN (hypertension)     Insomnia     Joint Pain, Localized In The Knee     Bursitis     Obesity     Chronic Kidney Disease (NKF Classification)     Hyperlipidemia     Diabetes mellitus, type 2     Joint Pain, Localized In The Hip     Morbid obesity     Depression     Thrombocytopenia     Seropositive rheumatoid arthritis     Lumbar radiculopathy, chronic     S/P TKR (total knee replacement) using cement, left     High risk medication use     Chronic pain     Right hand pain     GERD (gastroesophageal reflux disease)     Bilateral primary osteoarthritis of hip     Atherosclerotic cardiovascular disease, seen on abd CT 2016      Current Outpatient Prescriptions   Medication Sig Dispense Refill     ascorbic acid (ASCORBIC ACID WITH LATRICIA  HIPS) 500 MG tablet Take 500 mg by mouth every evening.        aspirin 81 MG EC tablet Take 81 mg by mouth every evening.       atorvastatin (LIPITOR) 40 MG tablet Take 1 tablet (40 mg total) by mouth bedtime. 90 tablet 3     blood glucose test (ACCU-CHEK FRANCISCO PLUS TEST STRP) Strp TEST FOUR TIMES DAILY 400 strip 3     cholecalciferol, vitamin D3, 1,000 unit tablet Take 1,000 Units by mouth every evening.        cyanocobalamin (VITAMIN B-12) 250 MCG tablet Take 250 mcg by mouth every evening.        docusate sodium (COLACE) 100 MG capsule Take 100 mg by mouth 2 (two) times a day.       folic acid (FOLVITE) 800 MCG tablet Take 1,200 mcg by mouth daily.       glucose 4 GM chewable tablet Chew 4 tablets (16 g total) as needed for low blood sugar. 120 tablet 0     insulin NPH human recomb (HUMULIN N PEN) 100 unit/mL (3 mL) pen Give 60 units subcutaneously every morning and 40 units subcutaneously every evening 3 mL 11     insulin regular (NOVOLIN R) 100 unit/mL injection Inject 16 units subcutaneous TID with meals plus sliding scale 10 mL 6     insulin syringe-needle U-100 (BD INSULIN SYRINGE ULT-FINE II) 1 mL 31 gauge x 5/16 Syrg USE 4 TIMES DAILY AS DIRECTED 500 each 1     lancing device (ACCU-CHEK SOFTCLIX) Misc Use 1 applicator As Directed 4 (four) times a day. 300 each 3     leflunomide (ARAVA) 20 MG tablet TAKE 1 TABLET EVERY DAY 30 tablet 0     lisinopril (PRINIVIL,ZESTRIL) 10 MG tablet TAKE 1 TABLET AT BEDTIME 90 tablet 3     loratadine (CLARITIN) 10 mg tablet Take 10 mg by mouth bedtime.        magnesium oxide (MAG-OX) 400 mg tablet Take 400 mg by mouth bedtime. With food        methotrexate 2.5 MG tablet TAKE 4 TABLETS (10 MG TOTAL) BY MOUTH ONCE A WEEK. 48 tablet 0     metoprolol succinate (TOPROL-XL) 25 MG TAKE ONE TABLET BY MOUTH ONCE DAILY 90 tablet 2     MULTIVITAMIN ORAL Take 1 tablet by mouth daily.       omega-3 fatty acids-vitamin E (FISH OIL) 1,000 mg cap Take 1,000 mg by mouth every evening. 90 each  3     oxyCODONE-acetaminophen (PERCOCET)  mg per tablet Take 1 tablet by mouth every 8 (eight) hours as needed for pain (Do not exceed 3 tablets per day). For use from 9/12- 10/12 90 tablet 0     ranitidine (ZANTAC) 150 MG tablet Take 1 tablet (150 mg total) by mouth 2 (two) times a day. 60 tablet 3     traZODone (DESYREL) 150 MG tablet Take 1 tablet (150 mg total) by mouth at bedtime. 90 tablet 3     predniSONE (DELTASONE) 2.5 MG tablet Take 1 tablet (2.5 mg total) by mouth every other day. 45 tablet 0     No current facility-administered medications for this visit.          DETAILED EXAMINATION  09/27/17  :  Vitals:    09/27/17 1535 09/27/17 1542   BP: 150/90 142/72   Pulse: 88    Weight: (!) 228 lb (103.4 kg)    Height: 6' (1.829 m)      Alert oriented. Head including the face is examined for malar rash, heliotropes, scarring, lupus pernio. Eyes examined for redness such as in episcleritis/scleritis, periorbital lesions.   Neck examined  for lymph nodes, range of motion Both upper and lower extremities (all four) examined for swollen, warm &/or  tender joints, range of motion, rash, muscle weakness, edema. The salient normal / abnormal findings are appended. She continues to have synovial thickening and tenderness in MCPs on right side particularly #2, 3 also on the left.    She has left knee arthroplasty scar.  She has right knee joint line tenderness, no effusion or warmth.         LAB / IMAGING DATA:  ALT   Date Value Ref Range Status   07/21/2017 36 0 - 45 U/L Final   05/05/2017 22 0 - 45 U/L Final   02/13/2017 15 0 - 45 U/L Final     Albumin   Date Value Ref Range Status   07/21/2017 3.3 (L) 3.5 - 5.0 g/dL Final   05/05/2017 3.3 (L) 3.5 - 5.0 g/dL Final   02/13/2017 3.4 (L) 3.5 - 5.0 g/dL Final     Creatinine   Date Value Ref Range Status   07/21/2017 1.12 (H) 0.60 - 1.10 mg/dL Final   05/05/2017 1.13 (H) 0.60 - 1.10 mg/dL Final   02/13/2017 1.24 (H) 0.60 - 1.10 mg/dL Final       WBC   Date Value Ref  Range Status   07/21/2017 9.9 4.0 - 11.0 thou/uL Final   05/05/2017 9.5 4.0 - 11.0 thou/uL Final   08/12/2015 11.9 (H) 4.0 - 11.0 thou/uL Final   07/01/2015 12.8 (H) 4.0 - 11.0 thou/uL Final     Hemoglobin   Date Value Ref Range Status   07/21/2017 14.3 12.0 - 16.0 g/dL Final   05/05/2017 14.3 12.0 - 16.0 g/dL Final   02/13/2017 14.8 12.0 - 16.0 g/dL Final     Platelets   Date Value Ref Range Status   07/21/2017 138 (L) 140 - 440 thou/uL Final   05/05/2017 131 (L) 140 - 440 thou/uL Final   02/13/2017 118 (L) 140 - 440 thou/uL Final       Lab Results   Component Value Date    RF <15.0 05/13/2015    SEDRATE 23 (H) 05/13/2015

## 2021-06-13 NOTE — PROGRESS NOTES
Patient asked to meet with me today. PCP and Ebonie were wondering if she would qualify for Rx assistance for Tresiba and Lantus.  After review, she qualified income wise but has Medicare with disqualified her for both.  Today, we also talked about whether she is tracking her sugars. She said, she didn't want to answer that question, but no.  I offered to provider her a print out to write down her number, she declined. I advised her to bring in her glucose machine at her next appointment so that Marichuy and view her sugars to help with managing her blood sugars.  She said, she liked that idea. I also scheduled her a SW appointment to meeting for housing.

## 2021-06-13 NOTE — PROGRESS NOTES
Injection - Other  Date/Time: 9/19/2017 3:45 PM  Performed by: RAQUEL QURESHI  Authorized by: RAQUEL QURESHI   Comments:     TRIGGER POINT INJECTION  Performed on: 9/19/2017    Ms. Bowman is a 67-year-old woman with pain in the right buttocks area.  She had a sacroiliac joint injection done on her last visit.  This did not seem to help with this pain.  She does have some point tenderness and it appears that this is a myofascial type pain.  We will give a trial to trigger point injections today.    Pre Procedure Diagnosis:  Myofascial pain  Vital Signs:  As per intra-procedure documentation    The procedure was discussed with Ebonie Bowman in detail along with the attendant risks, including but not limited to: nerve injury, infection, bleeding, allergic reaction, or worsening of pain.  Informed consent was obtained and patient elected to proceed.    After informed consent was obtained the patient was placed in a right lateral decubitus position on the examination table.  The left buttocks area was prepped sterilely.  A 1 1/2 inch #25-gauge needle was used.  There were 3 different tender spots injected in the right gluteal musculature.  A single spot was injected in the mid left gluteal region.  A total of 10 mL of 0.25% Marcaine with 10 mg of Decadron was used in divided doses.    The patient tolerated the procedure well.  The patient was taken to the recovery area after the injection.  There were no complications.      Pre Procedure Pain Scale: 8  Pain Score:   8 (constant varying pain to right buttock)    If Ebonie Bowman has any questions or concerns after discharge, she was instructed to call us.

## 2021-06-13 NOTE — PROGRESS NOTES
Patient was seen at [place] [id number] for the fitting and delivery of a Dukes J 300S cervical collar, [place] J replacement pad set (one for wash/one for wear),  and [place] cervical collar( showering purposes only). Patient was fit while supine in bed and no issues noted. Patient was educated on the fit and function.

## 2021-06-13 NOTE — TELEPHONE ENCOUNTER
Called to pt and pt states that she is still not feeling well.  She is drink water -she is able to keep that down - no food no other liquids.      Please see other enc for refill on Percocet.

## 2021-06-13 NOTE — TELEPHONE ENCOUNTER
I called the patient and she states that she is the same and no improvement. I offered an appointment with Marichuy Rubio for today and she declined today due to transportation. She agreed to see a different provider Friday. I have scheduled the patient with Libra Chaves. Patient thanked for the call.

## 2021-06-13 NOTE — PROGRESS NOTES
"Assessment  SW met with pt to discuss housing resources.  Pt was living in [place] until 2008 when she came to [place] to live with her son.  At that time he had lost his job and she came to provide support/financial stability.  Pt currently lives in 2 bedroom apt with son and pays $900/month rent (plus $80 for two garages).  Pt is on fixed income of $2100/month from social security disability.  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  Pt lease is up in May.  She describes less than ideal living conditions- like mold growing on ceiling and kitchen floor buckling.  SW discussed possibility of medical provider writing letter to let pt out of lease early.  Pt says it is uncertain if she will move with or without son.  Currently pt pays for rent and bills, son pays for car insurance/car costs.  They each pay for food.  SW discussed having discussion with her son to more evenly split costs as he is has been working again for quite some time.  SW and pt researched housing options on www.[employer] and www.housinglink.org.  Pt aware she will need to call, visit, and complete applications for places.  Pt aware there may be waiting lists.  No further SW needs identified today.       Action Plan:    will:  1)  Available as needed    Care Guide will:  Care Guide Delegation:   1)  Due Date:         Delegation:          Subjective     \"I would like to pay less than $900 a month.\"        Objective    Casual dress, average grooming, pleasant mood, no concerns about insight/cognition, judgement       "

## 2021-06-13 NOTE — TELEPHONE ENCOUNTER
Call pt- I see that she called last week about vomiting and do not see any office visit. How is she feeling today. Please schedule if symptoms continue.

## 2021-06-13 NOTE — PROGRESS NOTES
Called patient to remind her of her appointment with Kati LINDER tomorrow. Since our meeting is cancelled, I offered if she would like to come in sooner than 4pm.We moved her appointment to 11 am.     Patient stated today, that the Freestyle Saida glucose sensor fell off after 2-3 days and she wasn't sure what to do. I advised her to call diabetic ed and let them know. Based on the information I found today,it should stay on up to 14 days.  She is keeping a food log per recommended by Tiffany. Her sugars are still remaining high in the 200's. She did say she was at 160 at one point. She hasn't changed anything since she started seeing a change in her sugar numbers. She is trying to give herself insulin as recommended by Tiffany but states that its hard to remember at times because she had been doing it a different way for so long. She felt that the appointment was helpful.  I will attempt to meet patient in clinic tomorrow after appointment with Kati to go over new goals.

## 2021-06-13 NOTE — TELEPHONE ENCOUNTER
Called pt to schedule her next appt. Per pt, call her back in a month to schedule given that she is currently in the hospital.      Please give pt a call in a month to schedule her next appt.    Thank you      Next Appointment:  4 Months (please schedule a follow-up video visit for last patient of the day,  when anticipating to have son home from work to assist in video visit as performed on 11/24/2020 via smart phone).     Medications:     Please follow directives on pill bottle on how to take medication(s) provided.        Referrals:        Tests:      Please have labs performed in about 7-8 weeks.

## 2021-06-13 NOTE — TELEPHONE ENCOUNTER
Ninfa from the [facility] calling wondering why the pt is on ceftriaxone and when the end date is. I informed that she is on the medication for septic arthritis in her back and I will contact her back with an end date at [phone].

## 2021-06-13 NOTE — PROGRESS NOTES
Ebonie Bowman who presents today with a chief complaint of  No chief complaint on file.      Joint Pains: yes  Location: hands, knees, shoulders, and elbows  Onset: chronic  Intensity:  5/10  AM Stiffness: 15 Minutes  Alleviating/Aggravating Factors: heating pad helps  Tolerating Meds: yes  Other:      ROS:  Patient denies having any chest pain, shortness of breath, cough, abdominal pain, nausea, vomiting, rashes, fevers, oral ulcers and recent infections.  Patient admits to having a good appetite      Problem List:  Patient Active Problem List   Diagnosis     Osteoarthritis     Venous Insufficiency     HTN (hypertension)     Insomnia     Joint Pain, Localized In The Knee     Chronic kidney disease, stage 3 (moderate)     Hyperlipidemia     Type 2 diabetes mellitus with hypoglycemia without coma, with long-term current use of insulin (H)     Joint Pain, Localized In The Hip     Morbid obesity (H)     Depression     Rheumatoid arthritis (H)     Seropositive rheumatoid arthritis (H)     Lumbar radiculopathy     S/P TKR (total knee replacement) using cement, left     High risk medication use     Chronic pain     GERD (gastroesophageal reflux disease)     Bilateral primary osteoarthritis of hip     Atherosclerotic cardiovascular disease, seen on abd CT 2016      S/P insertion of spinal cord stimulator     Uncomplicated opioid dependence (H)     Controlled substance agreement signed, oxycodone. 8/21/19. UDS 8/21/19     Allergic rhinitis due to cats     Positive FIT (fecal immunochemical test)        PMH:   Past Medical History:   Diagnosis Date     BBB (bundle branch block)      Chronic back pain      Chronic kidney disease     stage 3     Chronic Kidney Disease (NKF Classification)     Created by Conversion      Depression      Diabetes (H)     Type 2     Diabetes mellitus, type 2 (H)     Created by Conversion      Frequent headaches      Ganglion Of The Left Wrist     Created by Conversion      GERD (gastroesophageal  reflux disease)      HLD (hyperlipidemia)      Hypertension     Per H&P dated 1/03/2013     Incarcerated ventral hernia     Per H&P dated 1/03/2013     Osteoarthritis      Rheumatoid arthritis (H)      Shortness of breath      Thrombocytopenia (H)      Venous insufficiency        Surgical History:  Past Surgical History:   Procedure Laterality Date     BACK SURGERY       CHOLECYSTECTOMY       FATTY TUMOR  1996    LT SHOULDER BLADE     HYSTERECTOMY  1984     JOINT REPLACEMENT Left     knee     NH ABDOMEN SURGERY PROC UNLISTED      Description: Hernia Repair;  Recorded: 10/23/2013;     NH IMPLANT SPINAL NEUROSTIM/ Left 4/25/2019    Procedure: LEFT FLANK INCISION REPLACE NEUROSTIMULATOR;  Surgeon: Vishal Pretty MD;  Location: [facility];  Service: Pain     SPINAL CORD STIMULATOR IMPLANT       Two left wrist  1990, 1995       Family History:  Family History   Problem Relation Age of Onset     Acute Myocardial Infarction Father 62     Cancer Father      Heart disease Father      COPD Father      Diabetes Sister      Diabetes Brother      Melanoma Son      Diabetes Maternal Grandmother        Social History:   reports that she quit smoking about 12 years ago. She has never used smokeless tobacco. She reports that she does not drink alcohol or use drugs.    Allergies:  Allergies   Allergen Reactions     Penicillins Hives     Sulfa (Sulfonamide Antibiotics) Hives        Current Medications:  Current Outpatient Medications   Medication Sig Dispense Refill     amLODIPine (NORVASC) 10 MG tablet TAKE 1 TABLET (10 MG TOTAL) BY MOUTH DAILY. 90 tablet 2     ascorbic acid (ASCORBIC ACID WITH LATRICIA HIPS) 500 MG tablet Take 500 mg by mouth every evening.        aspirin 81 MG EC tablet Take 81 mg by mouth every evening.       atorvastatin (LIPITOR) 40 MG tablet Take 1 tablet (40 mg total) by mouth at bedtime. 90 tablet 2     b complex vitamins tablet Take 1 tablet by mouth daily.       blood glucose test strips  Check blood sugar four times daily. Dispense brand per patient's insurance at pharmacy discretion. Dx: e11.9 400 strip 3     blood-glucose meter Misc Dispense one meter. Use as directed. Dx: e11.9 1 each 0     buPROPion (WELLBUTRIN) 100 MG tablet TAKE 1 TABLET EVERY DAY 90 tablet 3     cholecalciferol, vitamin D3, (VITAMIN D3 ORAL) Take 125 mcg by mouth daily.       estradiol (ESTRACE) 0.01 % (0.1 mg/gram) vaginal cream Apply a pea-sized amount vaginally daily x 1 week then reduce to 3 times per week thereafter 42.5 g 1     famotidine (PEPCID) 20 MG tablet Take 1 tablet (20 mg total) by mouth 2 (two) times a day. 180 tablet 3     flash glucose scanning reader (FREESTYLE BRE 14 DAY READER) Misc Use 1 each As Directed see administration instructions. Use as directed with sensor 1 each 0     flash glucose sensor (FREESTYLE BRE 14 DAY SENSOR) Kit Use 1 each As Directed every 14 (fourteen) days. 2 kit 11     folic acid (FOLVITE) 800 MCG tablet Take 800 mcg by mouth every evening.              hydrOXYchloroQUINE (PLAQUENIL) 200 mg tablet Take 1 tablet p.o. twice daily. 180 tablet 0     insulin NPH isoph U-100 human (HUMULIN N NPH INSULIN KWIKPEN) 100 unit/mL (3 mL) pen Inject 31 Units under the skin 2 (two) times a day. 3 mL 11     insulin regular (NOVOLIN R) 100 unit/mL injection Inject 24 Units under the skin 3 (three) times a day before meals.       insulin syringe-needle U-100 (BD INSULIN SYRINGE ULT-FINE II) 1 mL 31 gauge x 5/16 Syrg USE 4 TIMES DAILY AS DIRECTED 500 each 1     lancing device (ACCU-CHEK SOFTCLIX) Misc Use 1 applicator As Directed 4 (four) times a day. 300 each 3     lidocaine (LMX) 4 % cream Apply to affected area twice daily as needed 30 g 3     lisinopriL (PRINIVIL,ZESTRIL) 10 MG tablet TAKE 1 TABLET AT BEDTIME 90 tablet 3     loratadine (CLARITIN) 10 mg tablet Take 1 tablet (10 mg total) by mouth at bedtime. 90 tablet 3     magnesium oxide (MAG-OX) 400 mg tablet Take 400 mg by mouth at  bedtime. With food              methotrexate 2.5 MG tablet Take 5 tablets po once a week (hold dose if you come down with an infection, until infection clears). 65 tablet 0     metoprolol succinate (TOPROL-XL) 50 MG 24 hr tablet TAKE 1 TABLET EVERY DAY 90 tablet 1     MULTIVITAMIN ORAL Take 1 tablet by mouth every evening.        nystatin (MYCOSTATIN) ointment Apply to sage-anal area and pelvic floor skin twice daily for 14 days. 60 g 3     nystatin (MYCOSTATIN) powder Apply to affected area 3 times daily for 2 weeks. 60 g 0     omega-3 fatty acids-vitamin E (FISH OIL) 1,000 mg cap Take 1,000 mg by mouth every evening. 90 each 3     oxyCODONE-acetaminophen (PERCOCET) 5-325 mg per tablet Take 1-2 tablets by mouth every 6 (six) hours as needed for pain (maximum of 6 tablets per day). For 10/12-11/11 180 tablet 0     predniSONE (DELTASONE) 2.5 MG tablet Take 2.5 mg by mouth daily. 30 tablet 0     traZODone (DESYREL) 150 MG tablet TAKE 1 TABLET AT BEDTIME 90 tablet 3     No current facility-administered medications for this visit.            Physical Exam:  Following up today via video visit, per Covid-19 pandemic requirements.    Verbal consent has been obtained for this service by care team member.    Video call start time: 7:10 PM, failed multiple attempts at 2:00 visit.  Was successful when son returned home, with his assistance.    Video call end time: 7:25 PM    [employer] utilized for video call.    Phone number utilized: [[phone]]    Patient location for video visit: Home     Provider location for video visit:  Home (working remotely)      Summary/Assessment:      History that includes seropositive rheumatoid arthritis, osteoarthritis, renal insufficiency.    Currently off of prednisone, states was doing better on prednisone, desires to restart and remain on low-dose.  On past visit provided 10 mg daily decreasing by 2.5 mg weekly.    Claims compliance with methotrexate and Plaquenil.    States last saw  ophthalmology in March 2020.    Drug monitoring labs noted to be stable, last performed about a month ago.    On video exam, no clear signs of synovitis appreciated involving left hand, has a right hand in a glove.    Bone density test showed osteopenia with nonhigh risk FRAX score.      Admits to having GERD.  Please see below for management plan.      States has some discomfort with some swelling involving some of her MCP joints.    Low-dose prednisone taper provided on past visit was beneficial, currently off of prednisone.  States blood sugar levels were fine while on prednisone.    Claims compliance with methotrexate 5 tablets weekly and Plaquenil.  Claims to have seen the eye doctor within the past year.    Has renal insufficiency, history of diabetes.    Takes Percocet on average 6-8 tablets daily,, which provides relief.    Please see below for management plan.      Pertinent rheumatology/past medical history (please refer to above for more detailed history):      Seropositive rheumatoid arthritis    High risk medication use    Osteoarthritis, multiple joints    Hx left knee replacement    Chronic right knee pain (cortisone injection not helpful)    Chronic neck and back pain (managed by another provider)    Renal insufficiency    Chronic opioid use    Diabetes, type II    History of urosepsis    Osteopenia (with nonhigh risk FRAX score).    Chronic low-dose steroid use      Rheumatology medications provided/suggested:    Methotrexate  Folic acid  Plaquenil  Prednisone      Pertinent medication from other providers or from otc (please refer to above for more detailed med list):    Percocet  Insulin    Pertinent medications already tried:     Humira (worsened joint pains)      Pertinent lab history:    Positive: CCP antibody    Negative: Rheumatoid factor, HUGH, c- ANCA      Pertinent imaging/test history:    X-ray of right hand from February 2019 that showed some signs of degenerative joint disease, no  erosive changes identified.    9/2020, bone density test showed osteopenia with non-high risk FRAX score.      Other:    Standing order for labs placed every 3 months good through April 2020    Denies tobacco use or regular alcohol beverage intake.  Quit smoking greater than 10 years ago.    Was a patient of Dr. Schofield, last seen July 2018.    Denies any history of macular degeneration, retinal disease or glaucoma.        Plan:      We will  add prednisone 2.5 mg daily.  Continue checking fingersticks daily, if levels are elevated recommend contacting primary care physician to consider adjusting diabetic medication.    Continue calcium and vitamin D daily.  On prior visit suggested 600 mg of calcium daily either via diet or supplements.  Was taking 125 mcg equivalent of 5000 units daily.  Repeat bone density test in 1 year (last checked 9/17/2020) if on chronic steroids.    Continue methotrexate 5 tablets weekly ( limiting dose given mild renal insufficiency, thrombocytopenia) .  Hold if develops any infection.    Continue over-the-counter folic acid daily, made aware to avoid taking on the day when taking methotrexate.    Continue Plaquenil 200 mg twice a day.  Follow-up with ophthalmology every 6 months to 1 year.  States last seen March 2020.    Takes Percocet 6-8 tablets daily.  Suggested if still experiencing pain to contact her pain clinic doctor.    Check labs 2 months    Follow-up in 4 months for video visit with son's assistance, as performed on 11/24/2020 via [employer].      Procedure note:         This note was transcribed using Dragon voice recognition software as a result unintentional grammatical errors or word substitutions may have occurred. Please contact our Rheumatology department if you need any clarification or if you have any related inquiries.    Major side effect profile of medications provided were discussed with the patient.      Raheem Londono DO    ....................  11/24/2020    1:15 PM

## 2021-06-13 NOTE — TELEPHONE ENCOUNTER
PATIENT NAME:  Ebonie Bowman  YOB: 1950  MRN: [id number]  SURGEON: Dr. Das  DATE of CONSULT: 11/28/2020  Consult for neck pain    FOLLOW-UP PLAN:    Hospital Follow Up Visit: 2-3 weeks  Provider: NP on Dr. Das clinic day    DIAGNOSTICS:  MRI  DISPOSITION:  pending    ADDITIONAL INSTRUCTIONS FOR MEDICAL STAFF:      Liliana Guzman RN, CNRN

## 2021-06-13 NOTE — PROGRESS NOTES
"Assessment: Ebonie has had type 2 diabetes since 1990.  Currently taking Novolin N - 60 units at breakfast, 55 units at bedtime and Novolin R 16 units plus sliding scale, starting at 141-180 - 1 unit.  She will be increasing Novolin N to 60 units at bedtime tonight, from instructions from Marichuy Rubio.   Ebonie did not bring meter or log book but reports 's,   prelunch:  250's   and  predinner 300's mg/dl.   She is buying Relion insulin.  She typically eats at noon and evening meal @ 6 pm.  Activity limited due to back pain.   She denies hypoglycemia, but has had this a couple of years ago. She is here today to have Freestyle Saida glucose sensor placed.    Plan: Reviewed insulin injection technique and made some adjustments, i.e. she has been vigorously shaking Novolin N instead of gently rolling, this may have some impact on effectiveness of insulin.  Also, she has not been putting air into vial of insulin before filling syringe.    Place Freestyle Saida on her upper left arm.  Asked that she keep some food logs to review along with sensor tracings.  Will return in one week to review tracings and make insulin adjustment.     Subjective and Objective:      Ebonie Bowman is referred by Marichuy Rubio for Diabetes Education.     Lab Results   Component Value Date    HGBA1C 9.9 (H) 10/11/2017         Goals       CCC-Patient Stated \"I want to get information about other apartments that cost no more than $1000.00/month and are on the first floor.\" (pt-stated)            Action steps to achieve this goal  1.  I will meet with the [facility] to discuss possible apartment options  2.    3.      Date goal set:  10/4/17        CCC-Patient Stated \"I want to reach a goal weight of 200 lbs in a year.\" (pt-stated)            Action steps to achieve this goal  1.    2.    3.      Date goal set: 10/4/17        I would like to incorporate exercise into my daily routine.  (pt-stated)            Action steps to achieve this goal  1. I will " continue to do chair exercises as able and will not do moves that I feel unsafe with. This has been getting hard due to her leg. We discuss that I would rather have her safe. - Not discussed today  2. I will Bisi know if I feel I needs support from the nurse. A RN Assessment has been scheduled for 10/4/2017  3. I will continue to watch my portion sizes and choose healthy options as able.  I've been doing fair. I did have a salad yesterday but I do need to go to the store.  4. I will establish care with Marichuy Rubio on 9/13/2016. (12/15/2016)    Note: My Goal weight is 200-220 pounds.       Updated:9/6/2017  Date goal set:  10/2/13            Follow up:   Diabetes classes for 1 week      Education:     Monitoring   Meter (per above goals): assessment, discussed, pt returned demonstration,  Monitoring: assessment, discussed, pt returned demonstration, literature provided   BG goals: assessment, discussed, pt returned demonstration, literature provided      Nutrition Management:  assessment, discussed, pt returned demo,  literature provided   Weight:assessment, discussed, pt returned demo,  literature provided   Portions/Balance: assessment, discussed, pt returned demo,  literature provided   Carb ID/Count: assessment, discussed, pt returned demo,  literature provided   Label Reading: assessment, discussed, pt returned demo,  literature provided   Heart Healthy Fats:assessment, discussed, pt returned demo,  literature provided   Menu Planning: assessment, discussed, pt returned demo,  literature provided   Dining Out: assessment, discussed,  literature provided   Physical Activity: assessment, discussed,  literature provided   Medications: assessment, discussed  Orals: assessment, discussed  Injected Medications: Discussed and Literature provided   Storage/Exp:Discussed and Literature provided   Site Rotation: Discussed, Literature provided and Patient returned demonstration   Sites Assessed: yes    Diabetes Disease  Process: Discussed    Acute Complications: Prevent, Detect, Treat:  Hypoglycemia: Discussed and Literature provided  Hyperglycemia: Discussed and Literature provided  Sick Days: Discussed    Chronic Complications  Foot Care: literature provided  Skin Care: Literature provided  Eye: Discussed and Literature provided  ABC: Discussed and Literature provided  Teeth:Discussed and Literature provided  Goal Setting and Problem Solving: Discussed   - financial concerns;  Barriers: Discussed  Psychosocial Adjustments: Discussed      Time spent with the patient: 60 minutes for diabetes education and counseling.   Previous Education: not sure  Visit Type:THELMA Bowie  10/18/2017

## 2021-06-13 NOTE — PROGRESS NOTES
Today, I called Ebonie to reminder her of her appointment with Ebonie on Oct 4th @ 1pm.     During my call with her, she was stressed/overwhelmed by the cost of her new RX. She said, that the out of pocket expense would be over $1,000.  She did already put a call in regarding her concern. Looks as if there is a generic option, but we will wait to see what her physician says. Ebonie will let Ebonie the RN know if she needs to meet with me to try to apply for assistance for Arava. Website is [email or web address]://www.panapply.[email or web address]/ Patients FPL is 218%. (Around $2,200 a month, this was rounded up).

## 2021-06-13 NOTE — TELEPHONE ENCOUNTER
Refill Approved    Rx renewed per Medication Renewal Policy. Medication was last renewed on 1/21/20.    Selma Lyon, [facility] Triage/Med Refill 12/4/2020     Requested Prescriptions   Pending Prescriptions Disp Refills     traZODone (DESYREL) 150 MG tablet [Pharmacy Med Name: TRAZODONE HYDROCHLORIDE 150 MG Tablet] 90 tablet 3     Sig: TAKE 1 TABLET AT BEDTIME       Tricyclics/Misc Antidepressant/Antianxiety Meds Refill Protocol Passed - 12/2/2020 11:16 PM        Passed - PCP or prescribing provider visit in last year     Last office visit with prescriber/PCP: 10/20/2020 Marichuy Rubio FNP OR same dept: 10/20/2020 Marichuy Rubio FNP OR same specialty: 10/20/2020 Marichuy Rubio FNP  Last physical: 4/15/2019 Last MTM visit: Visit date not found   Next visit within 3 mo: Visit date not found  Next physical within 3 mo: Visit date not found  Prescriber OR PCP: ARSLAN Vasquez  Last diagnosis associated with med order: 1. Insomnia  - traZODone (DESYREL) 150 MG tablet [Pharmacy Med Name: TRAZODONE HYDROCHLORIDE 150 MG Tablet]; TAKE 1 TABLET AT BEDTIME  Dispense: 90 tablet; Refill: 3    If protocol passes may refill for 12 months if within 3 months of last provider visit (or a total of 15 months).

## 2021-06-13 NOTE — TELEPHONE ENCOUNTER
Date: 12/23/2020 Status: [facility]   Time: 1:30 PM Length: 30   Visit Type: VIDEO VISIT RETURN [7842] Copay: $0.00   Provider: Rika Gu MD Department: INF INFECTIOUS DISEASE         Notes: video CF, for pre-call @ [phone], ask for 2nd floor nursing station, send text link to TCU nurse Nikky @  - [facility] f/u

## 2021-06-14 NOTE — TELEPHONE ENCOUNTER
1/5/21 LMTCB at [facility] in [place] ([phone]). Kandace was out today and they transferred me to medical records voicemail. MRI has not been scheduled yet at [facility]. Pt still needs appt with Dr. Das at [facility].

## 2021-06-14 NOTE — TELEPHONE ENCOUNTER
Last Office Visit  10/20/2020  Notes:  Assessment / Plan / Medical Decision Makin. Bilateral primary osteoarthritis of hip  - patient would benefit from a lift chair. She is sometimes unable to stand without assistance related to low back osteoarthritis, hip osteoarthritis, and knee osteoarthritis. She is able to ambulate once in a standing position. A lift chair to help her to maintain her mobility and prevent deterioration of her condition  - Her  will be sending an order form to the clinic to complete and send to DME provider. Will await this form      Last Filled: 2020  Next OV:  Nothing scheduled         Medication teed up for provider signature

## 2021-06-14 NOTE — PROGRESS NOTES
"Ebonie Bowman is a 70 y.o. female who is being evaluated via a billable video visit.      The patient has been notified of following:     \"This video visit will be conducted via a call between you and your physician/provider. We have found that certain health care needs can be provided without the need for an in-person physical exam.  This service lets us provide the care you need with a video conversation.  If a prescription is necessary we can send it directly to your pharmacy.  If lab work is needed we can place an order for that and you can then stop by our lab to have the test done at a later time.    Video visits are billed at different rates depending on your insurance coverage. Please reach out to your insurance provider with any questions.    If during the course of the call the physician/provider feels a video visit is not appropriate, you will not be charged for this service.\"    Patient has given verbal consent to a Video visit? Yes  How would you like to obtain your AVS? AVS Preference: MyChart.  If dropped by the video visit, the video invitation should be sent to: Text to cell phone: [phone]  Will anyone else be joining your video visit? Yes tcpeter rn        Video Start Time: 100 PM    Additional provider notes:       Video-Visit Details    Type of service:  Video Visit    Video End Time (time video stopped): 120  Originating Location (pt. Location): Home    Distant Location (provider location):  [facility]     Platform used for Video Visit: [facility]  Followup Visit        Assessment:     Rheumatoid arthritis, on methotrexate and hydroxychloroquine  3 weeks history of progressive neck and shoulder pain, MRI cervical spine concerning for facet effusionarthritis and enhancement of T1-T2 facet joint and possible facet joint septic arthropathy  CRP 21 peak Nov 27, and 3.7 1/1  Appreciate input from neurosurgery  Covid pneumonia, bilateral pulmonary " infiltrates.  Comorbidities include diabetes mellitus type 2, advanced age, chronic back pain, spine stimulator in place  Clinically improved today     Comorbidities:    Benign essential hypertension    Insulin dependent type 2 diabetes mellitus (H)    Back pain    Arthropathy of spinal facet joint concurrent with and due to effusion    Radiculopathy of cervicothoracic region    Left hemiparesis (H)    Prolonged Q-T interval on ECG              Plan:   PLAN:  Got biopsy 11/28: negative cultures incl AFB and ungal  Started IV ceftriaxone, now about 4 weeks , but due to thrombocytopenia we will switch to ertapenem simply  But not much improvement, in syx, despite CRP declining to normal  So would refer to spine surgery  Patient will follow with primary doctor and arrange  Currently at TCU we gave instructions to finish the antibiotics on January 8    Rika Gu M.D.            Present Illness:   Is a 70 years old female immunocompromised with evidence of septic arthritis facet joints cervical spine on MRI.  She got a biopsy but yielded negative cultures as above.  We started empirically on ceftriaxone which she has been getting now almost 4 weeks CRP did decline significantly however she does not describe significant improvement in her symptoms and she is on painkillers.  She describes this pain worse with flexion and better with immobility.  Otherwise no side effects such as nausea vomiting abdominal pain she has some loose stools about twice a day  She has not seen spine surgery  Due to thrombocytopenia we switched her to ertapenem  Does not describe weakness in the upper extremities    Review of Systems  Performed and all negative except as mentioned above.    Past medical, family, and social history reviewed, unchanged from before.        Physical ExaNeurologic: m:     Video visit  No obvious rash  His not wearing brace  Speaks in clear sentences  Alert oriented      DATA     Results for orders placed or  performed in visit on 12/23/20   Basic Metabolic Panel   Result Value Ref Range    Sodium 141 136 - 145 mmol/L    Potassium 4.0 3.5 - 5.0 mmol/L    Chloride 106 98 - 107 mmol/L    CO2 28 22 - 31 mmol/L    Anion Gap, Calculation 7 5 - 18 mmol/L    Glucose 65 (L) 70 - 125 mg/dL    Calcium 8.9 8.5 - 10.5 mg/dL    BUN 23 8 - 28 mg/dL    Creatinine 0.95 0.60 - 1.10 mg/dL    GFR MDRD Af Amer >60 >60 mL/min/1.73m2    GFR MDRD Non Af Amer 58 (L) >60 mL/min/1.73m2   Comprehensive Metabolic Panel   Result Value Ref Range    Sodium 141 136 - 145 mmol/L    Potassium 4.0 3.5 - 5.0 mmol/L    Chloride 106 98 - 107 mmol/L    CO2 28 22 - 31 mmol/L    Anion Gap, Calculation 7 5 - 18 mmol/L    Glucose 65 (L) 70 - 125 mg/dL    BUN 23 8 - 28 mg/dL    Creatinine 0.95 0.60 - 1.10 mg/dL    GFR MDRD Af Amer >60 >60 mL/min/1.73m2    GFR MDRD Non Af Amer 58 (L) >60 mL/min/1.73m2    Bilirubin, Total 0.4 0.0 - 1.0 mg/dL    Calcium 8.9 8.5 - 10.5 mg/dL    Protein, Total 5.8 (L) 6.0 - 8.0 g/dL    Albumin 2.8 (L) 3.5 - 5.0 g/dL    Alkaline Phosphatase 44 (L) 45 - 120 U/L    AST 15 0 - 40 U/L    ALT 12 0 - 45 U/L   C-Reactive Protein (CRP)   Result Value Ref Range    CRP <0.1 0.0 - 0.8 mg/dL   Sedimentation Rate   Result Value Ref Range    Sed Rate 22 (H) 0 - 20 mm/hr   HM1 (CBC with Diff)   Result Value Ref Range    WBC 6.1 4.0 - 11.0 thou/uL    RBC 3.78 (L) 3.80 - 5.40 mill/uL    Hemoglobin 11.6 (L) 12.0 - 16.0 g/dL    Hematocrit 35.3 35.0 - 47.0 %    MCV 93 80 - 100 fL    MCH 30.7 27.0 - 34.0 pg    MCHC 32.9 32.0 - 36.0 g/dL    RDW 15.2 (H) 11.0 - 14.5 %    Platelets 127 (L) 140 - 440 thou/uL    MPV 10.3 8.5 - 12.5 fL    Neutrophils % 53 50 - 70 %    Lymphocytes % 31 20 - 40 %    Monocytes % 8 2 - 10 %    Eosinophils % 7 (H) 0 - 6 %    Basophils % 1 0 - 2 %    Immature Granulocyte % 1 (H) <=0 %    Neutrophils Absolute 3.2 2.0 - 7.7 thou/uL    Lymphocytes Absolute 1.9 0.8 - 4.4 thou/uL    Monocytes Absolute 0.5 0.0 - 0.9 thou/uL    Eosinophils  Absolute 0.4 0.0 - 0.4 thou/uL    Basophils Absolute 0.1 0.0 - 0.2 thou/uL    Immature Granulocyte Absolute 0.0 <=0.0 thou/uL         Rika Gu MD

## 2021-06-14 NOTE — TELEPHONE ENCOUNTER
Called and spoke with Ebonie's nurse at [facility]. Discussed a f/u plan - cervical MRI f/u appt with Dr. Das (can be a video visit with NP on Dr. Das clinic day). Answered questions about Ebonie's collar.  Liliana Guzman RN, CNRN

## 2021-06-14 NOTE — TELEPHONE ENCOUNTER
Date: 12/30/2020 Status: [facility]   Time: 1:00 PM Length: 20   Visit Type: VIDEO VISIT RETURN [1701] Copay: $0.00   Provider: Rika uG MD

## 2021-06-14 NOTE — TELEPHONE ENCOUNTER
Refill Approved    Rx renewed per Medication Renewal Policy. Medication was last renewed on 6/4/20.    Selma Lyon, [facility] Triage/Med Refill 1/21/2021     Requested Prescriptions   Pending Prescriptions Disp Refills     amLODIPine (NORVASC) 10 MG tablet [Pharmacy Med Name: AMLODIPINE BESYLATE 10 MG Tablet] 90 tablet 2     Sig: TAKE 1 TABLET EVERY DAY       Calcium-Channel Blockers Protocol Passed - 1/20/2021  2:42 PM        Passed - PCP or prescribing provider visit in past 12 months or next 3 months     Last office visit with prescriber/PCP: 10/20/2020 Marichuy Rubio FNP OR same dept: 10/20/2020 Marichuy Rubio FNP OR same specialty: 10/20/2020 Marichuy Rubio FNP  Last physical: 4/15/2019 Last MTM visit: Visit date not found   Next visit within 3 mo: Visit date not found  Next physical within 3 mo: Visit date not found  Prescriber OR PCP: ARSLAN Vasquez  Last diagnosis associated with med order: 1. Essential hypertension  - amLODIPine (NORVASC) 10 MG tablet [Pharmacy Med Name: AMLODIPINE BESYLATE 10 MG Tablet]; TAKE 1 TABLET EVERY DAY  Dispense: 90 tablet; Refill: 2    If protocol passes may refill for 12 months if within 3 months of last provider visit (or a total of 15 months).             Passed - Blood pressure filed in past 12 months     BP Readings from Last 1 Encounters:   12/04/20 165/74

## 2021-06-14 NOTE — PROGRESS NOTES
Assessment:  Ebonie has had type 2 diabetes since 1990.  Currently taking Novolin N - 60 units at 9:30 am, 60 units at bedtime and Novolin R 20 units plus sliding scale, starting at 141-180 - 1 unit.   She is here for Saida Freestyle continous glucose download.   Average BG is 229 mg/dl.   Her 7 am median BG is 154 mg/dl, significant daytime highs starting at 10 am thorough midnight 240-295 mg/dl.  She did not bring food records for review.  She reports eating twice daily at 12 noon and 6 pm and taking Novolin R insulin after she eats.  These tracings will be scanned into medical record.    Plan: Recommended to increase Novolin N to 66 units at 9:30 am;  to increase Novolin R at meal times to 24 units plus resistant correction scale as copied below.   Rec she take the mealtime Novolin R insulin 30 minutes before starting to eat.  She indicated this would be very difficult but we discussed strategies to help - I provided a refrigerator magnet to help remind her.   She agreed to call me next week for BG update as additional insulin adjustment will likely be needed.    Blood Glucose  (mg/dl) *Resistant    (units)    0   141-180 3   181-220 6   221- 260 9   261-300 12   301-340 15   > 340 18             Subjective and Objective:      Ebonie Bowman is referred by Marichuy Rubio for Diabetes Education.     Lab Results   Component Value Date    HGBA1C 9.9 (H) 10/11/2017         Goals        I want to reach a goal weight of 200 lbs in a year. (pt-stated)            Action steps to achieve this goal  1.  I will watch my portion sizes when it comes to protein. (Palm of my hand)    Updated: 10/24/2017  Date goal set: 10/4/17        I want to get information about other apartments that cost no more than $1000.00/month and are on the first floor. (pt-stated)            Action steps to achieve this goal  1.  I will meet with the [facility] to discuss possible apartment options. (10/24/2017)  2.  I will call from the list of apartments  Kati provided me and tour as able.  3.  I will notify Bisi if I have found an apartment to have her request a doctors letter to help me break my lease.     Updated: 10/24/2017  Date goal set:  10/4/17        I would like to incorporate exercise into my daily routine.  (pt-stated)            Action steps to achieve this goal  1. I will continue to do chair exercises as able and will not do moves that I feel unsafe with.-Not discussed today  2. I will Bisi know if I feel I needs support from the nurse. A RN Assessment has been scheduled for 10/4/2017 (10/24/2017)  3. I will establish care with Marichuy Rubio on 9/13/2016. (12/15/2016)    Note: My Goal weight is 200-220 pounds.       Updated:10/24/2017  Date goal set:  10/2/13            Follow up:   Diabetes classes for phone call next week.      Education:     Monitoring   Meter (per above goals): assessment, discussed, pt returned demonstration,  Monitoring: assessment, discussed, pt returned demonstration, literature provided   BG goals: assessment, discussed, pt returned demonstration, literature provided      Nutrition Management:  assessment, discussed, pt returned demo,  literature provided   Weight:assessment, discussed, pt returned demo,  literature provided   Portions/Balance: assessment, discussed, pt returned demo,  literature provided   Carb ID/Count: assessment, discussed, pt returned demo,  literature provided   Label Reading: assessment, discussed, pt returned demo,  literature provided   Heart Healthy Fats:assessment, discussed, pt returned demo,  literature provided   Menu Planning: assessment, discussed, pt returned demo,  literature provided   Dining Out: assessment, discussed,  literature provided   Physical Activity: assessment, discussed,  literature provided   Medications: assessment, discussed  Orals: assessment, discussed  Injected Medications: Assessed   Storage/Exp:Discussed   Site Rotation: Discussed       Diabetes Disease Process:  Discussed    Acute Complications: Prevent, Detect, Treat:  Hypoglycemia: Discussed and Literature provided  Hyperglycemia: Discussed  Sick Days: Literature provided    Chronic Complications  Foot Care: literature provided  Skin Care: Literature provided  Eye: Literature provided  ABC: Literature provided  Teeth:Literature provided  Goal Setting and Problem Solving: Discussed  Barriers: Assessed and Discussed  Psychosocial Adjustments: Assessed and Discussed      Time spent with the patient: 30 minutes for diabetes education and counseling.   Previous Education: yes  Visit Type:THELMA Bowie  11/20/2017

## 2021-06-14 NOTE — TELEPHONE ENCOUNTER
I called LM for Ninfa the nurse manager to c/b to reschedule the pt's appt to next Wednesday and to discharge ceftriaxone and change to ertapenem 1 gm daily until 1/10, per Dr Gu, due to low platelets. Ninfa is advised to c/b to schedule and to call with questions.

## 2021-06-14 NOTE — TELEPHONE ENCOUNTER
Reason for Call: Provider Communication  Return Phone Number: [phone]  Who is calling:  Roberto MAK [facility].  Reason for call:  Clarification for diagnosis:  Stage 3 chronic kidney disease (H)  N18.3     Is it stage 3A or stage 3B for insurance purposes?  Urgency for return call:  as available  Okay to leave detailed message?:  Yes  Call taken on 1/11/2021 at 3:10 PM by Verona Humphrey

## 2021-06-14 NOTE — TELEPHONE ENCOUNTER
Patient's doctor at [facility] was questions if patient needs to wear her cervical collar all the time.     Please call [phone] and ask for first floor nursting (at [facility]).

## 2021-06-14 NOTE — TELEPHONE ENCOUNTER
Reva with [facility]. Called the verify patient's PCP and confirm that PCP will be following home care orders and signing them.    Okay verbal orders for Home care as requested.    For home care, assisted living and nursing home needs for initiation of orders that pertain to nursing, physical therapy, occupational therapy and social work.  Established patient, seen by [facility] care provider within the last 2 years (10/20/20)

## 2021-06-14 NOTE — PROGRESS NOTES
ASSESSMENT AND PLAN:  Ebonie Bowman 67 y.o. female Pelvis sac has severe seropositive rheumatoid arthritis.  This is uncontrolled with methotrexate and leflunomide.  She has started Humira.  She has had 4 injections.  She has made 50% improvement.  I have asked her to continue as now.  In the next couple of months we will drop leflunomide and low-dose methotrexate may be continued thereafter along with Humira.  Osteoarthritis management is discussed.  Return for follow-up in 2 months with labs just prior.  Mild renal impairment noted.  She is aware not to take nonsteroidals.      Diagnoses and all orders for this visit:    Seropositive rheumatoid arthritis    Arthralgia of right lower leg    High risk medication use        HISTORY OF PRESENTING ILLNESS:  Ebonie Bowman, 67 y.o., female is here follow-up of seropositive rheumatoid arthritis. She has been intimate improvement.  She dropped her pain level down to 6.5.  She has had 4 injections of Humira.  She is still on methotrexate and leflunomide.  Recent labs reviewed within acceptable range.  Mild renal impairment.  She is able to do most of her day-to-day activities without significant impairment.  Her right hand is worse on the left yet.  She noted morning stiffness of 10 minutes.  There is no fever weight loss blurry vision eye redness mouth was a cough there is no rash.  She has noted dryness of her nose mouth, high blood pressure history, diabetes, swelling of the ankles, heartburn,.  She reports no other rash except the inframammary depending upon the weather, she has no history of psoriasis ulcerative colitis Crohn's disease.  Her mom had rheumatoid arthritis.  She is nonsmoker and does not take alcohol no regular exercise.  She has had multiple musculoskeletal surgeries in the past she had left wrist surgery 1995 and 94 she had a injury there is a bucket fell, she had back surgery 2 and 87 and 1990.  And she is had the left knee surgery first hemiarthroplasty  and then total knee replacement most recently in 2010.  Further historical information and ADL limitations as noted in the multidimensional health assessment questionnaire attached in the EMR.     ALLERGIES:Penicillins and Sulfa (sulfonamide antibiotics)    PAST MEDICAL/ACTIVE PROBLEMS/MEDICATION/ FAMILY HISTORY/SOCIAL DATA:  The patient has a family history of  Past Medical History:   Diagnosis Date     BBB (bundle branch block)      Chronic back pain      Chronic kidney disease     stage 3     Chronic Kidney Disease (NKF Classification)     Created by Conversion      Depression      Diabetes     Type 2     Diabetes mellitus, type 2     Created by Conversion      Frequent headaches      Ganglion Of The Left Wrist     Created by Conversion      GERD (gastroesophageal reflux disease)      HLD (hyperlipidemia)      Hypertension     Per H&P dated 1/03/2013     Incarcerated ventral hernia     Per H&P dated 1/03/2013     Osteoarthritis      Rheumatoid arthritis      Thrombocytopenia      Venous insufficiency      History   Smoking Status     Former Smoker     Quit date: 8/1/2008   Smokeless Tobacco     Never Used     Comment: smells of cigarrette smoke     Patient Active Problem List   Diagnosis     Osteoarthritis     Venous Insufficiency     HTN (hypertension)     Insomnia     Joint Pain, Localized In The Knee     Bursitis     Obesity     Chronic Kidney Disease (NKF Classification)     Hyperlipidemia     Diabetes mellitus, type 2     Joint Pain, Localized In The Hip     Morbid obesity     Depression     Thrombocytopenia     Seropositive rheumatoid arthritis     Lumbar radiculopathy, chronic     S/P TKR (total knee replacement) using cement, left     High risk medication use     Chronic pain     Right hand pain     GERD (gastroesophageal reflux disease)     Bilateral primary osteoarthritis of hip     Atherosclerotic cardiovascular disease, seen on abd CT 2016      Current Outpatient Prescriptions   Medication Sig  Dispense Refill     ascorbic acid (ASCORBIC ACID WITH LATRICIA HIPS) 500 MG tablet Take 500 mg by mouth every evening.        aspirin 81 MG EC tablet Take 81 mg by mouth every evening.       atorvastatin (LIPITOR) 40 MG tablet Take 1 tablet (40 mg total) by mouth bedtime. 90 tablet 3     blood glucose test (ACCU-CHEK FRANCISCO PLUS TEST STRP) Strp TEST FOUR TIMES DAILY 400 strip 3     carbamide peroxide (DEBROX) 6.5 % otic solution Administer 5 drops to the right ear 2 (two) times a day. 15 mL 2     cholecalciferol, vitamin D3, 1,000 unit tablet Take 1,000 Units by mouth every evening.        cyanocobalamin (VITAMIN B-12) 250 MCG tablet Take 250 mcg by mouth every evening.        docusate sodium (COLACE) 100 MG capsule Take 100 mg by mouth 2 (two) times a day.       folic acid (FOLVITE) 800 MCG tablet Take 1,200 mcg by mouth daily.       glucose 4 GM chewable tablet Chew 4 tablets (16 g total) as needed for low blood sugar. 120 tablet 0     insulin NPH human recomb (HUMULIN N PEN) 100 unit/mL (3 mL) pen Give 60 units subcutaneously every morning and 50 units subcutaneously every evening 3 mL 11     insulin regular (NOVOLIN R) 100 unit/mL injection Inject 16 units subcutaneous TID with meals plus sliding scale 10 mL 6     insulin syringe-needle U-100 (BD INSULIN SYRINGE ULT-FINE II) 1 mL 31 gauge x 5/16 Syrg USE 4 TIMES DAILY AS DIRECTED 500 each 1     lancing device (ACCU-CHEK SOFTCLIX) Misc Use 1 applicator As Directed 4 (four) times a day. 300 each 3     leflunomide (ARAVA) 20 MG tablet TAKE 1 TABLET EVERY DAY 90 tablet 0     lisinopril (PRINIVIL,ZESTRIL) 10 MG tablet TAKE 1 TABLET AT BEDTIME 90 tablet 3     loratadine (CLARITIN) 10 mg tablet Take 10 mg by mouth bedtime.        magnesium oxide (MAG-OX) 400 mg tablet Take 400 mg by mouth bedtime. With food        methotrexate 2.5 MG tablet TAKE 4 TABLETS (10 MG TOTAL) BY MOUTH ONCE A WEEK. 48 tablet 0     metoprolol succinate (TOPROL-XL) 25 MG TAKE ONE TABLET BY MOUTH ONCE  DAILY 90 tablet 2     MULTIVITAMIN ORAL Take 1 tablet by mouth daily.       omega-3 fatty acids-vitamin E (FISH OIL) 1,000 mg cap Take 1,000 mg by mouth every evening. 90 each 3     oxyCODONE-acetaminophen (PERCOCET)  mg per tablet Take 1 tablet by mouth every 8 (eight) hours as needed for pain (Do not exceed 3 tablets per day). For use from 11/11-12/11 90 tablet 0     ranitidine (ZANTAC) 150 MG tablet Take 1 tablet (150 mg total) by mouth 2 (two) times a day. 60 tablet 3     traZODone (DESYREL) 150 MG tablet Take 1 tablet (150 mg total) by mouth at bedtime. 90 tablet 3     predniSONE (DELTASONE) 2.5 MG tablet Take 1 tablet (2.5 mg total) by mouth every other day. 45 tablet 0     No current facility-administered medications for this visit.          DETAILED EXAMINATION  11/29/17  :  Vitals:    11/29/17 1458   Weight: (!) 228 lb (103.4 kg)   Height: 6' (1.829 m)     Alert oriented. Head including the face is examined for malar rash, heliotropes, scarring, lupus pernio. Eyes examined for redness such as in episcleritis/scleritis, periorbital lesions.   Neck/ Face examined for parotid gland swelling, range of motion of neck.  Left upper and lower and right upper and lower extremities examined for tenderness, swelling, warmth of the appendicular joints, range of motion, edema, rash.  Some of the important findings included:  She has left knee arthroplasty scar.  She has right knee joint line tenderness, no effusion or warmth.   MCP no 3 / 2 right swelling and tenderness.       LAB / IMAGING DATA:  ALT   Date Value Ref Range Status   11/27/2017 33 0 - 45 U/L Final   10/25/2017 39 0 - 45 U/L Final   09/27/2017 51 (H) 0 - 45 U/L Final     Albumin   Date Value Ref Range Status   11/27/2017 3.2 (L) 3.5 - 5.0 g/dL Final   10/25/2017 3.3 (L) 3.5 - 5.0 g/dL Final   09/27/2017 3.2 (L) 3.5 - 5.0 g/dL Final     Creatinine   Date Value Ref Range Status   11/27/2017 1.08 0.60 - 1.10 mg/dL Final   10/25/2017 0.97 0.60 - 1.10  mg/dL Final   09/27/2017 1.23 (H) 0.60 - 1.10 mg/dL Final       WBC   Date Value Ref Range Status   11/27/2017 5.4 4.0 - 11.0 thou/uL Final   10/25/2017 8.6 4.0 - 11.0 thou/uL Final   08/12/2015 11.9 (H) 4.0 - 11.0 thou/uL Final   07/01/2015 12.8 (H) 4.0 - 11.0 thou/uL Final     Hemoglobin   Date Value Ref Range Status   11/27/2017 13.2 12.0 - 16.0 g/dL Final   10/25/2017 13.4 12.0 - 16.0 g/dL Final   09/27/2017 13.5 12.0 - 16.0 g/dL Final     Platelets   Date Value Ref Range Status   11/27/2017 143 140 - 440 thou/uL Final   10/25/2017 177 140 - 440 thou/uL Final   09/27/2017 145 140 - 440 thou/uL Final       Lab Results   Component Value Date    RF <15.0 05/13/2015    SEDRATE 23 (H) 05/13/2015

## 2021-06-14 NOTE — PROGRESS NOTES
Ebonie had a headache today when I called, but was still up for connecting.     I reminded her to call Tiffany with her BS update. She had forgot about calling and giving an update. I advised to write it down as a reminder to call this week.  Otherwise, she said the increase has helped.

## 2021-06-14 NOTE — TELEPHONE ENCOUNTER
Reason contacted:  To help the Pt schedule an appointment  Information relayed:  Pt is scheduled for lab on 1/26 and also has a video visit F/U scheduled for 3/25 with Dr. Londono.    Additional questions:  No  Further follow-up needed:  No  Okay to leave a detailed message:  Yes

## 2021-06-14 NOTE — TELEPHONE ENCOUNTER
Patient is in a TCU and cannot schedule an apt until she knows when she will be home again. I told her we will call back in 2 weeks to see how she is feeling and try to schedule a follow up at that time

## 2021-06-14 NOTE — PROGRESS NOTES
Clinic Care Coordination Contact    Situation: Patient chart reviewed by care coordinator.    Background: NIYAH IBARRA following for discharge from care center. Contacted care center one month ago.     Assessment: patient continues to reside in care center.     Plan/Recommendations: do chart review in one month or if notified by TCU of discharge, call then.     MARIFER Mendoza  Clinic Care Coordinator  [phone]

## 2021-06-14 NOTE — TELEPHONE ENCOUNTER
Reason for Call:  Medication or medication refill:    Do you use a [facility]?  Name of the pharmacy and phone number for the current request: [facility] on file    Name of the medication requested:     Oxycodone-acetaminophen 5-325 mg    Other request: Patient stating she's got 2 days left of this med.    Can we leave a detailed message on this number? Yes    Phone number patient can be reached at: Home number on file [phone] (home)    Best Time: Any time    Call taken on 1/20/2021 at 2:53 PM by Darren Fry

## 2021-06-15 NOTE — TELEPHONE ENCOUNTER
Pt notified of lab results and verbalized understanding. Pt will reduce methotrexate dose to 3 tabs per day and will have labs rechecked in 4 weeks.     Pt states she has to [facility] as [facility] is out of network now to see Dr Londono. Pt encouraged to see who she is covered to see for a new Rheumatologist and get an appt scheduled as many book out several months.

## 2021-06-15 NOTE — PROGRESS NOTES
Clinic Care Coordination Contact    Situation: Patient chart reviewed by care coordinator.    Background: Was receiving home care after TCU.     Assessment: Called patient and she is done with home care and is currently in [place]. Discussed that her [employer] insurance is out of network for [employer] in 2021. She is considering her options. Would like to stay with [email or web address] and has phone number for [facility] to discuss her options.      Plan/Recommendations: Will call in one month to see if she is interested and eligible for care coordination support.     Elena Reyes [facility]  Clinic Care Coordinator  [phone]            Clinic Care Coordination Contact  [facility]/Voicemail       Clinical Data: Care Coordinator Outreach  Outreach attempted x 1.  Left message on patient's voicemail with call back information and requested return call.  Patient was discharged to home from TCU. Note that orders were obtained by [facility] for home care on 1-.  Plan: Care Coordinator will try to reach patient again in 10 business days.  MARIFER Mendoza  Clinic Care Coordinator  [phone]

## 2021-06-15 NOTE — PROGRESS NOTES
Patient wasn't due for outreach, but had missed her hospital FU yesterday with Marichuy.  I assisted patient on rescheduling this appointment for Next Wednesday @ 11:35 am. She is going to try and call [employer] Friday to schedule a ride.  She feels that she is getting her strengthen back and has been resting a lot.  She is currently getting help from [facility]. They had been working with her in the TCU, so it was a nice transition home to have the same staff assisting her.  She stated, she will have the home health care for 2 more weeks. She comes every Monday to help with bathing. Ebonie is also receiving PT every Monday and Wednesday at this time. She didn't indicate any end date with this. She did say she is having a hard time with cleaning and is getting a lot of rest.  She had concerns about her 1099 with social security as she hasn't gotten it and her mail had been stopped at one time. I provided her Social security number that I found online that states as of Feb 1, individuals can have quick and easy access to requesting a instant replacement. ([phone]). If she is still having trouble with this when she comes to see Marichuy on Wednesday, she will let Marichuy know that she would like to meet with me to help assist her. Patient doesn't have online access, which is another option to request this.

## 2021-06-15 NOTE — TELEPHONE ENCOUNTER
Called and relayed message to patient. Patient was very appreciative and will call back to schedule

## 2021-06-15 NOTE — PROGRESS NOTES
Internal Medicine Office Visit  [facility]  Patient Name: Ebonie Bowman  Patient Age: 67 y.o.  YOB: 1950  MRN: [id number]    Date of Visit: 2018  Reason for Office Visit:   Chief Complaint   Patient presents with     Hospital Visit Follow Up           Assessment / Plan / Medical Decision Makin. HTN (hypertension)  2. Chronic pain  3. Diabetes mellitus, type 2  4. Fall in home, subsequent encounter  5. Weakness  6. Bilateral primary osteoarthritis of hip  7. GERD (gastroesophageal reflux disease)  8. Seropositive rheumatoid arthritis  9. Osteoarthritis  9. Hospital discharge follow up  - Continue home health care for post hospitalization for influenza/fall/weakness home rehabilitation  - Decrease percocet use to a maximum of 7 tablets oxycodone-acetaminophen per day (52.5 MME). She will be referred to the pain clinic for medication management. She continues to have interventional procedures there for low back pain. Start duloxetine as an alternative medication for OA pain management.   - Order for new meter sent today. She is reminded the importance of regular checks to monitor for changes in diabetes regimen and safe insulin administration.   - A1c today   - Follow up in the clinic in 4 weeks for recheck of pain and diabetes         Health Maintenance Review  Health Maintenance   Topic Date Due     DEPRESSION FOLLOW UP  1950     DIABETES FOLLOW-UP  1950     DIABETES FOOT EXAM  1958     DIABETES OPHTHALMOLOGY EXAM  1958     DXA SCAN  2013     FALL RISK ASSESSMENT  2017     DIABETES HEMOGLOBIN A1C  2018     MAMMOGRAM  2018     DIABETES URINE MICROALBUMIN  10/11/2018     ADVANCE DIRECTIVES DISCUSSED WITH PATIENT  2021     TD 18+ HE  2024     COLONOSCOPY  2026     PNEUMOCOCCAL POLYSACCHARIDE VACCINE AGE 65 AND OVER  Completed     INFLUENZA VACCINE RULE BASED  Completed     PNEUMOCOCCAL  CONJUGATE VACCINE FOR ADULTS (PCV13 OR PREVNAR)  Addressed     ZOSTER VACCINE  Completed         I have discontinued Ms. Bowman's metoprolol succinate, oxyCODONE-acetaminophen, blood-glucose meter, and blood glucose test. I have also changed her loratadine. Additionally, I am having her start on oxyCODONE-acetaminophen, DULoxetine, metoprolol succinate, blood-glucose meter, and blood glucose test. Lastly, I am having her maintain her magnesium oxide, ascorbic acid (vitamin C), cholecalciferol (vitamin D3), aspirin, cyanocobalamin, omega-3 fatty acids-vitamin E, lancing device, MULTIVITAMIN ORAL, docusate sodium, folic acid, insulin syringe-needle U-100, atorvastatin, traZODone, glucose, ranitidine, insulin regular, methotrexate, NovoLIN N, NovoLIN N, amLODIPine, leflunomide, lidocaine, and lisinopril.      HPI:  Ebonie Bowman is a 67 y.o. year old who presents to the office today for follow-up of recent hospitalization.  She was actually hospitalized twice in December and January.  On 12/26/2017 she presented to the emergency department with presyncopal events.  She felt generally fatigued and weak.  She tested positive for influenza and was treated inpatient with rehydration and physical therapy.  She was then discharged to home with home health and physical therapy.     The next day she was brought into the emergency department by paramedics due to a fall.  It was suspected it was a mechanical fall due to weakness related to recent illness.  She was found to have rhabdomyolysis thought to be related to the fall or influenza, EEG was normal.  Her lab work normalized during the hospitalization.  Physical therapy was continued during her hospitalization.  She actually developed sepsis and was found to have acute pyelonephritis.  She was treated with a 14 day course of oral Levaquin.  Chronic pain was a concern during her hospitalization and concern regarding her narcotic medication that this may have contributed to her  fall risk and her percocet was reduced to 5-10 mg oxycodone/APAP TID PRN. She has chronic back pain with a spinal stimulator. She feels that her pain is suboptimally managed with even the current medication regimen and is very concerned about dose reductions.      She was discharged to TCU on 1/6 where she continued physical therapy. She now has home health care and continues to make gradual progress to improved strength and steady gait. She has underlying rheumatoid arthritis with chronic pain which may be improved with physical therapy.     She has not been checking her blood sugar due to the fact that she lost her meter when her son helped to re-arrange her apartment.     We reviewed her history of reflux, this is well controlled with ranitidine.     Hypertension is well controlled with current medications, no side effects associated with treatments.     Hyperlipidemia is managed with high potency statin, atovastatin 40 mg daily.     Rheumatoid arthritis is followed by rheumatology and she is prescribed methotrexate and leflunomide.       Review of Systems- pertinent positive in bold:  Constitutional: Fever, chills, night sweats, fainting, weight change, fatigue, seizures, dizziness, sleeping difficulties, loud snoring/pauses in breathing  Eyes: change in vision, blurred or double vision, redness/eye pain  Ears, nose, mouth, throat: change in hearing, ear pain, hoarseness, difficulty swallowing, sores in the mouth or throat  Respiratory: shortness of breath, cough, bloody sputum, wheezing  Cardiovascular: chest pain, palpitations   Gastrointestinal: abdominal pain, heartburn/indigestion, nausea/vomiting, change in appetite, change in bowel habits, constipation or diarrhea, rectal bleeding/dark stools, difficulty swallowing  Urinary: painful urination, frequent urination, urinary urgency/incontinence, blood in urine/dark urine, nocturia  Genital: WOMEN: vaginal discharge or odor, bleeding/pain with intercourse,  pelvic pain, vulvar/vaginal itching or burning, excessive menstrual bleeding, problems with sexual function  Musculoskeletal: backache/back pain, weakness, joint pain/stiffness, muscle cramps, swelling of hands, feet, ankles, leg pain/redness  Skin: change in moles/freckles, rash, nodules  Hematologic/lymphatic: swollen lymph glands, abnormal bruising/bleeding  Endocrine: excessive thirst/urination, cold or heat intolerance  Breast: breast lump, breast pain, nipple discharge/skin changes  Neurologic/emotional: worrisome memory change, numbness/tingling, anxiety, mood swings      Current Scheduled Meds:  Outpatient Encounter Prescriptions as of 2/7/2018   Medication Sig Dispense Refill     amLODIPine (NORVASC) 5 MG tablet Take 1 tablet (5 mg total) by mouth daily.  0     ascorbic acid (ASCORBIC ACID WITH LATRICIA HIPS) 500 MG tablet Take 500 mg by mouth every evening.        aspirin 81 MG EC tablet Take 81 mg by mouth every evening.       atorvastatin (LIPITOR) 40 MG tablet Take 1 tablet (40 mg total) by mouth bedtime. 90 tablet 3     cholecalciferol, vitamin D3, 1,000 unit tablet Take 1,000 Units by mouth every evening.        cyanocobalamin (VITAMIN B-12) 250 MCG tablet Take 250 mcg by mouth every evening.        docusate sodium (COLACE) 100 MG capsule Take 100 mg by mouth 2 (two) times a day.       folic acid (FOLVITE) 800 MCG tablet Take 1,200 mcg by mouth every evening.        glucose 4 GM chewable tablet Chew 4 tablets (16 g total) as needed for low blood sugar. 120 tablet 0     insulin regular (NOVOLIN R) 100 unit/mL injection Inject 24 Units under the skin 3 (three) times a day before meals. Inject 24 units subcutaneous TID with meals plus sliding scale        insulin syringe-needle U-100 (BD INSULIN SYRINGE ULT-FINE II) 1 mL 31 gauge x 5/16 Syrg USE 4 TIMES DAILY AS DIRECTED 500 each 1     lancing device (ACCU-CHEK SOFTCLIX) Misc Use 1 applicator As Directed 4 (four) times a day. 300 each 3     lidocaine  (XYLOCAINE) 5 % ointment Apply to affected area QID prn pain 35.44 g 0     magnesium oxide (MAG-OX) 400 mg tablet Take 800 mg by mouth at bedtime. With food        methotrexate 2.5 MG tablet Take 10 mg by mouth once a week. Weekly on Friday       MULTIVITAMIN ORAL Take 1 tablet by mouth every evening.        NOVOLIN N 100 unit/mL injection Inject 33 Units under the skin daily before breakfast. 10 mL PRN     NOVOLIN N 100 unit/mL injection Inject 30 Units under the skin daily before supper. 10 mL PRN     omega-3 fatty acids-vitamin E (FISH OIL) 1,000 mg cap Take 1,000 mg by mouth every evening. 90 each 3     ranitidine (ZANTAC) 150 MG tablet Take 1 tablet (150 mg total) by mouth 2 (two) times a day. 60 tablet 3     traZODone (DESYREL) 150 MG tablet Take 1 tablet (150 mg total) by mouth at bedtime. 90 tablet 3     [DISCONTINUED] blood glucose test strips Check blood sugar four times daily. Dx: e11.9 Dispense brand per patient's insurance at pharmacy discretion. 200 strip 11     [DISCONTINUED] blood-glucose meter Misc Use to check blood sugar as directed. DX: e11.9 Dispense brand per patient/insurance preference 1 each 0     [DISCONTINUED] lisinopril (PRINIVIL,ZESTRIL) 10 MG tablet Take 1 tablet (10 mg total) by mouth at bedtime.  0     [DISCONTINUED] loratadine (CLARITIN) 10 mg tablet Take 10 mg by mouth bedtime.        [DISCONTINUED] metoprolol succinate (TOPROL-XL) 25 MG Take 2 tablets (50 mg total) by mouth every evening.  0     blood glucose test strips Check blood sugar four times daily. Dispense brand per patient's insurance at pharmacy discretion. Dx: e11.9 200 strip 11     blood-glucose meter Misc Dispense one meter. Use as directed. Dx: e11.9 1 each 0     DULoxetine (CYMBALTA) 30 MG capsule Take 1 capsule (30 mg total) by mouth daily. 30 capsule 1     leflunomide (ARAVA) 20 MG tablet Take 1 tablet (20 mg total) by mouth every evening.  0     lisinopril (PRINIVIL,ZESTRIL) 10 MG tablet Take 1 tablet (10 mg  total) by mouth at bedtime. 90 tablet 1     loratadine (CLARITIN) 10 mg tablet Take 1 tablet (10 mg total) by mouth at bedtime. 90 tablet 2     metoprolol succinate (TOPROL XL) 50 MG 24 hr tablet Take 1 tablet (50 mg total) by mouth daily. 90 tablet 1     oxyCODONE-acetaminophen (PERCOCET) 5-325 mg per tablet Take 1-2 tablets by mouth every 8 (eight) hours as needed for pain (maximum of 7 tablets per day). 210 tablet 0     [DISCONTINUED] oxyCODONE-acetaminophen (PERCOCET)  mg per tablet Take 1 tablet by mouth every 8 (eight) hours as needed for pain (Do not exceed 3 tablets per day). For use from 12/12-1/11 90 tablet 0     No facility-administered encounter medications on file as of 2/7/2018.      Past Medical History:   Diagnosis Date     BBB (bundle branch block)      Chronic back pain      Chronic kidney disease     stage 3     Chronic Kidney Disease (NKF Classification)     Created by Conversion      Depression      Diabetes     Type 2     Diabetes mellitus, type 2     Created by Conversion      Frequent headaches      Ganglion Of The Left Wrist     Created by Conversion      GERD (gastroesophageal reflux disease)      HLD (hyperlipidemia)      Hypertension     Per H&P dated 1/03/2013     Incarcerated ventral hernia     Per H&P dated 1/03/2013     Osteoarthritis      Rheumatoid arthritis      Thrombocytopenia      Venous insufficiency      Past Surgical History:   Procedure Laterality Date     BACK SURGERY       CHOLECYSTECTOMY       FATTY TUMOR  1996    LT SHOULDER BLADE     HYSTERECTOMY  1984     JOINT REPLACEMENT Left     knee     DC ABDOMEN SURGERY PROC UNLISTED      Description: Hernia Repair;  Recorded: 10/23/2013;     Two left wrist  1990, 1995     Social History   Substance Use Topics     Smoking status: Former Smoker     Quit date: 8/1/2008     Smokeless tobacco: Never Used      Comment: smells of cigarrette smoke     Alcohol use No       Objective / Physical Examination:  Vitals:    02/07/18 1141    BP: 132/74   Pulse: 62   Weight: 220 lb (99.8 kg)     Wt Readings from Last 3 Encounters:   02/07/18 220 lb (99.8 kg)   01/18/18 214 lb 3.2 oz (97.2 kg)   01/16/18 214 lb 3.2 oz (97.2 kg)     Body mass index is 29.84 kg/(m^2).     General Appearance: Alert and oriented, cooperative, affect appropriate, speech clear, in no apparent distress  Lungs: Clear to auscultation bilaterally. Normal inspiratory and expiratory effort  Cardiovascular: Regular rate, normal S1, S2. No murmurs, rubs, or gallops  Abdomen: Bowel sounds active all four quadrants. Soft, non-tender. No hepatomegaly or splenomegaly. No bruits detected.   MSK: She has difficulty raising herself to a standing position but is able to do so unassisted. Gait is antalgic and slow. Moves upper and lower extremities. Significant lumbar tenderness to palpation   Extremities: no edema      Orders Placed This Encounter   Procedures     Glycosylated Hemoglobin A1c     Ambulatory referral to Pain Clinic   Followup: Return in about 3 months (around 5/7/2018) for Next scheduled follow up. earlier if needed.        Marichuy Rubio, CNP

## 2021-06-15 NOTE — PROGRESS NOTES
Code Status:  Full Code  Visit Type: Problem Visit     Facility:  [facility] [[id number]]         Facility Type: SNF (Skilled Nursing Facility, TCU)    History of Present Illness: Ebonie Bowman is a 67 y.o. female who I am seeing today for follow up on the TCU. Pt with hx of chronic back pain, s/p spinal stimulator, hypertension, hyperlipidemia, diabetes mellitus, recent hospitalization for influenza. She was treated with Tamiflu. Pt with recent fall 2/t to weakness and pain meds. EEG was normal. Head CT was negative. EKG with prolonged QT. Her Mg+ was replaced. She did have rhabdomyolysis. JAYNE treated with fluids. Leucocytosis found to have acute pyelonephritis.  She was treated with antibiotics. Repeat EKG showed decrease in the interval of 422.  She has completed her oral antibiotics.  Denies any dysuria.  DM on insulin. Hypertension, her metoprolol was increased and amlodipine was added.  Blood pressure satisfactory controlled.  She continues in therapy.   She is moving better  .  Active Ambulatory Problems     Diagnosis Date Noted     Osteoarthritis      Venous Insufficiency      HTN (hypertension)      Insomnia      Joint Pain, Localized In The Knee      Bursitis      Obesity      Chronic Kidney Disease (NKF Classification)      Hyperlipidemia      Diabetes mellitus, type 2      Joint Pain, Localized In The Hip      Morbid obesity 12/11/2014     Depression 05/04/2015     Thrombocytopenia 10/14/2015     Seropositive rheumatoid arthritis 01/20/2016     Lumbar radiculopathy, chronic      S/P TKR (total knee replacement) using cement, left 11/16/2016     High risk medication use 02/15/2017     Chronic pain 04/17/2017     Right hand pain 05/10/2017     GERD (gastroesophageal reflux disease) 07/12/2017     Bilateral primary osteoarthritis of hip 07/21/2017     Atherosclerotic cardiovascular disease, seen on abd CT 2016  09/18/2017     Flu 12/27/2017     Weakness 12/27/2017     Orthostatic hypotension  12/27/2017     JAYNE (acute kidney injury) 12/27/2017     Rhabdomyolysis 01/01/2018     UTI (urinary tract infection) 01/01/2018     Fall at home, initial encounter      Sepsis      Acute pyelonephritis      Resolved Ambulatory Problems     Diagnosis Date Noted     Lumbar Radiculopathy      Neck Strain      Rhythm Disorder      Diffuse Joint Pains (Arthralgias)      Diabetic Nephropathy      Ganglion Of The Left Wrist      Hypomagnesemia      Lower Back Pain Chronic      Cellulitis Of The Left Foot      Decrease In Appetite      Abnormal weight gain      Hypoglycemia 04/07/2015     Arthralgia 05/13/2015     Knee osteoarthritis 05/13/2015     Rheumatoid arthritis 06/03/2015     Preop examination 06/30/2016     Past Medical History:   Diagnosis Date     BBB (bundle branch block)      Chronic back pain      Chronic kidney disease      Chronic Kidney Disease (NKF Classification)      Depression      Diabetes      Diabetes mellitus, type 2      Frequent headaches      Ganglion Of The Left Wrist      GERD (gastroesophageal reflux disease)      HLD (hyperlipidemia)      Hypertension      Incarcerated ventral hernia      Osteoarthritis      Rheumatoid arthritis      Thrombocytopenia      Venous insufficiency        Current Outpatient Prescriptions   Medication Sig     amLODIPine (NORVASC) 5 MG tablet Take 1 tablet (5 mg total) by mouth daily.     ascorbic acid (ASCORBIC ACID WITH LATRICIA HIPS) 500 MG tablet Take 500 mg by mouth every evening.      aspirin 81 MG EC tablet Take 81 mg by mouth every evening.     atorvastatin (LIPITOR) 40 MG tablet Take 1 tablet (40 mg total) by mouth bedtime.     blood glucose test (ACCU-CHEK FRANCISCO PLUS TEST STRP) Strp TEST FOUR TIMES DAILY     cholecalciferol, vitamin D3, 1,000 unit tablet Take 1,000 Units by mouth every evening.      cyanocobalamin (VITAMIN B-12) 250 MCG tablet Take 250 mcg by mouth every evening.      docusate sodium (COLACE) 100 MG capsule Take 100 mg by mouth 2 (two) times a  day.     folic acid (FOLVITE) 800 MCG tablet Take 1,200 mcg by mouth every evening.      glucose 4 GM chewable tablet Chew 4 tablets (16 g total) as needed for low blood sugar.     insulin regular (NOVOLIN R) 100 unit/mL injection Inject 24 Units under the skin 3 (three) times a day before meals. Inject 24 units subcutaneous TID with meals plus sliding scale      insulin syringe-needle U-100 (BD INSULIN SYRINGE ULT-FINE II) 1 mL 31 gauge x 5/16 Syrg USE 4 TIMES DAILY AS DIRECTED     lancing device (ACCU-CHEK SOFTCLIX) Misc Use 1 applicator As Directed 4 (four) times a day.     leflunomide (ARAVA) 20 MG tablet Take 1 tablet (20 mg total) by mouth every evening.     levoFLOXacin (LEVAQUIN) 500 MG tablet Take 1 tablet (500 mg total) by mouth Daily at 6:00 am for 10 days.     lidocaine (XYLOCAINE) 5 % ointment Apply to affected area QID prn pain     lisinopril (PRINIVIL,ZESTRIL) 10 MG tablet Take 1 tablet (10 mg total) by mouth at bedtime.     loratadine (CLARITIN) 10 mg tablet Take 10 mg by mouth bedtime.      magnesium oxide (MAG-OX) 400 mg tablet Take 800 mg by mouth at bedtime. With food      methotrexate 2.5 MG tablet Take 10 mg by mouth once a week. Weekly on Friday     metoprolol succinate (TOPROL-XL) 25 MG Take 2 tablets (50 mg total) by mouth every evening.     MULTIVITAMIN ORAL Take 1 tablet by mouth every evening.      NOVOLIN N 100 unit/mL injection Inject 33 Units under the skin daily before breakfast.     NOVOLIN N 100 unit/mL injection Inject 30 Units under the skin daily before supper.     omega-3 fatty acids-vitamin E (FISH OIL) 1,000 mg cap Take 1,000 mg by mouth every evening.     oxyCODONE-acetaminophen (PERCOCET)  mg per tablet Take 1 tablet by mouth every 8 (eight) hours as needed for pain (Do not exceed 3 tablets per day). For use from 12/12-1/11     ranitidine (ZANTAC) 150 MG tablet Take 1 tablet (150 mg total) by mouth 2 (two) times a day.     traZODone (DESYREL) 150 MG tablet Take 1  tablet (150 mg total) by mouth at bedtime.       Allergies   Allergen Reactions     Penicillins Hives     Sulfa (Sulfonamide Antibiotics) Hives         Review of Systems   No fevers or chills. No headache, lightheadedness or dizziness. No SOB, chest pains or palpitations. No cough. Appetite is good. No nausea, vomiting, constipation or diarrhea. No dysuria, frequency, burning or pain with urination. Chronic back pain with stimulator in place. Chronic RA on immunosuppressive therapy. She continues on Percocet for pain.     Physical Exam   PHYSICAL EXAMINATION:  Vital signs:   Vitals:    01/16/18 2104   BP: 150/84   Pulse: 78   Resp: 16   Temp: 98  F (36.7  C)   SpO2: 97%     General: Awake, Alert, oriented x3, appropriately, follows simple commands, conversant  HEENT:PERRLA, Pink conjunctiva, anicteric sclerae, moist oral mucosa  NECK: Supple, without any lymphadenopathy, or masses  CVS:  S1  S2, without murmur or gallop.   LUNG: Clear to auscultation, No wheezes, rales or rhonci. No cough.   BACK: No kyphosis of the thoracic spine. Tenderness in the lumbar spine with palpitation. No out pouching.   ABDOMEN: Soft, nontender to palpation, with positive bowel sounds  EXTREMITIES:  Moves  both upper and lower extremities with generalized weakness, no pedal edema, no calf tenderness.  Movement improving.  SKIN: Warm and dry, no rashes or erythema noted  NEUROLOGIC: Intact, pulses palpable  PSYCHIATRIC: Cognition intact           Labs:    Recent Results (from the past 240 hour(s))   ALT (SGPT)   Result Value Ref Range    ALT 30 0 - 45 U/L   AST (SGOT)   Result Value Ref Range    AST 22 0 - 40 U/L   Basic Metabolic Panel   Result Value Ref Range    Sodium 139 136 - 145 mmol/L    Potassium 4.0 3.5 - 5.0 mmol/L    Chloride 104 98 - 107 mmol/L    CO2 29 22 - 31 mmol/L    Anion Gap, Calculation 6 5 - 18 mmol/L    Glucose 98 70 - 125 mg/dL    Calcium 9.0 8.5 - 10.5 mg/dL    BUN 18 8 - 22 mg/dL    Creatinine 1.19 (H) 0.60 - 1.10  mg/dL    GFR MDRD Af Amer 55 (L) >60 mL/min/1.73m2    GFR MDRD Non Af Amer 45 (L) >60 mL/min/1.73m2   CK Total   Result Value Ref Range    CK, Total 23 (L) 30 - 190 U/L         Assessment/Plan:  1. Influenza A     2. Rheumatoid arthritis     3. Chronic back pain     4. Diabetes mellitus     5. Essential hypertension       Recent influenza A.  She has completed her Tamiflu.  No cough.  No shortness of breath.  Chronic rheumatoid arthritis on immunosuppressive therapy.  Chronic back pain well-controlled with Percocet.  She is moving better and getting stronger in therapy.  Diabetes satisfactory controlled.  Essential hypertension well controlled.      Electronically signed by: Nikki Bourgeois, CNP

## 2021-06-15 NOTE — TELEPHONE ENCOUNTER
Refill Approved    Rx renewed per Medication Renewal Policy. Medication was last renewed on 8/7/20.    Flaco Menendez, [facility] Triage/Med Refill 3/4/2021     Requested Prescriptions   Pending Prescriptions Disp Refills     metoprolol succinate (TOPROL-XL) 50 MG 24 hr tablet [Pharmacy Med Name: METOPROLOL SUCCINATE ER 50 MG Tablet Extended Release 24 Hour] 90 tablet 1     Sig: TAKE 1 TABLET EVERY DAY       Beta-Blockers Refill Protocol Passed - 3/3/2021  2:07 PM        Passed - PCP or prescribing provider visit in past 12 months or next 3 months     Last office visit with prescriber/PCP: 10/20/2020 Marichuy Rubio FNP OR same dept: 10/20/2020 Marichuy Rubio FNP OR same specialty: 10/20/2020 Marichuy Rubio FNP  Last physical: 4/15/2019 Last MTM visit: Visit date not found   Next visit within 3 mo: Visit date not found  Next physical within 3 mo: Visit date not found  Prescriber OR PCP: ARSLAN Vasquez  Last diagnosis associated with med order: 1. Essential hypertension  - metoprolol succinate (TOPROL-XL) 50 MG 24 hr tablet [Pharmacy Med Name: METOPROLOL SUCCINATE ER 50 MG Tablet Extended Release 24 Hour]; TAKE 1 TABLET EVERY DAY  Dispense: 90 tablet; Refill: 1    If protocol passes may refill for 12 months if within 3 months of last provider visit (or a total of 15 months).             Passed - Blood pressure filed in past 12 months     BP Readings from Last 1 Encounters:   12/04/20 165/74

## 2021-06-15 NOTE — PROGRESS NOTES
Code Status:  Full Code  Visit Type: Discharge Summary     Facility:  [facility] [[id number]]         Facility Type: SNF (Skilled Nursing Facility, TCU)    History of Present Illness: Ebonie Bowman is a 67 y.o. female who I am seeing today for discharge from the TCU. Pt with hx of chronic back pain, s/p spinal stimulator, hypertension, hyperlipidemia, diabetes mellitus, recent hospitalization for influenza. She was treated with Tamiflu. Pt with recent fall 2/t to weakness and pain meds. EEG was normal. Head CT was negative. EKG with prolonged QT. Her Mg+ was replaced. She did have rhabdomyolysis. JAYNE treated with fluids. Leucocytosis found to have acute pyelonephritis.  She was treated with antibiotics. Repeat EKG showed decrease in the interval of 422.  She has completed her oral antibiotics.  Denies any dysuria.  DM on insulin. Hypertension, her metoprolol was increased and amlodipine was added.  Blood pressure satisfactory controlled.  She continues in therapy.   She is moving better  .  Active Ambulatory Problems     Diagnosis Date Noted     Osteoarthritis      Venous Insufficiency      HTN (hypertension)      Insomnia      Joint Pain, Localized In The Knee      Bursitis      Obesity      Chronic Kidney Disease (NKF Classification)      Hyperlipidemia      Diabetes mellitus, type 2      Joint Pain, Localized In The Hip      Morbid obesity 12/11/2014     Depression 05/04/2015     Thrombocytopenia 10/14/2015     Seropositive rheumatoid arthritis 01/20/2016     Lumbar radiculopathy, chronic      S/P TKR (total knee replacement) using cement, left 11/16/2016     High risk medication use 02/15/2017     Chronic pain 04/17/2017     Right hand pain 05/10/2017     GERD (gastroesophageal reflux disease) 07/12/2017     Bilateral primary osteoarthritis of hip 07/21/2017     Atherosclerotic cardiovascular disease, seen on abd CT 2016  09/18/2017     Flu 12/27/2017     Weakness 12/27/2017     Orthostatic  hypotension 12/27/2017     JAYNE (acute kidney injury) 12/27/2017     Rhabdomyolysis 01/01/2018     UTI (urinary tract infection) 01/01/2018     Fall at home, initial encounter      Sepsis      Acute pyelonephritis      Resolved Ambulatory Problems     Diagnosis Date Noted     Lumbar Radiculopathy      Neck Strain      Rhythm Disorder      Diffuse Joint Pains (Arthralgias)      Diabetic Nephropathy      Ganglion Of The Left Wrist      Hypomagnesemia      Lower Back Pain Chronic      Cellulitis Of The Left Foot      Decrease In Appetite      Abnormal weight gain      Hypoglycemia 04/07/2015     Arthralgia 05/13/2015     Knee osteoarthritis 05/13/2015     Rheumatoid arthritis 06/03/2015     Preop examination 06/30/2016     Past Medical History:   Diagnosis Date     BBB (bundle branch block)      Chronic back pain      Chronic kidney disease      Chronic Kidney Disease (NKF Classification)      Depression      Diabetes      Diabetes mellitus, type 2      Frequent headaches      Ganglion Of The Left Wrist      GERD (gastroesophageal reflux disease)      HLD (hyperlipidemia)      Hypertension      Incarcerated ventral hernia      Osteoarthritis      Rheumatoid arthritis      Thrombocytopenia      Venous insufficiency        Current Outpatient Prescriptions   Medication Sig     amLODIPine (NORVASC) 5 MG tablet Take 1 tablet (5 mg total) by mouth daily.     ascorbic acid (ASCORBIC ACID WITH LATRICIA HIPS) 500 MG tablet Take 500 mg by mouth every evening.      aspirin 81 MG EC tablet Take 81 mg by mouth every evening.     atorvastatin (LIPITOR) 40 MG tablet Take 1 tablet (40 mg total) by mouth bedtime.     blood glucose test (ACCU-CHEK FRANCISCO PLUS TEST STRP) Strp TEST FOUR TIMES DAILY     cholecalciferol, vitamin D3, 1,000 unit tablet Take 1,000 Units by mouth every evening.      cyanocobalamin (VITAMIN B-12) 250 MCG tablet Take 250 mcg by mouth every evening.      docusate sodium (COLACE) 100 MG capsule Take 100 mg by mouth 2  (two) times a day.     folic acid (FOLVITE) 800 MCG tablet Take 1,200 mcg by mouth every evening.      glucose 4 GM chewable tablet Chew 4 tablets (16 g total) as needed for low blood sugar.     insulin regular (NOVOLIN R) 100 unit/mL injection Inject 24 Units under the skin 3 (three) times a day before meals. Inject 24 units subcutaneous TID with meals plus sliding scale      insulin syringe-needle U-100 (BD INSULIN SYRINGE ULT-FINE II) 1 mL 31 gauge x 5/16 Syrg USE 4 TIMES DAILY AS DIRECTED     lancing device (ACCU-CHEK SOFTCLIX) Misc Use 1 applicator As Directed 4 (four) times a day.     leflunomide (ARAVA) 20 MG tablet Take 1 tablet (20 mg total) by mouth every evening.     lidocaine (XYLOCAINE) 5 % ointment Apply to affected area QID prn pain     lisinopril (PRINIVIL,ZESTRIL) 10 MG tablet Take 1 tablet (10 mg total) by mouth at bedtime.     loratadine (CLARITIN) 10 mg tablet Take 10 mg by mouth bedtime.      magnesium oxide (MAG-OX) 400 mg tablet Take 800 mg by mouth at bedtime. With food      methotrexate 2.5 MG tablet Take 10 mg by mouth once a week. Weekly on Friday     metoprolol succinate (TOPROL-XL) 25 MG Take 2 tablets (50 mg total) by mouth every evening.     MULTIVITAMIN ORAL Take 1 tablet by mouth every evening.      NOVOLIN N 100 unit/mL injection Inject 33 Units under the skin daily before breakfast.     NOVOLIN N 100 unit/mL injection Inject 30 Units under the skin daily before supper.     omega-3 fatty acids-vitamin E (FISH OIL) 1,000 mg cap Take 1,000 mg by mouth every evening.     oxyCODONE-acetaminophen (PERCOCET)  mg per tablet Take 1 tablet by mouth every 8 (eight) hours as needed for pain (Do not exceed 3 tablets per day). For use from 12/12-1/11     ranitidine (ZANTAC) 150 MG tablet Take 1 tablet (150 mg total) by mouth 2 (two) times a day.     traZODone (DESYREL) 150 MG tablet Take 1 tablet (150 mg total) by mouth at bedtime.       Allergies   Allergen Reactions     Penicillins Hives      Sulfa (Sulfonamide Antibiotics) Hives         Review of Systems   No fevers or chills. No headache, lightheadedness or dizziness. No SOB, chest pains or palpitations. No cough. Appetite is good. No nausea, vomiting, constipation or diarrhea. No dysuria, frequency, burning or pain with urination. Chronic back pain with stimulator in place. Chronic RA on immunosuppressive therapy. She continues on Percocet for pain.     Physical Exam   PHYSICAL EXAMINATION:  Vital signs:   Vitals:    01/18/18 1929   BP: 144/81   Pulse: 71   Resp: 16   Temp: 98.3  F (36.8  C)   SpO2: 94%     General: Awake, Alert, oriented x3, appropriately, follows simple commands, conversant  HEENT:PERRLA, Pink conjunctiva, anicteric sclerae, moist oral mucosa  NECK: Supple, without any lymphadenopathy, or masses  CVS:  S1  S2, without murmur or gallop.   LUNG: Clear to auscultation, No wheezes, rales or rhonci. No cough.   BACK: No kyphosis of the thoracic spine. Tenderness in the lumbar spine with palpitation. No out pouching.   ABDOMEN: Soft, nontender to palpation, with positive bowel sounds  EXTREMITIES:  Moves  both upper and lower extremities, no pedal edema, no calf tenderness.  Movement improving.  SKIN: Warm and dry, no rashes or erythema noted  NEUROLOGIC: Intact, pulses palpable  PSYCHIATRIC: Cognition intact           Labs:    Meds reviewed at the nursing home.    Assessment/Plan:  1. Influenza A     2. Rheumatoid arthritis     3. Chronic back pain     4. Diabetes mellitus     5. Essential hypertension       Patient with recent influenza A.  No residual symptomology.  She does have chronic rheumatoid arthritis on immunosuppressive therapy.  Chronic low back pain well-controlled with Percocet.  She is moving much better.  Diabetes.  Suspect controlled.  Hypertension.  Satisfactory controlled.    Patient will discharge home in the a.m. with current meds and treatments including Percocet.    DISCHARGE PLAN/FACE TO FACE:  I certify that  this patient is under my care and that I, or a nurse practitioner or physician's assistant working with me, had a face-to-face encounter that meets the physician face-to-face encounter requirements with this patient.       I certify that, based on my findings, the following services are medically necessary home health services: PT, OT, home health aide.    My clinical findings support the need for the above skilled services because: Recent hospitalization for influenza generalized weakness.  Underlying rheumatoid arthritis with chronic pain.    This patient is homebound because due to recent illness.     The patient is, or has been, under my care and I have initiated the establishment of the plan of care. This patient will be followed by a physician who will periodically review the plan of care.      Total time spent for this visit was 35 minutes with greater than 50% of the time spent in counseling correlation care including reviewing discharge plan with patient.    Electronically signed by: Nikki Bourgeois CNP

## 2021-06-15 NOTE — PROGRESS NOTES
[facility] Patient Outreach:     Discharge Date::  1/20/18      Reason for TCU stay (discharge diagnosis)::  Influenza A, rheumatoid arthritis, falls, rhabdomyolysis      Are you feeling better, the same or worse since your discharge?:  Patient is feeling better          As part of your discharge plan, did they discuss home care with you?: Yes        Have your seen them yet, or are they scheduled to visit?: Yes                Do you have any follow up visits scheduled with your PCP or Specialist?:  Yes, with Specialist      Who are you seeing and when is it scheduled?:  Dr. Schofield (Rheumatology) on 2/14 @ 2:20pm      I'm glad to hear you're doing well and we want you to continue to do well. Your PCP would like to see you for a follow-up visit. Can we help set that up for your today?: No        (RN) Provided patient the PCP's phone number to call if they have any questions or concerns?: Yes

## 2021-06-15 NOTE — PROGRESS NOTES
Patient was able to get her social security tax form and doesn't need any assistance with this. Some reason the mailman put the whole building on hold for 2 months delaying everyone's mail.    I talked to Ebonie about the information that was discussed with Marichuy today. She had become tearing during their visit and wanted to check in on her.  She said, she isn't happy with the change, but understands. She said, they don't agree to the same form of pain management. However, we discussed this a little further and how the pain clinic will be able to provider her the best form of treatment when it comes to pain management. She seemed to feel a little better after our conversation but was getting pretty tired.     Goals not discussed today! No concerns or needs

## 2021-06-15 NOTE — TELEPHONE ENCOUNTER
----- Message from Raheem Londono DO sent at 2/7/2021  1:07 AM CST -----  Noted to have some signs of mild renal insufficiency (diminished kidney function), not new with latest level a bit lower compared to prior levels.  Recommend drinking sufficient amount of fluids daily (about 6 cups of 8 ounces, unless advised otherwise by kidney specialist, cardiologist or vascular doctor) and decrease methotrexate to 3 tablets weekly.  Avoid anti-inflammatory medications, such as Advil, Aleve etc.     CBC/cell count and liver enzymes were within normal limits.     Recommend rechecking creatinine level in about 4 weeks and scheduling a follow-up video visit within 2 months (preferably when son is available to help out with video visit).    Please place orders mentioned above.

## 2021-06-15 NOTE — PROGRESS NOTES
Code Status:  Full Code  Visit Type: Problem Visit     Facility:  [facility] [[id number]]         Facility Type: SNF (Skilled Nursing Facility, TCU)    History of Present Illness: Ebonie Bowman is a 67 y.o. female who I am seeing today for follow up on the TCU. Pt with hx of chronic back pain, s/p spinal stimulator, hypertension, hyperlipidemia, diabetes mellitus, recent hospitalization for influenza. She was treated with Tamiflu. Pt with recent fall 2/t to weakness and pain meds. EEG was normal. Head CT was negative. EKG with prolonged QT. Her Mg+ was replaced. She did have rhabdomyolysis. JAYNE treated with fluids. Leucocytosis found to have acute pyelonephritis.  She was treated with antibiotics. Repeat EKG showed decrease in the interval of 422. DM on insulin. Hypertension, her metoprolol was increased and amlodipine was added. She continues in therapy with generalized weakness.            Active Ambulatory Problems     Diagnosis Date Noted     Osteoarthritis      Venous Insufficiency      HTN (hypertension)      Insomnia      Joint Pain, Localized In The Knee      Bursitis      Obesity      Chronic Kidney Disease (NKF Classification)      Hyperlipidemia      Diabetes mellitus, type 2      Joint Pain, Localized In The Hip      Morbid obesity 12/11/2014     Depression 05/04/2015     Thrombocytopenia 10/14/2015     Seropositive rheumatoid arthritis 01/20/2016     Lumbar radiculopathy, chronic      S/P TKR (total knee replacement) using cement, left 11/16/2016     High risk medication use 02/15/2017     Chronic pain 04/17/2017     Right hand pain 05/10/2017     GERD (gastroesophageal reflux disease) 07/12/2017     Bilateral primary osteoarthritis of hip 07/21/2017     Atherosclerotic cardiovascular disease, seen on abd CT 2016  09/18/2017     Flu 12/27/2017     Weakness 12/27/2017     Orthostatic hypotension 12/27/2017     JAYNE (acute kidney injury) 12/27/2017     Rhabdomyolysis 01/01/2018     UTI  (urinary tract infection) 01/01/2018     Fall at home, initial encounter      Sepsis      Acute pyelonephritis      Resolved Ambulatory Problems     Diagnosis Date Noted     Lumbar Radiculopathy      Neck Strain      Rhythm Disorder      Diffuse Joint Pains (Arthralgias)      Diabetic Nephropathy      Ganglion Of The Left Wrist      Hypomagnesemia      Lower Back Pain Chronic      Cellulitis Of The Left Foot      Decrease In Appetite      Abnormal weight gain      Hypoglycemia 04/07/2015     Arthralgia 05/13/2015     Knee osteoarthritis 05/13/2015     Rheumatoid arthritis 06/03/2015     Preop examination 06/30/2016     Past Medical History:   Diagnosis Date     BBB (bundle branch block)      Chronic back pain      Chronic kidney disease      Chronic Kidney Disease (NKF Classification)      Depression      Diabetes      Diabetes mellitus, type 2      Frequent headaches      Ganglion Of The Left Wrist      GERD (gastroesophageal reflux disease)      HLD (hyperlipidemia)      Hypertension      Incarcerated ventral hernia      Osteoarthritis      Rheumatoid arthritis      Thrombocytopenia      Venous insufficiency        Current Outpatient Prescriptions   Medication Sig     amLODIPine (NORVASC) 5 MG tablet Take 1 tablet (5 mg total) by mouth daily.     ascorbic acid (ASCORBIC ACID WITH LATRICIA HIPS) 500 MG tablet Take 500 mg by mouth every evening.      aspirin 81 MG EC tablet Take 81 mg by mouth every evening.     atorvastatin (LIPITOR) 40 MG tablet Take 1 tablet (40 mg total) by mouth bedtime.     blood glucose test (ACCU-CHEK FRANCISCO PLUS TEST STRP) Strp TEST FOUR TIMES DAILY     cholecalciferol, vitamin D3, 1,000 unit tablet Take 1,000 Units by mouth every evening.      cyanocobalamin (VITAMIN B-12) 250 MCG tablet Take 250 mcg by mouth every evening.      docusate sodium (COLACE) 100 MG capsule Take 100 mg by mouth 2 (two) times a day.     folic acid (FOLVITE) 800 MCG tablet Take 1,200 mcg by mouth every evening.       glucose 4 GM chewable tablet Chew 4 tablets (16 g total) as needed for low blood sugar.     insulin regular (NOVOLIN R) 100 unit/mL injection Inject 24 Units under the skin 3 (three) times a day before meals. Inject 24 units subcutaneous TID with meals plus sliding scale      insulin syringe-needle U-100 (BD INSULIN SYRINGE ULT-FINE II) 1 mL 31 gauge x 5/16 Syrg USE 4 TIMES DAILY AS DIRECTED     lancing device (ACCU-CHEK SOFTCLIX) Misc Use 1 applicator As Directed 4 (four) times a day.     [START ON 1/13/2018] leflunomide (ARAVA) 20 MG tablet Take 1 tablet (20 mg total) by mouth every evening.     levoFLOXacin (LEVAQUIN) 500 MG tablet Take 1 tablet (500 mg total) by mouth Daily at 6:00 am for 10 days.     lidocaine (XYLOCAINE) 5 % ointment Apply to affected area QID prn pain     lisinopril (PRINIVIL,ZESTRIL) 10 MG tablet Take 1 tablet (10 mg total) by mouth at bedtime.     loratadine (CLARITIN) 10 mg tablet Take 10 mg by mouth bedtime.      magnesium oxide (MAG-OX) 400 mg tablet Take 800 mg by mouth at bedtime. With food      methotrexate 2.5 MG tablet Take 10 mg by mouth once a week. Weekly on Friday     metoprolol succinate (TOPROL-XL) 25 MG Take 2 tablets (50 mg total) by mouth every evening.     MULTIVITAMIN ORAL Take 1 tablet by mouth every evening.      NOVOLIN N 100 unit/mL injection Inject 33 Units under the skin daily before breakfast.     NOVOLIN N 100 unit/mL injection Inject 30 Units under the skin daily before supper.     omega-3 fatty acids-vitamin E (FISH OIL) 1,000 mg cap Take 1,000 mg by mouth every evening.     oxyCODONE-acetaminophen (PERCOCET)  mg per tablet Take 1 tablet by mouth every 8 (eight) hours as needed for pain (Do not exceed 3 tablets per day). For use from 12/12-1/11     ranitidine (ZANTAC) 150 MG tablet Take 1 tablet (150 mg total) by mouth 2 (two) times a day.     traZODone (DESYREL) 150 MG tablet Take 1 tablet (150 mg total) by mouth at bedtime.       Allergies   Allergen  Reactions     Penicillins Hives     Sulfa (Sulfonamide Antibiotics) Hives         Review of Systems   No fevers or chills. No headache, lightheadedness or dizziness. No SOB, chest pains or palpitations. No cough. Appetite is good. No nausea, vomiting, constipation or diarrhea. No dysuria, frequency, burning or pain with urination. Chronic back pain with stimulator in place. Chronic RA on immunosuppressive therapy. She continues on Percocet for pain.     Physical Exam   PHYSICAL EXAMINATION:  Vital signs:   Vitals:    01/11/18 2151   BP: 170/82   Pulse: 82   Resp: 24   Temp: 98.8  F (37.1  C)   SpO2: 95%     General: Awake, Alert, oriented x3, appropriately, follows simple commands, conversant  HEENT:PERRLA, Pink conjunctiva, anicteric sclerae, moist oral mucosa  NECK: Supple, without any lymphadenopathy, or masses  CVS:  S1  S2, without murmur or gallop.   LUNG: Clear to auscultation, No wheezes, rales or rhonci. No cough.   BACK: No kyphosis of the thoracic spine. Tenderness in the lumbar spine with palpitation. No out pouching.   ABDOMEN: Soft, nontender to palpation, with positive bowel sounds  EXTREMITIES:  Moves  both upper and lower extremities with generalized weakness, no pedal edema, no calf tenderness  SKIN: Warm and dry, no rashes or erythema noted  NEUROLOGIC: Intact, pulses palpable  PSYCHIATRIC: Cognition intact           Labs:    Recent Results (from the past 240 hour(s))   CK Total   Result Value Ref Range    CK, Total 607 (HH) 30 - 190 U/L   Basic Metabolic Panel   Result Value Ref Range    Sodium 140 136 - 145 mmol/L    Potassium 4.0 3.5 - 5.0 mmol/L    Chloride 106 98 - 107 mmol/L    CO2 24 22 - 31 mmol/L    Anion Gap, Calculation 10 5 - 18 mmol/L    Glucose 168 (H) 70 - 125 mg/dL    Calcium 7.8 (L) 8.5 - 10.5 mg/dL    BUN 30 (H) 8 - 22 mg/dL    Creatinine 1.74 (H) 0.60 - 1.10 mg/dL    GFR MDRD Af Amer 35 (L) >60 mL/min/1.73m2    GFR MDRD Non Af Amer 29 (L) >60 mL/min/1.73m2   POCT Glucose    Result Value Ref Range    Glucose,  mg/dL   C. difficile Toxigenic by PCR   Result Value Ref Range    C.Difficile Toxigenic by PCR Negative Negative   POCT Glucose   Result Value Ref Range    Glucose,  mg/dL   POCT Glucose   Result Value Ref Range    Glucose,  mg/dL   POCT Glucose   Result Value Ref Range    Glucose,  mg/dL   HM2(CBC w/o Differential)   Result Value Ref Range    WBC 13.9 (H) 4.0 - 11.0 thou/uL    RBC 3.09 (L) 3.80 - 5.40 mill/uL    Hemoglobin 10.0 (L) 12.0 - 16.0 g/dL    Hematocrit 29.4 (L) 35.0 - 47.0 %    MCV 95 80 - 100 fL    MCH 32.4 27.0 - 34.0 pg    MCHC 34.0 32.0 - 36.0 g/dL    RDW 14.4 11.0 - 14.5 %    Platelets 93 (L) 140 - 440 thou/uL    MPV 11.9 8.5 - 12.5 fL   Basic Metabolic Panel   Result Value Ref Range    Sodium 140 136 - 145 mmol/L    Potassium 3.8 3.5 - 5.0 mmol/L    Chloride 109 (H) 98 - 107 mmol/L    CO2 23 22 - 31 mmol/L    Anion Gap, Calculation 8 5 - 18 mmol/L    Glucose 125 70 - 125 mg/dL    Calcium 7.9 (L) 8.5 - 10.5 mg/dL    BUN 22 8 - 22 mg/dL    Creatinine 1.31 (H) 0.60 - 1.10 mg/dL    GFR MDRD Af Amer 49 (L) >60 mL/min/1.73m2    GFR MDRD Non Af Amer 40 (L) >60 mL/min/1.73m2   POCT Glucose   Result Value Ref Range    Glucose,  mg/dL   Culture, Blood   Result Value Ref Range    Anaerobic Blood Culture Bottle No Growth No Growth, No organisms seen, bottle returned to instrument, Specimen not received    Aerobic Blood Culture Bottle No Growth No Growth, No organisms seen, bottle returned to instrument, Specimen not received   POCT Glucose   Result Value Ref Range    Glucose,  mg/dL   POCT Glucose   Result Value Ref Range    Glucose,  mg/dL   POCT Glucose   Result Value Ref Range    Glucose,  mg/dL   HM2(CBC w/o Differential)   Result Value Ref Range    WBC 10.0 4.0 - 11.0 thou/uL    RBC 3.13 (L) 3.80 - 5.40 mill/uL    Hemoglobin 10.0 (L) 12.0 - 16.0 g/dL    Hematocrit 29.6 (L) 35.0 - 47.0 %    MCV 95 80 - 100 fL     MCH 31.9 27.0 - 34.0 pg    MCHC 33.8 32.0 - 36.0 g/dL    RDW 14.3 11.0 - 14.5 %    Platelets 99 (L) 140 - 440 thou/uL    MPV 11.9 8.5 - 12.5 fL   Comprehensive Metabolic Panel   Result Value Ref Range    Sodium 141 136 - 145 mmol/L    Potassium 3.9 3.5 - 5.0 mmol/L    Chloride 110 (H) 98 - 107 mmol/L    CO2 24 22 - 31 mmol/L    Anion Gap, Calculation 7 5 - 18 mmol/L    Glucose 108 70 - 125 mg/dL    BUN 18 8 - 22 mg/dL    Creatinine 1.15 (H) 0.60 - 1.10 mg/dL    GFR MDRD Af Amer 57 (L) >60 mL/min/1.73m2    GFR MDRD Non Af Amer 47 (L) >60 mL/min/1.73m2    Bilirubin, Total 0.8 0.0 - 1.0 mg/dL    Calcium 8.3 (L) 8.5 - 10.5 mg/dL    Protein, Total 5.1 (L) 6.0 - 8.0 g/dL    Albumin 2.1 (L) 3.5 - 5.0 g/dL    Alkaline Phosphatase 49 45 - 120 U/L    AST 53 (H) 0 - 40 U/L     (H) 0 - 45 U/L   Procalcitonin   Result Value Ref Range    Procalcitonin 16.50 (H) 0.00 - 0.49 ng/mL   CK Total   Result Value Ref Range    CK, Total 66 30 - 190 U/L   Magnesium   Result Value Ref Range    Magnesium 1.5 (L) 1.8 - 2.6 mg/dL   POCT Glucose   Result Value Ref Range    Glucose,  mg/dL   POCT Glucose   Result Value Ref Range    Glucose,  mg/dL   POCT Glucose   Result Value Ref Range    Glucose,  mg/dL   Magnesium   Result Value Ref Range    Magnesium 1.3 (L) 1.8 - 2.6 mg/dL   POCT Glucose   Result Value Ref Range    Glucose,  mg/dL   Magnesium   Result Value Ref Range    Magnesium 1.5 (L) 1.8 - 2.6 mg/dL   Platelet Count - every other day x 3   Result Value Ref Range    Platelets 125 (L) 140 - 440 thou/uL   POCT Glucose   Result Value Ref Range    Glucose,  mg/dL   ECG 12 lead MUSE   Result Value Ref Range    SYSTOLIC BLOOD PRESSURE  mmHg    DIASTOLIC BLOOD PRESSURE  mmHg    VENTRICULAR RATE 76 BPM    ATRIAL RATE 76 BPM    P-R INTERVAL 158 ms    QRS DURATION 144 ms    Q-T INTERVAL 464 ms    QTC CALCULATION (BEZET) 522 ms    P Axis 61 degrees    R AXIS 90 degrees    T AXIS 47 degrees    MUSE DIAGNOSIS        Normal sinus rhythm  Right bundle branch block  Abnormal ECG  When compared with ECG of 01-JAN-2018 14:28,  Vent. rate has decreased BY  59 BPM  QRS axis Shifted left  Confirmed by MARCELO CELESTE, RUSLAN LOC: ([id number]) on 1/5/2018 4:58:27 PM     POCT Glucose   Result Value Ref Range    Glucose,  mg/dL   POCT Glucose   Result Value Ref Range    Glucose,  mg/dL   POCT Glucose   Result Value Ref Range    Glucose,  mg/dL   Magnesium   Result Value Ref Range    Magnesium 1.9 1.8 - 2.6 mg/dL   Comprehensive Metabolic Panel   Result Value Ref Range    Sodium 143 136 - 145 mmol/L    Potassium 3.9 3.5 - 5.0 mmol/L    Chloride 109 (H) 98 - 107 mmol/L    CO2 26 22 - 31 mmol/L    Anion Gap, Calculation 8 5 - 18 mmol/L    Glucose 118 70 - 125 mg/dL    BUN 20 8 - 22 mg/dL    Creatinine 1.14 (H) 0.60 - 1.10 mg/dL    GFR MDRD Af Amer 57 (L) >60 mL/min/1.73m2    GFR MDRD Non Af Amer 47 (L) >60 mL/min/1.73m2    Bilirubin, Total 0.6 0.0 - 1.0 mg/dL    Calcium 8.6 8.5 - 10.5 mg/dL    Protein, Total 5.5 (L) 6.0 - 8.0 g/dL    Albumin 2.3 (L) 3.5 - 5.0 g/dL    Alkaline Phosphatase 48 45 - 120 U/L    AST 17 0 - 40 U/L    ALT 67 (H) 0 - 45 U/L   HM2(CBC w/o Differential)   Result Value Ref Range    WBC 7.2 4.0 - 11.0 thou/uL    RBC 3.10 (L) 3.80 - 5.40 mill/uL    Hemoglobin 9.8 (L) 12.0 - 16.0 g/dL    Hematocrit 29.7 (L) 35.0 - 47.0 %    MCV 96 80 - 100 fL    MCH 31.6 27.0 - 34.0 pg    MCHC 33.0 32.0 - 36.0 g/dL    RDW 14.1 11.0 - 14.5 %    Platelets 159 140 - 440 thou/uL    MPV 10.5 8.5 - 12.5 fL   POCT Glucose   Result Value Ref Range    Glucose,  mg/dL   POCT Glucose   Result Value Ref Range    Glucose,  mg/dL   ALT (SGPT)   Result Value Ref Range    ALT 30 0 - 45 U/L   AST (SGOT)   Result Value Ref Range    AST 22 0 - 40 U/L   Basic Metabolic Panel   Result Value Ref Range    Sodium 139 136 - 145 mmol/L    Potassium 4.0 3.5 - 5.0 mmol/L    Chloride 104 98 - 107 mmol/L    CO2 29 22 - 31 mmol/L    Anion  Gap, Calculation 6 5 - 18 mmol/L    Glucose 98 70 - 125 mg/dL    Calcium 9.0 8.5 - 10.5 mg/dL    BUN 18 8 - 22 mg/dL    Creatinine 1.19 (H) 0.60 - 1.10 mg/dL    GFR MDRD Af Amer 55 (L) >60 mL/min/1.73m2    GFR MDRD Non Af Amer 45 (L) >60 mL/min/1.73m2   CK Total   Result Value Ref Range    CK, Total 23 (L) 30 - 190 U/L         Assessment/Plan:  1. Acute kidney injury     2. Influenza A     3. RA (refractory anemia)     4. Chronic back pain     5. Urinary tract infection     6. Diabetes mellitus     7. Essential hypertension       Recent increasing weakness with fall post Influenza A. She has completed her Tamiflu. No cough. No SOB. JAYNE with pyelonephritis. She has completed her antibiotics. Fluids encouraged. Creatine 1.12. It is trending downward however will never follow up. Chronic pain 2/t RA and back pain. She is on immunosuppressive therapy. She has a neuro stimulator in place. DM somewhat satisfactory controlled. Hypertension. BP on the higher side of normal. Her metoprolol was increased in hospital and amlodipine was added. She may need further adjustments. Will monitor.       45 minutes spent of which greater than 50% was face to face communication with the patient about above plan of care    Electronically signed by: Nikki Bourgeois CNP

## 2021-06-15 NOTE — PROGRESS NOTES
[facility]      Facility:    [facility] [[id number]]  Code Status: UNKNOWN      Chief Complaint/Reason for Visit:  Chief Complaint   Patient presents with     H & P     Call fall, rhabdomyolysis, influenza infection, generalized weakness, acute kidney injury, leukocytosis, acute pyelonephritis with pansensitive E. coli, prolonged QTC.  Diabetes, hypertension, chronic low back pain.       HPI:   Ebonie is a 67 y.o. female who was recently admitted to the hospital on January 1, 2018.  She is being followed for chronic back pain and does have a spinal stimulator in.  She has multiple other medical problems which is seen below.  She was in the hospital recently for influenza infection and she did have a fall at home.  After questioning in the emergency room was suspected that this was a mechanical fall and EEG was normal.  Head CT was negative and cervical spine did not show any acute processes.  There was no syncope or presyncope and she did have rhabdomyolysis although I do not see a level of CK in her chart at this time.  The rhabdomyolysis could be because of influenza infection or due to the fall however in the hospital notes it does say that this did resolve.  She did continue and complete her course of Tamiflu.  She was getting better with physical and occupational therapy however she did have acute kidney injury in the hospital which was suspected to be prerenal.  Renal function did improve with creatinine going from 1.74 down to 1.15.  Her leukocytosis did resolve and she did have a urinary tract infection and was treated with Levaquin.  She has somewhat low magnesium which was replaced and she did have an elevated QT QTc C interval.  She is however on Levaquin and this medication is notorious for lengthening the QT QTc interval.  She was treated appropriately and transferred here to the TCU at [place] in stable condition.    Patient claims today she is doing much  better she does not recall her last bowel movement but we will check it in the records.  Her blood pressure is elevated and she can understand why and she has no real chest pain or shortness of breath.  She is progressing as anticipated physical occupational therapy and overall she claims with her Percocet her pain is well managed.    Past Medical History:  Past Medical History:   Diagnosis Date     BBB (bundle branch block)      Chronic back pain      Chronic kidney disease     stage 3     Chronic Kidney Disease (NKF Classification)     Created by Conversion      Depression      Diabetes     Type 2     Diabetes mellitus, type 2     Created by Conversion      Frequent headaches      Ganglion Of The Left Wrist     Created by Conversion      GERD (gastroesophageal reflux disease)      HLD (hyperlipidemia)      Hypertension     Per H&P dated 1/03/2013     Incarcerated ventral hernia     Per H&P dated 1/03/2013     Osteoarthritis      Rheumatoid arthritis      Thrombocytopenia      Venous insufficiency            Surgical History:  Past Surgical History:   Procedure Laterality Date     BACK SURGERY       CHOLECYSTECTOMY       FATTY TUMOR  1996    LT SHOULDER BLADE     HYSTERECTOMY  1984     JOINT REPLACEMENT Left     knee     MO ABDOMEN SURGERY PROC UNLISTED      Description: Hernia Repair;  Recorded: 10/23/2013;     Two left wrist  1990, 1995       Family History:   Family History   Problem Relation Age of Onset     Acute Myocardial Infarction Father 62     Cancer Father      Heart disease Father      COPD Father      Diabetes Sister      Diabetes Brother      Melanoma Son      Diabetes Maternal Grandmother        Social History:    Social History     Social History     Marital status:      Spouse name: N/A     Number of children: N/A     Years of education: N/A     Social History Main Topics     Smoking status: Former Smoker     Quit date: 8/1/2008     Smokeless tobacco: Never Used      Comment: smells of  cigarrette smoke     Alcohol use No     Drug use: No     Sexual activity: Not Asked     Other Topics Concern     None     Social History Narrative    Lives with her son          Review of Systems   Constitutional:        Patient claims her pain is well managed with Percocet she denies any chest pain shortness of breath fevers chills nausea vomiting diarrhea change in vision hearing taste or smell weakness one side or the other.  She is urinating without difficulty and she cannot recall her last bowel movement it might of been here but it might of been in the hospital.  She does not feel constipated at this time.  The remainder of the review of systems is negative.       Vitals:    01/08/18 0836   BP: 142/83   Pulse: 84   Resp: 16   Temp: 98.7  F (37.1  C)   SpO2: 95%       Physical Exam   Constitutional: No distress.   HENT:   Head: Normocephalic and atraumatic.   Nose: Nose normal.   Mouth/Throat: No oropharyngeal exudate.   Eyes: Conjunctivae and EOM are normal. Pupils are equal, round, and reactive to light. Right eye exhibits no discharge. Left eye exhibits no discharge. No scleral icterus.   Neck: No thyromegaly present.   Cardiovascular: Normal rate and regular rhythm.    No murmur heard.  Pulmonary/Chest: Effort normal and breath sounds normal. No respiratory distress. She has no wheezes. She has no rales.   Abdominal: Soft. Bowel sounds are normal. She exhibits no distension. There is no tenderness. There is no rebound.   Musculoskeletal: She exhibits no tenderness or deformity.   Lymphadenopathy:     She has no cervical adenopathy.   Neurological: She is alert. No cranial nerve deficit. She exhibits normal muscle tone.   Skin: Skin is warm and dry. She is not diaphoretic.   Psychiatric: She has a normal mood and affect. Her behavior is normal.       Medication List:  Current Outpatient Prescriptions   Medication Sig     amLODIPine (NORVASC) 5 MG tablet Take 1 tablet (5 mg total) by mouth daily.     ascorbic  acid (ASCORBIC ACID WITH LATRICIA HIPS) 500 MG tablet Take 500 mg by mouth every evening.      aspirin 81 MG EC tablet Take 81 mg by mouth every evening.     atorvastatin (LIPITOR) 40 MG tablet Take 1 tablet (40 mg total) by mouth bedtime.     blood glucose test (ACCU-CHEK FRANCISCO PLUS TEST STRP) Strp TEST FOUR TIMES DAILY     cholecalciferol, vitamin D3, 1,000 unit tablet Take 1,000 Units by mouth every evening.      cyanocobalamin (VITAMIN B-12) 250 MCG tablet Take 250 mcg by mouth every evening.      docusate sodium (COLACE) 100 MG capsule Take 100 mg by mouth 2 (two) times a day.     folic acid (FOLVITE) 800 MCG tablet Take 1,200 mcg by mouth every evening.      glucose 4 GM chewable tablet Chew 4 tablets (16 g total) as needed for low blood sugar.     insulin regular (NOVOLIN R) 100 unit/mL injection Inject 24 Units under the skin 3 (three) times a day before meals. Inject 24 units subcutaneous TID with meals plus sliding scale      insulin syringe-needle U-100 (BD INSULIN SYRINGE ULT-FINE II) 1 mL 31 gauge x 5/16 Syrg USE 4 TIMES DAILY AS DIRECTED     lancing device (ACCU-CHEK SOFTCLIX) Misc Use 1 applicator As Directed 4 (four) times a day.     [START ON 1/13/2018] leflunomide (ARAVA) 20 MG tablet Take 1 tablet (20 mg total) by mouth every evening.     levoFLOXacin (LEVAQUIN) 500 MG tablet Take 1 tablet (500 mg total) by mouth Daily at 6:00 am for 10 days.     lidocaine (XYLOCAINE) 5 % ointment Apply to affected area QID prn pain     lisinopril (PRINIVIL,ZESTRIL) 10 MG tablet Take 1 tablet (10 mg total) by mouth at bedtime.     loratadine (CLARITIN) 10 mg tablet Take 10 mg by mouth bedtime.      magnesium oxide (MAG-OX) 400 mg tablet Take 800 mg by mouth at bedtime. With food      methotrexate 2.5 MG tablet Take 10 mg by mouth once a week. Weekly on Friday     metoprolol succinate (TOPROL-XL) 25 MG Take 2 tablets (50 mg total) by mouth every evening.     MULTIVITAMIN ORAL Take 1 tablet by mouth every evening.       NOVOLIN N 100 unit/mL injection Inject 33 Units under the skin daily before breakfast.     NOVOLIN N 100 unit/mL injection Inject 30 Units under the skin daily before supper.     omega-3 fatty acids-vitamin E (FISH OIL) 1,000 mg cap Take 1,000 mg by mouth every evening.     oxyCODONE-acetaminophen (PERCOCET)  mg per tablet Take 1 tablet by mouth every 8 (eight) hours as needed for pain (Do not exceed 3 tablets per day). For use from 12/12-1/11     ranitidine (ZANTAC) 150 MG tablet Take 1 tablet (150 mg total) by mouth 2 (two) times a day.     traZODone (DESYREL) 150 MG tablet Take 1 tablet (150 mg total) by mouth at bedtime.       Labs: Hospital labs are as follows; on 1/4/2018 white count was 10.0, hemoglobin was 10, platelets were 99,000.  Sodium was 141, potassium was 3.9, creatinine was 1.15 down from 1.74.  Calcium was 8.3 which is up from 7.8 and bilirubin was 0.8 with alk phosphatase at 46.  ALT was elevated 165 and AST was elevated at 53.  Urinalysis grew out pansensitive E. coli and C. difficile was negative.  CT scan of the head and cervical spine were negative for any acute processes.      Assessment:    ICD-10-CM    1. Rhabdomyolysis M62.82    2. Essential hypertension I10    3. Diabetes mellitus E11.9    4. Acute kidney injury N17.9    5. Urinary tract infection N39.0    6. Encounter for chronic pain management G89.29    7. Fall W19.XXXA        Plan: Plan at this time will do 12-lead EKG secondary prolonged QT interval in the hospital.  If QT interval is greater than 500 and it would be mandated stop any drugs that will prolong the QT interval this would include levofloxacin.  I will check creatinine kinase tomorrow secondary to rhabdomyolysis and we will get a creatinine kinase level from the hospital.  Basic metabolic profile be done tomorrow secondary to acute kidney injury we will check AST and ALT tomorrow secondary to elevated levels in the hospital.  Dietary to see for consistent  carbohydrate diet and since pain is in decent control.  We will check blood sugars 4 times daily and continue to monitor above medical problems.  She will continue with physical and occupational therapy at this time and no changes in her pain medications.        Electronically signed by: Flaco Shields DO

## 2021-06-15 NOTE — TELEPHONE ENCOUNTER
Refill Request  Did you contact pharmacy: No  Medication name:   OxoyCodone  Who prescribed the medication: PCP  Requested Pharmacy: [facility]  Is patient out of medication: Yes  Patient notified refills processed in 3 business days:  yes  Okay to leave a detailed message: yes    Declined offer to schedule appt with PCP       Consent (Ear)/Introductory Paragraph: The rationale for Mohs was explained to the patient and consent was obtained. The risks, benefits and alternatives to therapy were discussed in detail. Specifically, the risks of ear deformity, infection, scarring, bleeding, prolonged wound healing, incomplete removal, allergy to anesthesia, nerve injury and recurrence were addressed. Prior to the procedure, the treatment site was clearly identified and confirmed by the patient. All components of Universal Protocol/PAUSE Rule completed.

## 2021-06-15 NOTE — TELEPHONE ENCOUNTER
Last Office Visit  10/20/2020 Marichuy Rubio, ARSLAN    Notes:  1. Bilateral primary osteoarthritis of hip  - patient would benefit from a lift chair. She is sometimes unable to stand without assistance related to low back osteoarthritis, hip osteoarthritis, and knee osteoarthritis. She is able to ambulate once in a standing position. A lift chair to help her to maintain her mobility and prevent deterioration of her condition  - Her  will be sending an order form to the clinic to complete and send to DME provider. Will await this form     2. Type 2 diabetes mellitus with hypoglycemia without coma, with long-term current use of insulin (H)  Stable based on home readings   - Glycosylated Hemoglobin A1c  - Microalbumin, Random Urine  - flash glucose scanning reader (FREESTYLE BRE 14 DAY READER) Misc; Use 1 each As Directed see administration instructions. Use as directed with sensor  Dispense: 1 each; Refill: 0  - flash glucose sensor (FREESTYLE BRE 14 DAY SENSOR) Kit; Use 1 each As Directed every 14 (fourteen) days.  Dispense: 2 kit; Refill: 11     3. Mixed hyperlipidemia  Continue statin   - Lipid [place] FASTING     4. Positive FIT (fecal immunochemical test)  5. Encounter for screening colonoscopy  - Ambulatory referral for Colonoscopy    Last Filled:  oxyCODONE-acetaminophen (PERCOCET) 5-325 mg per tablet 180 tablet 0 1/20/2021 2/19/2021 No   Sig - Route: Take 1-2 tablets by mouth every 6 (six) hours as needed for pain (maximum of 6 tablets per day). For 1/20/21-2/19/21 - Oral   Sent to pharmacy as: oxyCODONE-acetaminophen 5 mg-325 mg tablet (Percocet)   Earliest Fill Date: 1/20/2021   E-Prescribing Status: Receipt confirmed by pharmacy (1/20/2021  6:36 PM CST)       Lab Results   Component Value Date    AMPHET (!) 08/21/2019     Screen Positive (Confirmation available on request)    HEBENZODIAZ Screen Negative 08/21/2019    OPIATES (!) 08/21/2019     Screen Positive (Confirmation available on  request)    PCP Screen Negative 08/21/2019    THC Screen Negative 08/21/2019    BARBIT Screen Negative 08/21/2019    COCAINEMETAB Screen Negative 08/21/2019    OXYCODONE (!) 08/21/2019     Screen Positive (Confirmation available on request)    ALONZOR 181.0 10/20/2020         Next OV:  Visit date not found      Encounter-Level CSA Scan Date:    8/22/2019           Medication teed up for provider signature - please adjust as appropriate.

## 2021-06-16 NOTE — TELEPHONE ENCOUNTER
Refill Request  Did you contact pharmacy: No  Medication name:   Requested Prescriptions     Pending Prescriptions Disp Refills     oxyCODONE-acetaminophen (PERCOCET) 5-325 mg per tablet 180 tablet 0     Sig: Take 1-2 tablets by mouth every 6 (six) hours as needed for pain (maximum of 6 tablets per day). For 3/2-4/1     Who prescribed the medication: Marichuy Rubio  Requested Pharmacy: [facility]  Is patient out of medication: Yes  Patient notified refills processed in 3 business days:  yes  Okay to leave a detailed message: yes

## 2021-06-16 NOTE — PROGRESS NOTES
Here for follow up visit with Dr. Pretty regarding low back pain, pain is a 9 today, took 20mg percocet last evening at 1900 . Did say that it helps the discomfort. Will start 10 mg percocet in the near future when the 20mg runs out. Unable to obtain BP as it was extremely painful on her forearm and could not tolerate this. Temp 97.8.

## 2021-06-16 NOTE — PROGRESS NOTES
Clinic Care Coordination Contact    Situation: Patient chart reviewed by care coordinator.    Background: Offered support through care coordination program and assisted with changing insurances.      Assessment: Talked with patient who did change insurance and can use VasoGenix this year.  Is working with pain clinic to get stimulator working.  Reviewed role of care coordination and patient will consider. If so will contact PCP.    Plan/Recommendations: No further outreach.    Elena Reyes [facility]  Clinic Care Coordinator  [phone]

## 2021-06-16 NOTE — PROGRESS NOTES
Internal Medicine Office Visit  [facility]   Patient Name: Ebonie Bowman  Patient Age: 71 y.o.  YOB: 1950  MRN: [id number]    Date of Visit: 4/5/2021  Reason for Office Visit:   Chief Complaint   Patient presents with     Medication Management           Assessment / Plan / Medical Decision Making:    Problem List Items Addressed This Visit     Chronic pain (Chronic)     - Mostly low back pain. She reports diminished function and worsening pain. Prior treatments including spinal stimulator, JOSE ANTONIO, PT ineffective  - Referral to pain clinic           Relevant Orders    Ambulatory referral to Pain Clinic    Controlled substance agreement signed, oxycodone. CSA/UDS 4/5/21     CSA and UDS updated today  Due to age/co-morbidities I am not willing to increase her dose of oxycodone further and she will be referred to the pain clinic. She can continued Percocet 5/325 mg 1-2 tablet every 6 hours as needed with a max of 6 tablets per day         Lumbar radiculopathy - Primary    Relevant Orders    Ambulatory referral to Pain Clinic    Morbid obesity (H) (Chronic)     Regular exercise and weight loss encouraged          Rheumatoid arthritis (H) (Chronic)     Followed by rheumatology          Stage 3a chronic kidney disease     Reviewed this diagnosis with patient. Encouraged hydration, exercise, avoidance of NSAIDs         Type 2 diabetes mellitus with chronic kidney disease, with long-term current use of insulin (H)     Stable with current medications         Relevant Orders    Glycosylated Hemoglobin A1c      Other Visit Diagnoses     Encounter for therapeutic drug monitoring        Relevant Orders    Drugs of Abuse 1,Urine    Hair loss        Relevant Orders    Iron and Transferrin Iron Binding Capacity    Thyroid Stimulating Hormone (TSH)    Ambulatory referral to Dermatology    Visit for screening mammogram        Relevant Orders    Mammo Screening Bilateral           I have discontinued  Ebonie Bowman's insulin regular and apixaban ANTICOAGULANT. I am also having her maintain her magnesium oxide, ascorbic acid (vitamin C), aspirin, omega-3 fatty acids-vitamin E, lancing device, MULTIVITAMIN ORAL, folic acid, insulin syringe-needle U-100, blood-glucose meter, b complex vitamins, blood glucose test, loratadine, (cholecalciferol, vitamin D3, (VITAMIN D3 ORAL)), atorvastatin, lidocaine, buPROPion, lisinopriL, predniSONE, FreeStyle Saida 14 Day Lafe, FreeStyle Saida 14 Day Sensor, insulin NPH, methotrexate, acetaminophen, hydrOXYchloroQUINE, ertapenem, amLODIPine, metoprolol succinate, and oxyCODONE-acetaminophen.        45 minutes spent on the date of the encounter doing chart review, review of test results, patient visit and documentation     Orders Placed This Encounter   Procedures     Mammo Screening Bilateral     Drugs of Abuse 1,Urine     Iron and Transferrin Iron Binding Capacity     Thyroid Stimulating Hormone (TSH)     Glycosylated Hemoglobin A1c     Ambulatory referral to Pain Clinic     Ambulatory referral to Dermatology   Followup: Return in about 3 months (around 7/5/2021) for Next scheduled follow up. earlier if needed.        Marichuy Rubio, CNP        HPI:  Ebonie Bowman is a 71 y.o. year old female with a past medical history of rheumatoid arthritis on methotrexate and Plaquenil, allergic rhinitis, HTN, GERD, HLD, T2DM, morbid obesity, OA, and stage 3 CKD who presents to the office today for follow-up and medication review.     November 27 through December 4 she was hospitalized for arthropathy of the facet joint concurrent with and due to an effusion with a questionable facet joint septic arthropathy.  ID and neurosurgery was consulted and she was on Rocephin IV per infectious disease.  Culture results came back negative and she was advised to follow-up with neurosurgery.  She discharged to TCU on 12/4. She is now back at home.     Her low back has been worse. She is getting an error on  "her spinal stimulator so she knows it isn't working and so her pain is worse. Prolonged sitting and walking all make her pain worse. Prior JOSE ANTONIO was ineffective, the last was in 2015. She reports getting only 1-2 hours of relief. She doesn't find the oxycodone-acetaminophen effective at the current dose.     She notes \"dots\" on her upper eyelid. She is really concerned that this could be a sign that she is developing a new infection.     We reviewed hair loss. The vertex of the scalp is very thin looking now. Symptoms started when she was in TCU.      She had COVID-19 pneumonia and completed remdesivir.  She was given Eliquis for 1 month for DVT prophylaxis.     She has type 2 diabetes.  Home readings were reviewed, her fasting readings have be in the 120s. She is no longer taking prandial insulin.     Seasonal rhinitis is treated with loratidine. She has sinus pressure in all sinuses.     Health Maintenance Review  Health Maintenance   Topic Date Due     DEPRESSION ACTION PLAN  Never done     COVID-19 Vaccine (1) Never done     MEDICARE ANNUAL WELLNESS VISIT  Never done     ZOSTER VACCINES (2 of 3) 12/31/2014     MAMMOGRAM  09/27/2018     INFLUENZA VACCINE RULE BASED (1) 08/01/2020     DIABETIC FOOT EXAM  03/11/2021     A1C  06/07/2021     FALL RISK ASSESSMENT  08/12/2021     LIPID  10/20/2021     MICROALBUMIN  10/20/2021     ADVANCE CARE PLANNING  12/14/2021     BMP  01/07/2022     DIABETIC EYE EXAM  03/01/2022     TD 18+ HE  07/18/2024     COLORECTAL CANCER SCREENING  12/14/2026     DEXA SCAN  09/17/2035     HEPATITIS C SCREENING  Completed     Pneumococcal Vaccine: Pediatrics (0 to 5 Years) and At-Risk Patients (6 to 64 Years)  Completed     Pneumococcal Vaccine: 65+ Years  Completed       Current Scheduled Meds:  Outpatient Encounter Medications as of 4/5/2021   Medication Sig Dispense Refill     acetaminophen (TYLENOL) 500 MG tablet Take 2 tablets (1,000 mg total) by mouth 3 (three) times a day.  0     " amLODIPine (NORVASC) 10 MG tablet TAKE 1 TABLET EVERY DAY 90 tablet 2     ascorbic acid (ASCORBIC ACID WITH LATRICIA HIPS) 500 MG tablet Take 500 mg by mouth every evening.        aspirin 81 MG EC tablet Take 81 mg by mouth every evening.       atorvastatin (LIPITOR) 40 MG tablet Take 1 tablet (40 mg total) by mouth at bedtime. 90 tablet 2     b complex vitamins tablet Take 1 tablet by mouth daily.       blood glucose test strips Check blood sugar four times daily. Dispense brand per patient's insurance at pharmacy discretion. Dx: e11.9 400 strip 3     blood-glucose meter Misc Dispense one meter. Use as directed. Dx: e11.9 1 each 0     buPROPion (WELLBUTRIN) 100 MG tablet TAKE 1 TABLET EVERY DAY 90 tablet 3     cholecalciferol, vitamin D3, (VITAMIN D3 ORAL) Take 3 tablets by mouth daily.        ertapenem (INVANZ) 1 gram SolR Infuse into a venous catheter.       flash glucose scanning reader (FREESTYLE BRE 14 DAY READER) Misc Use 1 each As Directed see administration instructions. Use as directed with sensor 1 each 0     flash glucose sensor (FREESTYLE BRE 14 DAY SENSOR) Kit Use 1 each As Directed every 14 (fourteen) days. 2 kit 11     folic acid (FOLVITE) 800 MCG tablet Take 800 mcg by mouth every evening.              hydrOXYchloroQUINE (PLAQUENIL) 200 mg tablet Take 1 tablet (200 mg total) by mouth 2 (two) times a day. 180 tablet 0     insulin NPH (NOVOLIN) 100 unit/mL injection Inject 33 Units under the skin 2 (two) times a day before meals.       insulin syringe-needle U-100 (BD INSULIN SYRINGE ULT-FINE II) 1 mL 31 gauge x 5/16 Syrg USE 4 TIMES DAILY AS DIRECTED 500 each 1     lancing device (ACCU-CHEK SOFTCLIX) Misc Use 1 applicator As Directed 4 (four) times a day. 300 each 3     lidocaine (LMX) 4 % cream Apply to affected area twice daily as needed 30 g 3     lisinopriL (PRINIVIL,ZESTRIL) 10 MG tablet TAKE 1 TABLET AT BEDTIME 90 tablet 3     loratadine (CLARITIN) 10 mg tablet Take 1 tablet (10 mg total) by  mouth at bedtime. 90 tablet 3     magnesium oxide (MAG-OX) 400 mg tablet Take 400 mg by mouth at bedtime. With food              methotrexate 2.5 MG tablet Take 7.5 mg by mouth once a week. On Fridays        metoprolol succinate (TOPROL-XL) 50 MG 24 hr tablet TAKE 1 TABLET EVERY DAY 90 tablet 2     MULTIVITAMIN ORAL Take 1 tablet by mouth every evening.        omega-3 fatty acids-vitamin E (FISH OIL) 1,000 mg cap Take 1,000 mg by mouth every evening. 90 each 3     oxyCODONE-acetaminophen (PERCOCET) 5-325 mg per tablet Take 1-2 tablets by mouth every 6 (six) hours as needed for pain (maximum of 6 tablets per day). For 4/1-5/1 180 tablet 0     predniSONE (DELTASONE) 2.5 MG tablet Take 2.5 mg by mouth daily. 30 tablet 0     [DISCONTINUED] apixaban ANTICOAGULANT (ELIQUIS) 2.5 mg Tab tablet Take 1 tablet (2.5 mg total) by mouth 2 (two) times a day. 60 tablet 0     [DISCONTINUED] insulin regular (NOVOLIN R) 100 unit/mL injection Inject 23 Units under the skin 3 (three) times a day before meals.        No facility-administered encounter medications on file as of 4/5/2021.      Post Discharge Medication Reconciliation Status: discharge medications reconciled and changed, per note/orders      Objective / Physical Examination:  Vitals:    04/05/21 1125   BP: 126/74   Pulse: 80   Weight: 222 lb (100.7 kg)     Wt Readings from Last 3 Encounters:   04/05/21 222 lb (100.7 kg)   11/27/20 206 lb (93.4 kg)   08/12/20 (!) 222 lb 9.6 oz (101 kg)     Body mass index is 30.11 kg/m .     Constitutional: In no apparent distress  Respiratory: Clear to auscultation bilaterally. Normal inspiratory and expiratory effort  Cardiovascular: Regular rate and rhythm. No murmurs, rubs, or gallops. No edema.   Gastrointestinal: Bowel sounds active all four quadrants. Soft, non-tender.   Skin: Warm and dry. Without suspicious looking lesions. Dermatitis at the base of the scalp  Psych: Alert and oriented x3.

## 2021-06-16 NOTE — PROGRESS NOTES
Pain Clinic Follow-up Note  3/7/2018    Ms. Bowman is a 67-year-old woman who had lumbar surgery at L5-S1 in 1990.  She continued to have pain in the back and right buttocks and thigh.  She had a spinal cord stimulator placed almost 2 years ago.  She says that this gave her some significant relief of up to about 60% of her pain.  She still feels she has about 40% of the pain.  The pain she is feeling now is mainly in the right lumbar area that radiates toward the right flank but not down the right leg.  The spinal cord stimulator seems to have alleviated the leg pain.  There is some slight numbness on the lateral thigh which may be related to her back or could possibly be a meralgia paresthetica.  She had a CT scan on 12/16/2014 which showed multilevel degenerative disc disease as well as her postsurgical changes at L5-S1.  There is some facet arthropathy.  An orthopedic consultation felt no further surgery was indicated.  Standing, sitting and walking make the pain worse.  It is constantly present and is a 7-9 intensity out of 10.  She had been using oxycodone 10/325 up to 6 per day.  This was recently decreased to 5/325 and she feels it is not working as well.  She has not been here for about 6 months.  She says that the main reason is that she has a large outstanding bill.  She has been avoiding returning because,  apparently her insurance does not pay for outpatient facility charges.    She is awake and alert.  Her speech is fluent and memory good.  Her affect is normal.  She can stand from a seated position with the aid of her cane.  She points to her pain being in the right lumbosacral area and radiating slightly laterally to the right.  It does not radiate around to the front.  It does radiate sometimes into the right buttocks but not down the right leg.  She also has some diffuse tenderness in the right lumbosacral area.  There is no redness or swelling noted.    Imp/Plan:  Ms. Bowman continues to have right  lumbar pain.  There is some radiation to the right flank and buttocks area, but it does not appear to be radicular pain down the leg.  She originally had surgery at L5-S1 which helped.  The spinal cord stimulator help decrease her leg pain and allowed her to be more functional.  Unfortunately, she was not able to significantly decrease her narcotic use.  Only able to decrease her narcotic usage moderately.  The pain she is describing now appears to be likely from the lower lumbar facet joints on the right.  Because of the problem with her being charged for every visit here that she has to pay for out-of-pocket, we will [facility].  They are an outpatient clinic and she would not receive charges there out-of-pocket.  Unfortunately, the charges would be excessive for her to come here monthly for her medications.  She had gotten medications before at the [facility] and she could possibly return there.  Other options would be another pain clinic such as [facility], [facility], or [place] if they are outpatient clinics.

## 2021-06-16 NOTE — PROGRESS NOTES
Clinic Care Coordination Contact  [facility]/Voicemail       Clinical Data: Care Coordinator Outreach  Outreach attempted x 1.  Left message on patient's voicemail with call back information and requested return call. Need to confirm her insurance as her old insurance was out of network at this time.   Plan:  Care Coordinator will try to reach patient again in 10 business days.  Elena Reyes OLAMIDE  Clinic Care Coordinator  [phone]

## 2021-06-16 NOTE — TELEPHONE ENCOUNTER
Telephone Encounter by Mera To CMA at 7/3/2019  3:07 PM     Author: Mera To CMA Service: -- Author Type: Certified Medical Assistant    Filed: 7/3/2019  3:08 PM Encounter Date: 7/3/2019 Status: Signed    : Mera To CMA (Certified Medical Assistant)       Called and spoke with pt - she states that she is in TCU at this time and will follow up at discharge    Marichuy Rubio FNP P Roth, Megan Care Team Pool             Call pt-   I do not see that she is scheduled for a follow up after her hospitalization. She should be seen, please schedule at her convenience   ARSLAN Kramer

## 2021-06-16 NOTE — PROGRESS NOTES
ASSESSMENT AND PLAN:  Ebonie Bowman 67 y.o. female is here for follow-up.  She has severe seropositive rheumatoid arthritis.  She had started Humira in November but then was admitted to the hospital in January because of influenza.  She has not gotten the supplies that there is some issue with the mailbox and the mail office she is working on that.  She takes methotrexate on Fridays 10 mg per week.  She has renal impairment.  She has osteoarthritis.  Management principles of which are reviewed.  She is aware not to take nonsteroidals.  We will meet here in 3 months with labs every 2 months      Diagnoses and all orders for this visit:    Seropositive rheumatoid arthritis  -     methotrexate 2.5 MG tablet; Take 4 tablets (10 mg total) by mouth once a week. Weekly on Friday  Dispense: 16 tablet; Refill: 1    Stage 3 chronic kidney disease    High risk medication use        HISTORY OF PRESENTING ILLNESS:  Ebonie Bowman, 67 y.o., female is here follow-up.  She has seropositive rheumatoid arthritis.  She has widespread osteoarthritis lumbar spondylosis.  Apart from the low back pain that she considers significant no other joint areas are significantly painful.  Although she has had discomfort throughout the joints.  She is no longer on Humira for now as since the December when she had been taking it she felt ill and was admitted to the hospital in January with influenza.  Now there is an issue with the delivery via the mail office.  And she noted pain level 9.0 but noted this pertains to the lower back.  She noted morning stiffness in the rest of the joints to be no more than 15 minutes or 20 minutes.  Recent labs reviewed.  Renal impairment is noted.  Milligrams per week with folic acid.   she has no history of psoriasis ulcerative colitis Crohn's disease.  Her mom had rheumatoid arthritis.  She is nonsmoker and does not take alcohol no regular exercise.  She has had multiple musculoskeletal surgeries in the past she had  left wrist surgery 1995 and 94 she had a injury there is a bucket fell, she had back surgery 2 and 87 and 1990.  And she is had the left knee surgery first hemiarthroplasty and then total knee replacement most recently in 2010.  Further historical information and ADL limitations as noted in the multidimensional health assessment questionnaire attached in the EMR.     ALLERGIES:Penicillins and Sulfa (sulfonamide antibiotics)    PAST MEDICAL/ACTIVE PROBLEMS/MEDICATION/ FAMILY HISTORY/SOCIAL DATA:  The patient has a family history of  Past Medical History:   Diagnosis Date     BBB (bundle branch block)      Chronic back pain      Chronic kidney disease     stage 3     Chronic Kidney Disease (NKF Classification)     Created by Conversion      Depression      Diabetes     Type 2     Diabetes mellitus, type 2     Created by Conversion      Frequent headaches      Ganglion Of The Left Wrist     Created by Conversion      GERD (gastroesophageal reflux disease)      HLD (hyperlipidemia)      Hypertension     Per H&P dated 1/03/2013     Incarcerated ventral hernia     Per H&P dated 1/03/2013     Osteoarthritis      Rheumatoid arthritis      Thrombocytopenia      Venous insufficiency      History   Smoking Status     Former Smoker     Quit date: 8/1/2008   Smokeless Tobacco     Never Used     Comment: smells of cigarrette smoke     Patient Active Problem List   Diagnosis     Osteoarthritis     Venous Insufficiency     HTN (hypertension)     Insomnia     Joint Pain, Localized In The Knee     Bursitis     Obesity     Chronic Kidney Disease (NKF Classification)     Hyperlipidemia     Diabetes mellitus, type 2     Joint Pain, Localized In The Hip     Depression     Seropositive rheumatoid arthritis     Lumbar radiculopathy, chronic     S/P TKR (total knee replacement) using cement, left     High risk medication use     Chronic pain     Right hand pain     GERD (gastroesophageal reflux disease)     Bilateral primary osteoarthritis  of hip     Atherosclerotic cardiovascular disease, seen on abd CT 2016      Weakness     JAYNE (acute kidney injury)     Current Outpatient Prescriptions   Medication Sig Dispense Refill     amLODIPine (NORVASC) 5 MG tablet Take 1 tablet (5 mg total) by mouth daily.  0     ascorbic acid (ASCORBIC ACID WITH LATRICIA HIPS) 500 MG tablet Take 500 mg by mouth every evening.        aspirin 81 MG EC tablet Take 81 mg by mouth every evening.       atorvastatin (LIPITOR) 40 MG tablet Take 1 tablet (40 mg total) by mouth bedtime. 90 tablet 3     blood glucose test strips Check blood sugar four times daily. Dispense brand per patient's insurance at pharmacy discretion. Dx: e11.9 200 strip 11     blood-glucose meter Misc Dispense one meter. Use as directed. Dx: e11.9 1 each 0     cholecalciferol, vitamin D3, 1,000 unit tablet Take 1,000 Units by mouth every evening.        cyanocobalamin (VITAMIN B-12) 250 MCG tablet Take 250 mcg by mouth every evening.        docusate sodium (COLACE) 100 MG capsule Take 100 mg by mouth 2 (two) times a day.       DULoxetine (CYMBALTA) 30 MG capsule Take 1 capsule (30 mg total) by mouth daily. 30 capsule 1     folic acid (FOLVITE) 800 MCG tablet Take 1,200 mcg by mouth every evening.        glucose 4 GM chewable tablet Chew 4 tablets (16 g total) as needed for low blood sugar. 120 tablet 0     insulin regular (NOVOLIN R) 100 unit/mL injection Inject 24 Units under the skin 3 (three) times a day before meals. Inject 24 units subcutaneous TID with meals plus sliding scale        insulin syringe-needle U-100 (BD INSULIN SYRINGE ULT-FINE II) 1 mL 31 gauge x 5/16 Syrg USE 4 TIMES DAILY AS DIRECTED 500 each 1     lancing device (ACCU-CHEK SOFTCLIX) Misc Use 1 applicator As Directed 4 (four) times a day. 300 each 3     leflunomide (ARAVA) 20 MG tablet Take 1 tablet (20 mg total) by mouth every evening.  0     lidocaine (XYLOCAINE) 5 % ointment Apply to affected area QID prn pain 35.44 g 0     lisinopril  (PRINIVIL,ZESTRIL) 10 MG tablet Take 1 tablet (10 mg total) by mouth at bedtime. 90 tablet 1     loratadine (CLARITIN) 10 mg tablet Take 1 tablet (10 mg total) by mouth at bedtime. 90 tablet 2     magnesium oxide (MAG-OX) 400 mg tablet Take 800 mg by mouth at bedtime. With food        methotrexate 2.5 MG tablet Take 10 mg by mouth once a week. Weekly on Friday       metoprolol succinate (TOPROL XL) 50 MG 24 hr tablet Take 1 tablet (50 mg total) by mouth daily. 90 tablet 1     MULTIVITAMIN ORAL Take 1 tablet by mouth every evening.        NOVOLIN N 100 unit/mL injection Inject 33 Units under the skin daily before breakfast. 10 mL PRN     NOVOLIN N 100 unit/mL injection Inject 30 Units under the skin daily before supper. 10 mL PRN     omega-3 fatty acids-vitamin E (FISH OIL) 1,000 mg cap Take 1,000 mg by mouth every evening. 90 each 3     oxyCODONE-acetaminophen (PERCOCET) 5-325 mg per tablet Take 1-2 tablets by mouth every 8 (eight) hours as needed for pain (maximum of 7 tablets per day). 210 tablet 0     ranitidine (ZANTAC) 150 MG tablet Take 1 tablet (150 mg total) by mouth 2 (two) times a day. 60 tablet 3     traZODone (DESYREL) 150 MG tablet Take 1 tablet (150 mg total) by mouth at bedtime. 90 tablet 3     No current facility-administered medications for this visit.          DETAILED EXAMINATION  02/14/18  :  Vitals:    02/14/18 1353   BP: 140/84   Pulse: 72   Weight: 220 lb (99.8 kg)   Height: 6' (1.829 m)     Alert oriented. Head including the face is examined for malar rash, heliotropes, scarring, lupus pernio. Eyes examined for redness such as in episcleritis/scleritis, periorbital lesions.   Neck/ Face examined for parotid gland swelling, range of motion of neck.  Left upper and lower and right upper and lower extremities examined for tenderness, swelling, warmth of the appendicular joints, range of motion, edema, rash.  Some of the important findings included: She has left TKA.  Joint line tenderness of the  right knee.  She does not have synovitis in any of the palpable appendicular joints.        LAB / IMAGING DATA:  ALT   Date Value Ref Range Status   01/09/2018 30 0 - 45 U/L Final   01/06/2018 67 (H) 0 - 45 U/L Final   01/04/2018 165 (H) 0 - 45 U/L Final     Albumin   Date Value Ref Range Status   01/06/2018 2.3 (L) 3.5 - 5.0 g/dL Final   01/04/2018 2.1 (L) 3.5 - 5.0 g/dL Final   12/27/2017 2.7 (L) 3.5 - 5.0 g/dL Final     Creatinine   Date Value Ref Range Status   01/09/2018 1.19 (H) 0.60 - 1.10 mg/dL Final   01/06/2018 1.14 (H) 0.60 - 1.10 mg/dL Final   01/04/2018 1.15 (H) 0.60 - 1.10 mg/dL Final       WBC   Date Value Ref Range Status   01/06/2018 7.2 4.0 - 11.0 thou/uL Final   01/04/2018 10.0 4.0 - 11.0 thou/uL Final   08/12/2015 11.9 (H) 4.0 - 11.0 thou/uL Final   07/01/2015 12.8 (H) 4.0 - 11.0 thou/uL Final     Hemoglobin   Date Value Ref Range Status   01/06/2018 9.8 (L) 12.0 - 16.0 g/dL Final   01/04/2018 10.0 (L) 12.0 - 16.0 g/dL Final   01/03/2018 10.0 (L) 12.0 - 16.0 g/dL Final     Platelets   Date Value Ref Range Status   01/06/2018 159 140 - 440 thou/uL Final   01/05/2018 125 (L) 140 - 440 thou/uL Final   01/04/2018 99 (L) 140 - 440 thou/uL Final       Lab Results   Component Value Date    RF <15.0 05/13/2015    SEDRATE 23 (H) 05/13/2015

## 2021-06-17 NOTE — PROGRESS NOTES
Internal Medicine Office Visit  [facility]  Patient Name: Ebonie Bowman  Patient Age: 68 y.o.  YOB: 1950  MRN: [id number]    Date of Visit: 2018  Reason for Office Visit:   Chief Complaint   Patient presents with     Follow-up           Assessment / Plan / Medical Decision Makin. Diabetes mellitus, type 2  - Glycosylated Hemoglobin A1c  - NOVOLIN N NPH U-100 INSULIN 100 unit/mL injection; Inject 33 Units under the skin 2 (two) times a day before meals.  Dispense: 10 mL; Refill: 11    2. Chronic pain  3. Chronic, continuous use of opioids  - oxyCODONE-acetaminophen (PERCOCET) 5-325 mg per tablet; Take 1-2 tablets by mouth every 8 (eight) hours as needed for pain (maximum of 6 tablets per day). For -  Dispense: 180 tablet; Refill: 0  Current treatment plan:  5-10 mg of oxycodone-acetaminophen , 3 times per day (6 pills per day)  Reviewed goals of care.  Benefits continue to outweigh the risks of continued use of opioid analgesia.  Prescription monitoring database has been reviewed and shows no aberrant use.  Reviewed side effects from current therapy.  Plan to follow-up in 3 months  Number of refills allowed prior to follow-up visit: 3  Prescription was sent electronically to pharmacy listed in treatment agreement.    4. Seropositive rheumatoid arthritis  - Followed by Dr. Schofield, rheumatology   - ALT (SGPT)  - Albumin  - Creatinine  - HM2(CBC w/o Differential)    5. Overweight (BMI 25.0-29.9)  6. Depression  - buPROPion (WELLBUTRIN) 100 MG tablet; Take 1 tablet (100 mg total) by mouth daily.  Dispense: 90 tablet; Refill: 3    7. Lumbar radiculopathy, chronic  - Has a spinal stimulator, cannot go to spine care any longer due to insurance coverage. She will check with insurance regarding covered pain clinics and notify the office so I can place a referral. Written instructions were given to her    8. HTN (hypertension)  -Stable     9. GERD  (gastroesophageal reflux disease)  - Stable     10. Depression  - Stable   - buPROPion (WELLBUTRIN) 100 MG tablet; Take 1 tablet (100 mg total) by mouth daily.  Dispense: 90 tablet; Refill: 3      Health Maintenance Review  Health Maintenance   Topic Date Due     DEPRESSION FOLLOW UP  1950     DIABETES OPHTHALMOLOGY EXAM  03/17/1958     DXA SCAN  03/17/2013     FALL RISK ASSESSMENT  09/13/2017     MAMMOGRAM  09/27/2018     DIABETES URINE MICROALBUMIN  10/11/2018     DIABETES HEMOGLOBIN A1C  11/07/2018     DIABETES FOLLOW-UP  11/07/2018     DIABETES FOOT EXAM  05/07/2019     ADVANCE DIRECTIVES DISCUSSED WITH PATIENT  12/14/2021     TD 18+ HE  07/18/2024     COLONOSCOPY  12/14/2026     PNEUMOCOCCAL POLYSACCHARIDE VACCINE AGE 65 AND OVER  Completed     INFLUENZA VACCINE RULE BASED  Completed     PNEUMOCOCCAL CONJUGATE VACCINE FOR ADULTS (PCV13 OR PREVNAR)  Addressed     ZOSTER VACCINE  Completed         I have discontinued Ms. Bowman's NovoLIN N NPH U-100 Insulin. I have also changed her buPROPion, oxyCODONE-acetaminophen, and NovoLIN N NPH U-100 Insulin. Additionally, I am having her maintain her magnesium oxide, ascorbic acid (vitamin C), cholecalciferol (vitamin D3), aspirin, cyanocobalamin, omega-3 fatty acids-vitamin E, lancing device, MULTIVITAMIN ORAL, docusate sodium, folic acid, insulin syringe-needle U-100, traZODone, glucose, insulin regular, amLODIPine, lidocaine, lisinopril, loratadine, DULoxetine, metoprolol succinate, blood-glucose meter, blood glucose test, ranitidine, methotrexate, and atorvastatin.      HPI:  Ebonie Bowman is a 68 y.o. year old who presents to the office today for follow up of chronic medical conditions.     Recently she has noticed some shaking in the right knee when she walks. If she stops for a while it will resolve and she move forward. There is more pain in the right knee in general which she uses lidocaine ointment as needed, typically every 2-3 days. The shaking in the knee  started about 2 months ago.     We reviewed a concern that she has about mold in her apartment. She has started to see this in the apartment on the ceiling. She denies cough, wheezing, URI symptoms.     Diabetes reviewed today. She has been eating more donuts and candy in the past 1-1.5 weeks. Generally speaking bupropion has been effective in helping her appetite, she started this in 2015 as an appetite suppressant so she does not wish to discontinue this medication. Her fasting reading have been around 150 and bedtime readings range from 150-160. Her last A1C was 5.9% which is inconsistent with her home readings.    She has chronic back pain, she typically takes the percocet at night only. Her pain is 8/10 currently. When she takes percocet this lowers her pain level to about a 6/10. Typically takes 3 tablets per day. She was unable to establish care with the pain clinic here due to her insurance not covering hospital based clinics.     We reviewed her history of reflux, this is well controlled with ranitidine.     She reports that her rheumatoid arthritis has been worse recently. She noticed that after 3 days of taking Humira her pain was actually worse than without the medication. She has an upcomign appointment with Dr. Schofield.     Review of Systems- pertinent positive in bold:  A 10-point ROS is negative except as stated in HPI         Current Scheduled Meds:  Outpatient Encounter Prescriptions as of 5/7/2018   Medication Sig Dispense Refill     amLODIPine (NORVASC) 5 MG tablet Take 1 tablet (5 mg total) by mouth daily.  0     ascorbic acid (ASCORBIC ACID WITH LATRICIA HIPS) 500 MG tablet Take 500 mg by mouth every evening.        aspirin 81 MG EC tablet Take 81 mg by mouth every evening.       atorvastatin (LIPITOR) 40 MG tablet TAKE 1 TABLET AT BEDTIME 90 tablet 3     blood glucose test strips Check blood sugar four times daily. Dispense brand per patient's insurance at pharmacy discretion. Dx: e11.9 200 strip  11     blood-glucose meter Misc Dispense one meter. Use as directed. Dx: e11.9 1 each 0     buPROPion (WELLBUTRIN) 100 MG tablet Take 1 tablet (100 mg total) by mouth daily. 90 tablet 3     cholecalciferol, vitamin D3, 1,000 unit tablet Take 1,000 Units by mouth every evening.        cyanocobalamin (VITAMIN B-12) 250 MCG tablet Take 250 mcg by mouth every evening.        docusate sodium (COLACE) 100 MG capsule Take 100 mg by mouth 2 (two) times a day.       DULoxetine (CYMBALTA) 30 MG capsule Take 1 capsule (30 mg total) by mouth daily. 30 capsule 1     folic acid (FOLVITE) 800 MCG tablet Take 1,200 mcg by mouth every evening.        glucose 4 GM chewable tablet Chew 4 tablets (16 g total) as needed for low blood sugar. 120 tablet 0     insulin regular (NOVOLIN R) 100 unit/mL injection Inject 24 Units under the skin 3 (three) times a day before meals. Inject 24 units subcutaneous TID with meals plus sliding scale        insulin syringe-needle U-100 (BD INSULIN SYRINGE ULT-FINE II) 1 mL 31 gauge x 5/16 Syrg USE 4 TIMES DAILY AS DIRECTED 500 each 1     lancing device (ACCU-CHEK SOFTCLIX) Misc Use 1 applicator As Directed 4 (four) times a day. 300 each 3     lidocaine (XYLOCAINE) 5 % ointment Apply to affected area QID prn pain 35.44 g 0     lisinopril (PRINIVIL,ZESTRIL) 10 MG tablet Take 1 tablet (10 mg total) by mouth at bedtime. 90 tablet 1     loratadine (CLARITIN) 10 mg tablet Take 1 tablet (10 mg total) by mouth at bedtime. 90 tablet 2     magnesium oxide (MAG-OX) 400 mg tablet Take 800 mg by mouth at bedtime. With food        methotrexate 2.5 MG tablet TAKE 4 TABLETS (10 MG TOTAL) BY MOUTH ONCE A WEEK  ON FRIDAY 32 tablet 1     metoprolol succinate (TOPROL XL) 50 MG 24 hr tablet Take 1 tablet (50 mg total) by mouth daily. 90 tablet 1     MULTIVITAMIN ORAL Take 1 tablet by mouth every evening.        NOVOLIN N NPH U-100 INSULIN 100 unit/mL injection Inject 33 Units under the skin 2 (two) times a day before meals.  10 mL 11     omega-3 fatty acids-vitamin E (FISH OIL) 1,000 mg cap Take 1,000 mg by mouth every evening. 90 each 3     oxyCODONE-acetaminophen (PERCOCET) 5-325 mg per tablet Take 1-2 tablets by mouth every 8 (eight) hours as needed for pain (maximum of 6 tablets per day). For 5/2-6/1 180 tablet 0     ranitidine (ZANTAC) 150 MG tablet TAKE 1 TABLET (150 MG) BY MOUTH 2 (TWO) TIMES A DAY. 180 tablet 0     traZODone (DESYREL) 150 MG tablet Take 1 tablet (150 mg total) by mouth at bedtime. 90 tablet 3     [DISCONTINUED] buPROPion (WELLBUTRIN) 100 MG tablet Take 100 mg by mouth 2 (two) times a day.       [DISCONTINUED] NOVOLIN N 100 unit/mL injection Inject 33 Units under the skin daily before breakfast. 10 mL PRN     [DISCONTINUED] NOVOLIN N 100 unit/mL injection Inject 30 Units under the skin daily before supper. 10 mL PRN     [DISCONTINUED] oxyCODONE-acetaminophen (PERCOCET) 5-325 mg per tablet Take 1-2 tablets by mouth every 8 (eight) hours as needed for pain (maximum of 7 tablets per day). For 5/2-6/1 180 tablet 0     No facility-administered encounter medications on file as of 5/7/2018.      Past Medical History:   Diagnosis Date     BBB (bundle branch block)      Chronic back pain      Chronic kidney disease     stage 3     Chronic Kidney Disease (NKF Classification)     Created by Conversion      Depression      Diabetes     Type 2     Diabetes mellitus, type 2     Created by Conversion      Frequent headaches      Ganglion Of The Left Wrist     Created by Conversion      GERD (gastroesophageal reflux disease)      HLD (hyperlipidemia)      Hypertension     Per H&P dated 1/03/2013     Incarcerated ventral hernia     Per H&P dated 1/03/2013     Osteoarthritis      Rheumatoid arthritis      Thrombocytopenia      Venous insufficiency      Past Surgical History:   Procedure Laterality Date     BACK SURGERY       CHOLECYSTECTOMY       FATTY TUMOR  1996    LT SHOULDER BLADE     HYSTERECTOMY  1984     JOINT REPLACEMENT  Left     knee     SD ABDOMEN SURGERY PROC UNLISTED      Description: Hernia Repair;  Recorded: 10/23/2013;     Two left wrist  1990, 1995     Social History   Substance Use Topics     Smoking status: Former Smoker     Quit date: 8/1/2008     Smokeless tobacco: Never Used      Comment: smells of cigarrette smoke     Alcohol use No       Objective / Physical Examination:  Vitals:    05/07/18 1150   BP: 130/74   Pulse: 82   Weight: 217 lb (98.4 kg)     Wt Readings from Last 3 Encounters:   05/07/18 217 lb (98.4 kg)   02/14/18 220 lb (99.8 kg)   02/07/18 220 lb (99.8 kg)     Body mass index is 29.43 kg/(m^2).     General Appearance: Alert and oriented, cooperative, affect appropriate, speech clear, in no apparent distress  Lungs: Clear to auscultation bilaterally. Normal inspiratory and expiratory effort  Cardiovascular: Regular rate, normal S1, S2. No murmurs, rubs, or gallops  Abdomen: Bowel sounds active all four quadrants. Soft, non-tender. No hepatomegaly or splenomegaly. No bruits detected.   Extremities: Pulses 2+ and equal throughout. No edema. Strength in lower extremities 5/5 and symmetrical. Unable to reproduce shakiness described by patient in HPI   Integumentary: Warm and dry.  Diabetic foot exam:   Left: Filament test present 1/6 locations   Right: Filament test present 1/6 locations   Neuro: Alert and oriented x3 but forgetful. Follows commands appropriately.         Orders Placed This Encounter   Procedures     Glycosylated Hemoglobin A1c   Followup: Return in about 3 months (around 8/7/2018). earlier if needed.        Marichuy Rubio, CNP

## 2021-06-17 NOTE — TELEPHONE ENCOUNTER
Telephone Encounter by Brook Riggs at 6/11/2020  5:15 PM     Author: Brook Riggs Service: -- Author Type: --    Filed: 6/12/2020 11:33 AM Encounter Date: 6/10/2020 Status: Addendum    : Brook Riggs    Related Notes: Original Note by Brook Riggs filed at 6/11/2020  5:17 PM     Summary: .      PRIOR AUTHORIZATION DENIED - LIDOCAINE PATCHES QUANTITY 30 PATCHES PER 5 DAYS    Denial Rational: THE REQUEST WAS APPROVED FOR A QUANTITY PF 30 PATCHES FOR 30 DAYS. THE PHARMACY IS RUNNING FOR 30 FOR 5 DAYS. 30 PATCHES FOR 30 DAYS HAS BEEN APPROVED THROUGH 12/31/2020          Appeal Information: If the provider would like to appeal this denial, please provide a letter of medical necessity and once it has been completed and placed in the patient's chart, notify the [facility] ([facility] MED [id number]) and the appeal can be initiated on behalf of the patient and provider.  Please also include any therapies that the patient has tried and their outcomes.

## 2021-06-17 NOTE — PROGRESS NOTES
I had received a vm from Angie at [facility].  She advised me that there was one individuals out of the [facility] location that is currently accepting new clients with [employer]. There is a specific certification that is required by Medicare they need to follow.  (RIAN).  She is currently open for Monday/Friday mornings. Angie says availability could open up during the summer as they aren't in the schools.   I called Ebonie to discuss this and provide her the information I have learned. She did have a couple of questions. How much would her out of pocket be or would insurance cover this? She wanted to clarify that they would help with laundry and dishes.  I advised I would call Angie back and ask these questions for her. I told her, that they may not no the answer for the insurance until after intake, but I'm not sure. She understood. We talked about whether she would like me to wait on requesting an intake for until she gets these answers. She said, no. Please place the request and we can connect after.  I told Ebonie that I will either connect with her later today or Wed as I'm out of the office Tuesday. She understood. She thanked me for my help and is grateful if this could help her around the home.     I called and left Angie a message with our questions today and advised to call me back so I can place an intake request.       Next Outreach: 4.11.18 (Just to reconnect with our conversation, outreach not due)

## 2021-06-17 NOTE — TELEPHONE ENCOUNTER
Reason for Call:  Medication or medication refill:    Do you use a [facility]?  Name of the pharmacy and phone number for the current request: No, [facility] on file    Name of the medication requested: oxyCODONE-acetaminophen (PERCOCET) 5-325 mg per tablet, hydrOXYchloroQUINE (PLAQUENIL) 200 mg tablet, predniSONE (DELTASONE) 2.5 MG tablet, atorvastatin (LIPITOR) 40 MG tablet, loratadine (CLARITIN) 10 mg tablet, traZODone (DESYREL) 50 MG tablet    Other request:Patient called to request a refill on these medications. Please send refills to [facility] pharmacy on file.    Can we leave a detailed message on this number? Yes    Phone number patient can be reached at: Home number on file [phone] (home)    Best Time: any    Call taken on 4/27/2021 at 12:36 PM by Petra Silva

## 2021-06-17 NOTE — TELEPHONE ENCOUNTER
Telephone Encounter by Faina Wilde LPN at 8/7/2020 12:03 PM     Author: Faina Wilde LPN Service: -- Author Type: Certified Medical Assistant    Filed: 8/7/2020 12:06 PM Encounter Date: 8/7/2020 Status: Signed    : Faina Wilde LPN (Licensed Nurse)         Last Office Visit  4/01/2020 Marichuy Rubio FNP    Notes:  1. Fall in home, initial encounter  2. Hip pain, left  3. Hip pain, right  4. Chronic pain  - Patient is ambulating well, does not have difficulty bearing weight and pain has improved since falls. She does not feel that she needs to come in for an office visit particularly in light of Coronavirus outbreak and I agree that she does not need to. She is advised to use a grab device to reduce need to bend forward to pick things up. She will use her walker in the home. We discussed restarting home PT after the virus outbreak risk is reduced. She will continue medications for chronic back pain as prescribed   - oxyCODONE-acetaminophen (PERCOCET) 5-325 mg per tablet; Take 1-2 tablets by mouth every 6 (six) hours as needed for pain (maximum of 6 tablets per day). 4/8-5/8  Dispense: 180 tablet; Refill: 0     5. Type 2 diabetes mellitus with hypoglycemia without coma, with long-term current use of insulin (H)  - Patient would benefit from a continuous glucose meter due to risk of hypoglycemia (history of this), and frequency of both glucose checks (4/day) and insulin injections (5/day).   - flash glucose scanning reader (FREESTYLE BRE 14 DAY READER) Misc; Use 1 each As Directed see administration instructions. Use as directed with sensor  Dispense: 1 each; Refill: 0  - flash glucose sensor (FREESTYLE BRE 14 DAY SENSOR) Kit; Use 1 each As Directed every 14 (fourteen) days.  Dispense: 2 kit; Refill: 11       Last Filled:  oxyCODONE-acetaminophen (PERCOCET) 5-325 mg per tablet  180 tablet  0  7/9/2020 8/8/2020  No    Sig - Route: Take 1-2 tablets by mouth every 6 (six) hours as needed  for pain (maximum of 6 tablets per day). For 7/14/20-8/13/20 - Oral    Sent to pharmacy as: oxyCODONE-acetaminophen 5 mg-325 mg tablet (Percocet)    Earliest Fill Date: 7/9/2020    E-Prescribing Status: Receipt confirmed by pharmacy (7/9/2020  6:41 PM CDT)        Lab Results   Component Value Date    AMPHET (!) 08/21/2019     Screen Positive (Confirmation available on request)    HEBENZODIAZ Screen Negative 08/21/2019    OPIATES (!) 08/21/2019     Screen Positive (Confirmation available on request)    PCP Screen Negative 08/21/2019    THC Screen Negative 08/21/2019    BARBIT Screen Negative 08/21/2019    COCAINEMETAB Screen Negative 08/21/2019    OXYCODONE (!) 08/21/2019     Screen Positive (Confirmation available on request)    CREAUR 291.7 08/21/2019    CREAUR 291.7 08/21/2019         Next OV:  Visit date not found      Encounter-Level CSA Scan Date:    8/22/2019          reviewed and results are as follows:        Medication teed up for provider signature - please adjust as appropriate.

## 2021-06-17 NOTE — PATIENT INSTRUCTIONS - HE
Patient Instructions by Ha Castillo MD at 10/8/2019  4:20 PM     Author: Ha Castillo MD Service: -- Author Type: Physician    Filed: 10/8/2019  5:02 PM Encounter Date: 10/8/2019 Status: Signed    : Ha Castillo MD (Physician)       Patient Education     Viral Gastroenteritis (Adult)    Gastroenteritis is commonly called the stomach flu. It is most often caused by a virus that affects the stomach and intestinal tract and usually lasts from 2 to 7 days. Common viruses causing gastroenteritis include norovirus, rotavirus, and hepatitis A. Non-viral causes of gastroenteritis include bacteria, parasites, and toxins.  The danger from repeated vomiting or diarrhea is dehydration. This is the loss of too much fluid from the body. When this occurs, body fluids must be replaced. Antibiotics do not help with this illness because it is usually viral.Simple home treatment will be helpful.  Symptoms of viral gastroenteritis may include:    Watery, loose stools    Stomach pain or abdominal cramps    Fever and chills    Nausea and vomiting    Loss of bowel control    Headache  Home care  Gastroenteritis is transmitted by contact with the stool or vomit of an infected person. This can occur from person to person or from contact with a contaminated surface.  Follow these guidelines when caring for yourself at home:    If symptoms are severe, rest at home for the next 24 hours or until you are feeling better.    Wash your hands with soap and water or use alcohol-based  to prevent the spread of infection. Wash your hands after touching anyone who is sick.    Wash your hands or use alcohol-based  after using the toilet and before meals. Clean the toilet after each use.  Remember these tips when preparing food:    People with diarrhea should not prepare or serve food to others. When preparing foods, wash your hands before and after.    Wash your hands after using  cutting boards, countertops, knives, or utensils that have been in contact with raw food.    Keep uncooked meats away from cooked and ready-to-eat foods.  Medicine  You may use acetaminophen or NSAID medicines like ibuprofen or naproxen to control fever unless another medicine was given. If you have chronic liver or kidney disease, talk with your healthcare provider before using these medicines. Also talk with your provider if you've had a stomach ulcer or gastrointestinal bleeding. Don't give aspirin to anyone under 18 years of age who is ill with a fever. It may cause severe liver damage. Don't use NSAIDS is you are already taking one for another condition (like arthritis) or are on aspirin (such as for heart disease or after a stroke).  If medicine for vomiting or diarrhea are prescribed, take these only as directed. Do not take over-the-counter medicines for vomiting or diarrhea unless instructed by your healthcare provider.  Diet  Follow these guidelines for food:    Water and liquids are important so you don't get dehydrated. Drink a small amount at a time or suck on ice chips if you are vomiting.    If you eat, avoid fatty, greasy, spicy, or fried foods.    Don't eat dairy if you have diarrhea. This can make diarrhea worse.    Avoid tobacco, alcohol, and caffeine which may worsen symptoms.  During the first 24 hours (the first full day), follow the diet below:    Beverages. Sports drinks, soft drinks without caffeine, ginger ale, mineral water (plain or flavored), decaffeinated tea and coffee. If you are very dehydrated, sports drinks aren't a good choice. They have too much sugar and not enough electrolytes. In this case, commercially available products called oral rehydration solutions, are best.    Soups. Eat clear broth, consommé, and bouillon.    Desserts. Eat gelatin, popsicles, and fruit juice bars.  During the next 24 hours (the second day), you may add the following to the above:    Hot cereal, plain  toast, bread, rolls, and crackers    Plain noodles, rice, mashed potatoes, chicken noodle or rice soup    Unsweetened canned fruit (avoid pineapple), bananas    Limit fat intake to less than 15 grams per day. Do this by avoiding margarine, butter, oils, mayonnaise, sauces, gravies, fried foods, peanut butter, meat, poultry, and fish.    Limit fiber and avoid raw or cooked vegetables, fresh fruits (except bananas), and bran cereals.    Limit caffeine and chocolate. Don't use spices or seasonings other than salt.    Limit dairy products.    Avoid alcohol.  During the next 24 hours:    Gradually resume a normal diet as you feel better and your symptoms improve.    If at any time it starts getting worse again, go back to clear liquids until you feel better.  Follow-up care  Follow up with your healthcare provider, or as advised. Call your provider if you don't get better within 24 hours or if diarrhea lasts more than a week. Also follow up if you are unable to keep down liquids and get dehydrated. If a stool (diarrhea) sample was taken, call as directed for the results.  Call 911  Call 911 if any of these occur:    Trouble breathing    Chest pain    Confused    Severe drowsiness or trouble awakening    Fainting or loss of consciousness    Rapid heart rate    Seizure    Stiff neck  When to seek medical advice  Call your healthcare provider right away if any of these occur:    Abdominal pain that gets worse    Continued vomiting (unable to keep liquids down)    Frequent diarrhea (more than 5 times a day)    Blood in vomit or stool (black or red color)    Dark urine, reduced urine output, or extreme thirst    Weakness or dizziness    Drowsiness    Fever of 100.4 F (38 C) or higher, or as directed by your healthcare provider    New rash  Date Last Reviewed: 1/3/2016    [id number] [employer]. [address] [place] [address], [place] [address]. All rights reserved. This information is not intended as a substitute for  professional medical care. Always follow your healthcare professional's instructions.

## 2021-06-17 NOTE — TELEPHONE ENCOUNTER
Last Office Visit  4/5/2021 Marichuy Rubio FNP    Notes:   Chronic pain (Chronic)       - Mostly low back pain. She reports diminished function and worsening pain. Prior treatments including spinal stimulator, JOSE ANTONIO, PT ineffective  - Referral to pain clinic             Relevant Orders     Ambulatory referral to Pain Clinic     Controlled substance agreement signed, oxycodone. CSA/UDS 4/5/21       CSA and UDS updated today  Due to age/co-morbidities I am not willing to increase her dose of oxycodone further and she will be referred to the pain clinic. She can continued Percocet 5/325 mg 1-2 tablet every 6 hours as needed with a max of 6 tablets per day           Lumbar radiculopathy - Primary     Relevant Orders     Ambulatory referral to Pain Clinic     Morbid obesity (H) (Chronic)       Regular exercise and weight loss encouraged            Rheumatoid arthritis (H) (Chronic)       Followed by rheumatology            Stage 3a chronic kidney disease       Reviewed this diagnosis with patient. Encouraged hydration, exercise, avoidance of NSAIDs           Type 2 diabetes mellitus with chronic kidney disease, with long-term current use of insulin (H)       Stable with current medications           Relevant Orders     Glycosylated Hemoglobin A1c               Other Visit Diagnoses      Encounter for therapeutic drug monitoring         Relevant Orders     Drugs of Abuse 1,Urine     Hair loss         Relevant Orders     Iron and Transferrin Iron Binding Capacity     Thyroid Stimulating Hormone (TSH)     Ambulatory referral to Dermatology     Visit for screening mammogram         Relevant Orders     Mammo Screening Bilateral         Last Filled:  oxyCODONE-acetaminophen (PERCOCET) 5-325 mg per tablet 180 tablet 0 4/1/2021 5/1/2021 No   Sig - Route: Take 1-2 tablets by mouth every 6 (six) hours as needed for pain (maximum of 6 tablets per day). For 4/1-5/1 - Oral   Sent to pharmacy as: oxyCODONE-acetaminophen 5 mg-325 mg tablet  (Percocet)   Earliest Fill Date: 4/1/2021   E-Prescribing Status: Receipt confirmed by pharmacy (4/1/2021  1:38 PM CDT)     hydrOXYchloroQUINE (PLAQUENIL) 200 mg tablet 180 tablet 0 12/15/2020  No   Sig - Route: Take 1 tablet (200 mg total) by mouth 2 (two) times a day. - Oral   Sent to pharmacy as: hydrOXYchloroQUINE 200 mg tablet (PLAQUENIL)   Cosign for Ordering: Accepted by Raheem Londono DO on 12/15/2020  1:51 PM   E-Prescribing Status: Receipt confirmed by pharmacy (12/15/2020 11:45 AM CST)     predniSONE (DELTASONE) 2.5 MG tablet 30 tablet 0 10/7/2020  --   Sig - Route: Take 2.5 mg by mouth daily. - Oral   Sent to pharmacy as: predniSONE 2.5 mg tablet (DELTASONE)   Cosign for Ordering: Accepted by Raheem Londono DO on 10/12/2020  3:29 PM   E-Prescribing Status: Receipt confirmed by pharmacy (10/7/2020  1:37 PM CDT)     atorvastatin (LIPITOR) 40 MG tablet 90 tablet 2 5/31/2020  No   Sig - Route: Take 1 tablet (40 mg total) by mouth at bedtime. - Oral   Sent to pharmacy as: atorvastatin 40 mg tablet (LIPITOR)   E-Prescribing Status: Sent to pharmacy (5/31/2020  4:49 PM CDT)     loratadine (CLARITIN) 10 mg tablet 90 tablet 3 1/21/2020  No   Sig - Route: Take 1 tablet (10 mg total) by mouth at bedtime. - Oral   Sent to pharmacy as: loratadine 10 mg tablet (CLARITIN)   E-Prescribing Status: Receipt confirmed by pharmacy (1/21/2020 11:31 AM CST)     traZODone (DESYREL) 50 MG tablet 30 tablet 0 12/4/2020 1/3/2021 No   Sig - Route: Take 1 tablet (50 mg total) by mouth at bedtime. - Oral   Sent to pharmacy as: traZODone 50 mg tablet (DESYREL)   E-Prescribing Status: Receipt confirmed by pharmacy (12/4/2020  7:56 AM CST)       Lab Results   Component Value Date    AMPHET (!) 08/21/2019     Screen Positive (Confirmation available on request)    HEBENZODIAZ Screen Negative 08/21/2019    OPIATES (!) 08/21/2019     Screen Positive (Confirmation available on request)    PCP Screen Negative  08/21/2019    THC Screen Negative 08/21/2019    BARBIT Screen Negative 08/21/2019    COCAINEMETAB Screen Negative 08/21/2019    OXYCODONE (!) 08/21/2019     Screen Positive (Confirmation available on request)    ALONZOR 181.0 10/20/2020         Next OV:  7/5/2021      Encounter-Level CSA Scan Date:    4/6/2021         Medication teed up for provider signature - please adjust as appropriate.

## 2021-06-17 NOTE — PROGRESS NOTES
Patient could use help with doing laundry and household chores as her balance is a concern. She doesn't have home care anymore and her son doesn't really help. He will do his laundry but won't help with hers. The concern is she is on the second floor and the laundry is down the stairs on the first floor. I'm concern for her to fall. I advised I was going to look in to a volunteer option for her to get some help.  She was grateful for the idea. She was okay with paying a little but doesn't have much to work with.  She also mentioned that she is saving up to be able to move, but moving is on hold at this time as she is worried about down payment etc.  I asked if she called her insurance to see what pain clinic would be in network. She said, she hasn't done this and states I'm doing okay. It didn't sound like she wanted to return to a pain clinic because she said it wasn't very helpful. We talked about my concern with her not continuing services and to speak with Marichuy further to determine what she would recommend. She understand.     We reviewed if Ebonie should apply for Medicare Savings or Extra Help- She over qualifies (Monthly is 2220).  She did apply on her own for HE FAA and got 50% off her bill. She was grateful for this and is making $50 a month payments at this time. Her monthly bill expense is $1100 a month and this doesn't include essentials or food.       Called [facility] to see if they serve the [place] Location. They do. I asked what steps I would need to take if I was to refer a patient. They said, I can either provide the clients name and phone number and they would call or the client can call. I asked if they had a sliding fee. They said, yes if insurance doesn't pay. I asked if [employer] would be a covered insurance. She said, they do have one person who she believes takes only [employer]. However, she is going to look into this and get back to me.         Next Outreach: 6/6/2018 if not sooner  based on NYFS update.

## 2021-06-17 NOTE — TELEPHONE ENCOUNTER
Telephone Encounter by Brook Riggs at 6/12/2020 11:32 AM     Author: Brook Riggs Service: -- Author Type: --    Filed: 6/12/2020 11:35 AM Encounter Date: 6/10/2020 Status: Signed    : Brook Riggs APPROVED: LIDOCAINE PATCHES 30 PER 30 DAYS    Approval start date: 06/10/2020  Approval end date:  12/30/2020    Pharmacy has been notified of approval and will contact patient when medication is ready for pickup.

## 2021-06-17 NOTE — PROGRESS NOTES
Received a call from Flores @ [facility].  Called her back today and left a message.   We haven't been able to connect yet regarding intake. I advised Flores that I will be back in the office on Friday.       Attempted to call Flores to place a intake request, left a message. Ask if there was anyone else I could speak with since we haven't been able to connect.  I will call patient once I have an update, otherwise she isn't due for outreach.     I called Flores back today and since Ebonie gave me permission for them to call her, I left her a message asking if she can call Ebonie to discuss services further and provided Amy her phone number. No further sets needed at this time.     Next Outreach: 6/18/2018

## 2021-06-17 NOTE — TELEPHONE ENCOUNTER
Patient asking for refill of oxycodone.     Patient has pending appointment with Pain Clinic 6/21/21.     Patient Established care with Oma Fallon CNP on 5/7/21. Controlled substance agreement is with Marichuy Rubio. Is Marichuy willing to take care of refill until patient gets in with pain clinic?     Please see pended order.

## 2021-06-18 ENCOUNTER — RECORDS - HEALTHEAST (OUTPATIENT)
Dept: ADMINISTRATIVE | Facility: CLINIC | Age: 71
End: 2021-06-18

## 2021-06-18 NOTE — PROGRESS NOTES
Spoke with Ebonie today. She said, she fell a couple of days ago by the table. Ebonie indicated she is okay. She is sore, but doesn't feel she needs to come in to be seen. Ebonie states she has bruises and her skin on her arm was ripped off. She indicated that it happened so fast she doesn't know what triggered it.  I asked if she was feeling unstable, and Ebonie said she could've. Ebonie then mentioned that she has noticed her medtronic feeling like its going down her leg and thinks it could've had a factor in her fall. She is going to call them.  I offered an appointment, she doesn't feel she needs to be seen. I advised to call to schedule if needed.     I asked if she had called her insurance company about pain clinic's in her network. She stated no. Ebonie stated I had gone to the [facility] pain clinic, that I figured if they aren't covering me, no one will. We discussed calling her insurance company because that may not be true. I advised if she has difficult with this, I can meet with her in the [facility] to call together.     We discussed NYFS today to see if they were going to her home to assist with homemaking. She said, there was a lady who came out who provided her more resources. The services doesn't sound like what I expected.  She stated that they gave her information for help at your door for groceries. Ebonie said, I don't know if they would take credit card or how I can stay within budget. I advised that they do except credit card. We talked about looking at the [facility] ad to determine her grocery list. She then said, I don't get those, however she doesn't check her mail often because its hard for her to get down stairs. She will look for this.       Concerned that patient doesn't leave the home often and needs help but is on a fixed income. Her son doesn't help and they are frequently arguing that she doesn't ask him for help.  I'm going to look into other resources that maybe available to patient.  Patient  is a fall risk.     Next Outreach:  8/18/2018- Will reach out sooner if I have more resources

## 2021-06-18 NOTE — PATIENT INSTRUCTIONS - HE
Patient Instructions by Marichuy Rubio FNP at 2/26/2020 12:00 PM     Author: Marichuy Rubio FNP Service: -- Author Type: Nurse Practitioner    Filed: 2/26/2020 12:23 PM Encounter Date: 2/26/2020 Status: Addendum    : Marichuy Rubio FNP (Nurse Practitioner)    Related Notes: Original Note by Marichuy Rubio FNP (Nurse Practitioner) filed at 2/26/2020 12:11 PM       - Skip your dose of methotrexate this Friday if you still have diarrhea/vomiting.   - Try fluids such as Gatorade mixed with some water or Pedialyte to replace the fluid you lose with diarrhea. Broth is another good option     Patient Education     Treating Diarrhea    Diarrhea happens when you have loose, watery, or frequent bowel movements. It is a common problem with many causes. Most cases of diarrhea clear up on their own. But certain cases may need treatment. Be sure to see your healthcare provider if your symptoms don't get better in a few days.  Getting relief  Treatment of diarrhea depends on its cause. Diarrhea caused by bacterial or parasite infection is often treated with antibiotics. Diarrhea caused by other factors, such as a stomach virus, often improves with simple home treatment. The tips below may also help ease your symptoms.    Drink plenty of fluids. This helps prevent too much fluid loss (dehydration). Water, clear soups, and electrolyte solutions are good choices. Don't take alcohol, coffee, tea, or milk. These can irritate your intestines and make symptoms worse.    Suck on ice chips if drinking makes you queasy.    Return to your normal diet slowly. You may want to eat bland foods at first, such as rice and toast. Also, you may need to stay away from certain foods for a while, such as dairy products. These can make symptoms worse. Ask your healthcare provider if there are any other foods you should stay away from.    If you were prescribed antibiotics, take them as directed.    Don't take anti-diarrhea  medicines without asking your provider first.  Call your healthcare provider   Call your healthcare provider if you have any of the following:     A fever of 100.4 F (38.0 C) or higher, or as directed by your provider    Chills    Severe pain    Worsening diarrhea or diarrhea for more than 2 days    Bloody vomit or stool    Signs of dehydration (dizziness, dry mouth and tongue, rapid pulse, dark urine)  Date Last Reviewed: 7/1/2016 2000-2019 [employer]. [address] [place] [address]. All rights reserved. This information is not intended as a substitute for professional medical care. Always follow your healthcare professional's instructions.

## 2021-06-18 NOTE — LETTER
Letter by Marichuy Rubio FNP at      Author: Marichuy Rubio FNP Service: -- Author Type: --    Filed:  Encounter Date: 2/7/2019 Status: (Other)       Ebonie Bowman  [address]  [place] [address]             February 7, 2019         Dear Ms. Bowman,    Below are the results from your recent visit:    Resulted Orders   HM2(CBC w/o Differential)   Result Value Ref Range    WBC 10.5 4.0 - 11.0 thou/uL    RBC 4.35 3.80 - 5.40 mill/uL    Hemoglobin 13.9 12.0 - 16.0 g/dL    Hematocrit 41.5 35.0 - 47.0 %    MCV 95 80 - 100 fL    MCH 31.9 27.0 - 34.0 pg    MCHC 33.5 32.0 - 36.0 g/dL    RDW 11.7 11.0 - 14.5 %    Platelets 139 (L) 140 - 440 thou/uL    MPV 7.7 7.0 - 10.0 fL   Creatinine   Result Value Ref Range    Creatinine 1.31 (H) 0.60 - 1.10 mg/dL    GFR MDRD Af Amer 49 (L) >60 mL/min/1.73m2    GFR MDRD Non Af Amer 40 (L) >60 mL/min/1.73m2   Albumin   Result Value Ref Range    Albumin 3.5 3.5 - 5.0 g/dL   ALT (SGPT)   Result Value Ref Range    ALT 12 0 - 45 U/L               You continue to have diminished kidney function consistent with chronic kidney disease but this is stable compared to the last check approximately 3 months ago. I have called in your methotrexate but the next refill will need to come from your new rheumatologist after you have established care.     The test for liver function is normal. The albumin level which is a type of blood protein is normal. Blood count numbers are normal.     Please call with questions or contact us using [employer].    Sincerely,        Electronically signed by ARSLAN Vasquez

## 2021-06-19 NOTE — LETTER
Letter by Radha Kurtz RN at      Author: Radha Kurtz RN Service: -- Author Type: --    Filed:  Encounter Date: 7/25/2019 Status: (Other)       CARE COORDINATION  [facility]   August 1, 2019    Ebonie Bowman  [address]  [place] [address]      Dear Ebonie,    I am a clinic care coordinator who works with ARSLAN Vasquez at [facility]. I wanted to thank you for spending the time to talk with me.  Below is a description of clinic care coordination and how I can further assist you.     The clinic care coordinator team is made up of a registered nurse,  and community health worker who understand the health care system. The goal of clinic care coordination is to help you manage your health and improve access to the health care system in the most efficient manner. The team can assist you in meeting your health care goals by providing education, coordinating services, strengthening the communication among your providers, assist you with any financial, behavioral, psychosocial, chemical dependency, counseling, and/or psychiatric resources if needed.    Please feel free to contact Bisi Arreola, the Community Health Worker, at  [phone] with any questions or concerns. We are focused on providing you with the highest-quality healthcare experience possible and that all starts with you.     Sincerely,     Radha Kurtz RN  Enclosed: I have enclosed a copy of the Complex Care Plan. This has helpful information and goals that we have talked about. Please keep this in an easy to access place to use as needed.

## 2021-06-19 NOTE — LETTER
Letter by Marichuy Rubio FNP at      Author: Marichuy Rubio FNP Service: -- Author Type: --    Filed:  Encounter Date: 7/24/2019 Status: (Other)       Ebonie Bowman  [address]  [place] [address]      07/26/19      Dear Ebonie,      In reviewing your records, we have determined a medication check is needed before your next refill, please call the [facility] to schedule an appointment.  A supply has been sent to your pharmacy but your provider can not send more prior to your appointment.  Please schedule for:      Medication check/review      We have made attempts to call you for an appointment, please verify your contact information is correct when calling back for an appointment, or if you have transferred your care to another clinic, please contact us so we can update our records.     Please call [phone] to schedule an appointment.    We believe that a strong preventative care program, including regular physicals and follow-up care is an important part of a healthy lifestyle and we are committed to helping you maintain your health.    Thank you for choosing us as your health care provider.    Sincerely,    [place] [id number], [address]  [place] [address] [phone]

## 2021-06-19 NOTE — LETTER
"Letter by Raheem Londono DO at      Author: Raheem Londono DO Service: -- Author Type: --    Filed:  Encounter Date: 4/16/2019 Status: (Other)         Ebonie Bowman  [address]  [place] [address]     April 16, 2019         Dear Ms. Bowman,    Below are the results from your recent visit:    Resulted Orders   XR Chest 2 Views    Narrative    EXAM: XR CHEST 2 VIEWS  LOCATION: [facility]  DATE/TIME: 4/10/2019 2:18 PM    INDICATION: On methotrexate, please perform comparison to prior chest x-ray from 2017, asymptomatic pulmonary-wise at this time.   COMPARISON: 12/27/2017.    FINDINGS: Small area of subsegmental atelectasis in the left lung base. Lungs otherwise clear. Nerve stimulator leads in the thoracic spinal canal. Tiny left pleural effusion. Overall no significant change since the prior study.     FINDINGS: Small area of subsegmental atelectasis in the left lung base. Lungs otherwise clear. Nerve stimulator leads in the thoracic spinal canal. Tiny left pleural effusion. Overall no significant change since the prior study.     The term \"atelectasis\" represents collapse of some lung alveoli (tiny air sacs).     Most importantly and reassuring that no significant change noted since prior study from December 27, 2017.       Sincerely,      Electronically signed by Raheem Londono DO       "

## 2021-06-19 NOTE — LETTER
Letter by Marichuy Rubio FNP at      Author: Marichuy Rubio FNP Service: -- Author Type: --    Filed:  Encounter Date: 8/27/2019 Status: (Other)         Ebonie Bowman  [address]  [place] [address]             August 27, 2019         Dear Ms. Bowman,    Below are the results from your recent visit:    Resulted Orders   Drugs of Abuse 1,Urine   Result Value Ref Range    Amphetamines (!) Screen Negative     Screen Positive (Confirmation available on request)    Benzodiazepines Screen Negative Screen Negative    Opiates (!) Screen Negative     Screen Positive (Confirmation available on request)    Phencyclidine Screen Negative Screen Negative    THC Screen Negative Screen Negative    Barbiturates Screen Negative Screen Negative    Cocaine Metabolite Screen Negative Screen Negative    Oxycodone (!) Screen Negative     Screen Positive (Confirmation available on request)    Creatinine, Urine 291.7 mg/dL    Narrative    Drug                  Screening Threshold    Amphetamines           1000 ng/mL  Benzodiazepine          200 ng/mL  Opiates                 300 ng/mL  Phencyclidine            25 ng/mL  THC Metabolite           50 ng/mL  Barbiturates            200 ng/mL  Cocaine Metabolite      150 ng/mL  Oxycodone               100 ng/mL    Screening results are to be used only for medical purposes.  Unconfirmed screening results must not be used for non-  medical purposes.   Glycosylated Hemoglobin A1c   Result Value Ref Range    Hemoglobin A1c 6.2 (H) 3.5 - 6.0 %   Microalbumin, Random Urine   Result Value Ref Range    Microalbumin, Random Urine 12.23 (H) 0.00 - 1.99 mg/dL    Creatinine, Urine 291.7 mg/dL    Microalbumin/Creatinine Ratio Random Urine 41.9 (H) <=19.9 mg/g    Narrative    Microalbumin, Random Urine  <2.0 mg/dL . . . . . . . . Normal  3.0-30.0 mg/dL . . . . . . Microalbuminuria  >30.0 mg/dL . . . . . .  . Clinical Proteinuria    Microalbumin/Creatinine Ratio, Random Urine  <20 mg/g  ". . . . .. . . . Normal   mg/g . . . . . . . Microalbuminuria  >300 mg/g . . . . . . . . Clinical Proteinuria       Lipid Profile   Result Value Ref Range    Triglycerides 140 <=149 mg/dL    Cholesterol 121 <=199 mg/dL    LDL Calculated 53 <=129 mg/dL    HDL Cholesterol 40 (L) >=50 mg/dL    Patient Fasting > 8hrs? No    Amphetamines, Urine, Quantitative   Result Value Ref Range    Amphetamine, Urn, Quant <50 ng/mL      Comment:      INTERPRETIVE INFORMATION: Amphetamines, Urine,                             Quantitative    Methodology: Quantitative Liquid Chromatography-Tandem Mass   Spectrometry    Positive cutoff: 200 ng/mL unless specified below:  Amphetamine     50 ng/mL    For medical purposes only; not valid for forensic use.     The absence of expected drug(s) and/or drug metabolite(s) may   indicate non-compliance, inappropriate timing of specimen   collection relative to drug administration, poor drug absorption,   diluted/adulterated urine, or limitations of testing. The   concentration value must be greater than or equal to the cutoff to   be reported as positive. Interpretive questions should be directed   to the laboratory.    Test developed and characteristics determined by [facility]. See Compliance Statement B: [facility]/    MDA, Urn, Quant <200 ng/mL    MDEA, Urn, Quant <200 ng/mL    MDMA, Urn, Quant <200 ng/mL    Methamphetamine, Urn, Quant <200 ng/mL    Phentermine, Urn, Quant <200 ng/mL      Comment:      Performed by [facility],  [address] [place] [id number] [phone]  [email or web address], Henrry New MD, Lab. Director     Your urine screening test is as expected.     A1C shows that diabetes is well controlled at this time.     Cholesterol numbers look pretty good. You have a low \"HDL\" good cholesterol level, sometimes exercise can help this so keep up the physical therapy and exercise as you can tolerate.     There is excess protein in the urine likely due to " diabetes. You take a medication already to help protect the kidneys.     Please call with questions or contact us using [employer].    Sincerely,        Electronically signed by ARSLAN Vasquez

## 2021-06-19 NOTE — PROGRESS NOTES
ASSESSMENT AND PLAN:  Ebonie Bowman 68 y.o. female is here for follow-up.  She has severe seropositive rheumatoid arthritis.  She is not doing well.  She has renal impairment.  She is due for labs today.  She is taking methotrexate on Fridays 10 mg.  She took Humira but each time she injected her joint pains got somewhat severe that she is not prepared to go back to it.  Will see if she can get Orencia.  Check labs today.  And q 2 months thereafter.  Once labs are reviewed refill methotrexate to be provided.  Return for follow-up in 3 months.  Diagnoses and all orders for this visit:    Seropositive rheumatoid arthritis (H)    Stage 3 chronic kidney disease    High risk medication use    S/P TKR (total knee replacement) using cement, left    Lumbar radiculopathy, chronic          HISTORY OF PRESENTING ILLNESS:  Ebonie Bowman, 68 y.o., female is here follow-up.  She has seropositive rheumatoid arthritis.  She has widespread osteoarthritis lumbar spondylosis.  She noted ongoing pain.  This is worse on the right hand.  She wonders this if this is secondary to air conditioning.  She took Humira for a short while only.  Each time she would inject herself for a day or 2 she would have severe pain in the joints of the point where she is no longer prepared to try it.  She has been continuing to take methotrexate.  She noted pain level is moderately severe.  She has noted stiffness in the morning for about 10 minutes or so.  There is no fever weight loss blurry vision eye redness mouth also nausea cough or rash.  She has no history of psoriasis ulcerative colitis Crohn's disease.  Her mom had rheumatoid arthritis.  She is nonsmoker and does not take alcohol no regular exercise.  She has had multiple musculoskeletal surgeries in the past she had left wrist surgery 1995 and 94 she had a injury there is a bucket fell, she had back surgery 2 and 87 and 1990.  And she is had the left knee surgery first hemiarthroplasty and then  total knee replacement most recently in 2010.  Further historical information and ADL limitations as noted in the multidimensional health assessment questionnaire attached in the EMR.     ALLERGIES:Penicillins and Sulfa (sulfonamide antibiotics)    PAST MEDICAL/ACTIVE PROBLEMS/MEDICATION/ FAMILY HISTORY/SOCIAL DATA:  The patient has a family history of  Past Medical History:   Diagnosis Date     BBB (bundle branch block)      Chronic back pain      Chronic kidney disease     stage 3     Chronic Kidney Disease (NKF Classification)     Created by Conversion      Depression      Diabetes (H)     Type 2     Diabetes mellitus, type 2 (H)     Created by Conversion      Frequent headaches      Ganglion Of The Left Wrist     Created by Conversion      GERD (gastroesophageal reflux disease)      HLD (hyperlipidemia)      Hypertension     Per H&P dated 1/03/2013     Incarcerated ventral hernia     Per H&P dated 1/03/2013     Osteoarthritis      Rheumatoid arthritis (H)      Thrombocytopenia (H)      Venous insufficiency      History   Smoking Status     Former Smoker     Quit date: 8/1/2008   Smokeless Tobacco     Never Used     Comment: smells of cigarrette smoke     Patient Active Problem List   Diagnosis     Osteoarthritis     Venous Insufficiency     HTN (hypertension)     Insomnia     Joint Pain, Localized In The Knee     Chronic kidney disease     Hyperlipidemia     Diabetes mellitus, type 2 (H)     Joint Pain, Localized In The Hip     Depression     Seropositive rheumatoid arthritis (H)     Lumbar radiculopathy, chronic     S/P TKR (total knee replacement) using cement, left     High risk medication use     Chronic pain     GERD (gastroesophageal reflux disease)     Bilateral primary osteoarthritis of hip     Atherosclerotic cardiovascular disease, seen on abd CT 2016      Current Outpatient Prescriptions   Medication Sig Dispense Refill     amLODIPine (NORVASC) 5 MG tablet Take 1 tablet (5 mg total) by mouth daily.  0      ascorbic acid (ASCORBIC ACID WITH LATRICIA HIPS) 500 MG tablet Take 500 mg by mouth every evening.        aspirin 81 MG EC tablet Take 81 mg by mouth every evening.       atorvastatin (LIPITOR) 40 MG tablet TAKE 1 TABLET AT BEDTIME 90 tablet 3     blood glucose test strips Check blood sugar four times daily. Dispense brand per patient's insurance at pharmacy discretion. Dx: e11.9 200 strip 11     blood-glucose meter Misc Dispense one meter. Use as directed. Dx: e11.9 1 each 0     buPROPion (WELLBUTRIN) 100 MG tablet Take 1 tablet (100 mg total) by mouth daily. 90 tablet 3     cholecalciferol, vitamin D3, 1,000 unit tablet Take 1,000 Units by mouth every evening.        cyanocobalamin (VITAMIN B-12) 250 MCG tablet Take 250 mcg by mouth every evening.        docusate sodium (COLACE) 100 MG capsule Take 100 mg by mouth 2 (two) times a day.       DULoxetine (CYMBALTA) 30 MG capsule Take 1 capsule (30 mg total) by mouth daily. 30 capsule 1     folic acid (FOLVITE) 800 MCG tablet Take 1,200 mcg by mouth every evening.        glucose 4 GM chewable tablet Chew 4 tablets (16 g total) as needed for low blood sugar. 120 tablet 0     insulin regular (NOVOLIN R) 100 unit/mL injection Inject 24 Units under the skin 3 (three) times a day before meals. Inject 24 units subcutaneous TID with meals plus sliding scale        insulin syringe-needle U-100 (BD INSULIN SYRINGE ULT-FINE II) 1 mL 31 gauge x 5/16 Syrg USE 4 TIMES DAILY AS DIRECTED 500 each 1     lancing device (ACCU-CHEK SOFTCLIX) Misc Use 1 applicator As Directed 4 (four) times a day. 300 each 3     lidocaine (XYLOCAINE) 5 % ointment Apply to affected area QID prn pain 35.44 g 0     lisinopril (PRINIVIL,ZESTRIL) 10 MG tablet TAKE 1 TABLET AT BEDTIME 90 tablet 2     loratadine (CLARITIN) 10 mg tablet Take 1 tablet (10 mg total) by mouth at bedtime. 90 tablet 2     magnesium oxide (MAG-OX) 400 mg tablet Take 800 mg by mouth at bedtime. With food        methotrexate 2.5 MG  tablet TAKE 4 TABLETS (10 MG) ONE TIME WEEKLY  ON  FRIDAY 48 tablet 0     metoprolol succinate (TOPROL XL) 50 MG 24 hr tablet Take 1 tablet (50 mg total) by mouth daily. 90 tablet 1     MULTIVITAMIN ORAL Take 1 tablet by mouth every evening.        NOVOLIN N NPH U-100 INSULIN 100 unit/mL injection Inject 33 Units under the skin 2 (two) times a day before meals. 10 mL 11     omega-3 fatty acids-vitamin E (FISH OIL) 1,000 mg cap Take 1,000 mg by mouth every evening. 90 each 3     oxyCODONE-acetaminophen (PERCOCET) 5-325 mg per tablet Take 1-2 tablets by mouth every 8 (eight) hours as needed for pain (maximum of 6 tablets per day). For 7/9-8/8 180 tablet 0     ranitidine (ZANTAC) 150 MG tablet TAKE 1 TABLET (150 MG) BY MOUTH 2 (TWO) TIMES A DAY. 180 tablet 3     traZODone (DESYREL) 150 MG tablet TAKE 1 TABLET AT BEDTIME 90 tablet 2     No current facility-administered medications for this visit.          DETAILED EXAMINATION  07/16/18  :  Vitals:    07/16/18 1609   BP: 116/64   Patient Site: Right Arm   Patient Position: Sitting   Cuff Size: Adult Regular   Pulse: 74   Weight: (!) 227 lb (103 kg)     Alert oriented. Head including the face is examined for malar rash, heliotropes, scarring, lupus pernio. Eyes examined for redness such as in episcleritis/scleritis, periorbital lesions.   Neck/ Face examined for parotid gland swelling, range of motion of neck.  Left upper and lower and right upper and lower extremities examined for tenderness, swelling, warmth of the appendicular joints, range of motion, edema, rash.  Some of the important findings included: She has synovitis in several joints in the hands wrists, MCPs 2 and 3 or 4 bilaterally and both wrists worse on the right side.  She has left TKA.        LAB / IMAGING DATA:  ALT   Date Value Ref Range Status   05/07/2018 15 0 - 45 U/L Final   03/14/2018 28 0 - 45 U/L Final   01/09/2018 30 0 - 45 U/L Final     Albumin   Date Value Ref Range Status   05/07/2018 3.3 (L)  3.5 - 5.0 g/dL Final   03/14/2018 3.2 (L) 3.5 - 5.0 g/dL Final   01/06/2018 2.3 (L) 3.5 - 5.0 g/dL Final     Creatinine   Date Value Ref Range Status   05/07/2018 1.18 (H) 0.60 - 1.10 mg/dL Final   03/14/2018 1.23 (H) 0.60 - 1.10 mg/dL Final   01/09/2018 1.19 (H) 0.60 - 1.10 mg/dL Final       WBC   Date Value Ref Range Status   05/07/2018 6.5 4.0 - 11.0 thou/uL Final   03/14/2018 6.7 4.0 - 11.0 thou/uL Final   08/12/2015 11.9 (H) 4.0 - 11.0 thou/uL Final   07/01/2015 12.8 (H) 4.0 - 11.0 thou/uL Final     Hemoglobin   Date Value Ref Range Status   05/07/2018 13.6 12.0 - 16.0 g/dL Final   03/14/2018 14.0 12.0 - 16.0 g/dL Final   01/06/2018 9.8 (L) 12.0 - 16.0 g/dL Final     Platelets   Date Value Ref Range Status   05/07/2018 104 (L) 140 - 440 thou/uL Final   03/14/2018 132 (L) 140 - 440 thou/uL Final   01/06/2018 159 140 - 440 thou/uL Final       Lab Results   Component Value Date    RF <15.0 05/13/2015    SEDRATE 13 07/02/2018

## 2021-06-19 NOTE — LETTER
Letter by Radha Kurtz RN at      Author: Radha Kurtz RN Service: -- Author Type: --    Filed:  Encounter Date: 7/25/2019 Status: (Other)       Care Plan  About Me:    Patient Name:  Ebonie Bowman    YOB: 1950  Age:         69 y.o.   [facility] MRN:    [id number] Telephone Information:  Home Phone [phone]   Mobile [phone]       Address:  [address] [place] [address] [address]  [place] [address] Email address:  [email or web address]      Emergency Contact(s)  Extended Emergency Contact Information      Name: Harshad Bowman    Home Phone Number: [phone]  Relation: Child      Name: NONE,PER PT    Relation: Declined          Primary language:  English     needed? No   [employer]:  [phone] op. 1  Other communication barriers: None  Preferred Method of Communication:  Phone  Current living arrangement: I live in a private home with family  Mobility Status/ Medical Equipment: Independent w/Device    Health Maintenance  Health Maintenance Reviewed: Up to date    My Access Plan  Medical Emergency 911   Primary Clinic Line [facility] - [phone]   24 Hour Appointment Line [phone] or  [phone]) (toll-free)   24 Hour Nurse Line [phone] (toll-free)   Preferred Urgent Care [facility], [phone]   Preferred Hospital [facility]  [phone]   Preferred Pharmacy [facility] Mail Delivery - [address]     Behavioral Health Crisis Line The National Suicide Prevention Lifeline at 1-228.753.2813 or 911             My Care Team Members  Patient Care Team       Relationship Specialty Notifications Start End    Marichuy Rubio FNP PCP - General Nurse Practitioner  8/4/16     Phone: [phone] Fax: [phone]         [address] [facility] [address] [facility] [address]    Bisi Arreola, CHW Community Health Worker Primary Care - CC Admissions 12/14/16     [facility] Ph# [phone]; Fax #[phone]    Phone: [phone] Fax: [phone]        Vishal Pretty MD Physician Anesthesiology  9/6/17     Phone: [phone] Fax: [phone]         [address] [address]    Amanda    2/6/19     County Worker- Alternative Waiver - Ebonie's Primary Contact    Phone: [phone]         Jazzy    2/6/19     [facility] ? (Alternative Waiver)    Phone: [phone]         Basia    4/12/19     [employer]-     Phone: [phone]         Raheem Londono DO Physician Rheumatology  4/12/19     Phone: [phone] Fax: [phone]         [address] [place] [address]    Monroe Salas MD Physician Neurology  4/15/19     Phone: [phone] Fax: [phone]         [address] [place] [address]    Leanne    7/25/19     [facility]      Phone: [phone]         Oriana Ulloa RN Lead Care Coordinator Primary Care - CC Admissions 7/25/19     Phone: [phone] Fax: [phone]                My Care Plans  Self Management and Treatment Plan  Goals and (Comments)  Goals        General    Functional (pt-stated)     Notes - Note created  8/1/2019 10:51 AM by Radha Kurtz, RN    Goal: I would like to get a seated walker within the next 2 months.  Action Steps:  1. My community health worker will fax the face to face visit note from my doctor, the prescription for the equipment and face sheet to [employer].   2. I will follow up with [employer] to get the equipment.  3. I will notify my community health worker once I have received the equipment. If I struggle getting the equipment or cannot get ahold of [employer] to coordinate getting the equipment, I will notify my community health worker.    Date Goal Set: 7/25/2019        Other (pt-stated)     Notes - Note created  8/1/2019 10:56 AM by Radha Kurtz RN    Goal:  I would like to obtain  information regarding Assisted Livings in my area in the next 30-90 days.    Action steps to achieve this goal  1.  I will speak with the [facility] SW to receive a list of Assisted Livings.    Date goal set:  7/25/2019      Other (pt-stated)     Notes - Note created  8/1/2019 11:02 AM by Radha Kurtz RN    Goal:  I would like to determine the status of my PCA in the next 30-90 days.  I had PCA services and I do not have it any longer.    Action steps to achieve this goal  1.  I will speak with the [facility]   2.  I will speak with the [facility] CHW at outreach phone calls.    Date goal set:  7/25/2019               Advance Care Plans/Directives Type:        My Medical and Care Information  Problem List   Patient Active Problem List   Diagnosis   ? Osteoarthritis   ? Venous Insufficiency   ? HTN (hypertension)   ? Insomnia   ? Joint Pain, Localized In The Knee   ? Chronic kidney disease   ? Hyperlipidemia   ? Diabetes mellitus, type 2 (H)   ? Joint Pain, Localized In The Hip   ? Morbid obesity (H)   ? Depression   ? Rheumatoid arthritis (H)   ? Seropositive rheumatoid arthritis (H)   ? Lumbar radiculopathy   ? S/P TKR (total knee replacement) using cement, left   ? High risk medication use   ? Chronic pain   ? GERD (gastroesophageal reflux disease)   ? Bilateral primary osteoarthritis of hip   ? Atherosclerotic cardiovascular disease, seen on abd CT 2016    ? Sepsis (H)   ? UTI (urinary tract infection)   ? Metabolic encephalopathy   ? Bacteremia   ? S/P insertion of spinal cord stimulator      Current Medications and Allergies:  See printed Medication Report.    Care Coordination Start Date: 7/25/2019   Frequency of Care Coordination:     Form Last Updated: 08/01/2019

## 2021-06-19 NOTE — LETTER
Letter by Raheem Londono DO at      Author: Raheem Londono DO Service: -- Author Type: --    Filed:  Encounter Date: 8/1/2019 Status: (Other)         Ebonie Bowman  [address]  [place] [address]             August 1, 2019         Dear Ms. Bowman,    Below are the results from your recent visit:    Resulted Orders   HM2(CBC w/o Differential)   Result Value Ref Range    WBC 9.5 4.0 - 11.0 thou/uL    RBC 4.14 3.80 - 5.40 mill/uL    Hemoglobin 12.8 12.0 - 16.0 g/dL    Hematocrit 38.3 35.0 - 47.0 %    MCV 92 80 - 100 fL    MCH 30.9 27.0 - 34.0 pg    MCHC 33.5 32.0 - 36.0 g/dL    RDW 12.7 11.0 - 14.5 %    Platelets 150 140 - 440 thou/uL    MPV 7.3 7.0 - 10.0 fL   Creatinine   Result Value Ref Range    Creatinine 1.19 (H) 0.60 - 1.10 mg/dL    GFR MDRD Af Amer 54 (L) >60 mL/min/1.73m2    GFR MDRD Non Af Amer 45 (L) >60 mL/min/1.73m2   ALT (SGPT)   Result Value Ref Range    ALT 12 0 - 45 U/L   AST (SGOT)   Result Value Ref Range    AST 14 0 - 40 U/L   Albumin   Result Value Ref Range    Albumin 3.6 3.5 - 5.0 g/dL   Erythrocyte Sedimentation Rate   Result Value Ref Range    Sed Rate 43 (H) 0 - 20 mm/hr   C-Reactive Protein   Result Value Ref Range    CRP <0.1 0.0 - 0.8 mg/dL     Chronic stable renal insufficiency/diminished kidney function noted. Recommend periodic monitoring.     ESR and CRP levels are nonspecific inflammatory markers, which need to be correlated clinically. ESR/sedimentation rate noted to be mildly positive, while CRP/C-reactive protein returned within normal limits.     Otherwise remainder of lab results were within normal limits.     Sincerely,        Electronically signed by Raheem Londono DO

## 2021-06-19 NOTE — LETTER
Letter by Raheem Londono DO at      Author: Raheem Londono DO Service: -- Author Type: --    Filed:  Encounter Date: 4/16/2019 Status: (Other)         Ebonie Bowman  [address]  [place] [address]             April 16, 2019         Dear Ms. Bowman,    Below are the results from your recent visit:    Resulted Orders   HM2(CBC w/o Differential)   Result Value Ref Range    WBC 12.5 (H) 4.0 - 11.0 thou/uL    RBC 4.38 3.80 - 5.40 mill/uL    Hemoglobin 13.6 12.0 - 16.0 g/dL    Hematocrit 40.4 35.0 - 47.0 %    MCV 92 80 - 100 fL    MCH 31.0 27.0 - 34.0 pg    MCHC 33.6 32.0 - 36.0 g/dL    RDW 12.2 11.0 - 14.5 %    Platelets 159 140 - 440 thou/uL    MPV 7.7 7.0 - 10.0 fL   Creatinine   Result Value Ref Range    Creatinine 1.24 (H) 0.60 - 1.10 mg/dL    GFR MDRD Af Amer 52 (L) >60 mL/min/1.73m2    GFR MDRD Non Af Amer 43 (L) >60 mL/min/1.73m2   ALT (SGPT)   Result Value Ref Range    ALT 19 0 - 45 U/L   AST (SGOT)   Result Value Ref Range    AST 17 0 - 40 U/L   Albumin   Result Value Ref Range    Albumin 3.5 3.5 - 5.0 g/dL   Erythrocyte Sedimentation Rate   Result Value Ref Range    Sed Rate 16 0 - 20 mm/hr   C-Reactive Protein   Result Value Ref Range    CRP <0.1 0.0 - 0.8 mg/dL     Chronic stable renal insufficiency/diminished kidney function noted.  Recommend periodic monitoring.     Very mildly elevated white blood cell count, recommend rechecking CBC with differential in about 4 weeks.     ESR and CRP levels are nonspecific inflammatory markers, they returned within normal limits.     Normal liver enzymes.       Sincerely,      Electronically signed by Raheem Londono DO

## 2021-06-19 NOTE — PROGRESS NOTES
Call patient: her EMG study did not show a specific cause of weakness related to her back or diabetes. There could still be some mild changes in nerve conduction but it cannot be said for sure with this test.     Based on this, I would recommend strengthening exercises with physical therapy. I believe her weakness is most likely related to deconditioning. Let me know if she is agreeable to ordering physical therapy.

## 2021-06-19 NOTE — PROGRESS NOTES
Internal Medicine Office Visit  [facility]  Patient Name: Ebonie Bowman  Patient Age: 68 y.o.  YOB: 1950  MRN: [id number]    Date of Visit: 2018  Reason for Office Visit:   Chief Complaint   Patient presents with     Follow-up     weakness, about the same           Assessment / Plan / Medical Decision Makin. Weakness of both lower extremities  2. Gait instability  3. Chronic pain  - EMG- Both Legs; Future, assess for possible radiculopathy or motor sensorineural changes related to diabetes   - Use walker regularly   - Consider a repeat of physical therapy for gait training and strengthening after EMG completed   - oxyCODONE-acetaminophen (PERCOCET) 5-325 mg per tablet; Take 1-2 tablets by mouth every 8 (eight) hours as needed for pain (maximum of 6 tablets per day). For -  Dispense: 180 tablet; Refill: 0    4. GERD (gastroesophageal reflux disease)  - ranitidine (ZANTAC) 150 MG tablet; TAKE 1 TABLET (150 MG) BY MOUTH 2 (TWO) TIMES A DAY.  Dispense: 180 tablet; Refill: 3    5. Diabetes mellitus, type 2 (H)  - By home readings seems to be well controlled. Last A1C 7.3% 2017 with a slight increase in insulin doses at that time. Repeat A1C in 1 month     Health Maintenance Review  Health Maintenance   Topic Date Due     DXA SCAN  2013     MAMMOGRAM  2018     INFLUENZA VACCINE RULE BASED (1) 2018     DIABETES URINE MICROALBUMIN  10/11/2018     DEPRESSION FOLLOW UP  2018     DIABETES HEMOGLOBIN A1C  2018     DIABETES FOLLOW-UP  2018     DIABETES FOOT EXAM  2019     DIABETES OPHTHALMOLOGY EXAM  2019     FALL RISK ASSESSMENT  2019     ADVANCE DIRECTIVES DISCUSSED WITH PATIENT  2021     TD 18+ HE  2024     COLONOSCOPY  2026     PNEUMOCOCCAL POLYSACCHARIDE VACCINE AGE 65 AND OVER  Completed     PNEUMOCOCCAL CONJUGATE VACCINE FOR ADULTS (PCV13 OR PREVNAR)  Addressed     ZOSTER VACCINE   "Completed         I have changed Ms. Bowman's oxyCODONE-acetaminophen. I am also having her maintain her magnesium oxide, ascorbic acid (vitamin C), cholecalciferol (vitamin D3), aspirin, cyanocobalamin, omega-3 fatty acids-vitamin E, lancing device, MULTIVITAMIN ORAL, docusate sodium, folic acid, insulin syringe-needle U-100, glucose, insulin regular, amLODIPine, lidocaine, loratadine, DULoxetine, metoprolol succinate, blood-glucose meter, blood glucose test, atorvastatin, buPROPion, NovoLIN N NPH U-100 Insulin, methotrexate, traZODone, lisinopril, and ranitidine.      HPI:  Ebonie Bowman is a 68 y.o. year old who presents to the office today for follow up of a weakness in her lower extremities.     She feels more weakness than usual, legs \"feel like jello and wobbly\". If she bends forward and bends at the knees she can easily fall forward. If she is cleaning the litter box or taking care of her son's dog pad where he urinates she has fallen. She has also fallen on the stairs when her legs feel like they have given out on her. She has tingling in the right leg in the whole leg which has been present since she had the nerve stimulator placed. There is intermittent tingling in the left leg but cannot say where this tingling typically happens. Has a hard time doing an MRI due to claustrophobia. Last lumbar MRI 3/2014. She walks with a cane most of the time but at home she walks by stabilizing herself on the walls. She had labs checked after a visit for the same concern, these were normal.     Diabetes reviewed- Novolin N was increased to 33 units twice daily. Fasting readings are typically below 150, later in the day typically between 115-135. Depends a lot on what she eats.      Review of Systems- pertinent positive in bold:  Negative for myalgias, falls, speech changes       Current Scheduled Meds:  Outpatient Encounter Prescriptions as of 7/9/2018   Medication Sig Dispense Refill     amLODIPine (NORVASC) 5 MG tablet " Take 1 tablet (5 mg total) by mouth daily.  0     ascorbic acid (ASCORBIC ACID WITH LATRICIA HIPS) 500 MG tablet Take 500 mg by mouth every evening.        aspirin 81 MG EC tablet Take 81 mg by mouth every evening.       atorvastatin (LIPITOR) 40 MG tablet TAKE 1 TABLET AT BEDTIME 90 tablet 3     blood glucose test strips Check blood sugar four times daily. Dispense brand per patient's insurance at pharmacy discretion. Dx: e11.9 200 strip 11     blood-glucose meter Misc Dispense one meter. Use as directed. Dx: e11.9 1 each 0     buPROPion (WELLBUTRIN) 100 MG tablet Take 1 tablet (100 mg total) by mouth daily. 90 tablet 3     cholecalciferol, vitamin D3, 1,000 unit tablet Take 1,000 Units by mouth every evening.        cyanocobalamin (VITAMIN B-12) 250 MCG tablet Take 250 mcg by mouth every evening.        docusate sodium (COLACE) 100 MG capsule Take 100 mg by mouth 2 (two) times a day.       DULoxetine (CYMBALTA) 30 MG capsule Take 1 capsule (30 mg total) by mouth daily. 30 capsule 1     folic acid (FOLVITE) 800 MCG tablet Take 1,200 mcg by mouth every evening.        glucose 4 GM chewable tablet Chew 4 tablets (16 g total) as needed for low blood sugar. 120 tablet 0     insulin regular (NOVOLIN R) 100 unit/mL injection Inject 24 Units under the skin 3 (three) times a day before meals. Inject 24 units subcutaneous TID with meals plus sliding scale        insulin syringe-needle U-100 (BD INSULIN SYRINGE ULT-FINE II) 1 mL 31 gauge x 5/16 Syrg USE 4 TIMES DAILY AS DIRECTED 500 each 1     lancing device (ACCU-CHEK SOFTCLIX) Misc Use 1 applicator As Directed 4 (four) times a day. 300 each 3     lidocaine (XYLOCAINE) 5 % ointment Apply to affected area QID prn pain 35.44 g 0     lisinopril (PRINIVIL,ZESTRIL) 10 MG tablet TAKE 1 TABLET AT BEDTIME 90 tablet 2     loratadine (CLARITIN) 10 mg tablet Take 1 tablet (10 mg total) by mouth at bedtime. 90 tablet 2     magnesium oxide (MAG-OX) 400 mg tablet Take 800 mg by mouth at  bedtime. With food        methotrexate 2.5 MG tablet TAKE 4 TABLETS (10 MG) ONE TIME WEEKLY  ON  FRIDAY 48 tablet 0     metoprolol succinate (TOPROL XL) 50 MG 24 hr tablet Take 1 tablet (50 mg total) by mouth daily. 90 tablet 1     MULTIVITAMIN ORAL Take 1 tablet by mouth every evening.        NOVOLIN N NPH U-100 INSULIN 100 unit/mL injection Inject 33 Units under the skin 2 (two) times a day before meals. 10 mL 11     omega-3 fatty acids-vitamin E (FISH OIL) 1,000 mg cap Take 1,000 mg by mouth every evening. 90 each 3     oxyCODONE-acetaminophen (PERCOCET) 5-325 mg per tablet Take 1-2 tablets by mouth every 8 (eight) hours as needed for pain (maximum of 6 tablets per day). For 7/9-8/8 180 tablet 0     ranitidine (ZANTAC) 150 MG tablet TAKE 1 TABLET (150 MG) BY MOUTH 2 (TWO) TIMES A DAY. 180 tablet 3     traZODone (DESYREL) 150 MG tablet TAKE 1 TABLET AT BEDTIME 90 tablet 2     [DISCONTINUED] oxyCODONE-acetaminophen (PERCOCET) 5-325 mg per tablet Take 1-2 tablets by mouth every 8 (eight) hours as needed for pain (maximum of 6 tablets per day). For 5/2-6/1 180 tablet 0     [DISCONTINUED] ranitidine (ZANTAC) 150 MG tablet TAKE 1 TABLET (150 MG) BY MOUTH 2 (TWO) TIMES A DAY. 180 tablet 0     No facility-administered encounter medications on file as of 7/9/2018.      Past Medical History:   Diagnosis Date     BBB (bundle branch block)      Chronic back pain      Chronic kidney disease     stage 3     Chronic Kidney Disease (NKF Classification)     Created by Conversion      Depression      Diabetes (H)     Type 2     Diabetes mellitus, type 2 (H)     Created by Conversion      Frequent headaches      Ganglion Of The Left Wrist     Created by Conversion      GERD (gastroesophageal reflux disease)      HLD (hyperlipidemia)      Hypertension     Per H&P dated 1/03/2013     Incarcerated ventral hernia     Per H&P dated 1/03/2013     Osteoarthritis      Rheumatoid arthritis (H)      Thrombocytopenia (H)      Venous  insufficiency      Past Surgical History:   Procedure Laterality Date     BACK SURGERY       CHOLECYSTECTOMY       FATTY TUMOR  1996    LT SHOULDER BLADE     HYSTERECTOMY  1984     JOINT REPLACEMENT Left     knee     ND ABDOMEN SURGERY PROC UNLISTED      Description: Hernia Repair;  Recorded: 10/23/2013;     SPINAL CORD STIMULATOR IMPLANT       Two left wrist  1990, 1995     Social History   Substance Use Topics     Smoking status: Former Smoker     Quit date: 8/1/2008     Smokeless tobacco: Never Used      Comment: smells of cigarrette smoke     Alcohol use No       Objective / Physical Examination:  Vitals:    07/09/18 1111   BP: 132/74   Pulse: 80   Weight: 221 lb (100.2 kg)     Wt Readings from Last 3 Encounters:   07/09/18 221 lb (100.2 kg)   07/02/18 (!) 225 lb (102.1 kg)   05/07/18 217 lb (98.4 kg)     Body mass index is 29.97 kg/(m^2).     Study Result   [facility]  CT LUMBAR SPINE WO CONTRAST  12/16/2014 1:10 PM     INDICATION: Low back pain.  TECHNIQUE: Helical images were obtained through the lumbar spine and evaluated in the axial plane with sagittal and coronal reformations. Images were obtained after L3-L4 through L5-S1 discography.  COMPARISON: Lumbar spine MRI 03/24/2014.     FINDINGS: Nomenclature is based on 5 lumbar type vertebral bodies. Normal vertebral heights and alignment. Chronic Schmorl node in the L2 inferior endplate incidentally noted. No pars defect. Status post right L5-S1 medial facetectomy. 3 cm right renal   cyst. Normal paraspinal musculature accounting for the patient's postoperative changes. Normal aortic diameter. Mild to moderate degenerative change of the sacroiliac joints.     T12-L1: Normal disc height. No herniation. Normal facets. No spinal canal or neural foraminal stenosis.     L1-L2: Mild disc height loss. Minimal subligamentous disc herniation. Normal facets. No spinal canal or neural foraminal stenosis.     L2-L3: Mild disc height loss. Mild posterior annular  bulge. Mild bilateral facet arthropathy. Mild spinal canal stenosis. No neural foraminal stenosis.     L3-L4: Mild disc height loss. The nucleus pulposus is largely intact, however, a moderate posterior central annular tear is identified. Contrast extravasates from the annular tear into the remainder of the annulus. Moderate bilateral facet arthropathy.   Mild spinal canal stenosis in a trefoil configuration. No neural foraminal stenosis.     L4-L5: Severe disc height loss. Moderate right anterior annular tear. Mild to moderate circumferential disc osteophyte complex most pronounced on the right. Mild bilateral facet arthropathy. No spinal canal or neural foraminal stenosis.     L5-S1: Severe disc height loss. Mild circumferential osteophytic spurring. Diffuse annular tear. Mild bilateral facet arthropathy. No spinal canal or neural foraminal stenosis.     IMPRESSION:   CONCLUSION:  1.  Disc degeneration from L3-L4 through L5-S1 as described above.  2.  No high-grade spinal canal or neural foraminal stenosis.  3.  Status post right L5-S1 medial facetectomy.       Study Result   XR THORACIC SPINE 3 VWS, XR LUMBAR SPINE 2 OR 3 VWS   1/1/2018 9:28 AM     INDICATION: Trauma.  COMPARISON: 1/1/2018 cervical spine CT. Chest radiographs, last performed 1/22/2017.     FINDINGS:      12 thoracic rib-bearing and 5 lumbar type vertebral bodies, with levoconvex curvature apex at T2-T3. Mild retrolisthesis of L2 on L3 and anterolisthesis of L3 on L4. Vertebral body height is preserved, without acute compression fracture. Intervertebral   height loss is moderate to severe in degree at L5-S1 and relatively moderate at L4-L5. Mild to moderate facet arthropathy throughout the mid to lower lumbar spine.     Stable positioning of spinal stimulator device, with electrode array terminating at the level of the T5 vertebral body.            General Appearance: Alert and oriented, cooperative, affect appropriate, speech clear, in no  apparent distress  Lungs: Clear to auscultation bilaterally. Normal inspiratory and expiratory effort  Cardiovascular: Regular rate, normal S1, S2. No murmurs, rubs, or gallops  Extremities: No edema.   Neuro: Alert and oriented x3  MSK: Difficulty standing from a seated position, pushes off with arms. Gait is somewhat unsteady when she first stands  strength of lower extremities-   right hip adduction- 4/5 right, 5/5 left   Right hip flexion- 4/5  Knee extension 3/5 bilaterally       Orders Placed This Encounter   Procedures     EMG- Both Legs   Followup: Return in about 4 weeks (around 8/6/2018) for Next scheduled follow up. earlier if needed.        Marichuy Rubio, CNP

## 2021-06-19 NOTE — LETTER
"Letter by Leigh Ann Romero CNP at      Author: Leigh Ann Romero CNP Service: -- Author Type: --    Filed:  Encounter Date: 7/1/2019 Status: (Other)         Patient: Ebonie Bowman   MR Number: [id number]   YOB: 1950   Date of Visit: 7/1/2019     [facility]      Facility:    [facility] [[id number]]  Code Status: FULL CODE      Chief Complaint/Reason for Visit:  Chief Complaint   Patient presents with   ? Follow Up       HPI:   Ebonie is a 69 y.o. female who has a past medical history for type 2 diabetes, hypertension, rheumatoid arthritis, chronic pain with a spine stimulator, RA, CKD, HLD, GERD.  She was hospitalized at [facility] from 6/22/2019 to 6/26/2019 for UTI with subsequent bacteremia.  Her urine was cultured for E. coli and she was started on IV antibiotics.  She did have metabolic encephalopathy which did improve with IV fluids and the antibiotics.  She was switched to Keflex with an end date of 7/5/2019.  Her methotrexate was held due to infection.  She was then transferred to TCU for rehab.    Today, she reports feeling well without any urinary symptoms.  She reports she did not have urinary symptoms before however she did have a lack of appetite and fatigue.  She reports her appetite is improving and feels that she is eating \"too much\".  Her blood sugars are mostly stable with her noon blood sugar being in the low 200s, she continues on NPH 70/30 33 units twice daily, and sliding scale insulin.  Apparently she adjusts her insulin including her basal based on her blood sugars.  Her blood pressure has been slightly elevated with most SBP's in the 150s-160s.  She does have issues with chronic low back pain in which she has a spine stimulator and takes Percocet 1-2 tabs every 6 hours at home.  She denies any shortness of breath, chest pain, bowel or bladder issues.  Past Medical History:  Past Medical History:   Diagnosis Date   ? BBB " (bundle branch block)    ? Chronic back pain    ? Chronic kidney disease     stage 3   ? Chronic Kidney Disease (NKF Classification)     Created by Conversion    ? Depression    ? Diabetes (H)     Type 2   ? Diabetes mellitus, type 2 (H)     Created by Conversion    ? Frequent headaches    ? Ganglion Of The Left Wrist     Created by Conversion    ? GERD (gastroesophageal reflux disease)    ? HLD (hyperlipidemia)    ? Hypertension     Per H&P dated 1/03/2013   ? Incarcerated ventral hernia     Per H&P dated 1/03/2013   ? Osteoarthritis    ? Rheumatoid arthritis (H)    ? Shortness of breath    ? Thrombocytopenia (H)    ? Venous insufficiency            Surgical History:  Past Surgical History:   Procedure Laterality Date   ? BACK SURGERY     ? CHOLECYSTECTOMY     ? FATTY TUMOR  1996    LT SHOULDER BLADE   ? HYSTERECTOMY  1984   ? JOINT REPLACEMENT Left     knee   ? VT ABDOMEN SURGERY PROC UNLISTED      Description: Hernia Repair;  Recorded: 10/23/2013;   ? VT IMPLANT SPINAL NEUROSTIM/ Left 4/25/2019    Procedure: LEFT FLANK INCISION REPLACE NEUROSTIMULATOR;  Surgeon: Vishal Pretty MD;  Location: [facility];  Service: Pain   ? SPINAL CORD STIMULATOR IMPLANT     ? Two left wrist  1990, 1995       Family History:   Family History   Problem Relation Age of Onset   ? Acute Myocardial Infarction Father 62   ? Cancer Father    ? Heart disease Father    ? COPD Father    ? Diabetes Sister    ? Diabetes Brother    ? Melanoma Son    ? Diabetes Maternal Grandmother        Social History:    Social History     Socioeconomic History   ? Marital status:      Spouse name: Not on file   ? Number of children: Not on file   ? Years of education: Not on file   ? Highest education level: Not on file   Occupational History   ? Not on file   Social Needs   ? Financial resource strain: Not on file   ? Food insecurity:     Worry: Not on file     Inability: Not on file   ? Transportation needs:     Medical: Not  on file     Non-medical: Not on file   Tobacco Use   ? Smoking status: Former Smoker     Last attempt to quit: 8/1/2008     Years since quitting: 10.9   ? Smokeless tobacco: Never Used   ? Tobacco comment: smells of cigarrette smoke   Substance and Sexual Activity   ? Alcohol use: No   ? Drug use: No     Comment: takes percocet for chronic pain   ? Sexual activity: Not on file   Lifestyle   ? Physical activity:     Days per week: Not on file     Minutes per session: Not on file   ? Stress: Not on file   Relationships   ? Social connections:     Talks on phone: Not on file     Gets together: Not on file     Attends [employer] service: Not on file     Active member of club or organization: Not on file     Attends meetings of clubs or organizations: Not on file     Relationship status: Not on file   ? Intimate partner violence:     Fear of current or ex partner: Not on file     Emotionally abused: Not on file     Physically abused: Not on file     Forced sexual activity: Not on file   Other Topics Concern   ? Not on file   Social History Narrative    Lives with her son. Lost one son when he was age 28 due to melanoma. Moved around growing up since her father was in the           Review of Systems   Patient denies fever, chills, headache, lightheadedness, dizziness, rhinorrhea, cough, congestion, shortness of breath, chest pain, palpitations, abdominal pain, n/v, diarrhea, constipation, change in appetite, dysuria, frequency, burning or pain with urination.  Other than stated in HPI all other review of systems is negative.           Physical Exam   Vital signs: /84, heart rate 65, respiratory 18, temp 97.0.  GENERAL APPEARANCE: Well developed, well nourished, in no acute distress.  HEENT: normocephalic, atraumatic  PERRL, sclerae anicteric, conjunctivae clear and moist, EOM intact  LUNGS: Lung sounds CTA, no adventitious sounds, respiratory effort normal.  CARD: RRR, S1, S2, without murmurs, gallops,  rubs,  ABD: Soft and nontender with normal bowel sounds.   MSK: Muscle strength and tone were normal.  EXTREMITIES: No cyanosis, clubbing or edema.  NEURO: Alert and oriented x 3.  Face is symmetric.  SKIN: Inspection of the skin reveals no rashes, ulcerations or petechiae.  PSYCH: euthymic          Medication List:  Current Outpatient Medications   Medication Sig   ? methotrexate 2.5 MG tablet Take by mouth once a week. Take on Fridays   ? acetaminophen (TYLENOL) 500 MG tablet Take 1 tablet (500 mg total) by mouth every 4 (four) hours as needed.   ? amLODIPine (NORVASC) 5 MG tablet Take 1 tablet (5 mg total) by mouth daily.   ? ascorbic acid (ASCORBIC ACID WITH LATRICIA HIPS) 500 MG tablet Take 500 mg by mouth every evening.    ? aspirin 81 MG EC tablet Take 81 mg by mouth every evening.   ? atorvastatin (LIPITOR) 40 MG tablet TAKE 1 TABLET AT BEDTIME   ? b complex vitamins tablet Take 1 tablet by mouth daily.   ? blood glucose test strips Check blood sugar four times daily. Dispense brand per patient's insurance at pharmacy discretion. Dx: e11.9   ? blood-glucose meter Misc Dispense one meter. Use as directed. Dx: e11.9   ? buPROPion (WELLBUTRIN) 100 MG tablet Take 1 tablet (100 mg total) by mouth daily.   ? cephalexin (KEFLEX) 500 MG capsule Take 1 capsule (500 mg total) by mouth 3 (three) times a day for 9 days.   ? cholecalciferol, vitamin D3, 1,000 unit tablet Take 1,000 Units by mouth every evening.    ? folic acid (FOLVITE) 800 MCG tablet Take 800 mcg by mouth every evening.          ? insulin syringe-needle U-100 (BD INSULIN SYRINGE ULT-FINE II) 1 mL 31 gauge x 5/16 Syrg USE 4 TIMES DAILY AS DIRECTED   ? lancing device (ACCU-CHEK SOFTCLIX) Misc Use 1 applicator As Directed 4 (four) times a day.   ? lisinopril (PRINIVIL,ZESTRIL) 10 MG tablet TAKE 1 TABLET AT BEDTIME   ? loratadine (CLARITIN) 10 mg tablet Take 1 tablet (10 mg total) by mouth at bedtime.   ? magnesium oxide (MAG-OX) 400 mg tablet Take 400 mg by  mouth at bedtime. With food          ? metoprolol succinate (TOPROL XL) 50 MG 24 hr tablet Take 1 tablet (50 mg total) by mouth daily.   ? MULTIVITAMIN ORAL Take 1 tablet by mouth every evening.    ? NOVOLIN N NPH U-100 INSULIN 100 unit/mL injection Inject 33 Units under the skin 2 (two) times a day before meals.   ? NOVOLIN R REGULAR U-100 INSULN 100 unit/mL injection Accu-Chek coverage (3 times a day before meals and at bedtime)  <70 call MD to adjust meds  Less than 100-   0 units NovoLin-R  101-175   2 units  176-225   4 units  226-300   6 units  301- 375 8 units  376- 425  10 units  > 426  12 units call MD   ? omega-3 fatty acids-vitamin E (FISH OIL) 1,000 mg cap Take 1,000 mg by mouth every evening.   ? oxyCODONE-acetaminophen (PERCOCET) 5-325 mg per tablet Take 1-2 tablets by mouth every 6 (six) hours as needed for pain (maximum of 6 tablets per day). For 6/3/19-7/3/19   ? ranitidine (ZANTAC) 150 MG tablet TAKE 1 TABLET TWICE DAILY   ? traZODone (DESYREL) 150 MG tablet TAKE 1 TABLET AT BEDTIME       Labs:  Recent Results (from the past 240 hour(s))   Urinalysis-UC if Indicated   Result Value Ref Range    Color, UA Yellow Colorless, Yellow, Straw, Light Yellow    Clarity, UA Turbid (!) Clear    Glucose, UA Negative Negative    Bilirubin, UA Negative Negative    Ketones, UA Trace (!) Negative    Specific Gravity, UA 1.031 (H) 1.001 - 1.030    Blood, UA Moderate (!) Negative    pH, UA 6.0 4.5 - 8.0    Protein,  mg/dL (!) Negative mg/dL    Urobilinogen, UA <2.0 E.U./dL <2.0 E.U./dL, 2.0 E.U./dL    Nitrite, UA Negative Negative    Leukocytes, UA Large (!) Negative    Bacteria, UA Few (!) None Seen hpf    RBC, UA 5-10 (!) None Seen, 0-2 hpf    WBC, UA >100 (!) None Seen, 0-5 hpf    Squam Epithel, UA 0-5 None Seen, 0-5 lpf    WBC Clumps Present (!) None Seen   Culture, Urine   Result Value Ref Range    Culture 50,000-100,000 col/ml Escherichia coli (!)        Susceptibility    Escherichia coli - BETSY      Amoxicillin + Clavulanate <=4/2 Sensitive      Ampicillin <=4 Sensitive      Cefazolin <=1 Sensitive      Cefepime <=1 Sensitive      Ceftriaxone <=1 Sensitive      Ciprofloxacin <=0.5 Sensitive      Gentamicin <=2 Sensitive      Levofloxacin <=1 Sensitive      Meropenem <=0.25 Sensitive      Nitrofurantoin <=16 Sensitive      Tetracycline <=2 Sensitive      Tobramycin <=2 Sensitive      Trimethoprim + Sulfamethoxazole <=0.5 Sensitive    Blood culture from PERIPHERAL SITE   Result Value Ref Range    Anaerobic Blood Culture Bottle No Growth No Growth, No organisms seen, bottle returned to instrument, Specimen not received, No Growth at 24 hours, No Growth at 48 hours, No Growth at 72 hours, No Growth at 96 hours, No Growth at 120 hours    Aerobic Blood Culture Bottle No Growth No Growth, No organisms seen, bottle returned to instrument, Specimen not received, No Growth at 24 hours, No Growth at 120 hours, No Growth at 48 hours, No Growth at 72 hours, No Growth at 96 hours   Blood Culture from PERIPHERAL SITE (2nd one)   Result Value Ref Range    Anaerobic Blood Culture Bottle Positive after less than 24 hours incubation (!) No Growth, No organisms seen, bottle returned to instrument, Specimen not received, No Growth at 24 hours, No Growth at 48 hours, No Growth at 72 hours, No Growth at 96 hours, No Growth at 120 hours    Aerobic Blood Culture Bottle No Growth No Growth, No organisms seen, bottle returned to instrument, Specimen not received, No Growth at 24 hours, No Growth at 120 hours, No Growth at 48 hours, No Growth at 72 hours, No Growth at 96 hours   Culture, Blood Positive Work-up   Result Value Ref Range    Culture Escherichia coli (!)     Gram Stain Result Gram negative bacilli        Susceptibility    Escherichia coli - BETSY     Amoxicillin + Clavulanate <=4/2 Sensitive      Ampicillin <=4 Sensitive      Cefazolin <=1 Sensitive      Cefepime <=1 Sensitive      Ceftriaxone <=1 Sensitive      Ciprofloxacin  <=0.5 Sensitive      Gentamicin <=2 Sensitive      Levofloxacin <=1 Sensitive      Meropenem <=0.25 Sensitive      Piperacillin + Tazobactam <=2/4 Sensitive      Tetracycline <=2 Sensitive      Tobramycin <=2 Sensitive      Trimethoprim + Sulfamethoxazole <=0.5 Sensitive    Comprehensive Metabolic Panel   Result Value Ref Range    Sodium 136 136 - 145 mmol/L    Potassium 3.5 3.5 - 5.0 mmol/L    Chloride 102 98 - 107 mmol/L    CO2 23 22 - 31 mmol/L    Anion Gap, Calculation 11 5 - 18 mmol/L    Glucose 259 (H) 70 - 125 mg/dL    BUN 14 8 - 28 mg/dL    Creatinine 1.28 (H) 0.60 - 1.10 mg/dL    GFR MDRD Af Amer 50 (L) >60 mL/min/1.73m2    GFR MDRD Non Af Amer 41 (L) >60 mL/min/1.73m2    Bilirubin, Total 1.3 (H) 0.0 - 1.0 mg/dL    Calcium 9.3 8.5 - 10.5 mg/dL    Protein, Total 6.4 6.0 - 8.0 g/dL    Albumin 3.2 (L) 3.5 - 5.0 g/dL    Alkaline Phosphatase 52 45 - 120 U/L    AST 13 0 - 40 U/L    ALT 10 0 - 45 U/L   Troponin I   Result Value Ref Range    Troponin I 0.08 0.00 - 0.29 ng/mL   Lactic Acid   Result Value Ref Range    Lactic Acid 1.8 0.5 - 2.2 mmol/L   HM1 (CBC with Diff)   Result Value Ref Range    WBC 13.5 (H) 4.0 - 11.0 thou/uL    RBC 4.00 3.80 - 5.40 mill/uL    Hemoglobin 12.5 12.0 - 16.0 g/dL    Hematocrit 37.1 35.0 - 47.0 %    MCV 93 80 - 100 fL    MCH 31.3 27.0 - 34.0 pg    MCHC 33.7 32.0 - 36.0 g/dL    RDW 14.4 11.0 - 14.5 %    Platelets 117 (L) 140 - 440 thou/uL    MPV 10.1 8.5 - 12.5 fL    Neutrophils % 85 (H) 50 - 70 %    Lymphocytes % 7 (L) 20 - 40 %    Monocytes % 8 2 - 10 %    Eosinophils % 0 0 - 6 %    Basophils % 0 0 - 2 %    Neutrophils Absolute 11.4 (H) 2.0 - 7.7 thou/uL    Lymphocytes Absolute 1.0 0.8 - 4.4 thou/uL    Monocytes Absolute 1.0 (H) 0.0 - 0.9 thou/uL    Eosinophils Absolute 0.0 0.0 - 0.4 thou/uL    Basophils Absolute 0.0 0.0 - 0.2 thou/uL   POCT Glucose   Result Value Ref Range    Glucose 224 (H) 70 - 139 mg/dL   POCT Glucose   Result Value Ref Range    Glucose 220 (H) 70 - 139 mg/dL    POCT Glucose   Result Value Ref Range    Glucose 207 (H) 70 - 139 mg/dL   Comprehensive Metabolic Panel   Result Value Ref Range    Sodium 137 136 - 145 mmol/L    Potassium 3.5 3.5 - 5.0 mmol/L    Chloride 106 98 - 107 mmol/L    CO2 23 22 - 31 mmol/L    Anion Gap, Calculation 8 5 - 18 mmol/L    Glucose 124 70 - 125 mg/dL    BUN 13 8 - 28 mg/dL    Creatinine 0.90 0.60 - 1.10 mg/dL    GFR MDRD Af Amer >60 >60 mL/min/1.73m2    GFR MDRD Non Af Amer >60 >60 mL/min/1.73m2    Bilirubin, Total 0.7 0.0 - 1.0 mg/dL    Calcium 8.3 (L) 8.5 - 10.5 mg/dL    Protein, Total 5.5 (L) 6.0 - 8.0 g/dL    Albumin 2.6 (L) 3.5 - 5.0 g/dL    Alkaline Phosphatase 43 (L) 45 - 120 U/L    AST 12 0 - 40 U/L    ALT 11 0 - 45 U/L   Magnesium   Result Value Ref Range    Magnesium 1.4 (L) 1.8 - 2.6 mg/dL   HM1 (CBC with Diff)   Result Value Ref Range    WBC 11.8 (H) 4.0 - 11.0 thou/uL    RBC 3.52 (L) 3.80 - 5.40 mill/uL    Hemoglobin 11.0 (L) 12.0 - 16.0 g/dL    Hematocrit 32.7 (L) 35.0 - 47.0 %    MCV 93 80 - 100 fL    MCH 31.3 27.0 - 34.0 pg    MCHC 33.6 32.0 - 36.0 g/dL    RDW 14.5 11.0 - 14.5 %    Platelets 98 (L) 140 - 440 thou/uL    MPV 10.3 8.5 - 12.5 fL    Neutrophils % 79 (H) 50 - 70 %    Lymphocytes % 11 (L) 20 - 40 %    Monocytes % 9 2 - 10 %    Eosinophils % 1 0 - 6 %    Basophils % 0 0 - 2 %    Neutrophils Absolute 9.3 (H) 2.0 - 7.7 thou/uL    Lymphocytes Absolute 1.2 0.8 - 4.4 thou/uL    Monocytes Absolute 1.1 (H) 0.0 - 0.9 thou/uL    Eosinophils Absolute 0.1 0.0 - 0.4 thou/uL    Basophils Absolute 0.0 0.0 - 0.2 thou/uL   POCT Glucose   Result Value Ref Range    Glucose 144 (H) 70 - 139 mg/dL   Blood culture from PERIPHERAL SITE   Result Value Ref Range    Anaerobic Blood Culture Bottle No Growth No Growth, No organisms seen, bottle returned to instrument, Specimen not received, No Growth at 24 hours, No Growth at 48 hours, No Growth at 72 hours, No Growth at 96 hours, No Growth at 120 hours    Aerobic Blood Culture Bottle No Growth  No Growth, No organisms seen, bottle returned to instrument, Specimen not received, No Growth at 24 hours, No Growth at 120 hours, No Growth at 48 hours, No Growth at 72 hours, No Growth at 96 hours   Blood Culture from PERIPHERAL SITE (2nd one)   Result Value Ref Range    Anaerobic Blood Culture Bottle No Growth No Growth, No organisms seen, bottle returned to instrument, Specimen not received, No Growth at 24 hours, No Growth at 48 hours, No Growth at 72 hours, No Growth at 96 hours, No Growth at 120 hours    Aerobic Blood Culture Bottle No Growth No Growth, No organisms seen, bottle returned to instrument, Specimen not received, No Growth at 24 hours, No Growth at 120 hours, No Growth at 48 hours, No Growth at 72 hours, No Growth at 96 hours   POCT Glucose - 4 times daily before meals and at bedtime   Result Value Ref Range    Glucose 164 (H) 70 - 139 mg/dL   POCT Glucose - 4 times daily before meals and at bedtime   Result Value Ref Range    Glucose 146 (H) 70 - 139 mg/dL   POCT Glucose - 4 times daily before meals and at bedtime   Result Value Ref Range    Glucose 170 (H) 70 - 139 mg/dL   HM1 (CBC with Diff)   Result Value Ref Range    WBC 11.2 (H) 4.0 - 11.0 thou/uL    RBC 3.82 3.80 - 5.40 mill/uL    Hemoglobin 11.8 (L) 12.0 - 16.0 g/dL    Hematocrit 34.5 (L) 35.0 - 47.0 %    MCV 90 80 - 100 fL    MCH 30.9 27.0 - 34.0 pg    MCHC 34.2 32.0 - 36.0 g/dL    RDW 14.1 11.0 - 14.5 %    Platelets 112 (L) 140 - 440 thou/uL    MPV 9.9 8.5 - 12.5 fL    Neutrophils % 76 (H) 50 - 70 %    Lymphocytes % 13 (L) 20 - 40 %    Monocytes % 9 2 - 10 %    Eosinophils % 2 0 - 6 %    Basophils % 0 0 - 2 %    Neutrophils Absolute 8.5 (H) 2.0 - 7.7 thou/uL    Lymphocytes Absolute 1.4 0.8 - 4.4 thou/uL    Monocytes Absolute 1.0 (H) 0.0 - 0.9 thou/uL    Eosinophils Absolute 0.2 0.0 - 0.4 thou/uL    Basophils Absolute 0.0 0.0 - 0.2 thou/uL   POCT Glucose - 4 times daily before meals and at bedtime   Result Value Ref Range    Glucose 111  70 - 139 mg/dL   POCT Glucose - 4 times daily before meals and at bedtime   Result Value Ref Range    Glucose 216 (H) 70 - 139 mg/dL   POCT Glucose - 4 times daily before meals and at bedtime   Result Value Ref Range    Glucose 121 70 - 139 mg/dL   POCT Glucose - 4 times daily before meals and at bedtime   Result Value Ref Range    Glucose 107 70 - 139 mg/dL   POCT Glucose - 4 times daily before meals and at bedtime   Result Value Ref Range    Glucose 201 (H) 70 - 139 mg/dL   POCT Glucose - 4 times daily before meals and at bedtime   Result Value Ref Range    Glucose 100 70 - 139 mg/dL   POCT Glucose - 4 times daily before meals and at bedtime   Result Value Ref Range    Glucose 149 (H) 70 - 139 mg/dL   Platelet Count - every other day x 3   Result Value Ref Range    Platelets 148 140 - 440 thou/uL   POCT Glucose - 4 times daily before meals and at bedtime   Result Value Ref Range    Glucose 93 70 - 139 mg/dL   Basic Metabolic Panel   Result Value Ref Range    Sodium 142 136 - 145 mmol/L    Potassium 3.8 3.5 - 5.0 mmol/L    Chloride 108 (H) 98 - 107 mmol/L    CO2 24 22 - 31 mmol/L    Anion Gap, Calculation 10 5 - 18 mmol/L    Glucose 50 (LL) 70 - 125 mg/dL    Calcium 8.8 8.5 - 10.5 mg/dL    BUN 14 8 - 28 mg/dL    Creatinine 0.86 0.60 - 1.10 mg/dL    GFR MDRD Af Amer >60 >60 mL/min/1.73m2    GFR MDRD Non Af Amer >60 >60 mL/min/1.73m2   HM2(CBC w/o Differential)   Result Value Ref Range    WBC 14.3 (H) 4.0 - 11.0 thou/uL    RBC 3.70 (L) 3.80 - 5.40 mill/uL    Hemoglobin 11.6 (L) 12.0 - 16.0 g/dL    Hematocrit 34.3 (L) 35.0 - 47.0 %    MCV 93 80 - 100 fL    MCH 31.4 27.0 - 34.0 pg    MCHC 33.8 32.0 - 36.0 g/dL    RDW 14.4 11.0 - 14.5 %    Platelets 196 140 - 440 thou/uL    MPV 10.1 8.5 - 12.5 fL         Assessment:    ICD-10-CM    1. Acute cystitis without hematuria N30.00    2. History of bacteremia Z87.898    3. Rheumatoid arthritis involving multiple sites, unspecified rheumatoid factor presence (H) M06.9    4.  Essential hypertension I10    5. Type 2 diabetes mellitus with complication, with long-term current use of insulin (H) E11.8     Z79.4    6. Chronic low back pain, unspecified back pain laterality, with sciatica presence unspecified M54.5     G89.29        Plan:  UTI: Continue Keflex through 7/5/2019.  Observe for signs and symptoms including encephalopathy.    Rheumatoid arthritis: Will restart methotrexate this Friday at 7.5 mg weekly.    Hypertension: Elevated blood pressures will increase lisinopril 20 mg daily and continue metoprolol succinate 50 mg daily and amlodipine 5 mg daily.    Diabetes: At this time we will just observe for trends of elevating blood sugars as she is covered with NovoLog sliding scale.  Also continue with NPH at 33 units twice daily    Low back pain: Continue with Percocet 5/325 1 tab every 6 hours as needed.  She adjust her stimulator related to the intensity of her pain.    Counseled on signs and symptoms of UTI and how methotrexate can decrease immunity making it difficult for her to fight an infection.  And this is why this medication was held.    35 total minutes spent with 20 spent face-to-face with patient in counseling and coordination regarding her rheumatoid arthritis and methotrexate in the picture of an infection., chronic pain medications and discovery of diabetic regimen.      Electronically signed by: Leigh Ann Romero CNP

## 2021-06-19 NOTE — LETTER
Letter by Raheem Londono DO at      Author: Raheem Londono DO Service: -- Author Type: --    Filed:  Encounter Date: 4/16/2019 Status: (Other)         Ebonie Bowman  [address]  [place] [address]             April 16, 2019         Dear Ms. Bowman,    Below are the results from your recent visit:    Resulted Orders   QTF-Mycobacterium tuberculosis by QuantiFERON-TB Gold Plus   Result Value Ref Range    QTF RESULT Negative Negative    QTF INTREPRETATION       No interferon-gamma response to M. tuberculosis antigens was detected.  Infecton with M. tuberculosis is unlikely.  A negative result alone does not exclude infection with M. tuberculosis    QTF NIL 0.09 IU/mL    QTF ANTIGEN TB1-NIL -0.01 IU/mL    QTF ANTIGEN TB2 - NIL -0.01 IU/mL    QTF MITOGEN-NIL 8.43 IU/mL   HM2(CBC w/o Differential)   Result Value Ref Range    WBC 12.5 (H) 4.0 - 11.0 thou/uL    RBC 4.38 3.80 - 5.40 mill/uL    Hemoglobin 13.6 12.0 - 16.0 g/dL    Hematocrit 40.4 35.0 - 47.0 %    MCV 92 80 - 100 fL    MCH 31.0 27.0 - 34.0 pg    MCHC 33.6 32.0 - 36.0 g/dL    RDW 12.2 11.0 - 14.5 %    Platelets 159 140 - 440 thou/uL    MPV 7.7 7.0 - 10.0 fL   Creatinine   Result Value Ref Range    Creatinine 1.24 (H) 0.60 - 1.10 mg/dL    GFR MDRD Af Amer 52 (L) >60 mL/min/1.73m2    GFR MDRD Non Af Amer 43 (L) >60 mL/min/1.73m2   ALT (SGPT)   Result Value Ref Range    ALT 19 0 - 45 U/L   AST (SGOT)   Result Value Ref Range    AST 17 0 - 40 U/L   Albumin   Result Value Ref Range    Albumin 3.5 3.5 - 5.0 g/dL   Erythrocyte Sedimentation Rate   Result Value Ref Range    Sed Rate 16 0 - 20 mm/hr   C-Reactive Protein   Result Value Ref Range    CRP <0.1 0.0 - 0.8 mg/dL        Negative TB blood test.      Sincerely,    Electronically signed by Raheem Londono,

## 2021-06-19 NOTE — LETTER
Letter by Marichuy Rubio FNP at      Author: Marichuy Rubio FNP Service: -- Author Type: --    Filed:  Encounter Date: 10/16/2019 Status: Signed         Ebonie Bowman  [address]  [place] [address]             October 16, 2019         Dear Ms. Bowman,    Below are the results from your recent visit:    Resulted Orders   Glycosylated Hemoglobin A1c   Result Value Ref Range    Hemoglobin A1c 7.0 (H) 3.5 - 6.0 %     Your A1C has increased compared to the last check but still shows that diabetes is reasonably well controlled.  I will see you in November to review your home blood sugar readings if you can please remember to bring either your meter or your logbook with you we can review these together.    Please call with questions or contact us using [facility].    Sincerely,        Electronically signed by ARSLAN Vasquez

## 2021-06-19 NOTE — LETTER
Letter by Rosalie Childress CNP at      Author: Rosalie Childress CNP Service: -- Author Type: --    Filed:  Encounter Date: 7/10/2019 Status: (Other)         Patient: Ebonie Bowman   MR Number: [id number]   YOB: 1950   Date of Visit: 7/10/2019     [facility]    Facility:   [facility]– [place][note 2] [[id number]]   Code Status: FULL CODE  PCP: Marichuy Rubio FNP   Phone: [phone]   Fax: [phone]      CHIEF COMPLAINT/REASON FOR VISIT:  Chief Complaint   Patient presents with   ? Discharge Summary       HISTORY COURSE:  Ebonie is a 69 y.o. female who has a past medical history for type 2 diabetes, hypertension, rheumatoid arthritis, chronic pain with a spine stimulator, RA, CKD, HLD, GERD.  She was hospitalized at [facility] from 6/22/2019 to 6/26/2019 for UTI with subsequent bacteremia.  Her urine was cultured for E. coli and she was started on IV antibiotics.  She did have metabolic encephalopathy which did improve with IV fluids and the antibiotics.  She was switched to Keflex with an end date of 7/5/2019.  Her methotrexate was held due to infection.  She was then transferred to TCU for rehab.    Today she is being seen as a first routine visit and face-to-face for discharge.  She will discharge to home on 7/11/2019 with current medications and treatments.  She will also have PT OT home health aide and RN.  Currently she is ambulating  300 feet with a walker and has completed steps.  She denies chest pain or shortness of breath denies constipation or diarrhea.  Her creatinine and GFR are slightly elevated from prior lab.  Creatinine is at 1.16 up from 1.11 in GFR is 46 down from 48.  Hemoglobin on 7 9 was 10.9.  Is recommended she follow-up with her primary MD within 7 days of discharge from the transitional care unit.  Her blood sugars have been stable and blood pressures have been ranging from 130s to 150s over 70-80's.      Review of Systems    Constitutional: Positive for fatigue. Negative for activity change, appetite change and fever.   HENT: Negative for congestion.    Respiratory: Negative for cough, shortness of breath and wheezing.    Cardiovascular: Negative for chest pain and leg swelling.   Gastrointestinal: Negative for abdominal distention, abdominal pain, constipation, diarrhea and nausea.   Genitourinary: Negative for dysuria.   Musculoskeletal: Negative for arthralgias and back pain.   Skin: Negative for color change and wound.   Neurological: Negative for dizziness.   Psychiatric/Behavioral: Negative for agitation, behavioral problems and confusion.       Vitals:    07/10/19 0926   BP: 136/76   Pulse: 67   Resp: 16   Temp: 97.7  F (36.5  C)   SpO2: 96%   Weight: (!) 227 lb 9.6 oz (103.2 kg)       Physical Exam   Constitutional: She is oriented to person, place, and time. She appears well-developed and well-nourished.   HENT:   Head: Normocephalic and atraumatic.   Eyes: Pupils are equal, round, and reactive to light. Conjunctivae are normal.   Neck: Normal range of motion. Neck supple.   Cardiovascular: Normal rate, regular rhythm and normal heart sounds.   No murmur heard.  Pulmonary/Chest: Effort normal and breath sounds normal. She has no wheezes. She has no rales.   Abdominal: Soft. Bowel sounds are normal. She exhibits no distension. There is no tenderness.   Musculoskeletal: Normal range of motion. She exhibits no edema.   Neurological: She is alert and oriented to person, place, and time.   Skin: Skin is warm and dry.   Psychiatric: She has a normal mood and affect. Her behavior is normal.       MEDICATION LIST:  Current Outpatient Medications   Medication Sig   ? acetaminophen (TYLENOL) 500 MG tablet Take 1 tablet (500 mg total) by mouth every 4 (four) hours as needed.   ? amLODIPine (NORVASC) 5 MG tablet Take 1 tablet (5 mg total) by mouth daily.   ? ascorbic acid (ASCORBIC ACID WITH LATRICIA HIPS) 500 MG tablet Take 500 mg by mouth  every evening.    ? aspirin 81 MG EC tablet Take 81 mg by mouth every evening.   ? atorvastatin (LIPITOR) 40 MG tablet TAKE 1 TABLET AT BEDTIME   ? b complex vitamins tablet Take 1 tablet by mouth daily.   ? blood glucose test strips Check blood sugar four times daily. Dispense brand per patient's insurance at pharmacy discretion. Dx: e11.9   ? blood-glucose meter Misc Dispense one meter. Use as directed. Dx: e11.9   ? buPROPion (WELLBUTRIN) 100 MG tablet Take 1 tablet (100 mg total) by mouth daily.   ? cholecalciferol, vitamin D3, 1,000 unit tablet Take 1,000 Units by mouth every evening.    ? folic acid (FOLVITE) 800 MCG tablet Take 800 mcg by mouth every evening.          ? insulin regular (NOVOLIN R) 100 unit/mL injection Inject 24 Units under the skin 3 (three) times a day before meals.   ? insulin syringe-needle U-100 (BD INSULIN SYRINGE ULT-FINE II) 1 mL 31 gauge x 5/16 Syrg USE 4 TIMES DAILY AS DIRECTED   ? lancing device (ACCU-CHEK SOFTCLIX) Misc Use 1 applicator As Directed 4 (four) times a day.   ? lisinopril (PRINIVIL,ZESTRIL) 10 MG tablet TAKE 1 TABLET AT BEDTIME   ? loratadine (CLARITIN) 10 mg tablet Take 1 tablet (10 mg total) by mouth at bedtime.   ? magnesium oxide (MAG-OX) 400 mg tablet Take 400 mg by mouth at bedtime. With food          ? methotrexate 2.5 MG tablet Take by mouth once a week. Take on Fridays   ? metoprolol succinate (TOPROL XL) 50 MG 24 hr tablet Take 1 tablet (50 mg total) by mouth daily.   ? MULTIVITAMIN ORAL Take 1 tablet by mouth every evening.    ? NOVOLIN N NPH U-100 INSULIN 100 unit/mL injection Inject 33 Units under the skin 2 (two) times a day before meals.   ? NOVOLIN R REGULAR U-100 INSULN 100 unit/mL injection Accu-Chek coverage (3 times a day before meals and at bedtime)  <70 call MD to adjust meds  Less than 100-   0 units NovoLin-R  101-175   2 units  176-225   4 units  226-300   6 units  301- 375 8 units  376- 425  10 units  > 426  12 units call MD   ? omega-3 fatty  acids-vitamin E (FISH OIL) 1,000 mg cap Take 1,000 mg by mouth every evening.   ? oxyCODONE-acetaminophen (PERCOCET) 5-325 mg per tablet Take 1-2 tablets by mouth every 6 (six) hours as needed for pain (maximum of 6 tablets per day). For 6/3/19-7/3/19   ? polyethylene glycol (MIRALAX) 17 gram packet Take 17 g by mouth daily as needed.   ? ranitidine (ZANTAC) 150 MG tablet TAKE 1 TABLET TWICE DAILY   ? traZODone (DESYREL) 150 MG tablet TAKE 1 TABLET AT BEDTIME       DISCHARGE DIAGNOSIS:    ICD-10-CM    1. Essential hypertension I10    2. Acute cystitis without hematuria N30.00    3. Type 2 diabetes mellitus with hyperosmolarity without coma, unspecified whether long term insulin use (H) E11.00    4. Venous Insufficiency I87.2        MEDICAL EQUIPMENT NEEDS:  None   DISCHARGE PLAN/FACE TO FACE:  I certify that services are/were furnished while this patient was under the care of a physician and that a physician or an allowed non-physician practitioner (NPP), had a face-to-face encounter that meets the physician face-to-face encounter requirements. The encounter was in whole, or in part, related to the primary reason for home health. The patient is confined to his/her home and needs intermittent skilled nursing, physical therapy, speech-language pathology, or the continued need for occupational therapy. A plan of care has been established by a physician and is periodically reviewed by a physician.  Date of Face-to-Face Encounter: 7/10/19    I certify that, based on my findings, the following services are medically necessary home health services: PT OT home health aide and RN    My clinical findings support the need for the above skilled services because: PT OT for continued strength and endurance, home health aide to assist with activities of daily living, RN for vital signs and medication management    This patient is homebound because: She is deconditioned and easily fatigued due to recent hospitalization for UTI with  bacteremia.    The patient is, or has been, under my care and I have initiated the establishment of the plan of care. This patient will be followed by a physician who will periodically review the plan of care.    Schedule follow up visit with primary care provider within 7 days to reestablish care.    Electronically signed by: Rosalie Childress CNP     Electronically signed by: Lyla Miller MD

## 2021-06-19 NOTE — LETTER
Letter by Johanny Harris PA-C at      Author: Johanny Harris PA-C Service: -- Author Type: --    Filed:  Encounter Date: 7/18/2019 Status: (Other)       Sobeida Colby  [address]    [place] [address]      07/18/19    Dear  Ebonie Bowman  [id number]    This letter is in regards to the appointment that you had scheduled on July 18, 2019 at the [facility] with Johanny Jenkins PA-C.     The [facility] strives to see all patients in a timely manner and we need your help to achieve this.  The above-mentioned appointment was missed and we do not have record of a cancellation by you.  Whenever possible, we request appointment cancellations at least 24 hours in advance.  This time allows us to offer the appointment to another patient in need.      If you feel you have received this letter in error, or if you need to reschedule this appointment, please call our office so that we may update our records.      Sincerely,    [facility]

## 2021-06-19 NOTE — LETTER
Letter by Marichuy Rubio FNP at      Author: Marichuy Rubio FNP Service: -- Author Type: --    Filed:  Encounter Date: 11/21/2019 Status: Signed         Ebonie Bowman  [address]  [place] [address]             November 21, 2019         Dear Ms. Bowman,    Below are the results from your recent visit:    Resulted Orders   Glycosylated Hemoglobin A1c   Result Value Ref Range    Hemoglobin A1c 6.9 (H) 3.5 - 6.0 %       Your A1C is still in a good range. I would still urge you to cut back on the donuts and sweets!     Please call with questions or contact us using [employer].    Sincerely,        Electronically signed by ARSLAN Vasquez

## 2021-06-19 NOTE — LETTER
Letter by Marichuy Rubio FNP at      Author: Marichuy Rubio FNP Service: -- Author Type: --    Filed:  Encounter Date: 8/21/2019 Status: (Other)         [facility]  08/21/19    Patient: Ebonie Bowman  YOB: 1950  Medical Record Number: [id number]                                                                  Opioid / Opioid Plus Controlled Substance Agreement    I understand that my care provider has prescribed an opioid (narcotic) controlled substance to help manage my condition(s). I am taking this medicine to help me function or work. I know this is strong medicine, and that it can cause serious side effects. Opioid medicine can be sedating, addicting and may cause a dependency on the drug. They can affect my ability to drive or think, and cause depression. They need to be taken exactly as prescribed. Combining opioids with certain medicines or chemicals (such as cocaine, sedatives and tranquilizers, sleeping pills, meth) can be dangerous or even fatal. Also, if I stop opioids suddenly, I may have severe withdrawal symptoms. Last, I understand that opioids do not work for all types of pain nor for all patients. If not helpful, I may be asked to stop them.        The risks, benefits, and side effects of these medicine(s) were explained to me. I agree that:    1. I will take part in other treatments as advised by my care team. This may be psychiatry or counseling, physical therapy, behavioral therapy, group treatment or a referral to a pain clinic. I will reduce or stop my medicine when my care team tells me to do so.  2. I will take my medicines as prescribed. I will not change the dose or schedule unless my care team tells me to. There will be no refills if I run out early.  I may be contactedwithout warning and asked to complete a urine drug test or pill count at any time.   3. I will keep all my appointments, and understand this is part of the monitoring of opioids. My  care team may require an office visit for EVERY opioid/controlled substance refill. If I miss appointments or dont follow instructions, my care team may stop my medicine.  4. I will not ask other providers to prescribe controlled substances, and I will not accept controlled substances from other people. If I need another prescribed controlled substance for a new reason, I will tell my care team within 1 business day.  5. I will use one pharmacy to fill all of my controlled substance prescriptions, and it is up to me to make sure that I do not run out of my medicines on weekends or holidays. If my care team is willing to refill my opioid prescription without a visit, I must request refills only during office hours, refills may take up to 3 days to process, and it may take up to 5 to 7 days for my medicine to be mailed and ready at my pharmacy. Prescriptions will not be mailed anywhere except my pharmacy.        [id number]  Rev 12/18         Registration to scan to EHR                             Page 1 of 2               Controlled Substance Agreement Opioid        [facility]  08/21/19  Patient: Ebonie Bowman  YOB: 1950  Medical Record Number: [id number]                                                                  6. I am responsible for my prescriptions. If the medicine/prescription is lost or stolen, it will not be replaced. I also agree not to share controlled substance medicines with anyone.  7. I agree to not use ANY illegal or recreational drugs. This includes marijuana, cocaine, bath salts or other drugs. I agree not to use alcohol unless my care team says I may.          I agree to give urine samples whenever asked. If I dont give a urine sample, the care team may stop my medicine.    8. If I enroll in the [place] Medical Marijuana program, I will tell my care team. I will also sign an agreement to share my medical records with my care team.   9. I will bring in my list of  medicines (or my medicine bottles) each time I come to the clinic.   10. I will tell my care team right away if I become pregnant or have a new medical problem treated outside of my regular clinic.  11. I understand that this medicine can affect my thinking and judgment. It may be unsafe for me to drive, use machinery and do dangerous tasks. I will not do any of these things until I know how the medicine affects me. If my dose changes, I will wait to see how it affects me. I will contact my care team if I have concerns about medicine side effects.    I understand that if I do not follow any of the conditions above, my prescriptions or treatment may be stopped.      I agree that my provider, clinic care team, and pharmacy may work with any city, state or federal law enforcement agency that investigates the misuse, sale, or other diversion of my controlled medicine. I will allow my provider to discuss my care with or share a copy of this agreement with any other treating provider, pharmacy or emergency room where I receive care. I agree to give up (waive) any right of privacy or confidentiality with respect to these consents.     I have read this agreement and have asked questions about anything I did not understand.      ________________________________________________________________________  Patient signature - Date/Time -  Ebonie Bowman                                      ________________________________________________________________________  Witness signature                                                            ________________________________________________________________________  Provider signature - ARSLAN Vasquez      [id number]  Rev 12/18         Registration to scan to EHR                         Page 2 of 2                   Controlled Substance Agreement Opioid           Page 1 of 2  Opioid Pain Medicines (also known as Narcotics)  What You Need to Know    What are opioids?   Opioids are pain  medicines that must be prescribed by a doctor.  They are also known as narcotics.    Examples are:     morphine (MS Contin, Ember)    oxycodone (Oxycontin)    oxycodone and acetaminophen (Percocet)    hydrocodone and acetaminophen (Vicodin, Norco)     fentanyl patch (Duragesic)     hydromorphone (Dilaudid)     methadone     What do opioids do well?   Opioids are best for short-term pain after a surgery or injury. They also work well for cancer pain. Unlike other pain medicines, they do not cause liver or kidney failure or ulcers. They may help some people with long-lasting (chronic) pain.     What do opioids NOT do well?   Opioids never get rid of pain entirely, and they do not work well for most patients with chronic pain. Opioids do not reduce swelling, one of the causes of pain. They also dont work well for nerve pain.                           For informational purposes only.  Not to replace the advice of your care provider.  Copyright [address] [facility]. All right reserved. [employer] 665982-Hrv 02/18.      Page 2 of 2    Risks and side effects   Talk to your doctor before you start or decide to keep taking one of these medicines. Side effects include:    Lowering your breathing rate enough to cause death    Overdose, including death, especially if taking higher than prescribed doses    Long-term opioid use    Worse depression symptoms; less pleasure in things you usually enjoy    Feeling tired or sluggish    Slower thoughts or cloudy thinking    Being more sensitive to pain over time; pain is harder to control    Trouble sleeping or restless sleep    Changes in hormone levels (for example, less testosterone)    Changes in sex drive or ability to have sex    Constipation    Unsafe driving    Itching and sweating    Feeling dizzy    Nausea, vomiting and dry mouth    What else should I know about opioids?  When someone takes opioids for too long or too often, they become dependent. This means that if  you stop or reduce the medicine too quickly, you will have withdrawal symptoms.    Dependence is not the same as addiction. Addiction is when people keep using a substance that harms their body, their mind or their relations with others. If you have a history of drug or alcohol abuse, taking opioids can cause a relapse.    Over time, opioids dont work as well. Most people will need higher and higher doses. The higher the dose, the more serious the side effects. We dont know the long-term effects of opioids.      Prescribed opioids aren't the best way to manage chronic pain    Other ways to manage pain include:      Ibuprofen or acetaminophen.  You should always try this first.      Treat health problems that may be causing pain.      acupuncture or massage, deep breathing, meditation, visual imagery, aromatherapy.      Use heat or ice at the pain site      Physical therapy and exercise      Stop smoking      See a counselor or therapist                                                  People who have used opioids for a long time may have a lower quality of life, worse depression, higher levels of pain and more visits to doctors.    Never share your opioids with others. Be sure to store opioids in a secure place, locked if possible.Young children can easily swallow them and overdose.     You can overdose on opioids.  Signs of overdose include decrease or loss of consciousness, slowed breathing, trouble waking and blue lips.  If someone is worried about overdose, they should call 911.    If you are at risk for overdose, you may get naloxone (Narcan, a medicine that reverses the effects of opioids.  If you overdose, a friend or family member can give you Narcan while waiting for the ambulance.  They need to know the signs of overdose and how to give Narcan.    While you're taking opioids:    Don't use alcohol or street drugs. Taking them together can cause death.    Don't take any of these medicines unless your doctor  says its okay.  Taking these with opioids can cause death.    Benzodiazepines (such as lorazepam         or diazepam)    Muscle relaxers (such as cyclobenzaprine)    sleeping pills    other opioids    Safe disposal of opioids  Find your area drug take-back program, your pharmacy mail-back program, buy a special disposal bag (such as Deterra) from your pharmacy or flush them down the toilet.  Use the guidelines at:  www.fda.gov/drugs/resourcesforyou

## 2021-06-19 NOTE — PROGRESS NOTES
Internal Medicine Office Visit  [facility]  Patient Name: Ebonie Bowman  Patient Age: 68 y.o.  YOB: 1950  MRN: [id number]    Date of Visit: 2018  Reason for Office Visit:   Chief Complaint   Patient presents with     Follow-up     3/months, leg concerns           Assessment / Plan / Medical Decision Makin. HTN (hypertension)  - Stable, continue current medications     2. Chronic pain  - UDS today  - oxyCODONE-acetaminophen (PERCOCET) 5-325 mg per tablet; Take 1-2 tablets by mouth every 8 (eight) hours as needed for pain (maximum of 6 tablets per day). For -  Dispense: 180 tablet; Refill: 0    3. Post-menopausal  - DXA Bone Density Scan; Future    4. Osteoarthritis  - Continue exercises for strengthening/fall prevention     5. Diabetes mellitus, type 2 (H)  - Stable based on home readings   - Microalbumin, Random Urine  - Glycosylated Hemoglobin A1c    6. Medication monitoring encounter  - Drugs of Abuse 1,Urine    7. Candidiasis of skin  - nystatin (MYCOSTATIN) powder; Apply to affected area 3 times daily x 10-14 days or at least 3 days after resolution of symptoms  Dispense: 15 g; Refill: 0      Health Maintenance Review  Health Maintenance   Topic Date Due     DXA SCAN  2013     INFLUENZA VACCINE RULE BASED (1) 2018     MAMMOGRAM  2018     DEPRESSION FOLLOW UP  2018     DIABETES HEMOGLOBIN A1C  2019     DIABETES FOLLOW-UP  2019     DIABETES FOOT EXAM  2019     DIABETES OPHTHALMOLOGY EXAM  2019     FALL RISK ASSESSMENT  2019     DIABETES URINE MICROALBUMIN  2019     ADVANCE DIRECTIVES DISCUSSED WITH PATIENT  2021     TD 18+ HE  2024     COLONOSCOPY  2026     PNEUMOCOCCAL POLYSACCHARIDE VACCINE AGE 65 AND OVER  Completed     PNEUMOCOCCAL CONJUGATE VACCINE FOR ADULTS (PCV13 OR PREVNAR)  Addressed     ZOSTER VACCINE  Completed         I have changed Ms. Bowman's  oxyCODONE-acetaminophen. I am also having her start on nystatin. Additionally, I am having her maintain her magnesium oxide, ascorbic acid (vitamin C), cholecalciferol (vitamin D3), aspirin, cyanocobalamin, omega-3 fatty acids-vitamin E, lancing device, MULTIVITAMIN ORAL, docusate sodium, folic acid, insulin syringe-needle U-100, glucose, insulin regular, amLODIPine, lidocaine, loratadine, DULoxetine, blood-glucose meter, blood glucose test, atorvastatin, buPROPion, NovoLIN N NPH U-100 Insulin, traZODone, lisinopril, ranitidine, methotrexate, and metoprolol succinate.      HPI:  Ebonie Bowman is a 68 y.o. year old who presents to the office today for 3 month follow-up of chronic medical conditions.    No falls since she was last seen. EMG was normal. She cannot afford the co-pay to do formal PT but due to prior PT treatments she has the exercises at home.    We first reviewed her history of diabetes mellitus. Readings have been below 150. No hypoglycemia.     She has a new concern of a rash and pain in the left inguinal area which started about 1 week ago.    She has chronic back pain, she typically takes the percocet at night only. Her pain is 8/10 currently and she has not taken the percocet. When she takes percocet this lowers her pain level to about a 6/10. Typically takes 3 tablets per day. Last dose of percocet was last night.    We reviewed her rheumatoid arthritis. She has been having a lot of wrist and hand pain. She did not tolerate Humira and may be switching to a different rheumatoid medication through her rheumatologist.     She had a stool test for colon cancer screening done April 2018 through her insurance. Results are not available to me and she did not receive her results.     Review of Systems- pertinent positive in bold:  A 10-point ROS is negative except as stated in HPI         Current Scheduled Meds:  Outpatient Encounter Prescriptions as of 8/7/2018   Medication Sig Dispense Refill     ascorbic  acid (ASCORBIC ACID WITH LATRICIA HIPS) 500 MG tablet Take 500 mg by mouth every evening.        aspirin 81 MG EC tablet Take 81 mg by mouth every evening.       atorvastatin (LIPITOR) 40 MG tablet TAKE 1 TABLET AT BEDTIME 90 tablet 3     blood glucose test strips Check blood sugar four times daily. Dispense brand per patient's insurance at pharmacy discretion. Dx: e11.9 200 strip 11     blood-glucose meter Misc Dispense one meter. Use as directed. Dx: e11.9 1 each 0     amLODIPine (NORVASC) 5 MG tablet Take 1 tablet (5 mg total) by mouth daily.  0     buPROPion (WELLBUTRIN) 100 MG tablet Take 1 tablet (100 mg total) by mouth daily. 90 tablet 3     cholecalciferol, vitamin D3, 1,000 unit tablet Take 1,000 Units by mouth every evening.        cyanocobalamin (VITAMIN B-12) 250 MCG tablet Take 250 mcg by mouth every evening.        docusate sodium (COLACE) 100 MG capsule Take 100 mg by mouth 2 (two) times a day.       DULoxetine (CYMBALTA) 30 MG capsule Take 1 capsule (30 mg total) by mouth daily. 30 capsule 1     folic acid (FOLVITE) 800 MCG tablet Take 1,200 mcg by mouth every evening.        glucose 4 GM chewable tablet Chew 4 tablets (16 g total) as needed for low blood sugar. 120 tablet 0     insulin regular (NOVOLIN R) 100 unit/mL injection Inject 24 Units under the skin 3 (three) times a day before meals. Inject 24 units subcutaneous TID with meals plus sliding scale        insulin syringe-needle U-100 (BD INSULIN SYRINGE ULT-FINE II) 1 mL 31 gauge x 5/16 Syrg USE 4 TIMES DAILY AS DIRECTED 500 each 1     lancing device (ACCU-CHEK SOFTCLIX) Misc Use 1 applicator As Directed 4 (four) times a day. 300 each 3     lidocaine (XYLOCAINE) 5 % ointment Apply to affected area QID prn pain 35.44 g 0     lisinopril (PRINIVIL,ZESTRIL) 10 MG tablet TAKE 1 TABLET AT BEDTIME 90 tablet 2     loratadine (CLARITIN) 10 mg tablet Take 1 tablet (10 mg total) by mouth at bedtime. 90 tablet 2     magnesium oxide (MAG-OX) 400 mg tablet Take  800 mg by mouth at bedtime. With food        methotrexate 2.5 MG tablet TAKE 4 TABLETS (10 MG) ONE TIME WEEKLY  ON  FRIDAY 48 tablet 0     metoprolol succinate (TOPROL XL) 50 MG 24 hr tablet Take 1 tablet (50 mg total) by mouth daily. 90 tablet 3     MULTIVITAMIN ORAL Take 1 tablet by mouth every evening.        NOVOLIN N NPH U-100 INSULIN 100 unit/mL injection Inject 33 Units under the skin 2 (two) times a day before meals. 10 mL 11     nystatin (MYCOSTATIN) powder Apply to affected area 3 times daily x 10-14 days or at least 3 days after resolution of symptoms 15 g 0     omega-3 fatty acids-vitamin E (FISH OIL) 1,000 mg cap Take 1,000 mg by mouth every evening. 90 each 3     oxyCODONE-acetaminophen (PERCOCET) 5-325 mg per tablet Take 1-2 tablets by mouth every 8 (eight) hours as needed for pain (maximum of 6 tablets per day). For 8/8-9/7 180 tablet 0     ranitidine (ZANTAC) 150 MG tablet TAKE 1 TABLET (150 MG) BY MOUTH 2 (TWO) TIMES A DAY. 180 tablet 3     traZODone (DESYREL) 150 MG tablet TAKE 1 TABLET AT BEDTIME 90 tablet 2     [DISCONTINUED] metoprolol succinate (TOPROL XL) 50 MG 24 hr tablet Take 1 tablet (50 mg total) by mouth daily. 90 tablet 1     [DISCONTINUED] oxyCODONE-acetaminophen (PERCOCET) 5-325 mg per tablet Take 1-2 tablets by mouth every 8 (eight) hours as needed for pain (maximum of 6 tablets per day). For 7/9-8/8 180 tablet 0     No facility-administered encounter medications on file as of 8/7/2018.      Past Medical History:   Diagnosis Date     BBB (bundle branch block)      Chronic back pain      Chronic kidney disease     stage 3     Chronic Kidney Disease (NKF Classification)     Created by Conversion      Depression      Diabetes (H)     Type 2     Diabetes mellitus, type 2 (H)     Created by Conversion      Frequent headaches      Ganglion Of The Left Wrist     Created by Conversion      GERD (gastroesophageal reflux disease)      HLD (hyperlipidemia)      Hypertension     Per H&P dated  1/03/2013     Incarcerated ventral hernia     Per H&P dated 1/03/2013     Osteoarthritis      Rheumatoid arthritis (H)      Thrombocytopenia (H)      Venous insufficiency      Past Surgical History:   Procedure Laterality Date     BACK SURGERY       CHOLECYSTECTOMY       FATTY TUMOR  1996    LT SHOULDER BLADE     HYSTERECTOMY  1984     JOINT REPLACEMENT Left     knee     [place] ABDOMEN SURGERY PROC UNLISTED      Description: Hernia Repair;  Recorded: 10/23/2013;     SPINAL CORD STIMULATOR IMPLANT       Two left wrist  1990, 1995     Social History   Substance Use Topics     Smoking status: Former Smoker     Quit date: 8/1/2008     Smokeless tobacco: Never Used      Comment: smells of cigarrette smoke     Alcohol use No       Objective / Physical Examination:  Vitals:    08/07/18 1258   BP: 118/82   Patient Site: Right Arm   Patient Position: Sitting   Cuff Size: Adult Regular   Pulse: (!) 57   SpO2: 96%   Weight: (!) 228 lb (103.4 kg)     Wt Readings from Last 3 Encounters:   08/07/18 (!) 228 lb (103.4 kg)   07/16/18 (!) 227 lb (103 kg)   07/09/18 221 lb (100.2 kg)     Body mass index is 30.92 kg/(m^2).     General Appearance: Alert and oriented, cooperative, affect appropriate, speech clear, in no apparent distress  Neck: Supple, trachea midline. No carotid bruits   Lungs: Clear to auscultation bilaterally. Normal inspiratory and expiratory effort  Cardiovascular: Regular rate, normal S1, S2. No murmurs, rubs, or gallops  Extremities: Pulses 2+ and equal throughout. No edema  Skin: left inguinal/pannus area is an erythematous macular rash with satellite lesions.     Orders Placed This Encounter   Procedures     DXA Bone Density Scan     Microalbumin, Random Urine     Drugs of Abuse 1,Urine     Glycosylated Hemoglobin A1c   Followup: Return in about 3 months (around 11/7/2018) for Recheck. earlier if needed.        Marichuy Rubio, CNP

## 2021-06-19 NOTE — PROGRESS NOTES
Patient presents at the request of  Marichuy Rubio for a bilateral lower extremity EMG.  She is a history of diabetes mellitus with history of weakness and decreased balance.  Her weakness is generalized at the lower extremities and she has been falling.    EMG/NCS  results: Please see scanned full report.    Comment NCS: Borderline study:  1.  Absent bilateral lower extremity SNAPs.  This can be normal for the patient's age.  2.  Normal bilateral lower extremity CMAPs, right radial sensory and ulnar motor study.    Comment EMG: Normal study  1.  Normal needle EMG bilateral lower extremities.    Interpretation: Essentially normal study    1.  There are absent bilateral lower extremity sensory studies.  This can be normal for patient's age,  I cannot completely exclude a relatively mild sensorimotor polyneuropathy.      2. There is no electrodiagnostic evidence of lumbosacral radiculopathy, lumbosacral plexopathy, or focal neuropathy in the bilateral lower extremities.    The testing was completed in its entirety by the physician.      It was our pleasure caring for your patient today, if there any questions or concerns please do not hesitate to contact us.

## 2021-06-20 NOTE — PROGRESS NOTES
Programs applying for: MA Spend Down- Alternative Waiver referral by - [facility]   Case # [id number]  Worker Braulio Weiss ph# [phone]  Bisi Following       ---Closing Encounter and Opening a New Encounter for the month---    12/13/18: Called and Left a message for Petra MA-LTC Screener to check on Alternative Waiver. I sent a message to Tonia MILLER in billing to check on Jessika care application.     11/19/2018: Denied for MA spend-down. She would need to have $1,006 a month of medical bills to meet this     11/13/18: Pt dropped of jessika care form.  This will be faxed to billing today.  I called [employer] today to see what type of account verification she is requesting as the letter says accounts.     10/26/2018:Talked with Miranda today. She did get the denial letter saying they didn't get her verification and Over MA.  I did attempt to call her worker today and had to leave a message. I'm not sure if the worker can tell me a status of the AC referral or not. I did ask about this on the Vm.   10/19/2018:  Consult with NIYAH- Will track Alternative waiver.  Jessika care form hasn't been signed by patient yet.     It shouldn't, Alternative Care Waiver doesn't require MA, Elderly Waiver does though. If she qualifies for AC and not EW, she wouldn't need to do the MA-LTC application.    10/18/2018: Spoke with Norman today. I was advised patient was denied MA and MA with a Spend-down.  He couldn't see the verifications I submitted but he felt it wouldn't make a difference.  I will consult with Carolina MILLER as we have submitted an alternative waiver and how this will affect this.     10/3/2018: Received documentation from miranda. Photo Id is pending. NIYAH sent referral for Alternative Waiver. Faxed to [facility]! Called pt and advised her documents are ready for pick at the . She thanked me.   Completed Jessika form over the phone! She will need to stop in and sign the form. Left at .  Based on her  letter she was approved for [facility] in March 2018 for 50%. Its good for 6 months and then would need to reapply.

## 2021-06-20 NOTE — LETTER
Letter by Oriana Ulloa RN at      Author: Dah, Oriana, RN Service: -- Author Type: --    Filed:  Encounter Date: 9/16/2020 Status: (Other)       CARE COORDINATION  [facility]  [address] [place] [address]    September 16, 2020    Ebonie Bowman  [address]  [place] [address]      Dear Ebonie,  Your Care Team congratulates you on your journey to maintain wellness. This document will help guide you on your journey to maintain a healthy lifestyle.  You can use this to help you overcome any barriers you may encounter.  If you should have any questions or concerns, you can contact the members of your Care Team or contact your Primary Care Clinic for assistance.    Health Maintenance  Health Maintenance Reviewed:      My Access Plan  Medical Emergency 911   Primary Clinic Line ARSLAN Vasquez - [phone]   24 Hour Appointment Line [phone] or  [phone]) (toll-free)   24 Hour Nurse Line [phone]   Preferred Urgent Care     Preferred Hospital     Preferred Pharmacy [id number] - [place], [place] - [address]     Behavioral Health Crisis Line The National Suicide Prevention Lifeline at 1-187.545.2000 or 439     My Care Team Members  Patient Care Team       Relationship Specialty Notifications Start End    Marichuy Rubio FNP PCP - General Nurse Practitioner  8/4/16     Phone: [phone] Fax: [phone]         [address] [facility] [address]    Vishal Pretty MD Physician Anesthesiology  9/6/17     Phone: [phone] Fax: [phone]         [address] [facility] [address]    Jazzy Claudio    2/6/19     [facility]     Phone: [phone]         Basia    4/12/19     [employer]-     Phone: [phone]         Raheem Londono DO Physician Rheumatology  4/12/19     Phone: [phone] Fax: [phone]         [address] [place]  [place] [address]    Monroe Salas MD Physician Neurology  4/15/19     Phone: [phone] Fax: [phone]         [address] [facility] [address]    Leanne    7/25/19     [facility]      Phone: [phone]         Marichuy Rubio, CHANELP Assigned PCP   7/28/19     Phone: [phone] Fax: [phone] [address] [facility] [address]              Goals        Patient Stated    ? COMPLETED:  I want to reach a goal weight of 200 lbs in a year. (pt-stated)      Action steps to achieve this goal  1.  I will continue to work on my portion sizes when it comes to protein and meals (Palm of my hand).- Not discussed  2.  I will continue to work towards being mindful when making choices when at restaurants by choosing salads or healthier alternatives.      Updated: 6/14/2019 NS  Date goal set: 10/4/17                  ? COMPLETED: Functional (pt-stated)      Goal: I already received the seated walker from [facility].      Personal Plan:    1. No action need anymore    Updated: 9/17/2019  Date Goal Set: 7/25/2019        ? COMPLETED: I want to get information about other apartments that cost no more than $1000.00/month and are on the first floor.- HOLD (pt-stated)      Action steps to achieve this goal  1.  I will meet with the [facility] SW to discuss possible apartment options. (10/24/2017)  2.  I will continue to call from the list of apartments Kati provided me and tour as able. I have called 3-4 places, but they didn't have any openings.   3.  I will notify Bisi if I have found an apartment to have her request a doctors letter to help me break my lease.(Hold)    Goal is on HOLD- Patient is still thinking about if she wants to live in [place] or going somewhere [place].       Updated: 2/6/0219  Not discussed today NS  Date goal set:  10/4/17      ? COMPLETED: I will see a dentist in the next 60 days. (pt-stated)      Action steps to achieve this goal  1. I will call my dental clinic  that I usually go in the next couple of weeks to see if they are open and if they do, I will schedule to see my dentist.   2. I do not need helps with coordination to see my dentist and I have my dental clinic number.   3. I will call my doctor for further assistance if needed.  4. I will get helps from my son if needed.      Updated: 5/27/2020  Date goal set:  1/16/2020  Goal completed: 06/30/2020      ? COMPLETED: I will see an eye doctor in the next 60 days.  (pt-stated)      Action steps to achieve this goal    1. I will go get my yearly eye exam done this Friday, 07/03/2020.  2. I will schedule for follow up with my eye doctor if needed.     06/30/2020    Updated: 5/27/2020  Date goal set:  1/16/2020  Goal completed: 06/30/2020      ? COMPLETED: I would attend an appt with my Rheumatologist in the next 30 days.  (pt-stated)      Personal action step    I will continue following up with Rheumatology as recommended.      Updated: 3/27/2020  Date goal set:  1/16/2020      ? COMPLETED: I would like to establish care with home care in the next 7 days.  (pt-stated)      Personal Action  I have received home care and this has been discontinued.    Update: 4/27/2020  Date goal set:  1/16/2020      ? COMPLETED: I would like to have a colonoscopy in the next 90 days.  (pt-stated)      Action steps to achieve this goal  1. I am scheduled to get colonoscopy procedure done in the next couple of months and I will make sure that I go to that appointment.     2. I do not need helps for follow up with this procedure and will talk to my doctor at the next follow up appointment.         Updated: 5/27/2020  Date goal set:  1/16/2020  Goal completed: 06/30/2020      ? COMPLETED: I would like to incorporate exercise into my daily routine.  (pt-stated)      Action steps to achieve this goal  1. I will complete 2 chair leg exercises once or twice a day for 5 minutes at a time 7 days a week to increase endurance. I have been doing this  3-4 times a week.   2. I will Bisi know if I feel I needs support from the nurse. A RN Assessment has been scheduled for 10/4/2017 (10/24/2017, completed)  3. I will establish care with Marichuy Rubio on 9/13/2016. (12/15/2016, completed)      Updated: 6/14/2019 NS  Date goal set:  10/2/13      ? COMPLETED: Other (pt-stated)      Goal:  I decide to not to pursue Assisted Livings at this time.    Personal Plan:  1.  If I decide to pursue assisted Living I will call my CHW and will schedule appointment with the [facility].     Updated: 9/17/2019:10-31-19  Date goal set:  7/25/2019      ? COMPLETED: Other (pt-stated)      Goal:  I would like to determine the status of my PCA in the next 30-90 days.  I had PCA services and I do not have it any longer.    Action steps to achieve this goal  1.  I will contact my , Jazzy Claudio, if I have additional questions or concerns about my PCA and homemaking services.    NOTE: [facility] NIYAH spoke with , Jazzy Claudio, at [facility] [place] 10[place]31/19. Jazzy has been working with pt to solidify homemaking services and informed [facility] that pt is not eligible for PCA services at this time due to no dependencies reported at time of assessment 1.5 months ago.      Date goal set:  7/25/2019  Discussed/updated: 9/17/19; 10/31/19    Completed: 10/31/19             Advance Care Plans/Directives Type:     It has been your Clinic Care Team's pleasure to work with you on your goals.    Regards,  Your Clinic Care Team

## 2021-06-20 NOTE — LETTER
Letter by Oriana Ulloa RN at      Author: Dah, Oriana, RN Service: -- Author Type: --    Filed:  Encounter Date: 1/16/2020 Status: Signed       Care Plan  About Me:    Patient Name:  Ebonie Bowman    YOB: 1950  Age:         69 y.o.   [facility] MRN:    [id number] Telephone Information:  Home Phone [phone]   Mobile [phone]       Address:  [address] [place] [address] [address]  [place] [address] Email address:  [email or web address]      Emergency Contact(s)  Extended Emergency Contact Information      Name: Harshad Bowman    Home Phone Number: [phone]  Relation: Child      Name: NONE,PER PT    Relation: Declined          Primary language:  English     needed? [facility]:  [phone] op. 1  Other communication barriers: None  Preferred Method of Communication:  Phone  Current living arrangement: I live in a private home with family  Mobility Status/ Medical Equipment: Independent w/Device    Health Maintenance  Health Maintenance Reviewed:      My Access Plan  Medical Emergency 911   Primary Clinic Line ARSLAN Vasquez - [phone]   24 Hour Appointment Line [phone] or  [phone]) (toll-free)   24 Hour Nurse Line [phone] (toll-free)   Preferred Urgent Care [facility] - [facility], [phone]   Preferred Hospital [facility]  [phone]   Preferred Pharmacy [facility] [address] [place] [address] E     Behavioral Health Crisis Line The National Suicide Prevention Lifeline at 1-487.674.5957 or 911             My Care Team Members  Patient Care Team       Relationship Specialty Notifications Start End    Marichuy Rubio FNP PCP - General Nurse Practitioner  8/4/16     Phone: [phone] Fax: [phone] [address] [facility] [address] [facility] [address]    Bisi Arreola CHW Community Health Worker Primary Care - CC Admissions 12/14/16     [facility]#  [phone]; Fax #[phone]    Phone: [phone] Fax: [phone]        Vishal Pretty MD Physician Anesthesiology  9/6/17     Phone: [phone] Fax: [phone]         [address] [facility] [address] [facility] [address]    Jazzy Claudio    2/6/19     [facility]     Phone: [phone]         Basia    4/12/19     [facility]-     Phone: [phone]         Raheem Londono DO Physician Rheumatology  4/12/19     Phone: [phone] Fax: [phone]         [facility] [place] [address]    Monroe Salas MD Physician Neurology  4/15/19     Phone: [phone] Fax: [phone]         [address] [facility] [address]    Leanne    7/25/19     [facility]      Phone: [phone]         Oriana Ulloa RN Lead Care Coordinator Primary Care - CC Admissions 7/25/19     Fax: [phone]         Marichuy Rubio, CHANELP Assigned PCP   7/28/19     Phone: [phone] Fax: [phone]         [address] [facility] [address] [facility] [address]            My Care Plans  Self Management and Treatment Plan  Goals and (Comments)  Goals        General    I will see a dentist in the next 60 days. (pt-stated)     Notes - Note created  1/16/2020  2:06 PM by Oriana Ulloa RN    Action steps to achieve this goal  1.  I will speak with CHW at outreach calls   2. I will call the dental clinic to make an appt   3. I will call CHW with any concerns or questions    Date goal set:  1/16/2020      I will see an eye doctor in the next 60 days.  (pt-stated)     Notes - Note created  1/16/2020  2:07 PM by Oriana Ulloa, RN    Action steps to achieve this goal  1.  I will speak with CHW at outreach calls  2. I will go to [facility] for my yearly exam   3. I will call CHW with any concerns or questions.     Date goal set:  1/16/2020      I would attend an appt with my Rheumatologist in the next 30 days.  (pt-stated)     Notes - Note created  1/16/2020  2:23 PM by Oriana Ulloa RN    Action steps  to achieve this goal  1. I will speak with CHW at outreach calls  2. I will call the rheumatology clinic to make f/u appt   3. I will call CHW with any concerns or questions.     Date goal set:  1/16/2020      I would like to establish care with home care in the next 7 days.  (pt-stated)     Notes - Note created  1/16/2020  2:05 PM by Oriana Ulloa RN    Action steps to achieve this goal  1. I will speak with CHW at outreach calls  2. I will answer my phone or return phone calls in a timely manner  3. I will call CHW with any concerns or questions.     Date goal set:  1/16/2020      I would like to have a colonoscopy in the next 90 days.  (pt-stated)     Notes - Note edited  1/16/2020  2:09 PM by Oriana Ulloa RN    Action steps to achieve this goal  1.  I will speak with CHW at outreach calls  2. I will attend my appt for colonoscopy as scheduled.   3. I will call CHW with any concerns or questions.   Specialty Scheduling 09/04/2019  2:21 PM Darren Fry - -   Note    Facility still attempting to reach patient to schedule. Per facility must be performed at hospital as patient is medically complex.            Type Date User Summary Attachment   Specialty Scheduling 08/21/2019 12:28 PM Elena Hilliard - -   Note    Entered on [facility] website                  Date goal set:  1/16/2020      I would like to like to loose 10# in the next 6 months.  (pt-stated)     Notes - Note created  1/16/2020  1:11 PM by Oriana Ulloa RN    Action steps to achieve this goal  1.  I will speak with CHW at outreach calls  2. I will exercise 30 mins daily  3. I will cut down on carbs and snacks to 2 times daily  4. I will call CHW with any concerns or questions.    Date goal set:  1/16/2020               Advance Care Plans/Directives Type:        My Medical and Care Information  Problem List   Patient Active Problem List   Diagnosis   ? Osteoarthritis   ? Venous Insufficiency   ? HTN (hypertension)   ? Insomnia   ? Joint Pain, Localized In The  Knee   ? Chronic kidney disease   ? Hyperlipidemia   ? Diabetes mellitus, type 2 (H)   ? Joint Pain, Localized In The Hip   ? Morbid obesity (H)   ? Depression   ? Rheumatoid arthritis (H)   ? Seropositive rheumatoid arthritis (H)   ? Lumbar radiculopathy   ? S/P TKR (total knee replacement) using cement, left   ? High risk medication use   ? Chronic pain   ? GERD (gastroesophageal reflux disease)   ? Bilateral primary osteoarthritis of hip   ? Atherosclerotic cardiovascular disease, seen on abd CT 2016    ? S/P insertion of spinal cord stimulator   ? Uncomplicated opioid dependence (H)   ? Controlled substance agreement signed, oxycodone. 8/21/19. UDS 8/21/19   ? Allergic rhinitis due to cats   ? Positive FIT (fecal immunochemical test)      Current Medications and Allergies:  See printed Medication Report.    Care Coordination Start Date: 1/16/2020   Frequency of Care Coordination:     Form Last Updated: 01/16/2020

## 2021-06-20 NOTE — PROGRESS NOTES
Outreach early, but I had called Ebonie yesterday regarding FRG.  Pt had indicated that she had another fall recently.  I called Ebonie today to see if she would be open to talking with our  to access if there are any services available for her at home. She agreed. Appt scheduled.     Goals not discussed today.

## 2021-06-20 NOTE — PROGRESS NOTES
[facility] NIYAH called pt at scheduled phone visit of 2:00 and was able to connect.    NIYAH educated the pt on Elderly Waiver (EW) and Alternative Care (AC) Waiver programs through the [facility]. Pt reported she's needing help with homemaking needs such as vacuuming and laundry, pt reported she's able to do all her self cares still and cooking/meals.    NIYAH educated pt on the process for what to expect with an AC/EW referral, pt agreeable to NIYAH completing the referral.    NIYAH called [facility] and completed referral for waiver assessment, pt will be contacted but may take a month or more. NIYAH provided NIYAH's info in case workers struggle to reach pt. Worker who completed intake was Trena [phone].

## 2021-06-20 NOTE — LETTER
Letter by Raheem Londono DO at      Author: Raheem Londono DO Service: -- Author Type: --    Filed:  Encounter Date: 9/29/2020 Status: (Other)         Ebonie Bowman  [address]  [place] [address]             September 29, 2020         Dear Ms. Bowman,    Below are the results from your recent visit:    Resulted Orders   DXA Bone Density Scan    Narrative    9/17/2020      RE: Ebonie Bowman  YOB: 1950        Dear Raheem Lonodno,    Patient Profile:  70 y.o. female, postmenopausal, is here for the follow up bone density   test.   History of fractures - None. Family history of osteoporosis - Yes;    mother.  Family history of hip fracture: Yes;  mother. Smoking history -   No. Osteoporosis treatment past -  No. Osteoporosis treatment current -   No.  Chronic medical problems - Chronic low back problems, Diabetes   Mellitus, Hysterectomy and Rheumatoid arthritis. High risk medications -    Steroids;  Yes, Currently (On less than one month).      Assessment:    1. The spine bone density L3-L4 with T-score 1.3.  2. Femoral bone densities show left total hip T- score -1.1 and right   total hip T-score -0.8.  3. Trabecular bone score indicates good trabecular bone architecture.      70 y.o. female with LOW BONE DENSITY (OSTEOPENIA) and MODERATE fracture   risk, adjusted for the TBS, with major osteoporotic fracture risk 11.8%   and hip fracture risk 2.7%.     Since the previous bone density dated  August 21, 2018, there has been a     -4.5% change in the bone density of the spine.  Additionally there has   been a -8.1% change in the left total hip and a nonstatistically   significant -3.4% change in the right total hip.    Recommendations:  Appropriate calcium, vitamin D supplements, along with balance and weight   bearing exercise recommended with follow up bone density scan in 1 to 2   years, for close monitoring while on steroids.      Bone densitometry was  performed on your patient using our [facility] iDXA   densitometer. The results are summarized and a copy of the actual scans   are included for your review. In conformity with the International Society   of Clinical Densitometry's most recent position statement for DXA   interpretation (2015), the diagnosis will be made on the lowest measured   T-score of the lumbar spine, femoral neck, total proximal femur or 33%   radius. Note the change in terminology for diagnostic classification from   OSTEOPENIA to LOW BONE MASS. All trending for sequential exams will be   done using multiple vertebrae or the total proximal femur. Fracture risk   is based on the WHO Fracture Risk Assessment Tool (FRAX). If additional   information is needed or if you would like to discuss the results, please   do not hesitate to call me.       Thank you for referring this patient to [facility].   We are happy to be of service in support of you and your practice. If you   have any questions or suggestions to improve our service, please call me   at [phone].     Sincerely,     Catarina Brewer M.D. C.C.D.  Osteoporosis Services, [facility]     Bone density test result consistent with having Osteopenia, which is a stage between normal and Osteoporosis.     Since the previous bone density dated  August 21, 2018, there has been a   -4.5% change in the bone density of the spine.  Additionally there has been a -8.1% change in the left total hip and a nonstatistically significant -3.4% change in the right total hip.     Continue taking calcium and vitamin D daily, as suggested.  Weightbearing exercise as tolerated recommendable.  Suggest repeat bone density test in about 2 years.       Sincerely,        Electronically signed by Raheem Londono DO

## 2021-06-20 NOTE — LETTER
Letter by Oriana Ulloa RN at      Author: Dah, Oriana, RN Service: -- Author Type: --    Filed:  Encounter Date: 1/16/2020 Status: Signed       CARE COORDINATION    January 16, 2020    Ebonie Bowman  [address]  [place] [address]      Dear Ebonie,    I am a clinic care coordinator who works with ARSLAN Vasquez at [facility]. I wanted to thank you for spending the time to talk with me.  Below is a description of clinic care coordination and how I can further assist you.     The clinic care coordinator team is made up of a registered nurse,  and community health worker who understand the health care system. The goal of clinic care coordination is to help you manage your health and improve access to the health care system in the most efficient manner. The team can assist you in meeting your health care goals by providing education, coordinating services, strengthening the communication among your providers, assist you with any financial, behavioral, psychosocial, chemical dependency, counseling, and/or psychiatric resources if needed.    Please feel free to contact Bisi Arreola, the Community Health Worker, at [phone] with any questions or concerns. We are focused on providing you with the highest-quality healthcare experience possible and that all starts with you.     Sincerely,   Oriana Ulloa  Enclosed: I have enclosed a copy of the Care Plan. This has helpful information and goals that we have talked about. Please keep this in an easy to access place to use as needed.

## 2021-06-20 NOTE — PROGRESS NOTES
Spoke with Patient today. She is having a rough day. Ebonie says, her back is really bothering her that even if she moves it hurts.  They recently changed her level on her stimulator and they advised her to connect with them on Monday.     Patient falls in between MA, SNAP. However, she may qualify for a MA spend down with her Medical expenses.  We did the form over the phone today. She will need to sign the last page and provide proof. She understood. This will be sent to her in the mail.     Next Outreach: 10/24/2018

## 2021-06-20 NOTE — PROGRESS NOTES
Programs applying for: MA Spend Down- Alternative Waiver referral by NIYAH- [facility]   Case:  Financial worker:  Bisi Following     ------Closing Encounter for the new month----    10/3/2018: Received documentation from miranda. Photo Id is pending. NIYAH sent referral for Alternative Waiver. Faxed to [facility] today! Called pt and advised her documents are ready for pick at the . She thanked me.   Completed [facility]ika form over the phone! She will need to stop in and sign the form. Left at .  Based on her letter she was approved for [facility] care in March 2018 for 50%. Its good for 6 months and then would need to reapply.     9/17/2018: Called Pt to see if she has received my information in the mail. She said, she didn't. I will try resend this again. If she doesn't get it, I'll have to wait until she comes into the clinic.     9/6/2018: Attempted to call pt today to see if she received the documents in the mail. X1. Application hasn't been submitted yet as its pending signature       8/24/2018: Completed [employer] over the phone today. Sent out the information needed and the CHARLINE and signature page to patient. Application will be faxed once documentation received.   1. Please sign the [facility] CHARLINE provided  2.  Please sign the application page that says signature. Its highlighted  3. Copy of Photo ID  4. Social security Letters stating what you receive  5. All Medical Expenses/Medication costs out of pocket

## 2021-06-20 NOTE — LETTER
Letter by Marichuy Rubio FNP at      Author: Marichuy Rubio FNP Service: -- Author Type: --    Filed:  Encounter Date: 2/27/2020 Status: (Other)         Ebonie Bowman  [address]  [place] [address]             February 27, 2020         Dear Ms. Bowman,    Below are the results from your recent visit:    Resulted Orders   Glycosylated Hemoglobin A1c   Result Value Ref Range    Hemoglobin A1c 6.2 (H) 3.5 - 6.0 %   Basic Metabolic Panel   Result Value Ref Range    Sodium 142 136 - 145 mmol/L    Potassium 3.8 3.5 - 5.0 mmol/L    Chloride 102 98 - 107 mmol/L    CO2 27 22 - 31 mmol/L    Anion Gap, Calculation 13 5 - 18 mmol/L    Glucose 125 70 - 125 mg/dL    Calcium 9.2 8.5 - 10.5 mg/dL    BUN 21 8 - 28 mg/dL    Creatinine 1.25 (H) 0.60 - 1.10 mg/dL    GFR MDRD Af Amer 51 (L) >60 mL/min/1.73m2    GFR MDRD Non Af Amer 42 (L) >60 mL/min/1.73m2    Narrative    Fasting Glucose reference range is 70-99 mg/dL per  American Diabetes Association (ADA) guidelines.   HM2(CBC w/o Differential)   Result Value Ref Range    WBC 11.3 (H) 4.0 - 11.0 thou/uL    RBC 4.44 3.80 - 5.40 mill/uL    Hemoglobin 14.3 12.0 - 16.0 g/dL    Hematocrit 42.1 35.0 - 47.0 %    MCV 95 80 - 100 fL    MCH 32.1 27.0 - 34.0 pg    MCHC 33.9 32.0 - 36.0 g/dL    RDW 11.8 11.0 - 14.5 %    Platelets 132 (L) 140 - 440 thou/uL    MPV 8.1 7.0 - 10.0 fL     Your A1c indicates that diabetes is well controlled at this time.    Your electrolytes are normal.  Kidney function is diminished and indicates stage III kidney disease which is stable for you.    Your white blood cell count is mildly elevated.  I suspect this is due to your recent stomach bug illness. Remember that you should not take Methotrexate until you are better.     Please call with questions or contact us using [employer] [facility].    Sincerely,        Electronically signed by ARSLAN Vasquez

## 2021-06-21 ENCOUNTER — HOSPITAL ENCOUNTER (OUTPATIENT)
Dept: PALLIATIVE MEDICINE | Facility: OTHER | Age: 71
Discharge: HOME | End: 2021-06-21
Attending: NURSE PRACTITIONER
Payer: COMMERCIAL

## 2021-06-21 ENCOUNTER — COMMUNICATION - HEALTHEAST (OUTPATIENT)
Dept: PALLIATIVE MEDICINE | Facility: OTHER | Age: 71
End: 2021-06-21

## 2021-06-21 DIAGNOSIS — M46.1 SACROILIITIS (H): ICD-10-CM

## 2021-06-21 DIAGNOSIS — M79.10 MYALGIA: ICD-10-CM

## 2021-06-21 DIAGNOSIS — G89.4 CHRONIC PAIN SYNDROME: ICD-10-CM

## 2021-06-21 DIAGNOSIS — M54.6 CHRONIC BILATERAL THORACIC BACK PAIN: ICD-10-CM

## 2021-06-21 DIAGNOSIS — G89.29 CHRONIC BILATERAL THORACIC BACK PAIN: ICD-10-CM

## 2021-06-21 DIAGNOSIS — M54.16 LUMBAR RADICULOPATHY: ICD-10-CM

## 2021-06-21 DIAGNOSIS — G89.29 CHRONIC PAIN: ICD-10-CM

## 2021-06-21 NOTE — LETTER
Letter by Marichuy Rubio FNP at      Author: Marichuy Rubio FNP Service: -- Author Type: --    Filed:  Encounter Date: 4/5/2021 Status: (Other)       Opioid / Opioid Plus Controlled Substance Agreement    This is an agreement between you and your provider about the safe and appropriate use of controlled substance/opioids prescribed by your care team. Controlled substances are medicines that can cause physical and mental dependence (abuse).    There are strict laws about having and using these medicines. We here at [facility] are committing to working with you in your efforts to get better. To support you in this work, well help you schedule regular office appointments for medicine refills. If we must cancel or change your appointment for any reason, well make sure you have enough medicine to last until your next appointment.     As a Provider, I will:    Listen carefully to your concerns and treat you with respect.     Recommend a treatment plan that I believe is in your best interest. This plan may involve therapies other than opioid pain medication.     Talk with you often about the possible benefits, and the risk of harm of any medicine that we prescribe for you.     Provide a plan on how to taper (discontinue or go off) using this medicine if the decision is made to stop its use.    As a Patient, I understand that opioid(s):     Are a controlled substance prescribed by my care team to help me function or work and manage my condition(s).     Are strong medicines and can cause serious side effects such as:    Drowsiness, which can seriously affect my driving ability    A lower breathing rate, enough to cause death    Harm to my thinking ability     Depression     Abuse of and addiction to this medicine    Need to be taken exactly as prescribed. Combining opioids with certain medicines or chemicals (such as illegal drugs, sedatives, sleeping pills, and benzodiazepines) can be dangerous or even  fatal. If I stop opioids suddenly, I may have severe withdrawal symptoms.    Do not work for all types of pain nor for all patients. If theyre not helpful, I may be asked to stop them.      The risks, benefits and side effects of these medicine(s) were explained to me. I agree that:  1. I will take part in other treatments as advised by my care team. This may be psychiatry or counseling, physical therapy, behavioral therapy, group treatment or a referral to a specialist.     2. I will keep all my appointments. I understand that this is part of the monitoring of opioids. My care team may require an office visit for EVERY opioid/controlled substance refill. If I miss appointments or dont follow instructions, my care team may stop my medicine.    3. I will take my medicines as prescribed. I will not change the dose or schedule unless my care team tells me to. There will be no refills if I run out early.     4. I may be asked to come to the clinic and complete a urine drug test or complete a pill count at any time. If I dont give a urine sample or participate in a pill count, the care team may stop my medicine.    5. I will only receive prescriptions from this clinic for chronic pain. If I am treated by another provider for acute pain issues, I will tell them that I am taking opioid pain medication for chronic pain and that I have a treatment agreement with this provider. I will inform my [facility] care team within one business day if I am given a prescription for any pain medication by another healthcare provider. My [facility] care team can contact other providers and pharmacists about my use of any medicines.    6. It is up to me to make sure that I dont run out of my medicines on weekends or holidays. If my care team is willing to refill my opioid prescription without a visit, I must request refills only during office hours. Refills may take up to 3 business days to process. I will use one pharmacy to  fill all my opioid and other controlled substance prescriptions. I will notify the clinic about any changes to my insurance or medication availability.    7. I am responsible for my prescriptions. If the medicine/prescription is lost, stolen or destroyed, it will not be replaced. I also agree not to share controlled substance medicines with anyone.    8. I am aware I should not use any illegal or recreational drugs. I agree not to drink alcohol unless my care team says I can.       9. If I enroll in the [place] [facility] Cannabis program, I will tell my care team prior to my next refill.     10. I will tell my care team right away if I become pregnant, have a new medical problem treated outside of my regular clinic, or have a change in my medications.    11. I understand that this medicine can affect my thinking, judgment and reaction time. Alcohol and drugs affect the brain and body, which can affect the safety of my driving. Being under the influence of alcohol or drugs can affect my decision-making, behaviors, personal safety, and the safety of others. Driving while impaired (DWI) can occur if a person is driving, operating, or in physical control of a car, motorcycle, boat, snowmobile, ATV, motorbike, off-road vehicle, or any other motor vehicle ([place] Statute 169A.20). I understand the risk if I choose to drive or operate any vehicle or machinery.    I understand that if I do not follow any of the conditions above, my prescriptions or treatment may be stopped or changed.        Opioids  What You Need to Know    What are opioids?   Opioids are pain medicines that must be prescribed by a doctor. They are also known as narcotics.     Examples are:   1. morphine (MS Contin, Ember)  2. oxycodone (Oxycontin)  3. oxycodone and acetaminophen (Percocet)  4. hydrocodone and acetaminophen (Vicodin, Norco)   5. fentanyl patch (Duragesic)   6. hydromorphone (Dilaudid)   7. methadone  8. codeine (Tylenol #3)     What do  opioids do well?   Opioids are best for severe short-term pain such as after a surgery or injury. They may work well for cancer pain. They may help some people with long-lasting (chronic) pain.     What do opioids NOT do well?   Opioids never get rid of pain entirely, and they dont work well for most patients with chronic pain. Opioids dont reduce swelling, one of the causes of pain.                              Other ways to manage chronic pain and improve function include:       Treat the health problem that may be causing pain    Anti-inflammation medicines, which reduce swelling and tenderness, such as ibuprofen (Advil, Motrin) or naproxen (Aleve)    Acetaminophen (Tylenol)    Antidepressants and anti-seizure medicines, especially for nerve pain    Topical treatments such as patches or creams    Injections or nerve blocks    Chiropractic or osteopathic treatment    Acupuncture, massage, deep breathing, meditation, visual imagery, aromatherapy    Use heat or ice at the pain site    Physical therapy     Exercise    Stop smoking    Take part in therapy       Risks and side effects     Talk to your doctor before you start or decide to keep taking opioids. Possible side effects include:      Lowering your breathing rate enough to cause death    Overdose, including death, especially if taking higher than prescribed doses    Worse depression symptoms; less pleasure in things you usually enjoy    Feeling tired or sluggish    Slower thoughts or cloudy thinking    Being more sensitive to pain over time; pain is harder to control    Trouble sleeping or restless sleep    Changes in hormone levels (for example, less testosterone)    Changes in sex drive or ability to have sex    Constipation    Unsafe driving    Itching and sweating    Dizziness    Nausea, throwing up and dry mouth    What else should I know about opioids?    Opioids may lead to dependence, tolerance, or addiction.      Dependence means that if you stop or  reduce the medicine too quickly, you will have withdrawal symptoms. These include loose poop (diarrhea), jitters, flu-like symptoms, nervousness and tremors. Dependence is not the same as addiction.                   Tolerance means needing higher doses over time to get the same effect. This may increase the chance of serious side effects.      Addiction is when people improperly use a substance that harms their body, their mind or their relations with others. Use of opiates can cause a relapse of addiction if you have a history of drug or alcohol abuse.      People who have used opioids for a long time may have a lower quality of life, worse depression, higher levels of pain and more visits to doctors.    You can overdose on opioids. Take these steps to lower your risk of overdose:    1. Recognize the signs:  Signs of overdose include decrease or loss of consciousness (blackout), slowed breathing, trouble waking up and blue lips. If someone is worried about overdose, they should call 911.    2. Talk to your doctor about Narcan (naloxone).   If you are at risk for overdose, you may be given a prescription for Narcan. This medicine very quickly reverses the effects of opioids.   If you overdose, a friend or family member can give you Narcan while waiting for the ambulance. They need to know the signs of overdose and how to give Narcan.     3. Don't use alcohol or street drugs.   Taking them with opioids can cause death.    4. Do not take any of these medicines unless your doctor says its OK. Taking these with opioids can cause death:    Benzodiazepines, such as lorazepam (Ativan), alprazolam (Xanax) or diazepam (Valium)    Muscle relaxers, such as cyclobenzaprine (Flexeril)    Sleeping pills like zolpidem (Ambien)     Other opioids      How to keep you and other people safe while taking opioids:    1. Never share your opioids with others.  Opioid medicines are regulated by the Drug Enforcement Agency (ANIA). Selling  or sharing medications is a criminal act.    2. Be sure to store opioids in a secure place, locked up if possible. Young children can easily swallow them and overdose.    3. When you are traveling with your medicines, keep them in the original bottles. If you use a pill box, be sure you also carry a copy of your medicine list from your clinic or pharmacy.    4. Safe disposal of opioids    Most pharmacies have places to get rid of medicine, called disposal kiosks. Medicine disposal options are also available in every [facility]. Search your county and medication disposal to find more options. You can find more details at:  https://[email or web address].[email or web address]-green/managing-unwanted-medications     I agree that my provider, clinic care team, and pharmacy may work with any city, state or federal law enforcement agency that investigates the misuse, sale, or other diversion of my controlled medicine. I will allow my provider to discuss my care with, or share a copy of, this agreement with any other treating provider, pharmacy or emergency room where I receive care.    I have read this agreement and have asked questions about anything I did not understand.      __________________________________________________  Patient Signature - Ebonie Bowman   ______________________                      Date     __________________________________________________  Provider Signature - ARSLAN Vasquez     ______________________                      Date     __________________________________________________  Witness Signature (required if provider not present while patient signing)     ______________________                      Date

## 2021-06-21 NOTE — PROGRESS NOTES
Pt will bring her rojelio care form on 11/7.  Ebonie did get a letter from [facility] stating she didn't provide verification and she is over the [place] guidelines.   Called [facility] Worker and left message today.   AC waiver could take month to month an a half before being reviewed.     Ebonie had no needs today.     Outreach: 12/26/2018  Or around

## 2021-06-21 NOTE — LETTER
Letter by Raheem Londono DO at      Author: Raheem Londono DO Service: -- Author Type: --    Filed:  Encounter Date: 11/3/2020 Status: (Other)         Ebonie Bowman  [address]  [place] [address]             November 3, 2020         Dear Ms. Bowman,    Below are the results from your recent visit:    Resulted Orders   HM2(CBC w/o Differential)   Result Value Ref Range    WBC 11.7 (H) 4.0 - 11.0 thou/uL    RBC 4.31 3.80 - 5.40 mill/uL    Hemoglobin 13.8 12.0 - 16.0 g/dL    Hematocrit 40.3 35.0 - 47.0 %    MCV 93 80 - 100 fL    MCH 31.9 27.0 - 34.0 pg    MCHC 34.2 32.0 - 36.0 g/dL    RDW 12.1 11.0 - 14.5 %    Platelets 129 (L) 140 - 440 thou/uL    MPV 7.9 7.0 - 10.0 fL   Creatinine   Result Value Ref Range    Creatinine 1.28 (H) 0.60 - 1.10 mg/dL    GFR MDRD Af Amer 50 (L) >60 mL/min/1.73m2    GFR MDRD Non Af Amer 41 (L) >60 mL/min/1.73m2   ALT (SGPT)   Result Value Ref Range    ALT 16 0 - 45 U/L   AST (SGOT)   Result Value Ref Range    AST 16 0 - 40 U/L   Albumin   Result Value Ref Range    Albumin 3.6 3.5 - 5.0 g/dL     Noted to have some signs of mild renal insufficiency (diminished kidney function), not new and stable, recommend periodic monitoring.     CBC/cell count results were stable.     Normal liver enzymes.     Sincerely,        Electronically signed by Raheem Londono DO

## 2021-06-21 NOTE — PROGRESS NOTES
Internal Medicine Office Visit  [facility]  Patient Name: Ebonie Bowman  Patient Age: 68 y.o.  YOB: 1950  MRN: [id number]    Date of Visit: 2018  Reason for Office Visit:   Chief Complaint   Patient presents with     Follow-up     3 month           Assessment / Plan / Medical Decision Makin. Overweight (BMI 25.0-29.9)  2. Depression  - buPROPion (WELLBUTRIN) 100 MG tablet; Take 1 tablet (100 mg total) by mouth daily.  Dispense: 90 tablet; Refill: 3-- helps her with overeating     3. Chronic pain  - Current treatment plan:  5 mg of Percocet, 3 times per day (6 pills per day)  Reviewed goals of care.  Benefits continue to outweigh the risks of continued use of opioid analgesia.  Plan to follow-up in 3 months  Number of refills allowed prior to follow-up visit: 3    4. Leg weakness  - Cane Quad, trial  - She declines PT  - She would benefit from personal care assistance due to low back pian with prolonged standing and leg weakness     5. Diabetes mellitus, type 2 (H)  - Glycosylated Hemoglobin A1c        Health Maintenance Review  Health Maintenance   Topic Date Due     INFLUENZA VACCINE RULE BASED (1) 2018     MAMMOGRAM  2018     DIABETES FOLLOW-UP  2019     DEPRESSION FOLLOW UP  2019     DIABETES FOOT EXAM  2019     DIABETES HEMOGLOBIN A1C  2019     DIABETES OPHTHALMOLOGY EXAM  2019     FALL RISK ASSESSMENT  2019     DIABETES URINE MICROALBUMIN  2019     DXA SCAN  2020     ADVANCE DIRECTIVES DISCUSSED WITH PATIENT  2021     TD 18+ HE  2024     COLONOSCOPY  2026     PNEUMOCOCCAL POLYSACCHARIDE VACCINE AGE 65 AND OVER  Completed     PNEUMOCOCCAL CONJUGATE VACCINE FOR ADULTS (PCV13 OR PREVNAR)  Addressed     ZOSTER VACCINE  Completed         I am having Ms. Bowman maintain her magnesium oxide, ascorbic acid (vitamin C), cholecalciferol (vitamin D3), aspirin, cyanocobalamin, omega-3  fatty acids-vitamin E, lancing device, MULTIVITAMIN ORAL, docusate sodium, folic acid, insulin syringe-needle U-100, glucose, insulin regular, amLODIPine, lidocaine, loratadine, DULoxetine, blood-glucose meter, blood glucose test, atorvastatin, NovoLIN N NPH U-100 Insulin, traZODone, lisinopril, ranitidine, methotrexate, nystatin, metoprolol succinate, oxyCODONE-acetaminophen, and buPROPion.      HPI:  Ebonie Bowman is a 68 y.o. year old who presents to the office today for follow-up.    She has continued complaints of lower extremity weakness and feels unsteady on her feet.  It seems like she is more weak on the right side.  She has had several episodes where she felt close to falling but did not actually do so.  She has been working with a county worker.  She may be eligible to have help with housework.  She has an assessment in 1 week for this.  This complaint has been evaluated in the past, and EMG did not show any obvious abnormalities.  No loss of bowel or bladder function.  She is unable to afford to go to physical therapy due to the cost associated with this.    We reviewed her history of low back pain.  She had a reprogramming of her back stimulator, and she did not see a change in this.  She is experiencing some difficulties with her battery not fully charging.  She continues to take oxycodone-acetaminophen as needed for pain.  This does provide her some relief.    Diabetes reviewed.  Her fasting readings have been from 120-136.  She is not expensing any concerns related to this.    Review of Systems- pertinent positive in bold:  A 10-point ROS is negative except as stated in HPI         Current Scheduled Meds:  Outpatient Encounter Prescriptions as of 11/7/2018   Medication Sig Dispense Refill     amLODIPine (NORVASC) 5 MG tablet Take 1 tablet (5 mg total) by mouth daily.  0     ascorbic acid (ASCORBIC ACID WITH LATRICIA HIPS) 500 MG tablet Take 500 mg by mouth every evening.        aspirin 81 MG EC tablet Take  81 mg by mouth every evening.       atorvastatin (LIPITOR) 40 MG tablet TAKE 1 TABLET AT BEDTIME 90 tablet 3     blood glucose test strips Check blood sugar four times daily. Dispense brand per patient's insurance at pharmacy discretion. Dx: e11.9 200 strip 11     blood-glucose meter Misc Dispense one meter. Use as directed. Dx: e11.9 1 each 0     cholecalciferol, vitamin D3, 1,000 unit tablet Take 1,000 Units by mouth every evening.        cyanocobalamin (VITAMIN B-12) 250 MCG tablet Take 250 mcg by mouth every evening.        docusate sodium (COLACE) 100 MG capsule Take 100 mg by mouth 2 (two) times a day.       DULoxetine (CYMBALTA) 30 MG capsule Take 1 capsule (30 mg total) by mouth daily. 30 capsule 1     folic acid (FOLVITE) 800 MCG tablet Take 1,200 mcg by mouth every evening.        glucose 4 GM chewable tablet Chew 4 tablets (16 g total) as needed for low blood sugar. 120 tablet 0     insulin regular (NOVOLIN R) 100 unit/mL injection Inject 24 Units under the skin 3 (three) times a day before meals. Inject 24 units subcutaneous TID with meals plus sliding scale        insulin syringe-needle U-100 (BD INSULIN SYRINGE ULT-FINE II) 1 mL 31 gauge x 5/16 Syrg USE 4 TIMES DAILY AS DIRECTED 500 each 1     lancing device (ACCU-CHEK SOFTCLIX) Misc Use 1 applicator As Directed 4 (four) times a day. 300 each 3     lidocaine (XYLOCAINE) 5 % ointment Apply to affected area QID prn pain 35.44 g 0     lisinopril (PRINIVIL,ZESTRIL) 10 MG tablet TAKE 1 TABLET AT BEDTIME 90 tablet 2     loratadine (CLARITIN) 10 mg tablet Take 1 tablet (10 mg total) by mouth at bedtime. 90 tablet 2     magnesium oxide (MAG-OX) 400 mg tablet Take 800 mg by mouth at bedtime. With food        methotrexate 2.5 MG tablet TAKE 4 TABLETS (10 MG) ONE TIME WEEKLY  ON  FRIDAY 48 tablet 0     metoprolol succinate (TOPROL XL) 50 MG 24 hr tablet Take 1 tablet (50 mg total) by mouth daily. 90 tablet 3     MULTIVITAMIN ORAL Take 1 tablet by mouth every  evening.        NOVOLIN N NPH U-100 INSULIN 100 unit/mL injection Inject 33 Units under the skin 2 (two) times a day before meals. 10 mL 11     nystatin (MYCOSTATIN) powder Apply to affected area 3 times daily x 10-14 days or at least 3 days after resolution of symptoms 15 g 0     omega-3 fatty acids-vitamin E (FISH OIL) 1,000 mg cap Take 1,000 mg by mouth every evening. 90 each 3     oxyCODONE-acetaminophen (PERCOCET) 5-325 mg per tablet Take 1-2 tablets by mouth every 8 (eight) hours as needed for pain (maximum of 6 tablets per day). For 11/5-12/5 180 tablet 0     ranitidine (ZANTAC) 150 MG tablet TAKE 1 TABLET (150 MG) BY MOUTH 2 (TWO) TIMES A DAY. 180 tablet 3     traZODone (DESYREL) 150 MG tablet TAKE 1 TABLET AT BEDTIME 90 tablet 2     [DISCONTINUED] buPROPion (WELLBUTRIN) 100 MG tablet Take 1 tablet (100 mg total) by mouth daily. 90 tablet 3     buPROPion (WELLBUTRIN) 100 MG tablet Take 1 tablet (100 mg total) by mouth daily. 90 tablet 3     No facility-administered encounter medications on file as of 11/7/2018.      Past Medical History:   Diagnosis Date     BBB (bundle branch block)      Chronic back pain      Chronic kidney disease     stage 3     Chronic Kidney Disease (NKF Classification)     Created by Conversion      Depression      Diabetes (H)     Type 2     Diabetes mellitus, type 2 (H)     Created by Conversion      Frequent headaches      Ganglion Of The Left Wrist     Created by Conversion      GERD (gastroesophageal reflux disease)      HLD (hyperlipidemia)      Hypertension     Per H&P dated 1/03/2013     Incarcerated ventral hernia     Per H&P dated 1/03/2013     Osteoarthritis      Rheumatoid arthritis (H)      Thrombocytopenia (H)      Venous insufficiency      Past Surgical History:   Procedure Laterality Date     BACK SURGERY       CHOLECYSTECTOMY       FATTY TUMOR  1996    LT SHOULDER BLADE     HYSTERECTOMY  1984     JOINT REPLACEMENT Left     knee     ID ABDOMEN SURGERY PROC UNLISTED       Description: Hernia Repair;  Recorded: 10/23/2013;     SPINAL CORD STIMULATOR IMPLANT       Two left wrist  1990, 1995     Social History   Substance Use Topics     Smoking status: Former Smoker     Quit date: 8/1/2008     Smokeless tobacco: Never Used      Comment: smells of cigarrette smoke     Alcohol use No       Objective / Physical Examination:  Vitals:    11/07/18 1204   BP: 128/60   Pulse: 84   Weight: (!) 229 lb (103.9 kg)     Wt Readings from Last 3 Encounters:   11/07/18 (!) 229 lb (103.9 kg)   08/23/18 (!) 228 lb (103.4 kg)   08/07/18 (!) 228 lb (103.4 kg)     Body mass index is 31.06 kg/(m^2).     General Appearance: Alert and oriented, cooperative, affect appropriate, speech clear, in no apparent distress  Lungs: Clear to auscultation bilaterally. Normal inspiratory and expiratory effort  Cardiovascular: Regular rate, normal S1, S2. No murmurs, rubs, or gallops  MSK: pushes herself up from seat position using arms  Extremities: No edema  Integumentary: Warm and dry. Without suspicious looking lesions  Neuro: Alert and oriented x 3  Psych: mood is anxious     Orders Placed This Encounter   Procedures     Cane Quad     Glycosylated Hemoglobin A1c   Followup: Return in about 3 months (around 2/7/2019) for Next scheduled follow up. earlier if needed.      Marichuy Rubio, CNP

## 2021-06-21 NOTE — LETTER
Letter by Marichuy Rubio FNP at      Author: Marichuy Rubio FNP Service: -- Author Type: --    Filed:  Encounter Date: 10/22/2020 Status: (Other)         Ebonie Bowman  [address]  [place] [address]             October 22, 2020         Dear Ms. Bowman,    Below are the results from your recent visit:    Resulted Orders   Glycosylated Hemoglobin A1c   Result Value Ref Range    Hemoglobin A1c 6.0 (H) <=5.6 %      Comment:      Normal <5.7% Prediabete 5.7-6.4% Diabletes 6.5% or higher - adopted from ADA consensus guidelines   Microalbumin, Random Urine   Result Value Ref Range    Microalbumin, Random Urine 2.82 (H) 0.00 - 1.99 mg/dL    Creatinine, Urine 181.0 mg/dL    Microalbumin/Creatinine Ratio Random Urine 15.6 <=19.9 mg/g    Narrative    Microalbumin, Random Urine  <2.0 mg/dL . . . . . . . . Normal  3.0-30.0 mg/dL . . . . . . Microalbuminuria  >30.0 mg/dL . . . . . .  . Clinical Proteinuria    Microalbumin/Creatinine Ratio, Random Urine  <20 mg/g . . . . .. . . . Normal   mg/g . . . . . . . Microalbuminuria  >300 mg/g . . . . . . . . Clinical Proteinuria       Lipid Cascade FASTING   Result Value Ref Range    Cholesterol 121 <=199 mg/dL    Triglycerides 88 <=149 mg/dL    HDL Cholesterol 49 (L) >=50 mg/dL    LDL Calculated 54 <=129 mg/dL    Patient Fasting > 8hrs? Yes        Your A1C is on the low side that I start to worry about having blood sugar readings that are too low. Let's have you lower your dose of Novolin N (the longer lasting insulin) from 33 units two times a day to 31 units two times a day. It's a small change but I think it's best for your safety.     The cholesterol levels look excellent.     There is a small amount of protein in the urine.     Also, I talked to Bisi about your insurance change. She recommended Senior Link[employer] line for help switching insurance plans to one that we can accept in the year 2021. Here is the number: Senior Linkage Line: [phone]        Sincerely,        Electronically signed by ARSLAN Vasquez

## 2021-06-22 NOTE — PROGRESS NOTES
Programs applying for: BARBARA Limon Down- Alternative Waiver referral by - [facility]   Case # [id number]  Worker Braulio Weiss ph# [phone]  [facility] Alternative waiver-  Obed ph# [phone]  Bisi Following           --Closing encounter for a new month--    2/4/2019: Left message today for Obed to follow up on status of Alternative waiver.    1/4/2018: Petra advised me to call [facility] term [facility], ph# [phone]. I have left a message for waiver line. It states they will call in 2 business days.        12/13/18: Called and Left a message for Petra JIMENEZ-[facility] Screener to check on Alternative Waiver. I sent a message to Tonia MILLER in billing to check on Jessika care application.       BARBARA Limon-down- Denied  Medicare Savings- Denied  [facility] [employer]-  Alternative Waiver

## 2021-06-22 NOTE — TELEPHONE ENCOUNTER
Controlled Substance Refill Request  Medication Name:   Requested Prescriptions     Pending Prescriptions Disp Refills     oxyCODONE-acetaminophen (PERCOCET) 5-325 mg per tablet 180 tablet 0     Sig: Take 1-2 tablets by mouth every 8 (eight) hours as needed for pain (maximum of 6 tablets per day). For 11/5-12/5     Date Last Fill: 11/5/2018  Pharmacy: [facility]      Submit electronically to pharmacy  Controlled Substance Agreement Date Scanned:   Encounter-Level CSA Scan Date:    There are no encounter-level csa scan date.       Last office visit with prescriber/PCP: 11/7/2018 Marichuy Rubio FNP OR same dept: 11/7/2018 Marichuy Rubio FNP OR same specialty: 11/7/2018 Marichuy Rubio FNP  Last physical: Visit date not found Last [facility] visit: 5/26/2015 Monroe Patino MD      Patient reports she will be out of medication tomorrow. Please send a new prescription asap.

## 2021-06-22 NOTE — PROGRESS NOTES
Spoke to Ebonie who stated things weren't going very well. The stimulator in her back quit working so she is experiencing some pain from that. She did state that she has an appointment to get it looked at on 1/3/18. Advised that Bisi reached out to check the status of her waiver and rojelio care application and we will update her once they call back. Ebonie stated she had no other needs at this time.    Next Outreach: 1/29/18

## 2021-06-22 NOTE — PROGRESS NOTES
Follow-up visit for spinal cord stimulator  1/3/2019    Ms. Bowman is a 68-year-old woman who has had low back surgery.  She has a spinal cord stimulator in place and feels that that does help to decrease the leg pain and make her more functional.  She has been having some difficulty with charging and keeping her stimulator charged.  She says at times she has had to charge it up to 3 times a day.  She has been seen on at least 3 times with the [employer] representative to restart her stimulator as the battery had completely discharged.    It was considered that perhaps a nonrechargeable stimulator battery would be best for her, however, she is using the high density settings which would make the battery life less than 2 years.  It would not make replacing the battery and a reasonable alternative for her at that.  A better alternative would be to to replace the battery with an [employer] stimulator battery that is much easier to recharge, but would give her much longer battery life.    We will submit for insurance authorization for the battery replacement.

## 2021-06-23 NOTE — PROGRESS NOTES
Spoke to Ebonie who said she isn't doing the greatest, she still has a lot of back pain. She was able to get her stimulator looked at but it needs a new battery and she is waiting for the insurance company to approve it. She is still doing her leg exercises, but said it is hard sometimes to stay away from sweets. I asked if she had looked into moving, and she said that she is still thinking about whether she wants to stay living in [place] or go somewhere warmer. No needs at this time.    Next outreach: 3/1/19

## 2021-06-23 NOTE — TELEPHONE ENCOUNTER
Last fill 01/07/2019    Last seen 11/07/2018    Next appointment 02/06/2019     Return in about 3 months (around 2/7/2019) for Next scheduled follow up.

## 2021-06-23 NOTE — PROGRESS NOTES
Programs applying for: [place] Spend Down- Alternative Waiver referral by - [facility]   Case # [id number]  Worker Braulio Weiss ph# [phone]  [facility] Alternative waiver-  Obed ph# [phone]  Bisi Following           2/6/2019: Connected with patient in clinic today. She is now receiving home making and meals. Based on the conversation she has been approved for the alternative waiver. I haven't heard back from the worker. No further FRG needed at this time.     2/4/2019: Left message today for Obed to follow up on status of Alternative waiver.  I haven't heard back from Tonia in Billing on whether patient qualified for [facility] [employer]. I sent another email today.  When I review the account it says Annual Tax form on file. Unsure if that is indicating she qualified for jessika care or not.     Reviewed Email back from Tonia- She was approved  for 50% on 11/30/18      MA Spend-down- Denied  Medicare Savings- Denied  [facility] [employer]- Approved 50%  Alternative Waiver- Approved

## 2021-06-23 NOTE — PROGRESS NOTES
Connected with Ebonie in clinic today. She mentioned that she is now getting help with cleaning which has been helping a lot. Homemaking is currently 1 time a week. Ebonie is going to call Amanda to see if she can receive this 2 times a week instead of 1.   She is also receiving 7 meals from [employer].  I have updated her care team today however she wasn't sure what their titles were. Ebonie mentioned that Jazzy was the boss of [employer].   She has no concerns or needs today.     Next Outreach: 4/6/2019

## 2021-06-23 NOTE — TELEPHONE ENCOUNTER
Controlled Substance Refill Request  Medication Name:   Requested Prescriptions     Pending Prescriptions Disp Refills     oxyCODONE-acetaminophen (PERCOCET) 5-325 mg per tablet 180 tablet 0     Sig: Take 1-2 tablets by mouth every 8 (eight) hours as needed for pain (maximum of 6 tablets per day). For 1/7-2/6     Date Last Fill: 1/7/2019  Pharmacy: [id number]      Submit electronically to pharmacy  Controlled Substance Agreement Date Scanned:   Encounter-Level CSA Scan Date:    There are no encounter-level csa scan date.       Last office visit with prescriber/PCP: 11/7/2018 Marichuy Rubio FNP OR same dept: 11/7/2018 Marichuy Rubio FNP OR same specialty: 11/7/2018 Marichuy Rubio FNP  Last physical: Visit date not found Last MTM visit: 5/26/2015 Monroe Patino MD        Patient has appt with Marichuy Rubio FNP on 2/6/2019.

## 2021-06-23 NOTE — TELEPHONE ENCOUNTER
RN cannot approve Refill Request    RN can NOT refill this medication med is not covered by policy/route to provider. Last office visit: 11/7/2018 Marichuy Rubio FNP Last Physical: Visit date not found Last MTM visit: 5/26/2015 Monroe Patino MD Last visit same specialty: 11/7/2018 Marichuy Rubio FNP.  Next visit within 3 mo: Visit date not found  Next physical within 3 mo: Visit date not found      [facility], [facility] Triage/Med Refill 1/30/2019    Requested Prescriptions   Pending Prescriptions Disp Refills     nystatin (MYCOSTATIN) powder [Pharmacy Med Name: NYSTATIN 475590 UNIT/GM Powder] 15 g 0     Sig: APPLY TO THE AFFECTED AREA THREE TIMES DAILY FOR 10 TO 14 DAYS OR AT LEAST 3 DAYS AFTER RESOLUTION OF SYMPTOMS    There is no refill protocol information for this order

## 2021-06-24 NOTE — TELEPHONE ENCOUNTER
"Patient calls requesting an update as to \"what is going on with getting this machine out of my back\"    "

## 2021-06-24 NOTE — PROGRESS NOTES
Internal Medicine Office Visit  [facility]  Patient Name: Ebonie Bowman  Patient Age: 68 y.o.  YOB: 1950  MRN: [id number]    Date of Visit: 2/15/2019  Reason for Office Visit:   Chief Complaint   Patient presents with     Hand Injury     Fell on Tuesday in the hallway [facility]. Right hand pain. Swelling.            Assessment / Plan / Medical Decision Makin. Pain of right hand negative for fracture  - XR Hand Right 3 or More VWS; Future  - XR Hand Right 3 or More VWS    2. Right wrist pain  Negative for fracture  - XR Wrist Right Navicular 3 or More VWS; Future  - XR Wrist Right Navicular 3 or More VWS  For patient's hand and wrist sprain recommend splint which is provided to patient, elevation, rest, ice 20-minute increments over-the-counter pain reliever as needed.  Recommend follow-up with her primary in 7 days, sooner if needed.    Orders Placed This Encounter   Procedures     XR Wrist Right Navicular 3 or More VWS     XR Hand Right 3 or More VWS   Followup: Return in about 7 days (around 2019). earlier if needed.    Health Maintenance Review  Health Maintenance   Topic Date Due     ZOSTER VACCINES (2 of 3) 2014     INFLUENZA VACCINE RULE BASED (1) 2018     MAMMOGRAM  2018     DEPRESSION FOLLOW UP  2019     DIABETES FOOT EXAM  2019     DIABETES HEMOGLOBIN A1C  2019     DIABETES OPHTHALMOLOGY EXAM  2019     FALL RISK ASSESSMENT  2019     DIABETES FOLLOW-UP  2019     DIABETES URINE MICROALBUMIN  2019     DXA SCAN  2020     ADVANCE DIRECTIVES DISCUSSED WITH PATIENT  2021     TD 18+ HE  2024     COLONOSCOPY  2026     PNEUMOCOCCAL POLYSACCHARIDE VACCINE AGE 65 AND OVER  Completed     PNEUMOCOCCAL CONJUGATE VACCINE FOR ADULTS (PCV13 OR PREVNAR)  Addressed         I am having Ebonie Bowman maintain her magnesium oxide, ascorbic acid (vitamin C), cholecalciferol (vitamin  D3), aspirin, cyanocobalamin, omega-3 fatty acids-vitamin E, lancing device, MULTIVITAMIN ORAL, docusate sodium, folic acid, insulin syringe-needle U-100, glucose, insulin regular, amLODIPine, lidocaine, DULoxetine, blood-glucose meter, blood glucose test, atorvastatin, NovoLIN N NPH U-100 Insulin, traZODone, lisinopril, ranitidine, buPROPion, nystatin, oxyCODONE-acetaminophen, loratadine, metoprolol succinate, and methotrexate.      HPI:  Ebonie Bowman is a 68 y.o. year old who presents to the office today for right wrist pain post fall.  Patient was attempting to catch her cat that had gotten out of the door and she lost her balance falling forward and had arms stretched outward to brace her fall.  She has distal ulna wrist pain and dorsal hand pain.  She has used an ace wrap and percocet for home treatment without relief.         Review of Systems-unremarkable other than listed in HPI      Current Scheduled Meds:  Outpatient Encounter Medications as of 2/15/2019   Medication Sig Dispense Refill     amLODIPine (NORVASC) 5 MG tablet Take 1 tablet (5 mg total) by mouth daily.  0     ascorbic acid (ASCORBIC ACID WITH LATRICIA HIPS) 500 MG tablet Take 500 mg by mouth every evening.        aspirin 81 MG EC tablet Take 81 mg by mouth every evening.       atorvastatin (LIPITOR) 40 MG tablet TAKE 1 TABLET AT BEDTIME 90 tablet 3     blood glucose test strips Check blood sugar four times daily. Dispense brand per patient's insurance at pharmacy discretion. Dx: e11.9 200 strip 11     blood-glucose meter Misc Dispense one meter. Use as directed. Dx: e11.9 1 each 0     buPROPion (WELLBUTRIN) 100 MG tablet Take 1 tablet (100 mg total) by mouth daily. 90 tablet 3     cholecalciferol, vitamin D3, 1,000 unit tablet Take 1,000 Units by mouth every evening.        cyanocobalamin (VITAMIN B-12) 250 MCG tablet Take 250 mcg by mouth every evening.        docusate sodium (COLACE) 100 MG capsule Take 100 mg by mouth 2 (two) times a day.        DULoxetine (CYMBALTA) 30 MG capsule Take 1 capsule (30 mg total) by mouth daily. 30 capsule 1     folic acid (FOLVITE) 800 MCG tablet Take 1,200 mcg by mouth every evening.        glucose 4 GM chewable tablet Chew 4 tablets (16 g total) as needed for low blood sugar. 120 tablet 0     insulin regular (NOVOLIN R) 100 unit/mL injection Inject 24 Units under the skin 3 (three) times a day before meals. Inject 24 units subcutaneous TID with meals plus sliding scale        insulin syringe-needle U-100 (BD INSULIN SYRINGE ULT-FINE II) 1 mL 31 gauge x 5/16 Syrg USE 4 TIMES DAILY AS DIRECTED 500 each 1     lancing device (ACCU-CHEK SOFTCLIX) Misc Use 1 applicator As Directed 4 (four) times a day. 300 each 3     lidocaine (XYLOCAINE) 5 % ointment Apply to affected area QID prn pain 35.44 g 0     lisinopril (PRINIVIL,ZESTRIL) 10 MG tablet TAKE 1 TABLET AT BEDTIME 90 tablet 2     loratadine (CLARITIN) 10 mg tablet Take 1 tablet (10 mg total) by mouth at bedtime. 90 tablet 2     magnesium oxide (MAG-OX) 400 mg tablet Take 800 mg by mouth at bedtime. With food        methotrexate 2.5 MG tablet TAKE 4 TABLETS (10 MG) ONE TIME WEEKLY  ON  FRIDAY 48 tablet 0     metoprolol succinate (TOPROL XL) 50 MG 24 hr tablet Take 1 tablet (50 mg total) by mouth daily. 90 tablet 3     MULTIVITAMIN ORAL Take 1 tablet by mouth every evening.        NOVOLIN N NPH U-100 INSULIN 100 unit/mL injection Inject 33 Units under the skin 2 (two) times a day before meals. 10 mL 11     nystatin (MYCOSTATIN) powder APPLY TO THE AFFECTED AREA THREE TIMES DAILY FOR 10 TO 14 DAYS OR AT LEAST 3 DAYS AFTER RESOLUTION OF SYMPTOMS 15 g 0     omega-3 fatty acids-vitamin E (FISH OIL) 1,000 mg cap Take 1,000 mg by mouth every evening. 90 each 3     oxyCODONE-acetaminophen (PERCOCET) 5-325 mg per tablet Take 1-2 tablets by mouth every 8 (eight) hours as needed for pain (maximum of 6 tablets per day). For 2/6-3/8 180 tablet 0     ranitidine (ZANTAC) 150 MG tablet TAKE 1  TABLET (150 MG) BY MOUTH 2 (TWO) TIMES A DAY. 180 tablet 3     traZODone (DESYREL) 150 MG tablet TAKE 1 TABLET AT BEDTIME 90 tablet 2     No facility-administered encounter medications on file as of 2/15/2019.      Past Medical History:   Diagnosis Date     BBB (bundle branch block)      Chronic back pain      Chronic kidney disease     stage 3     Chronic Kidney Disease (NKF Classification)     Created by Conversion      Depression      Diabetes (H)     Type 2     Diabetes mellitus, type 2 (H)     Created by Conversion      Frequent headaches      Ganglion Of The Left Wrist     Created by Conversion      GERD (gastroesophageal reflux disease)      HLD (hyperlipidemia)      Hypertension     Per H&P dated 1/03/2013     Incarcerated ventral hernia     Per H&P dated 1/03/2013     Osteoarthritis      Rheumatoid arthritis (H)      Thrombocytopenia (H)      Venous insufficiency      Past Surgical History:   Procedure Laterality Date     BACK SURGERY       CHOLECYSTECTOMY       FATTY TUMOR  1996    LT SHOULDER BLADE     HYSTERECTOMY  1984     JOINT REPLACEMENT Left     knee     DE ABDOMEN SURGERY PROC UNLISTED      Description: Hernia Repair;  Recorded: 10/23/2013;     SPINAL CORD STIMULATOR IMPLANT       Two left wrist  1990, 1995     Social History     Tobacco Use     Smoking status: Former Smoker     Last attempt to quit: 8/1/2008     Years since quitting: 10.5     Smokeless tobacco: Never Used     Tobacco comment: smells of cigarrette smoke   Substance Use Topics     Alcohol use: No     Drug use: No       Objective / Physical Examination:  Vitals:    02/15/19 1131   BP: 132/90   Pulse: 60   SpO2: 96%   Weight: (!) 234 lb (106.1 kg)   Height: 6' (1.829 m)     Wt Readings from Last 3 Encounters:   02/15/19 (!) 234 lb (106.1 kg)   02/06/19 (!) 237 lb (107.5 kg)   11/07/18 (!) 229 lb (103.9 kg)     Body mass index is 31.74 kg/m .     General Appearance: Alert and oriented, cooperative, affect appropriate, speech clear, in  no apparent distress  Head: Normocephalic, atraumatic  Lungs: Respirations are regular nonlabored  Musculoskeletal: Examination of patient's right wrist and hand she has moderate amount of swelling without erythema or ecchymosis.  She has distal ulnar pain with palpation as well as dorsal right hand pain she is able to move digits without difficulty pulses are intact.        Oma Fallon, CNP

## 2021-06-24 NOTE — TELEPHONE ENCOUNTER
"Called pt and discussed SCS Battery Replacement vs Removal. Pt states \"it's too much of a hassle, I want it out.\" Feels it , but pt doesn't help enough so would like it removed. Nurse told pt it will require another PA but Pt adamant that she would like it removed. PA request sent to remove SCS as requested.  "

## 2021-06-24 NOTE — TELEPHONE ENCOUNTER
Patient forgot to request a refill of the below medication and is not very low. She is hopeful this can be sent in today.        Controlled Substance Refill Request  Medication Name:   Requested Prescriptions     Pending Prescriptions Disp Refills     oxyCODONE-acetaminophen (PERCOCET) 5-325 mg per tablet 180 tablet 0     Sig: Take 1-2 tablets by mouth every 8 (eight) hours as needed for pain (maximum of 6 tablets per day). For 2/6-3/8     Date Last Fill: 2/01/2019  Pharmacy: [id number]      Submit electronically to pharmacy  Controlled Substance Agreement Date Scanned:   Encounter-Level CSA Scan Date:    There are no encounter-level csa scan date.       Last office visit with prescriber/PCP: 2/6/2019 Marichuy Rubio FNP OR same dept: 2/15/2019 Oam Fallon CNP OR same specialty: 2/15/2019 Oma Fallon CNP  Last physical: Visit date not found Last MTM visit: 5/26/2015 Monroe Patino MD

## 2021-06-25 NOTE — TELEPHONE ENCOUNTER
Call from Polina, at [facility], asking for information regarding pt's PA for SCS. Difficult to understand her message due to phone connection and her accent. LMTCB or to call [employer] as needed for SCS  Explant Information.

## 2021-06-25 NOTE — TELEPHONE ENCOUNTER
----- Message from Raheem Londono DO sent at 5/30/2021 11:08 AM CDT -----  Please discuss with the patient:    Some mild worsening kidney function noted compared to prior level.  Recommend improving control of diabetes given significantly elevated hemoglobin A1c as uncontrolled diabetes can contribute to kidney disease.  In the interim recommend decreasing methotrexate from 5 tablets weekly to 3 tablets weekly, drinking sufficient amount of fluids daily and avoiding anti-inflammatory medications/NSAIDs such as ibuprofen/Advil/Aleve etc. recommend rechecking creatinine in about 4 to 6 weeks.    Normal liver enzymes.    Platelet count a bit low, not new with level fluctuating.  Remainder of CBC/cell count results were unremarkable.    Please place lab orders mentioned above.

## 2021-06-25 NOTE — PROGRESS NOTES
Progress Notes by Claudia Elizondo MD at 2/27/2017  1:50 PM     Author: Claudia Elizondo MD Service: -- Author Type: Physician    Filed: 2/27/2017  2:18 PM Encounter Date: 2/27/2017 Status: Signed    : Claudia Elizondo MD (Physician)           Click to link to [facility]     [facility] HEART CARE NOTE    Thank you, Dr. Rubio, for asking the [facility] team to see Ms. Ebonie Bowman to evaluate atherosclerotic vascular disease, hypertension and irregular heartbeat.    Assessment/Recommendations   Assessment:    1.  Atherosclerotic vascular disease.  This was documented on abdominal CT one year ago.  She did subsequently undergo a nuclear stress study which demonstrated a small area of distal anteroseptal ischemia.  Based on the low risk examination, I recommended continued medical management and aggressive risk factor modification.  She has done well over the year with no change in her chronic exertional dyspnea.  At this point, continue medical management.  2.  Hypertension, adequately controlled.   3.  Irregular heartbeat, etiology unclear.  ECGs in the office have demonstrated no arrhythmia.  I suggested a Holter monitor along with an echocardiogram for further evaluation.  4.  Hyperlipidemia, well controlled    Plan:  1.  Continue current medications  2.  Schedule echocardiogram and Holter monitor.  We will call with those results.       History of Present Illness    Ms. Ebonie Bowman is a 66 y.o. female with hypertension, hyperlipidemia, type 2 diabetes mellitus who was discovered to have atherosclerosis in her aorta and an abdominal CT one year ago.  This subsequently led to cardiac consultation and nuclear stress study which demonstrated a small area of distal anteroseptal ischemia.  Based on the low risk nuclear study, I recommended continued medical management.  She has done well over the year with no increase in her symptoms of chronic exertional dyspnea which may be multifactorial.  She  reports occasional sharp right-sided chest pain lasting only a few minutes and resolving.  This does not occur with activity.  No symptoms of orthopnea, PND or lower extremity edema.  She again has been noted to have an irregular heartbeat by her primary physician although a subsequent ECG was unremarkable.  I brought up the option of pursuing a Holter monitor to see if she is having intermittent rhythm issues which she was agreeable to.    ECG (personally reviewed): No ECG today    ECHO (personally reviewed): No prior echo study      Physical Examination Review of Systems   Vitals:    02/27/17 1349   BP: 138/82   Pulse: 76   Resp: 18   SpO2: 97%     Body mass index is 32.28 kg/(m^2).  Wt Readings from Last 3 Encounters:   02/27/17 (!) 238 lb (108 kg)   02/15/17 (!) 239 lb 9.6 oz (108.7 kg)   01/22/17 (!) 245 lb 9.6 oz (111.4 kg)     General Appearance:   Awake, Alert, No acute distress.   HEENT:  No scleral icterus; the mucous membranes were pink and moist.   Neck: No cervical bruits or jugular venous distention    Chest: The spine was straight. The chest was symmetric.   Lungs:   Respirations unlabored; the lungs are clear to auscultation. No wheezing   Cardiovascular:    regular rate and rhythm.  S1, S2 normal.  No murmur, gallop or rub.     Abdomen:  No organomegaly, masses, bruits, or tenderness. Bowels sounds are present   Extremities:  no peripheral edema bilaterally.     Skin: No xanthelasma. Warm, Dry.   Musculoskeletal: No tenderness.   Neurologic: Mood and affect are appropriate.    General: Weight Loss  Eyes: WNL  Ears/Nose/Throat: WNL  Lungs: WNL  Heart: Shortness of Breath with activity  Stomach: WNL  Bladder: Frequent Urination at Night  Muscle/Joints: WNL  Skin: WNL  Nervous System: WNL  Mental Health: WNL     Blood: Easy Bruising     Medical History  Surgical History Family History Social History   Past Medical History:   Diagnosis Date   ? BBB (bundle branch block)    ? Chronic back pain    ?  Chronic kidney disease     stage 3   ? Chronic Kidney Disease (NKF Classification)     Created by Conversion    ? Depression    ? Diabetes     Type 2   ? Diabetes mellitus, type 2     Created by Conversion    ? Frequent headaches    ? Ganglion Of The Left Wrist     Created by Conversion    ? GERD (gastroesophageal reflux disease)    ? HLD (hyperlipidemia)    ? Hypertension     Per H&P dated 1/03/2013   ? Incarcerated ventral hernia     Per H&P dated 1/03/2013   ? Osteoarthritis    ? Rheumatoid arthritis    ? Thrombocytopenia    ? Venous insufficiency     Past Surgical History:   Procedure Laterality Date   ? BACK SURGERY     ? CHOLECYSTECTOMY     ? FATTY TUMOR  1996    LT SHOULDER BLADE   ? HYSTERECTOMY  1984   ? JOINT REPLACEMENT Left     knee   ? AK ABDOMEN SURGERY PROC UNLISTED      Description: Hernia Repair;  Recorded: 10/23/2013;   ? Two left wrist  1990, 1995    Family History   Problem Relation Age of Onset   ? Acute Myocardial Infarction Father 62   ? Cancer Father    ? Heart disease Father    ? COPD Father    ? Diabetes Sister    ? Diabetes Brother    ? Cancer Son    ? Diabetes Maternal Grandmother     Social History     Social History   ? Marital status:      Spouse name: N/A   ? Number of children: N/A   ? Years of education: N/A     Occupational History   ? Not on file.     Social History Main Topics   ? Smoking status: Former Smoker     Quit date: 8/1/2008   ? Smokeless tobacco: Never Used      Comment: smells of cigarrette smoke   ? Alcohol use No   ? Drug use: No   ? Sexual activity: Not on file     Other Topics Concern   ? Not on file     Social History Narrative          Medications  Allergies   Current Outpatient Prescriptions   Medication Sig Dispense Refill   ? ascorbic acid (ASCORBIC ACID WITH LATRICIA HIPS) 500 MG tablet Take 500 mg by mouth every evening.      ? aspirin 81 MG EC tablet Take 81 mg by mouth every evening.     ? atorvastatin (LIPITOR) 40 MG tablet Take 40 mg by mouth.     ?  blood glucose test (ACCU-CHEK FRANCISCO PLUS TEST STRP) Strp TEST FOUR TIMES DAILY 400 strip 3   ? buPROPion (WELLBUTRIN SR) 100 MG 12 hr tablet Take 1 tablet (100 mg total) by mouth daily. 90 tablet 0   ? cholecalciferol, vitamin D3, 1,000 unit tablet Take 1,000 Units by mouth every evening.      ? cyanocobalamin (VITAMIN B-12) 250 MCG tablet Take 250 mcg by mouth every evening.      ? docusate sodium (COLACE) 100 MG capsule Take 100 mg by mouth 2 (two) times a day.     ? folic acid (FOLVITE) 800 MCG tablet Take 1,200 mcg by mouth daily.     ? insulin NPH (NOVOLIN) 100 unit/mL injection Inject 40 Units under the skin bedtime.      ? insulin NPH human recomb (HUMULIN N PEN) 100 unit/mL (3 mL) pen Give 60 units subcutaneously every morning and 40 units subcutaneously every evening 3 mL 11   ? insulin syringe-needle U-100 (BD INSULIN SYRINGE ULT-FINE II) 1 mL 31 gauge x 5/16 Syrg USE 4 TIMES DAILY AS DIRECTED 500 each 1   ? lancing device (ACCU-CHEK SOFTCLIX) Misc Use 1 applicator As Directed 4 (four) times a day. 300 each 3   ? leflunomide (ARAVA) 20 MG tablet Take 1 tablet (20 mg total) by mouth daily. 90 tablet 0   ? lisinopril (PRINIVIL,ZESTRIL) 10 MG tablet Take 1 tablet (10 mg total) by mouth bedtime. 90 tablet 3   ? loratadine (CLARITIN) 10 mg tablet Take 10 mg by mouth bedtime.      ? magnesium oxide (MAG-OX) 400 mg tablet Take 400 mg by mouth bedtime. With food      ? methotrexate 2.5 MG tablet Take 4 tablets (10 mg total) by mouth once a week. 48 tablet 0   ? metoprolol succinate (TOPROL-XL) 25 MG Take 25 mg by mouth bedtime.     ? metoprolol succinate (TOPROL-XL) 25 MG TAKE ONE TABLET BY MOUTH ONCE DAILY 90 tablet 0   ? MULTIVITAMIN ORAL Take 1 tablet by mouth daily.     ? NOVOLOG 100 unit/mL injection Check blood sugar three (3) times daily before meals.  Treat as directed.;250.01 IDDDM; 10 mL 0   ? omega-3 fatty acids-vitamin E (FISH OIL) 1,000 mg cap Take 1,000 mg by mouth every evening. 90 each 3   ?  omeprazole (PRILOSEC) 20 MG capsule TAKE 1 CAPSULE TWICE DAILY 180 capsule 2   ? oxyCODONE-acetaminophen (PERCOCET)  mg per tablet Take 1 tablet by mouth every 8 (eight) hours as needed for pain (Do not exceed 3 tablets per day). 90 tablet 0   ? predniSONE (DELTASONE) 2.5 MG tablet Take 1 tablet (2.5 mg total) by mouth every other day. 45 tablet 0   ? traZODone (DESYREL) 150 MG tablet Take 1 tablet (150 mg total) by mouth bedtime. 90 tablet 3     No current facility-administered medications for this visit.       Allergies   Allergen Reactions   ? Penicillins Hives   ? Sulfa (Sulfonamide Antibiotics) Hives         Lab Results    Chemistry/lipid CBC Cardiac Enzymes/BNP/TSH/INR   Lab Results   Component Value Date    CHOL 111 06/27/2016    HDL 40 (L) 06/27/2016    LDLCALC 38 06/27/2016    TRIG 165 (H) 06/27/2016    CREATININE 1.24 (H) 02/13/2017    BUN 11 07/07/2016    K 3.9 07/07/2016     07/07/2016     07/07/2016    CO2 27 07/07/2016    Lab Results   Component Value Date    WBC 12.2 (H) 02/13/2017    HGB 14.8 02/13/2017    HCT 43.7 02/13/2017    MCV 91 02/13/2017     (L) 02/13/2017    Lab Results   Component Value Date    CKMB 1 06/16/2015    TROPONINI <0.01 06/16/2015    TSH 2.02 12/14/2016    INR 1.18 (H) 07/08/2016

## 2021-06-25 NOTE — TELEPHONE ENCOUNTER
Polina MILLER from [facility], calls back and states if pt would like SCS explanted, she would need supporting documentation of a discussion in Dr Pretty's notes either from consult or phone call with the pt. She did get approval for battery replacement if pt would prefer to have that done instead.  Called pt and discussed options. She decided to have the battery replaced after all, as she states SCS was helpful.  Called Polina at [facility] to notify at [phone], [phone]. E-mail sent to [place] to notify of decision as well.

## 2021-06-25 NOTE — TELEPHONE ENCOUNTER
Pt notified of lab result comments. Pt will reduce methotrexate to 3 tabs per week and have labs rechecked in 4-6 weeks. Lab order entered.     Pt will f/u with NP provider regarding diabetes. Pt verbalized understanding to not take NSAIDs and to increase fluid intake.

## 2021-06-25 NOTE — TELEPHONE ENCOUNTER
Refill Request  Did you contact pharmacy: No  Medication name:   Requested Prescriptions     Pending Prescriptions Disp Refills     oxyCODONE-acetaminophen (PERCOCET) 5-325 mg per tablet 180 tablet 0     Sig: Take 1-2 tablets by mouth every 6 (six) hours as needed for pain (maximum of 6 tablets per day). For 5/1-5/30     Who prescribed the medication: Marichuy Rubio  Requested Pharmacy: [facility]  Is patient out of medication: Yes  Patient notified refills processed in 3 business days:  yes  Okay to leave a detailed message: yes

## 2021-06-26 NOTE — TELEPHONE ENCOUNTER
Pt has yet to call or come in today for UDT.  She has an appt next Tuesday to see [employer] only, can this be done then?  Please advise.

## 2021-06-26 NOTE — TELEPHONE ENCOUNTER
Who is calling:  Pt  Reason for Call:  Pt needs a refill on hydrOXYchloroQUINE (PLAQUENIL) 200 mg.  Pt is wondering if there is any other medication that might be due for a refill, please send it in as well.    Please send to [facility] in [facility].  Pt states her pharmacy has changed from [facility] to [facility] due to insurance changes.       Okay to leave a detailed message: Yes

## 2021-06-26 NOTE — PROGRESS NOTES
"PAIN CLINIC NEW PATIENT H&P    Subjective:      Ebonie Bowman is a 71 y.o. female who presents for a new patient evaluation per Marichuy Rubio FNP . Consult form indicates Lumbar radiculopathy; Chronic pain syndrome . Consult initiated 4/5/21. Reviewed rooming evaluation and patient intake form.      CC: Pain    Functional Goals:  Ebonie specific goals with the pain center are \"I don't know yet.\"     Ebonie indicates pain is limiting and they would like to very less pain     HPI: Pain Story:  Records reviewed: intake paperwork      Reviewed note 4/5/21  Chronic pain (Chronic)        - Mostly low back pain. She reports diminished function and worsening pain. Prior treatments including spinal stimulator, JOSE ANTONIO, PT ineffective  - Referral to pain clinic          Lumbar radiculopathy - Primary      Relevant Orders     Ambulatory referral to Pain Clinic     November 27 through December 4 she was hospitalized for arthropathy of the facet joint concurrent with and due to an effusion with a questionable facet joint septic arthropathy.  ID and neurosurgery was consulted and she was on Rocephin IV per infectious disease.  Culture results came back negative and she was advised to follow-up with neurosurgery.  She discharged to TCU on 12/4. She is now back at home.   error on her spinal stimulator so she knows it isn't working and so her pain is worse      Ebonie reports her pain has been pretty bad last 2 years. She has been noticing no relief. She has been using a lot of percocet for the pain to be tolerable. She indicates she does not want to take a lot of medication.       Relevant Diagnoses:  1. Sacroiliitis (H)    2. Lumbar radiculopathy    3. Chronic pain syndrome    4. Myalgia    5. Chronic bilateral thoracic back pain    6. Chronic pain        Associated symptoms:    Weight loss: -   Weight gain: -   Fever and/or Chills: -   Rash: -   Swelling: -   Numbness: Laterals aspect of the right leg   Weakness: no   Bladder or " Bowel loss of control: no changed   Night pain: +  Location of the pain: low back  Severity: Today: 7. Average: 8. Best past week: 6. Worst past week 8.  Usual Pain Intensity: moderate to severe  Timing: constant  Quality: sharp, aching, tingling, numbness (legs), tender  Aggravating factors: standing, sitting, walking, reaching, twisting, lifting, squatting, bending, going or down stairs, getting out of the car or bed  Alleviating factors: medication, nothing  DME:walker. [facility] Serial number [id number] implanted 7/8/2016 batter replacement 4/25/2019 (Has not been functioning properly) - she was seen during 2020. Cane.    Functional Symptoms: Pain interferes with:  Sleep:she tried not to move she will sleep on her side.   Walking:    Ambulation/Transfer: Pt is ambulatory. Falls easily, pain with ambulation, uses a gait aid. Transfers independently. Stairs: she has some trouble - she will pull herself up using the 2 railings. She has 13 steps. She fatigues half way up.  Work:    Employment: retired   Work Activities: worked as a nurses aid   Childcare:: no    Animal Care: 2 cats - she has a  who will manage the litter. She is able to feet.   Family Care:She and her son help each other out. He has a renal transplant.    Volunteering: no  ADL's:    Bathing: she has a bench and she is able to do ok except for bending.    Dressing: daily   Cooking: she will cook her son will help some. Her appetite is good.    Shopping: she will go shopping. Her sone will go shopping. They do not go together.  Split the task.   Housekeeping: She does some and her  (male)    Toileting: she pushed up using the sink and tub. Denies any issue with bladder. Some constipation - she will go every 3 days and then she will go more than once.   Concentration: no issues  Transportation: son will drive her a bit  Relationships/Social: she would like to go to [place] but she is not able at this time. She would like to go  to a wedding in [place] July 4th. They are not able to travel yet. Mostly due to the pain.  Sexual health: not at this time  Services: she has a  who is coming in a couple of tines per week    Previous Treatment for Pain:    Interventional: Per discussion with the patient and review of the record Ebonie had the following interventional approaches: no longer getting the back injections     Past Surgical History:   Procedure Laterality Date     BACK SURGERY       CHOLECYSTECTOMY       FATTY TUMOR  1996    LT SHOULDER BLADE     HYSTERECTOMY  1984     IR FINE NEEDLE ASPIRATION  11/29/2020     JOINT REPLACEMENT Left     knee     PICC MIDLINE INSERTION  11/30/2020          SC ABDOMEN SURGERY PROC UNLISTED      Description: Hernia Repair;  Recorded: 10/23/2013;     SC IMPLANT SPINAL NEUROSTIM/ Left 4/25/2019    Procedure: LEFT FLANK INCISION REPLACE NEUROSTIMULATOR;  Surgeon: Vishal Pretty MD;  Location: [facility];  Service: Pain     SPINAL CORD STIMULATOR IMPLANT       Two left wrist  1990, 1995       Rehabilitation PT/OT: Ebonie reports participation in PT/OT. Couple of summers ago. It was a bit expensive. She has a HEP and she is doing some of those exercises    Pain Psychology/Counseling/Behavioral Medicine:  Patient no specific behavioral health strategies for management of pain.    Integrative Approaches:  Ebonie reports participating in:  Chiropractic: no  Acupuncture: no  Massage: self  Heat/Ice: both  Bracing: she h as one but she does not use it much  Home Exercise Program: +  Meditation/Spiritual Practices: she practices prayer      Behavioral Medicine/Mental Health History:   Patient + being diagnosed with   Depression +   Anxiety +   ADD -   OCD -   Bipolar disorder -   Schizophrenia -    No history of family with schizophrenia  Patient denies current psychiatrist or psychologist.  Patient denies  hospitalizations for mental illness.   Patient denies suicidal ideation. Patient denies  previous suicidal attempts.  Patient denies physical/sexual/emotional abuse or neglect.  Patient denies current concerns about possible abuse/neglect in their home.     Chemical History:   Patient denies any chemical dependency evaluation or treatment in the past.  Nicotine use: cigarettes   Quit Date: 2008   PPD: 2  Alcohol Use:  Occasional 1-2 tines per year  Marijuana: tried once not for pain many years ago. Open to considering for her pain. Some concern about financials  Kratom: no  LSD no  Methamphetamine: no  Heroin: no  Cocaine: no  Use of prescribed medication in a fashion other than intended: no  Use of others opioids: no  Legal history related to drugs or alcohol (DUI/DWI/Possession): no  ORT: 8 high risk     Impact of current Pain treatment:       Pertinent Pain Medications:  Last dose of medication was Percocet 6/21/21 1230.     She currently takes:      Current Outpatient Medications:      acetaminophen (TYLENOL) 500 MG tablet, Take 2 tablets (1,000 mg total) by mouth 3 (three) times a day., Disp:  , Rfl: 0     amLODIPine (NORVASC) 10 MG tablet, TAKE 1 TABLET EVERY DAY, Disp: 90 tablet, Rfl: 2     ascorbic acid (ASCORBIC ACID WITH LATRICIA HIPS) 500 MG tablet, Take 500 mg by mouth every evening. , Disp: , Rfl:      aspirin 81 MG EC tablet, Take 81 mg by mouth every evening., Disp: , Rfl:      atorvastatin (LIPITOR) 40 MG tablet, Take 1 tablet (40 mg total) by mouth at bedtime., Disp: 90 tablet, Rfl: 2     b complex vitamins tablet, Take 1 tablet by mouth daily., Disp: , Rfl:      blood glucose test strips, Check blood sugar four times daily. Dispense brand per patient's insurance at pharmacy discretion. Dx: e11.9, Disp: 400 strip, Rfl: 3     blood pressure monitor Kit, Use 1 applicator As Directed daily., Disp: 1 each, Rfl: 0     blood-glucose meter Misc, Dispense one meter. Use as directed. Dx: e11.9, Disp: 1 each, Rfl: 0     buPROPion (WELLBUTRIN) 100 MG tablet, TAKE 1 TABLET EVERY DAY, Disp: 90 tablet,  Rfl: 3     cholecalciferol, vitamin D3, (VITAMIN D3 ORAL), Take 3 tablets by mouth daily. , Disp: , Rfl:      flash glucose scanning reader (FREESTYLE BRE 14 DAY READER) Misc, Use 1 each As Directed see administration instructions. Use as directed with sensor, Disp: 1 each, Rfl: 0     flash glucose sensor (FREESTYLE BRE 14 DAY SENSOR) Kit, Use 1 each As Directed every 14 (fourteen) days., Disp: 2 kit, Rfl: 11     folic acid (FOLVITE) 800 MCG tablet, Take 800 mcg by mouth every evening.    , Disp: , Rfl:      hydrOXYchloroQUINE (PLAQUENIL) 200 mg tablet, Take 1 tablet (200 mg total) by mouth 2 (two) times a day., Disp: 180 tablet, Rfl: 0     insulin NPH (NOVOLIN) 100 unit/mL injection, Inject 33 Units under the skin 2 (two) times a day before meals., Disp: , Rfl:      insulin syringe-needle U-100 (BD INSULIN SYRINGE ULT-FINE II) 1 mL 31 gauge x 5/16 Syrg, USE 4 TIMES DAILY AS DIRECTED, Disp: 500 each, Rfl: 1     lancing device (ACCU-CHEK SOFTCLIX) Misc, Use 1 applicator As Directed 4 (four) times a day., Disp: 300 each, Rfl: 3     lidocaine (LMX) 4 % cream, Apply to affected area twice daily as needed, Disp: 30 g, Rfl: 3     lisinopriL (PRINIVIL,ZESTRIL) 10 MG tablet, TAKE 1 TABLET AT BEDTIME, Disp: 90 tablet, Rfl: 3     loratadine (CLARITIN) 10 mg tablet, Take 1 tablet (10 mg total) by mouth at bedtime., Disp: 90 tablet, Rfl: 3     magnesium oxide (MAG-OX) 400 mg tablet, Take 400 mg by mouth at bedtime. With food    , Disp: , Rfl:      methotrexate 2.5 MG tablet, Take 7.5 mg by mouth once a week. On Fridays , Disp: , Rfl:      metoprolol succinate (TOPROL-XL) 50 MG 24 hr tablet, TAKE 1 TABLET EVERY DAY, Disp: 90 tablet, Rfl: 2     MULTIVITAMIN ORAL, Take 1 tablet by mouth every evening. , Disp: , Rfl:      omega-3 fatty acids-vitamin E (FISH OIL) 1,000 mg cap, Take 1,000 mg by mouth every evening., Disp: 90 each, Rfl: 3     oxyCODONE-acetaminophen (PERCOCET) 5-325 mg per tablet, Take 1-2 tablets by mouth every 6  (six) hours as needed for pain (maximum of 6 tablets per day). For 6/7/21-7/7/21, Disp: 180 tablet, Rfl: 0 - she indicates the impact is about 4 hours     traZODone (DESYREL) 50 MG tablet, Take 1 tablet (50 mg total) by mouth at bedtime., Disp: 90 tablet, Rfl: 1 - continued    Previously tried medications: H=Helpful. NH=Not Helpful. U=Unsure. (n/a)=Not applicable  Acetaminophen: (nh)  NSAIDs:not able to take   Ibuprofen    Naproxen    Celecobix    Diclofenac   Gabapentinoids:    Gabapentin: (na)   Pregabalin: (na)  Antidepressants:    Amitriptyline: (na)   Nortriptyline: (na)   Duloxetine: (na)   Venlafaxine: (na)  Muscle Relaxants:    Tizanidine: (na)   Methocarbamol: (na)   Cyclobenzaprine: (na)   Metaxalone: (na)   Carisoprodol: (na)   Baclofen: (na)   Topicals:    Lidocaine: (limited impact)   Diclofenac gel: (na)   Compounded pain creams: (na)   OTC/Herbal topical pain applications: (tiger balm)  Opioids:    Codeine: (na)   Hydrocodone: (in the past)   Oxycodone: (taking)   Tramadol: (na)   Morphine: (makes me sick)   Hydromorphone: (felt sad)   Methadone: (na)   Fentanyl: (na)   Buprenorphine: (na)   Tapentadol: (na)   Oxymorphone: (na)  Supplements:    Tumeric: (na)   CBD: (na)   Vitamin D: (+)   Glucosamine/Chondroitin: (na)     Drug Allergies: as documented:    Pain Clinic Hx: Dr. Pretty    Past Medical History:   Diagnosis Date     BBB (bundle branch block)      Chronic back pain      Chronic kidney disease     stage 3     Chronic Kidney Disease (NKF Classification)     Created by Conversion      Depression      Diabetes (H)     Type 2     Diabetes mellitus, type 2 (H)     Created by Conversion      Frequent headaches      Ganglion Of The Left Wrist     Created by Conversion      GERD (gastroesophageal reflux disease)      HLD (hyperlipidemia)      Hypertension     Per H&P dated 1/03/2013     Incarcerated ventral hernia     Per H&P dated 1/03/2013     Osteoarthritis      Rheumatoid arthritis (H)       Shortness of breath      Thrombocytopenia (H)      Venous insufficiency        Family History   Problem Relation Age of Onset     Arthritis Mother      Acute Myocardial Infarction Father 62     Cancer Father      Heart disease Father      COPD Father      Diabetes Sister      Diabetes Brother      Melanoma Son      Diabetes Maternal Grandmother      Kidney failure Son      Glomerulonephritis Son        Social hx:  Patient lives in a apartment / town home  Patient resides: sone  In a crisis patient would rely on son (Karri)    Social History     Tobacco Use   Smoking Status Former Smoker     Packs/day: 1.00     Years: 43.00     Pack years: 43.00     Start date:      Quit date: 2008     Years since quittin.8   Smokeless Tobacco Never Used     Social History     Substance and Sexual Activity   Alcohol Use Yes    Comment: rare     Social History     Substance and Sexual Activity   Drug Use No    Comment: takes percocet for chronic pain     Social History     Substance and Sexual Activity   Sexual Activity Not Currently         Review Of System: As reviewed on the patient intake paperwork  Constitutional- -  Integumentary: dryness   Head: HA   Ears: -   Eyes: corrective lenses, specks in vision  Nose: -   Throat: -  Respiratory/Cardiovascular- -  Breast: -  GI: heartburn   :  -  GYN-  -   Vascular- -  Musculoskeletal- -   Neuro- -   Hematologic/Immunologic: -  Endocrine- -   Psych-  -  Withdrawal symptoms: -    Objective:     Vitals:    21 1306   BP: 144/76   Pulse: 72   Weight: (!) 229 lb 8 oz (104.1 kg)   PainSc:   7     Constitutional:  Pleasant and cooperative female who presents alone today.  She moves a lot during today's visit.    Psychiatric: Mood and affect are sad at times.  Patient does not appear sedated.     Integumentary:  Observed skin WNL     HEENT: EOM's grossly intact.     Lymph: No cervical lymphadenopathy noted     Chest: Non-labored breathing.      Cardiovascular: radial pulses  equal.       Spine:   Cervical Spine: ROM decreased.  Discomfort reported: +. Tenderness + Tension +  w/ palpation of the upper trapezius musculature.      Thoracic Spine:  full ROM of the bilaterally shoulder. Discomfort reported: +. Tension + Tenderness + w/ palpation of the thoracic paraspinals.       Lumbar Spine:  ROM decreased.   Discomfort reported +.   Tenderness + Tension +  w/  palpation of the lumbar.     Pelvis: Tenderness + with palpation of the greater trochanteric region  bilaterally. Tenderness +  with palpation over the SI joint bilaterally worse on the left. Tension and tenderness are noted with palpation of the gluteus alvaro, medius and minimus. Tenderness + along the coccyx.      Neurological:   Alert and oriented in all spheres including: time, place, person and situation.  Motor/Sensory/Tone:  5/5 bilaterally UE and LE.    Durable Medical Equipment: mask, cane      Lab:  Last UDT on 6/21/2021 was obtained.      Imaging:  EXAM: CT LUMBAR SPINE WO CONTRAST  LOCATION: [facility]  DATE/TIME: 2/22/2020 5:09 PM     INDICATION: Pain after fall  IMPRESSION:   1.  No evidence of an acute osseous abnormality.  2.  Multilevel degenerative changes of the lumbar spine, with at least moderate canal stenosis at L2-L3, L3-L4, and L4-L5.  3.  Diffuse bony demineralization, suggestive of osteoporosis.  4.  Bilateral hydronephrosis, new from the prior CT of the abdomen and pelvis and of uncertain etiology. No evidence of an obstructing renal calculus.    Assessment:   Ebonie Bowman is a 71 y.o. female h/o morbid obesity, RA, Stage 3 kidney disease, DM, facet arthropathy, SCS, spine surgery, knee replacement, hernial repair and eye surgery.     Evaluated today for sacroiliitis, myalgia upper trap, chronic low back pain.    PT ordered  "[employer]" visit planned    She did not leave enough urine for a UDT - she was given water during her visit - beyond RN connection after the visit she did not come back  within 24 hours. This is unexpected, in this setting will not assume opioid prescribing until there is further assessment by the pain center clinician. Primary care may continue in their current fashion.    *Universal Precautions:   UDT/Swab-    Consent- if prescribing opioids  Agreement- if prescribing opioids  Pharmacy- as documented   Count- n/a  Psychological evaluation - information provided  Pharmacogenetic testing- n/a  MME-   MTM - consider  Naloxone safety:     Plan:   Plan and next steps - please review and refer back to the after visit summary - key pieces are highlighted    Follow up: 4-6 weeks (40minute appt with Chelo JACOBO OVL    Education:  Today you saw Chelo Samano Nurse Practitioner.    The plan of care was reviewed, justification provided and questions answered.     I recognized you may have participated in treatments in the past. When troubles first start it is often an expectation you will be cured. Once we are looking at a long term experience, when cure is not likely, the achievable outcomes of treatment are adjust and full participation is expected.    Pain is complex and the treatment approaches are complex. Address of pain will focus on non-medication, non-opioid (non-narcotic) medication, noting occasionally opioids (narcotics) are included into a pain management program. There have been many changes over the last several years as it relates to opioid (narcotic) medication and these changes apply to everyone.     Health Maintenance: You will continue to see primary care for your general health care.    Behavioral Medicine: The body accepts sensory input. The input travels to the brain where the brain based on previous experience, education or culture comes up with a response. Since your brain is very powerful you have the opportunity to learn to adjust the response. Research has demonstrated over-and-over that people who are active participants with behavioral medicine have better  management of their pain.     Interventional: Lets get you an appt with [employer] rep to check the stimulator     Rehabilitation PT for the SI joint and the left upper trap - considering the kinetic chain..    Medication: [facility] does not assume responsibility for prescribing at the time of a consult. I did not provide you with any prescriptions. Continue with your current provider and they will continue your pain care in the fashion they deem is most appropriate.     Diagnostics: UDT collected 6/21/21 results are pending.  UDT  Patient required a random Urine Drug Testing, due to the need to comply with [place] [employer] Policy Guidelines and CDC Guideline for the use of any controlled substances. This is to ensure that patient is compliant with treatment, and monitor for risks such as diversion, abuse, or any other aberrant behaviors. Patient is either being considered for or taking a controlled substance. Unexpected findings will be discussed and treatment decision may be adjusted. Testing is being implemented across the board randomly w/o bias related to age, race, gender, socioeconomic status or [employer] affiliation.     Records: Reviewed to assist with preparation for the office visit and are reflected throughout the note. Patient intake sheet also reviewed.        Deirdre Samano APRN FNP-BC  [address] [facility] [address]   [phone]  [phone]

## 2021-06-26 NOTE — PROGRESS NOTES
"Patient in clinic for new consult with Deirdre Samano CNP.  Patient complains of lower back pain and is described as a constant \"hurts\", throbbing, sometimes stabbing.  Pain score: 7/10 and climbing.    Does the pain interfere with:  Work: yes  Walking/distance: yes  Sleep: yes  Daily activities: yes  Relationships/social life: No  Mood: yes  F= 7        "

## 2021-06-26 NOTE — PROGRESS NOTES
Progress Notes by Terry Nino MD at 7/2/2018 10:55 AM     Author: Terry Nino MD Service: -- Author Type: Physician    Filed: 7/3/2018 10:16 PM Encounter Date: 7/2/2018 Status: Signed    : Terry Nnio MD (Physician)              [facility]    Comprehensive and complex medical care - Chronic disease management - Shared decision making - Care coordination - Compassionate care    Patient advocacy - Rational deprescribing - Minimally disruptive medicine - Ethical focus - Customized care    ______________________________________________________________________     Date of Service: 7/2/2018  Primary Provider: ARSLAN Vasquez    Patient Care Team:  ARSLAN Dunn as PCP - General (Nurse Practitioner)  Bisi Arreola as Clinic Care Coordination Care Guide (Clinic Care Coordination)  Vishal Pretty MD as Physician (Anesthesiology)  EMRE Brand as Physician (Rheumatology)  Ebonie Aguilar RN as Registered Nurse (Clinic Care Coordination)  [facility]     ______________________________________________________________________     Patient's Pharmacy:    [facility] - [place], [place] - [address]  [address] [address]  Phone: [phone] Fax: [phone]    [facility] - [place], [place] - [address]  [address]  [facility] [address]  Phone: [phone] Fax: [phone]    [facility] - [place], [place] - [address]  [facility] [address]  Phone: [phone] Fax: [phone]     Patient's Contacts:  Name Home Phone Work Phone Mobile Phone Relationship Lgl Kandy CARRILLOJB [phone]   Child      Patient's Insurance:    Payor: HU[employer] / Plan: [employer] MEDICARE ADVANTAGE PLAN / Product Type: MEDICARE ADVANTAGE /     ______________________________________________________________________     Sobeida Colby is 68 y.o. female  who comes in today for:    Chief Complaint   Patient presents with   ? Extremity Weakness     legs, has falled 4 times in the last 2 weeks, has fallen at least 1 time a week for 4 months.  not dizzy before falls just feels weak       Patient Active Problem List   Diagnosis   ? Osteoarthritis   ? Venous Insufficiency   ? HTN (hypertension)   ? Insomnia   ? Joint Pain, Localized In The Knee   ? Chronic kidney disease   ? Hyperlipidemia   ? Diabetes mellitus, type 2 (H)   ? Joint Pain, Localized In The Hip   ? Depression   ? Seropositive rheumatoid arthritis (H)   ? Lumbar radiculopathy, chronic   ? S/P TKR (total knee replacement) using cement, left   ? High risk medication use   ? Chronic pain   ? GERD (gastroesophageal reflux disease)   ? Bilateral primary osteoarthritis of hip   ? Atherosclerotic cardiovascular disease, seen on abd CT 2016       Current Outpatient Prescriptions   Medication Sig   ? amLODIPine (NORVASC) 5 MG tablet Take 1 tablet (5 mg total) by mouth daily.   ? ascorbic acid (ASCORBIC ACID WITH LATRICIA HIPS) 500 MG tablet Take 500 mg by mouth every evening.    ? aspirin 81 MG EC tablet Take 81 mg by mouth every evening.   ? atorvastatin (LIPITOR) 40 MG tablet TAKE 1 TABLET AT BEDTIME   ? blood glucose test strips Check blood sugar four times daily. Dispense brand per patient's insurance at pharmacy discretion. Dx: e11.9   ? blood-glucose meter Misc Dispense one meter. Use as directed. Dx: e11.9   ? buPROPion (WELLBUTRIN) 100 MG tablet Take 1 tablet (100 mg total) by mouth daily.   ? cholecalciferol, vitamin D3, 1,000 unit tablet Take 1,000 Units by mouth every evening.    ? cyanocobalamin (VITAMIN B-12) 250 MCG tablet Take 250 mcg by mouth every evening.    ? docusate sodium (COLACE) 100 MG capsule Take 100 mg by mouth 2 (two) times a day.   ? DULoxetine (CYMBALTA) 30 MG capsule Take 1 capsule (30 mg total) by mouth daily.   ? folic acid (FOLVITE) 800 MCG tablet Take 1,200 mcg by mouth every evening.     ? glucose 4 GM chewable tablet Chew 4 tablets (16 g total) as needed for low blood sugar.   ? insulin regular (NOVOLIN R) 100 unit/mL injection Inject 24 Units under the skin 3 (three) times a day before meals. Inject 24 units subcutaneous TID with meals plus sliding scale    ? insulin syringe-needle U-100 (BD INSULIN SYRINGE ULT-FINE II) 1 mL 31 gauge x 5/16 Syrg USE 4 TIMES DAILY AS DIRECTED   ? lancing device (ACCU-CHEK SOFTCLIX) Misc Use 1 applicator As Directed 4 (four) times a day.   ? lidocaine (XYLOCAINE) 5 % ointment Apply to affected area QID prn pain   ? lisinopril (PRINIVIL,ZESTRIL) 10 MG tablet TAKE 1 TABLET AT BEDTIME   ? loratadine (CLARITIN) 10 mg tablet Take 1 tablet (10 mg total) by mouth at bedtime.   ? magnesium oxide (MAG-OX) 400 mg tablet Take 800 mg by mouth at bedtime. With food    ? methotrexate 2.5 MG tablet TAKE 4 TABLETS (10 MG) ONE TIME WEEKLY  ON  FRIDAY   ? metoprolol succinate (TOPROL XL) 50 MG 24 hr tablet Take 1 tablet (50 mg total) by mouth daily.   ? MULTIVITAMIN ORAL Take 1 tablet by mouth every evening.    ? NOVOLIN N NPH U-100 INSULIN 100 unit/mL injection Inject 33 Units under the skin 2 (two) times a day before meals.   ? omega-3 fatty acids-vitamin E (FISH OIL) 1,000 mg cap Take 1,000 mg by mouth every evening.   ? oxyCODONE-acetaminophen (PERCOCET) 5-325 mg per tablet Take 1-2 tablets by mouth every 8 (eight) hours as needed for pain (maximum of 6 tablets per day). For 5/2-6/1   ? ranitidine (ZANTAC) 150 MG tablet TAKE 1 TABLET (150 MG) BY MOUTH 2 (TWO) TIMES A DAY.   ? traZODone (DESYREL) 150 MG tablet TAKE 1 TABLET AT BEDTIME     Social History     Social History Narrative    Lives with her son     ______________________________________________________________________     History of present illness:    Patient is a patient of Marichuy Rubio who is seeing me today.    She is having problems with increasing leg weakness.  She is noticed that it has worsened in the last 4  months but particularly bad in the last 2 weeks.  She is fallen multiple times due to wobbly legs.  She notes that after standing up for period of time.  Usually she cannot stand for more than 7 or 8 minutes without sitting down.  She is noticed it equally on both legs.  She has not noticed any dragging of her feet.  She has not noticed any tripping.  She denies any dizziness related to her falls.  Her falls are mainly due to leg weakness.  In the last few weeks she has fallen multiple times and has injured her right hip related to this.  She says everything is improving related to this and she does not want any further workup for these injuries given how she is improving.    We reviewed some numbness she has in her left leg.  This is due to previous spinal injury.  She denies any numbness in her feet.    Reviewed her hyperlipidemia, her hypertension, and her diabetes is in relationship to this.    On review of systems, the patient denies any chest pain or shortness of breath.    ______________________________________________________________________    Exam:    Wt Readings from Last 3 Encounters:   07/02/18 (!) 225 lb (102.1 kg)   05/07/18 217 lb (98.4 kg)   02/14/18 220 lb (99.8 kg)     BP Readings from Last 3 Encounters:   07/02/18 132/80   05/07/18 130/74   02/14/18 140/84     /80  Pulse 60  Temp 97.9  F (36.6  C) (Oral)   Wt (!) 225 lb (102.1 kg)  LMP 09/27/1984  SpO2 98%  BMI 30.52 kg/m2    The patient is comfortable, no acute distress.  Mood good.  Insight good.  Eyes are nonicteric.  Neck is supple without mass.  No cervical adenopathy.  No thyromegaly. Heart regular rate and rhythm.  No heart murmur appreciated.  Lungs clear to auscultation bilaterally.  Respiratory effort is good.  Abdomen soft and nontender.  No hepatosplenomegaly.  Extremities no edema.  Her gait is unstable at times.  She does have some right lateral hip pain related to fall.  She has no active synovitis of her knees or  ankles.  Her Romberg is negative.  Her strength in the lower extremities is good at this time and no reflex abnormality is noted.    ______________________________________________________________________    Diagnostics:    Results for orders placed or performed in visit on 07/02/18   CK Total   Result Value Ref Range    CK, Total 40 30 - 190 U/L   Sedimentation Rate   Result Value Ref Range    Sed Rate 13 0 - 20 mm/hr   C-Reactive Protein   Result Value Ref Range    CRP <0.1 0.0 - 0.8 mg/dL      ______________________________________________________________________    Assessment:    1. Bilateral leg weakness    2. Visit for screening mammogram    3. Gait instability    4. Elevated CK in past    5. DM type 2 (diabetes mellitus, type 2) (H)    6. HLD (hyperlipidemia)    7. HTN (hypertension)       ______________________________________________________________________     PHQ-2 Total Score: 0 (5/7/2018 12:50 PM)  Depression Follow-up Plan: patient follow-up to return when and if necessary (5/7/2018 12:50 PM)  PHQ-9 Total Score: 2 (5/7/2018 12:50 PM)  ______________________________________________________________________       Plan:    1. Check blood work today.  See relevant orders and diagnosis associations at the bottom of this note.    2. Differential for her leg weakness is most likely related to deconditioning.  Other possibilities include progressive sensorimotor neuropathy due to diabetes, myopathy, statin related weakness/myopathy, or other.  3. The patient might benefit from physical therapy at home.  She might benefit from EMG of her lower extremities.  4. I doubt that stopping her statin medication would really help her situation.  5. We did discuss her case with her primary provider today and we will arrange follow-up to review this further as she knows her situation the best.  6. I did review personally her old x-rays including her hips, knees, and back and there is no serious findings here which would  horribly contribute to her current symptomatology.  7. I encouraged her to use her walker at this time.       Terry Nino MD  General Internal Medicine  [facility]     Personal office fax - [phone]   Voice mail - [phone]  E-mail - [email or web address]      Return in about 2 weeks (around 7/16/2018) for Follow-up with Marichuy Rubio.     Future Appointments  Date Time Provider Department Center   7/9/2018 11:10 AM [facility]AN [facility]   7/16/2018 4:20 PM EMRE Brand [facility]   8/7/2018 12:50 PM ARSLAN Dunn [facility]        ______________________________________________________________________    Relevant ICD-10 codes and order associations:      ICD-10-CM    1. Bilateral leg weakness R29.898 CK Total     Sedimentation Rate     C-Reactive Protein     Aldolase   2. Visit for screening mammogram Z12.31 Mammo Screening Bilateral   3. Gait instability R26.81    4. Elevated CK in past R74.8    5. DM type 2 (diabetes mellitus, type 2) (H) E11.9    6. HLD (hyperlipidemia) E78.5    7. HTN (hypertension) I10

## 2021-06-26 NOTE — PATIENT INSTRUCTIONS - HE
Plan:   Plan and next steps - please review and refer back to the after visit summary - key pieces are highlighted    Follow up: 4-6 weeks (40minute appt with Chelo JACOBO  OVL    Education:  Today you saw Chelo Samano Nurse Practitioner.    The plan of care was reviewed, justification provided and questions answered.     I recognized you may have participated in treatments in the past. When troubles first start it is often an expectation you will be cured. Once we are looking at a long term experience, when cure is not likely, the achievable outcomes of treatment are adjust and full participation is expected.    Pain is complex and the treatment approaches are complex. Address of pain will focus on non-medication, non-opioid (non-narcotic) medication, noting occasionally opioids (narcotics) are included into a pain management program. There have been many changes over the last several years as it relates to opioid (narcotic) medication and these changes apply to everyone.     Health Maintenance: You will continue to see primary care for your general health care.    Behavioral Medicine: The body accepts sensory input. The input travels to the brain where the brain based on previous experience, education or culture comes up with a response. Since your brain is very powerful you have the opportunity to learn to adjust the response. Research has demonstrated over-and-over that people who are active participants with behavioral medicine have better management of their pain.     Interventional: Lets get you an appt with [facility] to check the stimulator     Rehabilitation PT for the SI joint and the left upper trap - considering the kinetic chain..    Medication: The [facility] does not assume responsibility for prescribing at the time of a consult. I did not provide you with any prescriptions. Continue with your current provider and they will continue your pain care in the fashion they  deem is most appropriate.     Diagnostics: UDT collected 6/21/21 results are pending.  UDT  Patient required a random Urine Drug Testing, due to the need to comply with [facility] Model Policy Guidelines and CDC Guideline for the use of any controlled substances. This is to ensure that patient is compliant with treatment, and monitor for risks such as diversion, abuse, or any other aberrant behaviors. Patient is either being considered for or taking a controlled substance. Unexpected findings will be discussed and treatment decision may be adjusted. Testing is being implemented across the board randomly w/o bias related to age, race, gender, socioeconomic status or Evangel[employer] affiliation.     Records: Reviewed to assist with preparation for the office visit and are reflected throughout the note. Patient intake sheet also reviewed.

## 2021-06-26 NOTE — PROGRESS NOTES
RN spoke to patient to inform patient that the urine sample left was not large enough to initiate testing.  Patient was informed that we would need her to come back in to the clinic tomorrow to provide another sample.  Patient needs to find a ride.  Patient will give the clinic a call and let us know tomorrow when she will be able to get a ride to come in tomorrow.

## 2021-06-26 NOTE — TELEPHONE ENCOUNTER
Patient provided a urine sample today at office visit, however, there was not enough sample for UDT.  Patient was asked to return tomorrow via telephone call.  Patient is unsure of a ride and will give the clinic a call to let the clinic know when tomorrow she would be able to come in.

## 2021-06-27 NOTE — PROGRESS NOTES
Progress Notes by Marichuy Rubio FNP at 2019 11:10 AM     Author: Marichuy Rubio FNP Service: -- Author Type: Nurse Practitioner    Filed: 2019 11:24 AM Encounter Date: 2019 Status: Signed    : Marichuy Rubio FNP (Nurse Practitioner)       Internal Medicine Office Visit  [facility]  Patient Name: Ebonie Bowman  Patient Age: 68 y.o.  YOB: 1950  MRN: [id number]    Date of Visit: 2019  Reason for Office Visit:   Chief Complaint   Patient presents with   ? Follow-up           Assessment / Plan / Medical Decision Makin. Chronic pain syndrome  2. Lumbar radiculopathy, chronic  3. Weakness of both lower extremities  - Ambulatory referral to Neurology for further evaluation of subjective and objective weakness and normal EMG study. Continue use of walker. Encouraged seated exercises of lower extremities  - Consider physical therapy after neurology evaluation. This has historically been difficult for her to arrange for financial reasons   - Continue percocet as needed for pain, maximum of 6 tablets daily    4. Seropositive rheumatoid arthritis (H)  - Ambulatory referral to Rheumatology, Dr. Londono  - Will check labs today then refill methotrexate as a one-time fill before she establishes care with a new rheumatologist    - HM2(CBC w/o Differential)  - Creatinine  - Albumin  - ALT (SGPT)  - methotrexate 2.5 MG tablet; TAKE 4 TABLETS (10 MG) ONE TIME WEEKLY  ON  FRIDAY  Dispense: 48 tablet; Refill: 0    5. Type 2 diabetes mellitus with complication, with long-term current use of insulin (H)  - Continue current regimen, stable and well controlled currently     6. Mixed hyperlipidemia  - Continue atorvastatin     7. Essential hypertension  - stable     8. Encounter for screening mammogram for breast cancer  - Mammo Screening Bilateral; Future    She declines a flu shot. Reviewed her increased risk with h/o diabetes. She will  consider for next flu season       Health Maintenance Review  Health Maintenance   Topic Date Due   ? ZOSTER VACCINES (2 of 3) 12/31/2014   ? INFLUENZA VACCINE RULE BASED (1) 08/01/2018   ? MAMMOGRAM  09/27/2018   ? DEPRESSION FOLLOW UP  05/07/2019   ? DIABETES FOOT EXAM  05/07/2019   ? DIABETES HEMOGLOBIN A1C  05/07/2019   ? DIABETES OPHTHALMOLOGY EXAM  05/18/2019   ? FALL RISK ASSESSMENT  07/02/2019   ? DIABETES FOLLOW-UP  08/06/2019   ? DIABETES URINE MICROALBUMIN  08/07/2019   ? DXA SCAN  08/21/2020   ? ADVANCE DIRECTIVES DISCUSSED WITH PATIENT  12/14/2021   ? TD 18+ HE  07/18/2024   ? COLONOSCOPY  12/14/2026   ? PNEUMOCOCCAL POLYSACCHARIDE VACCINE AGE 65 AND OVER  Completed   ? PNEUMOCOCCAL CONJUGATE VACCINE FOR ADULTS (PCV13 OR PREVNAR)  Addressed         I am having Ebonie Bowman maintain her magnesium oxide, ascorbic acid (vitamin C), cholecalciferol (vitamin D3), aspirin, cyanocobalamin, omega-3 fatty acids-vitamin E, lancing device, MULTIVITAMIN ORAL, docusate sodium, folic acid, insulin syringe-needle U-100, glucose, insulin regular, amLODIPine, lidocaine, DULoxetine, blood-glucose meter, blood glucose test, atorvastatin, NovoLIN N NPH U-100 Insulin, traZODone, lisinopril, ranitidine, buPROPion, nystatin, oxyCODONE-acetaminophen, loratadine, metoprolol succinate, and methotrexate.      HPI:  Ebonie Bowman is a 68 y.o. year old who presents to the office today for follow up of chronic medical conditions.     Reviewed her history of low back pain with a spinal cord stimulator in place.  She was recently seen at the pain clinic on 1/3/19 with stimulator battery issues. Insurance authorization was initiated to proceed with battery replacement. She has not yet heard back the determination. Her back pain continues and she rates this 8/10 typically with improvement to 7/10 after taking Percocet. She is taking 3 tablets per day but is able to take up to 6 if needed.      Has had some ongoing complaints regarding  "weakness in the lower extremities.  An EMG did not show obvious abnormalities.  She was unable to afford to go to physical therapy due to the cost associated with this. She describes a recent incident in [place] where she was pushing a cart and began to experience leg weakness causing her to accelerate in walking until she ran into an apple display and stopped falling to the ground onto her left knee. Her legs felt wobbly \"like jello\". She was assisted to her feet, checked out her items and walked to the car that her son was driving. She is experiencing this leg weakness more frequently than before. It is bilateral, one leg does not seem worse than the other. She has a PCA which provides 4 hours of assistance per week.     Her right ear bothers her, this comes and goes. Started 1 year ago. She denies tinnitus. Uncertain whether her hearing is affected.     Diabetes reviewed. She checks her glucose 4 times per day. Her readings have been generally within range, she did not bring a lot with her today.     Rheumatoid arthritis reviewed, she is overdue for follow up with rheumatology but has not scheduled an appointment due to frustration that she did not understand her treatment program well or the results of her lab tests. We reviewed her renal function labs and CKD. She asks about another opinion from a different rheumatologist.     Review of Systems- pertinent positive in bold:  Constitutional: Fever, chills, night sweats, fainting, weight change, fatigue, seizures, dizziness, sleeping difficulties, loud snoring/pauses in breathing  Ears, nose, mouth, throat: change in hearing, ear pain, hoarseness, difficulty swallowing, sores in the mouth or throat  Respiratory: shortness of breath, cough, bloody sputum, wheezing  Cardiovascular: chest pain, palpitations   Musculoskeletal: backache/back pain (new or increasing), weakness, joint pain/stiffness (new or increasing), muscle cramps, swelling of hands, feet, ankles, " leg pain/redness  Skin: change in moles/freckles, rash, nodules  Hematologic/lymphatic: swollen lymph glands, abnormal bruising/bleeding  Endocrine: excessive thirst/urination, cold or heat intolerance  Neurologic/emotional: worrisome memory change, numbness/tingling, anxiety, mood swings, LE weakness       Current Scheduled Meds:  Outpatient Encounter Medications as of 2/6/2019   Medication Sig Dispense Refill   ? amLODIPine (NORVASC) 5 MG tablet Take 1 tablet (5 mg total) by mouth daily.  0   ? ascorbic acid (ASCORBIC ACID WITH LATRICIA HIPS) 500 MG tablet Take 500 mg by mouth every evening.      ? aspirin 81 MG EC tablet Take 81 mg by mouth every evening.     ? atorvastatin (LIPITOR) 40 MG tablet TAKE 1 TABLET AT BEDTIME 90 tablet 3   ? blood glucose test strips Check blood sugar four times daily. Dispense brand per patient's insurance at pharmacy discretion. Dx: e11.9 200 strip 11   ? blood-glucose meter Misc Dispense one meter. Use as directed. Dx: e11.9 1 each 0   ? buPROPion (WELLBUTRIN) 100 MG tablet Take 1 tablet (100 mg total) by mouth daily. 90 tablet 3   ? cholecalciferol, vitamin D3, 1,000 unit tablet Take 1,000 Units by mouth every evening.      ? cyanocobalamin (VITAMIN B-12) 250 MCG tablet Take 250 mcg by mouth every evening.      ? docusate sodium (COLACE) 100 MG capsule Take 100 mg by mouth 2 (two) times a day.     ? DULoxetine (CYMBALTA) 30 MG capsule Take 1 capsule (30 mg total) by mouth daily. 30 capsule 1   ? folic acid (FOLVITE) 800 MCG tablet Take 1,200 mcg by mouth every evening.      ? glucose 4 GM chewable tablet Chew 4 tablets (16 g total) as needed for low blood sugar. 120 tablet 0   ? insulin regular (NOVOLIN R) 100 unit/mL injection Inject 24 Units under the skin 3 (three) times a day before meals. Inject 24 units subcutaneous TID with meals plus sliding scale      ? insulin syringe-needle U-100 (BD INSULIN SYRINGE ULT-FINE II) 1 mL 31 gauge x 5/16 Syrg USE 4 TIMES DAILY AS DIRECTED 500  each 1   ? lancing device (ACCU-CHEK SOFTCLIX) Misc Use 1 applicator As Directed 4 (four) times a day. 300 each 3   ? lidocaine (XYLOCAINE) 5 % ointment Apply to affected area QID prn pain 35.44 g 0   ? lisinopril (PRINIVIL,ZESTRIL) 10 MG tablet TAKE 1 TABLET AT BEDTIME 90 tablet 2   ? loratadine (CLARITIN) 10 mg tablet Take 1 tablet (10 mg total) by mouth at bedtime. 90 tablet 2   ? magnesium oxide (MAG-OX) 400 mg tablet Take 800 mg by mouth at bedtime. With food      ? methotrexate 2.5 MG tablet TAKE 4 TABLETS (10 MG) ONE TIME WEEKLY  ON  FRIDAY 48 tablet 0   ? metoprolol succinate (TOPROL XL) 50 MG 24 hr tablet Take 1 tablet (50 mg total) by mouth daily. 90 tablet 3   ? MULTIVITAMIN ORAL Take 1 tablet by mouth every evening.      ? NOVOLIN N NPH U-100 INSULIN 100 unit/mL injection Inject 33 Units under the skin 2 (two) times a day before meals. 10 mL 11   ? nystatin (MYCOSTATIN) powder APPLY TO THE AFFECTED AREA THREE TIMES DAILY FOR 10 TO 14 DAYS OR AT LEAST 3 DAYS AFTER RESOLUTION OF SYMPTOMS 15 g 0   ? omega-3 fatty acids-vitamin E (FISH OIL) 1,000 mg cap Take 1,000 mg by mouth every evening. 90 each 3   ? oxyCODONE-acetaminophen (PERCOCET) 5-325 mg per tablet Take 1-2 tablets by mouth every 8 (eight) hours as needed for pain (maximum of 6 tablets per day). For 2/6-3/8 180 tablet 0   ? ranitidine (ZANTAC) 150 MG tablet TAKE 1 TABLET (150 MG) BY MOUTH 2 (TWO) TIMES A DAY. 180 tablet 3   ? traZODone (DESYREL) 150 MG tablet TAKE 1 TABLET AT BEDTIME 90 tablet 2   ? [DISCONTINUED] loratadine (CLARITIN) 10 mg tablet Take 1 tablet (10 mg total) by mouth at bedtime. 90 tablet 2   ? [DISCONTINUED] methotrexate 2.5 MG tablet TAKE 4 TABLETS (10 MG) ONE TIME WEEKLY  ON  FRIDAY 48 tablet 0   ? [DISCONTINUED] metoprolol succinate (TOPROL XL) 50 MG 24 hr tablet Take 1 tablet (50 mg total) by mouth daily. 90 tablet 3     No facility-administered encounter medications on file as of 2/6/2019.      Past Medical History:    Diagnosis Date   ? BBB (bundle branch block)    ? Chronic back pain    ? Chronic kidney disease     stage 3   ? Chronic Kidney Disease (NKF Classification)     Created by Conversion    ? Depression    ? Diabetes (H)     Type 2   ? Diabetes mellitus, type 2 (H)     Created by Conversion    ? Frequent headaches    ? Ganglion Of The Left Wrist     Created by Conversion    ? GERD (gastroesophageal reflux disease)    ? HLD (hyperlipidemia)    ? Hypertension     Per H&P dated 1/03/2013   ? Incarcerated ventral hernia     Per H&P dated 1/03/2013   ? Osteoarthritis    ? Rheumatoid arthritis (H)    ? Thrombocytopenia (H)    ? Venous insufficiency      Past Surgical History:   Procedure Laterality Date   ? BACK SURGERY     ? CHOLECYSTECTOMY     ? FATTY TUMOR  1996    LT SHOULDER BLADE   ? HYSTERECTOMY  1984   ? JOINT REPLACEMENT Left     knee   ? [facility] ABDOMEN SURGERY PROC UNLISTED      Description: Hernia Repair;  Recorded: 10/23/2013;   ? SPINAL CORD STIMULATOR IMPLANT     ? Two left wrist  1990, 1995     Social History     Tobacco Use   ? Smoking status: Former Smoker     Last attempt to quit: 8/1/2008     Years since quitting: 10.5   ? Smokeless tobacco: Never Used   ? Tobacco comment: smells of cigarrette smoke   Substance Use Topics   ? Alcohol use: No   ? Drug use: No        Review:      Objective / Physical Examination:  Vitals:    02/06/19 1103   BP: 130/74   Pulse: 92   Weight: (!) 237 lb (107.5 kg)     Wt Readings from Last 3 Encounters:   02/06/19 (!) 237 lb (107.5 kg)   11/07/18 (!) 229 lb (103.9 kg)   08/23/18 (!) 228 lb (103.4 kg)     Body mass index is 32.14 kg/m .     General Appearance: Cooperative, affect appropriate, speech clear, in no apparent distress  Ears: Tympanic membrane clear with landmarks well visualized bilaterally  Lungs: Clear to auscultation bilaterally. Normal inspiratory and expiratory effort  Cardiovascular: Regular rate, normal S1, S2. No murmurs, rubs, or gallops  Extremities: No  edema. LE strength is 4/5 and symmetrical   Neuro: Alert and oriented x 3, somewhat difficulty historian       Orders Placed This Encounter   Procedures   ? Mammo Screening Bilateral   ? HM2(CBC w/o Differential)   ? Creatinine   ? Albumin   ? ALT (SGPT)   ? Ambulatory referral to Neurology   ? Ambulatory referral to Rheumatology   Followup: Return in about 3 months (around 5/6/2019) for Recheck. earlier if needed.      Marichuy Rubio, CNP

## 2021-06-27 NOTE — PROGRESS NOTES
Progress Notes by Radha Kurtz RN at 7/25/2019 11:00 AM     Author: Radha Kurtz RN Service: -- Author Type: Registered Nurse    Filed: 8/1/2019 11:20 AM Encounter Date: 7/25/2019 Status: Signed    : Radha Kurtz RN (Registered Nurse)       Clinic Care Coordination Contact    Clinic Care Coordination Contact  OUTREACH    Referral Information:  Referral Source: (Reassessment)    Primary Diagnosis: Cardiovascular - other    Chief Complaint   Patient presents with   ? Clinic Care Coordination - Follow-up        Clinic Utilization  Difficulty keeping appointments:: No  Compliance Concerns: No  No-Show Concerns: No  No PCP office visit in Past Year: No  Utilization    Last refreshed: 8/1/2019 10:56 AM:  Hospital Admissions 1           Last refreshed: 8/1/2019 10:56 AM:  ED Visits 1           Last refreshed: 8/1/2019 10:56 AM:  No Show Count (past year) 3              Current as of: 8/1/2019 10:56 AM              Clinical Concerns:  Current Medical Concerns:  Chronic pain syndrome, htn, diabe    Current Behavioral Concerns: none    Education Provided to patient: yes   Pain  Pain (GOAL):: No  Health Maintenance Reviewed: Up to date  Clinical Pathway: None     Medication Management:  Patient takes all medications out of the bottles on a daily basis     Functional Status:  Dependent ADLs:: Ambulation-walker, Bathing, Dressing  Dependent IADLs:: Cooking, Cleaning, Laundry, Shopping, Meal Preparation, Medication Management, Transportation  Bed or wheelchair confined:: No  Mobility Status: Independent w/Device  Fallen 2 or more times in the past year?: No  Any fall with injury in the past year?: No   1 fall 6/2019    Living Situation:  Current living arrangement:: I live in a private home with family  Type of residence:: Apartment  No elevator    Diet/Exercise/Sleep:  Diet:: Diabetic diet  Inadequate nutrition (GOAL):: No  Food Insecurity: No  Tube Feeding: No  Exercise:: Unable to exercise  Inadequate  "activity/exercise (GOAL):: No  Significant changes in sleep pattern (GOAL): No    Transportation:  Transportation concerns (GOAL):: No  Transportation means:: Family     Psychosocial:  Hoahaoism or spiritual beliefs that impact treatment:: No  Mental health DX:: Yes  Mental health DX how managed:: Medication  Mental health management concern (GOAL):: No  Informal Support system:: Family     Financial/Insurance:   Financial/Insurance concerns (GOAL):: No       Resources and Interventions:  Current Resources:    ;   Community Resources: PCA, Home Care  Supplies used at home:: None  Equipment Currently Used at Home: shower chair, walker, rolling    Advance Care Plan/Directive  Advanced Care Plans/Directives on file:: No  Advanced Care Plan/Directive Status: Considering Options    Referrals Placed:  DME referral written for Seated Walker    69yr F PMH: chronic pain sydrome, htn, diabetes, metabolic encephalopathy, venous insufficiency,pain pump.  Currently lives with her son on the 2nd floor of an apartment complex.  Ebonie has [facility] 2 x's per wk for 3 wks for PT/OT.  Currently son cooks and receives optage meals.  Has a shower chair and rolling walker.  Difficult for patient to walk long distances with walker.  Would benefit from seated walker to be able to sit and rest.  Writer to send script.  Walker she was was free from a friend/relative.   She was receiving 6 hours a week for PCA services.  However her PCA quit \"a long time ago\" per patient report.  She has been without any assistance and is unsure what to do.  She states the \"Counts include 234 beds at the [facility] is looking for a replacement\".  Ebonie had previously been looking for an apartment to live alone but realizes she now needs assisted living.  [facility] to give list for assisted living.  Writer encouraged patient to 'get on a list'.  If her name came up she 'could always say no' and 'get back on the waiting list'.  Patient stated an understanding.  Appointment made for [facility].  " Patient re-enrolled for goals as listed below and updated goals previously in chart.     Goals:   Goals        General    Functional (pt-stated)     Notes - Note created  8/1/2019 10:51 AM by Radha Kurtz RN    Goal: I would like to get a seated walker within the next 2 months.  Action Steps:  1. My community health worker will fax the face to face visit note from my doctor, the prescription for the equipment and face sheet to [employer].   2. I will follow up with [employer] to get the equipment.  3. I will notify my community health worker once I have received the equipment. If I struggle getting the equipment or cannot get ahold of [employer] to coordinate getting the equipment, I will notify my community health worker.    Date Goal Set: 7/25/2019        Other (pt-stated)     Notes - Note created  8/1/2019 10:56 AM by Radha Kurtz RN    Goal:  I would like to obtain information regarding Assisted Livings in my area in the next 30-90 days.    Action steps to achieve this goal  1.  I will speak with the [facility] SW to receive a list of Assisted Livings.    Date goal set:  7/25/2019      Other (pt-stated)     Notes - Note created  8/1/2019 11:02 AM by Radha Kurtz, RN    Goal:  I would like to determine the status of my PCA in the next 30-90 days.  I had PCA services and I do not have it any longer.    Action steps to achieve this goal  1.  I will speak with the [facility]   2.  I will speak with the [facility] CHW at outreach phone calls.    Date goal set:  7/25/2019              Patient/Caregiver understanding: Patient stated an understanding.           Plan: DELEGATION: Please see DME Goal

## 2021-06-27 NOTE — PROGRESS NOTES
Progress Notes by Claudia Elizondo MD at 5/20/2019  4:30 PM     Author: Claudia Elizondo MD Service: -- Author Type: Physician    Filed: 5/20/2019  5:15 PM Encounter Date: 5/20/2019 Status: Signed    : Claudia Elizondo MD (Physician)           Click to link to [facility]     [facility] HEART CARE NOTE    Thank you, Dr. Rubio, for asking the [facility] team to see Ms. Ebonie Bowman to follow-up on atherosclerotic vascular disease.    Assessment/Recommendations   Assessment:    1.  Atherosclerotic vascular disease, noted on abdominal CT in 2016.  At that time, she underwent a nuclear stress study which demonstrated small area of distal anteroseptal ischemia.  She has done well over the last 2-1/2 years since her last visit with no complaints of exertional chest discomfort, dyspnea or decline in activity level other than what has been precipitated by her back pain and leg weakness.  At this point, would favor continued medical management and aggressive risk factor modification.  2.  Essential hypertension, controlled  3.  Mixed hyperlipidemia with excellent LDL level in 2016.  Do not see any repeat lipid profile since that time.  Would recommend repeat lipid levels.  4.  Chronic back pain    Plan:  1.  Continue current medical management  2.  Follow-up with me as needed or if change in clinical status       History of Present Illness    Ms. Ebonie Bowman is a 69 y.o. female with history of type 2 diabetes mellitus, hypertension and mixed hyperlipidemia who I saw initially in 2016 following an abdominal CT demonstrating evidence of cholesterol plaquing.  She subsequently saw me in consultation where she reported no symptoms of exertional chest discomfort or dyspnea; however, given her risk factors, I did recommend a nuclear stress study which demonstrated a small area of distal anteroseptal ischemia.  In light of the low risk of her stress study, I recommended a trial of medical therapy and she has done  well over the intervening 3 years.  She did also report an irregular heartbeat with a subsequent Holter demonstrating rare PACs.    Over the intervening 2 years since I last met with her, she tells me she has been feeling well.  Her biggest issue is that of chronic back pain and leg weakness which limits her ability to do activity.  She denies any chest pain or shortness of breath.  No orthopnea, PND or lower extremity edema.    ECG (personally reviewed): No current ECG    Cardiac Imaging Studies (personally reviewed): No recent cardiac imaging     Physical Examination Review of Systems   Vitals:    05/20/19 1648   BP: 112/78   Pulse: 65   Resp: 14     Body mass index is 32.22 kg/m .  Wt Readings from Last 3 Encounters:   05/20/19 (!) 231 lb (104.8 kg)   05/02/19 (!) 234 lb (106.1 kg)   04/25/19 (!) 234 lb (106.1 kg)     General Appearance:   Awake, Alert, No acute distress.   HEENT:  No scleral icterus; the mucous membranes were pink and moist.   Neck: No cervical bruits or jugular venous distention    Chest: The spine was straight. The chest was symmetric.   Lungs:   Respirations unlabored; the lungs are clear to auscultation. No wheezing   Cardiovascular:    Regular rate and rhythm.  S1, S2 normal.  No murmur, gallop or rub   Abdomen:  No organomegaly, masses, bruits, or tenderness. Bowels sounds are present   Extremities:  No peripheral edema bilaterally.   Skin: No xanthelasma. Warm, Dry.   Musculoskeletal: No tenderness.   Neurologic: Mood and affect are appropriate.    General: Weight Gain, Weight Loss  Eyes: WNL  Ears/Nose/Throat: WNL  Lungs: WNL  Heart: WNL  Stomach: WNL  Bladder: Frequent Urination at Night  Muscle/Joints: Joint Pain  Skin: WNL  Nervous System: Falls, Loss of Balance  Mental Health: WNL     Blood: WNL     Medical History  Surgical History Family History Social History   Past Medical History:   Diagnosis Date   ? BBB (bundle branch block)    ? Chronic back pain    ? Chronic kidney disease      stage 3   ? Chronic Kidney Disease (NKF Classification)     Created by Conversion    ? Depression    ? Diabetes (H)     Type 2   ? Diabetes mellitus, type 2 (H)     Created by Conversion    ? Frequent headaches    ? Ganglion Of The Left Wrist     Created by Conversion    ? GERD (gastroesophageal reflux disease)    ? HLD (hyperlipidemia)    ? Hypertension     Per H&P dated 1/03/2013   ? Incarcerated ventral hernia     Per H&P dated 1/03/2013   ? Osteoarthritis    ? Rheumatoid arthritis (H)    ? Shortness of breath    ? Thrombocytopenia (H)    ? Venous insufficiency     Past Surgical History:   Procedure Laterality Date   ? BACK SURGERY     ? CHOLECYSTECTOMY     ? FATTY TUMOR  1996    LT SHOULDER BLADE   ? HYSTERECTOMY  1984   ? JOINT REPLACEMENT Left     knee   ? PA ABDOMEN SURGERY PROC UNLISTED      Description: Hernia Repair;  Recorded: 10/23/2013;   ? PA IMPLANT SPINAL NEUROSTIM/ Left 4/25/2019    Procedure: LEFT FLANK INCISION REPLACE NEUROSTIMULATOR;  Surgeon: Vishal Pretty MD;  Location: [facility];  Service: Pain   ? SPINAL CORD STIMULATOR IMPLANT     ? Two left wrist  1990, 1995    Family History   Problem Relation Age of Onset   ? Acute Myocardial Infarction Father 62   ? Cancer Father    ? Heart disease Father    ? COPD Father    ? Diabetes Sister    ? Diabetes Brother    ? Melanoma Son    ? Diabetes Maternal Grandmother     Social History     Socioeconomic History   ? Marital status:      Spouse name: Not on file   ? Number of children: Not on file   ? Years of education: Not on file   ? Highest education level: Not on file   Occupational History   ? Not on file   Social Needs   ? Financial resource strain: Not on file   ? Food insecurity:     Worry: Not on file     Inability: Not on file   ? Transportation needs:     Medical: Not on file     Non-medical: Not on file   Tobacco Use   ? Smoking status: Former Smoker     Last attempt to quit: 8/1/2008     Years since  quitting: 10.8   ? Smokeless tobacco: Never Used   ? Tobacco comment: smells of cigarrette smoke   Substance and Sexual Activity   ? Alcohol use: No   ? Drug use: No   ? Sexual activity: Not on file   Lifestyle   ? Physical activity:     Days per week: Not on file     Minutes per session: Not on file   ? Stress: Not on file   Relationships   ? Social connections:     Talks on phone: Not on file     Gets together: Not on file     Attends Sabianism service: Not on file     Active member of club or organization: Not on file     Attends meetings of clubs or organizations: Not on file     Relationship status: Not on file   ? Intimate partner violence:     Fear of current or ex partner: Not on file     Emotionally abused: Not on file     Physically abused: Not on file     Forced sexual activity: Not on file   Other Topics Concern   ? Not on file   Social History Narrative    Lives with her son. Lost one son when he was age 28 due to melanoma. Moved around growing up since her father was in the           Medications  Allergies   Current Outpatient Medications   Medication Sig Dispense Refill   ? amLODIPine (NORVASC) 5 MG tablet Take 1 tablet (5 mg total) by mouth daily.  0   ? ascorbic acid (ASCORBIC ACID WITH LATRICIA HIPS) 500 MG tablet Take 500 mg by mouth every evening.      ? aspirin 81 MG EC tablet Take 81 mg by mouth every evening.     ? atorvastatin (LIPITOR) 40 MG tablet TAKE 1 TABLET AT BEDTIME 90 tablet 2   ? blood glucose test strips Check blood sugar four times daily. Dispense brand per patient's insurance at pharmacy discretion. Dx: e11.9 200 strip 11   ? blood-glucose meter Misc Dispense one meter. Use as directed. Dx: e11.9 1 each 0   ? buPROPion (WELLBUTRIN) 100 MG tablet Take 1 tablet (100 mg total) by mouth daily. 90 tablet 3   ? cholecalciferol, vitamin D3, 1,000 unit tablet Take 1,000 Units by mouth every evening.      ? cyanocobalamin (VITAMIN B-12) 250 MCG tablet Take 250 mcg by mouth every  evening.      ? docusate sodium (COLACE) 100 MG capsule Take 100 mg by mouth 2 (two) times a day.     ? DULoxetine (CYMBALTA) 30 MG capsule Take 1 capsule (30 mg total) by mouth daily. 30 capsule 1   ? folic acid (FOLVITE) 800 MCG tablet Take 1,200 mcg by mouth every evening.      ? insulin regular (NOVOLIN R) 100 unit/mL injection Inject 24 Units under the skin 3 (three) times a day before meals. Inject 24 units subcutaneous TID with meals plus sliding scale      ? insulin syringe-needle U-100 (BD INSULIN SYRINGE ULT-FINE II) 1 mL 31 gauge x 5/16 Syrg USE 4 TIMES DAILY AS DIRECTED 500 each 1   ? lancing device (ACCU-CHEK SOFTCLIX) Misc Use 1 applicator As Directed 4 (four) times a day. 300 each 3   ? lidocaine (XYLOCAINE) 5 % ointment Apply to affected area QID prn pain 35.44 g 0   ? lisinopril (PRINIVIL,ZESTRIL) 10 MG tablet TAKE 1 TABLET AT BEDTIME 90 tablet 2   ? loratadine (CLARITIN) 10 mg tablet Take 1 tablet (10 mg total) by mouth at bedtime. 90 tablet 2   ? magnesium oxide (MAG-OX) 400 mg tablet Take 800 mg by mouth at bedtime. With food      ? methotrexate 2.5 MG tablet Take 4 tablets po once a week (hold dose if you come down with an infection, until infection clears). 52 tablet 0   ? metoprolol succinate (TOPROL XL) 50 MG 24 hr tablet Take 1 tablet (50 mg total) by mouth daily. 90 tablet 3   ? MULTIVITAMIN ORAL Take 1 tablet by mouth every evening.      ? NOVOLIN N NPH U-100 INSULIN 100 unit/mL injection Inject 33 Units under the skin 2 (two) times a day before meals. 10 mL 11   ? nystatin (MYCOSTATIN) powder APPLY TO THE AFFECTED AREA THREE TIMES DAILY FOR 10 TO 14 DAYS OR AT LEAST 3 DAYS AFTER RESOLUTION OF SYMPTOMS 15 g 0   ? omega-3 fatty acids-vitamin E (FISH OIL) 1,000 mg cap Take 1,000 mg by mouth every evening. 90 each 3   ? oxyCODONE-acetaminophen (PERCOCET) 5-325 mg per tablet Take 1-2 tablets by mouth every 8 (eight) hours as needed for pain (maximum of 6 tablets per day). For 4/17-5/17 180  tablet 0   ? ranitidine (ZANTAC) 150 MG tablet TAKE 1 TABLET (150 MG) BY MOUTH 2 (TWO) TIMES A DAY. 180 tablet 3   ? traZODone (DESYREL) 150 MG tablet TAKE 1 TABLET AT BEDTIME 90 tablet 3     No current facility-administered medications for this visit.       Allergies   Allergen Reactions   ? Penicillins Hives   ? Sulfa (Sulfonamide Antibiotics) Hives         Lab Results    Chemistry/lipid CBC Cardiac Enzymes/BNP/TSH/INR   Lab Results   Component Value Date    CHOL 111 06/27/2016    HDL 40 (L) 06/27/2016    LDLCALC 38 06/27/2016    TRIG 165 (H) 06/27/2016    CREATININE 1.24 (H) 04/10/2019    BUN 18 01/09/2018    K 4.0 01/09/2018     01/09/2018     01/09/2018    CO2 29 01/09/2018    Lab Results   Component Value Date    WBC 12.5 (H) 04/10/2019    HGB 13.6 04/10/2019    HCT 40.4 04/10/2019    MCV 92 04/10/2019     04/10/2019    Lab Results   Component Value Date    CKTOTAL 40 07/02/2018    CKMB 1 06/16/2015    TROPONINI 0.03 01/01/2018     12/27/2017    TSH 2.20 10/11/2017    INR 1.21 (H) 01/01/2018

## 2021-06-28 NOTE — PROGRESS NOTES
Progress Notes by Oriana Ulloa RN at 1/16/2020  1:00 PM     Author: Oriana Ulloa RN Service: -- Author Type: Registered Nurse    Filed: 1/16/2020  2:33 PM Encounter Date: 1/16/2020 Status: Signed    : Oriana Ulloa RN (Registered Nurse)       Clinic Care Coordination Contact    Clinic Care Coordination Contact  OUTREACH    Referral Information:  Referral Source: (Reassessment)    Primary Diagnosis: Cardiovascular - other    Chief Complaint   Patient presents with   ? Clinic Care Coordination - Initial     Clinic Utilization  Difficulty keeping appointments:: No  Utilization    Last refreshed: 1/16/2020 12:57 AM:  Hospital Admissions 1           Last refreshed: 1/16/2020 12:57 AM:  ED Visits 2           Last refreshed: 1/16/2020 12:57 AM:  No Show Count (past year) 5              Current as of: 1/16/2020 12:57 AM              Clinical Concerns:  Lives in an apartment with your son  Son assists with grocery shopping,     F/u needed:   1) Colonoscopy     2) eye doctor - due the next 1-2 months    3) dental appt - need to be seen     4) Rheumatologist - last seen by 9/13/19 - return in 3-4 months - due now    5) Neurologist - not needed     Using a cane - near falls  Has 2 cats     Placed home care referral today for SN and PT - patient reports several near falls - bilat LE weakness, and skin issues or open sores to groin areas.     Denies any financial issues or food insecurity.  Denies abuse or neglect.       Pain  Pain (GOAL):: No  Health Maintenance Reviewed:      Medication Management:  Reviewed meds with patient today. Medrec completed.     1) Amlodipine 5mg daily     2) Ascorbic acid 500mg MIGUEL    3) ASA 81mg daily     4) Atorvastatin 40mg daily    5) B complex daily    6) Bupropion 100mg daily     7) Vit D 1000 units daily    8) Folic acid 800mcg daily    9) Novolin R 24  units three times a day with meals    10) Lisinopril 10mg daily     11) Loratadine 10mg daily    12) Methotrexate 2.5mg (5) tabs po weekly -  Fridays    13) Metoprolol 50mg daily    14) MVI daily    15) Omega 3 daily    16) Percocet 1-2 tabs PRN - reports taking (2) AM, and (1) @ NOON and (2) tab 8pm and (1) tab at 9-10pm - been on it for years.     17) Miralax 17gram daily    18) Ranitidine 150mg two times a day     19) Trazodone 150mg HS     20) Novolin NPH - 33 units two times a day       Functional Status:  Mobility Status: Independent w/Device    Living Situation:  Current living arrangement:: I live in a private home with family    Diet/Exercise/Sleep:  Diet:: Diabetic diet  Inadequate nutrition (GOAL):: No  Exercise:: Unable to exercise  Inadequate activity/exercise (GOAL):: No  Significant changes in sleep pattern (GOAL): No    Transportation:  Transportation concerns (GOAL):: No  Transportation means:: Family     Psychosocial:  Christianity or spiritual beliefs that impact treatment:: No  Mental health DX:: Yes  Mental health management concern (GOAL):: No  Informal Support system:: Family     Financial/Insurance:   Financial/Insurance concerns (GOAL):: No     Resources and Interventions:  Current Resources:    ;   Community Resources: PCA, Home Care  Supplies used at home:: Incontinence Supplies  Equipment Currently Used at Home: cane, quad    Advance Care Plan/Directive  Advanced Care Plans/Directives on file:: No  Advanced Care Plan/Directive Status: Considering Options          Goals:      Goals        Patient Stated    ? I will see a dentist in the next 60 days. (pt-stated)      Action steps to achieve this goal  1.  I will speak with [facility] at outreach calls   2. I will call the dental clinic to make an appt   3. I will call [facility] with any concerns or questions    Date goal set:  1/16/2020      ? I will see an eye doctor in the next 60 days.  (pt-stated)      Action steps to achieve this goal  1.  I will speak with [facility] at outreach calls  2. I will go to [facility] for [facility] exam   3. I will call [facility] with any concerns or questions.     Date  goal set:  1/16/2020      ? I would attend an appt with my Rheumatologist in the next 30 days.  (pt-stated)      Action steps to achieve this goal  1. I will speak with [facility] at outreach calls  2. I will call the rheumatology clinic to make f/u appt   3. I will call [facility] with any concerns or questions.     Date goal set:  1/16/2020      ? I would like to establish care with home care in the next 7 days.  (pt-stated)      Action steps to achieve this goal  1. I will speak with [facility] at outreach calls  2. I will answer my phone or return phone calls in a timely manner  3. I will call [facility] with any concerns or questions.     Date goal set:  1/16/2020      ? I would like to have a colonoscopy in the next 90 days.  (pt-stated)      Action steps to achieve this goal  1.  I will speak with [facility] at outreach calls  2. I will attend my appt for colonoscopy as scheduled.   3. I will call [facility] with any concerns or questions.   Specialty Scheduling 09/04/2019  2:21 PM Darren Fry - -   Note    Facility still attempting to reach patient to schedule. Per facility must be performed at hospital as patient is medically complex.            Type Date User Summary Attachment   Specialty Scheduling 08/21/2019 12:28 PM Elena Hilliard - -   Note    Entered on [facility] website                  Date goal set:  1/16/2020      ? I would like to like to loose 10# in the next 6 months.  (pt-stated)      Action steps to achieve this goal  1.  I will speak with [facility] at outreach calls  2. I will exercise 30 mins daily  3. I will cut down on carbs and snacks to 2 times daily  4. I will call [facility] with any concerns or questions.    Date goal set:  1/16/2020                   Future Appointments              In 1 month Marichuy Rubio, [facility]          Plan:   1) [facility] to see goals  2) [facility] RN will be available as needed  3) [facility] to f/u on home care status by 1/20/2020 - [facility] to notify [facility] RN with any issues

## 2021-06-28 NOTE — PROGRESS NOTES
Progress Notes by Oriana Ulloa RN at 11/27/2019  2:22 PM     Author: Oriana Ulloa RN Service: -- Author Type: Registered Nurse    Filed: 11/27/2019  2:24 PM Encounter Date: 11/27/2019 Status: Signed    : Oriana Ulloa RN (Registered Nurse)       Clinic Care Coordination Contact    Situation: Patient chart reviewed by care coordinator.    Background:   Martha Tamayo SW Dah, Nadia, RN             Recommend pt be moved to [facility] maintenance plan pending approval by [facility] RN.          Assessment:   Chart review completed today.   Per NIY[facility] recommendation to step down to maintenance   Last seen by PCP on 11/21/19 with no major concerns.   A1c - 6.9 on 11/21/19 - goal met   No new concerns or goals to work on    Plan/Recommendations:   Okay to step down to maintenance    Emergency Plan Recommendation:    When to Use the Emergency Department (ED)  An emergency means you could die if you dont get care quickly. Or you could be hurt permanently (disabled). Read below to know when to use -- and when not to use -- an emergency department (also called ED).    Dangers to your life  Here are examples of emergencies. These need immediate care:  A hard time breathing  Severe chest pain  Choking  Severe bleeding  Suddenly not able to move or speak  Blacking out (fainting)`  Poisoning    Dangers of permanent injuries  Here are other emergencies. These also need immediate care:  Deep cuts or severe burns  Broken bones, or sudden severe pain and swelling in a joint    When its an emergency  If you have an emergency, follow these steps:    1. Go to the nearest emergency department  If you can, go to the hospital ED closest to you right away.  If you cannot get there right away, or if it is not safe to take yourself, call 911 or your police emergency number.  2. Call your primary care doctor  Tell your doctor about the emergency. Call within 24 hours of going to the ED.  If you cannot call, have someone call for you.  Go to your  doctor (not the ED) for any follow-up care.    When its not an emergency  If a problem is not an emergency, follow these steps:    1. Call your primary care doctor  If you dont know the name of your doctor, call your health plan.  If you cannot call, have someone call for you.  2. Follow instructions  Your doctor will tell you what you should do.  You may be told to see your doctor right away. You may be told to go to the ED. Or you may be told to go to an urgent care center.  Follow your doctors advice.  Tips forTakingMedicine  Its easy to forget to take your medicine, especially when you take a lot of pills. But, to get the best results from medicines, always take them as directed. The tips on these pages can help you keep track.  Staying on schedule  Every medicine has a different purpose. So, each one needs to be taken as prescribed. Dont skip pills or stop taking a medicine, even when you feel fine. To stay on track try to:  Take your medicine at set times. You could take it each morning with breakfast or right before you go to bed. Some medicines may need to be taken at certain times of the day, or with food. Ask your doctor if this is the case for any of your medicines.  Find ways to remind yourself to take medicine. Use a pillbox or organize pills for the week. Set your watch or cell phone alarm to go off when youre supposed to take your medicine. Or, put a note on the bathroom mirror to remind yourself.  Have your prescriptions refilled while you still have plenty of pills left. Keep in mind that certain suppliers, such as mail order pharmacies, may take longer to fill prescriptions.  When traveling, keep all medicines in your carry-on bag. This way youll have them in case you and your checked luggage get . Also, bring copies of each of your prescriptions when you travel.  Safety tips  Read the warning labels and usage instructions for each medicine you take. Also, keep these safety tips in  mind:  Get help organizing your pills if you need it. Taking more than one medicine can be confusing. A family member or friend can help prevent you from making a mistake that could be dangerous to your health.  Fill all your prescriptions at the same drug store. This way, your records are all in one place.  Ask your pharmacist or doctor for a fact sheet or other patient information when you start a new medicine.  Tell your doctor and pharmacist if you have allergies to any medicine.  Dont split your pills to save money. Talk to your doctor if youre having trouble paying for your medicine.  Never share medicine with anyone.  Ask your pharmacy how you should dispose of old or  medicine.  Give a copy of your medicine list to a family member or close friend. Hold copies of each others lists in case of emergency.  Store medicines in a cool, dry, dark place - not in a steamy bathroom.  Make sure you tell your healthcare providers if you are taking any other supplements or drugs over-the-counter.  If you have side effects  Some medicines can cause side effects, such as nausea or dizziness. Tell your doctor if you have any side effects. He or she may change the dosage or schedule to reduce effects. Be sure to keep taking your medicine as directed, and always talk to your healthcare team about how you feel. Your feedback will help the doctor find the best medicine plan for you.  Know the Medications YoureTaking   You should know certain details about your medications. This helps you take them correctly and safely. For each medicine, ask your doctor or pharmacist the questions below. Write down the answers so you dont forget. Then fill in the information on your medication list. Also, ask about anything you dont understand or that seems wrong. For instance, if you get a refill and the pills dont look like the ones from last time, talk to the pharmacist before taking them.  Questions to ask   What is the medication's  name? Find out the brand name as well as the generic name, if any.  Why am I taking this? What does it do? How fast will it work?  How often should I take this? At what time of day?  How much of the medication (what dosage) should I take? How many pills is that?  What should I do if I miss a dose? What are some of the symptoms that may occur if I miss a dose?  Should I expect any side effects from this medication? What should I do if I have them?  Do I follow any special instructions while taking this? Are there any activities, foods, or other medicines I should avoid while taking this?  How long should I keep taking this? When I run out, should I order more?  How often should I come in to have this medication monitored?  Beware of medication interactions  Vitamins, herbal supplements, and some over-the-counter drugs can be dangerous to take if you use heart medications. So tell your doctor about all products youre taking. This includes even simple remedies for headaches, allergies, colds, or constipation. Show your medication list to the pharmacist every time you buy prescription or over-the-counter medication. They can tell you which drugs to avoid. Also, drinking alcohol while taking heart medication can be dangerous.    jLong-Term Complications of Diabetes can cause health problems over time. These are called complications. They are more likely to happen if your blood sugar is often too high. Over time, high blood sugar can damage blood vessels in your body. It is important to keep your blood sugar in your target range. This can help prevent or delay complications from diabetes.  Possible complications  Complications of diabetes include:    Eye problems, including damage to the blood vessels in the eyes (retinopathy), pressure in the eye (glaucoma), and clouding of the eyes lens (a cataract). Eye problems can eventually lead to irreversible blindness.     Tooth and gum problems (periodontal disease), causing  loss of teeth and bone    Blood vessel (vascular) disease leading to circulation problems, heart attack or stroke, or a need for amputation of a limb     Problems with sexual function leading to erectile dysfunction in men and sexual discomfort in women     Kidney disease (nephropathy) can eventually lead to kidney failure, which may require dialysis or kidney transplant     Nerve problems (neuropathy), causing pain or loss of feeling in your feet and other parts of your body, potentially leading to an amputation of a limb     High blood pressure (hypertension), putting strain on your heart and blood vessels    Serious infections, possibly leading to loss of toes, feet, or limbs  How to avoid complications  The serious consequences of these complications may be avoidable for most people with diabetes by managing your blood glucose, blood pressure, and cholesterol levels. This can help you feel better and stay healthy. You can manage diabetes by tracking your blood sugar. You can also eat healthy and exercise to avoid gaining weight. And you should take medicine if directed by your healthcare provider.  Call your health care provider if:    You vomit or have diarrhea for more than 6 hours.    Your blood glucose level is higher than usual or over 250 mg/dL after you have taken extra insulin (if recommended in your sick-day plan).    You take oral medicine for diabetes, and your blood sugar is higher than usual or over 250 mg/dL, before a meal and stays that high for more than 24 hours.    Your blood glucose is lower than usual or less than 70 mg/dL    You have moderate to large amounts of ketones in your blood or urine.    You arent better after 2 days.

## 2021-06-30 ENCOUNTER — COMMUNICATION - HEALTHEAST (OUTPATIENT)
Dept: RHEUMATOLOGY | Facility: CLINIC | Age: 71
End: 2021-06-30

## 2021-06-30 NOTE — PROGRESS NOTES
Progress Notes by Oma Fallon CNP at 2021 11:40 AM     Author: Oma Fallon CNP Service: -- Author Type: Nurse Practitioner    Filed: 2021  8:41 AM Encounter Date: 2021 Status: Signed    : Oma Fallon CNP (Nurse Practitioner)            [facility]  Patient Name: Ebonie Bowman  Patient Age: 71 y.o.  YOB: 1950  MRN: [id number]    Date of Visit: 2021  Reason for Office Visit:   Chief Complaint   Patient presents with   ? Establish Care     Needing refills. Wants to see someone new.            Assessment / Plan / Medical Decision Makin. Encounter to establish care  Vaccinations up to date    2. Lumbar radiculopathy  3. Cervical pain (neck)  - Ambulatory referral to Neurosurgery    4. Type 2 diabetes mellitus with stage 3a chronic kidney disease, with long-term current use of insulin (H)  - Glycosylated Hemoglobin A1c  Lab Results   Component Value Date    HGBA1C 9.5 (H) 2021    HGBA1C 6.6 (H) 2020    HGBA1C 6.0 (H) 10/20/2020     Lab Results   Component Value Date    MICROALBUR 2.82 (H) 10/20/2020    LDLCALC 54 10/20/2020    CREATININE 1.31 (H) 2021   Recommend follow up with Diabetes Education given elevated a1c and goal of <7.0%    5. Stage 3a chronic kidney disease  Lab Results   Component Value Date    CREATININE 1.31 (H) 2021     - Ambulatory referral to Nephrology    6. Type 2 diabetes mellitus with hypoglycemia without coma, with long-term current use of insulin (H)  - flash glucose scanning reader (FREESTYLE BRE 14 DAY READER) Misc; Use 1 each As Directed see administration instructions. Use as directed with sensor  Dispense: 1 each; Refill: 0  - flash glucose sensor (FREESTYLE BRE 14 DAY SENSOR) Kit; Use 1 each As Directed every 14 (fourteen) days.  Dispense: 2 kit; Refill: 11  Recommend patient check blood sugars 4 times daily. Continue insulin as prescribed and follow up with diabetes  education for medication adjustment     7. History of nicotine dependence  - Medical Cytology    8. Essential hypertension  Continue norvasc 10mg, lisinopril 10mg. Monitor blood pressure daily   - blood pressure monitor Kit; Use 1 applicator As Directed daily.  Dispense: 1 each; Refill: 0    9. Special screening for malignant neoplasms, colon  - Cologuard    10. Visit for screening mammogram  - Mammo Screening Bilateral; Future    11. Seropositive rheumatoid arthritis (H)  - HM2(CBC w/o Differential)  - Creatinine  - ALT (SGPT)  - AST (SGOT)  - Albumin    12. High risk medication use  - HM2(CBC w/o Differential)  - Creatinine  - ALT (SGPT)  - AST (SGOT)  - Albumin    13. Renal insufficiency  A referral has been made to nephrology.  Discussion with patient about good blood pressure and blood sugar control and its effects on renal function     14. Hair loss  - Thyroid Stimulating Hormone (TSH)  - Iron and Transferrin Iron Binding Capacity      Orders Placed This Encounter   Procedures   ? Mammo Screening Bilateral   ? Cologuard   ? Ambulatory referral to Neurosurgery   ? Ambulatory referral to Nephrology   Followup: Return in about 3 months (around 8/7/2021). earlier if needed.      I spent a total of 38 minutes on the day of the visit.  . Time spent doing chart review, history and exam, documentation and further activities per the note      Health Maintenance Review  Health Maintenance   Topic Date Due   ? DEPRESSION ACTION PLAN  Never done   ? MEDICARE ANNUAL WELLNESS VISIT  Never done   ? ZOSTER VACCINES (2 of 3) 12/31/2014   ? MAMMOGRAM  09/27/2018   ? DIABETIC FOOT EXAM  03/11/2021   ? INFLUENZA VACCINE RULE BASED (Season Ended) 08/01/2021   ? FALL RISK ASSESSMENT  08/12/2021   ? LIPID  10/20/2021   ? MICROALBUMIN  10/20/2021   ? A1C  11/07/2021   ? ADVANCE CARE PLANNING  12/14/2021   ? BMP  01/07/2022   ? DIABETIC EYE EXAM  04/03/2022   ? TD 18+ HE  07/18/2024   ? COLORECTAL CANCER SCREENING  12/14/2026   ? ADONIS  SCAN  09/17/2035   ? HEPATITIS C SCREENING  Completed   ? Pneumococcal Vaccine: Pediatrics (0 to 5 Years) and At-Risk Patients (6 to 64 Years)  Completed   ? Pneumococcal Vaccine: 65+ Years  Completed   ? COVID-19 Vaccine  Discontinued         I have discontinued Ebonie Bowman's predniSONE and ertapenem. I am also having her start on blood pressure monitor. Additionally, I am having her maintain her magnesium oxide, ascorbic acid (vitamin C), aspirin, omega-3 fatty acids-vitamin E, lancing device, MULTIVITAMIN ORAL, folic acid, insulin syringe-needle U-100, blood-glucose meter, b complex vitamins, blood glucose test, (cholecalciferol, vitamin D3, (VITAMIN D3 ORAL)), lidocaine, buPROPion, lisinopriL, insulin NPH, methotrexate, acetaminophen, hydrOXYchloroQUINE, amLODIPine, metoprolol succinate, atorvastatin, loratadine, traZODone, FreeStyle Saida 14 Day Randle, and FreeStyle Saida 14 Day Sensor.      HPI:  Ebonie Bowman is a 71 y.o. year old who presents to the office today with chronic conditions including Venous insufficiency, hypertension, hyperlipidemia, atherosclerotic cardiovascular disease, allergic rhinitis, type 2 diabetes with CKD, morbid obesity, RA, chronic pain, osteoarthritis, insomnia, stage III chronic kidney disease, depression, lumbar radiculopathy, GERD, status post insertion of spinal cord stimulator, positive FIT, back pain, prolonged QT interval on ECG, former smoker.    She reports some right sided neck pain.  She had an MRI in January and has not been seen by Neurosurgery.       Blood sugars in the 120s . She is checking blood sugars 4 day.  She has a saida.  Novolin 33 units twice daily.  She previously seen by diabetic educator.      She is up to date on eye exam.  She is in need of a dental exam.      She uses a walker to assist with ambulation. She reports it is difficult to stand for periods of time to complete tasks such as cooking.     History of nicotine dependency.  CT 11.2020     She is  scared for her son due to his renal failure and is on dialysis.  He has a dx of glomerulonephritis.     Review of Systems- see HPI       Current Scheduled Meds:  Outpatient Encounter Medications as of 5/7/2021   Medication Sig Dispense Refill   ? acetaminophen (TYLENOL) 500 MG tablet Take 2 tablets (1,000 mg total) by mouth 3 (three) times a day.  0   ? amLODIPine (NORVASC) 10 MG tablet TAKE 1 TABLET EVERY DAY 90 tablet 2   ? ascorbic acid (ASCORBIC ACID WITH LATRICIA HIPS) 500 MG tablet Take 500 mg by mouth every evening.      ? aspirin 81 MG EC tablet Take 81 mg by mouth every evening.     ? atorvastatin (LIPITOR) 40 MG tablet Take 1 tablet (40 mg total) by mouth at bedtime. 90 tablet 2   ? b complex vitamins tablet Take 1 tablet by mouth daily.     ? blood glucose test strips Check blood sugar four times daily. Dispense brand per patient's insurance at pharmacy discretion. Dx: e11.9 400 strip 3   ? blood-glucose meter Misc Dispense one meter. Use as directed. Dx: e11.9 1 each 0   ? buPROPion (WELLBUTRIN) 100 MG tablet TAKE 1 TABLET EVERY DAY 90 tablet 3   ? cholecalciferol, vitamin D3, (VITAMIN D3 ORAL) Take 3 tablets by mouth daily.      ? hydrOXYchloroQUINE (PLAQUENIL) 200 mg tablet Take 1 tablet (200 mg total) by mouth 2 (two) times a day. 180 tablet 0   ? insulin NPH (NOVOLIN) 100 unit/mL injection Inject 33 Units under the skin 2 (two) times a day before meals.     ? insulin syringe-needle U-100 (BD INSULIN SYRINGE ULT-FINE II) 1 mL 31 gauge x 5/16 Syrg USE 4 TIMES DAILY AS DIRECTED 500 each 1   ? lancing device (ACCU-CHEK SOFTCLIX) Misc Use 1 applicator As Directed 4 (four) times a day. 300 each 3   ? lidocaine (LMX) 4 % cream Apply to affected area twice daily as needed 30 g 3   ? lisinopriL (PRINIVIL,ZESTRIL) 10 MG tablet TAKE 1 TABLET AT BEDTIME 90 tablet 3   ? loratadine (CLARITIN) 10 mg tablet Take 1 tablet (10 mg total) by mouth at bedtime. 90 tablet 3   ? methotrexate 2.5 MG tablet Take 7.5 mg by mouth  once a week. On Fridays      ? metoprolol succinate (TOPROL-XL) 50 MG 24 hr tablet TAKE 1 TABLET EVERY DAY 90 tablet 2   ? omega-3 fatty acids-vitamin E (FISH OIL) 1,000 mg cap Take 1,000 mg by mouth every evening. 90 each 3   ? traZODone (DESYREL) 50 MG tablet Take 1 tablet (50 mg total) by mouth at bedtime. 90 tablet 1   ? [DISCONTINUED] ertapenem (INVANZ) 1 gram SolR Infuse into a venous catheter.     ? [DISCONTINUED] oxyCODONE-acetaminophen (PERCOCET) 5-325 mg per tablet Take 1-2 tablets by mouth every 6 (six) hours as needed for pain (maximum of 6 tablets per day). For 5/1-5/30 180 tablet 0   ? [DISCONTINUED] predniSONE (DELTASONE) 2.5 MG tablet Take 2.5 mg by mouth daily. 30 tablet 0   ? blood pressure monitor Kit Use 1 applicator As Directed daily. 1 each 0   ? flash glucose scanning reader (FREESTYLE BRE 14 DAY READER) Misc Use 1 each As Directed see administration instructions. Use as directed with sensor 1 each 0   ? flash glucose sensor (FREESTYLE BRE 14 DAY SENSOR) Kit Use 1 each As Directed every 14 (fourteen) days. 2 kit 11   ? folic acid (FOLVITE) 800 MCG tablet Take 800 mcg by mouth every evening.            ? magnesium oxide (MAG-OX) 400 mg tablet Take 400 mg by mouth at bedtime. With food            ? MULTIVITAMIN ORAL Take 1 tablet by mouth every evening.      ? [DISCONTINUED] flash glucose scanning reader (FREESTYLE BRE 14 DAY READER) Misc Use 1 each As Directed see administration instructions. Use as directed with sensor 1 each 0   ? [DISCONTINUED] flash glucose sensor (FREESTYLE BRE 14 DAY SENSOR) Kit Use 1 each As Directed every 14 (fourteen) days. 2 kit 11     No facility-administered encounter medications on file as of 5/7/2021.      Past Medical History:   Diagnosis Date   ? BBB (bundle branch block)    ? Chronic back pain    ? Chronic kidney disease     stage 3   ? Chronic Kidney Disease (NKF Classification)     Created by Conversion    ? Depression    ? Diabetes (H)     Type 2   ?  Diabetes mellitus, type 2 (H)     Created by Conversion    ? Frequent headaches    ? Ganglion Of The Left Wrist     Created by Conversion    ? GERD (gastroesophageal reflux disease)    ? HLD (hyperlipidemia)    ? Hypertension     Per H&P dated 2013   ? Incarcerated ventral hernia     Per H&P dated 2013   ? Osteoarthritis    ? Rheumatoid arthritis (H)    ? Shortness of breath    ? Thrombocytopenia (H)    ? Venous insufficiency      Past Surgical History:   Procedure Laterality Date   ? BACK SURGERY     ? CHOLECYSTECTOMY     ? FATTY TUMOR      LT SHOULDER BLADE   ? HYSTERECTOMY     ? IR FINE NEEDLE ASPIRATION  2020   ? JOINT REPLACEMENT Left     knee   ? PICC MIDLINE INSERTION  2020        ? NM ABDOMEN SURGERY PROC UNLISTED      Description: Hernia Repair;  Recorded: 10/23/2013;   ? NM IMPLANT SPINAL NEUROSTIM/ Left 2019    Procedure: LEFT FLANK INCISION REPLACE NEUROSTIMULATOR;  Surgeon: Vishal Pretty MD;  Location: [facility];  Service: Pain   ? SPINAL CORD STIMULATOR IMPLANT     ? Two left wrist  ,      Social History     Tobacco Use   ? Smoking status: Former Smoker     Packs/day: 1.00     Years: 43.00     Pack years: 43.00     Start date:      Quit date: 2008     Years since quittin.8   ? Smokeless tobacco: Never Used   Substance Use Topics   ? Alcohol use: Yes     Comment: rare   ? Drug use: No     Comment: takes percocet for chronic pain     Family History   Problem Relation Age of Onset   ? Arthritis Mother    ? Acute Myocardial Infarction Father 62   ? Cancer Father    ? Heart disease Father    ? COPD Father    ? Diabetes Sister    ? Diabetes Brother    ? Melanoma Son    ? Diabetes Maternal Grandmother    ? Kidney failure Son    ? Glomerulonephritis Son      Social History     Social History Narrative    Born in [place]. Lives with her son. Lost one son when he was age 30 due to melanoma. Moved around growing up since her father  was in the . Play games on phone and watch television.         Objective / Physical Examination:  Vitals:    05/07/21 1145   BP: 132/82   Pulse: 78   Resp: 24   Temp: 97.1  F (36.2  C)   SpO2: 96%   Weight: (!) 228 lb (103.4 kg)   Height: 6' (1.829 m)     Wt Readings from Last 3 Encounters:   05/07/21 (!) 228 lb (103.4 kg)   04/05/21 222 lb (100.7 kg)   11/27/20 206 lb (93.4 kg)     Body mass index is 30.92 kg/m .     General Appearance: Alert and oriented, cooperative, affect appropriate, speech clear, in no apparent distress  Head: Normocephalic, atraumatic  Eyes:  Conjunctivae clear and sclerae non-icteric  Neck: Supple, trachea midline. No cervical adenopathy  Lungs: Clear to auscultation bilaterally. No adventitious lung sounds noted. Normal inspiratory and expiratory effort  Cardiovascular: Regular rate, normal S1, S2.   Extremities: Pulses 2+ and equal throughout.   Integumentary: Warm and dry.   Neuro: Alert and oriented, follows commands appropriately.     No results found.  No results found for this or any previous visit (from the past 240 hour(s)).  Diagnostics:     Results for orders placed or performed in visit on 05/07/21   HM2(CBC w/o Differential)   Result Value Ref Range    WBC 9.5 4.0 - 11.0 thou/uL    RBC 4.68 3.80 - 5.40 mill/uL    Hemoglobin 14.0 12.0 - 16.0 g/dL    Hematocrit 41.3 35.0 - 47.0 %    MCV 88 80 - 100 fL    MCH 29.9 27.0 - 34.0 pg    MCHC 33.9 32.0 - 36.0 g/dL    RDW 13.1 11.0 - 14.5 %    Platelets 124 (L) 140 - 440 thou/uL    MPV 10.2 (H) 7.0 - 10.0 fL   Creatinine   Result Value Ref Range    Creatinine 1.31 (H) 0.60 - 1.10 mg/dL    GFR MDRD Af Amer 48 (L) >60 mL/min/1.73m2    GFR MDRD Non Af Amer 40 (L) >60 mL/min/1.73m2   ALT (SGPT)   Result Value Ref Range    ALT <9 0 - 45 U/L   AST (SGOT)   Result Value Ref Range    AST 10 0 - 40 U/L   Albumin   Result Value Ref Range    Albumin 3.6 3.5 - 5.0 g/dL   Thyroid Stimulating Hormone (TSH)   Result Value Ref Range    TSH 1.93  0.30 - 5.00 uIU/mL   Glycosylated Hemoglobin A1c   Result Value Ref Range    Hemoglobin A1c 9.5 (H) <=5.6 %   Iron and Transferrin Iron Binding Capacity   Result Value Ref Range    Iron 89 42 - 175 ug/dL    Transferrin 220 212 - 360 mg/dL    Transferrin Saturation, Calculated 32 20 - 50 %    Transferrin IBC, Calculated 274 (L) 313 - 563 ug/dL   Medical Cytology   Result Value Ref Range    Case Report       Medical Cytology                                  Case: [id number]                                   Authorizing Provider:  Oma Fallon,    Collected:           05/07/2021 1258                                     CNP                                                                          Ordering Location:     [facility]   Received:            05/07/2021 1258                                     [place]                                                                    Pathologist:           Terry Knowles MD                                                      Specimen:    Urine, Random                                                                              Final Diagnosis       RANDOM URINE SPECIMEN:      -  NEGATIVE FOR ATYPICAL OR MALIGNANT CELLS      -  NEGATIVE FOR SIGNIFICANT NUMBERS OF INFLAMMATORY CELLS    Microscopic Description       Material examined consists of a single monolayer preparation containing large numbers of mature squamous epithelial cells, accompanied by moderate numbers of urothelial cells, and a scattering of lymphocytes. No atypical or malignant features are identified.    Clinical Information Hx of smoking     Specimen Description       8 ml clear yellow fluid   1 SurePath slide are prepared    Specimen Processing      Charges CPT: 89290  ICD-10: Z87.891     Result Flag Normal Normal    General Path Interpretation Negative for malignant cells Negative for malignant cells, Non-Diagnostic     *Note: Due to a large number of results and/or encounters  for the requested time period, some results have not been displayed. A complete set of results can be found in Results Review.     The ASCVD Risk score (Oak Hillgibran NEBWY Jr., et al., 2013) failed to calculate for the following reasons:    The valid total cholesterol range is 130 to 320 mg/dL    Quality review:     PHQ-2 Total Score: 0 (8/12/2020 12:00 PM)      PHQ-9 Total Score: 1 (8/12/2020 12:00 PM)        Oma Fallon, [facility]  Internal Medicine  [facility] [address]

## 2021-07-03 NOTE — ADDENDUM NOTE
Addendum Note by Selene Cantu CMA at 8/11/2020  3:14 PM     Author: Selene Cantu CMA Service: -- Author Type: Certified Medical Assistant    Filed: 8/11/2020  3:14 PM Encounter Date: 8/7/2020 Status: Signed    : Selene Cantu CMA (Certified Medical Assistant)    Addended by: SELENE CANTU on: 8/11/2020 03:14 PM        Modules accepted: Orders

## 2021-07-03 NOTE — ADDENDUM NOTE
Addendum Note by Payal Hinton CMA at 1/3/2019 10:00 AM     Author: Payal Hinton CMA Service: -- Author Type: Certified Medical Assistant    Filed: 1/10/2019 10:21 AM Date of Service: 1/3/2019 10:00 AM Status: Signed    : Payal Hinton CMA (Certified Medical Assistant)    Encounter addended by: Payal Hinton CMA on: 1/10/2019 10:21 AM      Actions taken: Charge Capture section accepted

## 2021-07-03 NOTE — ADDENDUM NOTE
Addendum Note by Vishal Pretty MD at 8/23/2018 11:59 PM     Author: Vishal Pretty MD Service: -- Author Type: Physician    Filed: 9/18/2018 10:52 AM Date of Service: 8/23/2018 11:59 PM Status: Signed    : Vishal Pretty MD (Physician)    Encounter addended by: Vishal Pretty MD on: 9/18/2018 10:52 AM<BR>     Actions taken: LOS modified, Sign clinical note

## 2021-07-03 NOTE — ADDENDUM NOTE
Addendum Note by Rosibel Fallon RN at 8/23/2018 11:59 PM     Author: Rosibel Fallon RN Service: -- Author Type: Registered Nurse    Filed: 9/20/2018  3:35 PM Date of Service: 8/23/2018 11:59 PM Status: Signed    : Rosibel Fallon RN (Registered Nurse)    Encounter addended by: Rosibel Fallon RN on: 9/20/2018  3:35 PM<BR>     Actions taken: Charge Capture section accepted

## 2021-07-04 NOTE — TELEPHONE ENCOUNTER
Telephone Encounter by Lynsey Dunn RN at 6/30/2021 11:00 AM     Author: Lynsey Dunn RN Service: -- Author Type: Registered Nurse    Filed: 6/30/2021 11:00 AM Encounter Date: 6/30/2021 Status: Signed    : Lynsey Dunn RN (Registered Nurse)       Mailed to [facility] home address

## 2021-07-04 NOTE — ADDENDUM NOTE
Addendum Note by Marichuy Rubio FNP at 4/5/2021 11:35 AM     Author: Marichuy Rubio FNP Service: -- Author Type: Nurse Practitioner    Filed: 4/8/2021 10:50 AM Encounter Date: 4/5/2021 Status: Signed    : Marichuy Rubio FNP (Nurse Practitioner)    Addended by: MARICHUY RUBIO on: 4/8/2021 10:50 AM        Modules accepted: Orders

## 2021-07-04 NOTE — TELEPHONE ENCOUNTER
Telephone Encounter by Raheem Londono DO at 6/30/2021 10:40 AM     Author: Raheem Londono DO Service: -- Author Type: Physician    Filed: 6/30/2021 10:40 AM Encounter Date: 6/30/2021 Status: Signed    : Raheem Londono DO (Physician)       Please send patient a handout on leflunomide.

## 2021-07-06 ENCOUNTER — COMMUNICATION - HEALTHEAST (OUTPATIENT)
Dept: RHEUMATOLOGY | Facility: CLINIC | Age: 71
End: 2021-07-06

## 2021-07-06 ENCOUNTER — COMMUNICATION - HEALTHEAST (OUTPATIENT)
Dept: INTERNAL MEDICINE | Facility: CLINIC | Age: 71
End: 2021-07-06

## 2021-07-06 VITALS — BODY MASS INDEX: 31.13 KG/M2 | WEIGHT: 229.5 LBS

## 2021-07-06 DIAGNOSIS — G89.29 CHRONIC PAIN: ICD-10-CM

## 2021-07-06 RX ORDER — OXYCODONE AND ACETAMINOPHEN 5; 325 MG/1; MG/1
1-2 TABLET ORAL EVERY 6 HOURS PRN
Qty: 180 TABLET | Refills: 0 | Status: SHIPPED | OUTPATIENT
Start: 2021-07-06 | End: 2021-07-30

## 2021-07-06 NOTE — TELEPHONE ENCOUNTER
Telephone Encounter by Steinert, Joy C, CMA at 7/6/2021  9:34 AM     Author: Steinert, Joy C, CMA Service: -- Author Type: Certified Medical Assistant    Filed: 7/6/2021  9:34 AM Encounter Date: 7/6/2021 Status: Signed    : Steinert, Joy C, CMA (Certified Medical Assistant)       Refill Request  Did you contact pharmacy: No  Medication name:   Requested Prescriptions     Pending Prescriptions Disp Refills   ? oxyCODONE-acetaminophen (PERCOCET) 5-325 mg per tablet 180 tablet 0     Sig: Take 1-2 tablets by mouth every 6 (six) hours as needed for pain (maximum of 6 tablets per day). For 6/7/21-7/7/21     Who prescribed the medication: Marichuy Rubio  Requested Pharmacy: [facility]  Is patient out of medication: Yes  Patient notified refills processed in 3 business days:  yes  Okay to leave a detailed message: yes

## 2021-07-06 NOTE — TELEPHONE ENCOUNTER
Telephone Encounter by Vale Chacko CMA at 7/6/2021  4:51 PM     Author: Vale Chacko CMA Service: -- Author Type: Certified Medical Assistant    Filed: 7/6/2021  4:52 PM Encounter Date: 7/6/2021 Status: Signed    : Vale Chacko CMA (Certified Medical Assistant)       Please give pt a call to schedule. Thank you    Next Appointment:  3 Months     Tests:      Please have labs performed in about 1 week and prior to next visit. .

## 2021-07-12 ENCOUNTER — HOSPITAL ENCOUNTER (OUTPATIENT)
Dept: PHYSICAL THERAPY | Facility: REHABILITATION | Age: 71
End: 2021-07-12
Payer: COMMERCIAL

## 2021-07-12 DIAGNOSIS — M53.3 PAIN IN THE COCCYX: ICD-10-CM

## 2021-07-12 DIAGNOSIS — G89.29 CHRONIC MIDLINE LOW BACK PAIN WITHOUT SCIATICA: Primary | ICD-10-CM

## 2021-07-12 DIAGNOSIS — M53.3 SI (SACROILIAC) JOINT DYSFUNCTION: ICD-10-CM

## 2021-07-12 DIAGNOSIS — M62.81 GENERALIZED MUSCLE WEAKNESS: ICD-10-CM

## 2021-07-12 DIAGNOSIS — M54.50 CHRONIC MIDLINE LOW BACK PAIN WITHOUT SCIATICA: Primary | ICD-10-CM

## 2021-07-12 PROCEDURE — 97161 PT EVAL LOW COMPLEX 20 MIN: CPT | Mod: GP | Performed by: PHYSICAL THERAPIST

## 2021-07-12 PROCEDURE — 97110 THERAPEUTIC EXERCISES: CPT | Mod: GP | Performed by: PHYSICAL THERAPIST

## 2021-07-12 NOTE — PROGRESS NOTES
[facility]          OUTPATIENT PHYSICAL THERAPY ORTHOPEDIC EVALUATION  PLAN OF TREATMENT FOR OUTPATIENT REHABILITATION  (COMPLETE FOR INITIAL CLAIMS ONLY)  Patient's Last Name, First Name, M.I.  YOB: 1950  ColbyEbonie GEORGES    Provider s Name:  [facility]   Medical Record No.  [id number]   Start of Care Date:  07/12/21   Onset Date:   7/12/12   Type:     _X__PT   ___OT   ___SLP Medical Diagnosis:  Chronic pain syndrome G89.4 Sacroiliitis (H) M46.1 Myalgia M79.10     PT Diagnosis:   Chronic midline low back pain, SI joint dysfunction, pain in coccyx, and generalized muscle weakness   Visits from SOC:  1      _________________________________________________________________________________  Plan of Treatment/Functional Goals:  balance training, manual therapy, neuromuscular re-education, ROM, strengthening, stretching           Goals  Goal Identifier: HEP/self management  Goal Description: Patient will be independent in HEP and self management of condition.  Target Date: 08/23/21    Goal Identifier: Sleeping  Goal Description: Patient will be able to sleep more comfortably and wake only 1-2x/night d/t pain  Target Date: 09/10/21    Goal Identifier: Walking  Goal Description: Patient will be able to walk 1 block with <5/10 pain in the lower back  Target Date: 10/10/21    Goal Identifier: Sitting  Goal Description: Patient will be able to sit for 30 mintutes with <5/10 pain in the lower back  Target Date: 10/10/21                                                Therapy Frequency:  1 time/week  Predicted Duration of Therapy Intervention:  12 weeks    Flora Isaac, PT                 I CERTIFY THE NEED FOR THESE SERVICES FURNISHED UNDER        THIS PLAN OF TREATMENT AND WHILE UNDER MY CARE     (Physician co-signature of this document indicates review and certification of the therapy plan).                       Certification Date From:   07/12/21   Certification Date To:  10/09/21    Referring Provider:  Deirdre Samano CNP    Initial Assessment        See Epic Evaluation Start of Care Date: 07/12/21 07/12/21 1300   General Information   Type of Visit Initial OP Ortho PT Evaluation   Start of Care Date 07/12/21   Referring Physician Deirdre Samano CNP   Orders Evaluate and Treat   Orders Comment Sacroiliitis and tenderness of the coccyx. She is also struggling with left upper trap discomfort. Consider equipment evaluation for home use. Please consider the kinetic chain as we address symptoms. Financial are a barrier to care please be aware.   Date of Order 07/12/21   Certification Required? Yes   Medical Diagnosis Chronic pain syndrome G89.4 Sacroiliitis (H) M46.1 Myalgia M79.10   Precautions/Limitations fall precautions   General Information Comments The patient reports that she doesn't know when her pain started.  Her lower back has been hurting for 32 years.  She has a stimulator in her spine and also takes Percocet for her pain.  Nothing has really taken her pain away.  She can't walk very far.  She is using the scooter at the grocery store typically.   Body Part(s)   Body Part(s) Lumbar Spine/SI   Presentation and Etiology   Pertinent history of current problem (include personal factors and/or comorbidities that impact the POC) Type 2 diabetes, spinal cord stimulator, chronic pain, GERD, TKR L, RA, depression, morbid obesity, venous insufficiency, HTN, stage 3 chronic kidney disease, and hyperlipidemia   Impairments A. Pain;D. Decreased ROM;E. Decreased flexibility;F. Decreased strength and endurance;G. Impaired balance;H. Impaired gait;K. Numbness   Functional Limitations perform activities of daily living   Symptom Location Middle of the lower back   How/Where did it occur From insidious onset    Chronicity Chronic   Pain rating (0-10 point scale) Best (/10);Worst (/10)   Best (/10) 6   Worst (/10) 9   Pain quality B. Dull;H. Other   Pain quality comment Numbness in R LE since surgery   Frequency of pain/symptoms A. Constant   Pain/symptoms are: Worse in the morning   Pain/symptoms exacerbated by B. Walking   Pain/symptoms eased by E. Changing positions   Progression of symptoms since onset: Worsened   Current / Previous Interventions   Diagnostic Tests: CT scan   CT Results Results   CT results From 2/22/2020: 1.  No evidence of an acute osseous abnormality.   Current Level of Function   Living environment Apartment/condo   Current equipment-Gait/Locomotion None   Fall Risk Screen   Fall screen completed by PT   Have you fallen 2 or more times in the past year? Yes   Have you fallen and had an injury in the past year? No   Is patient a fall risk? Yes   Fall screen comments Legs feel weak and she will fall; will provide falls risk handout at next session   Abuse Screen (yes response referral indicated)   Feels Unsafe at Home or Work/School no   Feels Threatened by Someone no   Does Anyone Try to Keep You From Having Contact with Others or Doing Things Outside Your Home? no   Physical Signs of Abuse Present no   System Outcome Measures   Outcome Measures Low Back Pain (see Oswestry and Jeffry)   Lumbar Spine/SI Objective Findings   Observation Antalgic gait on the R   Posture Standing with slightly increased lumbar lordosis   Gait/Locomotion Antalgic R   Flexion ROM Major limited with pain   Extension ROM Mod limited with pain   Right Side Bending ROM Min limited   Left Side Bending ROM Pain with mod limited ROM   Pelvic Screen Unable to assess d/t pain   Hip Screen Unable to assess d/t pain   Hip Flexion (L2) Strength 5   Hip Abduction Strength 5   Hip Adduction Strength 5   Knee Flexion Strength 5   Knee Extension (L3) Strength 5   Ankle Dorsiflexion (L4) Strength 5   Hamstring Flexibility Poor   Slump  Test negative   Segmental Mobility Significant pain with passive mobility to sacrum and lumbar spine; unable to assess mobility   Palpation L LE appears shorter supine, L ASIS higher supine; significant tenderness throughout sacral region/hip musculature   Planned Therapy Interventions   Planned Therapy Interventions balance training;manual therapy;neuromuscular re-education;ROM;strengthening;stretching   Clinical Impression   Criteria for Skilled Therapeutic Interventions Met yes, treatment indicated   Clinical Presentation Stable/Uncomplicated   Clinical Presentation Rationale High levels of pain but not evolving   Clinical Decision Making (Complexity) Low complexity   Therapy Frequency 1 time/week   Predicted Duration of Therapy Intervention (days/wks) 12 weeks   Risk & Benefits of therapy have been explained Yes   Patient, Family & other staff in agreement with plan of care Yes   Ortho Goal 1   Goal Identifier HEP/self management   Goal Description Patient will be independent in HEP and self management of condition.   Target Date 08/23/21   Ortho Goal 2   Goal Identifier Sleeping   Goal Description Patient will be able to sleep more comfortably and wake only 1-2x/night d/t pain   Target Date 09/10/21   Ortho Goal 3   Goal Identifier Walking   Goal Description Patient will be able to walk 1 block with <5/10 pain in the lower back   Target Date 10/10/21   Ortho Goal 4   Goal Identifier Sitting   Goal Description Patient will be able to sit for 30 mintutes with <5/10 pain in the lower back   Target Date 10/10/21   Total Evaluation Time   PT Eval, Low Complexity Minutes (27182) 35   Therapy Certification   Certification date from 07/12/21   Certification date to 10/09/21   Medical Diagnosis Chronic pain syndrome G89.4 Sacroiliitis (H) M46.1 Myalgia M79.10     Exercise Grid:  Date 7/12/21   Exercise    PPT X 6   Bridging X 5 with ball squeeze and glute sets   Piriformis stretch Attempted above 90 degrees but too  painful; patient to try below 90 degrees for home   Clam shell X 10   Pubic shotgun 10 second hold x 1

## 2021-07-13 NOTE — ADDENDUM NOTE
Encounter addended by: Flora Isaac, PT on: 7/13/2021 3:09 PM   Actions taken: Order list changed, Diagnosis association updated

## 2021-07-13 NOTE — ADDENDUM NOTE
Encounter addended by: Flora Isaac, PT on: 7/13/2021 2:53 PM   Actions taken: Document created, Document edited

## 2021-07-14 PROBLEM — I95.1 ORTHOSTATIC HYPOTENSION: Status: RESOLVED | Noted: 2017-12-27 | Resolved: 2018-02-13

## 2021-07-14 PROBLEM — M62.82 RHABDOMYOLYSIS: Status: RESOLVED | Noted: 2018-01-01 | Resolved: 2018-02-13

## 2021-07-14 PROBLEM — N17.9 AKI (ACUTE KIDNEY INJURY) (H): Status: RESOLVED | Noted: 2017-12-27 | Resolved: 2018-05-09

## 2021-07-14 NOTE — TELEPHONE ENCOUNTER
Telephone Encounter by Kimmie Guzman at 7/14/2021  9:38 AM     Author: Kimmie Guzman Service: -- Author Type: Patient Access    Filed: 7/14/2021  9:39 AM Encounter Date: 7/6/2021 Status: Signed    : Kimmie Guzman (Patient Access)       LMTCB x1

## 2021-07-28 ENCOUNTER — HOSPITAL ENCOUNTER (OUTPATIENT)
Dept: OCCUPATIONAL THERAPY | Facility: REHABILITATION | Age: 71
End: 2021-07-28
Attending: NURSE PRACTITIONER
Payer: COMMERCIAL

## 2021-07-28 DIAGNOSIS — G89.29 CHRONIC MIDLINE LOW BACK PAIN WITHOUT SCIATICA: ICD-10-CM

## 2021-07-28 DIAGNOSIS — M53.3 PAIN IN THE COCCYX: ICD-10-CM

## 2021-07-28 DIAGNOSIS — M54.50 CHRONIC MIDLINE LOW BACK PAIN WITHOUT SCIATICA: ICD-10-CM

## 2021-07-28 DIAGNOSIS — M53.3 SI (SACROILIAC) JOINT DYSFUNCTION: ICD-10-CM

## 2021-07-28 DIAGNOSIS — Z78.9 IMPAIRED INSTRUMENTAL ACTIVITIES OF DAILY LIVING (IADL): ICD-10-CM

## 2021-07-28 DIAGNOSIS — Z78.9 DECREASED ACTIVITIES OF DAILY LIVING (ADL): Primary | ICD-10-CM

## 2021-07-28 PROCEDURE — 97112 NEUROMUSCULAR REEDUCATION: CPT | Mod: GO | Performed by: OCCUPATIONAL THERAPIST

## 2021-07-28 PROCEDURE — 97165 OT EVAL LOW COMPLEX 30 MIN: CPT | Mod: GO | Performed by: OCCUPATIONAL THERAPIST

## 2021-07-28 ASSESSMENT — ACTIVITIES OF DAILY LIVING (ADL): IADL_QUICK_ADDS: HOME/FINANCIAL/MANAGEMENT;COMMUNITY MOBILITY

## 2021-07-30 DIAGNOSIS — M05.9 SEROPOSITIVE RHEUMATOID ARTHRITIS (H): ICD-10-CM

## 2021-07-30 DIAGNOSIS — M79.641 RIGHT HAND PAIN: ICD-10-CM

## 2021-07-30 DIAGNOSIS — G89.4 CHRONIC PAIN SYNDROME: ICD-10-CM

## 2021-07-30 RX ORDER — OXYCODONE AND ACETAMINOPHEN 5; 325 MG/1; MG/1
1-2 TABLET ORAL EVERY 6 HOURS PRN
Qty: 180 TABLET | Refills: 0 | Status: SHIPPED | OUTPATIENT
Start: 2021-08-05 | End: 2021-08-06

## 2021-07-30 RX ORDER — PREDNISONE 2.5 MG/1
TABLET ORAL
Qty: 90 TABLET | Refills: 0 | Status: SHIPPED | OUTPATIENT
Start: 2021-07-30 | End: 2021-10-26

## 2021-07-30 NOTE — PROGRESS NOTES
Rehabilitation Services          OUTPATIENT OCCUPATIONAL THERAPY  EVALUATION  PLAN OF TREATMENT FOR OUTPATIENT REHABILITATION  (COMPLETE FOR INITIAL CLAIMS ONLY)  Patient's Last Name, First Name, M.I.  YOB: 1950  Ebonie Bowman                        Provider's Name  Isabel Dumont, OTR Medical Record No.  [id number]                               Onset Date:      (Pt unsure of onset date, has had pain for 32 years)   Start of Care Date:     07/28/21   Type:     ___PT   _X_OT   ___SLP Medical Diagnosis:                                OT Diagnosis:     Decreased ADL, impaired IADL, chronic pain Visits from SOC:  1   _________________________________________________________________________________  Plan of Treatment/Functional Goals:  ADL training, IADL training, Neuromuscular re-education, Self care/Home management     Pain neuroscience education, sleep hygiene education and goal setting, energy conservation education and activity modification, graded aerobic and neural mobilization home exercise programs to decrease nervous system sensitivity and exploration of stress management strategies to improve self-management of pain              Goals     Goal Description: Pt will independently verbalize and report at home utilization of 3 health management strategies to improve habits and routines for improved self-management of pain allowing increased participation in functional, everyday activities.  Target Date: 10/20/21        Goal Description: Pt will utilize sleep hygiene and positioning strategies independently, with visual aids and compensatory tools, to improve sleep as reported by decrease in waking times each night due to pain  Target Date: 10/20/21        Goal Description: Pt will demonstrate follow through with graded aerobic and neural mobilization home exercise programs to decrease nervous system sensitivity and increase  endurance for home management tasks and community mobility.  Target Date: 10/20/21          Therapy Frequency: 2x/month     Predicted Duration of Therapy Intervention (days/wks): up to 6 visits in 12 weeks  Isabel Dumont, OTR          I CERTIFY THE NEED FOR THESE SERVICES FURNISHED UNDER        THIS PLAN OF TREATMENT AND WHILE UNDER MY CARE     (Physician co-signature of this document indicates review and certification of the therapy plan).                 ,    Certification date from: 07/28/21, Certification date to: 10/20/21               Referring Physician: Deirdre Samano CNP     Initial Assessment        See Epic Evaluation      Start Of Care Date: 07/28/21 07/28/21 1200   Quick Adds   Quick Adds Certification   Type of Visit Initial Outpatient Occupational Therapy Evaluation       Present No   General Information   Start Of Care Date 07/28/21   Referring Physician Deirdre Samano CNP   Orders Evaluate and treat as indicated   Orders Date 07/13/21   Onset of Illness/Injury or Date of Surgery   (Pt unsure of onset date, has had pain for 32 years)   Special Instructions Therapeutic Neuroscience Education with Isabel Dumont   Additional Occupational Profile Info/Pertinent History of Current Problem Pt notes that she wakes 2-3x/night due to pain   Role/Living Environment   Current Community Support Housekeeping service   Patient role/Employment history Retired   Current Living Environment Apartment/condo  (lives with son)   Number of Stairs to Enter Home one flight of stairs; second floor apt   Primary Bathroom Set Up/Equipment Tub/Shower combo;Tub grab bar;Toilet  (uses vanity to push up from toilet)   Home/Community Accessibility Comments Son helps with transportation at times or [employer]   Prior Level - Transfers Assistive equipment   Prior Level - Ambulation Assistive equipment  (cane or walker when out, no device when in apt)   Prior Level - ADLS Independent   Prior  Responsibilities - IADL Meal Preparation  ( does vacuuming, dishes, laundry, cleaning)   Current Assistive Devices - Mobility Standard cane;Front wheeled walker  (rolling walker (hasn't used that much))   Current Assistive Devices - ADL Reacher   Role/Living Environment Comments  helps with more difficult home management tasks including changing cat litter, laundry.  Son and pt will often split the shopping   Patient/family Goals Statement Less pain with activities; able to walk longer    Pain   Patient currently in pain Yes   Pain location low back   Pain rating 8   Pain description Sharp;Dull;Deep   Pain comments Pain is a 6 at best, 8 at worst; pt has had a spinal cord stimulator for 3-5 years but reports it has given her trouble over the last 2 years.    Fall Risk Screen   Fall screen completed by OT   Have you fallen 2 or more times in the past year? Yes   Have you fallen and had an injury in the past year? No   Is patient a fall risk? Yes   Fall screen comments Working with physical therapy   Abuse Screen (yes response referral indicated)   Feels Unsafe at Home or Work/School no   Feels Threatened by Someone no   Does Anyone Try to Keep You From Having Contact with Others or Doing Things Outside Your Home? no   Physical Signs of Abuse Present no   Cognitive Status Examination   Orientation Orientation to person, place and time   Level of Consciousness Alert   Personal Safety and Judgment Intact   Posture   Posture Protracted shoulders   Functional Mobility   Ambulation Independent but with antalgic gait and limited tolerance   Transfer Skills   Transfer Transfer Skill: Sit to/from Stand   Transfer Skill: Sit to/from Stand   Level of [place]: Sit to/from Stand independent   Lower Body Dressing   Level of [place]: Dress Lower Body independent  (don/doff sock in seated position by bringing foot up)   Activity Tolerance   Activity Tolerance Pt does notes that walking tolerance is  significantly limited which impacts much of her tolerance for daily activties including household tasks and community mobility.   Instrumental Activities of Daily Living Assessment   IADL Quick Adds Home/Financial/Management;Community Mobility   Community Mobility Limited due to limited walking   Planned Therapy Interventions   Planned Therapy Interventions ADL training;IADL training;Neuromuscular re-education;Self care/Home management   Intervention Comments Pain neuroscience education, sleep hygiene education and goal setting, energy conservation education and activity modification, graded aerobic and neural mobilization home exercise programs to decrease nervous system sensitivity and exploration of stress management strategies to improve self-management of pain   Adult OT Eval Goals   OT Eval Goals (Adult) 1;2;3    OT Goal 1   Goal Description Pt will independently verbalize and report at home utilization of 3 health management strategies to improve habits and routines for improved self-management of pain allowing increased participation in functional, everyday activities.   Target Date 10/20/21    OT Goal 2   Goal Description Pt will utilize sleep hygiene and positioning strategies independently, with visual aids and compensatory tools, to improve sleep as reported by decrease in waking times each night due to pain   Target Date 10/20/21    OT Goal 3   Goal Description Pt will demonstrate follow through with graded aerobic and neural mobilization home exercise programs to decrease nervous system sensitivity and increase endurance for home management tasks and community mobility.   Target Date 10/20/21   Clinical Impression   Criteria for Skilled Therapeutic Interventions Met Yes, treatment indicated   OT Diagnosis Decreased ADL, impaired IADL, chronic pain   Influenced by the following impairments Pain   Assessment of Occupational Performance 1-3 Performance Deficits   Identified Performance Deficits Sleep,  functional mobility for IADL including home management tasks and community mobility   Clinical Decision Making (Complexity) Low complexity   Therapy Frequency 2x/month   Predicted Duration of Therapy Intervention (days/wks) up to 6 visits in 12 weeks   Risks and Benefits of Treatment have been explained. Yes   Patient, Family & other staff in agreement with plan of care Yes   Clinical Impression Comments Pt's pain impacts her independence and ability to regularly complete IADLs including home management tasks, community mobility, and sleep.  Pt exhibits postiive signs of neural tension in L radial nerve distribution.  Pt demonstrates signs and symptoms consistent with nervous system hypersensitivity.     Education Assessment   Barriers To Learning No Barriers   Therapy Certification   Certification date from 07/28/21   Certification date to 10/20/21   Certification I certify the need for these services furnished under this plan of treatment and while under my care.  (Physician co-signature of this document indicates review and certification of the therapy plan)   Total Evaluation Time   OT Denae, Low Complexity Minutes (80396) 40       Active neurodynamic screening of nervous system (screen positive if pain is elicited and movement pattern impacts function, may indicate underlying impairment contributing to ongoing pain):  Upper limb: Median Nerve  alyx - Radial Nerve L +, R -  Ulnar Nerve alyx -  Seated Slump alyx -

## 2021-08-02 ENCOUNTER — OFFICE VISIT (OUTPATIENT)
Dept: PALLIATIVE MEDICINE | Facility: OTHER | Age: 71
End: 2021-08-02
Payer: COMMERCIAL

## 2021-08-02 VITALS
DIASTOLIC BLOOD PRESSURE: 86 MMHG | BODY MASS INDEX: 30.61 KG/M2 | HEART RATE: 77 BPM | HEIGHT: 72 IN | WEIGHT: 226 LBS | SYSTOLIC BLOOD PRESSURE: 132 MMHG

## 2021-08-02 DIAGNOSIS — F41.9 ANXIETY: Primary | ICD-10-CM

## 2021-08-02 DIAGNOSIS — G89.4 CHRONIC PAIN SYNDROME: ICD-10-CM

## 2021-08-02 PROCEDURE — G0463 HOSPITAL OUTPT CLINIC VISIT: HCPCS

## 2021-08-02 PROCEDURE — 99215 OFFICE O/P EST HI 40 MIN: CPT | Performed by: NURSE PRACTITIONER

## 2021-08-02 RX ORDER — HYDROXYZINE HYDROCHLORIDE 25 MG/1
25-50 TABLET, FILM COATED ORAL 3 TIMES DAILY PRN
Qty: 42 TABLET | Refills: 0 | Status: SHIPPED | OUTPATIENT
Start: 2021-08-02 | End: 2021-09-16

## 2021-08-02 ASSESSMENT — PAIN SCALES - GENERAL: PAINLEVEL: EXTREME PAIN (9)

## 2021-08-02 ASSESSMENT — MIFFLIN-ST. JEOR: SCORE: 1642.13

## 2021-08-02 NOTE — PROGRESS NOTES
Patient is here for a follow up appointment with Deirdre Samano CNP.   Pain score: 9  Constant or intermittent: constant and intermittent  What does your pain feel like: deep hurt  Does the paininterfere with:   Work: yes  Walking/distance: yes  Sleep: yes  Daily activities: yes  Relationships/social life: no  Mood: yes  F= 8    Selma Mendoza LPN

## 2021-08-02 NOTE — LETTER
8/2/2021         RE: Ebonie Bowman  [address]  [place] [address]        Dear Colleague,    Thank you for referring your patient, Ebonie Bowman, to the [facility]. Please see a copy of my visit note below.        Visit Start: 1322  Visit Stop:1400  TT: 38    Wrap up:  Start Time: 194  End Time: 1827  TT: 3      Ebonie Bowman is a 71 year old person last evaluated 6/21/21.  h/o morbid obesity, RA, Stage 3 kidney disease, DM, facet arthropathy, SCS, spine surgery, knee replacement, hernial repair and eye surgery.   Is being evaluated for sacroiliitis, myalgia upper trap, chronic low back pain.      Major issues:  No diagnosis found.  Patient Active Problem List   Diagnosis     Ataxia     Axonal neuropathy     Diabetes mellitus (H)     Paraparesis (H)       _____  HPI:  _____    Location of the pain: low back   Timing: constant  Quality: hard pain  Aggravating factors: standing, sitting, walking, reaching, twisting, lifting, squatting, bending, going or down stairs, getting out of the car or bed  Alleviating factors: medication, SCS  DME:walker. Cane.    SCS Serial number [id number] implanted 7/8/2016 batter replacement 4/25/2019 (Has not been functioning properly- She met with  [employer] rep to check the stimulator - she needs to meet with the rep again - she would like it off and she would like to take the stimulator out. She does not like the stimulator b/c she does not want the stimulator to cycle on and off - she hs the controller but she is unclear how to work the device)     Severity: Today: 9    Any New pain, injuries, falls: no  Since last visit pain has: worse     Functional Symptoms: Pain interferes with:  Sleep: she is not sleeping well  Walking:  Ambulation/Transfer: Pt is ambulatory. Transfers independently.     Work / Social indicators for health:   Animal Care: 2 cats - her house keeper will help her care for the cats    ADL's:  Toileting: independent. GI can  be slow  Bathing: she will sponge bath  Dressing: no issues dressing  Cooking: son will do some cooking. She will do some cooking. They will order in  Housekeeping:   Shopping:son has been doing the shopping she will go with him occasionally    Additional social indicators for health:  Housing: concern about audi evicted - she is getting some assistance from several program.   Concentration: pretty good  Transportation: son will drive her  Relationships/Social:she is connected with family  Services:she has a     Impact of pain treatments:  Patient reports function has improved with current pain treatment: some    Pain Plan of Care Review:  Medication:  Last opioid dose was Percocet last had 2 days ago - filled 7/7 for 30 days would be due 8/6. She indicates the she is taking 2 at a time and this is lasting her 3 hours.     Medication changes: no  Medication side/adverse effects: no  Considerations: - she is out of medication Sat afternoon and she is not able to get medication.  She indicates she will use 6-7 pills per day.       Current Outpatient Medications:      acetaminophen (TYLENOL) 500 MG tablet, [ACETAMINOPHEN (TYLENOL) 500 MG TABLET] Take 2 tablets (1,000 mg total) by mouth 3 (three) times a day., Disp: , Rfl: 0     amLODIPine (NORVASC) 10 MG tablet, [AMLODIPINE (NORVASC) 10 MG TABLET] TAKE 1 TABLET EVERY DAY, Disp: 90 tablet, Rfl: 2     ascorbic acid (ASCORBIC ACID WITH LATRICIA HIPS) 500 MG tablet, [ASCORBIC ACID (ASCORBIC ACID WITH LATRICIA HIPS) 500 MG TABLET] Take 500 mg by mouth every evening. , Disp: , Rfl:      atorvastatin (LIPITOR) 40 MG tablet, [ATORVASTATIN (LIPITOR) 40 MG TABLET] Take 1 tablet (40 mg total) by mouth at bedtime., Disp: 90 tablet, Rfl: 2     b complex vitamins tablet, [B COMPLEX VITAMINS TABLET] Take 1 tablet by mouth daily., Disp: , Rfl:      blood glucose test strips, [BLOOD GLUCOSE TEST STRIPS] Check blood sugar four times daily. Dispense brand per patient's  insurance at pharmacy discretion. Dx: e11.9, Disp: 400 strip, Rfl: 3     blood-glucose meter Misc, [BLOOD-GLUCOSE METER MISC] Dispense one meter. Use as directed. Dx: e11.9, Disp: 1 each, Rfl: 0     cholecalciferol, vitamin D3, (VITAMIN D3 ORAL), [CHOLECALCIFEROL, VITAMIN D3, (VITAMIN D3 ORAL)] Take 3 tablets by mouth daily. , Disp: , Rfl:      hydrOXYchloroQUINE (PLAQUENIL) 200 mg tablet, [HYDROXYCHLOROQUINE (PLAQUENIL) 200 MG TABLET] Take 1 tablet (200 mg total) by mouth 2 (two) times a day., Disp: 180 tablet, Rfl: 0     insulin NPH (NOVOLIN) 100 unit/mL injection, [INSULIN NPH (NOVOLIN) 100 UNIT/ML INJECTION] Inject 33 Units under the skin 2 (two) times a day before meals., Disp: , Rfl:      insulin syringe-needle U-100 (BD INSULIN SYRINGE ULT-FINE II) 1 mL 31 gauge x 5/16 Syrg, [INSULIN SYRINGE-NEEDLE U-100 (BD INSULIN SYRINGE ULT-FINE II) 1 ML 31 GAUGE X 5/16 SYRG] USE 4 TIMES DAILY AS DIRECTED, Disp: 500 each, Rfl: 1     lancing device (ACCU-CHEK SOFTCLIX) Misc, [LANCING DEVICE (ACCU-CHEK SOFTCLIX) MISC] Use 1 applicator As Directed 4 (four) times a day., Disp: 300 each, Rfl: 3     lidocaine (LMX) 4 % cream, [LIDOCAINE (LMX) 4 % CREAM] Apply to affected area twice daily as needed, Disp: 30 g, Rfl: 3     lisinopriL (PRINIVIL,ZESTRIL) 10 MG tablet, [LISINOPRIL (PRINIVIL,ZESTRIL) 10 MG TABLET] TAKE 1 TABLET AT BEDTIME, Disp: 90 tablet, Rfl: 3     loratadine (CLARITIN) 10 mg tablet, [LORATADINE (CLARITIN) 10 MG TABLET] Take 1 tablet (10 mg total) by mouth at bedtime., Disp: 90 tablet, Rfl: 3     MEDICATION CANNOT BE REORDERED - PLEASE MANUALLY REORDER AND DISCONTINUE THE OLD ORDER, [OMEGA-3 FATTY ACIDS-VITAMIN E (FISH OIL) 1,000 MG CAP] Take 1,000 mg by mouth every evening., Disp: 90 each, Rfl: 3     methotrexate 2.5 MG tablet, [METHOTREXATE 2.5 MG TABLET] Take 3 tablets (7.5 mg total) by mouth once a week. On Fridays, Disp: 39 tablet, Rfl: 0     metoprolol succinate (TOPROL-XL) 50 MG 24 hr tablet, [METOPROLOL  SUCCINATE (TOPROL-XL) 50 MG 24 HR TABLET] TAKE 1 TABLET EVERY DAY, Disp: 90 tablet, Rfl: 2     MULTIVITAMIN ORAL, [MULTIVITAMIN ORAL] Take 1 tablet by mouth every evening. , Disp: , Rfl:      predniSONE (DELTASONE) 2.5 MG tablet, Take 1 tablet po daily, Disp: 90 tablet, Rfl: 0     traZODone (DESYREL) 50 MG tablet, [TRAZODONE (DESYREL) 50 MG TABLET] Take 1 tablet (50 mg total) by mouth at bedtime., Disp: 90 tablet, Rfl: 1     aspirin 81 MG EC tablet, [ASPIRIN 81 MG EC TABLET] Take 81 mg by mouth every evening. (Patient not taking: Reported on 8/2/2021), Disp: , Rfl:      blood pressure monitor Kit, [BLOOD PRESSURE MONITOR KIT] Use 1 applicator As Directed daily. (Patient not taking: Reported on 8/2/2021), Disp: 1 each, Rfl: 0     flash glucose scanning reader (FREESTYLE BRE 14 DAY READER) Misc, [FLASH GLUCOSE SCANNING READER (FREESTYLE BRE 14 DAY READER) MISC] Use 1 each As Directed see administration instructions. Use as directed with sensor (Patient not taking: Reported on 8/2/2021), Disp: 1 each, Rfl: 0     flash glucose sensor (FREESTYLE BRE 14 DAY SENSOR) Kit, [FLASH GLUCOSE SENSOR (FREESTYLE BRE 14 DAY SENSOR) KIT] Use 1 each As Directed every 14 (fourteen) days. (Patient not taking: Reported on 8/2/2021), Disp: 2 kit, Rfl: 11     folic acid (FOLVITE) 800 MCG tablet, [FOLIC ACID (FOLVITE) 800 MCG TABLET] Take 800 mcg by mouth every evening.        (Patient not taking: Reported on 8/2/2021), Disp: , Rfl:      magnesium oxide (MAG-OX) 400 mg tablet, [MAGNESIUM OXIDE (MAG-OX) 400 MG TABLET] Take 400 mg by mouth at bedtime. With food        (Patient not taking: Reported on 8/2/2021), Disp: , Rfl:      [START ON 8/5/2021] oxyCODONE-acetaminophen (PERCOCET) 5-325 MG tablet, Take 1-2 tablets by mouth every 6 hours as needed for pain For 8/5/21-9/4/21 (Patient not taking: Reported on 8/2/2021), Disp: 180 tablet, Rfl: 0    :   8/2/2021 Reviewed paper format to aid with decision regarding medication  management     Rehabilitation PT for the SI joint and the left upper trap - considering the kinetic chain.  Physical Therapy: + starts in September - [facility].  Occupational Therapy: + met with Isabel she will see her next week  _________  Objective:  _________  Vitals:    08/02/21 1304   BP: 132/86   Pulse: 77   Weight: 102.5 kg (226 lb)   Height: 1.829 m (6')   PainSc: Extreme Pain (9)   PainLoc: Low Back     Vitals:    08/02/21 1304   BP: 132/86   Pulse: 77   Weight: 102.5 kg (226 lb)   Height: 1.829 m (6')   PainSc: Extreme Pain (9)   PainLoc: Low Back     Constitutional:  Pleasant and cooperative female who presents alone today.  Pain behavior noted - a lot of shifting during todays visit some exaggerated sensitivity to touch may be related to being in withdrawals.     Psychiatric: Mood and affect are stressed.  Patient does not appear sedated.     Integumentary:  Observed skin WNL     HEENT: EOM's grossly intact.     Lymph: No cervical lymphadenopathy noted     Chest: Non-labored breathing.     Cardiovascular: radial pulses equal.      Spine:   Cervical Spine: ROM fair. discomfort light touch     Thoracic Spine:  fail ROM of the bilaterally shoulder. discomfort light touch.     Lumbar Spine:  Discomfort light touch     Pelvis: tenderness with light touch     Neurological:   Alert and oriented in all spheres including: time, place, person and situation.  Motor/Sensory/Tone:  5/5 bilaterally UE and LE. Hyperalgesia +    Durable Medical Equipment: walker, mask    Imaging:  Imaging pulled forward today - not specifically reviewed      EXAM: CT LUMBAR SPINE WO CONTRAST  LOCATION: [facility]  DATE/TIME: 2/22/2020 5:09 PM     INDICATION: Pain after fall  IMPRESSION:   1.  No evidence of an acute osseous abnormality.  2.  Multilevel degenerative changes of the lumbar spine, with at least moderate canal stenosis at L2-L3, L3-L4, and L4-L5.  3.  Diffuse bony demineralization, suggestive of osteoporosis.  4.   Bilateral hydronephrosis, new from the prior CT of the abdomen and pelvis and of uncertain etiology. No evidence of an obstructing renal calculus.    ______________  Assessment:  ___________  Ebonie Bowman is a 71 year old person h/o morbid obesity, RA, Stage 3 kidney disease, DM, facet arthropathy, SCS, spine surgery, knee replacement, hernial repair and eye surgery.    Clinic evaluation for sacroiliitis, myalgia upper trap, chronic low back pain.     PT - starting  OT - has started  [employer] - encouraging additional visits  hydroxyzine to address the w/d symptoms.  She is working with primary care for pain medication.  Dicussed using a medminder - she is a pt who may do better with weekly or every 2 week script    *Universal Precautions:   UDT/Swab-   She did not leave enough urine for a UDT - she was given water during her visit - beyond RN connection after the visit she did not come back within 24 hours. This is unexpected, in this setting will not assume opioid prescribing until there is further assessment by the pain center clinician. Primary care may continue in their current fashion.  Consent- if prescribing opioids  Agreement- if prescribing opioids  Pharmacy- as documented   Count- n/a  Psychological evaluation - information provided  Pharmacogenetic testing- n/a  MME- 43 at 6 oxycodone per day  MTM - consider  Naloxone safety:   Medical Cannabis: na      Previously tried medications: H=Helpful. NH=Not Helpful. U=Unsure. (n/a)=Not applicable  Acetaminophen: (nh)  NSAIDs:not able to take              Ibuprofen               Naproxen               Celecobix               Diclofenac   Gabapentinoids:               Gabapentin: (na)              Pregabalin: (na)  Antidepressants:               Amitriptyline: (na)              Nortriptyline: (na)              Duloxetine: (na)              Venlafaxine: (na)  Muscle Relaxants:               Tizanidine: (na)              Methocarbamol: (na)               Cyclobenzaprine: (na)              Metaxalone: (na)              Carisoprodol: (na)              Baclofen: (na)    Topicals:               Lidocaine: (limited impact)              Diclofenac gel: (na)              Compounded pain creams: (na)              OTC/Herbal topical pain applications: (tiger balm)  Opioids:               Codeine: (na)              Hydrocodone: (in the past)              Oxycodone: (taking)              Tramadol: (na)              Morphine: (makes me sick)              Hydromorphone: (felt sad)              Methadone: (na)              Fentanyl: (na)              Buprenorphine: (na)              Tapentadol: (na)              Oxymorphone: (na)  Supplements:               Tumeric: (na)              CBD: (na)              Vitamin D: (+)              Glucosamine/Chondroitin: (na)     ______  Plan:  _____  Thank you for participating in the clinic visit - Here is the Plan of Care / NextSteps:    Contact [phone] to reserve a time. You need to follow up by:October    Communicating with us:  Please call Monday-Friday for problems or questions - leave a message and one of the clinical support staff (CSS) will help to get things figured out. The number is [phone].    You may also opt to communicate through [employer].    Primary Care: You are continuing with [facility] for medication management.     Rehabilitation: PT you are starting with [facility]. in September. OT you are working with Isabel patel.     DME: you are planning to connect with the [employer] Rep to discuss the stimulation b/c you would like continuous stimulation.      Medication prescribed / to be continued:   we discussed you appear to have a refill at the pharmacy. When you start this may work better for you sine you had a little wash out. So start with 1 pill. We also discussed using a med minder or a pill box to help with the number of pills you have per day. It can be helpful visually.     Signed Prescriptions:                         Disp   Refills    hydrOXYzine (ATARAX) 25 MG tablet          42 tab*0      Medicine to help with withdrawal symptoms of being anxious unsettled and uncomfortable. Sometimes this can add in and be helpful for the pain. You can monitor  Sig: Take 1-2 tablets (25-50 mg) by mouth 3 times daily as           needed for anxiety  Authorizing Provider: DEIRDRE SAMANO [facility]  [address] [address]  [phone]  [phone]      ANUPAM Waters CNP    Patient is here for a follow up appointment with Deirdre Samano CNP.   Pain score: 9  Constant or intermittent: constant and intermittent  What does your pain feel like: deep hurt  Does the paininterfere with:   Work: yes  Walking/distance: yes  Sleep: yes  Daily activities: yes  Relationships/social life: no  Mood: yes  F= 8    Selma Mendoza LPN       Again, thank you for allowing me to participate in the care of your patient.        Sincerely,        ANUPAM Waters CNP

## 2021-08-02 NOTE — PROGRESS NOTES
Visit Start: 1322  Visit Stop:1400  TT: 38    Wrap up:  Start Time: 194  End Time: 1827  TT: 3      Ebonie Bowman is a 71 year old person last evaluated 6/21/21.  h/o morbid obesity, RA, Stage 3 kidney disease, DM, facet arthropathy, SCS, spine surgery, knee replacement, hernial repair and eye surgery.   Is being evaluated for sacroiliitis, myalgia upper trap, chronic low back pain.      Major issues:  No diagnosis found.  Patient Active Problem List   Diagnosis     Ataxia     Axonal neuropathy     Diabetes mellitus (H)     Paraparesis (H)       _____  HPI:  _____    Location of the pain: low back   Timing: constant  Quality: hard pain  Aggravating factors: standing, sitting, walking, reaching, twisting, lifting, squatting, bending, going or down stairs, getting out of the car or bed  Alleviating factors: medication, SCS  DME:walker. Cane.    SCS Serial number [id number] implanted 7/8/2016 batter replacement 4/25/2019 (Has not been functioning properly- She met with  [employer] rep to check the stimulator - she needs to meet with the rep again - she would like it off and she would like to take the stimulator out. She does not like the stimulator b/c she does not want the stimulator to cycle on and off - she hs the controller but she is unclear how to work the device)     Severity: Today: 9    Any New pain, injuries, falls: no  Since last visit pain has: worse     Functional Symptoms: Pain interferes with:  Sleep: she is not sleeping well  Walking:  Ambulation/Transfer: Pt is ambulatory. Transfers independently.     Work / Social indicators for health:   Animal Care: 2 cats - her house keeper will help her care for the cats    ADL's:  Toileting: independent. GI can be slow  Bathing: she will sponge bath  Dressing: no issues dressing  Cooking: son will do some cooking. She will do some cooking. They will order in  Housekeeping:   Shopping:son has been doing the shopping she will go with him  occasionally    Additional social indicators for health:  Housing: concern about audi evicted - she is getting some assistance from several program.   Concentration: pretty good  Transportation: son will drive her  Relationships/Social:she is connected with family  Services:she has a     Impact of pain treatments:  Patient reports function has improved with current pain treatment: some    Pain Plan of Care Review:  Medication:  Last opioid dose was Percocet last had 2 days ago - filled 7/7 for 30 days would be due 8/6. She indicates the she is taking 2 at a time and this is lasting her 3 hours.     Medication changes: no  Medication side/adverse effects: no  Considerations: - she is out of medication Sat afternoon and she is not able to get medication.  She indicates she will use 6-7 pills per day.       Current Outpatient Medications:      acetaminophen (TYLENOL) 500 MG tablet, [ACETAMINOPHEN (TYLENOL) 500 MG TABLET] Take 2 tablets (1,000 mg total) by mouth 3 (three) times a day., Disp: , Rfl: 0     amLODIPine (NORVASC) 10 MG tablet, [AMLODIPINE (NORVASC) 10 MG TABLET] TAKE 1 TABLET EVERY DAY, Disp: 90 tablet, Rfl: 2     ascorbic acid (ASCORBIC ACID WITH LATRICIA HIPS) 500 MG tablet, [ASCORBIC ACID (ASCORBIC ACID WITH LATRICIA HIPS) 500 MG TABLET] Take 500 mg by mouth every evening. , Disp: , Rfl:      atorvastatin (LIPITOR) 40 MG tablet, [ATORVASTATIN (LIPITOR) 40 MG TABLET] Take 1 tablet (40 mg total) by mouth at bedtime., Disp: 90 tablet, Rfl: 2     b complex vitamins tablet, [B COMPLEX VITAMINS TABLET] Take 1 tablet by mouth daily., Disp: , Rfl:      blood glucose test strips, [BLOOD GLUCOSE TEST STRIPS] Check blood sugar four times daily. Dispense brand per patient's insurance at pharmacy discretion. Dx: e11.9, Disp: 400 strip, Rfl: 3     blood-glucose meter Misc, [BLOOD-GLUCOSE METER MISC] Dispense one meter. Use as directed. Dx: e11.9, Disp: 1 each, Rfl: 0     cholecalciferol, vitamin D3, (VITAMIN D3  ORAL), [CHOLECALCIFEROL, VITAMIN D3, (VITAMIN D3 ORAL)] Take 3 tablets by mouth daily. , Disp: , Rfl:      hydrOXYchloroQUINE (PLAQUENIL) 200 mg tablet, [HYDROXYCHLOROQUINE (PLAQUENIL) 200 MG TABLET] Take 1 tablet (200 mg total) by mouth 2 (two) times a day., Disp: 180 tablet, Rfl: 0     insulin NPH (NOVOLIN) 100 unit/mL injection, [INSULIN NPH (NOVOLIN) 100 UNIT/ML INJECTION] Inject 33 Units under the skin 2 (two) times a day before meals., Disp: , Rfl:      insulin syringe-needle U-100 (BD INSULIN SYRINGE ULT-FINE II) 1 mL 31 gauge x 5/16 Syrg, [INSULIN SYRINGE-NEEDLE U-100 (BD INSULIN SYRINGE ULT-FINE II) 1 ML 31 GAUGE X 5/16 SYRG] USE 4 TIMES DAILY AS DIRECTED, Disp: 500 each, Rfl: 1     lancing device (ACCU-CHEK SOFTCLIX) Misc, [LANCING DEVICE (ACCU-CHEK SOFTCLIX) MISC] Use 1 applicator As Directed 4 (four) times a day., Disp: 300 each, Rfl: 3     lidocaine (LMX) 4 % cream, [LIDOCAINE (LMX) 4 % CREAM] Apply to affected area twice daily as needed, Disp: 30 g, Rfl: 3     lisinopriL (PRINIVIL,ZESTRIL) 10 MG tablet, [LISINOPRIL (PRINIVIL,ZESTRIL) 10 MG TABLET] TAKE 1 TABLET AT BEDTIME, Disp: 90 tablet, Rfl: 3     loratadine (CLARITIN) 10 mg tablet, [LORATADINE (CLARITIN) 10 MG TABLET] Take 1 tablet (10 mg total) by mouth at bedtime., Disp: 90 tablet, Rfl: 3     MEDICATION CANNOT BE REORDERED - PLEASE MANUALLY REORDER AND DISCONTINUE THE OLD ORDER, [OMEGA-3 FATTY ACIDS-VITAMIN E (FISH OIL) 1,000 MG CAP] Take 1,000 mg by mouth every evening., Disp: 90 each, Rfl: 3     methotrexate 2.5 MG tablet, [METHOTREXATE 2.5 MG TABLET] Take 3 tablets (7.5 mg total) by mouth once a week. On Fridays, Disp: 39 tablet, Rfl: 0     metoprolol succinate (TOPROL-XL) 50 MG 24 hr tablet, [METOPROLOL SUCCINATE (TOPROL-XL) 50 MG 24 HR TABLET] TAKE 1 TABLET EVERY DAY, Disp: 90 tablet, Rfl: 2     MULTIVITAMIN ORAL, [MULTIVITAMIN ORAL] Take 1 tablet by mouth every evening. , Disp: , Rfl:      predniSONE (DELTASONE) 2.5 MG tablet, Take 1 tablet  po daily, Disp: 90 tablet, Rfl: 0     traZODone (DESYREL) 50 MG tablet, [TRAZODONE (DESYREL) 50 MG TABLET] Take 1 tablet (50 mg total) by mouth at bedtime., Disp: 90 tablet, Rfl: 1     aspirin 81 MG EC tablet, [ASPIRIN 81 MG EC TABLET] Take 81 mg by mouth every evening. (Patient not taking: Reported on 8/2/2021), Disp: , Rfl:      blood pressure monitor Kit, [BLOOD PRESSURE MONITOR KIT] Use 1 applicator As Directed daily. (Patient not taking: Reported on 8/2/2021), Disp: 1 each, Rfl: 0     flash glucose scanning reader (FREESTYLE BRE 14 DAY READER) Misc, [FLASH GLUCOSE SCANNING READER (FREESTYLE BRE 14 DAY READER) MISC] Use 1 each As Directed see administration instructions. Use as directed with sensor (Patient not taking: Reported on 8/2/2021), Disp: 1 each, Rfl: 0     flash glucose sensor (FREESTYLE BRE 14 DAY SENSOR) Kit, [FLASH GLUCOSE SENSOR (FREESTYLE BRE 14 DAY SENSOR) KIT] Use 1 each As Directed every 14 (fourteen) days. (Patient not taking: Reported on 8/2/2021), Disp: 2 kit, Rfl: 11     folic acid (FOLVITE) 800 MCG tablet, [FOLIC ACID (FOLVITE) 800 MCG TABLET] Take 800 mcg by mouth every evening.        (Patient not taking: Reported on 8/2/2021), Disp: , Rfl:      magnesium oxide (MAG-OX) 400 mg tablet, [MAGNESIUM OXIDE (MAG-OX) 400 MG TABLET] Take 400 mg by mouth at bedtime. With food        (Patient not taking: Reported on 8/2/2021), Disp: , Rfl:      [START ON 8/5/2021] oxyCODONE-acetaminophen (PERCOCET) 5-325 MG tablet, Take 1-2 tablets by mouth every 6 hours as needed for pain For 8/5/21-9/4/21 (Patient not taking: Reported on 8/2/2021), Disp: 180 tablet, Rfl: 0    :   8/2/2021 Reviewed paper format to aid with decision regarding medication management     Rehabilitation PT for the SI joint and the left upper trap - considering the kinetic chain.  Physical Therapy: + starts in September - [facility].  Occupational Therapy: + met with Isabel she will see her next  week  _________  Objective:  _________  Vitals:    08/02/21 1304   BP: 132/86   Pulse: 77   Weight: 102.5 kg (226 lb)   Height: 1.829 m (6')   PainSc: Extreme Pain (9)   PainLoc: Low Back     Vitals:    08/02/21 1304   BP: 132/86   Pulse: 77   Weight: 102.5 kg (226 lb)   Height: 1.829 m (6')   PainSc: Extreme Pain (9)   PainLoc: Low Back     Constitutional:  Pleasant and cooperative female who presents alone today.  Pain behavior noted - a lot of shifting during todays visit some exaggerated sensitivity to touch may be related to being in withdrawals.     Psychiatric: Mood and affect are stressed.  Patient does not appear sedated.     Integumentary:  Observed skin WNL     HEENT: EOM's grossly intact.     Lymph: No cervical lymphadenopathy noted     Chest: Non-labored breathing.     Cardiovascular: radial pulses equal.      Spine:   Cervical Spine: ROM fair. discomfort light touch     Thoracic Spine:  fail ROM of the bilaterally shoulder. discomfort light touch.     Lumbar Spine:  Discomfort light touch     Pelvis: tenderness with light touch     Neurological:   Alert and oriented in all spheres including: time, place, person and situation.  Motor/Sensory/Tone:  5/5 bilaterally UE and LE. Hyperalgesia +    Durable Medical Equipment: walker, mask    Imaging:  Imaging pulled forward today - not specifically reviewed      EXAM: CT LUMBAR SPINE WO CONTRAST  LOCATION: [facility]  DATE/TIME: 2/22/2020 5:09 PM     INDICATION: Pain after fall  IMPRESSION:   1.  No evidence of an acute osseous abnormality.  2.  Multilevel degenerative changes of the lumbar spine, with at least moderate canal stenosis at L2-L3, L3-L4, and L4-L5.  3.  Diffuse bony demineralization, suggestive of osteoporosis.  4.  Bilateral hydronephrosis, new from the prior CT of the abdomen and pelvis and of uncertain etiology. No evidence of an obstructing renal calculus.    ______________  Assessment:  ___________  Ebonie Bowman is a 71 year old  person h/o morbid obesity, RA, Stage 3 kidney disease, DM, facet arthropathy, SCS, spine surgery, knee replacement, hernial repair and eye surgery.    Clinic evaluation for sacroiliitis, myalgia upper trap, chronic low back pain.     PT - starting  OT - has started  [employer] - encouraging additional visits  hydroxyzine to address the w/d symptoms.  She is working with primary care for pain medication.  Dicussed using a medminder - she is a pt who may do better with weekly or every 2 week script    *Universal Precautions:   UDT/Swab-   She did not leave enough urine for a UDT - she was given water during her visit - beyond RN connection after the visit she did not come back within 24 hours. This is unexpected, in this setting will not assume opioid prescribing until there is further assessment by the pain center clinician. Primary care may continue in their current fashion.  Consent- if prescribing opioids  Agreement- if prescribing opioids  Pharmacy- as documented   Count- n/a  Psychological evaluation - information provided  Pharmacogenetic testing- n/a  MME- 43 at 6 oxycodone per day  MTM - consider  Naloxone safety:   Medical Cannabis: na      Previously tried medications: H=Helpful. NH=Not Helpful. U=Unsure. (n/a)=Not applicable  Acetaminophen: (nh)  NSAIDs:not able to take              Ibuprofen               Naproxen               Celecobix               Diclofenac   Gabapentinoids:               Gabapentin: (na)              Pregabalin: (na)  Antidepressants:               Amitriptyline: (na)              Nortriptyline: (na)              Duloxetine: (na)              Venlafaxine: (na)  Muscle Relaxants:               Tizanidine: (na)              Methocarbamol: (na)              Cyclobenzaprine: (na)              Metaxalone: (na)              Carisoprodol: (na)              Baclofen: (na)    Topicals:               Lidocaine: (limited impact)              Diclofenac gel: (na)              Compounded pain  creams: (na)              OTC/Herbal topical pain applications: (kathrine gannon)  Opioids:               Codeine: (na)              Hydrocodone: (in the past)              Oxycodone: (taking)              Tramadol: (na)              Morphine: (makes me sick)              Hydromorphone: (felt sad)              Methadone: (na)              Fentanyl: (na)              Buprenorphine: (na)              Tapentadol: (na)              Oxymorphone: (na)  Supplements:               Tumeric: (na)              CBD: (na)              Vitamin D: (+)              Glucosamine/Chondroitin: (na)     ______  Plan:  _____  Thank you for participating in the clinic visit - Here is the Plan of Care / NextSteps:    Contact [phone] to reserve a time. You need to follow up by:October    Communicating with us:  Please call Monday-Friday for problems or questions - leave a message and one of the clinical support staff (CSS) will help to get things figured out. The number is [phone].    You may also opt to communicate through [employer].    Primary Care: You are continuing with Marichuy for medication management.     Rehabilitation: PT you are starting with [facility]. in September. OT you are working with Isabel now.     DME: you are planning to connect with the [employer] Rep to discuss the stimulation b/c you would like continuous stimulation.      Medication prescribed / to be continued:   we discussed you appear to have a refill at the pharmacy. When you start this may work better for you sine you had a little wash out. So start with 1 pill. We also discussed using a med minder or a pill box to help with the number of pills you have per day. It can be helpful visually.     Signed Prescriptions:                        Disp   Refills    hydrOXYzine (ATARAX) 25 MG tablet          42 tab*0      Medicine to help with withdrawal symptoms of being anxious unsettled and uncomfortable. Sometimes this can add in and be helpful for the pain. You can  monitor  Sig: Take 1-2 tablets (25-50 mg) by mouth 3 times daily as           needed for anxiety  Authorizing Provider: MICHAEL DODGE [facility]  [address] [address]  [phone]  [phone]      ANUPAM Waters CNP

## 2021-08-02 NOTE — PATIENT INSTRUCTIONS
___  Plan:  _____  Thank you for participating in the clinic visit - Here is the Plan of Care / NextSteps:    Contact [phone] to reserve a time. You need to follow up by:October    Communicating with us:  Please call Monday-Friday for problems or questions - leave a message and one of the clinical support staff (CSS) will help to get things figured out. The number is [phone].    You may also opt to communicate through [employer].    Primary Care: You are continuing with Marichuy for medication management.     Rehabilitation: PT you are starting with [facility]. in September. OT you are working with Isabel now.     DME: you are planning to connect with the [employer] Rep to discuss the stimulation b/c you would like continuous stimulation.      Medication prescribed / to be continued:   we discussed you appear to have a refill at the pharmacy. When you start this may work better for you sine you had a little wash out. So start with 1 pill. We also discussed using a med minder or a pill box to help with the number of pills you have per day. It can be helpful visually.     Signed Prescriptions:                        Disp   Refills    hydrOXYzine (ATARAX) 25 MG tablet          42 tab*0      Medicine to help with withdrawal symptoms of being anxious unsettled and uncomfortable. Sometimes this can add in and be helpful for the pain. You can monitor  Sig: Take 1-2 tablets (25-50 mg) by mouth 3 times daily as           needed for anxiety  Authorizing Provider: MICHAEL DODGE

## 2021-08-03 PROBLEM — F11.20 UNCOMPLICATED OPIOID DEPENDENCE (H): Status: RESOLVED | Noted: 2019-08-23 | Resolved: 2021-04-06

## 2021-08-06 PROBLEM — N18.30 CHRONIC KIDNEY DISEASE, STAGE 3 (H): Status: ACTIVE | Noted: 2021-08-06

## 2021-08-30 ENCOUNTER — TELEPHONE (OUTPATIENT)
Dept: RHEUMATOLOGY | Facility: CLINIC | Age: 71
End: 2021-08-30

## 2021-08-30 NOTE — TELEPHONE ENCOUNTER
Pt received letter with information of the leflunomide medication from Dr. Londono's office    Pt would like to get started on the medication if possible.    Pt's pharmacy is [facility] in [place].    Please call the Pt to let her know when medication has been sent. Feel free to contact the Pt back with any questions.

## 2021-08-31 NOTE — TELEPHONE ENCOUNTER
Please mention to the patient:    Recommend checking standing order labs.  Depending on results we can consider adding leflunomide.  We can discuss over upcoming visit with us on September 9, 2021.

## 2021-09-01 ENCOUNTER — TELEPHONE (OUTPATIENT)
Dept: INTERNAL MEDICINE | Facility: CLINIC | Age: 71
End: 2021-09-01

## 2021-09-01 ENCOUNTER — LAB (OUTPATIENT)
Dept: LAB | Facility: CLINIC | Age: 71
End: 2021-09-01
Payer: COMMERCIAL

## 2021-09-01 DIAGNOSIS — M05.9 SEROPOSITIVE RHEUMATOID ARTHRITIS (H): ICD-10-CM

## 2021-09-01 DIAGNOSIS — G89.4 CHRONIC PAIN SYNDROME: Primary | ICD-10-CM

## 2021-09-01 DIAGNOSIS — Z79.899 HIGH RISK MEDICATION USE: ICD-10-CM

## 2021-09-01 LAB
ALBUMIN SERPL-MCNC: 3.7 G/DL (ref 3.5–5)
ALT SERPL W P-5'-P-CCNC: 14 U/L (ref 0–45)
AST SERPL W P-5'-P-CCNC: 12 U/L (ref 0–40)
BASOPHILS # BLD AUTO: 0.1 10E3/UL (ref 0–0.2)
BASOPHILS NFR BLD AUTO: 1 %
CREAT SERPL-MCNC: 1.44 MG/DL (ref 0.6–1.1)
EOSINOPHIL # BLD AUTO: 0.3 10E3/UL (ref 0–0.7)
EOSINOPHIL NFR BLD AUTO: 3 %
ERYTHROCYTE [DISTWIDTH] IN BLOOD BY AUTOMATED COUNT: 13.1 % (ref 10–15)
GFR SERPL CREATININE-BSD FRML MDRD: 36 ML/MIN/1.73M2
HCT VFR BLD AUTO: 41.2 % (ref 35–47)
HGB BLD-MCNC: 13.8 G/DL (ref 11.7–15.7)
IMM GRANULOCYTES # BLD: 0 10E3/UL
IMM GRANULOCYTES NFR BLD: 0 %
LYMPHOCYTES # BLD AUTO: 3.1 10E3/UL (ref 0.8–5.3)
LYMPHOCYTES NFR BLD AUTO: 31 %
MCH RBC QN AUTO: 31.3 PG (ref 26.5–33)
MCHC RBC AUTO-ENTMCNC: 33.5 G/DL (ref 31.5–36.5)
MCV RBC AUTO: 93 FL (ref 78–100)
MONOCYTES # BLD AUTO: 0.7 10E3/UL (ref 0–1.3)
MONOCYTES NFR BLD AUTO: 7 %
NEUTROPHILS # BLD AUTO: 5.7 10E3/UL (ref 1.6–8.3)
NEUTROPHILS NFR BLD AUTO: 59 %
PLATELET # BLD AUTO: 159 10E3/UL (ref 150–450)
RBC # BLD AUTO: 4.41 10E6/UL (ref 3.8–5.2)
WBC # BLD AUTO: 9.8 10E3/UL (ref 4–11)

## 2021-09-01 PROCEDURE — 82565 ASSAY OF CREATININE: CPT

## 2021-09-01 PROCEDURE — 84460 ALANINE AMINO (ALT) (SGPT): CPT

## 2021-09-01 PROCEDURE — 82040 ASSAY OF SERUM ALBUMIN: CPT

## 2021-09-01 PROCEDURE — 84450 TRANSFERASE (AST) (SGOT): CPT

## 2021-09-01 PROCEDURE — 36415 COLL VENOUS BLD VENIPUNCTURE: CPT

## 2021-09-01 PROCEDURE — 85025 COMPLETE CBC W/AUTO DIFF WBC: CPT

## 2021-09-01 RX ORDER — OXYCODONE AND ACETAMINOPHEN 5; 325 MG/1; MG/1
TABLET ORAL
COMMUNITY
Start: 2021-08-06 | End: 2021-09-03

## 2021-09-01 RX ORDER — OXYCODONE AND ACETAMINOPHEN 5; 325 MG/1; MG/1
TABLET ORAL
Qty: 180 TABLET | Refills: 0 | Status: CANCELLED | OUTPATIENT
Start: 2021-09-01

## 2021-09-01 NOTE — TELEPHONE ENCOUNTER
Patient is requesting refill of Percocet 5mg/325    [facility] in [place] is the preferred pharmacy.

## 2021-09-01 NOTE — TELEPHONE ENCOUNTER
Last visit: 5/7/22021- Oma Fallon CNP      1. Encounter to establish care  Vaccinations up to date     2. Lumbar radiculopathy  3. Cervical pain (neck)  - Ambulatory referral to Neurosurgery     4. Type 2 diabetes mellitus with stage 3a chronic kidney disease, with long-term current use of insulin (H)  - Glycosylated Hemoglobin A1c        Lab Results   Component Value Date     HGBA1C 9.5 (H) 05/07/2021     HGBA1C 6.6 (H) 12/07/2020     HGBA1C 6.0 (H) 10/20/2020            Lab Results   Component Value Date     MICROALBUR 2.82 (H) 10/20/2020     LDLCALC 54 10/20/2020     CREATININE 1.31 (H) 05/07/2021   Recommend follow up with Diabetes Education given elevated a1c and goal of <7.0%     5. Stage 3a chronic kidney disease        Lab Results   Component Value Date     CREATININE 1.31 (H) 05/07/2021      - Ambulatory referral to Nephrology     6. Type 2 diabetes mellitus with hypoglycemia without coma, with long-term current use of insulin (H)  - flash glucose scanning reader (FREESTYLE BRE 14 DAY READER) Misc; Use 1 each As Directed see administration instructions. Use as directed with sensor  Dispense: 1 each; Refill: 0  - flash glucose sensor (FREESTYLE BRE 14 DAY SENSOR) Kit; Use 1 each As Directed every 14 (fourteen) days.  Dispense: 2 kit; Refill: 11  Recommend patient check blood sugars 4 times daily. Continue insulin as prescribed and follow up with diabetes education for medication adjustment      7. History of nicotine dependence  - Medical Cytology     8. Essential hypertension  Continue norvasc 10mg, lisinopril 10mg. Monitor blood pressure daily   - blood pressure monitor Kit; Use 1 applicator As Directed daily.  Dispense: 1 each; Refill: 0     9. Special screening for malignant neoplasms, colon  - Cologuard     10. Visit for screening mammogram  - Mammo Screening Bilateral; Future     11. Seropositive rheumatoid arthritis (H)  - HM2(CBC w/o Differential)  - Creatinine  - ALT (SGPT)  - AST (SGOT)  -  Albumin     12. High risk medication use  - HM2(CBC w/o Differential)  - Creatinine  - ALT (SGPT)  - AST (SGOT)  - Albumin     13. Renal insufficiency  A referral has been made to nephrology.  Discussion with patient about good blood pressure and blood sugar control and its effects on renal function      14. Hair loss  - Thyroid Stimulating Hormone (TSH)  - Iron and Transferrin Iron Binding Capacity      Last filled:     oxyCODONE-acetaminophen (PERCOCET) 5-325 mg per tablet 180 tablet 0 7/6/2021 8/5/2021 No   Sig - Route: Take 1-2 tablets by mouth every 6 (six) hours as needed for pain (maximum of 6 tablets per day). For 7/7/21-8/6/21 - Oral   Sent to pharmacy as: oxyCODONE-acetaminophen 5 mg-325 mg tablet (Percocet)   Earliest Fill Date: 7/6/2021   E-Prescribing Status: Receipt confirmed by pharmacy (7/6/2021 10:21 AM CDT)     Next visit: Nothing scheduled at this time

## 2021-09-01 NOTE — TELEPHONE ENCOUNTER
Please advise patient no further prescriptions for pain medication will be prescribed by my office she will need to follow-up with pain management Urine drug screen as requested for future refills.

## 2021-09-03 ENCOUNTER — HOSPITAL ENCOUNTER (OUTPATIENT)
Dept: PHYSICAL THERAPY | Facility: REHABILITATION | Age: 71
End: 2021-09-03
Payer: COMMERCIAL

## 2021-09-03 DIAGNOSIS — M62.81 GENERALIZED MUSCLE WEAKNESS: ICD-10-CM

## 2021-09-03 DIAGNOSIS — M54.50 CHRONIC MIDLINE LOW BACK PAIN WITHOUT SCIATICA: Primary | ICD-10-CM

## 2021-09-03 DIAGNOSIS — G89.29 CHRONIC MIDLINE LOW BACK PAIN WITHOUT SCIATICA: Primary | ICD-10-CM

## 2021-09-03 DIAGNOSIS — M53.3 SI (SACROILIAC) JOINT DYSFUNCTION: ICD-10-CM

## 2021-09-03 DIAGNOSIS — M53.3 PAIN IN THE COCCYX: ICD-10-CM

## 2021-09-03 PROCEDURE — 97110 THERAPEUTIC EXERCISES: CPT | Mod: GP | Performed by: PHYSICAL THERAPIST

## 2021-09-03 RX ORDER — OXYCODONE AND ACETAMINOPHEN 5; 325 MG/1; MG/1
TABLET ORAL
Qty: 60 TABLET | Refills: 0 | Status: SHIPPED | OUTPATIENT
Start: 2021-09-03 | End: 2021-09-16

## 2021-09-03 NOTE — TELEPHONE ENCOUNTER
Contacted patient and relayed provider message.  Offered to assist with scheduling with Marichuy Rubio, but patient declined.  She did state she will follow up with Marichuy and was thankful Oma sent Rx in.

## 2021-09-03 NOTE — TELEPHONE ENCOUNTER
"Contacted patient relayed PCP message below.  Patient became upset and began sobbing on the phone.  States \"it was supposed to go to Marichuy Rubio, not Oma.  Marichuy has been the one filling my pain meds.\"  Writer informed that Oma is listed as PCP, patient states that is incorrect.  Patient insists this be routed to Marichuy Rubio.  Writer informed that Marichuy is not in the office today.  Also advised that she can be seen in ED or UC if pain is not tolerable.  Patient became upset and said \"thanks for nothing\" and hung up on writer.  "

## 2021-09-03 NOTE — PROGRESS NOTES
Outpatient Physical Therapy Progress Note     Patient: Ebonie Bowman  : 1950    Beginning/End Dates of Reporting Period:  21 to 9/3/21    Referring Provider: MAGGIE Espana Diagnosis: Chronic pain syndrome, s/p lumbar surgery     Client Self Report: 8/10 Right hip pain and LBP - especially when walking    Objective Measurements:  Objective Measure: Posture  Details: Moderate thoracic kyphosis and FHP. Patient is unable to obtain full neutral, and at end of her motion she has increased LBP  Objective Measure: SI  Details: Right leg is high in standing causing lumbar tilt. R PSIS high in sitting, +R FB test       Goals:  Goal Identifier HEP/self management   Goal Description Patient will be independent in HEP and self management of condition.   Target Date 21   Date Met      Progress (detail required for progress note):     Goal Identifier Sleeping   Goal Description Patient will be able to sleep more comfortably and wake only 1-2x/night d/t pain   Target Date 21   Date Met      Progress (detail required for progress note):     Goal Identifier Walking   Goal Description Patient will be able to walk 1 block with <5/10 pain in the lower back   Target Date 21   Date Met      Progress (detail required for progress note):     Goal Identifier Sitting   Goal Description Patient will be able to sit for 30 minutes with <5/10 pain in the lower back   Target Date 21   Date Met      Progress (detail required for progress note):     Goal Identifier     Goal Description     Target Date     Date Met      Progress (detail required for progress note):     Goal Identifier     Goal Description     Target Date     Date Met      Progress (detail required for progress note):     Goal Identifier     Goal Description     Target Date     Date Met      Progress (detail required for progress note):     Goal Identifier     Goal Description     Target Date     Date Met      Progress (detail required  for progress note):           Plan:  Continue therapy per current plan of care.    Discharge:  No

## 2021-09-07 ENCOUNTER — TELEPHONE (OUTPATIENT)
Dept: INTERNAL MEDICINE | Facility: CLINIC | Age: 71
End: 2021-09-07

## 2021-09-07 NOTE — TELEPHONE ENCOUNTER
Reason for Call:  Other prescription refill    Detailed comments: Almost out of the following: oxyCODONE-acetaminophen (PERCOCET) 5-325 MG table. Has maybe 3 days left.  [facility], [place].    Phone Number Patient can be reached at: Home number on file [phone] (home)    Best Time: Any time    Can we leave a detailed message on this number? YES    Call taken on 9/7/2021 at 3:58 PM by Laura Kanavel

## 2021-09-07 NOTE — TELEPHONE ENCOUNTER
Call pt- The refill was sent on 9/3/21. I have not seen her since 4/5/21. She needs to be seen for an office visit for any future refills.

## 2021-09-07 NOTE — TELEPHONE ENCOUNTER
Last Rx on 9/3/21 for 58 with Sudha.  Oma Fallon filled and patient was informed any further refills would need follow up with Marichuy Rubio.

## 2021-09-09 ENCOUNTER — TELEPHONE (OUTPATIENT)
Dept: RHEUMATOLOGY | Facility: CLINIC | Age: 71
End: 2021-09-09

## 2021-09-09 ENCOUNTER — VIRTUAL VISIT (OUTPATIENT)
Dept: RHEUMATOLOGY | Facility: CLINIC | Age: 71
End: 2021-09-09
Payer: COMMERCIAL

## 2021-09-09 DIAGNOSIS — N28.9 RENAL INSUFFICIENCY: ICD-10-CM

## 2021-09-09 DIAGNOSIS — M05.9 SEROPOSITIVE RHEUMATOID ARTHRITIS (H): Primary | ICD-10-CM

## 2021-09-09 DIAGNOSIS — Z79.899 HIGH RISK MEDICATION USE: ICD-10-CM

## 2021-09-09 DIAGNOSIS — M15.9 OSTEOARTHRITIS OF MULTIPLE JOINTS, UNSPECIFIED OSTEOARTHRITIS TYPE: ICD-10-CM

## 2021-09-09 DIAGNOSIS — M79.641 RIGHT HAND PAIN: ICD-10-CM

## 2021-09-09 PROCEDURE — 99214 OFFICE O/P EST MOD 30 MIN: CPT | Mod: 95 | Performed by: INTERNAL MEDICINE

## 2021-09-09 RX ORDER — PREDNISONE 1 MG/1
TABLET ORAL
Qty: 60 TABLET | Refills: 2 | Status: ON HOLD | OUTPATIENT
Start: 2021-10-07 | End: 2021-10-29

## 2021-09-09 NOTE — PROGRESS NOTES
Sobeida Potedilia who presents today with a chief complaint of  No chief complaint on file.      Joint Pains: yes  Location: multiple joints  Onset: years  Intensity: 7 /10  AM Stiffness: 30+ Minutes  Alleviating/Aggravating Factors: warm hand helps  Tolerating Meds: yes  Other: pt couldn't get leflunomide due to cost      ROS:  Patient denies having any chest pain, shortness of breath, cough, abdominal pain, nausea, vomiting, rashes, fevers, oral ulcers and recent infections.  Patient admits to having a good appetite    Problem List:  Patient Active Problem List   Diagnosis     Ataxia     Axonal neuropathy     Diabetes mellitus (H)     Paraparesis (H)     Chronic kidney disease, stage 3        PMH:   Past Medical History:   Diagnosis Date     BBB (bundle branch block)      Chronic back pain      Chronic kidney disease     stage 3     Chronic kidney disease, unspecified     Created by Conversion      Depression      Diabetes (H)     Type 2     Diabetes mellitus, type 2 (H)     Created by Conversion      Frequent headaches      Ganglion of tendon sheath     Created by Conversion      GERD (gastroesophageal reflux disease)      HLD (hyperlipidemia)      Hypertension     Per H&P dated 1/03/2013     Incarcerated ventral hernia     Per H&P dated 1/03/2013     Osteoarthritis      Rheumatoid arthritis (H)      Shortness of breath      Thrombocytopenia (H)      Venous insufficiency        Surgical History:  Past Surgical History:   Procedure Laterality Date     BACK SURGERY       C UNLISTED PROCEDURE, ABDOMEN/PERITONEUM/OMENTUM      Description: Hernia Repair;  Recorded: 10/23/2013;     CHOLECYSTECTOMY       HERNIA REPAIR       HYSTERECTOMY  1984     IR FINE NEEDLE ASPIRATION  11/29/2020     JOINT REPLACEMENT Left     knee     OTHER SURGICAL HISTORY  1990, 1995    Two left wrist     OTHER SURGICAL HISTORY  1996    FATTY TUMORLT SHOULDER BLADE     PICC MIDLINE INSERTION  11/30/2020          DC IMPLANT SPINAL NEUROSTIM/  Left 4/25/2019    Procedure: LEFT FLANK INCISION REPLACE NEUROSTIMULATOR;  Surgeon: Vishal Pretty MD;  Location: [facility];  Service: Pain     SPINAL CORD STIMULATOR IMPLANT         Family History:  Family History   Problem Relation Age of Onset     Myocardial Infarction Father      Cancer Father      Chronic Obstructive Pulmonary Disease Father      Heart Disease Father      Diabetes Sister      Diabetes Brother      Diabetes Maternal Grandmother      Melanoma Son      Arthritis Mother      Acute Myocardial Infarction Father 62.00     Diabetes Sister      Diabetes Brother      Melanoma Son      Kidney failure Son      Glomerulonephritis Son        Social History:   reports that she quit smoking about 13 years ago. She started smoking about 56 years ago. She has a 43.00 pack-year smoking history. She has never used smokeless tobacco. She reports current alcohol use. She reports that she does not use drugs.    Allergies:  Allergies   Allergen Reactions     Morphine Nausea and Vomiting     Penicillins Hives     Tolerates ceftriaxone     Sulfa (Sulfonamide Antibiotics) [Sulfa Drugs] Hives        Current Medications:  Current Outpatient Medications   Medication Sig Dispense Refill     acetaminophen (TYLENOL) 500 MG tablet [ACETAMINOPHEN (TYLENOL) 500 MG TABLET] Take 2 tablets (1,000 mg total) by mouth 3 (three) times a day.  0     amLODIPine (NORVASC) 10 MG tablet [AMLODIPINE (NORVASC) 10 MG TABLET] TAKE 1 TABLET EVERY DAY 90 tablet 2     ascorbic acid (ASCORBIC ACID WITH LATRICIA HIPS) 500 MG tablet [ASCORBIC ACID (ASCORBIC ACID WITH LATRICIA HIPS) 500 MG TABLET] Take 500 mg by mouth every evening.        atorvastatin (LIPITOR) 40 MG tablet [ATORVASTATIN (LIPITOR) 40 MG TABLET] Take 1 tablet (40 mg total) by mouth at bedtime. 90 tablet 2     b complex vitamins tablet [B COMPLEX VITAMINS TABLET] Take 1 tablet by mouth daily.       blood glucose test strips [BLOOD GLUCOSE TEST STRIPS] Check blood sugar four  times daily. Dispense brand per patient's insurance at pharmacy discretion. Dx: e11.9 400 strip 3     blood-glucose meter Misc [BLOOD-GLUCOSE METER MISC] Dispense one meter. Use as directed. Dx: e11.9 1 each 0     cholecalciferol, vitamin D3, (VITAMIN D3 ORAL) [CHOLECALCIFEROL, VITAMIN D3, (VITAMIN D3 ORAL)] Take 3 tablets by mouth daily.        flash glucose scanning reader (FREESTYLE BRE 14 DAY READER) Misc [FLASH GLUCOSE SCANNING READER (FREESTYLE BRE 14 DAY READER) MISC] Use 1 each As Directed see administration instructions. Use as directed with sensor (Patient not taking: Reported on 8/2/2021) 1 each 0     flash glucose sensor (FREESTYLE BRE 14 DAY SENSOR) Kit [FLASH GLUCOSE SENSOR (FREESTYLE BRE 14 DAY SENSOR) KIT] Use 1 each As Directed every 14 (fourteen) days. (Patient not taking: Reported on 8/2/2021) 2 kit 11     hydrOXYchloroQUINE (PLAQUENIL) 200 mg tablet [HYDROXYCHLOROQUINE (PLAQUENIL) 200 MG TABLET] Take 1 tablet (200 mg total) by mouth 2 (two) times a day. 180 tablet 0     hydrOXYzine (ATARAX) 25 MG tablet Take 1-2 tablets (25-50 mg) by mouth 3 times daily as needed for anxiety 42 tablet 0     insulin NPH (NOVOLIN) 100 unit/mL injection [INSULIN NPH (NOVOLIN) 100 UNIT/ML INJECTION] Inject 33 Units under the skin 2 (two) times a day before meals.       insulin syringe-needle U-100 (BD INSULIN SYRINGE ULT-FINE II) 1 mL 31 gauge x 5/16 Syrg [INSULIN SYRINGE-NEEDLE U-100 (BD INSULIN SYRINGE ULT-FINE II) 1 ML 31 GAUGE X 5/16 SYRG] USE 4 TIMES DAILY AS DIRECTED 500 each 1     lancing device (ACCU-CHEK SOFTCLIX) Misc [LANCING DEVICE (ACCU-CHEK SOFTCLIX) MISC] Use 1 applicator As Directed 4 (four) times a day. 300 each 3     lidocaine (LMX) 4 % cream [LIDOCAINE (LMX) 4 % CREAM] Apply to affected area twice daily as needed 30 g 3     lisinopriL (PRINIVIL,ZESTRIL) 10 MG tablet [LISINOPRIL (PRINIVIL,ZESTRIL) 10 MG TABLET] TAKE 1 TABLET AT BEDTIME 90 tablet 3     loratadine (CLARITIN) 10 mg tablet  [LORATADINE (CLARITIN) 10 MG TABLET] Take 1 tablet (10 mg total) by mouth at bedtime. 90 tablet 3     MEDICATION CANNOT BE REORDERED - PLEASE MANUALLY REORDER AND DISCONTINUE THE OLD ORDER [OMEGA-3 FATTY ACIDS-VITAMIN E (FISH OIL) 1,000 MG CAP] Take 1,000 mg by mouth every evening. 90 each 3     methotrexate 2.5 MG tablet [METHOTREXATE 2.5 MG TABLET] Take 3 tablets (7.5 mg total) by mouth once a week. On Fridays 39 tablet 0     metoprolol succinate (TOPROL-XL) 50 MG 24 hr tablet [METOPROLOL SUCCINATE (TOPROL-XL) 50 MG 24 HR TABLET] TAKE 1 TABLET EVERY DAY 90 tablet 2     MULTIVITAMIN ORAL [MULTIVITAMIN ORAL] Take 1 tablet by mouth every evening.        naloxone (NARCAN) 4 MG/0.1ML nasal spray Spray 1 spray (4 mg) into one nostril alternating nostrils once as needed for opioid reversal every 2-3 minutes until assistance arrives 0.2 mL 0     oxyCODONE-acetaminophen (PERCOCET) 5-325 MG tablet TAKE 1 TO 2 TABLETS BY MOUTH EVERY 6 HOURS AS NEEDED FOR PAIN FOR 8 5 21 9 4 21 60 tablet 0     predniSONE (DELTASONE) 2.5 MG tablet Take 1 tablet po daily 90 tablet 0     traZODone (DESYREL) 50 MG tablet [TRAZODONE (DESYREL) 50 MG TABLET] Take 1 tablet (50 mg total) by mouth at bedtime. 90 tablet 1           Physical Exam:  Following up today via video visit, per Covid-19 pandemic requirements.    Verbal consent has been obtained for this service by care team member.    Video call start time: 2:17 PM    Video call end time: 2:39 PM    [employer] utilized for video call.    Phone number utilized: [phone]     Patient location for video visit: Home     Provider location for video visit:  Home (working remotely)    Summary/Assessment:      History that includes seropositive rheumatoid arthritis, osteoarthritis, renal insufficiency.    Presents for follow-up visit.      Patient states has been doing well except for some residual discomfort involving right third middle digit. On video exam points to right third MCP joint region,  some subtle fullness noted.      Some mild worsening of kidney function noted.  Likely history of uncontrolled diabetes contributing.  States has been checking fingersticks and typically running in the 130-140 range.  Has not had hemoglobin rechecked since May 2021, was 9.5 at the time.    Patient is interested in trying to decrease prednisone from 2.5 mg daily to off.    States looked into leflunomide and to costly.  Desires to remain on methotrexate 3 tablets weekly.    Takes Plaquenil 200 mg twice a day.  Claims to have seen ophthalmology a few months ago.    Takes Percocet on average 6-8 tablets daily,, which provides relief.    Please see below for management plan.      Pertinent rheumatology/past medical history (please refer to above for more detailed history):      Seropositive rheumatoid arthritis    High risk medication use    Osteoarthritis, multiple joints    Hx left knee replacement    Chronic right knee pain (cortisone injection not helpful)    Chronic neck and back pain (managed by another provider)    Renal insufficiency    Chronic opioid use    Diabetes, type II    History of urosepsis    Osteopenia (with nonhigh risk FRAX score).    Chronic low-dose steroid use      Rheumatology medications provided/suggested:    Methotrexate  Folic acid  Plaquenil  Prednisone      Pertinent medication from other providers or from otc (please refer to above for more detailed med list):    Percocet  Insulin  Calcium  Vitamin D    Pertinent medications already tried:     Humira (worsened joint pains)  Leflunomide (cost prohibitive)      Pertinent lab history:    Positive: CCP antibody    Negative: Rheumatoid factor, HUGH, c- ANCA      Pertinent imaging/test history:    X-ray of right hand from February 2019 that showed some signs of degenerative joint disease, no erosive changes identified.    9/2020, bone density test showed osteopenia with non-high risk FRAX score.      Other:    Standing order for labs placed every  3 months good through April 2020    Denies tobacco use or regular alcohol beverage intake.  Quit smoking greater than 10 years ago.    Was a patient of Dr. Schofield, last seen July 2018.    Denies any history of macular degeneration, retinal disease or glaucoma.        Plan:      For active arthritis involving right third MCP joint, will schedule patient for cortisone injection.      Suggested she discuss her diminishing kidney function and diabetes control further with her PCP.    Regarding tapering prednisone to off recommended in about a month she decrease from 2.5 mg daily to 2 mg daily and thereafter if stable decrease by half a milligram per month.    Continue methotrexate 3 tablets weekly (was on 5 tablets weekly, limiting dose given mild renal insufficiency also has thrombocytopenia) .  Hold if develops any infection.     Continue Plaquenil 200 mg twice a day.  Follow-up with ophthalmology every 6 months to 1 year.     Continue calcium and vitamin D daily.  On prior visit suggested 600 mg of calcium daily either via diet or supplements.  Was taking vitamin D 125 mcg equivalent of 5000 units daily.  Repeat bone density test in 1 year (last checked 9/17/2020) if on chronic steroids.    Depending on her kidney function level and how she progresses.  We will send patient a handout on leflunomide.    Continue over-the-counter folic acid daily, made aware to avoid taking on the day when taking methotrexate.    Takes Percocet 6-8 tablets daily, established with pain clinic.      Recheck kidney function level in about 4 to 6 weeks and standing order labs in about 11 weeks.    Has in clinic follow-up appointment scheduled for December 29, 2021, recommend that she maintain this visit date.          This note was transcribed using Dragon voice recognition software as a result unintentional grammatical errors or word substitutions may have occurred. Please contact our Rheumatology department if you need any clarification  or if you have any related inquiries.        Raheem Londono DO  ..................  9/9/2021   1:33 PM

## 2021-09-09 NOTE — PATIENT INSTRUCTIONS
Summary of Your Rheumatology Visit    Next Appointment:  December 29th 2021, in clinic      Medications:    Please follow directives on pill bottle on how to take medication(s) provided.      Referrals:      Tests:     Please have labs performed in 4-6 weeks and every 12 weeks thereafter.       Injections:        Other:

## 2021-09-09 NOTE — TELEPHONE ENCOUNTER
Patient not available left message.   Please schedule patient for cortisone injection visit with us involving the third MCP joint right hand, at [facility] at 12 PM or 12:30 PM slot.

## 2021-09-10 ENCOUNTER — TELEPHONE (OUTPATIENT)
Dept: PHYSICAL THERAPY | Facility: REHABILITATION | Age: 71
End: 2021-09-10

## 2021-09-10 NOTE — TELEPHONE ENCOUNTER
Called pt. Regarding her missed PT appointment today at 1:30 pm. I did remind her to call if she is unable to make her appointments, as our policy is to close the PT episode of care after 3 failed visits. She was reminded of her next PT appointment at 1:30 pm on 9/17/21.

## 2021-09-16 ENCOUNTER — OFFICE VISIT (OUTPATIENT)
Dept: INTERNAL MEDICINE | Facility: CLINIC | Age: 71
End: 2021-09-16
Payer: COMMERCIAL

## 2021-09-16 VITALS
WEIGHT: 230 LBS | DIASTOLIC BLOOD PRESSURE: 80 MMHG | HEART RATE: 70 BPM | SYSTOLIC BLOOD PRESSURE: 118 MMHG | BODY MASS INDEX: 31.19 KG/M2 | OXYGEN SATURATION: 96 %

## 2021-09-16 DIAGNOSIS — F51.01 PRIMARY INSOMNIA: ICD-10-CM

## 2021-09-16 DIAGNOSIS — I10 ESSENTIAL HYPERTENSION: Primary | ICD-10-CM

## 2021-09-16 DIAGNOSIS — N18.32 TYPE 2 DIABETES MELLITUS WITH STAGE 3B CHRONIC KIDNEY DISEASE, WITH LONG-TERM CURRENT USE OF INSULIN (H): ICD-10-CM

## 2021-09-16 DIAGNOSIS — G89.4 CHRONIC PAIN SYNDROME: ICD-10-CM

## 2021-09-16 DIAGNOSIS — M05.9 SEROPOSITIVE RHEUMATOID ARTHRITIS (H): ICD-10-CM

## 2021-09-16 DIAGNOSIS — Z79.4 TYPE 2 DIABETES MELLITUS WITH STAGE 3B CHRONIC KIDNEY DISEASE, WITH LONG-TERM CURRENT USE OF INSULIN (H): ICD-10-CM

## 2021-09-16 DIAGNOSIS — N18.32 STAGE 3B CHRONIC KIDNEY DISEASE (H): ICD-10-CM

## 2021-09-16 DIAGNOSIS — E11.22 TYPE 2 DIABETES MELLITUS WITH STAGE 3B CHRONIC KIDNEY DISEASE, WITH LONG-TERM CURRENT USE OF INSULIN (H): ICD-10-CM

## 2021-09-16 PROBLEM — Z96.89 S/P INSERTION OF SPINAL CORD STIMULATOR: Status: ACTIVE | Noted: 2019-06-24

## 2021-09-16 PROBLEM — M19.90 OSTEOARTHROSIS: Status: ACTIVE | Noted: 2018-01-01

## 2021-09-16 PROBLEM — J30.81 ALLERGIC RHINITIS DUE TO CATS: Status: ACTIVE | Noted: 2019-11-22

## 2021-09-16 PROBLEM — G47.00 INSOMNIA: Status: ACTIVE | Noted: 2021-09-16

## 2021-09-16 PROBLEM — M25.559 PAIN IN JOINT, PELVIC REGION AND THIGH: Status: ACTIVE | Noted: 2021-09-16

## 2021-09-16 PROBLEM — N18.31 STAGE 3A CHRONIC KIDNEY DISEASE (H): Status: ACTIVE | Noted: 2021-08-06

## 2021-09-16 PROBLEM — M54.13 RADICULOPATHY OF CERVICOTHORACIC REGION: Status: ACTIVE | Noted: 2021-09-16

## 2021-09-16 PROBLEM — Z79.899 HIGH RISK MEDICATION USE: Status: ACTIVE | Noted: 2017-02-15

## 2021-09-16 PROBLEM — I25.10 ATHEROSCLEROTIC CARDIOVASCULAR DISEASE: Status: ACTIVE | Noted: 2017-09-18

## 2021-09-16 PROBLEM — M25.40: Status: ACTIVE | Noted: 2021-09-16

## 2021-09-16 PROBLEM — R94.31 PROLONGED Q-T INTERVAL ON ECG: Status: RESOLVED | Noted: 2021-09-16 | Resolved: 2021-09-16

## 2021-09-16 PROBLEM — G89.29 CHRONIC PAIN: Status: ACTIVE | Noted: 2017-04-17

## 2021-09-16 PROBLEM — E78.5 HYPERLIPIDEMIA: Status: ACTIVE | Noted: 2021-09-16

## 2021-09-16 PROBLEM — M47.819: Status: ACTIVE | Noted: 2021-09-16

## 2021-09-16 PROBLEM — M54.9 BACKACHE: Status: ACTIVE | Noted: 2020-11-27

## 2021-09-16 PROBLEM — I87.2 VENOUS INSUFFICIENCY: Status: ACTIVE | Noted: 2021-09-16

## 2021-09-16 PROBLEM — R94.31 PROLONGED Q-T INTERVAL ON ECG: Status: ACTIVE | Noted: 2021-09-16

## 2021-09-16 PROBLEM — R19.5 POSITIVE FIT (FECAL IMMUNOCHEMICAL TEST): Status: ACTIVE | Noted: 2019-11-22

## 2021-09-16 PROBLEM — K21.9 GERD (GASTROESOPHAGEAL REFLUX DISEASE): Status: ACTIVE | Noted: 2017-07-12

## 2021-09-16 PROBLEM — M16.0 BILATERAL PRIMARY OSTEOARTHRITIS OF HIP: Status: ACTIVE | Noted: 2017-07-21

## 2021-09-16 PROBLEM — M54.16 LUMBAR RADICULOPATHY: Status: ACTIVE | Noted: 2021-09-16

## 2021-09-16 LAB
AMPHETAMINES UR QL SCN: ABNORMAL
BARBITURATES UR QL: ABNORMAL
BENZODIAZ UR QL: ABNORMAL
CANNABINOIDS UR QL SCN: ABNORMAL
CHOLEST SERPL-MCNC: 130 MG/DL
COCAINE UR QL: ABNORMAL
CREAT UR-MCNC: 113 MG/DL
CREAT UR-MCNC: 113 MG/DL
FASTING STATUS PATIENT QL REPORTED: YES
HBA1C MFR BLD: 9.7 % (ref 0–5.6)
HDLC SERPL-MCNC: 47 MG/DL
LDLC SERPL CALC-MCNC: 57 MG/DL
MICROALBUMIN UR-MCNC: 4.11 MG/DL (ref 0–1.99)
MICROALBUMIN/CREAT UR: 36.4 MG/G CR
OPIATES UR QL SCN: ABNORMAL
OXYCODONE UR QL: ABNORMAL
PCP UR QL SCN: ABNORMAL
TRIGL SERPL-MCNC: 129 MG/DL

## 2021-09-16 PROCEDURE — 80359 METHYLENEDIOXYAMPHETAMINES: CPT | Mod: 90 | Performed by: NURSE PRACTITIONER

## 2021-09-16 PROCEDURE — 80061 LIPID PANEL: CPT | Performed by: NURSE PRACTITIONER

## 2021-09-16 PROCEDURE — 99000 SPECIMEN HANDLING OFFICE-LAB: CPT | Performed by: NURSE PRACTITIONER

## 2021-09-16 PROCEDURE — 80324 DRUG SCREEN AMPHETAMINES 1/2: CPT | Mod: 90 | Performed by: NURSE PRACTITIONER

## 2021-09-16 PROCEDURE — 83036 HEMOGLOBIN GLYCOSYLATED A1C: CPT | Performed by: NURSE PRACTITIONER

## 2021-09-16 PROCEDURE — 80307 DRUG TEST PRSMV CHEM ANLYZR: CPT | Performed by: NURSE PRACTITIONER

## 2021-09-16 PROCEDURE — 36415 COLL VENOUS BLD VENIPUNCTURE: CPT | Performed by: NURSE PRACTITIONER

## 2021-09-16 PROCEDURE — 99215 OFFICE O/P EST HI 40 MIN: CPT | Performed by: NURSE PRACTITIONER

## 2021-09-16 PROCEDURE — 82043 UR ALBUMIN QUANTITATIVE: CPT | Performed by: NURSE PRACTITIONER

## 2021-09-16 RX ORDER — OXYCODONE AND ACETAMINOPHEN 5; 325 MG/1; MG/1
TABLET ORAL
Qty: 60 TABLET | Refills: 0 | Status: CANCELLED | OUTPATIENT
Start: 2021-09-16

## 2021-09-16 RX ORDER — TRAZODONE HYDROCHLORIDE 50 MG/1
150 TABLET, FILM COATED ORAL AT BEDTIME
Qty: 270 TABLET | Refills: 1 | Status: CANCELLED | OUTPATIENT
Start: 2021-09-16

## 2021-09-16 RX ORDER — TRAZODONE HYDROCHLORIDE 150 MG/1
150 TABLET ORAL AT BEDTIME
Qty: 90 TABLET | Refills: 1 | Status: SHIPPED | OUTPATIENT
Start: 2021-09-16 | End: 2022-11-04

## 2021-09-16 RX ORDER — GLUCOSAMINE HCL/CHONDROITIN SU 500-400 MG
CAPSULE ORAL
Qty: 100 EACH | Refills: 3 | Status: SHIPPED | OUTPATIENT
Start: 2021-09-16

## 2021-09-16 RX ORDER — AMLODIPINE BESYLATE 10 MG/1
TABLET ORAL
Qty: 90 TABLET | Refills: 2 | Status: SHIPPED | OUTPATIENT
Start: 2021-09-16 | End: 2022-06-13

## 2021-09-16 RX ORDER — METOPROLOL SUCCINATE 50 MG/1
TABLET, EXTENDED RELEASE ORAL
Qty: 90 TABLET | Refills: 2 | Status: SHIPPED | OUTPATIENT
Start: 2021-09-16 | End: 2022-06-28

## 2021-09-16 RX ORDER — LISINOPRIL 10 MG/1
TABLET ORAL
Qty: 90 TABLET | Refills: 1 | Status: ON HOLD | OUTPATIENT
Start: 2021-09-16 | End: 2021-10-29

## 2021-09-16 RX ORDER — LANCETS
EACH MISCELLANEOUS
Qty: 100 EACH | Refills: 11 | Status: SHIPPED | OUTPATIENT
Start: 2021-09-16

## 2021-09-16 NOTE — LETTER
September 17, 2021      Ebonie Bowman  [address]   [place] [address]        Dear ,    We are writing to inform you of your test results.    Your A1c indicates that type 2 diabetes is not well controlled.  It will help to get some readings when you have a new meter but I also strongly recommend that you see the diabetic educators to have the diagnostic glucose meter placed so we can make some adjustments to your insulin.    There is excess protein in the urine which is a sign of kidney damage.  Please follow-up with the nephrologist (kidney specialist).    Your cholesterol numbers look pretty good overall.    Resulted Orders   Hemoglobin A1c   Result Value Ref Range    Hemoglobin A1C 9.7 (H) 0.0 - 5.6 %      Comment:      Normal <5.7%   Prediabetes 5.7-6.4%    Diabetes 6.5% or higher     Note: Adopted from ADA consensus guidelines.   Lipid panel reflex to direct LDL Fasting   Result Value Ref Range    Cholesterol 130 <=199 mg/dL    Triglycerides 129 <=149 mg/dL    Direct Measure HDL 47 (L) >=50 mg/dL      Comment:      HDL Cholesterol Reference Range:     0-2 years:   No reference ranges established for patients under 2 years old  at [facility] for lipid analytes.    2-8 years:  Greater than 45 mg/dL     18 years and older:   Female: Greater than or equal to 50 mg/dL   Male:   Greater than or equal to 40 mg/dL    LDL Cholesterol Calculated 57 <=129 mg/dL    Patient Fasting > 8hrs? Yes    Albumin Random Urine Quantitative with Creat Ratio   Result Value Ref Range    Microalbumin Urine mg/dL 4.11 (H) 0.00 - 1.99 mg/dL    Creatinine Urine mg/dL 113 mg/dL    Microalbumin Urine mg/g Cr 36.4 (H) <=19.9 mg/g Cr    Narrative    Microalbumin, Random Urine   <2.0 mg/dL . . . . . . . . Normal   3.0-30.0 mg/dL . . . . . . Microalbuminuria   >30.0 mg/dL . . . . . .  . Clinical Proteinuria     Microalbumin/Creatinine Ratio, Random Urine   <20 mg/g . . . . .. . . . Normal    mg/g . . . . .  . . Microalbuminuria   >300 mg/g . . . . . . . . Clinical Proteinuria   Drugs of Abuse 1 Panel, Urine ([facility] Only)   Result Value Ref Range    Amphetamines Urine Screen Positive (A) Screen Negative    Benzodiazepines Urine Screen Negative Screen Negative    Opiates Urine Screen Positive (A) Screen Negative    PCP Urine Screen Negative Screen Negative    Cannabinoids Urine Screen Negative Screen Negative    Barbiturates Urine Screen Negative Screen Negative    Cocaine Urine Screen Negative Screen Negative    Oxycodone Urine Screen Positive (A) Screen Negative    Creatinine Urine mg/dL 113 mg/dL    Narrative    Drug                  Screening Threshold    Amphetamines           1000 ng/mL  Benzodiazepine          200 ng/mL  Opiates                 300 ng/mL  Phencyclidine            25 ng/mL  THC Metabolite           50 ng/mL  Barbiturates            200 ng/mL  Cocaine Metabolite      150 ng/mL  Oxycodone               100 ng/mL    Screening results are to be used only for medical purposes.  Unconfirmed screening results must not be used for non-  medical purposes.       If you have any questions or concerns, please call the clinic at the number listed above.       Sincerely,      Marichuy Rubio NP

## 2021-09-16 NOTE — PROGRESS NOTES
Internal Medicine Office Visit  [facility]   Patient Name: Ebonie Bowman  Patient Age: 71 year old  YOB: 1950  MRN: [id number]    Date of Visit: 9/16/2021  Patient presents with:  Follow Up: pain management           Assessment / Plan / Medical Decision Making:    Problem List Items Addressed This Visit        Nervous and Auditory    Chronic pain     Pt reports inadequate pain relief at this time. Due to age and co-morbidities, I am not comfortable escalating her pain medication at this time. She is seeing the pain clinic but due to lack of adequate UDS and failure to repeat the screening test, they are unable to titrate any controlled substances for her. She will continue the current dose of Percocet as well as work with PT. She will also continue to work with spinal stimulator rep and if this proves an ineffective form of pain relief she may have it removed eventually   - Oxycodone-acetaminophen 1-2 tablets every 4-6 hours as needed with a max of 6 tablets daily          Relevant Orders    Urine Drugs of Abuse Screen Panel 1 - Drug Screen (Full) (Completed)    Amphetamine quantitative urine       Endocrine    Type 2 diabetes mellitus with chronic kidney disease, with long-term current use of insulin (H)     Not at goal with A1C of 9.5%. She is not checking her glucose due to lack of supplies  - Order for new meter with supplies ordered  - Referral to diabetes education for her to have help with application of CGM which she struggled with previously and thus never used  - Will also have her wear a diagnostic CGM for help with titration of medications          Relevant Medications    blood glucose monitoring (NO BRAND SPECIFIED) meter device kit    blood glucose (NO BRAND SPECIFIED) test strip    blood glucose calibration (NO BRAND SPECIFIED) solution    thin (NO BRAND SPECIFIED) lancets    alcohol swab prep pads    Other Relevant Orders    Albumin Random Urine Quantitative with  Creat Ratio (Completed)    Hemoglobin A1c (Completed)    Lipid panel reflex to direct LDL Fasting (Completed)    AMB Adult Diabetes Educator Referral       Circulatory    Essential hypertension - Primary     Stable          Relevant Medications    amLODIPine (NORVASC) 10 MG tablet    lisinopril (ZESTRIL) 10 MG tablet    metoprolol succinate ER (TOPROL-XL) 50 MG 24 hr tablet       Immune    Seropositive rheumatoid arthritis (H)     Followed by rheumatology. Currently on steroid taper             Urinary    Stage 3b chronic kidney disease     We reviewed the decline in renal function in the past 6 months, likely in part related to worsening diabetes management. We will attempt to address diabetes control but I have also recommended referral to nephrology which she is agreeable to         Relevant Orders    Adult Nephrology Referral       Other    Insomnia    Relevant Medications    traZODone (DESYREL) 150 MG tablet           I have changed Ebonei Bowman's lisinopril and metoprolol succinate ER. I am also having her start on blood glucose monitoring, blood glucose, blood glucose calibration, thin, alcohol swab, and traZODone. Additionally, I am having her maintain her vitamin C, MEDICATION CANNOT BE REORDERED - PLEASE MANUALLY REORDER AND DISCONTINUE THE OLD ORDER, blood glucose, MULTIVITAMIN ORAL, insulin syringe-needle U-100, blood-glucose meter Misc, B Complex Formula 1, BLOOD GLUCOSE TEST STRIPS, (cholecalciferol, vitamin D3, (VITAMIN D3 ORAL)), lidocaine, insulin NPH, acetaminophen, hydroxychloroquine, atorvastatin, loratadine, FreeStyle Saida 14 Day [place], FreeStyle Saida 14 Day Sensor, methotrexate, predniSONE, naloxone, predniSONE, and amLODIPine.          Orders Placed This Encounter   Procedures     Albumin Random Urine Quantitative with Creat Ratio     Hemoglobin A1c     Lipid panel reflex to direct LDL Fasting     Drugs of Abuse 1 Panel, Urine ([facility] Only)     Amphetamine quantitative urine     AMB Adult  Diabetes Educator Referral     Adult Nephrology Referral   Followup: Return in about 3 months (around 12/16/2021) for Follow up. earlier if needed.    49 minutes spent on the date of the encounter doing chart review, review of test results, patient visit and documentation     Marichuy Rubio NP, CNP        HPI:  Ebonie Bowman is a 71 year old year old who presents to the office today to re-establish care.     H/o seropositive RA, and OA which is followed by rheumatology. She has had painin the right third MCP with a plan to do a cortisone injection.     She has CKD which has been worsening with review of recent labs.     DM was reviewed. Her A1C was 9.5%. Her diabetic meter battery was dead and then she couldn't get a new meter. She hasn't checked her glucose in a few months because of that. She never could apply the Freestyle Saida.     She was seen at the pain clinic due to chronic low back pain, SI joint pain, and left upper trap pain . She was referred to PT which she has started. She will also see occupational therapy and reports she has done 1 session and has missed 1 appointment-- she states that she was told she didn't need to schedule additional sessions. She wants the spinal cord stimulator taken out if it doesn't end up helping. She is working with the rep from [employer]. She was prescribed hydroxyzine to help with withdrawal anxiety and also as an adjunct pain medication but she had some amnesia after she took it and that freaked her out so she isn't taking it.       Health Maintenance Review  Health Maintenance   Topic Date Due     DIABETIC FOOT EXAM  Never done     URINE DRUG SCREEN  Never done     ANNUAL REVIEW OF HM ORDERS  Never done     ADVANCE CARE PLANNING  Never done     DEPRESSION ACTION PLAN  Never done     MEDICARE ANNUAL WELLNESS VISIT  Never done     ZOSTER IMMUNIZATION (2 of 3) 12/31/2014     MAMMO SCREENING  09/27/2018     PHQ-9  02/12/2021     INFLUENZA VACCINE (1) 09/01/2021     BMP   01/07/2022     A1C  03/16/2022     EYE EXAM  04/03/2022     LIPID  09/16/2022     MICROALBUMIN  09/16/2022     FALL RISK ASSESSMENT  09/16/2022     DTAP/TDAP/TD IMMUNIZATION (4 - Td or Tdap) 07/18/2024     COLORECTAL CANCER SCREENING  12/14/2026     DEXA  09/17/2035     HEPATITIS C SCREENING  Completed     Pneumococcal Vaccine: 65+ Years  Completed     IPV IMMUNIZATION  Aged Out     MENINGITIS IMMUNIZATION  Aged Out     COVID-19 Vaccine  Discontinued       Current Scheduled Meds:  Outpatient Encounter Medications as of 9/16/2021   Medication Sig Dispense Refill     acetaminophen (TYLENOL) 500 MG tablet [ACETAMINOPHEN (TYLENOL) 500 MG TABLET] Take 2 tablets (1,000 mg total) by mouth 3 (three) times a day. (Patient taking differently: Take 1,000 mg by mouth once )  0     alcohol swab prep pads Use to swab area of injection/jacquelyn as directed. 100 each 3     amLODIPine (NORVASC) 10 MG tablet [AMLODIPINE (NORVASC) 10 MG TABLET] TAKE 1 TABLET EVERY DAY 90 tablet 2     ascorbic acid (ASCORBIC ACID WITH LATRICIA HIPS) 500 MG tablet [ASCORBIC ACID (ASCORBIC ACID WITH LATRICIA HIPS) 500 MG TABLET] Take 500 mg by mouth every evening.        atorvastatin (LIPITOR) 40 MG tablet [ATORVASTATIN (LIPITOR) 40 MG TABLET] Take 1 tablet (40 mg total) by mouth at bedtime. 90 tablet 2     b complex vitamins tablet [B COMPLEX VITAMINS TABLET] Take 1 tablet by mouth daily.       blood glucose (NO BRAND SPECIFIED) test strip Use to test blood sugar 3 times daily or as directed. To accompany: Blood Glucose Monitor Brands: per insurance. 100 strip 6     blood glucose calibration (NO BRAND SPECIFIED) solution To accompany: Blood Glucose Monitor Brands: per insurance. 1 each 0     blood glucose monitoring (NO BRAND SPECIFIED) meter device kit Use to test blood sugar 3 times daily or as directed. Preferred blood glucose meter OR supplies to accompany: Blood Glucose Monitor Brands: per insurance. 1 kit 0     blood glucose test strips [BLOOD GLUCOSE TEST  STRIPS] Check blood sugar four times daily. Dispense brand per patient's insurance at pharmacy discretion. Dx: e11.9 400 strip 3     blood-glucose meter Misc [BLOOD-GLUCOSE METER MISC] Dispense one meter. Use as directed. Dx: e11.9 1 each 0     cholecalciferol, vitamin D3, (VITAMIN D3 ORAL) [CHOLECALCIFEROL, VITAMIN D3, (VITAMIN D3 ORAL)] Take 3 tablets by mouth daily.        flash glucose scanning reader (FREESTYLE BRE 14 DAY READER) Misc [FLASH GLUCOSE SCANNING READER (FREESTYLE BRE 14 DAY READER) MISC] Use 1 each As Directed see administration instructions. Use as directed with sensor 1 each 0     flash glucose sensor (FREESTYLE BRE 14 DAY SENSOR) Kit [FLASH GLUCOSE SENSOR (FREESTYLE BRE 14 DAY SENSOR) KIT] Use 1 each As Directed every 14 (fourteen) days. 2 kit 11     insulin NPH (NOVOLIN) 100 unit/mL injection [INSULIN NPH (NOVOLIN) 100 UNIT/ML INJECTION] Inject 33 Units under the skin 2 (two) times a day before meals.       insulin syringe-needle U-100 (BD INSULIN SYRINGE ULT-FINE II) 1 mL 31 gauge x 5/16 Syrg [INSULIN SYRINGE-NEEDLE U-100 (BD INSULIN SYRINGE ULT-FINE II) 1 ML 31 GAUGE X 5/16 SYRG] USE 4 TIMES DAILY AS DIRECTED 500 each 1     lancing device (ACCU-CHEK SOFTCLIX) Misc [LANCING DEVICE (ACCU-CHEK SOFTCLIX) MISC] Use 1 applicator As Directed 4 (four) times a day. 300 each 3     lidocaine (LMX) 4 % cream [LIDOCAINE (LMX) 4 % CREAM] Apply to affected area twice daily as needed 30 g 3     lisinopril (ZESTRIL) 10 MG tablet [LISINOPRIL (PRINIVIL,ZESTRIL) 10 MG TABLET] TAKE 1 TABLET AT BEDTIME 90 tablet 1     loratadine (CLARITIN) 10 mg tablet [LORATADINE (CLARITIN) 10 MG TABLET] Take 1 tablet (10 mg total) by mouth at bedtime. 90 tablet 3     MEDICATION CANNOT BE REORDERED - PLEASE MANUALLY REORDER AND DISCONTINUE THE OLD ORDER [OMEGA-3 FATTY ACIDS-VITAMIN E (FISH OIL) 1,000 MG CAP] Take 1,000 mg by mouth every evening. 90 each 3     methotrexate 2.5 MG tablet [METHOTREXATE 2.5 MG TABLET] Take 3  tablets (7.5 mg total) by mouth once a week. On Fridays 39 tablet 0     metoprolol succinate ER (TOPROL-XL) 50 MG 24 hr tablet [METOPROLOL SUCCINATE (TOPROL-XL) 50 MG 24 HR TABLET] TAKE 1 TABLET EVERY DAY 90 tablet 2     MULTIVITAMIN ORAL [MULTIVITAMIN ORAL] Take 1 tablet by mouth every evening.        naloxone (NARCAN) 4 MG/0.1ML nasal spray Spray 1 spray (4 mg) into one nostril alternating nostrils once as needed for opioid reversal every 2-3 minutes until assistance arrives 0.2 mL 0     predniSONE (DELTASONE) 2.5 MG tablet Take 1 tablet po daily 90 tablet 0     thin (NO BRAND SPECIFIED) lancets Use with lanceting device. To accompany: Blood Glucose Monitor Brands: per insurance. 100 each 11     traZODone (DESYREL) 150 MG tablet Take 1 tablet (150 mg) by mouth At Bedtime 90 tablet 1     hydrOXYchloroQUINE (PLAQUENIL) 200 mg tablet [HYDROXYCHLOROQUINE (PLAQUENIL) 200 MG TABLET] Take 1 tablet (200 mg total) by mouth 2 (two) times a day. 180 tablet 0     [START ON 10/7/2021] predniSONE (DELTASONE) 1 MG tablet Take 2 tablets p.o. daily for 1 month then if stable decrease by  1/2 tablet (1/2 mg) every month. (Patient not taking: Reported on 9/16/2021) 60 tablet 2     [DISCONTINUED] amLODIPine (NORVASC) 10 MG tablet [AMLODIPINE (NORVASC) 10 MG TABLET] TAKE 1 TABLET EVERY DAY 90 tablet 2     [DISCONTINUED] hydrOXYzine (ATARAX) 25 MG tablet Take 1-2 tablets (25-50 mg) by mouth 3 times daily as needed for anxiety 42 tablet 0     [DISCONTINUED] lisinopriL (PRINIVIL,ZESTRIL) 10 MG tablet [LISINOPRIL (PRINIVIL,ZESTRIL) 10 MG TABLET] TAKE 1 TABLET AT BEDTIME 90 tablet 3     [DISCONTINUED] metoprolol succinate (TOPROL-XL) 50 MG 24 hr tablet [METOPROLOL SUCCINATE (TOPROL-XL) 50 MG 24 HR TABLET] TAKE 1 TABLET EVERY DAY 90 tablet 2     [DISCONTINUED] oxyCODONE-acetaminophen (PERCOCET) 5-325 MG tablet TAKE 1 TO 2 TABLETS BY MOUTH EVERY 6 HOURS AS NEEDED FOR PAIN FOR 8 5 21 9 4 21 60 tablet 0     [DISCONTINUED] traZODone (DESYREL)  50 MG tablet [TRAZODONE (DESYREL) 50 MG TABLET] Take 1 tablet (50 mg total) by mouth at bedtime. (Patient taking differently: Take 150 mg by mouth At Bedtime ) 90 tablet 1     No facility-administered encounter medications on file as of 9/16/2021.       Objective / Physical Examination:    Creatinine review 1.44 (9/1) --> 1.31 (5/7/21) --> 1.19 (1/26/21)    Vitals:    09/16/21 0830   BP: 118/80   Pulse: 70   SpO2: 96%   Weight: 104.3 kg (230 lb)     Wt Readings from Last 3 Encounters:   09/16/21 104.3 kg (230 lb)   08/02/21 102.5 kg (226 lb)   05/07/21 103.4 kg (228 lb)     Body mass index is 31.19 kg/m .     Constitutional: In no apparent distress  Respiratory: Clear to auscultation bilaterally. Normal inspiratory and expiratory effort  Cardiovascular: Regular rate and rhythm. No murmurs, rubs, or gallops. No edema. No carotid bruits.   Psych: Alert and oriented x3.

## 2021-09-17 ENCOUNTER — HOSPITAL ENCOUNTER (OUTPATIENT)
Dept: PHYSICAL THERAPY | Facility: REHABILITATION | Age: 71
End: 2021-09-17
Payer: COMMERCIAL

## 2021-09-17 DIAGNOSIS — M62.81 GENERALIZED MUSCLE WEAKNESS: ICD-10-CM

## 2021-09-17 DIAGNOSIS — M53.3 PAIN IN THE COCCYX: ICD-10-CM

## 2021-09-17 DIAGNOSIS — M53.3 SI (SACROILIAC) JOINT DYSFUNCTION: ICD-10-CM

## 2021-09-17 DIAGNOSIS — M54.50 CHRONIC MIDLINE LOW BACK PAIN WITHOUT SCIATICA: Primary | ICD-10-CM

## 2021-09-17 DIAGNOSIS — G89.29 CHRONIC MIDLINE LOW BACK PAIN WITHOUT SCIATICA: Primary | ICD-10-CM

## 2021-09-17 PROCEDURE — 97140 MANUAL THERAPY 1/> REGIONS: CPT | Mod: GP | Performed by: PHYSICAL THERAPIST

## 2021-09-17 PROCEDURE — 97110 THERAPEUTIC EXERCISES: CPT | Mod: GP | Performed by: PHYSICAL THERAPIST

## 2021-09-17 NOTE — ASSESSMENT & PLAN NOTE
Not at goal with A1C of 9.5%. She is not checking her glucose due to lack of supplies  - Order for new meter with supplies ordered  - Referral to diabetes education for her to have help with application of CGM which she struggled with previously and thus never used  - Will also have her wear a diagnostic CGM for help with titration of medications

## 2021-09-17 NOTE — ASSESSMENT & PLAN NOTE
We reviewed the decline in renal function in the past 6 months, likely in part related to worsening diabetes management. We will attempt to address diabetes control but I have also recommended referral to nephrology which she is agreeable to

## 2021-09-17 NOTE — ASSESSMENT & PLAN NOTE
Pt reports inadequate pain relief at this time. Due to age and co-morbidities, I am not comfortable escalating her pain medication at this time. She is seeing the pain clinic but due to lack of adequate UDS and failure to repeat the screening test, they are unable to titrate any controlled substances for her. She will continue the current dose of Percocet as well as work with PT. She will also continue to work with spinal stimulator rep and if this proves an ineffective form of pain relief she may have it removed eventually   - Oxycodone-acetaminophen 1-2 tablets every 4-6 hours as needed with a max of 6 tablets daily

## 2021-09-20 ENCOUNTER — OFFICE VISIT (OUTPATIENT)
Dept: EDUCATION SERVICES | Facility: CLINIC | Age: 71
End: 2021-09-20
Payer: COMMERCIAL

## 2021-09-20 DIAGNOSIS — N18.32 TYPE 2 DIABETES MELLITUS WITH STAGE 3B CHRONIC KIDNEY DISEASE, WITH LONG-TERM CURRENT USE OF INSULIN (H): ICD-10-CM

## 2021-09-20 DIAGNOSIS — G89.4 CHRONIC PAIN SYNDROME: ICD-10-CM

## 2021-09-20 DIAGNOSIS — M54.16 LUMBAR RADICULOPATHY: Primary | ICD-10-CM

## 2021-09-20 DIAGNOSIS — Z79.4 TYPE 2 DIABETES MELLITUS WITH STAGE 3B CHRONIC KIDNEY DISEASE, WITH LONG-TERM CURRENT USE OF INSULIN (H): ICD-10-CM

## 2021-09-20 DIAGNOSIS — E11.22 TYPE 2 DIABETES MELLITUS WITH STAGE 3B CHRONIC KIDNEY DISEASE, WITH LONG-TERM CURRENT USE OF INSULIN (H): ICD-10-CM

## 2021-09-20 PROCEDURE — G0108 DIAB MANAGE TRN  PER INDIV: HCPCS | Mod: AE

## 2021-09-20 RX ORDER — OXYCODONE AND ACETAMINOPHEN 5; 325 MG/1; MG/1
1-2 TABLET ORAL EVERY 6 HOURS PRN
Qty: 180 TABLET | Refills: 0 | Status: SHIPPED | OUTPATIENT
Start: 2021-09-20 | End: 2021-10-19

## 2021-09-20 NOTE — TELEPHONE ENCOUNTER
Reason for Call:  Medication or medication refill:    Do you use a [facility]?  Name of the pharmacy and phone number for the current request:      [facility] on file    Name of the medication requested:     Percocet 5 mg    Other request: Patient reports she has been out of medication for the last 3 days.  Please expedite if possible.    Can we leave a detailed message on this number? YES    Phone number patient can be reached at: Home number on file [phone] (home)    Best Time: Any time    Call taken on 9/20/2021 at 9:05 AM by Darren Fry

## 2021-09-20 NOTE — LETTER
9/20/2021         RE: Ebonie Bowman  [address]  [place] [address]        Dear Colleague,    Thank you for referring your patient, Ebonie Bowman, to the [facility]. Please see a copy of my visit note below.    Diabetes Self-Management Education & Support    Presents for: Individual review    SUBJECTIVE/OBJECTIVE:  Presents for: Individual review  Accompanied by: Self  Diabetes education in the past 24mo: No  Focus of Visit: Monitoring, Healthy Eating  Diabetes type: Type 2  Date of diagnosis: 1997  Disease course: Worsening  Cultural Influences/Ethnic Background:  Not  or       Diabetes Symptoms & Complications:  Fatigue: Yes (afternoons)  Neuropathy: Yes  Polydipsia: Yes  Polyphagia: No  Polyuria: Sometimes  Visual change: Yes  Slow healing wounds: No  Complications assessed today?: Yes  Autonomic neuropathy: Yes  CVA: No  Heart disease:  (family history - father)  Retinopathy: No    Patient Problem List and Family Medical History reviewed for relevant medical history, current medical status, and diabetes risk factors.    Vitals:  There were no vitals taken for this visit.  Estimated body mass index is 31.19 kg/m  as calculated from the following:    Height as of 8/2/21: 1.829 m (6').    Weight as of 9/16/21: 104.3 kg (230 lb).   Last 3 BP:   BP Readings from Last 3 Encounters:   09/16/21 118/80   08/02/21 132/86   05/07/21 132/82       History   Smoking Status     Former Smoker     Packs/day: 1.00     Years: 43.00     Start date: 1/1/1965     Quit date: 8/1/2008   Smokeless Tobacco     Never Used       Labs:  Lab Results   Component Value Date    A1C 9.7 09/16/2021     Lab Results   Component Value Date    GLC 93 01/07/2021     Lab Results   Component Value Date    LDL 57 09/16/2021     Direct Measure HDL   Date Value Ref Range Status   09/16/2021 47 (L) >=50 mg/dL Final     Comment:     HDL Cholesterol Reference Range:     0-2 years:   No reference ranges  established for patients under 2 years old  at [facility] for lipid analytes.    2-8 years:  Greater than 45 mg/dL     18 years and older:   Female: Greater than or equal to 50 mg/dL   Male:   Greater than or equal to 40 mg/dL   ]  GFR Estimate   Date Value Ref Range Status   09/01/2021 36 (L) >60 mL/min/1.73m2 Final     Comment:     As of July 11, 2021, eGFR is calculated by the CKD-EPI creatinine equation, without race adjustment. eGFR can be influenced by muscle mass, exercise, and diet. The reported eGFR is an estimation only and is only applicable if the renal function is stable.   05/07/2021 40 (L) >60 mL/min/1.73m2 Final     GFR Estimate If Black   Date Value Ref Range Status   05/07/2021 48 (L) >60 mL/min/1.73m2 Final     Lab Results   Component Value Date    CR 1.44 09/01/2021     No results found for: MICROALBUMIN    Healthy Eating:  Healthy Eating Assessed Today: Yes  Meals include: Lunch, Dinner, Afternoon Snack, Evening Snack  Beverages: Water, Tea, Milk, Juice, Soda    Being Active:  Being Active Assessed Today: Yes  Exercise:: Currently not exercising  Barrier to exercise: Physical limitation (back problems)    Monitoring:  Monitoring Assessed Today: Yes  Did patient bring glucose meter to appointment? : Yes  Blood Glucose Meter: CGM, One Touch      Taking Medications:  Diabetes Medication(s)     Insulin       insulin NPH (NOVOLIN) 100 unit/mL injection    [INSULIN NPH (NOVOLIN) 100 UNIT/ML INJECTION] Inject 33 Units under the skin 2 (two) times a day before meals.          Taking Medication Assessed Today: Yes  Current Treatments: Insulin Injections  Dose schedule: Pre-breakfast, Pre-dinner  Problems taking diabetes medications regularly?: No    Problem Solving:  Is the patient at risk for hypoglycemia?: Yes  Hypoglycemia Frequency: Monthly  Hypoglycemia Treatment: Juice, Candy  Medical ID: No    Hypoglycemia symptoms  Dizziness or Light-Headedness: Yes  Hunger: No  Mood changes:  Yes  Nervousness/Anxiety: Yes  Sleepiness: No  Sweats: Yes  Feeling shaky: Yes    Hypoglycemia Complications  Blackouts: No  Hospitalization: No  Nocturnal hypoglycemia: Yes  Required assistance: Yes  Seizures: No    Reducing Risks:  Reducing Risks Assessed Today: Yes  Diabetes Risks: Age over 45 years, Sedentary Lifestyle  CAD Risks: Diabetes Mellitus, Family history, Obesity, Sedentary lifestyle  Has dilated eye exam at least once a year?: Yes  Sees dentist every 6 months?: No    Healthy Coping:  Healthy Coping Assessed Today: Yes  Emotional response to diabetes: Ready to learn  Informal Support system:: Family, Children  Stage of change: PREPARATION (Decided to change - considering how)  Patient Activation Measure Survey Score:  No flowsheet data found.    Diabetes knowledge and skills assessment:   Patient is knowledgeable in diabetes management concepts related to: Taking Medication    Patient needs further education on the following diabetes management concepts: Healthy Eating, Being Active, Monitoring, Taking Medication and Problem Solving    Based on learning assessment above, most appropriate setting for further diabetes education would be: Individual setting.      INTERVENTIONS:    Education provided today on:  AADE Self-Care Behaviors:  Healthy Eating: carbohydrate counting, consistency in amount, composition, and timing of food intake and portion control  Being Active: relationship to blood glucose  Monitoring: log and interpret results and individual blood glucose targets  Taking Medication: when to take medications  Problem Solving: high blood glucose - causes, signs/symptoms, treatment and prevention and low blood glucose - causes, signs/symptoms, treatment and prevention  Patient was instructed on One Touch Ultra meter and was able to provide an accurate return demonstration. Patient's blood glucose reading today was 372 mg/dL.    Opportunities for ongoing education and support in diabetes-self  management were discussed.    Pt verbalized understanding of concepts discussed and recommendations provided today.       Education Materials Provided:  Carbohydrate Counting      ASSESSMENT:    Pt seen today for DM education. She brings in tamara and OT ultra meter kit she picked up from the pharmacy. Pt notes her other meter . She did not know how to use the tamara and has not been able to check her BG in months. Instructed on OT meter. She was able to check BG w/o difficulty. BG was 372 in clinic. Instructed on tamara - she was able to insert and start sensor w/o difficulty.   Reviewed cho foods and portions. Will review further at f/u.   She is taking NPH 33 units BID and believes she is good about not forgetting doses.         Patient's most recent   Lab Results   Component Value Date    A1C 9.7 2021    is not meeting goal of <8.0    PLAN  See Patient Instructions for co-developed, patient-stated behavior change goals.  Pt will f/u 10/7 - will upload tamara.       Time Spent: 60 minutes  Encounter Type: Individual    Any diabetes medication dose changes were made via the CDE Protocol and Collaborative Practice Agreement with the patient's referring provider. A copy of this encounter was shared with the provider.

## 2021-09-20 NOTE — TELEPHONE ENCOUNTER
Seen on 9/16/21 by PCP with following plan-    Nervous and Auditory      Chronic pain       Pt reports inadequate pain relief at this time. Due to age and co-morbidities, I am not comfortable escalating her pain medication at this time. She is seeing the pain clinic but due to lack of adequate UDS and failure to repeat the screening test, they are unable to titrate any controlled substances for her. She will continue the current dose of Percocet as well as work with PT. She will also continue to work with spinal stimulator rep and if this proves an ineffective form of pain relief she may have it removed eventually   - Oxycodone-acetaminophen 1-2 tablets every 4-6 hours as needed with a max of 6 tablets daily            Relevant Orders     Urine Drugs of Abuse Screen Panel 1 - Drug Screen (Full) (Completed)     Amphetamine quantitative urine       oxyCODONE-acetaminophen (PERCOCET) 5-325 mg per tablet 180 tablet 0 7/6/2021 8/5/2021 No   Sig - Route: Take 1-2 tablets by mouth every 6 (six) hours as needed for pain (maximum of 6 tablets per day). For 7/7/21-8/6/21 - Oral   Sent to pharmacy as: oxyCODONE-acetaminophen 5 mg-325 mg tablet (Percocet)   Earliest Fill Date: 7/6/2021   E-Prescribing Status: Receipt confirmed by pharmacy (7/6/2021 10:21 AM CDT)       Last fill-

## 2021-09-20 NOTE — PROGRESS NOTES
Diabetes Self-Management Education & Support    Presents for: Individual review    SUBJECTIVE/OBJECTIVE:  Presents for: Individual review  Accompanied by: Self  Diabetes education in the past 24mo: No  Focus of Visit: Monitoring, Healthy Eating  Diabetes type: Type 2  Date of diagnosis: 1995  Disease course: Worsening  Cultural Influences/Ethnic Background:  Not  or       Diabetes Symptoms & Complications:  Fatigue: Yes (afternoons)  Neuropathy: Yes  Polydipsia: Yes  Polyphagia: No  Polyuria: Sometimes  Visual change: Yes  Slow healing wounds: No  Complications assessed today?: Yes  Autonomic neuropathy: Yes  CVA: No  Heart disease:  (family history - father)  Retinopathy: No    Patient Problem List and Family Medical History reviewed for relevant medical history, current medical status, and diabetes risk factors.    Vitals:  There were no vitals taken for this visit.  Estimated body mass index is 31.19 kg/m  as calculated from the following:    Height as of 8/2/21: 1.829 m (6').    Weight as of 9/16/21: 104.3 kg (230 lb).   Last 3 BP:   BP Readings from Last 3 Encounters:   09/16/21 118/80   08/02/21 132/86   05/07/21 132/82       History   Smoking Status     Former Smoker     Packs/day: 1.00     Years: 43.00     Start date: 1/1/1965     Quit date: 8/1/2008   Smokeless Tobacco     Never Used       Labs:  Lab Results   Component Value Date    A1C 9.7 09/16/2021     Lab Results   Component Value Date    GLC 93 01/07/2021     Lab Results   Component Value Date    LDL 57 09/16/2021     Direct Measure HDL   Date Value Ref Range Status   09/16/2021 47 (L) >=50 mg/dL Final     Comment:     HDL Cholesterol Reference Range:     0-2 years:   No reference ranges established for patients under 2 years old  at [facility] for lipid analytes.    2-8 years:  Greater than 45 mg/dL     18 years and older:   Female: Greater than or equal to 50 mg/dL   Male:   Greater than or equal to 40 mg/dL   ]  GFR  Estimate   Date Value Ref Range Status   09/01/2021 36 (L) >60 mL/min/1.73m2 Final     Comment:     As of July 11, 2021, eGFR is calculated by the CKD-EPI creatinine equation, without race adjustment. eGFR can be influenced by muscle mass, exercise, and diet. The reported eGFR is an estimation only and is only applicable if the renal function is stable.   05/07/2021 40 (L) >60 mL/min/1.73m2 Final     GFR Estimate If Black   Date Value Ref Range Status   05/07/2021 48 (L) >60 mL/min/1.73m2 Final     Lab Results   Component Value Date    CR 1.44 09/01/2021     No results found for: MICROALBUMIN    Healthy Eating:  Healthy Eating Assessed Today: Yes  Meals include: Lunch, Dinner, Afternoon Snack, Evening Snack  Beverages: Water, Tea, Milk, Juice, Soda    Being Active:  Being Active Assessed Today: Yes  Exercise:: Currently not exercising  Barrier to exercise: Physical limitation (back problems)    Monitoring:  Monitoring Assessed Today: Yes  Did patient bring glucose meter to appointment? : Yes  Blood Glucose Meter: CGM, One Touch      Taking Medications:  Diabetes Medication(s)     Insulin       insulin NPH (NOVOLIN) 100 unit/mL injection    [INSULIN NPH (NOVOLIN) 100 UNIT/ML INJECTION] Inject 33 Units under the skin 2 (two) times a day before meals.          Taking Medication Assessed Today: Yes  Current Treatments: Insulin Injections  Dose schedule: Pre-breakfast, Pre-dinner  Problems taking diabetes medications regularly?: No    Problem Solving:  Is the patient at risk for hypoglycemia?: Yes  Hypoglycemia Frequency: Monthly  Hypoglycemia Treatment: Juice, Candy  Medical ID: No    Hypoglycemia symptoms  Dizziness or Light-Headedness: Yes  Hunger: No  Mood changes: Yes  Nervousness/Anxiety: Yes  Sleepiness: No  Sweats: Yes  Feeling shaky: Yes    Hypoglycemia Complications  Blackouts: No  Hospitalization: No  Nocturnal hypoglycemia: Yes  Required assistance: Yes  Seizures: No    Reducing Risks:  Reducing Risks  Assessed Today: Yes  Diabetes Risks: Age over 45 years, Sedentary Lifestyle  CAD Risks: Diabetes Mellitus, Family history, Obesity, Sedentary lifestyle  Has dilated eye exam at least once a year?: Yes  Sees dentist every 6 months?: No    Healthy Coping:  Healthy Coping Assessed Today: Yes  Emotional response to diabetes: Ready to learn  Informal Support system:: Family, Children  Stage of change: PREPARATION (Decided to change - considering how)  Patient Activation Measure Survey Score:  No flowsheet data found.    Diabetes knowledge and skills assessment:   Patient is knowledgeable in diabetes management concepts related to: Taking Medication    Patient needs further education on the following diabetes management concepts: Healthy Eating, Being Active, Monitoring, Taking Medication and Problem Solving    Based on learning assessment above, most appropriate setting for further diabetes education would be: Individual setting.      INTERVENTIONS:    Education provided today on:  AADE Self-Care Behaviors:  Healthy Eating: carbohydrate counting, consistency in amount, composition, and timing of food intake and portion control  Being Active: relationship to blood glucose  Monitoring: log and interpret results and individual blood glucose targets  Taking Medication: when to take medications  Problem Solving: high blood glucose - causes, signs/symptoms, treatment and prevention and low blood glucose - causes, signs/symptoms, treatment and prevention  Patient was instructed on One Touch Ultra meter and was able to provide an accurate return demonstration. Patient's blood glucose reading today was 372 mg/dL.    Opportunities for ongoing education and support in diabetes-self management were discussed.    Pt verbalized understanding of concepts discussed and recommendations provided today.       Education Materials Provided:  Carbohydrate Counting      ASSESSMENT:    Pt seen today for DM education. She brings in tamara and OT  ultra meter kit she picked up from the pharmacy. Pt notes her other meter . She did not know how to use the tamara and has not been able to check her BG in months. Instructed on OT meter. She was able to check BG w/o difficulty. BG was 372 in clinic. Instructed on tamara - she was able to insert and start sensor w/o difficulty.   Reviewed cho foods and portions. Will review further at f/u.   She is taking NPH 33 units BID and believes she is good about not forgetting doses.         Patient's most recent   Lab Results   Component Value Date    A1C 9.7 2021    is not meeting goal of <8.0    PLAN  See Patient Instructions for co-developed, patient-stated behavior change goals.  Pt will f/u 10/7 - will upload tamara.       Time Spent: 60 minutes  Encounter Type: Individual    Any diabetes medication dose changes were made via the CDE Protocol and Collaborative Practice Agreement with the patient's referring provider. A copy of this encounter was shared with the provider.

## 2021-09-24 LAB
AMPHET/CREAT UR: NOT DETECTED NG/MG CREAT
AMPHETAMINES UR QL CFM: NEGATIVE
LEVEL OF DETECTION: NORMAL
MDA/CREAT UR: NOT DETECTED NG/MG CREAT
MDMA/CREAT UR: NOT DETECTED NG/MG CREAT
METHAMPHET/CREAT UR: NOT DETECTED NG/MG CREAT

## 2021-09-27 DIAGNOSIS — M05.9 SEROPOSITIVE RHEUMATOID ARTHRITIS (H): ICD-10-CM

## 2021-09-30 DIAGNOSIS — M05.9 SEROPOSITIVE RHEUMATOID ARTHRITIS (H): ICD-10-CM

## 2021-09-30 RX ORDER — HYDROXYCHLOROQUINE SULFATE 200 MG/1
200 TABLET, FILM COATED ORAL 2 TIMES DAILY
Qty: 180 TABLET | Refills: 1 | Status: SHIPPED | OUTPATIENT
Start: 2021-09-30 | End: 2022-01-03

## 2021-10-02 ENCOUNTER — HEALTH MAINTENANCE LETTER (OUTPATIENT)
Age: 71
End: 2021-10-02

## 2021-10-07 ENCOUNTER — ALLIED HEALTH/NURSE VISIT (OUTPATIENT)
Dept: EDUCATION SERVICES | Facility: CLINIC | Age: 71
End: 2021-10-07
Payer: COMMERCIAL

## 2021-10-07 DIAGNOSIS — E11.9 DIABETES MELLITUS (H): Primary | ICD-10-CM

## 2021-10-07 PROCEDURE — 95250 CONT GLUC MNTR PHYS/QHP EQP: CPT

## 2021-10-07 PROCEDURE — G0108 DIAB MANAGE TRN  PER INDIV: HCPCS | Mod: AE

## 2021-10-07 NOTE — PROGRESS NOTES
Diabetes Self-Management Education & Support    Presents for: Individual review    SUBJECTIVE/OBJECTIVE:  Presents for: Individual review  Accompanied by: Self  Diabetes education in the past 24mo: No  Focus of Visit: Monitoring, Healthy Eating  Diabetes type: Type 2  Date of diagnosis: 1995  Disease course: Worsening  Cultural Influences/Ethnic Background:  Not  or       Diabetes Symptoms & Complications:  Fatigue: Yes (afternoons)  Neuropathy: Yes  Polydipsia: Yes  Polyphagia: No  Polyuria: Sometimes  Visual change: Yes  Slow healing wounds: No  Complications assessed today?: Yes  Autonomic neuropathy: Yes  CVA: No  Heart disease:  (family history - father)  Retinopathy: No    Patient Problem List and Family Medical History reviewed for relevant medical history, current medical status, and diabetes risk factors.    Vitals:  There were no vitals taken for this visit.  Estimated body mass index is 31.19 kg/m  as calculated from the following:    Height as of 8/2/21: 1.829 m (6').    Weight as of 9/16/21: 104.3 kg (230 lb).   Last 3 BP:   BP Readings from Last 3 Encounters:   09/16/21 118/80   08/02/21 132/86   05/07/21 132/82       History   Smoking Status     Former Smoker     Packs/day: 1.00     Years: 43.00     Start date: 1/1/1965     Quit date: 8/1/2008   Smokeless Tobacco     Never Used       Labs:  Lab Results   Component Value Date    A1C 9.7 09/16/2021     Lab Results   Component Value Date    GLC 93 01/07/2021     Lab Results   Component Value Date    LDL 57 09/16/2021     Direct Measure HDL   Date Value Ref Range Status   09/16/2021 47 (L) >=50 mg/dL Final     Comment:     HDL Cholesterol Reference Range:     0-2 years:   No reference ranges established for patients under 2 years old  at [facility] for lipid analytes.    2-8 years:  Greater than 45 mg/dL     18 years and older:   Female: Greater than or equal to 50 mg/dL   Male:   Greater than or equal to 40 mg/dL   ]  GFR  Estimate   Date Value Ref Range Status   09/01/2021 36 (L) >60 mL/min/1.73m2 Final     Comment:     As of July 11, 2021, eGFR is calculated by the CKD-EPI creatinine equation, without race adjustment. eGFR can be influenced by muscle mass, exercise, and diet. The reported eGFR is an estimation only and is only applicable if the renal function is stable.   05/07/2021 40 (L) >60 mL/min/1.73m2 Final     GFR Estimate If Black   Date Value Ref Range Status   05/07/2021 48 (L) >60 mL/min/1.73m2 Final     Lab Results   Component Value Date    CR 1.44 09/01/2021     No results found for: MICROALBUMIN    Healthy Eating:  Healthy Eating Assessed Today: Yes  Meals include: Lunch, Dinner, Afternoon Snack, Evening Snack  Breakfast: only eating sometimes, around 9am  Lunch: 4065-7338: meals on wheels  Dinner: 430-630pm  Snacks: sometimes HS snack, about 50% of the time  Beverages: Water, Tea, Milk, Juice, Soda    Being Active:  Being Active Assessed Today: Yes  Exercise:: Currently not exercising  Barrier to exercise: Physical limitation (back problems)    Monitoring:  Monitoring Assessed Today: Yes  Did patient bring glucose meter to appointment? : Yes  Blood Glucose Meter: CGM - tamara 2       Taking Medications:  Diabetes Medication(s)     Insulin       insulin NPH (NOVOLIN) 100 unit/mL injection    [INSULIN NPH (NOVOLIN) 100 UNIT/ML INJECTION] Inject 33 Units under the skin 2 (two) times a day before meals.          Taking Medication Assessed Today: Yes  Current Treatments: Insulin Injections  Dose schedule:  (NPH 9am and 9pm)  Problems taking diabetes medications regularly?: No    Problem Solving:  Is the patient at risk for hypoglycemia?: Yes  Hypoglycemia Frequency: Monthly  Hypoglycemia Treatment: Juice, Candy  Medical ID: No    Hypoglycemia symptoms  Dizziness or Light-Headedness: Yes  Hunger: No  Mood changes: Yes  Nervousness/Anxiety: Yes  Sleepiness: No  Sweats: Yes  Feeling shaky: Yes    Hypoglycemia  Complications  Blackouts: No  Hospitalization: No  Nocturnal hypoglycemia: Yes  Required assistance: Yes  Seizures: No    Reducing Risks:  Reducing Risks Assessed Today: Yes  Diabetes Risks: Age over 45 years, Sedentary Lifestyle  CAD Risks: Diabetes Mellitus, Family history, Obesity, Sedentary lifestyle  Has dilated eye exam at least once a year?: Yes  Sees dentist every 6 months?: No    Healthy Coping:  Healthy Coping Assessed Today: Yes  Emotional response to diabetes: Ready to learn  Informal Support system:: Family, Children  Stage of change: PREPARATION (Decided to change - considering how)  Patient Activation Measure Survey Score:  No flowsheet data found.    Diabetes knowledge and skills assessment:   Patient is knowledgeable in diabetes management concepts related to: Healthy Eating, Monitoring, Taking Medication and Problem Solving    Patient needs further education on the following diabetes management concepts: Healthy Eating, Monitoring, Taking Medication, Problem Solving and Reducing Risks    Based on learning assessment above, most appropriate setting for further diabetes education would be: Individual setting.      INTERVENTIONS:    Education provided today on:  AADE Self-Care Behaviors:  Healthy Eating: plate planning method and carbs   Monitoring: individual blood glucose targets and frequency of monitoring  Taking Medication: action of prescribed medication and when to take medications  Problem Solving: high blood glucose - causes, signs/symptoms, treatment and prevention and low blood glucose - causes, signs/symptoms, treatment and prevention    Opportunities for ongoing education and support in diabetes-self management were discussed.    Pt verbalized understanding of concepts discussed and recommendations provided today.       Education Materials Provided:  No new materials provided today      ASSESSMENT:  Pt seen today for f/u. Her tamara fell off a couple days early. Encouraged pt to call  freestyle to get a replacement. Pt notes she was unable to get the sensor on at home herself. Placed new sensor today, pt was able to insert it herself with minimal difficulty. Will keep working on this.   Saida uploaded:               Reviewed diet today. Pt feels higher FBG may be from HS snack, however is not sure. Discussed possibly adjusting insulin per what she is eating. Pt feels this may be difficult to remember.   Based on saida reports, will adjust per BG, pt feels this is manageable.     Patient's most recent   Lab Results   Component Value Date    A1C 9.7 09/16/2021    is not meeting goal of <8.0    PLAN  See Patient Instructions for co-developed, patient-stated behavior change goals.  Will increase NPH to 36 units in the morning and evening dose per sliding scale: <250 = 33 units, >250 = 36 units. This was all written down for pt and she verbalized understanding. Pt will follow up again in 2 weeks and will upload and review saida reports again.       Time Spent: 60 minutes  Encounter Type: Individual    Any diabetes medication dose changes were made via the CDE Protocol and Collaborative Practice Agreement with the patient's referring provider. A copy of this encounter was shared with the provider.

## 2021-10-07 NOTE — LETTER
10/7/2021         RE: Ebonie Bowman  [address]  [place] [address]        Dear Colleague,    Thank you for referring your patient, Ebonie Bowman, to the [facility]. Please see a copy of my visit note below.    Diabetes Self-Management Education & Support    Presents for: Individual review    SUBJECTIVE/OBJECTIVE:  Presents for: Individual review  Accompanied by: Self  Diabetes education in the past 24mo: No  Focus of Visit: Monitoring, Healthy Eating  Diabetes type: Type 2  Date of diagnosis: 1995  Disease course: Worsening  Cultural Influences/Ethnic Background:  Not  or       Diabetes Symptoms & Complications:  Fatigue: Yes (afternoons)  Neuropathy: Yes  Polydipsia: Yes  Polyphagia: No  Polyuria: Sometimes  Visual change: Yes  Slow healing wounds: No  Complications assessed today?: Yes  Autonomic neuropathy: Yes  CVA: No  Heart disease:  (family history - father)  Retinopathy: No    Patient Problem List and Family Medical History reviewed for relevant medical history, current medical status, and diabetes risk factors.    Vitals:  There were no vitals taken for this visit.  Estimated body mass index is 31.19 kg/m  as calculated from the following:    Height as of 8/2/21: 1.829 m (6').    Weight as of 9/16/21: 104.3 kg (230 lb).   Last 3 BP:   BP Readings from Last 3 Encounters:   09/16/21 118/80   08/02/21 132/86   05/07/21 132/82       History   Smoking Status     Former Smoker     Packs/day: 1.00     Years: 43.00     Start date: 1/1/1965     Quit date: 8/1/2008   Smokeless Tobacco     Never Used       Labs:  Lab Results   Component Value Date    A1C 9.7 09/16/2021     Lab Results   Component Value Date    GLC 93 01/07/2021     Lab Results   Component Value Date    LDL 57 09/16/2021     Direct Measure HDL   Date Value Ref Range Status   09/16/2021 47 (L) >=50 mg/dL Final     Comment:     HDL Cholesterol Reference Range:     0-2 years:   No reference ranges  established for patients under 2 years old  at [facility] for lipid analytes.    2-8 years:  Greater than 45 mg/dL     18 years and older:   Female: Greater than or equal to 50 mg/dL   Male:   Greater than or equal to 40 mg/dL   ]  GFR Estimate   Date Value Ref Range Status   09/01/2021 36 (L) >60 mL/min/1.73m2 Final     Comment:     As of July 11, 2021, eGFR is calculated by the CKD-EPI creatinine equation, without race adjustment. eGFR can be influenced by muscle mass, exercise, and diet. The reported eGFR is an estimation only and is only applicable if the renal function is stable.   05/07/2021 40 (L) >60 mL/min/1.73m2 Final     GFR Estimate If Black   Date Value Ref Range Status   05/07/2021 48 (L) >60 mL/min/1.73m2 Final     Lab Results   Component Value Date    CR 1.44 09/01/2021     No results found for: MICROALBUMIN    Healthy Eating:  Healthy Eating Assessed Today: Yes  Meals include: Lunch, Dinner, Afternoon Snack, Evening Snack  Breakfast: only eating sometimes, around 9am  Lunch: 2470-3210: meals on wheels  Dinner: 430-630pm  Snacks: sometimes HS snack, about 50% of the time  Beverages: Water, Tea, Milk, Juice, Soda    Being Active:  Being Active Assessed Today: Yes  Exercise:: Currently not exercising  Barrier to exercise: Physical limitation (back problems)    Monitoring:  Monitoring Assessed Today: Yes  Did patient bring glucose meter to appointment? : Yes  Blood Glucose Meter: CGM - tamara 2       Taking Medications:  Diabetes Medication(s)     Insulin       insulin NPH (NOVOLIN) 100 unit/mL injection    [INSULIN NPH (NOVOLIN) 100 UNIT/ML INJECTION] Inject 33 Units under the skin 2 (two) times a day before meals.          Taking Medication Assessed Today: Yes  Current Treatments: Insulin Injections  Dose schedule:  (NPH 9am and 9pm)  Problems taking diabetes medications regularly?: No    Problem Solving:  Is the patient at risk for hypoglycemia?: Yes  Hypoglycemia Frequency:  Monthly  Hypoglycemia Treatment: Juice, Candy  Medical ID: No    Hypoglycemia symptoms  Dizziness or Light-Headedness: Yes  Hunger: No  Mood changes: Yes  Nervousness/Anxiety: Yes  Sleepiness: No  Sweats: Yes  Feeling shaky: Yes    Hypoglycemia Complications  Blackouts: No  Hospitalization: No  Nocturnal hypoglycemia: Yes  Required assistance: Yes  Seizures: No    Reducing Risks:  Reducing Risks Assessed Today: Yes  Diabetes Risks: Age over 45 years, Sedentary Lifestyle  CAD Risks: Diabetes Mellitus, Family history, Obesity, Sedentary lifestyle  Has dilated eye exam at least once a year?: Yes  Sees dentist every 6 months?: No    Healthy Coping:  Healthy Coping Assessed Today: Yes  Emotional response to diabetes: Ready to learn  Informal Support system:: Family, Children  Stage of change: PREPARATION (Decided to change - considering how)  Patient Activation Measure Survey Score:  No flowsheet data found.    Diabetes knowledge and skills assessment:   Patient is knowledgeable in diabetes management concepts related to: Healthy Eating, Monitoring, Taking Medication and Problem Solving    Patient needs further education on the following diabetes management concepts: Healthy Eating, Monitoring, Taking Medication, Problem Solving and Reducing Risks    Based on learning assessment above, most appropriate setting for further diabetes education would be: Individual setting.      INTERVENTIONS:    Education provided today on:  AADE Self-Care Behaviors:  Healthy Eating: plate planning method and carbs   Monitoring: individual blood glucose targets and frequency of monitoring  Taking Medication: action of prescribed medication and when to take medications  Problem Solving: high blood glucose - causes, signs/symptoms, treatment and prevention and low blood glucose - causes, signs/symptoms, treatment and prevention    Opportunities for ongoing education and support in diabetes-self management were discussed.    Pt verbalized  understanding of concepts discussed and recommendations provided today.       Education Materials Provided:  No new materials provided today      ASSESSMENT:  Pt seen today for f/u. Her saida fell off a couple days early. Encouraged pt to call freestyle to get a replacement. Pt notes she was unable to get the sensor on at home herself. Placed new sensor today, pt was able to insert it herself with minimal difficulty. Will keep working on this.   Saida uploaded:               Reviewed diet today. Pt feels higher FBG may be from HS snack, however is not sure. Discussed possibly adjusting insulin per what she is eating. Pt feels this may be difficult to remember.   Based on saida reports, will adjust per BG, pt feels this is manageable.     Patient's most recent   Lab Results   Component Value Date    A1C 9.7 09/16/2021    is not meeting goal of <8.0    PLAN  See Patient Instructions for co-developed, patient-stated behavior change goals.  Will increase NPH to 36 units in the morning and evening dose per sliding scale: <250 = 33 units, >250 = 36 units. This was all written down for pt and she verbalized understanding. Pt will follow up again in 2 weeks and will upload and review saida reports again.       Time Spent: 60 minutes  Encounter Type: Individual    Any diabetes medication dose changes were made via the CDE Protocol and Collaborative Practice Agreement with the patient's referring provider. A copy of this encounter was shared with the provider.

## 2021-10-11 ENCOUNTER — OFFICE VISIT (OUTPATIENT)
Dept: PALLIATIVE MEDICINE | Facility: OTHER | Age: 71
End: 2021-10-11
Payer: COMMERCIAL

## 2021-10-11 ENCOUNTER — TELEPHONE (OUTPATIENT)
Dept: INTERNAL MEDICINE | Facility: CLINIC | Age: 71
End: 2021-10-11

## 2021-10-11 VITALS
SYSTOLIC BLOOD PRESSURE: 134 MMHG | DIASTOLIC BLOOD PRESSURE: 70 MMHG | HEART RATE: 62 BPM | BODY MASS INDEX: 31.33 KG/M2 | OXYGEN SATURATION: 96 % | WEIGHT: 231 LBS

## 2021-10-11 DIAGNOSIS — G89.4 CHRONIC PAIN SYNDROME: Primary | ICD-10-CM

## 2021-10-11 DIAGNOSIS — M46.1 SACROILIITIS (H): ICD-10-CM

## 2021-10-11 PROCEDURE — G0463 HOSPITAL OUTPT CLINIC VISIT: HCPCS

## 2021-10-11 PROCEDURE — 99213 OFFICE O/P EST LOW 20 MIN: CPT | Performed by: NURSE PRACTITIONER

## 2021-10-11 ASSESSMENT — PAIN SCALES - GENERAL: PAINLEVEL: EXTREME PAIN (8)

## 2021-10-11 NOTE — LETTER
10/11/2021         RE: Ebonie Bowman  [facility]  [place] [address]        Dear Colleague,    Thank you for referring your patient, Ebonie Bowman, to the [facility]. Please see a copy of my visit note below.    Medical Record review and prep:  Start Time: 1243  End Time: 1246  TT: 3      Ebonie Bowman is a 71 year old person last evaluated 8/2/21.  h/o morbid obesity, RA, Stage 3 kidney disease, DM, facet arthropathy, SCS, spine surgery, knee replacement, hernial repair and eye surgery. Is being evaluated for sacroiliitis, myalgia upper trap, chronic low back pain.  .      Major issues:  1. Chronic pain syndrome    2. Sacroiliitis (H)      Patient Active Problem List   Diagnosis     S/P TKR (total knee replacement) using cement, left     Ataxia     Axonal neuropathy     Type 2 diabetes mellitus with chronic kidney disease, with long-term current use of insulin (H)     Paraparesis (H)     Osteoarthrosis     Allergic rhinitis due to cats     Arthropathy of spinal facet joint concurrent with and due to effusion     Atherosclerotic cardiovascular disease     Backache     Bilateral primary osteoarthritis of hip     Chronic pain     Controlled substance agreement signed     Depression     Essential hypertension     GERD (gastroesophageal reflux disease)     High risk medication use     Hyperlipidemia     Insomnia     Lumbar radiculopathy     Obesity     Pain in joint, pelvic region and thigh     Positive FIT (fecal immunochemical test)     Radiculopathy of cervicothoracic region     Seropositive rheumatoid arthritis (H)     S/P insertion of spinal cord stimulator     Venous insufficiency     Stage 3b chronic kidney disease (H)       _____  HPI:  _____      Location of the pain: low back   Timing: constant  Quality: hard pain  Aggravating factors: standing, sitting, walking, reaching, twisting, lifting, squatting, bending, going or down stairs, getting out of the car or bed  Alleviating  "factors: medication, SCS  DME:eamon Colunga.     SCS Serial number [id number] implanted 7/8/2016 batter replacement 4/25/2019 (Has not been functioning properly- She met with  [employer] rep to check the stimulator - she needs to meet with the rep again - she would like it off and she would like to take the stimulator out. She does not like the stimulator b/c she does not want the stimulator to cycle on and off - she hs the controller but she is unclear how to work the device)     - She indicates she worked with the programming on her own she indicates she is doing ok    Severity: Today: 8    Since last visit pain has: unchanged  Associated symptoms: some difficulty with organization      Functional Symptoms: Pain interferes with:    Work:   Animal Care: 2 cats - her house keeper will help her care for the cats    Social indicators for health:  Transportation: She is using [employer]. She has called late and then she needs to have cash to pay. She indicates sometimes she has the money and sometimes she does not and they do not give change. She does not believe her insurance will cover medical rides.   Services: She reports having a case work she does not recall she has every spoken about issues with transportation    Pain Plan of Care Review:  Medication: Last dose was Percocet @ 10:00 am     Medication changes: reports using hydroxyzine she indicates she \"blanked out\" the whole night. She indicates she does not want to forget so she has not taken since.    She did not use the medminder      Current Outpatient Medications:      acetaminophen (TYLENOL) 500 MG tablet, [ACETAMINOPHEN (TYLENOL) 500 MG TABLET] Take 2 tablets (1,000 mg total) by mouth 3 (three) times a day. (Patient taking differently: Take 1,000 mg by mouth once ), Disp: , Rfl: 0     alcohol swab prep pads, Use to swab area of injection/jacquelyn as directed., Disp: 100 each, Rfl: 3     amLODIPine (NORVASC) 10 MG tablet, [AMLODIPINE (NORVASC) 10 " MG TABLET] TAKE 1 TABLET EVERY DAY, Disp: 90 tablet, Rfl: 2     ascorbic acid (ASCORBIC ACID WITH LATRICIA HIPS) 500 MG tablet, [ASCORBIC ACID (ASCORBIC ACID WITH LATRICIA HIPS) 500 MG TABLET] Take 500 mg by mouth every evening. , Disp: , Rfl:      atorvastatin (LIPITOR) 40 MG tablet, [ATORVASTATIN (LIPITOR) 40 MG TABLET] Take 1 tablet (40 mg total) by mouth at bedtime., Disp: 90 tablet, Rfl: 2     b complex vitamins tablet, [B COMPLEX VITAMINS TABLET] Take 1 tablet by mouth daily., Disp: , Rfl:      blood glucose (NO BRAND SPECIFIED) test strip, Use to test blood sugar 3 times daily or as directed. To accompany: Blood Glucose Monitor Brands: per insurance., Disp: 100 strip, Rfl: 6     blood glucose calibration (NO BRAND SPECIFIED) solution, To accompany: Blood Glucose Monitor Brands: per insurance., Disp: 1 each, Rfl: 0     blood glucose monitoring (NO BRAND SPECIFIED) meter device kit, Use to test blood sugar 3 times daily or as directed. Preferred blood glucose meter OR supplies to accompany: Blood Glucose Monitor Brands: per insurance., Disp: 1 kit, Rfl: 0     blood glucose test strips, [BLOOD GLUCOSE TEST STRIPS] Check blood sugar four times daily. Dispense brand per patient's insurance at pharmacy discretion. Dx: e11.9, Disp: 400 strip, Rfl: 3     blood-glucose meter Misc, [BLOOD-GLUCOSE METER MISC] Dispense one meter. Use as directed. Dx: e11.9, Disp: 1 each, Rfl: 0     cholecalciferol, vitamin D3, (VITAMIN D3 ORAL), [CHOLECALCIFEROL, VITAMIN D3, (VITAMIN D3 ORAL)] Take 3 tablets by mouth daily. , Disp: , Rfl:      flash glucose scanning reader (FREESTYLE BRE 14 DAY READER) Misc, [FLASH GLUCOSE SCANNING READER (FREESTYLE BRE 14 DAY READER) MISC] Use 1 each As Directed see administration instructions. Use as directed with sensor, Disp: 1 each, Rfl: 0     flash glucose sensor (FREESTYLE BRE 14 DAY SENSOR) Kit, [FLASH GLUCOSE SENSOR (FREESTYLE BRE 14 DAY SENSOR) KIT] Use 1 each As Directed every 14 (fourteen)  days., Disp: 2 kit, Rfl: 11     hydroxychloroquine (PLAQUENIL) 200 MG tablet, Take 1 tablet (200 mg) by mouth 2 times daily, Disp: 180 tablet, Rfl: 1     insulin NPH (NOVOLIN) 100 unit/mL injection, [INSULIN NPH (NOVOLIN) 100 UNIT/ML INJECTION] Inject 33 Units under the skin 2 (two) times a day before meals., Disp: , Rfl:      insulin syringe-needle U-100 (BD INSULIN SYRINGE ULT-FINE II) 1 mL 31 gauge x 5/16 Syrg, [INSULIN SYRINGE-NEEDLE U-100 (BD INSULIN SYRINGE ULT-FINE II) 1 ML 31 GAUGE X 5/16 SYRG] USE 4 TIMES DAILY AS DIRECTED, Disp: 500 each, Rfl: 1     lancing device (ACCU-CHEK SOFTCLIX) Misc, [LANCING DEVICE (ACCU-CHEK SOFTCLIX) MISC] Use 1 applicator As Directed 4 (four) times a day., Disp: 300 each, Rfl: 3     lidocaine (LMX) 4 % cream, [LIDOCAINE (LMX) 4 % CREAM] Apply to affected area twice daily as needed, Disp: 30 g, Rfl: 3     lisinopril (ZESTRIL) 10 MG tablet, [LISINOPRIL (PRINIVIL,ZESTRIL) 10 MG TABLET] TAKE 1 TABLET AT BEDTIME, Disp: 90 tablet, Rfl: 1     loratadine (CLARITIN) 10 mg tablet, [LORATADINE (CLARITIN) 10 MG TABLET] Take 1 tablet (10 mg total) by mouth at bedtime., Disp: 90 tablet, Rfl: 3     MEDICATION CANNOT BE REORDERED - PLEASE MANUALLY REORDER AND DISCONTINUE THE OLD ORDER, [OMEGA-3 FATTY ACIDS-VITAMIN E (FISH OIL) 1,000 MG CAP] Take 1,000 mg by mouth every evening., Disp: 90 each, Rfl: 3     methotrexate 2.5 MG tablet, Take 3 tablets (7.5 mg) by mouth once a week, Disp: 36 tablet, Rfl: 0     metoprolol succinate ER (TOPROL-XL) 50 MG 24 hr tablet, [METOPROLOL SUCCINATE (TOPROL-XL) 50 MG 24 HR TABLET] TAKE 1 TABLET EVERY DAY, Disp: 90 tablet, Rfl: 2     MULTIVITAMIN ORAL, [MULTIVITAMIN ORAL] Take 1 tablet by mouth every evening. , Disp: , Rfl:      naloxone (NARCAN) 4 MG/0.1ML nasal spray, Spray 1 spray (4 mg) into one nostril alternating nostrils once as needed for opioid reversal every 2-3 minutes until assistance arrives, Disp: 0.2 mL, Rfl: 0     oxyCODONE-acetaminophen (PERCOCET)  5-325 MG tablet, Take 1-2 tablets by mouth every 6 hours as needed for severe pain (Max of 6 tablets daily), Disp: 180 tablet, Rfl: 0     predniSONE (DELTASONE) 1 MG tablet, Take 2 tablets p.o. daily for 1 month then if stable decrease by  1/2 tablet (1/2 mg) every month., Disp: 60 tablet, Rfl: 2     thin (NO BRAND SPECIFIED) lancets, Use with lanceting device. To accompany: Blood Glucose Monitor Brands: per insurance., Disp: 100 each, Rfl: 11     traZODone (DESYREL) 150 MG tablet, Take 1 tablet (150 mg) by mouth At Bedtime, Disp: 90 tablet, Rfl: 1     predniSONE (DELTASONE) 2.5 MG tablet, Take 1 tablet po daily (Patient not taking: Reported on 10/11/2021), Disp: 90 tablet, Rfl: 0    :   10/11/2021 Reviewed to aid with decision regarding medication management    Rehabilitation:  Physical Therapy: several sessions of PT are scheduled in [facility]  Occupational Therapy: paused b/c transportation is a issue  Home exercise program: +  _________  Objective:  _________  Vitals:    10/11/21 1421   BP: 134/70   Pulse: 62   SpO2: 96%   Weight: 104.8 kg (231 lb)   PainSc: Extreme Pain (8)       Constitutional:  Pleasant and cooperative person who presents alone today.  Psychiatric: Mood and affect are appropriate for the situation, setting and topic of discussion.  Patient does not appear sedated.  Integumentary:  Observed skin WNL.   HEENT: EOM's grossly intact.    Chest: Breathing is non-labored.   Neurological:  Alert and oriented in all spheres including: time, place, person and situation.  Durable Medical Equipment:mask walker    Diagnostics:    Imaging:  Imaging pulled forward today - not specifically reviewed       EXAM: CT LUMBAR SPINE WO CONTRAST  LOCATION: [facility]  DATE/TIME: 2/22/2020 5:09 PM     INDICATION: Pain after fall  IMPRESSION:   1.  No evidence of an acute osseous abnormality.  2.  Multilevel degenerative changes of the lumbar spine, with at least moderate canal stenosis at L2-L3, L3-L4, and  L4-L5.  3.  Diffuse bony demineralization, suggestive of osteoporosis.  4.  Bilateral hydronephrosis, new from the prior CT of the abdomen and pelvis and of uncertain etiology. No evidence of an obstructing renal calculus.      ______________  Assessment:  ___________  Ebonie Bowman is a 71 year old person  h/o morbid obesity, RA, Stage 3 kidney disease, DM, facet arthropathy, SCS, spine surgery, knee replacement, hernial repair and eye surgery.    Clniic evaluation for sacroiliitis, myalgia upper trap, chronic low back pain.     PT -missed visits due to transportation issues - financials are playing into this would like her to connect with the  she has available Kree about helping her identify if she has access to medical rides and getting those rides set up.     OT - has stopped    [employer] - encouraged additional visits - she has been working with this option and she feels like she is learning this a bit better.     Hydroxyzine not taking felty she lost track of time only used once    She is working with primary care for pain medication.  Dicussed using a medminder - she is a pt who may do better with weekly or every 2 week script - ordered MTM services to aid with medication set up     *Universal Precautions: managed by primary care  UDT/Swab-   She did not leave enough urine for a UDT - she was given water during her visit - beyond RN connection after the visit she did not come back within 24 hours. This is unexpected, in this setting will not assume opioid prescribing until there is further assessment by the pain center clinician. Primary care may continue in their current fashion.  Consent- if prescribing opioids  Agreement- if prescribing opioids  Pharmacy- as documented   Count- n/a  Psychological evaluation - information provided  Pharmacogenetic testing- n/a  MME- 43 at 6 oxycodone per day  MTM - consider  Naloxone safety:   Medical Cannabis: na        Previously tried medications: H=Helpful.  NH=Not Helpful. U=Unsure. (n/a)=Not applicable  Acetaminophen: (nh)  NSAIDs:not able to take              Ibuprofen               Naproxen               Celecobix               Diclofenac   Gabapentinoids:               Gabapentin: (na)              Pregabalin: (na)  Antidepressants:               Amitriptyline: (na)              Nortriptyline: (na)              Duloxetine: (na)              Venlafaxine: (na)  Muscle Relaxants:               Tizanidine: (na)              Methocarbamol: (na)              Cyclobenzaprine: (na)              Metaxalone: (na)              Carisoprodol: (na)              Baclofen: (na)    Topicals:               Lidocaine: (limited impact)              Diclofenac gel: (na)              Compounded pain creams: (na)              OTC/Herbal topical pain applications: (tiger balm)  Opioids:               Codeine: (na)              Hydrocodone: (in the past)              Oxycodone: (taking)              Tramadol: (na)              Morphine: (makes me sick)              Hydromorphone: (felt sad)              Methadone: (na)              Fentanyl: (na)              Buprenorphine: (na)              Tapentadol: (na)              Oxymorphone: (na)  Supplements:               Tumeric: (na)              CBD: (na)              Vitamin D: (+)              Glucosamine/Chondroitin: (na)     ______  Plan:  _____  Thank you for participating in the clinic visit - Here is the Plan of Care / NextSteps:    Contact [phone] to reserve a time. You need to follow up by: 2.5 month     Communicating with us:  Please call Monday-Friday for problems or questions - leave a message and one of the clinical support staff (CSS) will help to get things figured out. The number is [phone].    You may also opt to communicate through [employer].    Rehabilitation: Remain active continue your home exercises and stretches.    Specialists: When you talk with your social work lets see if you can determine if you have  access to medical rides.  Then you can use your metro mobility money for other activities. Also lets see if she can assist with getting rides set up for your visits.     MTM: we discussed having you work with a pharmacist to aid with medication set up so you do not run out of the oxycodone your primary doctor has been providing.         Deirdre BO FNP-BC  [address] [address]  [phone]  [phone]      ANUPAM Waters CNP    Patient presents to the clinic today for a follow up with ANUPAM Waters CNP regarding  Pain Management     Patient describes their pain as Lower back- Ache- pain is just there.     Pain score: 8    Constant     Does the pain interfere with:    Work: NA    Walking/distance: Y    Sleep: y    Daily activities: Y    Relationships/social life: Y    Mood: Y    F= 7    Date and Time of Short Acting Narcotic Dose: Percocet @ 10:00 am       Date and Time of Long Acting Narcotic Dose:    Payal Morrell MA  [facility]       Again, thank you for allowing me to participate in the care of your patient.        Sincerely,        ANUPAM Waters CNP

## 2021-10-11 NOTE — PATIENT INSTRUCTIONS
___  Plan:  _____  Thank you for participating in the clinic visit - Here is the Plan of Care / NextSteps:    Contact [phone] to reserve a time. You need to follow up by: 2.5 month     Communicating with us:  Please call Monday-Friday for problems or questions - leave a message and one of the clinical support staff (CSS) will help to get things figured out. The number is [phone].    You may also opt to communicate through [employer].    Rehabilitation: Remain active continue your home exercises and stretches.    Specialists: When you talk with your social work lets see if you can determine if you have access to medical rides.  Then you can use your metro mobility money for other activities. Also lets see if she can assist with getting rides set up for your visits.     MTM: we discussed having you work with a pharmacist to aid with medication set up so you do not run out of the oxycodone your primary doctor has been providing.

## 2021-10-11 NOTE — PROGRESS NOTES
Patient presents to the clinic today for a follow up with ANUPAM Waters CNP regarding  Pain Management     Patient describes their pain as Lower back- Ache- pain is just there.     Pain score: 8    Constant     Does the pain interfere with:    Work: NA    Walking/distance: Y    Sleep: y    Daily activities: Y    Relationships/social life: Y    Mood: Y    F= 7    Date and Time of Short Acting Narcotic Dose: Percocet @ 10:00 am       Date and Time of Long Acting Narcotic Dose:    Payal Morrell MA  [facility]

## 2021-10-11 NOTE — TELEPHONE ENCOUNTER
Please route determinations to the Pharm Diabetes pool ([id number]).      Thank you,  [facility] Team

## 2021-10-11 NOTE — TELEPHONE ENCOUNTER
PA Initiation    Medication: flash glucose sensor (FREESTYLE BRE 14 DAY SENSOR) Kit   Insurance Company: [employer] - Phone [phone] Fax [phone]  Pharmacy Filling the Rx: [facility] - [place], [place] - [address]  Filling Pharmacy Phone: [phone]  Filling Pharmacy Fax:    Start Date: 10/11/2021

## 2021-10-11 NOTE — PROGRESS NOTES
Medical Record review and prep:  Start Time: 1243  End Time: 1246  TT: 3      Ebonie Bowman is a 71 year old person last evaluated 8/2/21.  h/o morbid obesity, RA, Stage 3 kidney disease, DM, facet arthropathy, SCS, spine surgery, knee replacement, hernial repair and eye surgery. Is being evaluated for sacroiliitis, myalgia upper trap, chronic low back pain.  .      Major issues:  1. Chronic pain syndrome    2. Sacroiliitis (H)      Patient Active Problem List   Diagnosis     S/P TKR (total knee replacement) using cement, left     Ataxia     Axonal neuropathy     Type 2 diabetes mellitus with chronic kidney disease, with long-term current use of insulin (H)     Paraparesis (H)     Osteoarthrosis     Allergic rhinitis due to cats     Arthropathy of spinal facet joint concurrent with and due to effusion     Atherosclerotic cardiovascular disease     Backache     Bilateral primary osteoarthritis of hip     Chronic pain     Controlled substance agreement signed     Depression     Essential hypertension     GERD (gastroesophageal reflux disease)     High risk medication use     Hyperlipidemia     Insomnia     Lumbar radiculopathy     Obesity     Pain in joint, pelvic region and thigh     Positive FIT (fecal immunochemical test)     Radiculopathy of cervicothoracic region     Seropositive rheumatoid arthritis (H)     S/P insertion of spinal cord stimulator     Venous insufficiency     Stage 3b chronic kidney disease (H)       _____  HPI:  _____      Location of the pain: low back   Timing: constant  Quality: hard pain  Aggravating factors: standing, sitting, walking, reaching, twisting, lifting, squatting, bending, going or down stairs, getting out of the car or bed  Alleviating factors: medication, SCS  DME:walker. Cane.     SCS Serial number [id number] implanted 7/8/2016 batter replacement 4/25/2019 (Has not been functioning properly- She met with  [employer] rep to check the stimulator - she needs to meet with the rep  "again - she would like it off and she would like to take the stimulator out. She does not like the stimulator b/c she does not want the stimulator to cycle on and off - she hs the controller but she is unclear how to work the device)     - She indicates she worked with the programming on her own she indicates she is doing ok    Severity: Today: 8    Since last visit pain has: unchanged  Associated symptoms: some difficulty with organization      Functional Symptoms: Pain interferes with:    Work:   Animal Care: 2 cats - her house keeper will help her care for the cats    Social indicators for health:  Transportation: She is using [employer]. She has called late and then she needs to have cash to pay. She indicates sometimes she has the money and sometimes she does not and they do not give change. She does not believe her insurance will cover medical rides.   Services: She reports having a case work she does not recall she has every spoken about issues with transportation    Pain Plan of Care Review:  Medication: Last dose was Percocet @ 10:00 am     Medication changes: reports using hydroxyzine she indicates she \"blanked out\" the whole night. She indicates she does not want to forget so she has not taken since.    She did not use the LoiLo      Current Outpatient Medications:      acetaminophen (TYLENOL) 500 MG tablet, [ACETAMINOPHEN (TYLENOL) 500 MG TABLET] Take 2 tablets (1,000 mg total) by mouth 3 (three) times a day. (Patient taking differently: Take 1,000 mg by mouth once ), Disp: , Rfl: 0     alcohol swab prep pads, Use to swab area of injection/jacquelyn as directed., Disp: 100 each, Rfl: 3     amLODIPine (NORVASC) 10 MG tablet, [AMLODIPINE (NORVASC) 10 MG TABLET] TAKE 1 TABLET EVERY DAY, Disp: 90 tablet, Rfl: 2     ascorbic acid (ASCORBIC ACID WITH LATRICIA HIPS) 500 MG tablet, [ASCORBIC ACID (ASCORBIC ACID WITH LATRICIA HIPS) 500 MG TABLET] Take 500 mg by mouth every evening. , Disp: , Rfl:      " atorvastatin (LIPITOR) 40 MG tablet, [ATORVASTATIN (LIPITOR) 40 MG TABLET] Take 1 tablet (40 mg total) by mouth at bedtime., Disp: 90 tablet, Rfl: 2     b complex vitamins tablet, [B COMPLEX VITAMINS TABLET] Take 1 tablet by mouth daily., Disp: , Rfl:      blood glucose (NO BRAND SPECIFIED) test strip, Use to test blood sugar 3 times daily or as directed. To accompany: Blood Glucose Monitor Brands: per insurance., Disp: 100 strip, Rfl: 6     blood glucose calibration (NO BRAND SPECIFIED) solution, To accompany: Blood Glucose Monitor Brands: per insurance., Disp: 1 each, Rfl: 0     blood glucose monitoring (NO BRAND SPECIFIED) meter device kit, Use to test blood sugar 3 times daily or as directed. Preferred blood glucose meter OR supplies to accompany: Blood Glucose Monitor Brands: per insurance., Disp: 1 kit, Rfl: 0     blood glucose test strips, [BLOOD GLUCOSE TEST STRIPS] Check blood sugar four times daily. Dispense brand per patient's insurance at pharmacy discretion. Dx: e11.9, Disp: 400 strip, Rfl: 3     blood-glucose meter Misc, [BLOOD-GLUCOSE METER MISC] Dispense one meter. Use as directed. Dx: e11.9, Disp: 1 each, Rfl: 0     cholecalciferol, vitamin D3, (VITAMIN D3 ORAL), [CHOLECALCIFEROL, VITAMIN D3, (VITAMIN D3 ORAL)] Take 3 tablets by mouth daily. , Disp: , Rfl:      flash glucose scanning reader (FREESTYLE BRE 14 DAY READER) Misc, [FLASH GLUCOSE SCANNING READER (FREESTYLE BRE 14 DAY READER) MISC] Use 1 each As Directed see administration instructions. Use as directed with sensor, Disp: 1 each, Rfl: 0     flash glucose sensor (FREESTYLE BRE 14 DAY SENSOR) Kit, [FLASH GLUCOSE SENSOR (FREESTYLE BRE 14 DAY SENSOR) KIT] Use 1 each As Directed every 14 (fourteen) days., Disp: 2 kit, Rfl: 11     hydroxychloroquine (PLAQUENIL) 200 MG tablet, Take 1 tablet (200 mg) by mouth 2 times daily, Disp: 180 tablet, Rfl: 1     insulin NPH (NOVOLIN) 100 unit/mL injection, [INSULIN NPH (NOVOLIN) 100 UNIT/ML  INJECTION] Inject 33 Units under the skin 2 (two) times a day before meals., Disp: , Rfl:      insulin syringe-needle U-100 (BD INSULIN SYRINGE ULT-FINE II) 1 mL 31 gauge x 5/16 Syrg, [INSULIN SYRINGE-NEEDLE U-100 (BD INSULIN SYRINGE ULT-FINE II) 1 ML 31 GAUGE X 5/16 SYRG] USE 4 TIMES DAILY AS DIRECTED, Disp: 500 each, Rfl: 1     lancing device (ACCU-CHEK SOFTCLIX) Misc, [LANCING DEVICE (ACCU-CHEK SOFTCLIX) MISC] Use 1 applicator As Directed 4 (four) times a day., Disp: 300 each, Rfl: 3     lidocaine (LMX) 4 % cream, [LIDOCAINE (LMX) 4 % CREAM] Apply to affected area twice daily as needed, Disp: 30 g, Rfl: 3     lisinopril (ZESTRIL) 10 MG tablet, [LISINOPRIL (PRINIVIL,ZESTRIL) 10 MG TABLET] TAKE 1 TABLET AT BEDTIME, Disp: 90 tablet, Rfl: 1     loratadine (CLARITIN) 10 mg tablet, [LORATADINE (CLARITIN) 10 MG TABLET] Take 1 tablet (10 mg total) by mouth at bedtime., Disp: 90 tablet, Rfl: 3     MEDICATION CANNOT BE REORDERED - PLEASE MANUALLY REORDER AND DISCONTINUE THE OLD ORDER, [OMEGA-3 FATTY ACIDS-VITAMIN E (FISH OIL) 1,000 MG CAP] Take 1,000 mg by mouth every evening., Disp: 90 each, Rfl: 3     methotrexate 2.5 MG tablet, Take 3 tablets (7.5 mg) by mouth once a week, Disp: 36 tablet, Rfl: 0     metoprolol succinate ER (TOPROL-XL) 50 MG 24 hr tablet, [METOPROLOL SUCCINATE (TOPROL-XL) 50 MG 24 HR TABLET] TAKE 1 TABLET EVERY DAY, Disp: 90 tablet, Rfl: 2     MULTIVITAMIN ORAL, [MULTIVITAMIN ORAL] Take 1 tablet by mouth every evening. , Disp: , Rfl:      naloxone (NARCAN) 4 MG/0.1ML nasal spray, Spray 1 spray (4 mg) into one nostril alternating nostrils once as needed for opioid reversal every 2-3 minutes until assistance arrives, Disp: 0.2 mL, Rfl: 0     oxyCODONE-acetaminophen (PERCOCET) 5-325 MG tablet, Take 1-2 tablets by mouth every 6 hours as needed for severe pain (Max of 6 tablets daily), Disp: 180 tablet, Rfl: 0     predniSONE (DELTASONE) 1 MG tablet, Take 2 tablets p.o. daily for 1 month then if stable  decrease by  1/2 tablet (1/2 mg) every month., Disp: 60 tablet, Rfl: 2     thin (NO BRAND SPECIFIED) lancets, Use with lanceting device. To accompany: Blood Glucose Monitor Brands: per insurance., Disp: 100 each, Rfl: 11     traZODone (DESYREL) 150 MG tablet, Take 1 tablet (150 mg) by mouth At Bedtime, Disp: 90 tablet, Rfl: 1     predniSONE (DELTASONE) 2.5 MG tablet, Take 1 tablet po daily (Patient not taking: Reported on 10/11/2021), Disp: 90 tablet, Rfl: 0    :   10/11/2021 Reviewed to aid with decision regarding medication management    Rehabilitation:  Physical Therapy: several sessions of PT are scheduled in [facility]  Occupational Therapy: paused b/c transportation is a issue  Home exercise program: +  _________  Objective:  _________  Vitals:    10/11/21 1421   BP: 134/70   Pulse: 62   SpO2: 96%   Weight: 104.8 kg (231 lb)   PainSc: Extreme Pain (8)       Constitutional:  Pleasant and cooperative person who presents alone today.  Psychiatric: Mood and affect are appropriate for the situation, setting and topic of discussion.  Patient does not appear sedated.  Integumentary:  Observed skin WNL.   HEENT: EOM's grossly intact.    Chest: Breathing is non-labored.   Neurological:  Alert and oriented in all spheres including: time, place, person and situation.  Durable Medical Equipment:mask walker    Diagnostics:    Imaging:  Imaging pulled forward today - not specifically reviewed       EXAM: CT LUMBAR SPINE WO CONTRAST  LOCATION: [facility]  DATE/TIME: 2/22/2020 5:09 PM     INDICATION: Pain after fall  IMPRESSION:   1.  No evidence of an acute osseous abnormality.  2.  Multilevel degenerative changes of the lumbar spine, with at least moderate canal stenosis at L2-L3, L3-L4, and L4-L5.  3.  Diffuse bony demineralization, suggestive of osteoporosis.  4.  Bilateral hydronephrosis, new from the prior CT of the abdomen and pelvis and of uncertain etiology. No evidence of an obstructing renal  calculus.      ______________  Assessment:  ___________  Ebonie Bowman is a 71 year old person  h/o morbid obesity, RA, Stage 3 kidney disease, DM, facet arthropathy, SCS, spine surgery, knee replacement, hernial repair and eye surgery.    Clniic evaluation for sacroiliitis, myalgia upper trap, chronic low back pain.     PT -missed visits due to transportation issues - financials are playing into this would like her to connect with the  she has available Kree about helping her identify if she has access to medical rides and getting those rides set up.     OT - has stopped    [employer] - encouraged additional visits - she has been working with this option and she feels like she is learning this a bit better.     Hydroxyzine not taking felty she lost track of time only used once    She is working with primary care for pain medication.  Dicussed using a medminder - she is a pt who may do better with weekly or every 2 week script - ordered MTM services to aid with medication set up     *Universal Precautions: managed by primary care  UDT/Swab-   She did not leave enough urine for a UDT - she was given water during her visit - beyond RN connection after the visit she did not come back within 24 hours. This is unexpected, in this setting will not assume opioid prescribing until there is further assessment by the pain center clinician. Primary care may continue in their current fashion.  Consent- if prescribing opioids  Agreement- if prescribing opioids  Pharmacy- as documented   Count- n/a  Psychological evaluation - information provided  Pharmacogenetic testing- n/a  MME- 43 at 6 oxycodone per day  MTM - consider  Naloxone safety:   Medical Cannabis: na        Previously tried medications: H=Helpful. NH=Not Helpful. U=Unsure. (n/a)=Not applicable  Acetaminophen: (nh)  NSAIDs:not able to take              Ibuprofen               Naproxen               Celecobix               Diclofenac   Gabapentinoids:                Gabapentin: (na)              Pregabalin: (na)  Antidepressants:               Amitriptyline: (na)              Nortriptyline: (na)              Duloxetine: (na)              Venlafaxine: (na)  Muscle Relaxants:               Tizanidine: (na)              Methocarbamol: (na)              Cyclobenzaprine: (na)              Metaxalone: (na)              Carisoprodol: (na)              Baclofen: (na)    Topicals:               Lidocaine: (limited impact)              Diclofenac gel: (na)              Compounded pain creams: (na)              OTC/Herbal topical pain applications: (tiger balm)  Opioids:               Codeine: (na)              Hydrocodone: (in the past)              Oxycodone: (taking)              Tramadol: (na)              Morphine: (makes me sick)              Hydromorphone: (felt sad)              Methadone: (na)              Fentanyl: (na)              Buprenorphine: (na)              Tapentadol: (na)              Oxymorphone: (na)  Supplements:               Tumeric: (na)              CBD: (na)              Vitamin D: (+)              Glucosamine/Chondroitin: (na)     ______  Plan:  _____  Thank you for participating in the clinic visit - Here is the Plan of Care / NextSteps:    Contact [phone] to reserve a time. You need to follow up by: 2.5 month     Communicating with us:  Please call Monday-Friday for problems or questions - leave a message and one of the clinical support staff (CSS) will help to get things figured out. The number is [phone].    You may also opt to communicate through [email or web address].    Rehabilitation: Remain active continue your home exercises and stretches.    Specialists: When you talk with your social work lets see if you can determine if you have access to medical rides.  Then you can use your metro mobility money for other activities. Also lets see if she can assist with getting rides set up for your visits.     MTM: we discussed having you work with a  pharmacist to aid with medication set up so you do not run out of the oxycodone your primary doctor has been providing.         Deirdre BO [facility]  [address] [address]  [phone]  [phone]      ANUPAM Waters CNP

## 2021-10-12 ENCOUNTER — TRANSFERRED RECORDS (OUTPATIENT)
Dept: HEALTH INFORMATION MANAGEMENT | Facility: CLINIC | Age: 71
End: 2021-10-12
Payer: COMMERCIAL

## 2021-10-12 LAB
CREATININE (EXTERNAL): 1.38 MG/DL (ref 0.6–0.93)
GFR ESTIMATED (EXTERNAL): 38 ML/MIN/1.73M2
GFR ESTIMATED (IF AFRICAN AMERICAN) (EXTERNAL): 44 ML/MIN/1.73M2
GLUCOSE (EXTERNAL): 212 MG/DL (ref 65–99)
POTASSIUM (EXTERNAL): 4.5 MMOL/L (ref 3.5–5.3)

## 2021-10-12 NOTE — TELEPHONE ENCOUNTER
PRIOR AUTHORIZATION DENIED    Medication: flash glucose sensor (FREESTYLE BRE 14 DAY SENSOR) Kit--DENIED    Denial Date: 10/11/2021    Denial Rational: Patient needs to be injecting insulin 3 or more times daily    Appeal Information:

## 2021-10-13 ENCOUNTER — TELEPHONE (OUTPATIENT)
Dept: PALLIATIVE MEDICINE | Facility: OTHER | Age: 71
End: 2021-10-13

## 2021-10-13 NOTE — TELEPHONE ENCOUNTER
MTM referral from: [facility] visit (referral by provider)    MTM referral outreach attempt #2 on October 13, 2021 at 12:21 PM      Outcome: Patient not reachable after several attempts, will route to MTM Pharmacist/Provider as an FYI.  MT scheduling number is [phone].  Thank you for the referral.    Jaycob Bullard, MTM coordinator

## 2021-10-18 DIAGNOSIS — G89.4 CHRONIC PAIN SYNDROME: ICD-10-CM

## 2021-10-18 NOTE — TELEPHONE ENCOUNTER
Pt is requesting refill of Percocet 5-325 MG    She has 3 days remaining of medication    Requesting RX is sent to [facility] in [place] is the preferred pharmacy.

## 2021-10-19 RX ORDER — OXYCODONE AND ACETAMINOPHEN 5; 325 MG/1; MG/1
1-2 TABLET ORAL EVERY 6 HOURS PRN
Qty: 180 TABLET | Refills: 0 | Status: ON HOLD | OUTPATIENT
Start: 2021-10-19 | End: 2021-10-29

## 2021-10-19 NOTE — TELEPHONE ENCOUNTER
Routing refill request to provider for review/approval because:  Controlled substance request    Last Written Prescription Date:  9/20/21  Last Fill Quantity: 180,  # refills: 0   Last office visit provider:  9/16/21     Requested Prescriptions   Pending Prescriptions Disp Refills     oxyCODONE-acetaminophen (PERCOCET) 5-325 MG tablet 180 tablet 0     Sig: Take 1-2 tablets by mouth every 6 hours as needed for severe pain (Max of 6 tablets daily)       There is no refill protocol information for this order          Flaco Menendez RN 10/19/21 11:56 AM

## 2021-10-21 ENCOUNTER — ALLIED HEALTH/NURSE VISIT (OUTPATIENT)
Dept: EDUCATION SERVICES | Facility: CLINIC | Age: 71
End: 2021-10-21
Payer: COMMERCIAL

## 2021-10-21 DIAGNOSIS — E11.9 DIABETES MELLITUS, TYPE 2 (H): Primary | ICD-10-CM

## 2021-10-21 PROCEDURE — G0108 DIAB MANAGE TRN  PER INDIV: HCPCS | Mod: AE

## 2021-10-21 RX ORDER — NAPROXEN SODIUM 220 MG
1 TABLET ORAL 2 TIMES DAILY
Qty: 100 EACH | Refills: 11 | Status: SHIPPED | OUTPATIENT
Start: 2021-10-21 | End: 2022-01-24

## 2021-10-21 NOTE — LETTER
10/21/2021         RE: Ebonie Bowman  [address]  [place] [address]        Dear Colleague,    Thank you for referring your patient, Ebonie Bowman, to the [facility]. Please see a copy of my visit note below.    Diabetes Self-Management Education & Support    Presents for: Individual review    SUBJECTIVE/OBJECTIVE:  Presents for: Individual review  Accompanied by: Self  Diabetes education in the past 24mo: No  Focus of Visit: Monitoring, Healthy Eating  Diabetes type: Type 2  Date of diagnosis: 1995  Disease course: Worsening  Cultural Influences/Ethnic Background:  Not  or       Diabetes Symptoms & Complications:  Fatigue: Yes (afternoons)  Neuropathy: Yes  Polydipsia: Yes  Polyphagia: No  Polyuria: Sometimes  Visual change: Yes  Slow healing wounds: No  Complications assessed today?: Yes  Autonomic neuropathy: Yes  CVA: No  Heart disease:  (family history - father)  Retinopathy: No    Patient Problem List and Family Medical History reviewed for relevant medical history, current medical status, and diabetes risk factors.    Vitals:  There were no vitals taken for this visit.  Estimated body mass index is 31.33 kg/m  as calculated from the following:    Height as of 8/2/21: 1.829 m (6').    Weight as of 10/11/21: 104.8 kg (231 lb).   Last 3 BP:   BP Readings from Last 3 Encounters:   10/11/21 134/70   09/16/21 118/80   08/02/21 132/86       History   Smoking Status     Former Smoker     Packs/day: 1.00     Years: 43.00     Start date: 1/1/1965     Quit date: 8/1/2008   Smokeless Tobacco     Never Used       Labs:  Lab Results   Component Value Date    A1C 9.7 09/16/2021     Lab Results   Component Value Date    GLC 93 01/07/2021     Lab Results   Component Value Date    LDL 57 09/16/2021     Direct Measure HDL   Date Value Ref Range Status   09/16/2021 47 (L) >=50 mg/dL Final     Comment:     HDL Cholesterol Reference Range:     0-2 years:   No reference ranges  established for patients under 2 years old  at [email or web address] for lipid analytes.    2-8 years:  Greater than 45 mg/dL     18 years and older:   Female: Greater than or equal to 50 mg/dL   Male:   Greater than or equal to 40 mg/dL   ]  GFR Estimate   Date Value Ref Range Status   09/01/2021 36 (L) >60 mL/min/1.73m2 Final     Comment:     As of July 11, 2021, eGFR is calculated by the CKD-EPI creatinine equation, without race adjustment. eGFR can be influenced by muscle mass, exercise, and diet. The reported eGFR is an estimation only and is only applicable if the renal function is stable.   05/07/2021 40 (L) >60 mL/min/1.73m2 Final     GFR Estimate If Black   Date Value Ref Range Status   05/07/2021 48 (L) >60 mL/min/1.73m2 Final     Lab Results   Component Value Date    CR 1.44 09/01/2021     No results found for: MICROALBUMIN    Healthy Eating:  Healthy Eating Assessed Today: Yes  Meals include: Lunch, Dinner, Afternoon Snack, Evening Snack  Breakfast: only eating sometimes, around 9am  Lunch: 3062-6022: meals on wheels  Dinner: 430-630pm  Snacks: sometimes HS snack, about 50% of the time  Beverages: Water, Tea, Milk, Juice, Soda    Being Active:  Being Active Assessed Today: Yes  Exercise:: Currently not exercising  Barrier to exercise: Physical limitation (back problems)    Monitoring:  Monitoring Assessed Today: Yes  Did patient bring glucose meter to appointment? : Yes  Blood Glucose Meter: CGM        Taking Medications:  Diabetes Medication(s)     Insulin       insulin NPH (NOVOLIN) 100 unit/mL injection    [INSULIN NPH (NOVOLIN) 100 UNIT/ML INJECTION] Inject 33 Units under the skin 2 (two) times a day before meals.          Taking Medication Assessed Today: Yes  Current Treatments: Insulin Injections  Dose schedule:  (NPH 9am and 9pm)  Problems taking diabetes medications regularly?: No    Problem Solving:  Is the patient at risk for hypoglycemia?: Yes  Hypoglycemia Frequency: Monthly  Hypoglycemia  Treatment: Juice, Candy  Medical ID: No    Hypoglycemia symptoms  Dizziness or Light-Headedness: Yes  Hunger: No  Mood changes: Yes  Nervousness/Anxiety: Yes  Sleepiness: No  Sweats: Yes  Feeling shaky: Yes    Hypoglycemia Complications  Blackouts: No  Hospitalization: No  Nocturnal hypoglycemia: Yes  Required assistance: Yes  Seizures: No    Reducing Risks:  Reducing Risks Assessed Today: Yes  Diabetes Risks: Age over 45 years, Sedentary Lifestyle  CAD Risks: Diabetes Mellitus, Family history, Obesity, Sedentary lifestyle  Has dilated eye exam at least once a year?: Yes  Sees dentist every 6 months?: No    Healthy Coping:  Healthy Coping Assessed Today: Yes  Emotional response to diabetes: Ready to learn  Informal Support system:: Family, Children  Stage of change: PREPARATION (Decided to change - considering how)  Patient Activation Measure Survey Score:  No flowsheet data found.      REPORTS:              Pt verbalized understanding of concepts discussed and recommendations provided today.         ASSESSMENT:  Pt seen today for f/u. She has been taking NPH at 9am and 9pm as instructed. Am dose at 36 units, pm dose based on BG if >250 = 36 units, if <250 = 33 units. * OF NOTE: pt reports her syringe only goes to 30 units so she has been guesstimating the dose. Pt demonstrated today how she is doing this and she is pulling back to probably around 36-39 units. Ordered 0.5 ml syringes today, pt will  today from her pharmacy. Practiced w/ pt the lines go up by 2's.   She has not had any more lows.   Saida reports shows definite improvement. From 28% in range at our last visit to 44% range now.   Pt notes sensor fell off a couple days ago. She tried to call [employer]estyle but was very frustrated with the call and the customer service. Called today during our appt and reported 2 sensors fallen off and they are going to ship 2 replacement sensors to pt.       PLAN  Will adjust insulin today per protocol as follows  after looking at tamara and taking into account she has been guesstimating doses.     AM dose: 38 units   PM dose: <250 = 34 units, >250 = 38 units   This was all written down for pt and she verbalized understanding.   Pt will f/u 11/11 as scheduled. Will call sooner w/ any concerns or if she starts to have low BG.       Time Spent: 60 minutes  Encounter Type: Individual    Any diabetes medication dose changes were made via the CDE Protocol and Collaborative Practice Agreement with the patient's referring provider. A copy of this encounter was shared with the provider.

## 2021-10-21 NOTE — PROGRESS NOTES
Diabetes Self-Management Education & Support    Presents for: Individual review    SUBJECTIVE/OBJECTIVE:  Presents for: Individual review  Accompanied by: Self  Diabetes education in the past 24mo: No  Focus of Visit: Monitoring, Healthy Eating  Diabetes type: Type 2  Date of diagnosis: 1995  Disease course: Worsening  Cultural Influences/Ethnic Background:  Not  or       Diabetes Symptoms & Complications:  Fatigue: Yes (afternoons)  Neuropathy: Yes  Polydipsia: Yes  Polyphagia: No  Polyuria: Sometimes  Visual change: Yes  Slow healing wounds: No  Complications assessed today?: Yes  Autonomic neuropathy: Yes  CVA: No  Heart disease:  (family history - father)  Retinopathy: No    Patient Problem List and Family Medical History reviewed for relevant medical history, current medical status, and diabetes risk factors.    Vitals:  There were no vitals taken for this visit.  Estimated body mass index is 31.33 kg/m  as calculated from the following:    Height as of 8/2/21: 1.829 m (6').    Weight as of 10/11/21: 104.8 kg (231 lb).   Last 3 BP:   BP Readings from Last 3 Encounters:   10/11/21 134/70   09/16/21 118/80   08/02/21 132/86       History   Smoking Status     Former Smoker     Packs/day: 1.00     Years: 43.00     Start date: 1/1/1965     Quit date: 8/1/2008   Smokeless Tobacco     Never Used       Labs:  Lab Results   Component Value Date    A1C 9.7 09/16/2021     Lab Results   Component Value Date    GLC 93 01/07/2021     Lab Results   Component Value Date    LDL 57 09/16/2021     Direct Measure HDL   Date Value Ref Range Status   09/16/2021 47 (L) >=50 mg/dL Final     Comment:     HDL Cholesterol Reference Range:     0-2 years:   No reference ranges established for patients under 2 years old  at [facility] for lipid analytes.    2-8 years:  Greater than 45 mg/dL     18 years and older:   Female: Greater than or equal to 50 mg/dL   Male:   Greater than or equal to 40 mg/dL   ]  GFR  Estimate   Date Value Ref Range Status   09/01/2021 36 (L) >60 mL/min/1.73m2 Final     Comment:     As of July 11, 2021, eGFR is calculated by the CKD-EPI creatinine equation, without race adjustment. eGFR can be influenced by muscle mass, exercise, and diet. The reported eGFR is an estimation only and is only applicable if the renal function is stable.   05/07/2021 40 (L) >60 mL/min/1.73m2 Final     GFR Estimate If Black   Date Value Ref Range Status   05/07/2021 48 (L) >60 mL/min/1.73m2 Final     Lab Results   Component Value Date    CR 1.44 09/01/2021     No results found for: MICROALBUMIN    Healthy Eating:  Healthy Eating Assessed Today: Yes  Meals include: Lunch, Dinner, Afternoon Snack, Evening Snack  Breakfast: only eating sometimes, around 9am  Lunch: 8840-0438: meals on wheels  Dinner: 430-630pm  Snacks: sometimes HS snack, about 50% of the time  Beverages: Water, Tea, Milk, Juice, Soda    Being Active:  Being Active Assessed Today: Yes  Exercise:: Currently not exercising  Barrier to exercise: Physical limitation (back problems)    Monitoring:  Monitoring Assessed Today: Yes  Did patient bring glucose meter to appointment? : Yes  Blood Glucose Meter: CGM        Taking Medications:  Diabetes Medication(s)     Insulin       insulin NPH (NOVOLIN) 100 unit/mL injection    [INSULIN NPH (NOVOLIN) 100 UNIT/ML INJECTION] Inject 33 Units under the skin 2 (two) times a day before meals.          Taking Medication Assessed Today: Yes  Current Treatments: Insulin Injections  Dose schedule:  (NPH 9am and 9pm)  Problems taking diabetes medications regularly?: No    Problem Solving:  Is the patient at risk for hypoglycemia?: Yes  Hypoglycemia Frequency: Monthly  Hypoglycemia Treatment: Juice, Candy  Medical ID: No    Hypoglycemia symptoms  Dizziness or Light-Headedness: Yes  Hunger: No  Mood changes: Yes  Nervousness/Anxiety: Yes  Sleepiness: No  Sweats: Yes  Feeling shaky: Yes    Hypoglycemia Complications  Blackouts:  No  Hospitalization: No  Nocturnal hypoglycemia: Yes  Required assistance: Yes  Seizures: No    Reducing Risks:  Reducing Risks Assessed Today: Yes  Diabetes Risks: Age over 45 years, Sedentary Lifestyle  CAD Risks: Diabetes Mellitus, Family history, Obesity, Sedentary lifestyle  Has dilated eye exam at least once a year?: Yes  Sees dentist every 6 months?: No    Healthy Coping:  Healthy Coping Assessed Today: Yes  Emotional response to diabetes: Ready to learn  Informal Support system:: Family, Children  Stage of change: PREPARATION (Decided to change - considering how)  Patient Activation Measure Survey Score:  No flowsheet data found.      REPORTS:              Pt verbalized understanding of concepts discussed and recommendations provided today.         ASSESSMENT:  Pt seen today for f/u. She has been taking NPH at 9am and 9pm as instructed. Am dose at 36 units, pm dose based on BG if >250 = 36 units, if <250 = 33 units. * OF NOTE: pt reports her syringe only goes to 30 units so she has been guesstimating the dose. Pt demonstrated today how she is doing this and she is pulling back to probably around 36-39 units. Ordered 0.5 ml syringes today, pt will  today from her pharmacy. Practiced w/ pt the lines go up by 2's.   She has not had any more lows.   Saida reports shows definite improvement. From 28% in range at our last visit to 44% range now.   Pt notes sensor fell off a couple days ago. She tried to call [employer] but was very frustrated with the call and the customer service. Called today during our appt and reported 2 sensors fallen off and they are going to ship 2 replacement sensors to pt.       PLAN  Will adjust insulin today per protocol as follows after looking at saida and taking into account she has been guesstimating doses.     AM dose: 38 units   PM dose: <250 = 34 units, >250 = 38 units   This was all written down for pt and she verbalized understanding.   Pt will f/u 11/11 as scheduled.  Will call sooner w/ any concerns or if she starts to have low BG.       Time Spent: 60 minutes  Encounter Type: Individual    Any diabetes medication dose changes were made via the CDE Protocol and Collaborative Practice Agreement with the patient's referring provider. A copy of this encounter was shared with the provider.

## 2021-10-25 ENCOUNTER — HOSPITAL ENCOUNTER (INPATIENT)
Facility: HOSPITAL | Age: 71
LOS: 2 days | Discharge: NURSING, REHAB OR LONG-TERM CARE | DRG: 312 | End: 2021-10-29
Attending: STUDENT IN AN ORGANIZED HEALTH CARE EDUCATION/TRAINING PROGRAM | Admitting: FAMILY MEDICINE
Payer: COMMERCIAL

## 2021-10-25 ENCOUNTER — APPOINTMENT (OUTPATIENT)
Dept: CT IMAGING | Facility: HOSPITAL | Age: 71
DRG: 312 | End: 2021-10-25
Attending: STUDENT IN AN ORGANIZED HEALTH CARE EDUCATION/TRAINING PROGRAM
Payer: COMMERCIAL

## 2021-10-25 DIAGNOSIS — I10 ESSENTIAL HYPERTENSION: Primary | ICD-10-CM

## 2021-10-25 DIAGNOSIS — M05.9 SEROPOSITIVE RHEUMATOID ARTHRITIS (H): ICD-10-CM

## 2021-10-25 DIAGNOSIS — E11.22 TYPE 2 DIABETES MELLITUS WITH STAGE 3 CHRONIC KIDNEY DISEASE, WITH LONG-TERM CURRENT USE OF INSULIN, UNSPECIFIED WHETHER STAGE 3A OR 3B CKD (H): ICD-10-CM

## 2021-10-25 DIAGNOSIS — R55 SYNCOPE, UNSPECIFIED SYNCOPE TYPE: ICD-10-CM

## 2021-10-25 DIAGNOSIS — Z79.4 TYPE 2 DIABETES MELLITUS WITH STAGE 3 CHRONIC KIDNEY DISEASE, WITH LONG-TERM CURRENT USE OF INSULIN, UNSPECIFIED WHETHER STAGE 3A OR 3B CKD (H): ICD-10-CM

## 2021-10-25 DIAGNOSIS — G89.4 CHRONIC PAIN SYNDROME: ICD-10-CM

## 2021-10-25 DIAGNOSIS — N18.30 TYPE 2 DIABETES MELLITUS WITH STAGE 3 CHRONIC KIDNEY DISEASE, WITH LONG-TERM CURRENT USE OF INSULIN, UNSPECIFIED WHETHER STAGE 3A OR 3B CKD (H): ICD-10-CM

## 2021-10-25 PROCEDURE — 96360 HYDRATION IV INFUSION INIT: CPT

## 2021-10-25 PROCEDURE — C9803 HOPD COVID-19 SPEC COLLECT: HCPCS

## 2021-10-25 PROCEDURE — 99285 EMERGENCY DEPT VISIT HI MDM: CPT | Mod: 25

## 2021-10-25 PROCEDURE — 93005 ELECTROCARDIOGRAM TRACING: CPT | Performed by: STUDENT IN AN ORGANIZED HEALTH CARE EDUCATION/TRAINING PROGRAM

## 2021-10-25 PROCEDURE — 72125 CT NECK SPINE W/O DYE: CPT

## 2021-10-25 PROCEDURE — 70450 CT HEAD/BRAIN W/O DYE: CPT

## 2021-10-25 ASSESSMENT — ENCOUNTER SYMPTOMS
WEAKNESS: 0
FEVER: 0
ABDOMINAL PAIN: 0
BACK PAIN: 1
HEADACHES: 0
NECK PAIN: 1
NUMBNESS: 0

## 2021-10-26 ENCOUNTER — APPOINTMENT (OUTPATIENT)
Dept: CARDIOLOGY | Facility: HOSPITAL | Age: 71
DRG: 312 | End: 2021-10-26
Attending: HOSPITALIST
Payer: COMMERCIAL

## 2021-10-26 ENCOUNTER — APPOINTMENT (OUTPATIENT)
Dept: ULTRASOUND IMAGING | Facility: HOSPITAL | Age: 71
DRG: 312 | End: 2021-10-26
Attending: HOSPITALIST
Payer: COMMERCIAL

## 2021-10-26 ENCOUNTER — APPOINTMENT (OUTPATIENT)
Dept: RADIOLOGY | Facility: HOSPITAL | Age: 71
DRG: 312 | End: 2021-10-26
Attending: FAMILY MEDICINE
Payer: COMMERCIAL

## 2021-10-26 ENCOUNTER — APPOINTMENT (OUTPATIENT)
Dept: NEUROLOGY | Facility: HOSPITAL | Age: 71
DRG: 312 | End: 2021-10-26
Attending: PSYCHIATRY & NEUROLOGY
Payer: COMMERCIAL

## 2021-10-26 PROBLEM — R55 SYNCOPE, UNSPECIFIED SYNCOPE TYPE: Status: ACTIVE | Noted: 2021-10-26

## 2021-10-26 LAB
ALBUMIN SERPL-MCNC: 3.5 G/DL (ref 3.5–5)
ALP SERPL-CCNC: 52 U/L (ref 45–120)
ALT SERPL W P-5'-P-CCNC: 14 U/L (ref 0–45)
ANION GAP SERPL CALCULATED.3IONS-SCNC: 7 MMOL/L (ref 5–18)
AST SERPL W P-5'-P-CCNC: 14 U/L (ref 0–40)
ATRIAL RATE - MUSE: 57 BPM
BASOPHILS # BLD AUTO: 0.1 10E3/UL (ref 0–0.2)
BASOPHILS NFR BLD AUTO: 1 %
BILIRUB SERPL-MCNC: 0.7 MG/DL (ref 0–1)
BUN SERPL-MCNC: 19 MG/DL (ref 8–28)
CALCIUM SERPL-MCNC: 9.4 MG/DL (ref 8.5–10.5)
CHLORIDE BLD-SCNC: 105 MMOL/L (ref 98–107)
CK SERPL-CCNC: 37 U/L (ref 30–190)
CO2 SERPL-SCNC: 28 MMOL/L (ref 22–31)
CREAT SERPL-MCNC: 1.34 MG/DL (ref 0.6–1.1)
DIASTOLIC BLOOD PRESSURE - MUSE: 65 MMHG
EOSINOPHIL # BLD AUTO: 0.2 10E3/UL (ref 0–0.7)
EOSINOPHIL NFR BLD AUTO: 2 %
ERYTHROCYTE [DISTWIDTH] IN BLOOD BY AUTOMATED COUNT: 12.9 % (ref 10–15)
GFR SERPL CREATININE-BSD FRML MDRD: 39 ML/MIN/1.73M2
GLUCOSE BLD-MCNC: 158 MG/DL (ref 70–125)
GLUCOSE BLDC GLUCOMTR-MCNC: 112 MG/DL (ref 70–99)
GLUCOSE BLDC GLUCOMTR-MCNC: 141 MG/DL (ref 70–99)
GLUCOSE BLDC GLUCOMTR-MCNC: 145 MG/DL (ref 70–99)
GLUCOSE BLDC GLUCOMTR-MCNC: 273 MG/DL (ref 70–99)
HCT VFR BLD AUTO: 41.5 % (ref 35–47)
HGB BLD-MCNC: 13.7 G/DL (ref 11.7–15.7)
HOLD SPECIMEN: NORMAL
HOLD SPECIMEN: NORMAL
IMM GRANULOCYTES # BLD: 0 10E3/UL
IMM GRANULOCYTES NFR BLD: 0 %
INTERPRETATION ECG - MUSE: NORMAL
LACTATE SERPL-SCNC: 1 MMOL/L (ref 0.7–2)
LVEF ECHO: NORMAL
LYMPHOCYTES # BLD AUTO: 2 10E3/UL (ref 0.8–5.3)
LYMPHOCYTES NFR BLD AUTO: 19 %
MAGNESIUM SERPL-MCNC: 1.8 MG/DL (ref 1.8–2.6)
MAGNESIUM SERPL-MCNC: 1.9 MG/DL (ref 1.8–2.6)
MCH RBC QN AUTO: 31.1 PG (ref 26.5–33)
MCHC RBC AUTO-ENTMCNC: 33 G/DL (ref 31.5–36.5)
MCV RBC AUTO: 94 FL (ref 78–100)
MONOCYTES # BLD AUTO: 0.7 10E3/UL (ref 0–1.3)
MONOCYTES NFR BLD AUTO: 6 %
NEUTROPHILS # BLD AUTO: 7.5 10E3/UL (ref 1.6–8.3)
NEUTROPHILS NFR BLD AUTO: 72 %
NRBC # BLD AUTO: 0 10E3/UL
NRBC BLD AUTO-RTO: 0 /100
P AXIS - MUSE: 67 DEGREES
PLATELET # BLD AUTO: 142 10E3/UL (ref 150–450)
POTASSIUM BLD-SCNC: 3.9 MMOL/L (ref 3.5–5)
PR INTERVAL - MUSE: 220 MS
PROT SERPL-MCNC: 6.6 G/DL (ref 6–8)
QRS DURATION - MUSE: 114 MS
QT - MUSE: 526 MS
QTC - MUSE: 511 MS
R AXIS - MUSE: 109 DEGREES
RBC # BLD AUTO: 4.4 10E6/UL (ref 3.8–5.2)
SARS-COV-2 RNA RESP QL NAA+PROBE: NEGATIVE
SODIUM SERPL-SCNC: 140 MMOL/L (ref 136–145)
SYSTOLIC BLOOD PRESSURE - MUSE: 147 MMHG
T AXIS - MUSE: 49 DEGREES
TROPONIN I SERPL-MCNC: 0.03 NG/ML (ref 0–0.29)
TROPONIN I SERPL-MCNC: 0.04 NG/ML (ref 0–0.29)
VENTRICULAR RATE- MUSE: 57 BPM
WBC # BLD AUTO: 10.5 10E3/UL (ref 4–11)

## 2021-10-26 PROCEDURE — G0378 HOSPITAL OBSERVATION PER HR: HCPCS

## 2021-10-26 PROCEDURE — 80053 COMPREHEN METABOLIC PANEL: CPT | Performed by: STUDENT IN AN ORGANIZED HEALTH CARE EDUCATION/TRAINING PROGRAM

## 2021-10-26 PROCEDURE — 84484 ASSAY OF TROPONIN QUANT: CPT | Performed by: FAMILY MEDICINE

## 2021-10-26 PROCEDURE — 255N000002 HC RX 255 OP 636: Performed by: FAMILY MEDICINE

## 2021-10-26 PROCEDURE — 96361 HYDRATE IV INFUSION ADD-ON: CPT

## 2021-10-26 PROCEDURE — 36415 COLL VENOUS BLD VENIPUNCTURE: CPT | Performed by: FAMILY MEDICINE

## 2021-10-26 PROCEDURE — 82962 GLUCOSE BLOOD TEST: CPT

## 2021-10-26 PROCEDURE — 83605 ASSAY OF LACTIC ACID: CPT | Performed by: STUDENT IN AN ORGANIZED HEALTH CARE EDUCATION/TRAINING PROGRAM

## 2021-10-26 PROCEDURE — 96372 THER/PROPH/DIAG INJ SC/IM: CPT | Performed by: FAMILY MEDICINE

## 2021-10-26 PROCEDURE — 84484 ASSAY OF TROPONIN QUANT: CPT | Performed by: STUDENT IN AN ORGANIZED HEALTH CARE EDUCATION/TRAINING PROGRAM

## 2021-10-26 PROCEDURE — 95819 EEG AWAKE AND ASLEEP: CPT | Mod: 26 | Performed by: PSYCHIATRY & NEUROLOGY

## 2021-10-26 PROCEDURE — 93880 EXTRACRANIAL BILAT STUDY: CPT

## 2021-10-26 PROCEDURE — 87635 SARS-COV-2 COVID-19 AMP PRB: CPT | Performed by: STUDENT IN AN ORGANIZED HEALTH CARE EDUCATION/TRAINING PROGRAM

## 2021-10-26 PROCEDURE — 99205 OFFICE O/P NEW HI 60 MIN: CPT | Performed by: PSYCHIATRY & NEUROLOGY

## 2021-10-26 PROCEDURE — 258N000003 HC RX IP 258 OP 636: Performed by: STUDENT IN AN ORGANIZED HEALTH CARE EDUCATION/TRAINING PROGRAM

## 2021-10-26 PROCEDURE — 250N000012 HC RX MED GY IP 250 OP 636 PS 637: Performed by: FAMILY MEDICINE

## 2021-10-26 PROCEDURE — 82550 ASSAY OF CK (CPK): CPT | Performed by: STUDENT IN AN ORGANIZED HEALTH CARE EDUCATION/TRAINING PROGRAM

## 2021-10-26 PROCEDURE — 84484 ASSAY OF TROPONIN QUANT: CPT | Performed by: HOSPITALIST

## 2021-10-26 PROCEDURE — 73521 X-RAY EXAM HIPS BI 2 VIEWS: CPT

## 2021-10-26 PROCEDURE — 36415 COLL VENOUS BLD VENIPUNCTURE: CPT | Performed by: STUDENT IN AN ORGANIZED HEALTH CARE EDUCATION/TRAINING PROGRAM

## 2021-10-26 PROCEDURE — 95819 EEG AWAKE AND ASLEEP: CPT

## 2021-10-26 PROCEDURE — 83735 ASSAY OF MAGNESIUM: CPT | Performed by: STUDENT IN AN ORGANIZED HEALTH CARE EDUCATION/TRAINING PROGRAM

## 2021-10-26 PROCEDURE — 999N000208 ECHOCARDIOGRAM COMPLETE

## 2021-10-26 PROCEDURE — 83735 ASSAY OF MAGNESIUM: CPT | Performed by: FAMILY MEDICINE

## 2021-10-26 PROCEDURE — 85025 COMPLETE CBC W/AUTO DIFF WBC: CPT | Performed by: STUDENT IN AN ORGANIZED HEALTH CARE EDUCATION/TRAINING PROGRAM

## 2021-10-26 PROCEDURE — 99220 PR INITIAL OBSERVATION CARE,LEVEL III: CPT | Performed by: FAMILY MEDICINE

## 2021-10-26 PROCEDURE — 250N000013 HC RX MED GY IP 250 OP 250 PS 637: Performed by: HOSPITALIST

## 2021-10-26 PROCEDURE — 36415 COLL VENOUS BLD VENIPUNCTURE: CPT | Performed by: HOSPITALIST

## 2021-10-26 PROCEDURE — 93306 TTE W/DOPPLER COMPLETE: CPT | Mod: 26 | Performed by: INTERNAL MEDICINE

## 2021-10-26 PROCEDURE — 250N000013 HC RX MED GY IP 250 OP 250 PS 637: Performed by: FAMILY MEDICINE

## 2021-10-26 RX ORDER — DEXTROSE MONOHYDRATE 25 G/50ML
25-50 INJECTION, SOLUTION INTRAVENOUS
Status: DISCONTINUED | OUTPATIENT
Start: 2021-10-26 | End: 2021-10-28

## 2021-10-26 RX ORDER — TRAZODONE HYDROCHLORIDE 50 MG/1
150 TABLET, FILM COATED ORAL AT BEDTIME
Status: DISCONTINUED | OUTPATIENT
Start: 2021-10-26 | End: 2021-10-29 | Stop reason: HOSPADM

## 2021-10-26 RX ORDER — ATORVASTATIN CALCIUM 40 MG/1
40 TABLET, FILM COATED ORAL DAILY
Status: DISCONTINUED | OUTPATIENT
Start: 2021-10-26 | End: 2021-10-29 | Stop reason: HOSPADM

## 2021-10-26 RX ORDER — METOPROLOL SUCCINATE 25 MG/1
50 TABLET, EXTENDED RELEASE ORAL DAILY
Status: DISCONTINUED | OUTPATIENT
Start: 2021-10-26 | End: 2021-10-29 | Stop reason: HOSPADM

## 2021-10-26 RX ORDER — NICOTINE POLACRILEX 4 MG
15-30 LOZENGE BUCCAL
Status: DISCONTINUED | OUTPATIENT
Start: 2021-10-26 | End: 2021-10-28

## 2021-10-26 RX ORDER — DEXTROSE MONOHYDRATE 25 G/50ML
25-50 INJECTION, SOLUTION INTRAVENOUS
Status: DISCONTINUED | OUTPATIENT
Start: 2021-10-26 | End: 2021-10-29 | Stop reason: HOSPADM

## 2021-10-26 RX ORDER — OXYCODONE AND ACETAMINOPHEN 5; 325 MG/1; MG/1
1 TABLET ORAL ONCE
Status: COMPLETED | OUTPATIENT
Start: 2021-10-26 | End: 2021-10-26

## 2021-10-26 RX ORDER — LORATADINE 10 MG/1
10 TABLET ORAL AT BEDTIME
Status: DISCONTINUED | OUTPATIENT
Start: 2021-10-26 | End: 2021-10-29 | Stop reason: HOSPADM

## 2021-10-26 RX ORDER — PREDNISONE 1 MG/1
1 TABLET ORAL DAILY
Status: DISCONTINUED | OUTPATIENT
Start: 2021-10-26 | End: 2021-10-29 | Stop reason: HOSPADM

## 2021-10-26 RX ORDER — NICOTINE POLACRILEX 4 MG
15-30 LOZENGE BUCCAL
Status: DISCONTINUED | OUTPATIENT
Start: 2021-10-26 | End: 2021-10-29 | Stop reason: HOSPADM

## 2021-10-26 RX ORDER — HYDRALAZINE HYDROCHLORIDE 20 MG/ML
5 INJECTION INTRAMUSCULAR; INTRAVENOUS EVERY 6 HOURS PRN
Status: DISCONTINUED | OUTPATIENT
Start: 2021-10-26 | End: 2021-10-29 | Stop reason: HOSPADM

## 2021-10-26 RX ORDER — SODIUM CHLORIDE 9 MG/ML
INJECTION, SOLUTION INTRAVENOUS ONCE
Status: COMPLETED | OUTPATIENT
Start: 2021-10-26 | End: 2021-10-26

## 2021-10-26 RX ORDER — ONDANSETRON 2 MG/ML
4 INJECTION INTRAMUSCULAR; INTRAVENOUS EVERY 6 HOURS PRN
Status: DISCONTINUED | OUTPATIENT
Start: 2021-10-26 | End: 2021-10-29 | Stop reason: HOSPADM

## 2021-10-26 RX ORDER — HYDROXYCHLOROQUINE SULFATE 200 MG/1
200 TABLET, FILM COATED ORAL 2 TIMES DAILY
Status: DISCONTINUED | OUTPATIENT
Start: 2021-10-26 | End: 2021-10-29 | Stop reason: HOSPADM

## 2021-10-26 RX ORDER — ONDANSETRON 4 MG/1
4 TABLET, ORALLY DISINTEGRATING ORAL EVERY 6 HOURS PRN
Status: DISCONTINUED | OUTPATIENT
Start: 2021-10-26 | End: 2021-10-29 | Stop reason: HOSPADM

## 2021-10-26 RX ORDER — ACETAMINOPHEN 325 MG/1
650 TABLET ORAL EVERY 6 HOURS PRN
Status: DISCONTINUED | OUTPATIENT
Start: 2021-10-26 | End: 2021-10-26

## 2021-10-26 RX ORDER — ACETAMINOPHEN 650 MG/1
650 SUPPOSITORY RECTAL EVERY 6 HOURS PRN
Status: DISCONTINUED | OUTPATIENT
Start: 2021-10-26 | End: 2021-10-26

## 2021-10-26 RX ORDER — OXYCODONE AND ACETAMINOPHEN 5; 325 MG/1; MG/1
1-2 TABLET ORAL EVERY 6 HOURS PRN
Status: DISCONTINUED | OUTPATIENT
Start: 2021-10-26 | End: 2021-10-29 | Stop reason: HOSPADM

## 2021-10-26 RX ORDER — VITAMIN B COMPLEX
25 TABLET ORAL DAILY
Status: DISCONTINUED | OUTPATIENT
Start: 2021-10-26 | End: 2021-10-29 | Stop reason: HOSPADM

## 2021-10-26 RX ORDER — AMLODIPINE BESYLATE 5 MG/1
10 TABLET ORAL DAILY
Status: DISCONTINUED | OUTPATIENT
Start: 2021-10-26 | End: 2021-10-29 | Stop reason: HOSPADM

## 2021-10-26 RX ORDER — ASPIRIN 81 MG/1
81 TABLET ORAL DAILY
COMMUNITY
End: 2022-03-14

## 2021-10-26 RX ORDER — CHLORAL HYDRATE 500 MG
1 CAPSULE ORAL DAILY
COMMUNITY

## 2021-10-26 RX ADMIN — SODIUM CHLORIDE: 9 INJECTION, SOLUTION INTRAVENOUS at 01:46

## 2021-10-26 RX ADMIN — OXYCODONE HYDROCHLORIDE AND ACETAMINOPHEN 2 TABLET: 5; 325 TABLET ORAL at 16:47

## 2021-10-26 RX ADMIN — HYDROXYCHLOROQUINE SULFATE 200 MG: 200 TABLET, FILM COATED ORAL at 21:02

## 2021-10-26 RX ADMIN — ACETAMINOPHEN 650 MG: 325 TABLET ORAL at 08:51

## 2021-10-26 RX ADMIN — OXYCODONE HYDROCHLORIDE AND ACETAMINOPHEN 2 TABLET: 5; 325 TABLET ORAL at 22:50

## 2021-10-26 RX ADMIN — INSULIN ASPART 1 UNITS: 100 INJECTION, SOLUTION INTRAVENOUS; SUBCUTANEOUS at 21:43

## 2021-10-26 RX ADMIN — Medication 500 MG: at 12:32

## 2021-10-26 RX ADMIN — AMLODIPINE BESYLATE 10 MG: 5 TABLET ORAL at 12:33

## 2021-10-26 RX ADMIN — INSULIN ASPART 1 UNITS: 100 INJECTION, SOLUTION INTRAVENOUS; SUBCUTANEOUS at 18:16

## 2021-10-26 RX ADMIN — ATORVASTATIN CALCIUM 40 MG: 40 TABLET, FILM COATED ORAL at 12:35

## 2021-10-26 RX ADMIN — METOPROLOL SUCCINATE 50 MG: 50 TABLET, EXTENDED RELEASE ORAL at 12:31

## 2021-10-26 RX ADMIN — Medication 25 MCG: at 12:36

## 2021-10-26 RX ADMIN — OXYCODONE HYDROCHLORIDE AND ACETAMINOPHEN 1 TABLET: 5; 325 TABLET ORAL at 09:43

## 2021-10-26 RX ADMIN — PERFLUTREN 3 ML: 6.52 INJECTION, SUSPENSION INTRAVENOUS at 12:25

## 2021-10-26 RX ADMIN — TRAZODONE HYDROCHLORIDE 150 MG: 50 TABLET ORAL at 21:02

## 2021-10-26 RX ADMIN — PREDNISONE 1 MG: 1 TABLET ORAL at 12:31

## 2021-10-26 RX ADMIN — LORATADINE 10 MG: 10 TABLET ORAL at 21:02

## 2021-10-26 RX ADMIN — HYDROXYCHLOROQUINE SULFATE 200 MG: 200 TABLET, FILM COATED ORAL at 12:30

## 2021-10-26 ASSESSMENT — ENCOUNTER SYMPTOMS
SHORTNESS OF BREATH: 0
COUGH: 0
EYE PAIN: 0
VOMITING: 0
SEIZURES: 0
COLOR CHANGE: 0
BACK PAIN: 0
WHEEZING: 0
DIFFICULTY URINATING: 0
DIARRHEA: 0
DYSURIA: 0
NAUSEA: 0
SORE THROAT: 0
FEVER: 0
HEMATURIA: 0
HEADACHES: 0
LIGHT-HEADEDNESS: 0
ABDOMINAL PAIN: 0
DIZZINESS: 0
CHILLS: 0
BLOOD IN STOOL: 0
CONSTIPATION: 0
ARTHRALGIAS: 0
PALPITATIONS: 0

## 2021-10-26 ASSESSMENT — MIFFLIN-ST. JEOR: SCORE: 1648.48

## 2021-10-26 ASSESSMENT — ACTIVITIES OF DAILY LIVING (ADL): DEPENDENT_IADLS:: TRANSPORTATION;CLEANING

## 2021-10-26 NOTE — CONSULTS
NEUROLOGY CONSULTATION NOTE     Ebonie Bowman,  1950, MRN [id number] Date: 10/26/2021     [facility]   Code status:  Full Code   PCP: Marichuy Rubio, [phone]      ASSESSMENT & PLAN   Diagnosis code: Spell of loss of consciousness/rule out seizure    Spell of loss of consciousness/rule out seizure      Head CT negative for structural lesions    MRI brain recommended though patient is claustrophobic and does not want to do that    EEG negative for epilepsy    Hold off antiepileptic medication for right now    Cardiac monitoring to rule out arrhythmias    Consider echocardiogram and cardiology consultation.    3-month driving restriction per [place] state law       Chief Complaint   Patient presents with     Fall        HISTORY OF PRESENT ILLNESS     We have been requested by Dr. Tan to evaluate Ebonie Bowman who is a 71 year old  female for spells of loss of consciousness.  This is a 71-year-old female with diabetes hypertension who had 2 syncopal events.  The first 1 happened while she was on the stairs.  Second 1 happened when she got out of bed and then went to the bathroom and try to sit down on the toilet seat and kept on slipping.  She is not sure how long she was out for.  She awoke and had her son called 911.  She thinks she was out for several minutes.  Denies any chest pains palpitations headaches.  Denies any other provoking factors.  No recent head injuries.  Denies any shortness of breath.  No changes in her medications.  Currently she feels she is back to her normal self.  There was no confusion or any premonitions.  Has not had passing out events in the past.     PAST MEDICAL & SURGICAL HISTORY     Medical History  Past Medical History:   Diagnosis Date     BBB (bundle branch block)      Chronic back pain      Chronic kidney disease     stage 3     Chronic kidney disease, unspecified     Created by Conversion      Depression      Diabetes (H)     Type 2     Diabetes mellitus, type 2  (H)     Created by Conversion      Frequent headaches      Ganglion of tendon sheath     Created by Conversion      GERD (gastroesophageal reflux disease)      HLD (hyperlipidemia)      Hypertension     Per H&P dated 2013     Incarcerated ventral hernia     Per H&P dated 2013     Osteoarthritis      Rheumatoid arthritis (H)      Shortness of breath      Thrombocytopenia (H)      Venous insufficiency      Surgical History  Past Surgical History:   Procedure Laterality Date     BACK SURGERY       C UNLISTED PROCEDURE, ABDOMEN/PERITONEUM/OMENTUM      Description: Hernia Repair;  Recorded: 10/23/2013;     CHOLECYSTECTOMY       HERNIA REPAIR       HYSTERECTOMY  1984     IR FINE NEEDLE ASPIRATION  2020     JOINT REPLACEMENT Left     knee     OTHER SURGICAL HISTORY  ,     Two left wrist     OTHER SURGICAL HISTORY      FATTY TUMORLT SHOULDER BLADE     PICC MIDLINE INSERTION  2020          PA IMPLANT SPINAL NEUROSTIM/ Left 2019    Procedure: LEFT FLANK INCISION REPLACE NEUROSTIMULATOR;  Surgeon: Vishal Pretty MD;  Location: [facility];  Service: Pain     SPINAL CORD STIMULATOR IMPLANT          SOCIAL HISTORY     Social History     Tobacco Use     Smoking status: Former Smoker     Packs/day: 1.00     Years: 43.00     Pack years: 43.00     Start date: 1965     Quit date: 2008     Years since quittin.2     Smokeless tobacco: Never Used   Substance Use Topics     Alcohol use: Yes     Comment: Alcoholic Drinks/day: rare     Drug use: No     Comment: Drug use: takes percocet for chronic pain        FAMILY HISTORY     Reviewed, and no family history of seizures     ALLERGIES     Allergies   Allergen Reactions     Morphine Nausea and Vomiting     Penicillins Hives     Tolerates ceftriaxone     Sulfa (Sulfonamide Antibiotics) [Sulfa Drugs] Hives        REVIEW OF SYSTEMS     12 system ROS was done in the HPI this was negative.     HOME & HOSPITAL MEDICATIONS      Prior to Admission Medications  Medications Prior to Admission   Medication Sig Dispense Refill Last Dose     amLODIPine (NORVASC) 10 MG tablet [AMLODIPINE (NORVASC) 10 MG TABLET] TAKE 1 TABLET EVERY DAY 90 tablet 2 10/25/2021 at Unknown time     ascorbic acid (ASCORBIC ACID WITH LATRICIA HIPS) 500 MG tablet Take 500 mg by mouth daily    10/25/2021 at Unknown time     aspirin 81 MG EC tablet Take 81 mg by mouth daily   10/25/2021 at Unknown time     atorvastatin (LIPITOR) 40 MG tablet [ATORVASTATIN (LIPITOR) 40 MG TABLET] Take 1 tablet (40 mg total) by mouth at bedtime. 90 tablet 2 10/25/2021 at AM     b complex vitamins tablet [B COMPLEX VITAMINS TABLET] Take 1 tablet by mouth daily.   10/25/2021 at Unknown time     fish oil-omega-3 fatty acids 1000 MG capsule Take 1 g by mouth daily   10/25/2021 at Unknown time     hydroxychloroquine (PLAQUENIL) 200 MG tablet Take 1 tablet (200 mg) by mouth 2 times daily 180 tablet 1 10/25/2021 at Unknown time     insulin NPH (NOVOLIN) 100 unit/mL injection Inject 34-38 Units Subcutaneous 2 times daily (before meals) AM: 38 units  PM: 34 units if BG < 250, 38 units if BG > 250   10/25/2021 at Unknown time     lidocaine (LMX) 4 % cream [LIDOCAINE (LMX) 4 % CREAM] Apply to affected area twice daily as needed 30 g 3 Unknown at Unknown time     lisinopril (ZESTRIL) 10 MG tablet [LISINOPRIL (PRINIVIL,ZESTRIL) 10 MG TABLET] TAKE 1 TABLET AT BEDTIME 90 tablet 1 10/25/2021 at AM     loratadine (CLARITIN) 10 mg tablet [LORATADINE (CLARITIN) 10 MG TABLET] Take 1 tablet (10 mg total) by mouth at bedtime. 90 tablet 3 10/25/2021 at AM     methotrexate 2.5 MG tablet Take 3 tablets (7.5 mg) by mouth once a week 36 tablet 0 10/22/2021 at Unknown time     metoprolol succinate ER (TOPROL-XL) 50 MG 24 hr tablet [METOPROLOL SUCCINATE (TOPROL-XL) 50 MG 24 HR TABLET] TAKE 1 TABLET EVERY DAY 90 tablet 2 10/25/2021 at Unknown time     MULTIVITAMIN ORAL Take 1 tablet by mouth daily    10/25/2021 at  Unknown time     naloxone (NARCAN) 4 MG/0.1ML nasal spray Spray 1 spray (4 mg) into one nostril alternating nostrils once as needed for opioid reversal every 2-3 minutes until assistance arrives 0.2 mL 0 Unknown at Unknown time     oxyCODONE-acetaminophen (PERCOCET) 5-325 MG tablet Take 1-2 tablets by mouth every 6 hours as needed for severe pain (Max of 6 tablets daily) 180 tablet 0 10/24/2021 at Unknown time     predniSONE (DELTASONE) 1 MG tablet Take 2 tablets p.o. daily for 1 month then if stable decrease by  1/2 tablet (1/2 mg) every month. (Patient taking differently: Take 1 mg by mouth daily If stable decrease by  1/2 tablet (1/2 mg) every month.) 60 tablet 2 10/25/2021 at Unknown time     traZODone (DESYREL) 150 MG tablet Take 1 tablet (150 mg) by mouth At Bedtime 90 tablet 1 10/25/2021 at Unknown time     Vitamin D3 (VITAMIN D3) 25 mcg (1000 units) tablet Take 1,000 Units by mouth daily    10/25/2021 at Unknown time     alcohol swab prep pads Use to swab area of injection/jacquelyn as directed. 100 each 3      blood glucose (NO BRAND SPECIFIED) test strip Use to test blood sugar 3 times daily or as directed. To accompany: Blood Glucose Monitor Brands: per insurance. 100 strip 6      blood glucose calibration (NO BRAND SPECIFIED) solution To accompany: Blood Glucose Monitor Brands: per insurance. 1 each 0      blood glucose monitoring (NO BRAND SPECIFIED) meter device kit Use to test blood sugar 3 times daily or as directed. Preferred blood glucose meter OR supplies to accompany: Blood Glucose Monitor Brands: per insurance. 1 kit 0      blood glucose test strips [BLOOD GLUCOSE TEST STRIPS] Check blood sugar four times daily. Dispense brand per patient's insurance at pharmacy discretion. Dx: e11.9 400 strip 3      blood-glucose meter Misc [BLOOD-GLUCOSE METER MISC] Dispense one meter. Use as directed. Dx: e11.9 1 each 0      flash glucose scanning reader (FREESTYLE BRE 14 DAY READER) Misc [FLASH GLUCOSE  "SCANNING READER (FREESTYLE BRE 14 DAY READER) MISC] Use 1 each As Directed see administration instructions. Use as directed with sensor 1 each 0      flash glucose sensor (FREESTYLE BRE 14 DAY SENSOR) Kit [FLASH GLUCOSE SENSOR (FREESTYLE BRE 14 DAY SENSOR) KIT] Use 1 each As Directed every 14 (fourteen) days. 2 kit 11      insulin syringe 31G X 5/16\" 0.5 ML MISC 1 each 2 times daily 100 each 11      insulin syringe-needle U-100 (BD INSULIN SYRINGE ULT-FINE II) 1 mL 31 gauge x 5/16 Syrg [INSULIN SYRINGE-NEEDLE U-100 (BD INSULIN SYRINGE ULT-FINE II) 1 ML 31 GAUGE X 5/16 SYRG] USE 4 TIMES DAILY AS DIRECTED 500 each 1      lancing device (ACCU-CHEK SOFTCLIX) Misc [LANCING DEVICE (ACCU-CHEK SOFTCLIX) MISC] Use 1 applicator As Directed 4 (four) times a day. 300 each 3      thin (NO BRAND SPECIFIED) lancets Use with lanceting device. To accompany: Blood Glucose Monitor Brands: per insurance. 100 each 11        Hospital Medications    amLODIPine  10 mg Oral Daily     atorvastatin  40 mg Oral Daily     hydroxychloroquine  200 mg Oral BID     insulin aspart  1-3 Units Subcutaneous TID AC     insulin aspart  1-3 Units Subcutaneous At Bedtime     insulin NPH  30 Units Subcutaneous BID AC     loratadine  10 mg Oral At Bedtime     [START ON 10/29/2021] methotrexate  7.5 mg Oral Weekly     metoprolol succinate ER  50 mg Oral Daily     predniSONE  1 mg Oral Daily     traZODone  150 mg Oral At Bedtime     vitamin C  500 mg Oral Daily     Vitamin D3  25 mcg Oral Daily        PHYSICAL EXAM     Vital signs  Temp:  [97.6  F (36.4  C)-98.2  F (36.8  C)] 98.2  F (36.8  C)  Pulse:  [53-91] 60  Resp:  [12-30] 18  BP: (116-188)/() 166/72  SpO2:  [93 %-98 %] 98 %    VITALS: BP (!) 166/72   Pulse 60   Temp 98.2  F (36.8  C) (Oral)   Resp 18   Ht 1.829 m (6')   Wt 103.1 kg (227 lb 6.4 oz)   SpO2 98%   BMI 30.84 kg/m    GENERAL -Health appearing, No apparent distress  EYES- No scleral icterus, no eyelid droop, Pupils - see " Neuro section  HEENT - Normocephalic, atraumatic, Hearing grossly intact; Oral mucosa moist and pink in color. External Ears and nose intact.   Neck - supple with no obvious lymphadenopathy or thyromegaly  PULM - Good spontaneous respiratory effort; Normal palpation of chest.   CV- Pedal pulses present with no peripheral edema/ No significant varicosities.  MSK- Gait - see Neuro section; Strength and tone- see Neuro section; Range of motion grossly intact.  PSYCH -cooperative    Neurological  Mental status - Patient is awake and oriented to self, place and time. Attention span is normal. Language is fluent and follows commands appropriately.   Cranial nerves - CN II-XII intact. Pupils are reactive and symmetric; EOMI, VFIT, NLF symmetric  Motor - There is no pronator drift. Motor exam shows 5/5 strength in all extremities.  Tone - Tone is symmetric bilaterally in upper and lower extremities.  Reflexes -toes are downgoing.  Sensation - Sensory exam is grossly intact to light touch, pain.  Coordination - Finger to nose and heel to shin is without dysmetria.  Gait and station --able to ambulate with assistance.  Formal gait testing cannot be done because of safety concerns from ongoing medical issues     DIAGNOSTIC STUDIES     Pertinent Radiology   Carotis US                                                      IMPRESSION:  1.  Mild plaque formation, velocities consistent with less than 50% stenosis in the right internal carotid artery.  2.  Mild plaque formation, velocities consistent with less than 50% stenosis in the left internal carotid artery.  3.  Flow within the vertebral arteries is antegrade.    CT head  IMPRESSION:  1.  No CT finding of a mass, hemorrhage or focal area suggestive of acute infarct.  2.  Minimal age related changes.    CT C spine  IMPRESSION:  1. No evidence of acute cervical spine fracture.  2. No significant canal compromise with scattered areas of neural foraminal narrowing as described  above.    EEG  IMPRESSION/REPORT/PLAN  This is a normal EEG during wakefulness and drowsiness/sleep except for mild generalized background dysrhythmia. Further clinical correlation is needed.     Please note that the absence of epileptiform abnormalities does not exclude the possibility of epilepsy in any patient.     EEG 2018  Impression  Normal awake and sleep stage I EEG     Signed  Dr. Monroe Salas    Recent Results (from the past 24 hour(s))   ECG 12-LEAD WITH MUSE ([facility])    Collection Time: 10/26/21 12:15 AM   Result Value Ref Range    Systolic Blood Pressure 147 mmHg    Diastolic Blood Pressure 65 mmHg    Ventricular Rate 57 BPM    Atrial Rate 57 BPM    MT Interval 220 ms    QRS Duration 114 ms     ms    QTc 511 ms    P Axis 67 degrees    R AXIS 109 degrees    T Axis 49 degrees    Interpretation ECG       Sinus bradycardia with 1st degree A-V block  Rightward axis  Low voltage QRS  Nonspecific ST and T wave abnormality  Prolonged QT  Abnormal ECG  When compared with ECG of 03-DEC-2020 14:57,  MT interval has increased  Right bundle branch block is no longer Present  Confirmed by SEE ED PROVIDER NOTE FOR, ECG INTERPRETATION ([id number]),  MOUSTAPHA SMART ([id number]) on 10/26/2021 7:01:03 AM     Comprehensive metabolic panel    Collection Time: 10/26/21 12:31 AM   Result Value Ref Range    Sodium 140 136 - 145 mmol/L    Potassium 3.9 3.5 - 5.0 mmol/L    Chloride 105 98 - 107 mmol/L    Carbon Dioxide (CO2) 28 22 - 31 mmol/L    Anion Gap 7 5 - 18 mmol/L    Urea Nitrogen 19 8 - 28 mg/dL    Creatinine 1.34 (H) 0.60 - 1.10 mg/dL    Calcium 9.4 8.5 - 10.5 mg/dL    Glucose 158 (H) 70 - 125 mg/dL    Alkaline Phosphatase 52 45 - 120 U/L    AST 14 0 - 40 U/L    ALT 14 0 - 45 U/L    Protein Total 6.6 6.0 - 8.0 g/dL    Albumin 3.5 3.5 - 5.0 g/dL    Bilirubin Total 0.7 0.0 - 1.0 mg/dL    GFR Estimate 39 (L) >60 mL/min/1.73m2   Lactic acid whole blood    Collection Time: 10/26/21 12:31 AM   Result Value Ref Range     Lactic Acid 1.0 0.7 - 2.0 mmol/L   Troponin I (now)    Collection Time: 10/26/21 12:31 AM   Result Value Ref Range    Troponin I 0.03 0.00 - 0.29 ng/mL   CK total    Collection Time: 10/26/21 12:31 AM   Result Value Ref Range    CK 37 30 - 190 U/L   CBC with platelets and differential    Collection Time: 10/26/21 12:31 AM   Result Value Ref Range    WBC Count 10.5 4.0 - 11.0 10e3/uL    RBC Count 4.40 3.80 - 5.20 10e6/uL    Hemoglobin 13.7 11.7 - 15.7 g/dL    Hematocrit 41.5 35.0 - 47.0 %    MCV 94 78 - 100 fL    MCH 31.1 26.5 - 33.0 pg    MCHC 33.0 31.5 - 36.5 g/dL    RDW 12.9 10.0 - 15.0 %    Platelet Count 142 (L) 150 - 450 10e3/uL    % Neutrophils 72 %    % Lymphocytes 19 %    % Monocytes 6 %    % Eosinophils 2 %    % Basophils 1 %    % Immature Granulocytes 0 %    NRBCs per 100 WBC 0 <1 /100    Absolute Neutrophils 7.5 1.6 - 8.3 10e3/uL    Absolute Lymphocytes 2.0 0.8 - 5.3 10e3/uL    Absolute Monocytes 0.7 0.0 - 1.3 10e3/uL    Absolute Eosinophils 0.2 0.0 - 0.7 10e3/uL    Absolute Basophils 0.1 0.0 - 0.2 10e3/uL    Absolute Immature Granulocytes 0.0 <=0.0 10e3/uL    Absolute NRBCs 0.0 10e3/uL   Extra Blue Top Tube    Collection Time: 10/26/21 12:31 AM   Result Value Ref Range    Hold Specimen JIC    Extra Red Top Tube    Collection Time: 10/26/21 12:31 AM   Result Value Ref Range    Hold Specimen JIC    Magnesium    Collection Time: 10/26/21 12:31 AM   Result Value Ref Range    Magnesium 1.8 1.8 - 2.6 mg/dL   Asymptomatic COVID-19 Virus (Coronavirus) by PCR Nasopharyngeal    Collection Time: 10/26/21  1:45 AM    Specimen: Nasopharyngeal; Swab   Result Value Ref Range    SARS CoV2 PCR Negative Negative   Troponin I    Collection Time: 10/26/21  7:39 AM   Result Value Ref Range    Troponin I 0.04 0.00 - 0.29 ng/mL   Magnesium    Collection Time: 10/26/21  7:39 AM   Result Value Ref Range    Magnesium 1.9 1.8 - 2.6 mg/dL   Glucose by meter    Collection Time: 10/26/21 10:40 AM   Result Value Ref Range    GLUCOSE  BY METER POCT 112 (H) 70 - 99 mg/dL   Echocardiogram Complete    Collection Time: 10/26/21 12:26 PM   Result Value Ref Range    LVEF  55-60%    Troponin I    Collection Time: 10/26/21  2:05 PM   Result Value Ref Range    Troponin I 0.03 0.00 - 0.29 ng/mL   Glucose by meter    Collection Time: 10/26/21  4:29 PM   Result Value Ref Range    GLUCOSE BY METER POCT 141 (H) 70 - 99 mg/dL   Glucose by meter    Collection Time: 10/26/21  5:59 PM   Result Value Ref Range    GLUCOSE BY METER POCT 145 (H) 70 - 99 mg/dL       Total time spent for face to face visit, reviewing labs/imaging studies, counseling and coordination of care was: Over 70 min More than 50% of this time was spent on counseling and coordination of care.    Counseling patient.  Getting EEG done.  Coordination of care with the primary team.    David Frances MD  Neurologist  [facility]  Tel:- [phone]

## 2021-10-26 NOTE — CONSULTS
Care Management Initial Consult    General Information  Assessment completed with: Patient, pt  Type of CM/SW Visit: Initial Assessment    Primary Care Provider verified and updated as needed: Yes   Readmission within the last 30 days: no previous admission in last 30 days   Return Category: Progression of disease  Reason for Consult: discharge planning  Advance Care Planning: Advance Care Planning Reviewed: no concerns identified          Communication Assessment  Patient's communication style: spoken language (English or Bilingual)    Hearing Difficulty or Deaf: no   Wear Glasses or Blind: yes    Cognitive  Cognitive/Neuro/Behavioral: WDL                      Living Environment:   People in home: child(zulma), adult  Karri  Current living Arrangements: apartment      Able to return to prior arrangements: yes       Family/Social Support:  Care provided by: self, child(zulma)  Provides care for: no one  Marital Status: Single  Children          Description of Support System: Supportive    Support Assessment: Adequate family and caregiver support    Current Resources:   Patient receiving home care services: No     Community Resources: DME, Housekeeping/Chore Agency  Equipment currently used at home: cane, straight, walker, standard  Supplies currently used at home:      Employment/Financial:  Employment Status:          Financial Concerns: No concerns identified   Referral to Financial Counselor: No       Lifestyle & Psychosocial Needs:  Social Determinants of Health     Tobacco Use: Medium Risk     Smoking Tobacco Use: Former Smoker     Smokeless Tobacco Use: Never Used   Alcohol Use:      Frequency of Alcohol Consumption:      Average Number of Drinks:      Frequency of Binge Drinking:    Financial Resource Strain:      Difficulty of Paying Living Expenses:    Food Insecurity:      Worried About Running Out of Food in the Last Year:      Ran Out of Food in the Last Year:    Transportation Needs:      Lack of  Transportation (Medical):      Lack of Transportation (Non-Medical):    Physical Activity:      Days of Exercise per Week:      Minutes of Exercise per Session:    Stress:      Feeling of Stress :    Social Connections:      Frequency of Communication with Friends and Family:      Frequency of Social Gatherings with Friends and Family:      Attends Yazidi Services:      Active Member of Clubs or Organizations:      Attends Club or Organization Meetings:      Marital Status:    Intimate Partner Violence:      Fear of Current or Ex-Partner:      Emotionally Abused:      Physically Abused:      Sexually Abused:    Depression: Not at risk     PHQ-2 Score: 0   Housing Stability:      Unable to Pay for Housing in the Last Year:      Number of Places Lived in the Last Year:      Unstable Housing in the Last Year:        Functional Status:  Prior to admission patient needed assistance:   Dependent ADLs:: Ambulation-cane, Ambulation-walker, Bathing  Dependent IADLs:: Transportation, Cleaning  Assesssment of Functional Status: Not at baseline with ADL Functioning    Mental Health Status:  Mental Health Status: No Current Concerns       Chemical Dependency Status:                Values/Beliefs:  Spiritual, Cultural Beliefs, Yazidi Practices, Values that affect care:                 Additional Information:  CHAPIN assessed, lives w/son, Karri. He will transport. Has housekeeping and weekly bath but not sure if it's a PCA or HHA svcs. She can't recall. Pt does not drive. Discussed GOMES.      Wan Chacko RN

## 2021-10-26 NOTE — ED PROVIDER NOTES
EMERGENCY DEPARTMENT ENCOUNTER      NAME: Ebonie Bowman  AGE: 71 year old female  YOB: 1950  MRN: [id number]  EVALUATION DATE & TIME: 10/25/2021 10:55 PM    PCP: Marichuy Rubio    ED PROVIDER: Eren Grace M.D.      Chief Complaint   Patient presents with     Fall         FINAL IMPRESSION:  Syncope    ED COURSE & MEDICAL DECISION MAKING:    Pertinent Labs & Imaging studies reviewed. (See chart for details)  71 year old female presents to the Emergency Department for evaluation of syncope.  Patient had 2 episodes of syncope today.  She has had a work-up with her primary doctor for 1 of these episodes quite some time ago but has never had an acute work-up.  Here in the ER if she has a very mild creatinine bump.  CT scan of the head and neck are normal.  EKG is nonspecific with no signs of arrhythmia or acute MI.  She did have a slightly prolonged QT however her magnesium is normal.  In the light of the 2 episodes and her age and admitted to the hospital for observation and further care.    11:14 PM I met with the patient, obtained an initial history, performed an examination and discussed the plan. PPE worn throughout all interactions with the patient, including N95 mask.        At the conclusion of the encounter I discussed the results of all of the tests and the disposition. The questions were answered. The patient or family acknowledged understanding and was agreeable with the care plan.           MEDICATIONS GIVEN IN THE EMERGENCY:  Medications   sodium chloride 0.9% infusion (has no administration in time range)       NEW PRESCRIPTIONS STARTED AT TODAY'S ER VISIT  New Prescriptions    No medications on file          =================================================================    HPI    Patient information was obtained from: Patient    Use of : N/A       Ebonie Bowman is a 71 year old female with a pertinent history of DM2, CKD3, HTN, HLD, rheumatoid arthritis, chronic back pain, who  presents to this ED by EMS for evaluation of neck pain, syncope.    Patient had gotten up from her chair and walked to the bathroom, and attempted to sit down on the toilet when she believes she lost consciousness and fell. She has new neck pain secondary to the fall. Her son lives with her and was able to help her get up. She did not get lightheaded or dizzy prior to falling. Patient fell this morning as well, she was walking up the stairs and fell on the last few stairs. She did not get lightheaded or dizzy at that time either. Patient has chronic low back pain that is unchanged. She has had similar episodes before and usually sees her PCP if she is able to make an appointment. No known heart problems. Denies chest pain, abdominal pain, fever, numbness, paresthesias, weakness, headache, or any additional symptoms at this time.     REVIEW OF SYSTEMS   Review of Systems   Constitutional: Negative for fever.   Cardiovascular: Negative for chest pain.   Gastrointestinal: Negative for abdominal pain.   Musculoskeletal: Positive for back pain and neck pain.   Neurological: Positive for syncope. Negative for weakness, numbness and headaches.        Negative for paresthesias.   All other systems reviewed and are negative.       PAST MEDICAL HISTORY:  Past Medical History:   Diagnosis Date     BBB (bundle branch block)      Chronic back pain      Chronic kidney disease     stage 3     Chronic kidney disease, unspecified     Created by Conversion      Depression      Diabetes (H)     Type 2     Diabetes mellitus, type 2 (H)     Created by Conversion      Frequent headaches      Ganglion of tendon sheath     Created by Conversion      GERD (gastroesophageal reflux disease)      HLD (hyperlipidemia)      Hypertension     Per H&P dated 1/03/2013     Incarcerated ventral hernia     Per H&P dated 1/03/2013     Osteoarthritis      Rheumatoid arthritis (H)      Shortness of breath      Thrombocytopenia (H)      Venous  "insufficiency        PAST SURGICAL HISTORY:  Past Surgical History:   Procedure Laterality Date     BACK SURGERY       C UNLISTED PROCEDURE, ABDOMEN/PERITONEUM/OMENTUM      Description: Hernia Repair;  Recorded: 10/23/2013;     CHOLECYSTECTOMY       HERNIA REPAIR       HYSTERECTOMY  1984     IR FINE NEEDLE ASPIRATION  11/29/2020     JOINT REPLACEMENT Left     knee     OTHER SURGICAL HISTORY  1990, 1995    Two left wrist     OTHER SURGICAL HISTORY  1996    FATTY TUMORLT SHOULDER BLADE     PICC MIDLINE INSERTION  11/30/2020          OR IMPLANT SPINAL NEUROSTIM/ Left 4/25/2019    Procedure: LEFT FLANK INCISION REPLACE NEUROSTIMULATOR;  Surgeon: Vishal Pretty MD;  Location: [facility];  Service: Pain     SPINAL CORD STIMULATOR IMPLANT             CURRENT MEDICATIONS:    acetaminophen (TYLENOL) 500 MG tablet  alcohol swab prep pads  amLODIPine (NORVASC) 10 MG tablet  ascorbic acid (ASCORBIC ACID WITH LATRICIA HIPS) 500 MG tablet  atorvastatin (LIPITOR) 40 MG tablet  b complex vitamins tablet  blood glucose (NO BRAND SPECIFIED) test strip  blood glucose calibration (NO BRAND SPECIFIED) solution  blood glucose monitoring (NO BRAND SPECIFIED) meter device kit  blood glucose test strips  blood-glucose meter Misc  cholecalciferol, vitamin D3, (VITAMIN D3 ORAL)  flash glucose scanning reader (FREESTYLE BRE 14 DAY READER) Misc  flash glucose sensor (FREESTYLE BRE 14 DAY SENSOR) Kit  hydroxychloroquine (PLAQUENIL) 200 MG tablet  insulin NPH (NOVOLIN) 100 unit/mL injection  insulin syringe 31G X 5/16\" 0.5 ML MISC  insulin syringe-needle U-100 (BD INSULIN SYRINGE ULT-FINE II) 1 mL 31 gauge x 5/16 Syrg  lancing device (ACCU-CHEK SOFTCLIX) Misc  lidocaine (LMX) 4 % cream  lisinopril (ZESTRIL) 10 MG tablet  loratadine (CLARITIN) 10 mg tablet  MEDICATION CANNOT BE REORDERED - PLEASE MANUALLY REORDER AND DISCONTINUE THE OLD ORDER  methotrexate 2.5 MG tablet  metoprolol succinate ER (TOPROL-XL) 50 MG 24 hr " tablet  MULTIVITAMIN ORAL  naloxone (NARCAN) 4 MG/0.1ML nasal spray  oxyCODONE-acetaminophen (PERCOCET) 5-325 MG tablet  predniSONE (DELTASONE) 1 MG tablet  predniSONE (DELTASONE) 2.5 MG tablet  thin (NO BRAND SPECIFIED) lancets  traZODone (DESYREL) 150 MG tablet        ALLERGIES:  Allergies   Allergen Reactions     Morphine Nausea and Vomiting     Penicillins Hives     Tolerates ceftriaxone     Sulfa (Sulfonamide Antibiotics) [Sulfa Drugs] Hives       FAMILY HISTORY:  Family History   Problem Relation Age of Onset     Myocardial Infarction Father      Cancer Father      Chronic Obstructive Pulmonary Disease Father      Heart Disease Father      Diabetes Sister      Diabetes Brother      Diabetes Maternal Grandmother      Melanoma Son      Arthritis Mother      Acute Myocardial Infarction Father 62.00     Diabetes Sister      Diabetes Brother      Melanoma Son      Kidney failure Son      Glomerulonephritis Son        SOCIAL HISTORY:   Social History     Socioeconomic History     Marital status:      Spouse name: None     Number of children: None     Years of education: None     Highest education level: None   Occupational History     None   Tobacco Use     Smoking status: Former Smoker     Packs/day: 1.00     Years: 43.00     Pack years: 43.00     Start date: 1965     Quit date: 2008     Years since quittin.2     Smokeless tobacco: Never Used   Substance and Sexual Activity     Alcohol use: Yes     Comment: Alcoholic Drinks/day: rare     Drug use: No     Comment: Drug use: takes percocet for chronic pain     Sexual activity: Not Currently   Other Topics Concern     None   Social History Narrative    Born in [place]. Lives with her son. Lost one son when he was age 30 due to melanoma. Moved around growing up since her father was in the . Play games on phone and watch television.       Social Determinants of Health     Financial Resource Strain:      Difficulty of Paying Living Expenses:     Food Insecurity:      Worried About Running Out of Food in the Last Year:      Ran Out of Food in the Last Year:    Transportation Needs:      Lack of Transportation (Medical):      Lack of Transportation (Non-Medical):    Physical Activity:      Days of Exercise per Week:      Minutes of Exercise per Session:    Stress:      Feeling of Stress :    Social Connections:      Frequency of Communication with Friends and Family:      Frequency of Social Gatherings with Friends and Family:      Attends Amish Services:      Active Member of Clubs or Organizations:      Attends Club or Organization Meetings:      Marital Status:    Intimate Partner Violence:      Fear of Current or Ex-Partner:      Emotionally Abused:      Physically Abused:      Sexually Abused:        VITALS:  BP (!) 147/65   Pulse 58   Temp 97.6  F (36.4  C)   Resp 18   SpO2 97%       PHYSICAL EXAM    Constitutional: Well developed, Well nourished, NAD, GCS 15  HENT: Normocephalic, Atraumatic, Bilateral external ears normal, Oropharynx normal, mucous membranes moist, Nose normal. Neck-  Midline cervical tenderness throughout the c-spine. No stepoffs. Supple, No stridor.  Eyes: PERRL, EOMI, Conjunctiva normal, No discharge.   Respiratory: Normal breath sounds, No respiratory distress, No wheezing, Speaks full sentences easily. No cough.  Cardiovascular: Normal heart rate, Regular rhythm, No murmurs, No rubs, No gallops. Chest wall nontender.  GI:Soft, No tenderness, No masses, No flank tenderness. No rebound or guarding.    Musculoskeletal: 2+ DP pulses. No edema.No cyanosis, No clubbing. Good range of motion in all major joints. No tenderness to palpation or major deformities noted.   Integument: Warm, Dry, No erythema, No rash. No petechiae.   Neurologic: Alert & oriented x 3,  CN 3-12 intact Normal motor function, Normal sensory function, No focal deficits noted. Normal gait. Normal finger to nose bilaterally  Psychiatric: Affect normal,  Judgment normal, Mood normal. Cooperative.          LAB:  All pertinent labs reviewed and interpreted.  Labs Ordered and Resulted from Time of ED Arrival Up to the Time of Departure from the ED   COMPREHENSIVE METABOLIC PANEL - Abnormal; Notable for the following components:       Result Value    Creatinine 1.34 (*)     Glucose 158 (*)     GFR Estimate 39 (*)     All other components within normal limits   CBC WITH PLATELETS AND DIFFERENTIAL - Abnormal; Notable for the following components:    Platelet Count 142 (*)     All other components within normal limits   LACTIC ACID WHOLE BLOOD - Normal   TROPONIN I - Normal   CK TOTAL - Normal   MAGNESIUM - Normal   EXTRA BLUE TOP TUBE   EXTRA RED TOP TUBE   CBC WITH PLATELETS & DIFFERENTIAL    Narrative:     The following orders were created for panel order CBC with Platelets & Differential.  Procedure                               Abnormality         Status                     ---------                               -----------         ------                     CBC with platelets and d...[[id number]]  Abnormal            Final result                 Please view results for these tests on the individual orders.   EXTRA TUBE    Narrative:     The following orders were created for panel order [place] Draw.  Procedure                               Abnormality         Status                     ---------                               -----------         ------                     Extra Blue Top Tube[[id number]]                              In process                 Extra Red Top Tube[[id number]]                               In process                   Please view results for these tests on the individual orders.       RADIOLOGY:  Reviewed all pertinent imaging. Please see official radiology report.  Cervical spine CT w/o contrast   Final Result   IMPRESSION:   1. No evidence of acute cervical spine fracture.   2. No significant canal compromise with scattered areas of neural  foraminal narrowing as described above.      Head CT w/o contrast   Final Result   IMPRESSION:   1.  No CT finding of a mass, hemorrhage or focal area suggestive of acute infarct.   2.  Minimal age related changes.          EKG:    Performed at: 0015    Impression: Sinus bradycardia with 1st degree AV block. Rightward axis. Right bundle branch block. Nonspecific ST and T wave abnormality. Prolonged QT. Abnormal ECG.    Rate: 57  Rhythm: Sinus  Axis: 109  MS Interval: 220  QRS Interval: 114  QTc Interval: 511  ST Changes: Nonspecific ST and T wave abnormality.  Comparison: No significant change compared with ECG of 03-DEC-2020.    I have independently reviewed and interpreted the EKG(s) documented above.        I, Marichuy Verdugo, am serving as a scribe to document services personally performed by Dr. Eren Grace based on my observation and the provider's statements to me. I, Eren Grace MD attest that Marichuy Verdugo is acting in a scribe capacity, has observed my performance of the services and has documented them in accordance with my direction.    Eren Grace M.D.  Emergency Medicine  [facility] EMERGENCY DEPARTMENT  [facility] [facility] [address]  [phone]  Dept: [phone]     Eren Grace MD  10/26/21 [id number]

## 2021-10-26 NOTE — ED NOTES
[facility] ED Handoff Report    ED Chief Complaint: Syncope    ED Diagnosis:  (I10) Essential hypertension  (primary encounter diagnosis)  Comment:  Plan: perflutren lipid microsphere (DEFINITY)         injection SUSP            (R55) Syncope, unspecified syncope type  Comment:   Plan:        PMH:    Past Medical History:   Diagnosis Date     BBB (bundle branch block)      Chronic back pain      Chronic kidney disease     stage 3     Chronic kidney disease, unspecified     Created by Conversion      Depression      Diabetes (H)     Type 2     Diabetes mellitus, type 2 (H)     Created by Conversion      Frequent headaches      Ganglion of tendon sheath     Created by Conversion      GERD (gastroesophageal reflux disease)      HLD (hyperlipidemia)      Hypertension     Per H&P dated 1/03/2013     Incarcerated ventral hernia     Per H&P dated 1/03/2013     Osteoarthritis      Rheumatoid arthritis (H)      Shortness of breath      Thrombocytopenia (H)      Venous insufficiency         Code Status:  Full Code     Falls Risk: Yes Band: Applied    Current Living Situation/Residence: lives in an apartment with son.    Elimination Status: Continent: No     Activity Level: SBA w/ walker    Patients Preferred Language:  English     Needed: No    Vital Signs:  /70   Pulse 63   Temp 97.6  F (36.4  C)   Resp 30   SpO2 96%      Cardiac Rhythm: NSR    Pain Score: 8/10, prior to Percocet.    Is the Patient Confused:  No    Last Food or Drink: 10/26/21    Focused Assessment:  Trauma, cardiac    Tests Performed: Done: Labs and Imaging    Treatments Provided:  Tylenol, Percocet, Insulin    Family Dynamics/Concerns: No    Family Updated On Visitor Policy: No    Plan of Care Communicated to Family: No    Who Was Updated about Plan of Care: The patient.    Belongings Checklist Done and Signed by Patient: Yes    Covid: asymptomatic , negative    Additional Information: N/A    RN: Jaspreet Becerra 10/26/2021  4:53 PM

## 2021-10-26 NOTE — ED TRIAGE NOTES
"Pt comes in with [facility]. Pt has 2 falls today. Pt \"blacks out\" and then wakes up on the floor. This time she fell into the tub. BS in the 130's per EMS. Pt primary doctor aware of these episodes but patient hasn't been in to see them. Baby aspirin.   "

## 2021-10-26 NOTE — H&P
ADMISSION HISTORY & PHYSICAL        ADMIT DATE: 10/25/2021      FACILITY: [facility]      PCP: Marichuy Rubio, [phone]     ASSESSMENT AND PLAN:  71 year old female with history significant for Chronic back pain, Chronic kidney disease, Diabetes mellitus, type 2 (H), GERD (gastroesophageal reflux disease), HLD (hyperlipidemia), Hypertension, and Rheumatoid arthritis (H) comes in after two syncopal episodes on 10/25.    Active Problems:    Syncope, unspecified syncope type    Syncope and collapse  -patient denies any shortness of breath, lightheadedness, and chest pain with episodes so cardiac cause less likely.   -certain meds like oxy, trazodone and claritin can contribute to lightheadedness/dizziness which can lead to the fall but again patient denies any lightheadedness or dizziness before both falls prior to admission.   -EKG showed sinus bradycardia with 1st degree AV block with prolonged QTc interval of 511 ms. Troponin x 2 negative.-->base on previous recent OVs, HR is usually 60 bpm or greater   -CT head negative for IC bleed and any acute findings  -echo pending (last echo from 3/10/37713 shows normal LVEF of 55%)  -US b/l carotid ordered in ED pending  -neuro consult  -trend troponin  -tele  -cardiac cause less likely but heart score is 4--->consider nuclear stress test if all other testing is unremarkable.       Prolonged QTc interval   -K level is WNL  -mag level  -avoid QTc prolonged meds but will c/w home trazodone and home plaquenil for now  -c/w tele       B/l hip pain  -patient states she fell on her right hip with the first fall and she fell on her left hip with the second fall on 10/25.   -pelvic x-ray       Neck pain  -CT neck negative for acute fracture  -patient denies any neck pain at this time      Elevated creatinine on CKD  -baseline creatinine is around 1.2  -creatinine on admission was 1.34  -monitor with daily BMP for now      HLD  -c/w home lipitor      DM type  2  -last HgbA1c was 9.7 on 9/16/2021 which is above goal  -on NPH 34-38 units BID  -start NPH 30 units BID for now  -start low resistance sliding scale insulin for now  -start accuchecks and adjust insulin regimen PRN      Hx of allergic rhinitis  -c/w home claritin      HTN  -BP is elevated  -start norvasc  -c/w home metoprolol   -creatinine is mildly elevated compared to baseline-->hold home lisinopril until creatinine improves  -IV hydralazine PRN      Hx of RA  -c/w home plaquenil and methotrexate.   -c/w home prednisone      Hx of chronic back pain  -c/w home percocet for now    Insomnia  -c/w home trazodone       Fall  -PT/OT eval  -fall precautions      FEN/GI: low fat, low salt, diabetic diet  DVT proph: SCDs for now  Code status: No Order          Disposition:  -Anticipated Length of Stay in midnights and medical necessity (including a midnight in the Emergency Department after triage if applicable): 1-2 days   -Discharge barriers: neuro consult, tele, trend troponins, echo and carotid US pending,       CHIEF COMPLAINT:  Chief Complaint   Patient presents with     Fall        HISTORY OF PRESENTING ILLNESS:  71 year old female with history significant for Chronic back pain, Chronic kidney disease, Diabetes mellitus, type 2 (H), GERD (gastroesophageal reflux disease), HLD (hyperlipidemia), Hypertension, and Rheumatoid arthritis (H) comes in after two syncopal episodes on 10/25.      Patient had gotten up from her chair and walked to the bathroom and attempted to sit down on the toilet when she believes she had LOC and fell. She denies any lightheadedness, dizziness, chest pain, shortness of breath, palpitations, and/or nausea prior to falling. Patient has new neck pain 2/2 to fall now. Patient's son lives with patient and was able to help her get up.     That morning, patient fell as well while she was walking up the stairs. She fell on the last few stairs. She did not feel lightheaded, dizzy, have chest  pain, have shortness of breath, have palpitations, and/or have nausea prior to this fall either.     Patient has chronic lower back pain that is unchanged.     Patient denies any recent changes to her meds or any additions/subtractions of meds recently.     PMH:  Past Medical History:   Diagnosis Date     BBB (bundle branch block)      Chronic back pain      Chronic kidney disease     stage 3     Chronic kidney disease, unspecified     Created by Conversion      Depression      Diabetes (H)     Type 2     Diabetes mellitus, type 2 (H)     Created by Conversion      Frequent headaches      Ganglion of tendon sheath     Created by Conversion      GERD (gastroesophageal reflux disease)      HLD (hyperlipidemia)      Hypertension     Per H&P dated 1/03/2013     Incarcerated ventral hernia     Per H&P dated 1/03/2013     Osteoarthritis      Rheumatoid arthritis (H)      Shortness of breath      Thrombocytopenia (H)      Venous insufficiency        PSH:  Past Surgical History:   Procedure Laterality Date     BACK SURGERY       C UNLISTED PROCEDURE, ABDOMEN/PERITONEUM/OMENTUM      Description: Hernia Repair;  Recorded: 10/23/2013;     CHOLECYSTECTOMY       HERNIA REPAIR       HYSTERECTOMY  1984     IR FINE NEEDLE ASPIRATION  11/29/2020     JOINT REPLACEMENT Left     knee     OTHER SURGICAL HISTORY  1990, 1995    Two left wrist     OTHER SURGICAL HISTORY  1996    FATTY TUMORLT SHOULDER BLADE     PICC MIDLINE INSERTION  11/30/2020          MO IMPLANT SPINAL NEUROSTIM/ Left 4/25/2019    Procedure: LEFT FLANK INCISION REPLACE NEUROSTIMULATOR;  Surgeon: Vishal Pretty MD;  Location: [facility];  Service: Pain     SPINAL CORD STIMULATOR IMPLANT          ALLERGIES:  Allergies   Allergen Reactions     Morphine Nausea and Vomiting     Penicillins Hives     Tolerates ceftriaxone     Sulfa (Sulfonamide Antibiotics) [Sulfa Drugs] Hives       MEDICATIONS:  Reviewed.  Current Facility-Administered Medications  "  Medication     acetaminophen (TYLENOL) tablet 650 mg    Or     acetaminophen (TYLENOL) Suppository 650 mg     glucose gel 15-30 g    Or     dextrose 50 % injection 25-50 mL    Or     glucagon injection 1 mg     insulin aspart (NovoLOG) injection (RAPID ACTING)     insulin aspart (NovoLOG) injection (RAPID ACTING)     melatonin tablet 1 mg     ondansetron (ZOFRAN-ODT) ODT tab 4 mg    Or     ondansetron (ZOFRAN) injection 4 mg     oxyCODONE-acetaminophen (PERCOCET) 5-325 MG per tablet 1 tablet     Current Outpatient Medications   Medication     amLODIPine (NORVASC) 10 MG tablet     ascorbic acid (ASCORBIC ACID WITH LATRICIA HIPS) 500 MG tablet     aspirin 81 MG EC tablet     atorvastatin (LIPITOR) 40 MG tablet     b complex vitamins tablet     fish oil-omega-3 fatty acids 1000 MG capsule     hydroxychloroquine (PLAQUENIL) 200 MG tablet     insulin NPH (NOVOLIN) 100 unit/mL injection     lidocaine (LMX) 4 % cream     lisinopril (ZESTRIL) 10 MG tablet     loratadine (CLARITIN) 10 mg tablet     methotrexate 2.5 MG tablet     metoprolol succinate ER (TOPROL-XL) 50 MG 24 hr tablet     MULTIVITAMIN ORAL     naloxone (NARCAN) 4 MG/0.1ML nasal spray     oxyCODONE-acetaminophen (PERCOCET) 5-325 MG tablet     predniSONE (DELTASONE) 1 MG tablet     traZODone (DESYREL) 150 MG tablet     Vitamin D3 (VITAMIN D3) 25 mcg (1000 units) tablet     alcohol swab prep pads     blood glucose (NO BRAND SPECIFIED) test strip     blood glucose calibration (NO BRAND SPECIFIED) solution     blood glucose monitoring (NO BRAND SPECIFIED) meter device kit     blood glucose test strips     blood-glucose meter Misc     flash glucose scanning reader (FREESTYLE BRE 14 DAY READER) Misc     flash glucose sensor (FREESTYLE BRE 14 DAY SENSOR) Kit     insulin syringe 31G X 5/16\" 0.5 ML MISC     insulin syringe-needle U-100 (BD INSULIN SYRINGE ULT-FINE II) 1 mL 31 gauge x 5/16 Syrg     lancing device (ACCU-CHEK SOFTCLIX) Misc     thin (NO BRAND SPECIFIED) " lancets        SOCIAL HISTORY:  Social History     Socioeconomic History     Marital status:      Spouse name: Not on file     Number of children: Not on file     Years of education: Not on file     Highest education level: Not on file   Occupational History     Not on file   Tobacco Use     Smoking status: Former Smoker     Packs/day: 1.00     Years: 43.00     Pack years: 43.00     Start date: 1965     Quit date: 2008     Years since quittin.2     Smokeless tobacco: Never Used   Substance and Sexual Activity     Alcohol use: Yes     Comment: Alcoholic Drinks/day: rare     Drug use: No     Comment: Drug use: takes percocet for chronic pain     Sexual activity: Not Currently   Other Topics Concern     Not on file   Social History Narrative    Born in [place]. Lives with her son. Lost one son when he was age 30 due to melanoma. Moved around growing up since her father was in the . Play games on phone and watch television.       Social Determinants of Health     Financial Resource Strain:      Difficulty of Paying Living Expenses:    Food Insecurity:      Worried About Running Out of Food in the Last Year:      Ran Out of Food in the Last Year:    Transportation Needs:      Lack of Transportation (Medical):      Lack of Transportation (Non-Medical):    Physical Activity:      Days of Exercise per Week:      Minutes of Exercise per Session:    Stress:      Feeling of Stress :    Social Connections:      Frequency of Communication with Friends and Family:      Frequency of Social Gatherings with Friends and Family:      Attends [employer] Services:      Active Member of Clubs or Organizations:      Attends Club or Organization Meetings:      Marital Status:    Intimate Partner Violence:      Fear of Current or Ex-Partner:      Emotionally Abused:      Physically Abused:      Sexually Abused:        FAMILY HISTORY:  Family History   Problem Relation Age of Onset     Myocardial Infarction  Father      Cancer Father      Chronic Obstructive Pulmonary Disease Father      Heart Disease Father      Diabetes Sister      Diabetes Brother      Diabetes Maternal Grandmother      Melanoma Son      Arthritis Mother      Acute Myocardial Infarction Father 62.00     Diabetes Sister      Diabetes Brother      Melanoma Son      Kidney failure Son      Glomerulonephritis Son        ROS:  Review of Systems   Constitutional: Negative for chills and fever.   HENT: Negative for congestion, ear pain and sore throat.    Eyes: Negative for pain and visual disturbance.   Respiratory: Negative for cough, shortness of breath and wheezing.    Cardiovascular: Negative for chest pain, palpitations and leg swelling.   Gastrointestinal: Negative for abdominal pain, blood in stool, constipation, diarrhea, nausea and vomiting.   Genitourinary: Negative for decreased urine volume, difficulty urinating, dysuria and hematuria.   Musculoskeletal: Negative for arthralgias and back pain.   Skin: Negative for color change and rash.   Neurological: Positive for syncope. Negative for dizziness, seizures, light-headedness and headaches.   All other systems reviewed and are negative.         PHYSICAL EXAM:  Patient Vitals for the past 24 hrs:   BP Temp Pulse Resp SpO2   10/26/21 0700 (!) 169/104 -- 63 19 96 %   10/26/21 0630 -- -- 56 21 95 %   10/26/21 0600 (!) 167/77 -- 60 24 96 %   10/26/21 0530 -- -- 56 23 95 %   10/26/21 0500 (!) 160/81 -- 58 16 96 %   10/26/21 0400 (!) 162/70 -- 57 16 94 %   10/26/21 0300 (!) 158/72 -- 55 16 95 %   10/26/21 0210 (!) 172/80 -- 56 22 96 %   10/26/21 0200 -- -- 55 17 94 %   10/26/21 0145 -- -- 58 30 97 %   10/26/21 0130 -- -- 57 23 96 %   10/26/21 0115 -- -- 53 21 93 %   10/26/21 0100 -- -- 54 30 94 %   10/26/21 0045 -- -- 55 18 94 %   10/26/21 0030 -- -- 58 18 97 %   10/26/21 0015 -- -- 57 14 96 %   10/26/21 0000 -- -- 55 18 95 %   10/25/21 2345 (!) 147/65 -- 54 17 96 %   10/25/21 2330 (!) 142/64 -- 58 19  96 %   10/25/21 2315 133/61 -- 53 16 95 %   10/25/21 2300 132/65 97.6  F (36.4  C) 54 12 97 %        Intake/Output Summary (Last 24 hours) at 10/26/2021 0937  Last data filed at 10/26/2021 0603  Gross per 24 hour   Intake --   Output 800 ml   Net -800 ml     GENRL:  Not in acute distress. Satting at 96% on room air.   HEENT: NC/AT      Neck- supple      Sclera- anicteric      Mucous membrane- moist and pink  CHEST: Clear to auscultation bilaterally  HEART: S1S2 regular. No murmurs, rubs or gallops  ABDMN: Soft. Non-tender. No guarding or rigidity. Bowel sounds- active  EXTRM:  No pedal edema. Tenderness on palpation of b/l hips.   NEURO:  No involuntary movements  INTGM: please see nursing assessment for full skin assessment  PSYCH: normal affect, normal speech       DIAGNOSTIC DATA:  Recent Results (from the past 24 hour(s))   ECG 12-LEAD WITH MUSE (LHE)    Collection Time: 10/26/21 12:15 AM   Result Value Ref Range    Systolic Blood Pressure 147 mmHg    Diastolic Blood Pressure 65 mmHg    Ventricular Rate 57 BPM    Atrial Rate 57 BPM    AR Interval 220 ms    QRS Duration 114 ms     ms    QTc 511 ms    P Axis 67 degrees    R AXIS 109 degrees    T Axis 49 degrees    Interpretation ECG       Sinus bradycardia with 1st degree A-V block  Rightward axis  Low voltage QRS  Nonspecific ST and T wave abnormality  Prolonged QT  Abnormal ECG  When compared with ECG of 03-DEC-2020 14:57,  AR interval has increased  Right bundle branch block is no longer Present  Confirmed by SEE ED PROVIDER NOTE FOR, ECG INTERPRETATION ([id number]),  MOUSTAPHA SMART ([id number]) on 10/26/2021 7:01:03 AM     Comprehensive metabolic panel    Collection Time: 10/26/21 12:31 AM   Result Value Ref Range    Sodium 140 136 - 145 mmol/L    Potassium 3.9 3.5 - 5.0 mmol/L    Chloride 105 98 - 107 mmol/L    Carbon Dioxide (CO2) 28 22 - 31 mmol/L    Anion Gap 7 5 - 18 mmol/L    Urea Nitrogen 19 8 - 28 mg/dL    Creatinine 1.34 (H) 0.60 - 1.10 mg/dL     Calcium 9.4 8.5 - 10.5 mg/dL    Glucose 158 (H) 70 - 125 mg/dL    Alkaline Phosphatase 52 45 - 120 U/L    AST 14 0 - 40 U/L    ALT 14 0 - 45 U/L    Protein Total 6.6 6.0 - 8.0 g/dL    Albumin 3.5 3.5 - 5.0 g/dL    Bilirubin Total 0.7 0.0 - 1.0 mg/dL    GFR Estimate 39 (L) >60 mL/min/1.73m2   Lactic acid whole blood    Collection Time: 10/26/21 12:31 AM   Result Value Ref Range    Lactic Acid 1.0 0.7 - 2.0 mmol/L   Troponin I (now)    Collection Time: 10/26/21 12:31 AM   Result Value Ref Range    Troponin I 0.03 0.00 - 0.29 ng/mL   CK total    Collection Time: 10/26/21 12:31 AM   Result Value Ref Range    CK 37 30 - 190 U/L   CBC with platelets and differential    Collection Time: 10/26/21 12:31 AM   Result Value Ref Range    WBC Count 10.5 4.0 - 11.0 10e3/uL    RBC Count 4.40 3.80 - 5.20 10e6/uL    Hemoglobin 13.7 11.7 - 15.7 g/dL    Hematocrit 41.5 35.0 - 47.0 %    MCV 94 78 - 100 fL    MCH 31.1 26.5 - 33.0 pg    MCHC 33.0 31.5 - 36.5 g/dL    RDW 12.9 10.0 - 15.0 %    Platelet Count 142 (L) 150 - 450 10e3/uL    % Neutrophils 72 %    % Lymphocytes 19 %    % Monocytes 6 %    % Eosinophils 2 %    % Basophils 1 %    % Immature Granulocytes 0 %    NRBCs per 100 WBC 0 <1 /100    Absolute Neutrophils 7.5 1.6 - 8.3 10e3/uL    Absolute Lymphocytes 2.0 0.8 - 5.3 10e3/uL    Absolute Monocytes 0.7 0.0 - 1.3 10e3/uL    Absolute Eosinophils 0.2 0.0 - 0.7 10e3/uL    Absolute Basophils 0.1 0.0 - 0.2 10e3/uL    Absolute Immature Granulocytes 0.0 <=0.0 10e3/uL    Absolute NRBCs 0.0 10e3/uL   Extra Blue Top Tube    Collection Time: 10/26/21 12:31 AM   Result Value Ref Range    Hold Specimen JIC    Extra Red Top Tube    Collection Time: 10/26/21 12:31 AM   Result Value Ref Range    Hold Specimen JIC    Magnesium    Collection Time: 10/26/21 12:31 AM   Result Value Ref Range    Magnesium 1.8 1.8 - 2.6 mg/dL   Asymptomatic COVID-19 Virus (Coronavirus) by PCR Nasopharyngeal    Collection Time: 10/26/21  1:45 AM    Specimen:  Nasopharyngeal; Swab   Result Value Ref Range    SARS CoV2 PCR Negative Negative   Troponin I    Collection Time: 10/26/21  7:39 AM   Result Value Ref Range    Troponin I 0.04 0.00 - 0.29 ng/mL      Results for orders placed or performed during the hospital encounter of 10/25/21   Cervical spine CT w/o contrast    Impression    IMPRESSION:  1. No evidence of acute cervical spine fracture.  2. No significant canal compromise with scattered areas of neural foraminal narrowing as described above.   Head CT w/o contrast    Impression    IMPRESSION:  1.  No CT finding of a mass, hemorrhage or focal area suggestive of acute infarct.  2.  Minimal age related changes.      All lab studies reviewed personally    Radiology Results: imaging impressions reviewed    Total time is 70 minutes with greater than 50% of time spent in chart review, coordination of care with ED provider/patient's PCP/provider from outside facility and consultants, and discussing plan of care with patient and his/her family.     Amador Tan MD.   [facility] Medicine Service   [phone]   Pager [phone]

## 2021-10-26 NOTE — PHARMACY-ADMISSION MEDICATION HISTORY
Pharmacy Note - Admission Medication History    Pertinent Provider Information:   Currently tapering off prednisone -- dose scheduled to change on November 1st.     ______________________________________________________________________    Prior To Admission (PTA) med list completed and updated in EMR.       PTA Med List   Medication Sig Note Last Dose     amLODIPine (NORVASC) 10 MG tablet [AMLODIPINE (NORVASC) 10 MG TABLET] TAKE 1 TABLET EVERY DAY  10/25/2021 at Unknown time     ascorbic acid (ASCORBIC ACID WITH LATRICIA HIPS) 500 MG tablet Take 500 mg by mouth daily   10/25/2021 at Unknown time     aspirin 81 MG EC tablet Take 81 mg by mouth daily  10/25/2021 at Unknown time     atorvastatin (LIPITOR) 40 MG tablet [ATORVASTATIN (LIPITOR) 40 MG TABLET] Take 1 tablet (40 mg total) by mouth at bedtime.  10/25/2021 at AM     b complex vitamins tablet [B COMPLEX VITAMINS TABLET] Take 1 tablet by mouth daily.  10/25/2021 at Unknown time     fish oil-omega-3 fatty acids 1000 MG capsule Take 1 g by mouth daily  10/25/2021 at Unknown time     hydroxychloroquine (PLAQUENIL) 200 MG tablet Take 1 tablet (200 mg) by mouth 2 times daily  10/25/2021 at Unknown time     insulin NPH (NOVOLIN) 100 unit/mL injection Inject 34-38 Units Subcutaneous 2 times daily (before meals) AM: 38 units  PM: 34 units if BG < 250, 38 units if BG > 250  10/25/2021 at Unknown time     lidocaine (LMX) 4 % cream [LIDOCAINE (LMX) 4 % CREAM] Apply to affected area twice daily as needed  Unknown at Unknown time     lisinopril (ZESTRIL) 10 MG tablet [LISINOPRIL (PRINIVIL,ZESTRIL) 10 MG TABLET] TAKE 1 TABLET AT BEDTIME  10/25/2021 at AM     loratadine (CLARITIN) 10 mg tablet [LORATADINE (CLARITIN) 10 MG TABLET] Take 1 tablet (10 mg total) by mouth at bedtime.  10/25/2021 at AM     methotrexate 2.5 MG tablet Take 3 tablets (7.5 mg) by mouth once a week 10/26/2021: Friday 10/22/2021 at Unknown time     metoprolol succinate ER (TOPROL-XL) 50 MG 24 hr tablet  [METOPROLOL SUCCINATE (TOPROL-XL) 50 MG 24 HR TABLET] TAKE 1 TABLET EVERY DAY  10/25/2021 at Unknown time     MULTIVITAMIN ORAL Take 1 tablet by mouth daily   10/25/2021 at Unknown time     naloxone (NARCAN) 4 MG/0.1ML nasal spray Spray 1 spray (4 mg) into one nostril alternating nostrils once as needed for opioid reversal every 2-3 minutes until assistance arrives  Unknown at Unknown time     oxyCODONE-acetaminophen (PERCOCET) 5-325 MG tablet Take 1-2 tablets by mouth every 6 hours as needed for severe pain (Max of 6 tablets daily) 10/26/2021: Typically takes 6 tablets/day. 10/24/2021 at Unknown time     predniSONE (DELTASONE) 1 MG tablet Take 2 tablets p.o. daily for 1 month then if stable decrease by  1/2 tablet (1/2 mg) every month. (Patient taking differently: Take 1 mg by mouth daily If stable decrease by  1/2 tablet (1/2 mg) every month.) 10/26/2021: Currently tapering off prednisone -- October dose is 1 mg daily. If stable, plan to decrease to 0.5 mg daily in November. 10/25/2021 at Unknown time     traZODone (DESYREL) 150 MG tablet Take 1 tablet (150 mg) by mouth At Bedtime  10/25/2021 at Unknown time     Vitamin D3 (VITAMIN D3) 25 mcg (1000 units) tablet Take 1,000 Units by mouth daily   10/25/2021 at Unknown time       Information source(s): Patient and CareEverywhere/[facility]  Method of interview communication: in-person    Summary of Changes to PTA Med List  New: aspirin  Discontinued: Tylenol  Changed: NPH, prednisone    Patient was asked about OTC/herbal products specifically.  PTA med list reflects this.    Allergies were reviewed, assessed, and updated with the patient.      Patient does not anticipate needing any multi-use medications during admission.    The information provided in this note is only as accurate as the sources available at the time of the update(s).    Thank you for the opportunity to participate in the care of this patient.    Spring Botello [facility]  10/26/2021 8:51 AM

## 2021-10-26 NOTE — ED NOTES
Bed: JNED-08  Expected date: 10/25/21  Expected time: 10:51 PM  Means of arrival: Ambulance  Comments:  [facility]- 70 yo F fall, neck pain, syncope

## 2021-10-26 NOTE — ED NOTES
Patient was taken to us and did not get to eat her breakfast. I reordered her breakfast at this time.

## 2021-10-27 ENCOUNTER — APPOINTMENT (OUTPATIENT)
Dept: PHYSICAL THERAPY | Facility: HOSPITAL | Age: 71
DRG: 312 | End: 2021-10-27
Attending: FAMILY MEDICINE
Payer: COMMERCIAL

## 2021-10-27 ENCOUNTER — APPOINTMENT (OUTPATIENT)
Dept: ULTRASOUND IMAGING | Facility: HOSPITAL | Age: 71
DRG: 312 | End: 2021-10-27
Attending: FAMILY MEDICINE
Payer: COMMERCIAL

## 2021-10-27 ENCOUNTER — APPOINTMENT (OUTPATIENT)
Dept: OCCUPATIONAL THERAPY | Facility: HOSPITAL | Age: 71
DRG: 312 | End: 2021-10-27
Attending: FAMILY MEDICINE
Payer: COMMERCIAL

## 2021-10-27 LAB
ALBUMIN SERPL-MCNC: 3.3 G/DL (ref 3.5–5)
ALP SERPL-CCNC: 51 U/L (ref 45–120)
ALT SERPL W P-5'-P-CCNC: 12 U/L (ref 0–45)
ANION GAP SERPL CALCULATED.3IONS-SCNC: 10 MMOL/L (ref 5–18)
AST SERPL W P-5'-P-CCNC: 16 U/L (ref 0–40)
BILIRUB SERPL-MCNC: 0.7 MG/DL (ref 0–1)
BUN SERPL-MCNC: 18 MG/DL (ref 8–28)
CALCIUM SERPL-MCNC: 9.1 MG/DL (ref 8.5–10.5)
CHLORIDE BLD-SCNC: 106 MMOL/L (ref 98–107)
CO2 SERPL-SCNC: 25 MMOL/L (ref 22–31)
CREAT SERPL-MCNC: 1.33 MG/DL (ref 0.6–1.1)
ERYTHROCYTE [DISTWIDTH] IN BLOOD BY AUTOMATED COUNT: 13 % (ref 10–15)
GFR SERPL CREATININE-BSD FRML MDRD: 40 ML/MIN/1.73M2
GLUCOSE BLD-MCNC: 192 MG/DL (ref 70–125)
GLUCOSE BLDC GLUCOMTR-MCNC: 140 MG/DL (ref 70–99)
GLUCOSE BLDC GLUCOMTR-MCNC: 142 MG/DL (ref 70–99)
GLUCOSE BLDC GLUCOMTR-MCNC: 180 MG/DL (ref 70–99)
GLUCOSE BLDC GLUCOMTR-MCNC: 206 MG/DL (ref 70–99)
HCT VFR BLD AUTO: 40.1 % (ref 35–47)
HGB BLD-MCNC: 13.2 G/DL (ref 11.7–15.7)
MCH RBC QN AUTO: 31.1 PG (ref 26.5–33)
MCHC RBC AUTO-ENTMCNC: 32.9 G/DL (ref 31.5–36.5)
MCV RBC AUTO: 94 FL (ref 78–100)
PLATELET # BLD AUTO: 136 10E3/UL (ref 150–450)
POTASSIUM BLD-SCNC: 4.1 MMOL/L (ref 3.5–5)
PROT SERPL-MCNC: 6.1 G/DL (ref 6–8)
RBC # BLD AUTO: 4.25 10E6/UL (ref 3.8–5.2)
SODIUM SERPL-SCNC: 141 MMOL/L (ref 136–145)
WBC # BLD AUTO: 9.6 10E3/UL (ref 4–11)

## 2021-10-27 PROCEDURE — 99223 1ST HOSP IP/OBS HIGH 75: CPT | Mod: AI | Performed by: FAMILY MEDICINE

## 2021-10-27 PROCEDURE — 97535 SELF CARE MNGMENT TRAINING: CPT | Mod: GO

## 2021-10-27 PROCEDURE — 250N000013 HC RX MED GY IP 250 OP 250 PS 637: Performed by: FAMILY MEDICINE

## 2021-10-27 PROCEDURE — 97166 OT EVAL MOD COMPLEX 45 MIN: CPT | Mod: GO

## 2021-10-27 PROCEDURE — 99207 PR NO CHARGE LOS: CPT | Performed by: FAMILY MEDICINE

## 2021-10-27 PROCEDURE — 93970 EXTREMITY STUDY: CPT

## 2021-10-27 PROCEDURE — 97162 PT EVAL MOD COMPLEX 30 MIN: CPT | Mod: GP

## 2021-10-27 PROCEDURE — 97110 THERAPEUTIC EXERCISES: CPT | Mod: GP

## 2021-10-27 PROCEDURE — 99232 SBSQ HOSP IP/OBS MODERATE 35: CPT | Performed by: PSYCHIATRY & NEUROLOGY

## 2021-10-27 PROCEDURE — 82962 GLUCOSE BLOOD TEST: CPT

## 2021-10-27 PROCEDURE — 120N000001 HC R&B MED SURG/OB

## 2021-10-27 PROCEDURE — 96372 THER/PROPH/DIAG INJ SC/IM: CPT | Performed by: FAMILY MEDICINE

## 2021-10-27 PROCEDURE — G0378 HOSPITAL OBSERVATION PER HR: HCPCS

## 2021-10-27 PROCEDURE — 97530 THERAPEUTIC ACTIVITIES: CPT | Mod: GP

## 2021-10-27 PROCEDURE — 36415 COLL VENOUS BLD VENIPUNCTURE: CPT | Performed by: FAMILY MEDICINE

## 2021-10-27 PROCEDURE — 99222 1ST HOSP IP/OBS MODERATE 55: CPT | Performed by: INTERNAL MEDICINE

## 2021-10-27 PROCEDURE — 96372 THER/PROPH/DIAG INJ SC/IM: CPT

## 2021-10-27 PROCEDURE — 80053 COMPREHEN METABOLIC PANEL: CPT | Performed by: FAMILY MEDICINE

## 2021-10-27 PROCEDURE — 85027 COMPLETE CBC AUTOMATED: CPT | Performed by: FAMILY MEDICINE

## 2021-10-27 PROCEDURE — 250N000012 HC RX MED GY IP 250 OP 636 PS 637: Performed by: FAMILY MEDICINE

## 2021-10-27 RX ORDER — CALCIUM CARBONATE 500 MG/1
500 TABLET, CHEWABLE ORAL 3 TIMES DAILY PRN
Status: DISCONTINUED | OUTPATIENT
Start: 2021-10-27 | End: 2021-10-29 | Stop reason: HOSPADM

## 2021-10-27 RX ORDER — LORAZEPAM 1 MG/1
1 TABLET ORAL
Status: DISCONTINUED | OUTPATIENT
Start: 2021-10-27 | End: 2021-10-29 | Stop reason: HOSPADM

## 2021-10-27 RX ADMIN — HUMAN INSULIN 30 UNITS: 100 INJECTION, SUSPENSION SUBCUTANEOUS at 16:45

## 2021-10-27 RX ADMIN — HYDROXYCHLOROQUINE SULFATE 200 MG: 200 TABLET, FILM COATED ORAL at 10:57

## 2021-10-27 RX ADMIN — OXYCODONE HYDROCHLORIDE AND ACETAMINOPHEN 2 TABLET: 5; 325 TABLET ORAL at 23:29

## 2021-10-27 RX ADMIN — INSULIN ASPART 1 UNITS: 100 INJECTION, SOLUTION INTRAVENOUS; SUBCUTANEOUS at 12:33

## 2021-10-27 RX ADMIN — AMLODIPINE BESYLATE 10 MG: 5 TABLET ORAL at 08:30

## 2021-10-27 RX ADMIN — OXYCODONE HYDROCHLORIDE AND ACETAMINOPHEN 2 TABLET: 5; 325 TABLET ORAL at 17:01

## 2021-10-27 RX ADMIN — OXYCODONE HYDROCHLORIDE AND ACETAMINOPHEN 2 TABLET: 5; 325 TABLET ORAL at 04:57

## 2021-10-27 RX ADMIN — LORATADINE 10 MG: 10 TABLET ORAL at 21:36

## 2021-10-27 RX ADMIN — ATORVASTATIN CALCIUM 40 MG: 40 TABLET, FILM COATED ORAL at 08:30

## 2021-10-27 RX ADMIN — TRAZODONE HYDROCHLORIDE 150 MG: 50 TABLET ORAL at 21:36

## 2021-10-27 RX ADMIN — HUMAN INSULIN 30 UNITS: 100 INJECTION, SUSPENSION SUBCUTANEOUS at 08:36

## 2021-10-27 RX ADMIN — PREDNISONE 1 MG: 1 TABLET ORAL at 10:57

## 2021-10-27 RX ADMIN — Medication 25 MCG: at 08:30

## 2021-10-27 RX ADMIN — OXYCODONE HYDROCHLORIDE AND ACETAMINOPHEN 2 TABLET: 5; 325 TABLET ORAL at 10:56

## 2021-10-27 RX ADMIN — HYDROXYCHLOROQUINE SULFATE 200 MG: 200 TABLET, FILM COATED ORAL at 21:36

## 2021-10-27 RX ADMIN — INSULIN ASPART 1 UNITS: 100 INJECTION, SOLUTION INTRAVENOUS; SUBCUTANEOUS at 17:38

## 2021-10-27 ASSESSMENT — ACTIVITIES OF DAILY LIVING (ADL)
ADLS_ACUITY_SCORE: 15
ADLS_ACUITY_SCORE: 15
ADLS_ACUITY_SCORE: 19
PREVIOUS_RESPONSIBILITIES: FINANCES;MEDICATION MANAGEMENT
ADLS_ACUITY_SCORE: 19
ADLS_ACUITY_SCORE: 9
ADLS_ACUITY_SCORE: 19

## 2021-10-27 NOTE — PROGRESS NOTES
NEUROLOGY PROGRESS NOTE     Ebonie Bowman,  1950, MRN [id number] Date: 10/27/2021     [facility]   Code status:  Full Code   PCP: Marichuy Rubio, [phone]      ASSESSMENT & PLAN   Diagnosis code: Spell of loss of consciousness/rule out seizure    Spell of loss of consciousness/rule out seizure      Head CT negative for structural lesions    MRI brain recommended though patient is claustrophobic and does not want to do that.  Patient agreed to this to later changed her mind.    EEG negative for epilepsy    Hold off antiepileptic medication for right now.  If she has more spells and no cardiac cause can be found could consider seizure medication trial.    Cardiac monitoring to rule out arrhythmias    Echocardiogram shows 55-60% action fraction.  Cardiology consultation could be considered.    3-month driving restriction per [place] state law    Discharge:-Per primary team.  Okay discharge from neurology and.       Chief Complaint   Patient presents with     Fall        HISTORY OF PRESENT ILLNESS     We have been requested by Dr. Tan to evaluate Ebonie Bowman who is a 71 year old  female for spells of loss of consciousness.  This is a 71-year-old female with diabetes hypertension who had 2 syncopal events.  The first 1 happened while she was on the stairs.  Second 1 happened when she got out of bed and then went to the bathroom and try to sit down on the toilet seat and kept on slipping.  She is not sure how long she was out for.  She awoke and had her son called 911.  She thinks she was out for several minutes.  Denies any chest pains palpitations headaches.  Denies any other provoking factors.  No recent head injuries.  Denies any shortness of breath.  No changes in her medications.  Currently she feels she is back to her normal self.  There was no confusion or any premonitions.  Has not had passing out events in the past.    10/27/21  Patient reports no overnight seizures.  No chest pains or  palpitations.  Discussed with her about getting MRI and she is interested in trying that with a pill delay.  After I ordered the MRI she change her mind and did not want to do it.  MRI was then canceled.  She reports no other new issues.  Echocardiogram was noncontributory.     PAST MEDICAL & SURGICAL HISTORY     Medical History  Past Medical History:   Diagnosis Date     BBB (bundle branch block)      Chronic back pain      Chronic kidney disease     stage 3     Chronic kidney disease, unspecified     Created by Conversion      Depression      Diabetes (H)     Type 2     Diabetes mellitus, type 2 (H)     Created by Conversion      Frequent headaches      Ganglion of tendon sheath     Created by Conversion      GERD (gastroesophageal reflux disease)      HLD (hyperlipidemia)      Hypertension     Per H&P dated 1/03/2013     Incarcerated ventral hernia     Per H&P dated 1/03/2013     Osteoarthritis      Rheumatoid arthritis (H)      Shortness of breath      Thrombocytopenia (H)      Venous insufficiency      Surgical History  Past Surgical History:   Procedure Laterality Date     BACK SURGERY       C UNLISTED PROCEDURE, ABDOMEN/PERITONEUM/OMENTUM      Description: Hernia Repair;  Recorded: 10/23/2013;     CHOLECYSTECTOMY       HERNIA REPAIR       HYSTERECTOMY  1984     IR FINE NEEDLE ASPIRATION  11/29/2020     JOINT REPLACEMENT Left     knee     OTHER SURGICAL HISTORY  1990, 1995    Two left wrist     OTHER SURGICAL HISTORY  1996    FATTY TUMORLT SHOULDER BLADE     PICC MIDLINE INSERTION  11/30/2020          NV IMPLANT SPINAL NEUROSTIM/ Left 4/25/2019    Procedure: LEFT FLANK INCISION REPLACE NEUROSTIMULATOR;  Surgeon: Vishal Pretty MD;  Location: [facility];  Service: Pain     SPINAL CORD STIMULATOR IMPLANT          SOCIAL HISTORY     Social History     Tobacco Use     Smoking status: Former Smoker     Packs/day: 1.00     Years: 43.00     Pack years: 43.00     Start date: 1/1/1965     Quit  date: 2008     Years since quittin.2     Smokeless tobacco: Never Used   Substance Use Topics     Alcohol use: Yes     Comment: Alcoholic Drinks/day: rare     Drug use: No     Comment: Drug use: takes percocet for chronic pain        FAMILY HISTORY     Reviewed, and no family history of seizures     ALLERGIES     Allergies   Allergen Reactions     Morphine Nausea and Vomiting     Penicillins Hives     Tolerates ceftriaxone     Sulfa (Sulfonamide Antibiotics) [Sulfa Drugs] Hives        REVIEW OF SYSTEMS     12 system ROS was done in the HPI this was negative.  No new issues today.     HOME & HOSPITAL MEDICATIONS     Prior to Admission Medications  Medications Prior to Admission   Medication Sig Dispense Refill Last Dose     amLODIPine (NORVASC) 10 MG tablet [AMLODIPINE (NORVASC) 10 MG TABLET] TAKE 1 TABLET EVERY DAY 90 tablet 2 10/25/2021 at Unknown time     ascorbic acid (ASCORBIC ACID WITH LATRICIA HIPS) 500 MG tablet Take 500 mg by mouth daily    10/25/2021 at Unknown time     aspirin 81 MG EC tablet Take 81 mg by mouth daily   10/25/2021 at Unknown time     atorvastatin (LIPITOR) 40 MG tablet [ATORVASTATIN (LIPITOR) 40 MG TABLET] Take 1 tablet (40 mg total) by mouth at bedtime. 90 tablet 2 10/25/2021 at AM     b complex vitamins tablet [B COMPLEX VITAMINS TABLET] Take 1 tablet by mouth daily.   10/25/2021 at Unknown time     fish oil-omega-3 fatty acids 1000 MG capsule Take 1 g by mouth daily   10/25/2021 at Unknown time     hydroxychloroquine (PLAQUENIL) 200 MG tablet Take 1 tablet (200 mg) by mouth 2 times daily 180 tablet 1 10/25/2021 at Unknown time     insulin NPH (NOVOLIN) 100 unit/mL injection Inject 34-38 Units Subcutaneous 2 times daily (before meals) AM: 38 units  PM: 34 units if BG < 250, 38 units if BG > 250   10/25/2021 at Unknown time     lidocaine (LMX) 4 % cream [LIDOCAINE (LMX) 4 % CREAM] Apply to affected area twice daily as needed 30 g 3 Unknown at Unknown time     lisinopril (ZESTRIL) 10  MG tablet [LISINOPRIL (PRINIVIL,ZESTRIL) 10 MG TABLET] TAKE 1 TABLET AT BEDTIME 90 tablet 1 10/25/2021 at AM     loratadine (CLARITIN) 10 mg tablet [LORATADINE (CLARITIN) 10 MG TABLET] Take 1 tablet (10 mg total) by mouth at bedtime. 90 tablet 3 10/25/2021 at AM     methotrexate 2.5 MG tablet Take 3 tablets (7.5 mg) by mouth once a week 36 tablet 0 10/22/2021 at Unknown time     metoprolol succinate ER (TOPROL-XL) 50 MG 24 hr tablet [METOPROLOL SUCCINATE (TOPROL-XL) 50 MG 24 HR TABLET] TAKE 1 TABLET EVERY DAY 90 tablet 2 10/25/2021 at Unknown time     MULTIVITAMIN ORAL Take 1 tablet by mouth daily    10/25/2021 at Unknown time     naloxone (NARCAN) 4 MG/0.1ML nasal spray Spray 1 spray (4 mg) into one nostril alternating nostrils once as needed for opioid reversal every 2-3 minutes until assistance arrives 0.2 mL 0 Unknown at Unknown time     oxyCODONE-acetaminophen (PERCOCET) 5-325 MG tablet Take 1-2 tablets by mouth every 6 hours as needed for severe pain (Max of 6 tablets daily) 180 tablet 0 10/24/2021 at Unknown time     predniSONE (DELTASONE) 1 MG tablet Take 2 tablets p.o. daily for 1 month then if stable decrease by  1/2 tablet (1/2 mg) every month. (Patient taking differently: Take 1 mg by mouth daily If stable decrease by  1/2 tablet (1/2 mg) every month.) 60 tablet 2 10/25/2021 at Unknown time     traZODone (DESYREL) 150 MG tablet Take 1 tablet (150 mg) by mouth At Bedtime 90 tablet 1 10/25/2021 at Unknown time     Vitamin D3 (VITAMIN D3) 25 mcg (1000 units) tablet Take 1,000 Units by mouth daily    10/25/2021 at Unknown time     alcohol swab prep pads Use to swab area of injection/jacquelyn as directed. 100 each 3      blood glucose (NO BRAND SPECIFIED) test strip Use to test blood sugar 3 times daily or as directed. To accompany: Blood Glucose Monitor Brands: per insurance. 100 strip 6      blood glucose calibration (NO BRAND SPECIFIED) solution To accompany: Blood Glucose Monitor Brands: per insurance. 1  "each 0      blood glucose monitoring (NO BRAND SPECIFIED) meter device kit Use to test blood sugar 3 times daily or as directed. Preferred blood glucose meter OR supplies to accompany: Blood Glucose Monitor Brands: per insurance. 1 kit 0      blood glucose test strips [BLOOD GLUCOSE TEST STRIPS] Check blood sugar four times daily. Dispense brand per patient's insurance at pharmacy discretion. Dx: e11.9 400 strip 3      blood-glucose meter Misc [BLOOD-GLUCOSE METER MISC] Dispense one meter. Use as directed. Dx: e11.9 1 each 0      flash glucose scanning reader (FREESTYLE BRE 14 DAY READER) Misc [FLASH GLUCOSE SCANNING READER (FREESTYLE BRE 14 DAY READER) MISC] Use 1 each As Directed see administration instructions. Use as directed with sensor 1 each 0      flash glucose sensor (FREESTYLE BRE 14 DAY SENSOR) Kit [FLASH GLUCOSE SENSOR (FREESTYLE BRE 14 DAY SENSOR) KIT] Use 1 each As Directed every 14 (fourteen) days. 2 kit 11      insulin syringe 31G X 5/16\" 0.5 ML MISC 1 each 2 times daily 100 each 11      insulin syringe-needle U-100 (BD INSULIN SYRINGE ULT-FINE II) 1 mL 31 gauge x 5/16 Syrg [INSULIN SYRINGE-NEEDLE U-100 (BD INSULIN SYRINGE ULT-FINE II) 1 ML 31 GAUGE X 5/16 SYRG] USE 4 TIMES DAILY AS DIRECTED 500 each 1      lancing device (ACCU-CHEK SOFTCLIX) Misc [LANCING DEVICE (ACCU-CHEK SOFTCLIX) MISC] Use 1 applicator As Directed 4 (four) times a day. 300 each 3      thin (NO BRAND SPECIFIED) lancets Use with lanceting device. To accompany: Blood Glucose Monitor Brands: per insurance. 100 each 11        Hospital Medications    amLODIPine  10 mg Oral Daily     atorvastatin  40 mg Oral Daily     hydroxychloroquine  200 mg Oral BID     insulin aspart  1-3 Units Subcutaneous TID AC     insulin aspart  1-3 Units Subcutaneous At Bedtime     insulin NPH  30 Units Subcutaneous BID AC     loratadine  10 mg Oral At Bedtime     [START ON 10/29/2021] methotrexate  7.5 mg Oral Weekly     metoprolol succinate ER  50 " mg Oral Daily     predniSONE  1 mg Oral Daily     traZODone  150 mg Oral At Bedtime     vitamin C  500 mg Oral Daily     Vitamin D3  25 mcg Oral Daily        PHYSICAL EXAM     Vital signs  Temp:  [97.6  F (36.4  C)-98.4  F (36.9  C)] 98.4  F (36.9  C)  Pulse:  [56-91] 59  Resp:  [14-30] 18  BP: (116-166)/(58-72) 147/67  SpO2:  [94 %-98 %] 97 %    VITALS: BP (!) 147/67 (BP Location: Right arm)   Pulse 59   Temp 98.4  F (36.9  C) (Oral)   Resp 18   Ht 1.829 m (6')   Wt 103.1 kg (227 lb 6.4 oz)   SpO2 97%   BMI 30.84 kg/m    GENERAL -Health appearing, No apparent distress  EYES- No scleral icterus, no eyelid droop, Pupils - see Neuro section  HEENT - Normocephalic, atraumatic, Hearing grossly intact; Oral mucosa moist and pink in color. External Ears and nose intact.   Neck - supple with no obvious lymphadenopathy or thyromegaly  PULM - Good spontaneous respiratory effort; Normal palpation of chest.   CV- Pedal pulses present with no peripheral edema/ No significant varicosities.  MSK- Gait - see Neuro section; Strength and tone- see Neuro section; Range of motion grossly intact.  PSYCH -cooperative    Neurological  Mental status - Patient is awake and oriented to self, place and time. Attention span is normal. Language is fluent and follows commands appropriately.   Cranial nerves - CN II-XII intact. Pupils are reactive and symmetric; EOMI, VFIT, NLF symmetric  Motor - There is no pronator drift. Motor exam shows 5/5 strength in all extremities.  Tone - Tone is symmetric bilaterally in upper and lower extremities.  Coordination - Finger to nose and heel to shin is without dysmetria.  Gait and station --able to ambulate with assistance.  Formal gait testing cannot be done because of safety concerns from ongoing medical issues     DIAGNOSTIC STUDIES     Pertinent Radiology   [facility] US                                                      IMPRESSION:  1.  Mild plaque formation, velocities consistent with less than  50% stenosis in the right internal carotid artery.  2.  Mild plaque formation, velocities consistent with less than 50% stenosis in the left internal carotid artery.  3.  Flow within the vertebral arteries is antegrade.    CT head  IMPRESSION:  1.  No CT finding of a mass, hemorrhage or focal area suggestive of acute infarct.  2.  Minimal age related changes.    CT C spine  IMPRESSION:  1. No evidence of acute cervical spine fracture.  2. No significant canal compromise with scattered areas of neural foraminal narrowing as described above.    EEG  IMPRESSION/REPORT/PLAN  This is a normal EEG during wakefulness and drowsiness/sleep except for mild generalized background dysrhythmia. Further clinical correlation is needed.     Please note that the absence of epileptiform abnormalities does not exclude the possibility of epilepsy in any patient.     EEG 2018  Impression  Normal awake and sleep stage I EEG     Signed  Dr. Monroe Salas    ECHO  Definity contrast utilized  The left ventricle is normal in size.  There is mild concentric left ventricular hypertrophy.  The left ventricular ejection fraction is normal.  The visual ejection fraction is 55-60%.  Normal left ventricular wall motion  Normal right ventricle size and systolic function.  The left atrium is mildly dilated.  No significant valve abnormality  No prior study made available for comparison    Recent Results (from the past 24 hour(s))   Troponin I    Collection Time: 10/26/21  2:05 PM   Result Value Ref Range    Troponin I 0.03 0.00 - 0.29 ng/mL   Glucose by meter    Collection Time: 10/26/21  4:29 PM   Result Value Ref Range    GLUCOSE BY METER POCT 141 (H) 70 - 99 mg/dL   Glucose by meter    Collection Time: 10/26/21  5:59 PM   Result Value Ref Range    GLUCOSE BY METER POCT 145 (H) 70 - 99 mg/dL   Troponin I    Collection Time: 10/26/21  6:07 PM   Result Value Ref Range    Troponin I 0.03 0.00 - 0.29 ng/mL   Glucose by meter    Collection Time:  10/26/21  9:25 PM   Result Value Ref Range    GLUCOSE BY METER POCT 273 (H) 70 - 99 mg/dL   Glucose by meter    Collection Time: 10/27/21  2:09 AM   Result Value Ref Range    GLUCOSE BY METER POCT 180 (H) 70 - 99 mg/dL   Comprehensive metabolic panel    Collection Time: 10/27/21  7:20 AM   Result Value Ref Range    Sodium 141 136 - 145 mmol/L    Potassium 4.1 3.5 - 5.0 mmol/L    Chloride 106 98 - 107 mmol/L    Carbon Dioxide (CO2) 25 22 - 31 mmol/L    Anion Gap 10 5 - 18 mmol/L    Urea Nitrogen 18 8 - 28 mg/dL    Creatinine 1.33 (H) 0.60 - 1.10 mg/dL    Calcium 9.1 8.5 - 10.5 mg/dL    Glucose 192 (H) 70 - 125 mg/dL    Alkaline Phosphatase 51 45 - 120 U/L    AST 16 0 - 40 U/L    ALT 12 0 - 45 U/L    Protein Total 6.1 6.0 - 8.0 g/dL    Albumin 3.3 (L) 3.5 - 5.0 g/dL    Bilirubin Total 0.7 0.0 - 1.0 mg/dL    GFR Estimate 40 (L) >60 mL/min/1.73m2   CBC with platelets    Collection Time: 10/27/21  7:20 AM   Result Value Ref Range    WBC Count 9.6 4.0 - 11.0 10e3/uL    RBC Count 4.25 3.80 - 5.20 10e6/uL    Hemoglobin 13.2 11.7 - 15.7 g/dL    Hematocrit 40.1 35.0 - 47.0 %    MCV 94 78 - 100 fL    MCH 31.1 26.5 - 33.0 pg    MCHC 32.9 31.5 - 36.5 g/dL    RDW 13.0 10.0 - 15.0 %    Platelet Count 136 (L) 150 - 450 10e3/uL   Glucose by meter    Collection Time: 10/27/21 11:48 AM   Result Value Ref Range    GLUCOSE BY METER POCT 206 (H) 70 - 99 mg/dL       Total time spent for face to face visit, reviewing labs/imaging studies, counseling and coordination of care was: Over 31 min More than 50% of this time was spent on counseling and coordination of care.    Counseling patient.  Trying to get a MRI and then coordination of care with the nursing staff to cancel it since patient change her mind.    David Frances MD  Neurologist  [facility] Neurology [facility]  Tel:- [phone]

## 2021-10-27 NOTE — PROGRESS NOTES
"PRIMARY DIAGNOSIS: \"GENERIC\" NURSING  OUTPATIENT/OBSERVATION GOALS TO BE MET BEFORE DISCHARGE:  1. ADLs back to baseline: Yes    2. Activity and level of assistance: Up with standby assistance.    3. Pain status: No improvement noted. Consider adjustment in pain regimen.  The pt reports chronic lower back pain, hx of spinal surgeries. Pain is consistently rated in the 8-9 range.    4. Return to near baseline physical activity: Yes, using walker.      Discharge Planner Nurse   Safe discharge environment identified: Yes  Barriers to discharge: Yes, workup for syncopal episodes.         Entered by: Margie Calvillo 10/27/2021 1:52 AM     Please review provider order for any additional goals.   Nurse to notify provider when observation goals have been met and patient is ready for discharge.  "

## 2021-10-27 NOTE — PLAN OF CARE
Problem: Syncope  Goal: Absence of Syncopal Symptoms  Outcome: Improving   Co back pain and bilateral calf tenderness when up  Walking to bathroom, medicated with Percocet two  Tablets.

## 2021-10-27 NOTE — PROGRESS NOTES
10/27/21 0900   Quick Adds   Quick Adds Certification   Type of Visit Initial PT Evaluation   Living Environment   People in home child(zulma), adult  (Son)   Current Living Arrangements apartment  (2nd floor and no steps to get in from outside. )   Home Accessibility stairs within home   Number of Stairs, Within Home, Primary greater than 10 stairs   Stair Railings, Within Home, Primary railings on both sides of stairs   Living Environment Comments Pt needs assist with driving.   ([place] take the metro)   Self-Care   Usual Activity Tolerance good   Current Activity Tolerance moderate   Equipment Currently Used at Home walker, rolling;cane, straight  (FWW and a 4WW, tub/shower, reg toilet.  )   Activity/Exercise/Self-Care Comment Pt is indep with dressing and bathing, shares cooking, assist with cleaning and luandry.  Pt ambulates indep inside the apt without AD and uses FWW indep outside.     Disability/Function   Hearing Difficulty or Deaf no   Wear Glasses or Blind yes   Vision Management glasses.    Walking or Climbing Stairs ambulation difficulty, requires equipment;ambulation difficulty, assistance 1 person   Mobility Management FWW   Fall history within last six months yes   Number of times patient has fallen within last six months 4  (mostly falling inside. )   Change in Functional Status Since Onset of Current Illness/Injury yes   General Information   Onset of Illness/Injury or Date of Surgery 10/25/21   Referring Physician Amador Tan   Patient/Family Therapy Goals Statement (PT) to go home.    Pertinent History of Current Problem (include personal factors and/or comorbidities that impact the POC) syncope   Existing Precautions/Restrictions fall  (Tab alarm is on. )   Weight-Bearing Status - LLE weight-bearing as tolerated   Weight-Bearing Status - RLE weight-bearing as tolerated   Cognition   Orientation Status (Cognition) person;place;situation  (year and month with cues. )   Follows Commands  (Cognition) follows three-step commands   Pain Assessment   Patient Currently in Pain   (Pt has LBP in the sacral area. Pt c/o heart burn. Told nurse)   Posture    Posture Comments Cues for posture.    Range of Motion (ROM)   ROM Comment Bilat LEs with back pain with AROM left LE seemed to incr more pain.  Pt has tenderness with palpation in bilat calf areas.  Nursing notified.       Strength   Strength Comments able to WB for mobility.    Bed Mobility   Comment (Bed Mobility) Supine <>sit with CGA with cues for body mechanics.  Pt did have back pain and sat at the EOB before standing.     Transfers   Transfer Safety Comments mod A x 1 bed to stand with cues for hand placement.    (with FWW)   Gait/Stairs (Locomotion)   [place] Level (Gait) minimum assist (75% patient effort)   Assistive Device (Gait) walker, front-wheeled   Distance in Feet (Required for LE Total Joints) 20   Pattern (Gait) step-through   Comment (Gait/Stairs) Pt did have back pain with walking.  She did c/o some heart burn after walking and nursing was notified.  Pt walked slowly and needed cues for posture.    Balance   Balance Comments min A x 1 with FWW.    Sensory Examination   Sensory Perception Comments Hx of back surg x 2 and has dec sensation R lat area of the distal R LE   (LT WNL LEs with siight dec at R lat calf area. )   Clinical Impression   Criteria for Skilled Therapeutic Intervention yes, treatment indicated   PT Diagnosis (PT) impaired mobility   Influenced by the following impairments bed mobility, transfers, gait, steps    Functional limitations due to impairments LBP, weakness, dec activity kaylee,    Clinical Presentation Evolving/Changing   Clinical Presentation Rationale Pt presents medically diagnosed.    Clinical Decision Making (Complexity) moderate complexity   Therapy Frequency (PT) Daily   Predicted Duration of Therapy Intervention (days/wks) 7   Planned Therapy Interventions (PT) bed mobility training;gait  training;home exercise program;stair training;strengthening;stretching   Anticipated Equipment Needs at Discharge (PT) walker, rolling  (FWW)   Risk & Benefits of therapy have been explained evaluation/treatment results reviewed;care plan/treatment goals reviewed;risks/benefits reviewed;participants voiced agreement with care plan;patient   PT Discharge Planning    PT Discharge Recommendation (DC Rec) Transitional Care Facility;home with home care physical therapy   PT Rationale for DC Rec Pt did not kaylee steps yet and had increased back pain with gait.  Some heart burn per the pt after walking.  PT did tell nursing.    Therapy Certification   Start of care date 10/27/21   Certification date from 10/27/21   Certification date to 11/03/21   Medical Diagnosis syncope   Total Evaluation Time   Total Evaluation Time (Minutes) 15   Skin WDL   Skin WDL WDL   Skin Color pale   Skin Temperature warm

## 2021-10-27 NOTE — PROGRESS NOTES
[facility]      OUTPATIENT OCCUPATIONAL THERAPY  EVALUATION  PLAN OF TREATMENT FOR OUTPATIENT REHABILITATION  (COMPLETE FOR INITIAL CLAIMS ONLY)  Patient's Last Name, First Name, M.I.  YOB: 1950  Ebonie Bowman                          Provider's Name  [facility] Medical Record No.  [id number]                               Onset Date:  (P) 10/25/21   Start of Care Date:  (P) 10/27/21     Type:     ___PT   _X_OT   ___SLP Medical Diagnosis:  (P) syncope                        OT Diagnosis:  (P) weakness, back pain, fatigue   Visits from SOC:  1   _________________________________________________________________________________  Plan of Treatment/Functional Goals    Planned Interventions: (P) ADL retraining   Goals: See Occupational Therapy Goals on Care Plan in [email or web address] electronic health record.    Therapy Frequency: (P) Daily  Predicted Duration of Therapy Intervention: (P) 7 days  _________________________________________________________________________________    I CERTIFY THE NEED FOR THESE SERVICES FURNISHED UNDER        THIS PLAN OF TREATMENT AND WHILE UNDER MY CARE     (Physician co-signature of this document indicates review and certification of the therapy plan).                Certification date from: (P) 10/27/21, Certification date to: (P) 11/02/21    Referring Physician: (P) Dr Tan            Initial Assessment        See Occupational Therapy evaluation dated (P) 10/27/21 in [employer] electronic health record.

## 2021-10-27 NOTE — PROGRESS NOTES
[facility] PROGRESS NOTE      ADMIT DATE: 10/25/2021     FACILITY: [facility]    PCP: Marichuy Rubio, [phone]    ASSESSMENT AND PLAN:   71 year old female with history significant for Chronic back pain, Chronic kidney disease, Diabetes mellitus, type 2 (H), GERD (gastroesophageal reflux disease), HLD (hyperlipidemia), Hypertension, and Rheumatoid arthritis (H) comes in after two syncopal episodes on 10/25.    Active Problems:    Syncope, unspecified syncope type      Syncope and collapse  -patient denies any shortness of breath, lightheadedness, and chest pain with episodes so cardiac cause less likely.   -certain meds like oxy, trazodone and claritin can contribute to lightheadedness/dizziness which can lead to the fall but again patient denies any lightheadedness or dizziness before both falls prior to admission.   -EKG showed sinus bradycardia with 1st degree AV block with prolonged QTc interval of 511 ms. Troponin x 3 negative.-->base on previous recent OVs, HR is usually 60 bpm or greater   -CT head negative for IC bleed and any acute findings  -echo pending (last echo from 3/10/83193 shows normal LVEF of 55%)  -US b/l carotid unremarkable  -echo showed normal LVEF of 55-60%.   -EEG unremarkable  -neuro recommended MRI but patient is very claustrophic and is refusing. Neuro thinks MRI would be low yield and recommends cardio consult  -cardio consulted.         Prolonged QTc interval   -K level is WNL  -mag level WNL  -avoid QTc prolonged meds but will c/w home trazodone and home plaquenil for now  -c/w tele         B/l hip pain  -patient states she fell on her right hip with the first fall and she fell on her left hip with the second fall on 10/25.   -pelvic x-ray negative for fracture        Neck pain  -CT neck negative for acute fracture  -patient denies any neck pain at this time      B/l calf pain  -new concern  -Qiana's sign + for both legs  -doppler US of BLEs        Elevated  creatinine on CKD  -baseline creatinine is around 1.2  -creatinine on admission was 1.34-->1.33  -monitor with daily BMP for now        HLD  -c/w home lipitor        DM type 2  -last HgbA1c was 9.7 on 9/16/2021 which is above goal  -on NPH 34-38 units BID  -increase NPH to 32 units BID for now  -start low resistance sliding scale insulin for now  -start accuchecks and adjust insulin regimen PRN        Hx of allergic rhinitis  -c/w home claritin        HTN  -BP improved  -c/w home norvasc  -c/w home metoprolol   -creatinine is mildly elevated compared to baseline-->hold home lisinopril until creatinine improves  -IV hydralazine PRN        Hx of RA  -c/w home plaquenil and methotrexate.   -c/w home prednisone        Hx of chronic back pain  -c/w home percocet for now     Insomnia  -c/w home trazodone         Fall  -PT/OT eval-->recommend TCU. Patient would like to talk to CM about TCU.   -fall precautions                FEN/GI: low fat, low salt, diabetic diet  DVT proph: SCDs for now  Code status: No Order    Discharge barriers:  -cardio consult, TCU placement and discharge planning  -ADOD: 1-2 days      SUBJECTIVE:    Patient denies any BLE calf pain even though bedside nurse stated patient complained of BLE calf pain this morning. Patient denies any repeat syncopal events since admission. Patient also denies any chest pain, shortness of breath, and lightheadedness. Patient denies any other complaints at this time.     ROS:  12 Points review of systems reviewed and is negative except for what has already been mentioned above    OBJECTIVE:  Patient Vitals for the past 24 hrs:   BP Temp Temp src Pulse Resp SpO2 Height Weight   10/27/21 0337 127/58 98.1  F (36.7  C) Oral 57 18 94 % -- --   10/26/21 2319 125/60 98.4  F (36.9  C) Oral 56 18 95 % -- --   10/26/21 2005 -- 98  F (36.7  C) Oral -- 14 96 % -- --   10/26/21 1756 (!) 166/72 98.2  F (36.8  C) Oral 60 18 98 % 1.829 m (6') 103.1 kg (227 lb 6.4 oz)   10/26/21 4256  116/70 -- -- 63 30 96 % -- --   10/26/21 1530 -- -- -- 60 18 94 % -- --   10/26/21 1500 -- -- -- 60 20 95 % -- --   10/26/21 1430 -- -- -- 63 17 95 % -- --   10/26/21 1400 -- -- -- 91 19 97 % -- --   10/26/21 1330 -- -- -- 61 17 97 % -- --   10/26/21 1230 (!) 188/77 -- -- 59 25 -- -- --   10/26/21 1200 -- -- -- 61 24 -- -- --   10/26/21 1130 -- -- -- 60 19 -- -- --   10/26/21 1100 -- -- -- 62 18 -- -- --   10/26/21 1030 -- -- -- 62 21 -- -- --          Intake/Output Summary (Last 24 hours) at 10/27/2021 0701  Last data filed at 10/27/2021 0613  Gross per 24 hour   Intake 1420 ml   Output 600 ml   Net 820 ml     GENRL:  Not in acute distress. Satting at 96% on room air.   HEENT: NC/AT                 Neck- supple                 Sclera- anicteric                 Mucous membrane- moist and pink  CHEST: Clear to auscultation bilaterally  HEART: S1S2 regular. No murmurs, rubs or gallops  ABDMN: Soft. Non-tender. No guarding or rigidity. Bowel sounds- active  EXTRM:  No pedal edema. Qiana's sign + in BLEs.   NEURO:  No involuntary movements  INTGM: please see nursing assessment for full skin assessment  PSYCH: normal affect, normal speech     DIAGNOSTIC DATA:          Recent Results (from the past 24 hour(s))   Troponin I    Collection Time: 10/26/21  7:39 AM   Result Value Ref Range    Troponin I 0.04 0.00 - 0.29 ng/mL   Magnesium    Collection Time: 10/26/21  7:39 AM   Result Value Ref Range    Magnesium 1.9 1.8 - 2.6 mg/dL   Glucose by meter    Collection Time: 10/26/21 10:40 AM   Result Value Ref Range    GLUCOSE BY METER POCT 112 (H) 70 - 99 mg/dL   Echocardiogram Complete    Collection Time: 10/26/21 12:26 PM   Result Value Ref Range    LVEF  55-60%    Troponin I    Collection Time: 10/26/21  2:05 PM   Result Value Ref Range    Troponin I 0.03 0.00 - 0.29 ng/mL   Glucose by meter    Collection Time: 10/26/21  4:29 PM   Result Value Ref Range    GLUCOSE BY METER POCT 141 (H) 70 - 99 mg/dL   Glucose by meter     Collection Time: 10/26/21  5:59 PM   Result Value Ref Range    GLUCOSE BY METER POCT 145 (H) 70 - 99 mg/dL   Troponin I    Collection Time: 10/26/21  6:07 PM   Result Value Ref Range    Troponin I 0.03 0.00 - 0.29 ng/mL   Glucose by meter    Collection Time: 10/26/21  9:25 PM   Result Value Ref Range    GLUCOSE BY METER POCT 273 (H) 70 - 99 mg/dL   Glucose by meter    Collection Time: 10/27/21  2:09 AM   Result Value Ref Range    GLUCOSE BY METER POCT 180 (H) 70 - 99 mg/dL        Results for orders placed or performed during the hospital encounter of 10/25/21   Cervical spine CT w/o contrast    Impression    IMPRESSION:  1. No evidence of acute cervical spine fracture.  2. No significant canal compromise with scattered areas of neural foraminal narrowing as described above.   Head CT w/o contrast    Impression    IMPRESSION:  1.  No CT finding of a mass, hemorrhage or focal area suggestive of acute infarct.  2.  Minimal age related changes.   US Carotid Bilateral    Impression    IMPRESSION:  1.  Mild plaque formation, velocities consistent with less than 50% stenosis in the right internal carotid artery.  2.  Mild plaque formation, velocities consistent with less than 50% stenosis in the left internal carotid artery.  3.  Flow within the vertebral arteries is antegrade.   XR Hip Bilateral 1 View Each    Impression    IMPRESSION: No acute fracture or dislocation. Minimal degenerative arthritis of both hips. Chronic soft tissue calcifications projecting near the lesser trochanters bilaterally are stable. Diffuse bony demineralization. Degenerative changes in the spine.   Electronic device projects over the left lower quadrant.    Echocardiogram Complete   Result Value Ref Range    LVEF  55-60%           All recent labs reviewed personally  Radiology report reviewed.   Radiology Results: imaging impressions reviewed      The total time spent in preparing this progress note is about 35 minutes, >50% time spent in care  co-ordination that includes reviewing labs, images, discussing the plan of care with patient/family, consultants, and .      Amador Tan MD.   [facility] Medicine Service   [phone]   Pager [phone]

## 2021-10-27 NOTE — PROGRESS NOTES
PRIMARY DIAGNOSIS: GENERALIZED WEAKNESS    OUTPATIENT/OBSERVATION GOALS TO BE MET BEFORE DISCHARGE  1. Orthostatic performed: Yes:          Lying Orthostatic BP: 132/62         Sitting Orthostatic BP: 120/55         Standing Orthostatic BP: 121/56     2. Tolerating PO medications: Yes    3. Return to near baseline physical activity: No    4. Cleared for discharge by consultants (if involved): No    Discharge Planner Nurse   Safe discharge environment identified: Yes  Barriers to discharge: Yes       Entered by: Flory Garcia 10/26/2021 10:16 PM     Please review provider order for any additional goals.   Nurse to notify provider when observation goals have been met and patient is ready for discharge.    Admitted this evening following syncopal episodes resulting in falls. Alert and oriented. C/o chronic back pain. PRN Percocet q6h, given x1. Nonpharmacological interventions offered, but declined. Pt is diabetic, sugars 145 and 273. Neurology consulted. SHLOMO Zaidi. Tele NSR, BBB. Purewick in place, encouraging pt to ambulate with staff to bathroom, pt said she will in the morning.

## 2021-10-27 NOTE — PLAN OF CARE
Admitted this evening following syncopal episodes resulting in falls. Alert and oriented. C/o chronic back pain. PRN Percocet q6h, given x1. Nonpharmacological interventions offered, but declined. Pt is diabetic, sugars 145 and 273. Orthostatic Bps performed, negative. Neurology consulted. SHLOMO Zaidi. Tele NSR, BBB. Purewick in place, encouraging pt to ambulate with staff to bathroom, pt said she will in the morning.

## 2021-10-27 NOTE — PROGRESS NOTES
"PRIMARY DIAGNOSIS: \"GENERIC\" NURSING  OUTPATIENT/OBSERVATION GOALS TO BE MET BEFORE DISCHARGE:  1. ADLs back to baseline: Yes    2. Activity and level of assistance: Up with standby assistance.    3. Pain status: Improved-controlled with oral pain medications.  Percocet effective for lower back pain.     4. Return to near baseline physical activity: Yes     Discharge Planner Nurse   Safe discharge environment identified: Yes  Barriers to discharge: Yes, monitoring for arrhythmias.   Cardiac rhythm: S-Colin, BBB with 1st degree AVB.        Entered by: Margie Calvillo 10/27/2021 5:56 AM     Please review provider order for any additional goals.   Nurse to notify provider when observation goals have been met and patient is ready for discharge.  "

## 2021-10-27 NOTE — PROGRESS NOTES
[facility]      OUTPATIENT PHYSICAL THERAPY EVALUATION  PLAN OF TREATMENT FOR OUTPATIENT REHABILITATION  (COMPLETE FOR INITIAL CLAIMS ONLY)  Patient's Last Name, First Name, M.I.  YOB: 1950  Ebonie Bowman                        Provider's Name  [facility] Medical Record No.  [id number]                               Onset Date:  10/25/21   Start of Care Date:  (P) 10/27/21      Type:     _X_PT   ___OT   ___SLP Medical Diagnosis:  (P) syncope                        PT Diagnosis:      Visits from SOC:  1   _________________________________________________________________________________  Plan of Treatment/Functional Goals    Planned Interventions:       Goals: See Physical Therapy Goals on Care Plan in [employer] electronic health record.    Therapy Frequency:    Predicted Duration of Therapy Intervention:    _________________________________________________________________________________    I CERTIFY THE NEED FOR THESE SERVICES FURNISHED UNDER        THIS PLAN OF TREATMENT AND WHILE UNDER MY CARE     (Physician co-signature of this document indicates review and certification of the therapy plan).                Certification date from: (P) 10/27/21, Certification date to: (P) 11/03/21    Referring Physician: ZAKIYA) Amador Tan            Initial Assessment        See Physical Therapy evaluation dated (P) 10/27/21 in Epic electronic health record.

## 2021-10-27 NOTE — PLAN OF CARE
Problem: Pain Chronic (Persistent)  Goal: Acceptable Pain Control and Functional Ability  Outcome: No Change   Continues to rate pain 6/10 medicated at 0500 with  Percocet, co bilateral calf tenderness when working  With PT. Doctor notified waiting for orders.  Pulse 52-53 Metoprolol held.

## 2021-10-27 NOTE — UTILIZATION REVIEW
Admission Status; Secondary Review Determination   Under the authority of the Utilization Management Committee, the utilization review process indicated a secondary review on Ebonie Bowman. The review outcome is based on review of the medical records, discussions with staff, and applying clinical experience noted on the date of the review.   (x) Inpatient Status Appropriate - This patient's medical care is consistent with medical management for inpatient care and reasonable inpatient medical practice.     RATIONALE FOR DETERMINATION   71 years old female with past medical history of chronic pain syndrome, diabetes mellitus type 2, GERD, hyperlipidemia, hypertension and rheumatoid arthritis who presented to the emergency room for evaluation of syncopal episode, neurology consult CT head negative for significant acute changes, EEG is negative for epilepsy, unable to have MRI brain as patient is claustrophobic, cardiology consult today as recommended by neurology.  Crossing second midnight.  Discussed with      At the time of admission with the information available to the attending physician more than 2 nights Hospital complex care was anticipated, based on patient risk of adverse outcome if treated as outpatient and complex care required. Inpatient admission is appropriate based on the Medicare guidelines.   The information on this document is developed by the utilization review team in order for the business office to ensure compliance. This only denotes the appropriateness of proper admission status and does not reflect the quality of care rendered.   The definitions of Inpatient Status and Observation Status used in making the determination above are those provided in the CMS Coverage Manual, Chapter 1 and Chapter 6, section 70.4.   Sincerely,   Cedrick Quintero MD  Utilization Review  Physician Advisor  [facility]

## 2021-10-28 ENCOUNTER — TELEPHONE (OUTPATIENT)
Dept: CARDIOLOGY | Facility: CLINIC | Age: 71
End: 2021-10-28

## 2021-10-28 ENCOUNTER — APPOINTMENT (OUTPATIENT)
Dept: OCCUPATIONAL THERAPY | Facility: HOSPITAL | Age: 71
DRG: 312 | End: 2021-10-28
Payer: COMMERCIAL

## 2021-10-28 ENCOUNTER — APPOINTMENT (OUTPATIENT)
Dept: PHYSICAL THERAPY | Facility: HOSPITAL | Age: 71
DRG: 312 | End: 2021-10-28
Payer: COMMERCIAL

## 2021-10-28 DIAGNOSIS — I10 ESSENTIAL HYPERTENSION: ICD-10-CM

## 2021-10-28 DIAGNOSIS — R55 SYNCOPE, UNSPECIFIED SYNCOPE TYPE: Primary | ICD-10-CM

## 2021-10-28 DIAGNOSIS — I25.10 ATHEROSCLEROTIC CARDIOVASCULAR DISEASE: ICD-10-CM

## 2021-10-28 LAB
ALBUMIN SERPL-MCNC: 3.1 G/DL (ref 3.5–5)
ALP SERPL-CCNC: 52 U/L (ref 45–120)
ALT SERPL W P-5'-P-CCNC: 11 U/L (ref 0–45)
ANION GAP SERPL CALCULATED.3IONS-SCNC: 9 MMOL/L (ref 5–18)
AST SERPL W P-5'-P-CCNC: 14 U/L (ref 0–40)
ATRIAL RATE - MUSE: 71 BPM
BILIRUB SERPL-MCNC: 0.6 MG/DL (ref 0–1)
BUN SERPL-MCNC: 20 MG/DL (ref 8–28)
CALCIUM SERPL-MCNC: 9.2 MG/DL (ref 8.5–10.5)
CHLORIDE BLD-SCNC: 108 MMOL/L (ref 98–107)
CO2 SERPL-SCNC: 24 MMOL/L (ref 22–31)
CREAT SERPL-MCNC: 1.31 MG/DL (ref 0.6–1.1)
DIASTOLIC BLOOD PRESSURE - MUSE: NORMAL MMHG
ERYTHROCYTE [DISTWIDTH] IN BLOOD BY AUTOMATED COUNT: 13.1 % (ref 10–15)
GFR SERPL CREATININE-BSD FRML MDRD: 40 ML/MIN/1.73M2
GLUCOSE BLD-MCNC: 127 MG/DL (ref 70–125)
GLUCOSE BLDC GLUCOMTR-MCNC: 142 MG/DL (ref 70–99)
GLUCOSE BLDC GLUCOMTR-MCNC: 151 MG/DL (ref 70–99)
GLUCOSE BLDC GLUCOMTR-MCNC: 155 MG/DL (ref 70–99)
GLUCOSE BLDC GLUCOMTR-MCNC: 234 MG/DL (ref 70–99)
HCT VFR BLD AUTO: 38.3 % (ref 35–47)
HGB BLD-MCNC: 12.4 G/DL (ref 11.7–15.7)
INTERPRETATION ECG - MUSE: NORMAL
MCH RBC QN AUTO: 30.8 PG (ref 26.5–33)
MCHC RBC AUTO-ENTMCNC: 32.4 G/DL (ref 31.5–36.5)
MCV RBC AUTO: 95 FL (ref 78–100)
P AXIS - MUSE: 39 DEGREES
PLATELET # BLD AUTO: 126 10E3/UL (ref 150–450)
POTASSIUM BLD-SCNC: 4.2 MMOL/L (ref 3.5–5)
PR INTERVAL - MUSE: 186 MS
PROT SERPL-MCNC: 5.8 G/DL (ref 6–8)
QRS DURATION - MUSE: 160 MS
QT - MUSE: 424 MS
QTC - MUSE: 460 MS
R AXIS - MUSE: 119 DEGREES
RBC # BLD AUTO: 4.03 10E6/UL (ref 3.8–5.2)
SODIUM SERPL-SCNC: 141 MMOL/L (ref 136–145)
SYSTOLIC BLOOD PRESSURE - MUSE: NORMAL MMHG
T AXIS - MUSE: 45 DEGREES
VENTRICULAR RATE- MUSE: 71 BPM
WBC # BLD AUTO: 9.4 10E3/UL (ref 4–11)

## 2021-10-28 PROCEDURE — 250N000012 HC RX MED GY IP 250 OP 636 PS 637: Performed by: FAMILY MEDICINE

## 2021-10-28 PROCEDURE — 93010 ELECTROCARDIOGRAM REPORT: CPT | Performed by: INTERNAL MEDICINE

## 2021-10-28 PROCEDURE — 82040 ASSAY OF SERUM ALBUMIN: CPT | Performed by: FAMILY MEDICINE

## 2021-10-28 PROCEDURE — 93005 ELECTROCARDIOGRAM TRACING: CPT

## 2021-10-28 PROCEDURE — 85027 COMPLETE CBC AUTOMATED: CPT | Performed by: FAMILY MEDICINE

## 2021-10-28 PROCEDURE — 36415 COLL VENOUS BLD VENIPUNCTURE: CPT | Performed by: FAMILY MEDICINE

## 2021-10-28 PROCEDURE — 97116 GAIT TRAINING THERAPY: CPT | Mod: GP

## 2021-10-28 PROCEDURE — 99232 SBSQ HOSP IP/OBS MODERATE 35: CPT | Performed by: PSYCHIATRY & NEUROLOGY

## 2021-10-28 PROCEDURE — 250N000013 HC RX MED GY IP 250 OP 250 PS 637: Performed by: FAMILY MEDICINE

## 2021-10-28 PROCEDURE — 99207 PR CDG-DOWN CODE ROS EXAM: CPT | Performed by: FAMILY MEDICINE

## 2021-10-28 PROCEDURE — 120N000001 HC R&B MED SURG/OB

## 2021-10-28 PROCEDURE — 97110 THERAPEUTIC EXERCISES: CPT | Mod: GP

## 2021-10-28 PROCEDURE — 99232 SBSQ HOSP IP/OBS MODERATE 35: CPT | Performed by: FAMILY MEDICINE

## 2021-10-28 PROCEDURE — 97530 THERAPEUTIC ACTIVITIES: CPT | Mod: GP

## 2021-10-28 RX ADMIN — OXYCODONE HYDROCHLORIDE AND ACETAMINOPHEN 2 TABLET: 5; 325 TABLET ORAL at 23:27

## 2021-10-28 RX ADMIN — OXYCODONE HYDROCHLORIDE AND ACETAMINOPHEN 2 TABLET: 5; 325 TABLET ORAL at 17:14

## 2021-10-28 RX ADMIN — HYDROXYCHLOROQUINE SULFATE 200 MG: 200 TABLET, FILM COATED ORAL at 20:57

## 2021-10-28 RX ADMIN — METOPROLOL SUCCINATE 50 MG: 50 TABLET, EXTENDED RELEASE ORAL at 09:12

## 2021-10-28 RX ADMIN — Medication 25 MCG: at 09:13

## 2021-10-28 RX ADMIN — TRAZODONE HYDROCHLORIDE 150 MG: 50 TABLET ORAL at 21:00

## 2021-10-28 RX ADMIN — INSULIN ASPART 1 UNITS: 100 INJECTION, SOLUTION INTRAVENOUS; SUBCUTANEOUS at 21:34

## 2021-10-28 RX ADMIN — INSULIN ASPART 1 UNITS: 100 INJECTION, SOLUTION INTRAVENOUS; SUBCUTANEOUS at 17:03

## 2021-10-28 RX ADMIN — LORATADINE 10 MG: 10 TABLET ORAL at 21:00

## 2021-10-28 RX ADMIN — ATORVASTATIN CALCIUM 40 MG: 40 TABLET, FILM COATED ORAL at 09:12

## 2021-10-28 RX ADMIN — OXYCODONE HYDROCHLORIDE AND ACETAMINOPHEN 2 TABLET: 5; 325 TABLET ORAL at 05:31

## 2021-10-28 RX ADMIN — INSULIN ASPART 1 UNITS: 100 INJECTION, SOLUTION INTRAVENOUS; SUBCUTANEOUS at 13:00

## 2021-10-28 RX ADMIN — Medication 500 MG: at 09:12

## 2021-10-28 RX ADMIN — HYDROXYCHLOROQUINE SULFATE 200 MG: 200 TABLET, FILM COATED ORAL at 09:12

## 2021-10-28 RX ADMIN — PREDNISONE 1 MG: 1 TABLET ORAL at 09:12

## 2021-10-28 RX ADMIN — AMLODIPINE BESYLATE 10 MG: 5 TABLET ORAL at 09:12

## 2021-10-28 RX ADMIN — INSULIN ASPART 1 UNITS: 100 INJECTION, SOLUTION INTRAVENOUS; SUBCUTANEOUS at 09:08

## 2021-10-28 ASSESSMENT — ACTIVITIES OF DAILY LIVING (ADL)
ADLS_ACUITY_SCORE: 15
ADLS_ACUITY_SCORE: 19
ADLS_ACUITY_SCORE: 15
ADLS_ACUITY_SCORE: 19
ADLS_ACUITY_SCORE: 15
ADLS_ACUITY_SCORE: 19
ADLS_ACUITY_SCORE: 19
ADLS_ACUITY_SCORE: 15
ADLS_ACUITY_SCORE: 19
ADLS_ACUITY_SCORE: 19
ADLS_ACUITY_SCORE: 15

## 2021-10-28 NOTE — PROGRESS NOTES
Care Management Follow Up    Length of Stay (days): 1    Expected Discharge Date: 10/29/2021     Concerns to be Addressed:       Patient plan of care discussed at interdisciplinary rounds: Yes    Anticipated Discharge Disposition:  TCU     Anticipated Discharge Services:    Anticipated Discharge DME:      Patient/family educated on Medicare website which has current facility and service quality ratings:  Yes  Education Provided on the Discharge Plan: Yes   Patient/Family in Agreement with the Plan:      Referrals Placed by CM/SW:    Private pay costs discussed: Not applicable    Additional Information:  CM met with pt to discuss TCU  Rec's. Pt is agreeable to TCU. CM advised that TCU beds are very tight right now, so requested area would be considered but CM would also make referrals to  TCU's that are a little further away- in anticipation of available bed. Pt was agreeable to this idea. TCU referrals completed.       Mercy Zuluaga LGSW

## 2021-10-28 NOTE — TELEPHONE ENCOUNTER
----- Message from Modesta Mg MD sent at 10/28/2021  9:49 AM CDT -----  This patient needs a 30-day event monitor as outpatient.  Potential discharge today.  Can follow-up with me in 4 to 6 weeks

## 2021-10-28 NOTE — PLAN OF CARE
Pt reported constant lower back pain 7/10. PRN oxy given with minimal relief. Repositioned pt and provided pillow support. Pt stated pain came back to 7/10 and that this is normal for her. Pt appears to be comfortable with no obvious signs of moderate-severe pain. Up with assist of one, gait belt, and walker. Denies lightheadedness or dizziness with ambulation. Pt states BLE feel weak to her. Bed alarm on for safety and call light within reach.

## 2021-10-28 NOTE — PLAN OF CARE
Problem: Pain Chronic (Persistent)  Goal: Acceptable Pain Control and Functional Ability  10/28/2021 1441 by Reina Soni RN  Outcome: Improving   Pt stated back pain relief with heating pad on. She was turn for comfort. And Pillows in use  support. Up to the bathroom with assist.

## 2021-10-28 NOTE — CONSULTS
HEART CARE CONSULTATON NOTE        Assessment/Recommendations   Assessment:  1.  Syncope: Concern as patient does not have prodrome and does have evidence of conduction disease and prolonged QT on her EKG.  Will recommend further evaluation with a prolonged monitor as outpatient.  No significant findings on echocardiogram.  2.  Poorly controlled diabetes mellitus type 2  3.  Hypertension  4.  Chronic pain  5.  Morbid obesity  6.  Rheumatoid arthritis    Plan:  1.  Recommend outpatient 30-day event monitor.  Will consider ILR especially if recurrent events   2.  Follow-up in cardiology clinic in 4 to 6 weeks  No further cardiac recommendations at this time please call if any further questions or concerns       History of Present Illness/Subjective    HPI: Ebonie Bowman is a 71 year old female with history of diabetes mellitus type 2 poorly controlled, chronic kidney disease, hypertension, dyslipidemia, obesity, rheumatoid arthritis, venous insufficiency admitted with syncope.  On day of admission she had gotten up and started walking up the stairs when she fell.  She reported loss of consciousness but no prodrome.  Later that day she got up to go to the bathroom and when she tried to sit down to go to the bathroom she fell.  Again she believes she lost consciousness.  No prodrome of chest pain, lightheadedness or palpitation.  Currently feels well without any recurrent symptoms.    Twelve-lead EKG personally reviewed and shows sinus bradycardia 57 bpm with first-degree AV block prolonged QT    Echocardiogram 10/26/2021  Interpretation Summary     Definity contrast utilized  The left ventricle is normal in size.  There is mild concentric left ventricular hypertrophy.  The left ventricular ejection fraction is normal.  The visual ejection fraction is 55-60%.  Normal left ventricular wall motion  Normal right ventricle size and systolic function.  The left atrium is mildly dilated.  No significant valve abnormality  No  prior study made available for comparison    Ultrasound carotids                                                                   IMPRESSION:  1.  Mild plaque formation, velocities consistent with less than 50% stenosis in the right internal carotid artery.  2.  Mild plaque formation, velocities consistent with less than 50% stenosis in the left internal carotid artery.  3.  Flow within the vertebral arteries is antegrade.  ______________________________________________________________________________       Physical Examination  Review of Systems   VITALS: /59 (BP Location: Right arm)   Pulse 69   Temp 98.3  F (36.8  C) (Oral)   Resp 20   Ht 1.829 m (6')   Wt 103.1 kg (227 lb 6.4 oz)   SpO2 93%   BMI 30.84 kg/m    BMI: Body mass index is 30.84 kg/m .  Wt Readings from Last 3 Encounters:   10/26/21 103.1 kg (227 lb 6.4 oz)   10/11/21 104.8 kg (231 lb)   09/16/21 104.3 kg (230 lb)       Intake/Output Summary (Last 24 hours) at 10/28/2021 0931  Last data filed at 10/27/2021 2300  Gross per 24 hour   Intake 240 ml   Output 450 ml   Net -210 ml     General Appearance:   no distress, normal body habitus   ENT/Mouth: membranes moist, no oral lesions or bleeding gums.      EYES:  no scleral icterus, normal conjunctivae   Neck: no carotid bruits or thyromegaly   Chest/Lungs:   lungs are clear to auscultation   Cardiovascular:   Regular. Normal first and second heart sounds with no murmurs  no edema bilaterally    Abdomen:  no organomegaly, masses, bruits, or tenderness; bowel sounds are present   Extremities: no cyanosis or clubbing   Skin: no xanthelasma, warm.    Neurologic: normal  bilateral, no tremors     Psychiatric: alert and oriented x3, calm     Review Of Systems  Skin: negative  Eyes: negative  Ears/Nose/Throat: negative  Respiratory: No shortness of breath, dyspnea on exertion, cough, or hemoptysis  Cardiovascular: negative  Gastrointestinal: negative  Genitourinary: negative  Musculoskeletal:  negative  Neurologic: negative  Psychiatric: negative  Hematologic/Lymphatic/Immunologic: negative  Endocrine: negative          Lab Results    Chemistry/lipid CBC Cardiac Enzymes/BNP/TSH/INR   Recent Labs   Lab Test 09/16/21  0936   CHOL 130   HDL 47*   LDL 57   TRIG 129     Recent Labs   Lab Test 09/16/21  0936 10/20/20  1413 08/21/19  1227   LDL 57 54 53     Recent Labs   Lab Test 10/28/21  0845 10/28/21  0657 10/27/21  2101   NA  --  141  --    POTASSIUM  --  4.2  --    CHLORIDE  --  108*  --    CO2  --  24  --    * 127*   < >   BUN  --  20  --    CR  --  1.31*  --    GFRESTIMATED  --  40*  --    GRACY  --  9.2  --     < > = values in this interval not displayed.     Recent Labs   Lab Test 10/28/21  0657 10/27/21  0720 10/26/21  0031   CR 1.31* 1.33* 1.34*     Recent Labs   Lab Test 09/16/21  0936 05/07/21  1237 12/07/20  0830   A1C 9.7* 9.5* 6.6*          Recent Labs   Lab Test 10/28/21  0657   WBC 9.4   HGB 12.4   HCT 38.3   MCV 95   *     Recent Labs   Lab Test 10/28/21  0657 10/27/21  0720 10/26/21  0031   HGB 12.4 13.2 13.7    Recent Labs   Lab Test 10/26/21  1807 10/26/21  1405 10/26/21  0739   TROPONINI 0.03 0.03 0.04     No results for input(s): BNP, NTBNPI, NTBNP in the last 83111 hours.  Recent Labs   Lab Test 05/07/21  1237   TSH 1.93     Recent Labs   Lab Test 11/30/20  0620 11/27/20  2308 01/01/18  0805   INR 1.34* 1.27* 1.21*        Medical History  Surgical History Family History Social History   Past Medical History:   Diagnosis Date     BBB (bundle branch block)      Chronic back pain      Chronic kidney disease     stage 3     Chronic kidney disease, unspecified     Created by Conversion      Depression      Diabetes (H)     Type 2     Diabetes mellitus, type 2 (H)     Created by Conversion      Frequent headaches      Ganglion of tendon sheath     Created by Conversion      GERD (gastroesophageal reflux disease)      HLD (hyperlipidemia)      Hypertension     Per H&P dated  2013     Incarcerated ventral hernia     Per H&P dated 2013     Osteoarthritis      Rheumatoid arthritis (H)      Shortness of breath      Thrombocytopenia (H)      Venous insufficiency      Past Surgical History:   Procedure Laterality Date     BACK SURGERY       C UNLISTED PROCEDURE, ABDOMEN/PERITONEUM/OMENTUM      Description: Hernia Repair;  Recorded: 10/23/2013;     CHOLECYSTECTOMY       HERNIA REPAIR       HYSTERECTOMY  1984     IR FINE NEEDLE ASPIRATION  2020     JOINT REPLACEMENT Left     knee     OTHER SURGICAL HISTORY  ,     Two left wrist     OTHER SURGICAL HISTORY      FATTY TUMORLT SHOULDER BLADE     PICC MIDLINE INSERTION  2020          CA IMPLANT SPINAL NEUROSTIM/ Left 2019    Procedure: LEFT FLANK INCISION REPLACE NEUROSTIMULATOR;  Surgeon: Vishal Pretty MD;  Location: [facility];  Service: Pain     SPINAL CORD STIMULATOR IMPLANT       Family History   Problem Relation Age of Onset     Myocardial Infarction Father      Cancer Father      Chronic Obstructive Pulmonary Disease Father      Heart Disease Father      Diabetes Sister      Diabetes Brother      Diabetes Maternal Grandmother      Melanoma Son      Arthritis Mother      Acute Myocardial Infarction Father 62.00     Diabetes Sister      Diabetes Brother      Melanoma Son      Kidney failure Son      Glomerulonephritis Son         Social History     Socioeconomic History     Marital status:      Spouse name: Not on file     Number of children: Not on file     Years of education: Not on file     Highest education level: Not on file   Occupational History     Not on file   Tobacco Use     Smoking status: Former Smoker     Packs/day: 1.00     Years: 43.00     Pack years: 43.00     Start date: 1965     Quit date: 2008     Years since quittin.2     Smokeless tobacco: Never Used   Substance and Sexual Activity     Alcohol use: Yes     Comment: Alcoholic Drinks/day:  rare     Drug use: No     Comment: Drug use: takes percocet for chronic pain     Sexual activity: Not Currently   Other Topics Concern     Not on file   Social History Narrative    Born in [place]. Lives with her son. Lost one son when he was age 30 due to melanoma. Moved around growing up since her father was in the . Play games on phone and watch television.       Social Determinants of Health     Financial Resource Strain:      Difficulty of Paying Living Expenses:    Food Insecurity:      Worried About Running Out of Food in the Last Year:      Ran Out of Food in the Last Year:    Transportation Needs:      Lack of Transportation (Medical):      Lack of Transportation (Non-Medical):    Physical Activity:      Days of Exercise per Week:      Minutes of Exercise per Session:    Stress:      Feeling of Stress :    Social Connections:      Frequency of Communication with Friends and Family:      Frequency of Social Gatherings with Friends and Family:      Attends Protestant Services:      Active Member of Clubs or Organizations:      Attends Club or Organization Meetings:      Marital Status:    Intimate Partner Violence:      Fear of Current or Ex-Partner:      Emotionally Abused:      Physically Abused:      Sexually Abused:          Medications  Allergies   No current outpatient medications on file.        Allergies   Allergen Reactions     Morphine Nausea and Vomiting     Penicillins Hives     Tolerates ceftriaxone     Sulfa (Sulfonamide Antibiotics) [Sulfa Drugs] Dawson Mg MD

## 2021-10-28 NOTE — PROGRESS NOTES
[facility] PROGRESS NOTE      ADMIT DATE: 10/25/2021     FACILITY: [facility]    PCP: Marichuy Rubio, [phone]    ASSESSMENT AND PLAN:   71 year old female with history significant for Chronic back pain, Chronic kidney disease, Diabetes mellitus, type 2 (H), GERD (gastroesophageal reflux disease), HLD (hyperlipidemia), Hypertension, and Rheumatoid arthritis (H) comes in after two syncopal episodes on 10/25.    Active Problems:    Essential hypertension    Syncope, unspecified syncope type      Syncope and collapse  -patient denies any shortness of breath, lightheadedness, and chest pain with episodes so cardiac cause less likely.   -certain meds like oxy, trazodone and claritin can contribute to lightheadedness/dizziness which can lead to the fall but again patient denies any lightheadedness or dizziness before both falls prior to admission.   -EKG showed sinus bradycardia with 1st degree AV block with prolonged QTc interval of 511 ms. Troponin x 3 negative.-->base on previous recent OVs, HR is usually 60 bpm or greater   -CT head negative for IC bleed and any acute findings  -echo pending (last echo from 3/10/45494 shows normal LVEF of 55%)  -US b/l carotid unremarkable  -echo showed normal LVEF of 55-60%.   -EEG unremarkable  -neuro recommended MRI but patient is very claustrophic and is refusing. Neuro thinks MRI would be low yield and recommends cardio consult  -cardio consulted and appreciate recommendations-->Concern as patient does not have prodrome and does have evidence of conduction disease and prolonged QT on her EKG.  Will recommend further evaluation with a prolonged monitor as outpatient.  No significant findings on echocardiogram. Recommend outpatient 30-day event monitor.  Will consider ILR especially if recurrent events. Follow-up in cardiology clinic in 4 to 6 weeks       Prolonged QTc interval   -K level is WNL  -mag level WNL  -repeat EKG to monitor QTc interval   -avoid QTc  prolonged meds but will c/w home trazodone and home plaquenil for now  -c/w tele         B/l hip pain  -patient states she fell on her right hip with the first fall and she fell on her left hip with the second fall on 10/25.   -pelvic x-ray negative for fracture  -she denies any hip pain this morning.         Neck pain  -CT neck negative for acute fracture  -patient denies any neck pain at this time      B/l calf pain  -new concern  -Qiana's sign + for both legs  -doppler US of BLEs-->negative for DVT  -c/w home percocet for pain control         Elevated creatinine on CKD  -baseline creatinine is around 1.2  -creatinine on admission was 1.34-->1.33-->1.31  -monitor with daily BMP for now        HLD  -c/w home lipitor        DM type 2  -last HgbA1c was 9.7 on 9/16/2021 which is above goal  -on NPH 34-38 units BID  -c/w NPH 32 units BID for now  -c/w low resistance sliding scale insulin for now  -c/w accuchecks and adjust insulin regimen PRN        Hx of allergic rhinitis  -c/w home claritin        HTN  -BP improved  -c/w home norvasc  -c/w home metoprolol   -creatinine is mildly elevated compared to baseline-->hold home lisinopril until creatinine improves  -IV hydralazine PRN        Hx of RA  -c/w home plaquenil and methotrexate.   -c/w home prednisone        Hx of chronic back pain  -c/w home percocet for now     Insomnia  -c/w home trazodone         Fall  -PT/OT eval-->recommend TCU. Patient interested in TCU placement.   -fall precautions                FEN/GI: low fat, low salt, diabetic diet  DVT proph: SCDs for now  Code status: No Order    Discharge barriers:  - TCU placement and discharge planning  -ADOD: 1-2 days      SUBJECTIVE:    Patient denies any complaints this morning. She states she has been walking more now and has not had a repeat syncopal episode.     ROS:  12 Points review of systems reviewed and is negative except for what has already been mentioned above    OBJECTIVE:  Patient Vitals for the  past 24 hrs:   BP Temp Temp src Pulse Resp SpO2   10/28/21 0800 111/59 98.3  F (36.8  C) Oral 69 20 93 %   10/28/21 0426 131/68 97.8  F (36.6  C) Oral -- 18 93 %   10/27/21 2326 120/58 97.5  F (36.4  C) Oral 61 18 96 %   10/27/21 1936 (!) 143/65 97.6  F (36.4  C) Oral 57 18 97 %   10/27/21 1742 121/59 -- -- -- -- --   10/27/21 1517 (!) 152/118 98.5  F (36.9  C) Oral 62 22 96 %          Intake/Output Summary (Last 24 hours) at 10/27/2021 0701  Last data filed at 10/27/2021 0613  Gross per 24 hour   Intake 1420 ml   Output 600 ml   Net 820 ml     GENRL:  Not in acute distress. Satting at 93% on room air.   HEENT: NC/AT                 Neck- supple                 Sclera- anicteric                 Mucous membrane- moist and pink  CHEST: Clear to auscultation bilaterally  HEART: S1S2 regular. No murmurs, rubs or gallops  ABDMN: Soft. Non-tender. No guarding or rigidity. Bowel sounds- active  EXTRM:  No pedal edema.  NEURO:  No involuntary movements  INTGM: please see nursing assessment for full skin assessment  PSYCH: normal affect, normal speech     DIAGNOSTIC DATA:          Recent Results (from the past 24 hour(s))   Glucose by meter    Collection Time: 10/27/21 11:48 AM   Result Value Ref Range    GLUCOSE BY METER POCT 206 (H) 70 - 99 mg/dL   Glucose by meter    Collection Time: 10/27/21  5:20 PM   Result Value Ref Range    GLUCOSE BY METER POCT 142 (H) 70 - 99 mg/dL   Glucose by meter    Collection Time: 10/27/21  9:01 PM   Result Value Ref Range    GLUCOSE BY METER POCT 140 (H) 70 - 99 mg/dL   Comprehensive metabolic panel    Collection Time: 10/28/21  6:57 AM   Result Value Ref Range    Sodium 141 136 - 145 mmol/L    Potassium 4.2 3.5 - 5.0 mmol/L    Chloride 108 (H) 98 - 107 mmol/L    Carbon Dioxide (CO2) 24 22 - 31 mmol/L    Anion Gap 9 5 - 18 mmol/L    Urea Nitrogen 20 8 - 28 mg/dL    Creatinine 1.31 (H) 0.60 - 1.10 mg/dL    Calcium 9.2 8.5 - 10.5 mg/dL    Glucose 127 (H) 70 - 125 mg/dL    Alkaline Phosphatase  52 45 - 120 U/L    AST 14 0 - 40 U/L    ALT 11 0 - 45 U/L    Protein Total 5.8 (L) 6.0 - 8.0 g/dL    Albumin 3.1 (L) 3.5 - 5.0 g/dL    Bilirubin Total 0.6 0.0 - 1.0 mg/dL    GFR Estimate 40 (L) >60 mL/min/1.73m2   CBC with platelets    Collection Time: 10/28/21  6:57 AM   Result Value Ref Range    WBC Count 9.4 4.0 - 11.0 10e3/uL    RBC Count 4.03 3.80 - 5.20 10e6/uL    Hemoglobin 12.4 11.7 - 15.7 g/dL    Hematocrit 38.3 35.0 - 47.0 %    MCV 95 78 - 100 fL    MCH 30.8 26.5 - 33.0 pg    MCHC 32.4 31.5 - 36.5 g/dL    RDW 13.1 10.0 - 15.0 %    Platelet Count 126 (L) 150 - 450 10e3/uL   Glucose by meter    Collection Time: 10/28/21  8:45 AM   Result Value Ref Range    GLUCOSE BY METER POCT 142 (H) 70 - 99 mg/dL        Results for orders placed or performed during the hospital encounter of 10/25/21   Cervical spine CT w/o contrast    Impression    IMPRESSION:  1. No evidence of acute cervical spine fracture.  2. No significant canal compromise with scattered areas of neural foraminal narrowing as described above.   Head CT w/o contrast    Impression    IMPRESSION:  1.  No CT finding of a mass, hemorrhage or focal area suggestive of acute infarct.  2.  Minimal age related changes.   US Carotid Bilateral    Impression    IMPRESSION:  1.  Mild plaque formation, velocities consistent with less than 50% stenosis in the right internal carotid artery.  2.  Mild plaque formation, velocities consistent with less than 50% stenosis in the left internal carotid artery.  3.  Flow within the vertebral arteries is antegrade.   XR Hip Bilateral 1 View Each    Impression    IMPRESSION: No acute fracture or dislocation. Minimal degenerative arthritis of both hips. Chronic soft tissue calcifications projecting near the lesser trochanters bilaterally are stable. Diffuse bony demineralization. Degenerative changes in the spine.   Electronic device projects over the left lower quadrant.    Echocardiogram Complete   Result Value Ref Range     LVEF  55-60%           All recent labs reviewed personally  Radiology report reviewed.   Radiology Results: imaging impressions reviewed      The total time spent in preparing this progress note is about 35 minutes, >50% time spent in care co-ordination that includes reviewing labs, images, discussing the plan of care with patient/family, consultants, and .      Amador Tan MD.   [facility] Medicine Service   [phone]   Pager [phone]

## 2021-10-28 NOTE — PLAN OF CARE
Problem: Pain Chronic (Persistent)  Goal: Acceptable Pain Control and Functional Ability  10/28/2021 0118 by Margie Calvillo, RN  Outcome: Improving  Pt has chronic low back pain 2/2 hx of back surgeries.  Pain rated in the 8-9 range; percocet has been effective in managing pain. Heating pad applied to lower back with minimal relief.  Pt noted to be sleeping peacefully upon reassessment.       Problem: Syncope  Goal: Absence of Syncopal Symptoms  10/28/2021 0118 by Margie Calvillo, RN  Outcome: Improving  There have been no s/sx of syncope.  Cardiac rhythm: SR with 1st degree AVB, BBB; denies any symptoms.    Pt up with walker, SBA.

## 2021-10-28 NOTE — PLAN OF CARE
Problem: Adult Inpatient Plan of Care  Goal: Patient-Specific Goal (Individualized)  Outcome: Improving     Problem: Pain Chronic (Persistent)  Goal: Acceptable Pain Control and Functional Ability  Outcome: Improving   Pt's activity supervise, up with PT and walk with assist  to her door room. Denies feeling light headed, no prn given this shift.

## 2021-10-28 NOTE — PROGRESS NOTES
NEUROLOGY PROGRESS NOTE     Ebonie Bowman,  1950, MRN [id number] Date: 10/28/2021     [facility]   Code status:  Full Code   PCP: Marichuy Rubio, [phone]      ASSESSMENT & PLAN   Diagnosis code: Spell of loss of consciousness/rule out seizure    Spell of loss of consciousness/rule out seizure      Head CT negative for structural lesions    MRI brain recommended though patient is claustrophobic and does not want to do that.  Patient agreed to this to later changed her mind.    EEG negative for epilepsy    Hold off antiepileptic medication for right now.  If she has more spells and no cardiac cause can be found could consider seizure medication trial.    Cardiac monitoring to rule out arrhythmias    Echocardiogram shows 55-60% action fraction.  Cardiology recommending outpatient event monitor for 30 days.  Loop monitor if she has more events.    Primary team considering other medication related spells of loss of consciousness.    3-month driving restriction per [place] state law    Discharge:-Per primary team.  Okay discharge from neurology end.         Chief Complaint   Patient presents with     Fall        HISTORY OF PRESENT ILLNESS     We have been requested by Dr. Tan to evaluate Ebonie Bowman who is a 71 year old  female for spells of loss of consciousness.  This is a 71-year-old female with diabetes hypertension who had 2 syncopal events.  The first 1 happened while she was on the stairs.  Second 1 happened when she got out of bed and then went to the bathroom and try to sit down on the toilet seat and kept on slipping.  She is not sure how long she was out for.  She awoke and had her son called 911.  She thinks she was out for several minutes.  Denies any chest pains palpitations headaches.  Denies any other provoking factors.  No recent head injuries.  Denies any shortness of breath.  No changes in her medications.  Currently she feels she is back to her normal self.  There was no confusion  or any premonitions.  Has not had passing out events in the past.    10/27/21  Patient reports no overnight seizures.  No chest pains or palpitations.  Discussed with her about getting MRI and she is interested in trying that with a pill delay.  After I ordered the MRI she change her mind and did not want to do it.  MRI was then canceled.  She reports no other new issues.  Echocardiogram was noncontributory.    10/28/21  Patient reports no overnight seizures.  No chest pains or palpitations.  Cardiology was seen with the patient and they are recommending an outpatient 30-day monitor.  Potential discharge later today.     PAST MEDICAL & SURGICAL HISTORY     Medical History  Past Medical History:   Diagnosis Date     BBB (bundle branch block)      Chronic back pain      Chronic kidney disease     stage 3     Chronic kidney disease, unspecified     Created by Conversion      Depression      Diabetes (H)     Type 2     Diabetes mellitus, type 2 (H)     Created by Conversion      Frequent headaches      Ganglion of tendon sheath     Created by Conversion      GERD (gastroesophageal reflux disease)      HLD (hyperlipidemia)      Hypertension     Per H&P dated 1/03/2013     Incarcerated ventral hernia     Per H&P dated 1/03/2013     Osteoarthritis      Rheumatoid arthritis (H)      Shortness of breath      Thrombocytopenia (H)      Venous insufficiency      Surgical History  Past Surgical History:   Procedure Laterality Date     BACK SURGERY       C UNLISTED PROCEDURE, ABDOMEN/PERITONEUM/OMENTUM      Description: Hernia Repair;  Recorded: 10/23/2013;     CHOLECYSTECTOMY       HERNIA REPAIR       HYSTERECTOMY  1984     IR FINE NEEDLE ASPIRATION  11/29/2020     JOINT REPLACEMENT Left     knee     OTHER SURGICAL HISTORY  1990, 1995    Two left wrist     OTHER SURGICAL HISTORY  1996    FATTY TUMORLT SHOULDER BLADE     PICC MIDLINE INSERTION  11/30/2020          GA IMPLANT SPINAL NEUROSTIM/ Left 4/25/2019    Procedure:  LEFT FLANK INCISION REPLACE NEUROSTIMULATOR;  Surgeon: Vishal Pretty MD;  Location: [facility];  Service: Pain     SPINAL CORD STIMULATOR IMPLANT          SOCIAL HISTORY     Social History     Tobacco Use     Smoking status: Former Smoker     Packs/day: 1.00     Years: 43.00     Pack years: 43.00     Start date: 1965     Quit date: 2008     Years since quittin.2     Smokeless tobacco: Never Used   Substance Use Topics     Alcohol use: Yes     Comment: Alcoholic Drinks/day: rare     Drug use: No     Comment: Drug use: takes percocet for chronic pain        FAMILY HISTORY     Reviewed, and no family history of seizures     ALLERGIES     Allergies   Allergen Reactions     Morphine Nausea and Vomiting     Penicillins Hives     Tolerates ceftriaxone     Sulfa (Sulfonamide Antibiotics) [Sulfa Drugs] Hives        REVIEW OF SYSTEMS     12 system ROS was done in the HPI this was negative.  No changes.     HOME & HOSPITAL MEDICATIONS     Prior to Admission Medications  Medications Prior to Admission   Medication Sig Dispense Refill Last Dose     amLODIPine (NORVASC) 10 MG tablet [AMLODIPINE (NORVASC) 10 MG TABLET] TAKE 1 TABLET EVERY DAY 90 tablet 2 10/25/2021 at Unknown time     ascorbic acid (ASCORBIC ACID WITH LATRICIA HIPS) 500 MG tablet Take 500 mg by mouth daily    10/25/2021 at Unknown time     aspirin 81 MG EC tablet Take 81 mg by mouth daily   10/25/2021 at Unknown time     atorvastatin (LIPITOR) 40 MG tablet [ATORVASTATIN (LIPITOR) 40 MG TABLET] Take 1 tablet (40 mg total) by mouth at bedtime. 90 tablet 2 10/25/2021 at AM     b complex vitamins tablet [B COMPLEX VITAMINS TABLET] Take 1 tablet by mouth daily.   10/25/2021 at Unknown time     fish oil-omega-3 fatty acids 1000 MG capsule Take 1 g by mouth daily   10/25/2021 at Unknown time     hydroxychloroquine (PLAQUENIL) 200 MG tablet Take 1 tablet (200 mg) by mouth 2 times daily 180 tablet 1 10/25/2021 at Unknown time     insulin NPH  (NOVOLIN) 100 unit/mL injection Inject 34-38 Units Subcutaneous 2 times daily (before meals) AM: 38 units  PM: 34 units if BG < 250, 38 units if BG > 250   10/25/2021 at Unknown time     lidocaine (LMX) 4 % cream [LIDOCAINE (LMX) 4 % CREAM] Apply to affected area twice daily as needed 30 g 3 Unknown at Unknown time     lisinopril (ZESTRIL) 10 MG tablet [LISINOPRIL (PRINIVIL,ZESTRIL) 10 MG TABLET] TAKE 1 TABLET AT BEDTIME 90 tablet 1 10/25/2021 at AM     loratadine (CLARITIN) 10 mg tablet [LORATADINE (CLARITIN) 10 MG TABLET] Take 1 tablet (10 mg total) by mouth at bedtime. 90 tablet 3 10/25/2021 at AM     methotrexate 2.5 MG tablet Take 3 tablets (7.5 mg) by mouth once a week 36 tablet 0 10/22/2021 at Unknown time     metoprolol succinate ER (TOPROL-XL) 50 MG 24 hr tablet [METOPROLOL SUCCINATE (TOPROL-XL) 50 MG 24 HR TABLET] TAKE 1 TABLET EVERY DAY 90 tablet 2 10/25/2021 at Unknown time     MULTIVITAMIN ORAL Take 1 tablet by mouth daily    10/25/2021 at Unknown time     naloxone (NARCAN) 4 MG/0.1ML nasal spray Spray 1 spray (4 mg) into one nostril alternating nostrils once as needed for opioid reversal every 2-3 minutes until assistance arrives 0.2 mL 0 Unknown at Unknown time     oxyCODONE-acetaminophen (PERCOCET) 5-325 MG tablet Take 1-2 tablets by mouth every 6 hours as needed for severe pain (Max of 6 tablets daily) 180 tablet 0 10/24/2021 at Unknown time     predniSONE (DELTASONE) 1 MG tablet Take 2 tablets p.o. daily for 1 month then if stable decrease by  1/2 tablet (1/2 mg) every month. (Patient taking differently: Take 1 mg by mouth daily If stable decrease by  1/2 tablet (1/2 mg) every month.) 60 tablet 2 10/25/2021 at Unknown time     traZODone (DESYREL) 150 MG tablet Take 1 tablet (150 mg) by mouth At Bedtime 90 tablet 1 10/25/2021 at Unknown time     Vitamin D3 (VITAMIN D3) 25 mcg (1000 units) tablet Take 1,000 Units by mouth daily    10/25/2021 at Unknown time     alcohol swab prep pads Use to swab area  "of injection/jacquelyn as directed. 100 each 3      blood glucose (NO BRAND SPECIFIED) test strip Use to test blood sugar 3 times daily or as directed. To accompany: Blood Glucose Monitor Brands: per insurance. 100 strip 6      blood glucose calibration (NO BRAND SPECIFIED) solution To accompany: Blood Glucose Monitor Brands: per insurance. 1 each 0      blood glucose monitoring (NO BRAND SPECIFIED) meter device kit Use to test blood sugar 3 times daily or as directed. Preferred blood glucose meter OR supplies to accompany: Blood Glucose Monitor Brands: per insurance. 1 kit 0      blood glucose test strips [BLOOD GLUCOSE TEST STRIPS] Check blood sugar four times daily. Dispense brand per patient's insurance at pharmacy discretion. Dx: e11.9 400 strip 3      blood-glucose meter Misc [BLOOD-GLUCOSE METER MISC] Dispense one meter. Use as directed. Dx: e11.9 1 each 0      flash glucose scanning reader (FREESTYLE BRE 14 DAY READER) Misc [FLASH GLUCOSE SCANNING READER (FREESTYLE BRE 14 DAY READER) MISC] Use 1 each As Directed see administration instructions. Use as directed with sensor 1 each 0      flash glucose sensor (FREESTYLE BRE 14 DAY SENSOR) Kit [FLASH GLUCOSE SENSOR (FREESTYLE BRE 14 DAY SENSOR) KIT] Use 1 each As Directed every 14 (fourteen) days. 2 kit 11      insulin syringe 31G X 5/16\" 0.5 ML MISC 1 each 2 times daily 100 each 11      insulin syringe-needle U-100 (BD INSULIN SYRINGE ULT-FINE II) 1 mL 31 gauge x 5/16 Syrg [INSULIN SYRINGE-NEEDLE U-100 (BD INSULIN SYRINGE ULT-FINE II) 1 ML 31 GAUGE X 5/16 SYRG] USE 4 TIMES DAILY AS DIRECTED 500 each 1      lancing device (ACCU-CHEK SOFTCLIX) Misc [LANCING DEVICE (ACCU-CHEK SOFTCLIX) MISC] Use 1 applicator As Directed 4 (four) times a day. 300 each 3      thin (NO BRAND SPECIFIED) lancets Use with lanceting device. To accompany: Blood Glucose Monitor Brands: per insurance. 100 each 11        Hospital Medications    amLODIPine  10 mg Oral Daily     " atorvastatin  40 mg Oral Daily     hydroxychloroquine  200 mg Oral BID     insulin aspart  1-3 Units Subcutaneous TID AC     insulin aspart  1-3 Units Subcutaneous At Bedtime     insulin NPH  32 Units Subcutaneous BID AC     loratadine  10 mg Oral At Bedtime     [START ON 10/29/2021] methotrexate  7.5 mg Oral Weekly     metoprolol succinate ER  50 mg Oral Daily     predniSONE  1 mg Oral Daily     traZODone  150 mg Oral At Bedtime     vitamin C  500 mg Oral Daily     Vitamin D3  25 mcg Oral Daily        PHYSICAL EXAM     Vital signs  Temp:  [97.5  F (36.4  C)-99.1  F (37.3  C)] 99.1  F (37.3  C)  Pulse:  [57-70] 70  Resp:  [18-22] 20  BP: (111-152)/() 120/60  SpO2:  [92 %-97 %] 92 %    VITALS: /60 (BP Location: Right arm)   Pulse 70   Temp 99.1  F (37.3  C) (Oral)   Resp 20   Ht 1.829 m (6')   Wt 103.1 kg (227 lb 6.4 oz)   SpO2 92%   BMI 30.84 kg/m    GENERAL -Health appearing, No apparent distress  EYES- No scleral icterus, no eyelid droop, Pupils - see Neuro section  HEENT - Normocephalic, atraumatic, Hearing grossly intact; Oral mucosa moist and pink in color. External Ears and nose intact.   Neck - supple with no obvious lymphadenopathy or thyromegaly  PULM - Good spontaneous respiratory effort; Normal palpation of chest.   CV- Pedal pulses present with no peripheral edema/ No significant varicosities.  MSK- Gait - see Neuro section; Strength and tone- see Neuro section; Range of motion grossly intact.  PSYCH -cooperative    Neurological  Mental status - Patient is awake and oriented to self, place and time. Attention span is normal. Language is fluent and follows commands appropriately.   Cranial nerves - CN II-XII intact. Pupils are reactive and symmetric; EOMI, VFIT, NLF symmetric  Motor - There is no pronator drift. Motor exam shows 5/5 strength in all extremities.  Tone - Tone is symmetric bilaterally in upper and lower extremities.  Coordination - Finger to nose and heel to shin is  without dysmetria.  Gait and station --able to ambulate with assistance.  Formal gait testing cannot be done because of safety concerns from ongoing medical issues  No significant change in exam today.     DIAGNOSTIC STUDIES     Pertinent Radiology   Carotis US                                                      IMPRESSION:  1.  Mild plaque formation, velocities consistent with less than 50% stenosis in the right internal carotid artery.  2.  Mild plaque formation, velocities consistent with less than 50% stenosis in the left internal carotid artery.  3.  Flow within the vertebral arteries is antegrade.    CT head  IMPRESSION:  1.  No CT finding of a mass, hemorrhage or focal area suggestive of acute infarct.  2.  Minimal age related changes.    CT C spine  IMPRESSION:  1. No evidence of acute cervical spine fracture.  2. No significant canal compromise with scattered areas of neural foraminal narrowing as described above.    EEG  IMPRESSION/REPORT/PLAN  This is a normal EEG during wakefulness and drowsiness/sleep except for mild generalized background dysrhythmia. Further clinical correlation is needed.     Please note that the absence of epileptiform abnormalities does not exclude the possibility of epilepsy in any patient.     EEG 2018  Impression  Normal awake and sleep stage I EEG     Signed  Dr. Monroe Salas    ECHO  Definity contrast utilized  The left ventricle is normal in size.  There is mild concentric left ventricular hypertrophy.  The left ventricular ejection fraction is normal.  The visual ejection fraction is 55-60%.  Normal left ventricular wall motion  Normal right ventricle size and systolic function.  The left atrium is mildly dilated.  No significant valve abnormality  No prior study made available for comparison    Cardiology consultation notes reviewed.    Recent Results (from the past 24 hour(s))   Glucose by meter    Collection Time: 10/27/21  5:20 PM   Result Value Ref Range    GLUCOSE  BY METER POCT 142 (H) 70 - 99 mg/dL   Glucose by meter    Collection Time: 10/27/21  9:01 PM   Result Value Ref Range    GLUCOSE BY METER POCT 140 (H) 70 - 99 mg/dL   Comprehensive metabolic panel    Collection Time: 10/28/21  6:57 AM   Result Value Ref Range    Sodium 141 136 - 145 mmol/L    Potassium 4.2 3.5 - 5.0 mmol/L    Chloride 108 (H) 98 - 107 mmol/L    Carbon Dioxide (CO2) 24 22 - 31 mmol/L    Anion Gap 9 5 - 18 mmol/L    Urea Nitrogen 20 8 - 28 mg/dL    Creatinine 1.31 (H) 0.60 - 1.10 mg/dL    Calcium 9.2 8.5 - 10.5 mg/dL    Glucose 127 (H) 70 - 125 mg/dL    Alkaline Phosphatase 52 45 - 120 U/L    AST 14 0 - 40 U/L    ALT 11 0 - 45 U/L    Protein Total 5.8 (L) 6.0 - 8.0 g/dL    Albumin 3.1 (L) 3.5 - 5.0 g/dL    Bilirubin Total 0.6 0.0 - 1.0 mg/dL    GFR Estimate 40 (L) >60 mL/min/1.73m2   CBC with platelets    Collection Time: 10/28/21  6:57 AM   Result Value Ref Range    WBC Count 9.4 4.0 - 11.0 10e3/uL    RBC Count 4.03 3.80 - 5.20 10e6/uL    Hemoglobin 12.4 11.7 - 15.7 g/dL    Hematocrit 38.3 35.0 - 47.0 %    MCV 95 78 - 100 fL    MCH 30.8 26.5 - 33.0 pg    MCHC 32.4 31.5 - 36.5 g/dL    RDW 13.1 10.0 - 15.0 %    Platelet Count 126 (L) 150 - 450 10e3/uL   Glucose by meter    Collection Time: 10/28/21  8:45 AM   Result Value Ref Range    GLUCOSE BY METER POCT 142 (H) 70 - 99 mg/dL   Glucose by meter    Collection Time: 10/28/21 12:06 PM   Result Value Ref Range    GLUCOSE BY METER POCT 151 (H) 70 - 99 mg/dL   ECG 12-LEAD WITH MUSE (LHE)    Collection Time: 10/28/21 12:12 PM   Result Value Ref Range    Systolic Blood Pressure  mmHg    Diastolic Blood Pressure  mmHg    Ventricular Rate 71 BPM    Atrial Rate 71 BPM    SD Interval 186 ms    QRS Duration 160 ms     ms    QTc 460 ms    P Axis 39 degrees    R AXIS 119 degrees    T Axis 45 degrees    Interpretation ECG       Sinus rhythm  Right bundle branch block  Abnormal ECG  When compared with ECG of 26-OCT-2021 00:15,  SD interval has decreased  Right  bundle branch block is now Present  Confirmed by MARCELO CELESTE, LES LOC:JN ([id number]) on 10/28/2021 1:50:32 PM         Total time spent for face to face visit, reviewing labs/imaging studies, counseling and coordination of care was: Over 30 min More than 50% of this time was spent on counseling and coordination of care.    Counseling patient.  Coordination of care with the primary team.  Discussion of MRI again with the patient.  Discussion of plan with the hospitalist.    David Frances MD  Neurologist  [facility]  Tel:- [phone]

## 2021-10-29 ENCOUNTER — APPOINTMENT (OUTPATIENT)
Dept: CARDIOLOGY | Facility: HOSPITAL | Age: 71
DRG: 312 | End: 2021-10-29
Attending: INTERNAL MEDICINE
Payer: COMMERCIAL

## 2021-10-29 ENCOUNTER — APPOINTMENT (OUTPATIENT)
Dept: OCCUPATIONAL THERAPY | Facility: HOSPITAL | Age: 71
DRG: 312 | End: 2021-10-29
Payer: COMMERCIAL

## 2021-10-29 VITALS
DIASTOLIC BLOOD PRESSURE: 72 MMHG | SYSTOLIC BLOOD PRESSURE: 162 MMHG | HEIGHT: 72 IN | WEIGHT: 227.4 LBS | OXYGEN SATURATION: 99 % | TEMPERATURE: 97.6 F | BODY MASS INDEX: 30.8 KG/M2 | RESPIRATION RATE: 18 BRPM | HEART RATE: 58 BPM

## 2021-10-29 LAB
ALBUMIN SERPL-MCNC: 3.1 G/DL (ref 3.5–5)
ALP SERPL-CCNC: 53 U/L (ref 45–120)
ALT SERPL W P-5'-P-CCNC: 12 U/L (ref 0–45)
ANION GAP SERPL CALCULATED.3IONS-SCNC: 6 MMOL/L (ref 5–18)
AST SERPL W P-5'-P-CCNC: 10 U/L (ref 0–40)
BILIRUB SERPL-MCNC: 0.7 MG/DL (ref 0–1)
BUN SERPL-MCNC: 20 MG/DL (ref 8–28)
CALCIUM SERPL-MCNC: 9.3 MG/DL (ref 8.5–10.5)
CHLORIDE BLD-SCNC: 108 MMOL/L (ref 98–107)
CO2 SERPL-SCNC: 27 MMOL/L (ref 22–31)
CREAT SERPL-MCNC: 1.22 MG/DL (ref 0.6–1.1)
ERYTHROCYTE [DISTWIDTH] IN BLOOD BY AUTOMATED COUNT: 13 % (ref 10–15)
GFR SERPL CREATININE-BSD FRML MDRD: 44 ML/MIN/1.73M2
GLUCOSE BLD-MCNC: 127 MG/DL (ref 70–125)
GLUCOSE BLDC GLUCOMTR-MCNC: 139 MG/DL (ref 70–99)
GLUCOSE BLDC GLUCOMTR-MCNC: 191 MG/DL (ref 70–99)
GLUCOSE BLDC GLUCOMTR-MCNC: 193 MG/DL (ref 70–99)
HCT VFR BLD AUTO: 37.8 % (ref 35–47)
HGB BLD-MCNC: 12.4 G/DL (ref 11.7–15.7)
MCH RBC QN AUTO: 31.3 PG (ref 26.5–33)
MCHC RBC AUTO-ENTMCNC: 32.8 G/DL (ref 31.5–36.5)
MCV RBC AUTO: 96 FL (ref 78–100)
PLATELET # BLD AUTO: 121 10E3/UL (ref 150–450)
POTASSIUM BLD-SCNC: 3.8 MMOL/L (ref 3.5–5)
PROT SERPL-MCNC: 6 G/DL (ref 6–8)
RBC # BLD AUTO: 3.96 10E6/UL (ref 3.8–5.2)
SODIUM SERPL-SCNC: 141 MMOL/L (ref 136–145)
WBC # BLD AUTO: 8.4 10E3/UL (ref 4–11)

## 2021-10-29 PROCEDURE — 80053 COMPREHEN METABOLIC PANEL: CPT | Performed by: FAMILY MEDICINE

## 2021-10-29 PROCEDURE — 97110 THERAPEUTIC EXERCISES: CPT | Mod: GO

## 2021-10-29 PROCEDURE — 97535 SELF CARE MNGMENT TRAINING: CPT | Mod: GO

## 2021-10-29 PROCEDURE — 99239 HOSP IP/OBS DSCHRG MGMT >30: CPT | Performed by: INTERNAL MEDICINE

## 2021-10-29 PROCEDURE — 250N000012 HC RX MED GY IP 250 OP 636 PS 637: Performed by: FAMILY MEDICINE

## 2021-10-29 PROCEDURE — 93270 REMOTE 30 DAY ECG REV/REPORT: CPT

## 2021-10-29 PROCEDURE — 250N000013 HC RX MED GY IP 250 OP 250 PS 637: Performed by: FAMILY MEDICINE

## 2021-10-29 PROCEDURE — 99232 SBSQ HOSP IP/OBS MODERATE 35: CPT | Performed by: PSYCHIATRY & NEUROLOGY

## 2021-10-29 PROCEDURE — 99207 PR NO CHARGE LOS: CPT | Performed by: INTERNAL MEDICINE

## 2021-10-29 PROCEDURE — 99207 PR CDG-CORRECTLY CODED, REVIEWED AND AGREE: CPT | Performed by: INTERNAL MEDICINE

## 2021-10-29 PROCEDURE — 85027 COMPLETE CBC AUTOMATED: CPT | Performed by: FAMILY MEDICINE

## 2021-10-29 PROCEDURE — 36415 COLL VENOUS BLD VENIPUNCTURE: CPT | Performed by: FAMILY MEDICINE

## 2021-10-29 PROCEDURE — 250N000011 HC RX IP 250 OP 636: Performed by: INTERNAL MEDICINE

## 2021-10-29 RX ORDER — HEPARIN SODIUM 5000 [USP'U]/.5ML
5000 INJECTION, SOLUTION INTRAVENOUS; SUBCUTANEOUS EVERY 12 HOURS
Status: DISCONTINUED | OUTPATIENT
Start: 2021-10-29 | End: 2021-10-29 | Stop reason: HOSPADM

## 2021-10-29 RX ORDER — OXYCODONE AND ACETAMINOPHEN 5; 325 MG/1; MG/1
1-2 TABLET ORAL EVERY 6 HOURS PRN
Qty: 12 TABLET | Refills: 0 | Status: SHIPPED | OUTPATIENT
Start: 2021-10-29 | End: 2021-11-01

## 2021-10-29 RX ORDER — HYDRALAZINE HYDROCHLORIDE 10 MG/1
10 TABLET, FILM COATED ORAL 3 TIMES DAILY PRN
DISCHARGE
Start: 2021-10-29 | End: 2022-03-14

## 2021-10-29 RX ORDER — PREDNISONE 1 MG/1
1 TABLET ORAL DAILY
Refills: 0 | DISCHARGE
Start: 2021-10-30 | End: 2021-12-23

## 2021-10-29 RX ADMIN — Medication 25 MCG: at 09:00

## 2021-10-29 RX ADMIN — ATORVASTATIN CALCIUM 40 MG: 40 TABLET, FILM COATED ORAL at 08:59

## 2021-10-29 RX ADMIN — AMLODIPINE BESYLATE 10 MG: 5 TABLET ORAL at 08:59

## 2021-10-29 RX ADMIN — HEPARIN SODIUM 5000 UNITS: 10000 INJECTION, SOLUTION INTRAVENOUS; SUBCUTANEOUS at 12:49

## 2021-10-29 RX ADMIN — INSULIN ASPART 1 UNITS: 100 INJECTION, SOLUTION INTRAVENOUS; SUBCUTANEOUS at 12:48

## 2021-10-29 RX ADMIN — OXYCODONE HYDROCHLORIDE AND ACETAMINOPHEN 2 TABLET: 5; 325 TABLET ORAL at 12:47

## 2021-10-29 RX ADMIN — METHOTREXATE SODIUM 7.5 MG: 2.5 TABLET ORAL at 10:54

## 2021-10-29 RX ADMIN — Medication 500 MG: at 09:00

## 2021-10-29 RX ADMIN — OXYCODONE HYDROCHLORIDE AND ACETAMINOPHEN 2 TABLET: 5; 325 TABLET ORAL at 06:29

## 2021-10-29 RX ADMIN — PREDNISONE 1 MG: 1 TABLET ORAL at 09:00

## 2021-10-29 RX ADMIN — INSULIN ASPART 1 UNITS: 100 INJECTION, SOLUTION INTRAVENOUS; SUBCUTANEOUS at 17:11

## 2021-10-29 RX ADMIN — HYDROXYCHLOROQUINE SULFATE 200 MG: 200 TABLET, FILM COATED ORAL at 08:59

## 2021-10-29 ASSESSMENT — ACTIVITIES OF DAILY LIVING (ADL)
ADLS_ACUITY_SCORE: 15

## 2021-10-29 NOTE — DISCHARGE SUMMARY
"[facility]    Discharge Summary  Hospitalist    Date of Admission:  10/25/2021  Date of Discharge:  10/29/2021  5:58 PM  Provider:  Gabriela Couch DO    Discharge Diagnoses   1.  Syncope and collapse, recurrent  2.  Prolonged QTc interval  3.  Fall with Bilateral hip, neck pain  4.  Chronic LBP  5.  Acute on chronic kidney disease  6.  HTN    12 tablets of oxycodone (home dose given) for discharge to TCU  30 days event monitor    Other medical issues:  Past Medical History:   Diagnosis Date     BBB (bundle branch block)      Chronic back pain      Chronic kidney disease     stage 3     Chronic kidney disease, unspecified     Created by Conversion      Depression      Diabetes (H)     Type 2     Diabetes mellitus, type 2 (H)     Created by Conversion      Frequent headaches      Ganglion of tendon sheath     Created by Conversion      GERD (gastroesophageal reflux disease)      HLD (hyperlipidemia)      Hypertension     Per H&P dated 1/03/2013     Incarcerated ventral hernia     Per H&P dated 1/03/2013     Osteoarthritis      Rheumatoid arthritis (H)      Shortness of breath      Thrombocytopenia (H)      Venous insufficiency      H/o RA (chronically on plaquenil and methotrexate)    History of Present Illness   Ebonie Bowman is an 71 year old female who presented with fall and hip/neck pain.  Please see the admission history and physical for full details.    Hospital Course   Ebonie Bowman was admitted on 10/25/2021.  The following problems were addressed during her hospitalization:    1. Syncope and collapse    Ms. Bowman presents to the ED from home after experiencing 2 episodes of syncope and collapse.  She denies any prodrome prior to the events or loss of bowel/bladder.   She reports \"34 similar episodes over the last 4 years\" to me on discharge day.  She has not been evaluated for this in the past.  She remained on tele monitoring during her hospital stay with no arrythmias noted and " no further syncopal events during her hospital stay.       Neurology and Cardiology consults were requested.  CT imaging of the head neg for bleed, mass, CVA. She is declining MRI recommended by Neuro due to severe claustrophobia.      She had bilateral ultrasound and EEG which were unremarkable.   An ECHO revealed a normal LVEF of 55-60%.      EKG showed sinus bradycardia with 1st degree AV block with prolonged QTc interval of 511 ms. Troponin x 3 negative.  There remains clinical concern that Ms. Pott for arrythmia contributing to her presentation as she does not report a prodrome and does have evidence of conduction disease and prolonged QT on her EKG.  An outpatient 30-day event monitor was recommended by Cardiology and will be placed prior to hospital discharge today.  She will follow-up in cardiology clinic in 4 to 6 weeks.    She is diabetic but no hypoglycemia observed in the ED or during her hospital stay.     2.  Prolonged QTc interval   Potassium and magnesium levels were within normal limits.  A repeat EKG 10/28/21 showed improved QTc interval than from 10/26/21.    She should avoid QTc prolonged/ meds but was felt ok to continue on her home trazodone and home plaquenil, for now.     3.  Unwtinessed fall with c/o bilateral hip and neck pain  Chronic pain syndrome (back pain)  She found herself on the ground at home after one of her episodes.  She reported bilateral hip and neck pain in the ED.  Imaging negative for acute fractures.  She had US of the lower extremities that was negative for DVT bilaterally.    She was continued on her home PTA percocet without dosing change.  She was given a rx for 12 percocet tablets at time of discharge today to the TCU.      4.  JAYNE on top of CKD  Creat was elevated on admission and improved with IVF given.  Her creat was near her baseline of 1.2 on am of day of discharge.      Significant Results and Procedures   none    Pending Results   Unresulted Labs Ordered in  the Past 30 Days of this Admission     No orders found from 9/25/2021 to 10/26/2021.          Code Status   Full Code       Primary Care Physician   Marichuy Rubio    Physical Exam   Temp: 97.6  F (36.4  C) Temp src: Oral BP: (!) 162/72 Pulse: 58   Resp: 18 SpO2: 99 % O2 Device: None (Room air)    Vitals:    10/26/21 1756   Weight: 103.1 kg (227 lb 6.4 oz)     Vital Signs with Ranges  Temp:  [97.3  F (36.3  C)-98.3  F (36.8  C)] 97.6  F (36.4  C)  Pulse:  [57-69] 58  Resp:  [16-20] 18  BP: (125-162)/(57-72) 162/72  SpO2:  [94 %-99 %] 99 %  I/O last 3 completed shifts:  In: 960 [P.O.:960]  Out: -     PT SEEN AND EXAMINED ON DAY OF D/C    GEN:  Alert, oriented x 3, comfortable, no overt distress.  Easily conversant  HEENT:  Normocephalic/atraumatic, no scleral icterus, no nasal discharge, mouth moist,  NECK:  No clear thyromegaly or clear JVD  CV:  Somewhat distant but regular rate and rhythm, no loud murmur to ausc.  S1 + S2 noted, no S3 or S4.  LUNGS:  Clear to auscultation ant/lat bilaterally.  No rales/rhonchi/wheezing auscultated bilaterally.  No costal retractions bilaterally.  Symmetric chest rise on inhalation noted.  ABD:  Active bowel sounds, soft, non-tender/non-distended.  No rebound/guarding/rigidity.  No masses palpated.  No obvious HSM to exam although exam somewhat limited secondary to body habitus.  EXT:  No pitting pretibial edema or cyanosis bilaterally. No joint synovitis noted.  No calf-tenderness or asymmetry noted.  SKIN:  Dry to touch, no rashes or jaundice noted.  PSYCH:  Mood appropriate, Not tearful or depressed.  Maintains direct eye contact.    NEURO:  No tremors at rest, speech is clear and appropriate.  CN 2-12 intact to gross testing bilaterally    Discharge Disposition   Discharged to rehabilitation facility    Consultations This Hospital Stay   NEUROLOGY IP CONSULT  PHYSICAL THERAPY ADULT IP CONSULT  OCCUPATIONAL THERAPY ADULT IP CONSULT  SOCIAL WORK IP CONSULT  CARDIOLOGY IP  CONSULT  OCCUPATIONAL THERAPY ADULT IP CONSULT  PHYSICAL THERAPY ADULT IP CONSULT    Time Spent on this Encounter   I, Gabriela Couch DO, personally saw the patient today and spent greater than 30 minutes discharging this patient.    Discharge Orders      General info for SNF    Length of Stay Estimate: Short Term Care: Estimated # of Days <30  Condition at Discharge: Improving  Level of care:skilled   Rehabilitation Potential: Good  Admission H&P remains valid and up-to-date: Yes  Recent Chemotherapy: N/A  Use Nursing Home Standing Orders: Yes     Mantoux instructions    Give two-step Mantoux (PPD) Per Facility Policy Yes     Follow Up and recommended labs and tests    Follow up with Dr. Mg (Cardiology) in 4-6 weeks. Will need to review the 30 day monitor ordered at d/c     Reason for your hospital stay    You were admitted after fall and syncopal episode     Activity - Up with nursing assistance     Full Code     Occupational Therapy Adult Consult    Evaluate and treat as clinically indicated.    Reason:  falls and recurrent syncope     Physical Therapy Adult Consult    Evaluate and treat as clinically indicated.    Reason: falls and recurrent syncope     Cardiac Event Monitor Adult Pediatric    Please place before hospital discharge today     Fall precautions     Diet    Follow this diet upon discharge:       Combination Diet Consistent Carb 60 Grams CHO per Meal Diet; 2 gm NA Diet; Low Fat Diet     Discharge Medications   Current Discharge Medication List      START taking these medications    Details   hydrALAZINE (APRESOLINE) 10 MG tablet Take 1 tablet (10 mg) by mouth 3 times daily as needed (sbp >175)    Associated Diagnoses: Essential hypertension         CONTINUE these medications which have CHANGED    Details   insulin  UNIT/ML injection Inject 32 Units Subcutaneous 2 times daily (before meals)    Associated Diagnoses: Type 2 diabetes mellitus with stage 3 chronic kidney disease, with  long-term current use of insulin, unspecified whether stage 3a or 3b CKD (H)      oxyCODONE-acetaminophen (PERCOCET) 5-325 MG tablet Take 1-2 tablets by mouth every 6 hours as needed for severe pain (Max of 6 tablets daily)  Qty: 12 tablet, Refills: 0    Associated Diagnoses: Chronic pain syndrome      predniSONE (DELTASONE) 1 MG tablet Take 1 tablet (1 mg) by mouth daily  Refills: 0    Associated Diagnoses: Seropositive rheumatoid arthritis (H)         CONTINUE these medications which have NOT CHANGED    Details   amLODIPine (NORVASC) 10 MG tablet [AMLODIPINE (NORVASC) 10 MG TABLET] TAKE 1 TABLET EVERY DAY  Qty: 90 tablet, Refills: 2    Associated Diagnoses: Essential hypertension      ascorbic acid (ASCORBIC ACID WITH LATRICIA HIPS) 500 MG tablet Take 500 mg by mouth daily       aspirin 81 MG EC tablet Take 81 mg by mouth daily      atorvastatin (LIPITOR) 40 MG tablet [ATORVASTATIN (LIPITOR) 40 MG TABLET] Take 1 tablet (40 mg total) by mouth at bedtime.  Qty: 90 tablet, Refills: 2    Associated Diagnoses: Mixed hyperlipidemia      b complex vitamins tablet [B COMPLEX VITAMINS TABLET] Take 1 tablet by mouth daily.      fish oil-omega-3 fatty acids 1000 MG capsule Take 1 g by mouth daily      hydroxychloroquine (PLAQUENIL) 200 MG tablet Take 1 tablet (200 mg) by mouth 2 times daily  Qty: 180 tablet, Refills: 1    Associated Diagnoses: Seropositive rheumatoid arthritis (H)      loratadine (CLARITIN) 10 mg tablet [LORATADINE (CLARITIN) 10 MG TABLET] Take 1 tablet (10 mg total) by mouth at bedtime.  Qty: 90 tablet, Refills: 3    Associated Diagnoses: Allergic rhinitis due to cats      methotrexate 2.5 MG tablet Take 3 tablets (7.5 mg) by mouth once a week  Qty: 36 tablet, Refills: 0    Associated Diagnoses: Seropositive rheumatoid arthritis (H)      metoprolol succinate ER (TOPROL-XL) 50 MG 24 hr tablet [METOPROLOL SUCCINATE (TOPROL-XL) 50 MG 24 HR TABLET] TAKE 1 TABLET EVERY DAY  Qty: 90 tablet, Refills: 2    Associated  Diagnoses: Essential hypertension      MULTIVITAMIN ORAL Take 1 tablet by mouth daily       traZODone (DESYREL) 150 MG tablet Take 1 tablet (150 mg) by mouth At Bedtime  Qty: 90 tablet, Refills: 1    Associated Diagnoses: Primary insomnia      Vitamin D3 (VITAMIN D3) 25 mcg (1000 units) tablet Take 1,000 Units by mouth daily       alcohol swab prep pads Use to swab area of injection/jacquelyn as directed.  Qty: 100 each, Refills: 3    Associated Diagnoses: Type 2 diabetes mellitus with stage 3b chronic kidney disease, with long-term current use of insulin (H)      !! blood glucose (NO BRAND SPECIFIED) test strip Use to test blood sugar 3 times daily or as directed. To accompany: Blood Glucose Monitor Brands: per insurance.  Qty: 100 strip, Refills: 6    Associated Diagnoses: Type 2 diabetes mellitus with stage 3b chronic kidney disease, with long-term current use of insulin (H)      blood glucose calibration (NO BRAND SPECIFIED) solution To accompany: Blood Glucose Monitor Brands: per insurance.  Qty: 1 each, Refills: 0    Associated Diagnoses: Type 2 diabetes mellitus with stage 3b chronic kidney disease, with long-term current use of insulin (H)      blood glucose monitoring (NO BRAND SPECIFIED) meter device kit Use to test blood sugar 3 times daily or as directed. Preferred blood glucose meter OR supplies to accompany: Blood Glucose Monitor Brands: per insurance.  Qty: 1 kit, Refills: 0    Associated Diagnoses: Type 2 diabetes mellitus with stage 3b chronic kidney disease, with long-term current use of insulin (H)      !! blood glucose test strips [BLOOD GLUCOSE TEST STRIPS] Check blood sugar four times daily. Dispense brand per patient's insurance at pharmacy discretion. Dx: e11.9  Qty: 400 strip, Refills: 3    Associated Diagnoses: Diabetes mellitus, type 2 (H)      !! blood-glucose meter Misc [BLOOD-GLUCOSE METER MISC] Dispense one meter. Use as directed. Dx: e11.9  Qty: 1 each, Refills: 0    Associated Diagnoses:  "Diabetes mellitus, type 2 (H)      flash glucose scanning reader (FREESTYLE BRE 14 DAY READER) Misc [FLASH GLUCOSE SCANNING READER (FREESTYLE BRE 14 DAY READER) MISC] Use 1 each As Directed see administration instructions. Use as directed with sensor  Qty: 1 each, Refills: 0    Associated Diagnoses: Type 2 diabetes mellitus with hypoglycemia without coma, with long-term current use of insulin (H)      flash glucose sensor (FREESTYLE BRE 14 DAY SENSOR) Kit [FLASH GLUCOSE SENSOR (FREESTYLE BRE 14 DAY SENSOR) KIT] Use 1 each As Directed every 14 (fourteen) days.  Qty: 2 kit, Refills: 11    Associated Diagnoses: Type 2 diabetes mellitus with hypoglycemia without coma, with long-term current use of insulin (H)      insulin syringe 31G X 5/16\" 0.5 ML MISC 1 each 2 times daily  Qty: 100 each, Refills: 11    Associated Diagnoses: Diabetes mellitus, type 2 (H)      insulin syringe-needle U-100 (BD INSULIN SYRINGE ULT-FINE II) 1 mL 31 gauge x 5/16 Syrg [INSULIN SYRINGE-NEEDLE U-100 (BD INSULIN SYRINGE ULT-FINE II) 1 ML 31 GAUGE X 5/16 SYRG] USE 4 TIMES DAILY AS DIRECTED  Qty: 500 each, Refills: 1    Associated Diagnoses: DM2 (diabetes mellitus, type 2) (H)      lancing device (ACCU-CHEK SOFTCLIX) Misc [LANCING DEVICE (ACCU-CHEK SOFTCLIX) MISC] Use 1 applicator As Directed 4 (four) times a day.  Qty: 300 each, Refills: 3    Comments: Accu Check Soni Lancets  Associated Diagnoses: Type II or unspecified type diabetes mellitus without mention of complication, not stated as uncontrolled      thin (NO BRAND SPECIFIED) lancets Use with lanceting device. To accompany: Blood Glucose Monitor Brands: per insurance.  Qty: 100 each, Refills: 11    Associated Diagnoses: Type 2 diabetes mellitus with stage 3b chronic kidney disease, with long-term current use of insulin (H)       !! - Potential duplicate medications found. Please discuss with provider.      STOP taking these medications       lidocaine (LMX) 4 % cream Comments: "   Reason for Stopping:         lisinopril (ZESTRIL) 10 MG tablet Comments:   Reason for Stopping:         naloxone (NARCAN) 4 MG/0.1ML nasal spray Comments:   Reason for Stopping:             Allergies   Allergies   Allergen Reactions     Morphine Nausea and Vomiting     Penicillins Hives     Tolerates ceftriaxone     Sulfa (Sulfonamide Antibiotics) [Sulfa Drugs] Hives     Data   Recent Labs   Lab 10/29/21  0704 10/28/21  0657 10/27/21  0720   WBC 8.4 9.4 9.6   HGB 12.4 12.4 13.2   HCT 37.8 38.3 40.1   MCV 96 95 94   * 126* 136*     Recent Labs   Lab 10/29/21  1646 10/29/21  1229 10/29/21  0752 10/29/21  0704 10/28/21  2110 10/28/21  0845 10/28/21  0657 10/27/21  1148 10/27/21  0720   NA  --   --   --  141  --   --  141  --  141   POTASSIUM  --   --   --  3.8  --   --  4.2  --  4.1   CHLORIDE  --   --   --  108*  --   --  108*  --  106   CO2  --   --   --  27  --   --  24  --  25   ANIONGAP  --   --   --  6  --   --  9  --  10   * 193* 139* 127*   < >   < > 127*   < > 192*   BUN  --   --   --  20  --   --  20  --  18   CR  --   --   --  1.22*  --   --  1.31*  --  1.33*   GFRESTIMATED  --   --   --  44*  --   --  40*  --  40*   GRACY  --   --   --  9.3  --   --  9.2  --  9.1    < > = values in this interval not displayed.     Recent Labs   Lab 10/29/21  1646 10/29/21  1229 10/29/21  0752 10/29/21  0704 10/28/21  2110 10/28/21  0845 10/28/21  0657 10/27/21  1148 10/27/21  0720   NA  --   --   --  141  --   --  141  --  141   POTASSIUM  --   --   --  3.8  --   --  4.2  --  4.1   CHLORIDE  --   --   --  108*  --   --  108*  --  106   CO2  --   --   --  27  --   --  24  --  25   ANIONGAP  --   --   --  6  --   --  9  --  10   * 193* 139* 127*   < >   < > 127*   < > 192*   BUN  --   --   --  20  --   --  20  --  18   CR  --   --   --  1.22*  --   --  1.31*  --  1.33*   GFRESTIMATED  --   --   --  44*  --   --  40*  --  40*   GRACY  --   --   --  9.3  --   --  9.2  --  9.1   PROTTOTAL  --   --   --  6.0   --   --  5.8*  --  6.1   ALBUMIN  --   --   --  3.1*  --   --  3.1*  --  3.3*   BILITOTAL  --   --   --  0.7  --   --  0.6  --  0.7   ALKPHOS  --   --   --  53  --   --  52  --  51   AST  --   --   --  10  --   --  14  --  16   ALT  --   --   --  12  --   --  11  --  12    < > = values in this interval not displayed.     No results for input(s): LACT in the last 168 hours.  No results for input(s): TROPONIN, TROPI, TROPR in the last 168 hours.    Invalid input(s): TROP, TROPONINIES  Results for orders placed or performed during the hospital encounter of 10/25/21   Cervical spine CT w/o contrast    Narrative    EXAM: CT CERVICAL SPINE W/O CONTRAST  LOCATION: [facility]  DATE/TIME: 10/25/2021 11:53 PM    INDICATION: Trauma to the neck with neck pain.  COMPARISON: 01/22/2021.  TECHNIQUE: CT cervical spine without contrast. Dose reduction techniques were performed.    FINDINGS: There is good anatomic alignment of the C1 and C2 vertebral bodies and also with the occipital condyles. The prevertebral soft tissues and the predental space are maintained. There is good anatomic alignment with no evidence of subluxation.   There is no evidence of an acute cervical spine fracture. There are degenerative changes involving the left C7-T1 transverse processes. These are stable in the interval. The vertebral body heights are well-maintained throughout. There is no evidence of   an acute cervical spine fracture. There is diffuse degenerative disc disease seen throughout the cervical disc spaces with a mild to moderate loss of disc space heights, endplate changes and small anterior osteophyte formation. There is mild diffuse   facet arthropathy throughout the cervical spine.    At the C3-C3 at the C3-C4 disc space level there is mild left neural foraminal narrowing.    At the C4-C5 disc space level there is mild bilateral neural foraminal narrowing. There is no significant canal compromise throughout the  cervical spine. The lung apices are clear. The paraspinal soft tissues are unremarkable.      Impression    IMPRESSION:  1. No evidence of acute cervical spine fracture.  2. No significant canal compromise with scattered areas of neural foraminal narrowing as described above.   Head CT w/o contrast    Narrative    EXAM: CT HEAD W/O CONTRAST  LOCATION: [facility]  DATE/TIME: 10/25/2021 11:53 PM    INDICATION: Syncopal episode with head trauma.  COMPARISON: 02/22/2020.  TECHNIQUE: Routine CT Head without IV contrast. Multiplanar reformats. Dose reduction techniques were used.    FINDINGS:  INTRACRANIAL CONTENTS: No intracranial hemorrhage, extraaxial collection, or mass effect.  No CT evidence of acute infarct. There is minimal low attenuation within the periventricular and subcortical white matter consistent with minimal small vessel   ischemic disease. The ventricular system, basal cisterns and the cortical sulci are consistent with minimal volume loss for age. There is a tiny lipoma superior to the corpus callosum.     VISUALIZED ORBITS/SINUSES/MASTOIDS: No intraorbital abnormality. No paranasal sinus mucosal disease. No middle ear or mastoid effusion.    BONES/SOFT TISSUES: No acute abnormality.      Impression    IMPRESSION:  1.  No CT finding of a mass, hemorrhage or focal area suggestive of acute infarct.  2.  Minimal age related changes.   US Carotid Bilateral    Narrative    EXAM: US CAROTID BILATERAL  LOCATION: [facility]  DATE/TIME: 10/26/2021 9:01 AM    INDICATION: syncope  COMPARISON: None.  TECHNIQUE: Duplex exam performed utilizing 2D gray-scale imaging, Doppler interrogation with color-flow and spectral waveform analysis. The percent diameter stenosis is determined using NASCET criteria and Society of Radiologists in Ultrasound Consensus   Criteria.    FINDINGS:    RIGHT: Mild plaque at the bifurcation. The peak systolic velocity in the ICA is less than  125 cm/sec, consistent with less than 50% stenosis. Normal velocities in the ECA. Antegrade flow within the vertebral artery.     LEFT: Mild plaque at the bifurcation. The peak systolic velocity in the ICA is less than 125 cm/sec, consistent with less than 50% stenosis. Normal velocities in the ECA. Antegrade flow within the vertebral artery.    VELOCITY CHART:  CCA   Right: 76/17 cm/s   Left: 76/18 cm/s  ICA   Right: 97/27 cm/s   Left: 80/23 cm/s  ECA   Right: 106/10 cm/s   Left: 103/15 cm/s  ICA/CCA PSV Ratio   Right: 1.3   Left: 1.1      Impression    IMPRESSION:  1.  Mild plaque formation, velocities consistent with less than 50% stenosis in the right internal carotid artery.  2.  Mild plaque formation, velocities consistent with less than 50% stenosis in the left internal carotid artery.  3.  Flow within the vertebral arteries is antegrade.   XR Hip Bilateral 1 View Each    Narrative    EXAM: XR PELVIS W 2 VW HIPS BILATERAL  LOCATION: [facility]  DATE/TIME: 10/26/2021 12:57 PM    INDICATION: b/l hip pain after two falls  COMPARISON: CT abdomen pelvis 10/26/2021      Impression    IMPRESSION: No acute fracture or dislocation. Minimal degenerative arthritis of both hips. Chronic soft tissue calcifications projecting near the lesser trochanters bilaterally are stable. Diffuse bony demineralization. Degenerative changes in the spine.   Electronic device projects over the left lower quadrant.    US Lower Extremity Venous Duplex Bilateral    Narrative    EXAM: US LOWER EXTREMITY VENOUS DUPLEX BILATERAL  LOCATION: [facility]  DATE/TIME: 10/27/2021 6:45 PM    INDICATION: Bilateral calf pain  COMPARISON: None.  TECHNIQUE: Venous Duplex ultrasound of bilateral lower extremities with and without compression, augmentation and duplex. Color flow and spectral Doppler with waveform analysis performed.    FINDINGS: Exam includes the common femoral, femoral, popliteal veins as  well as segmentally visualized deep calf veins and greater saphenous vein.     RIGHT: No deep vein thrombosis. No superficial thrombophlebitis. No popliteal cyst.    LEFT: No deep vein thrombosis. No superficial thrombophlebitis. No popliteal cyst.      Impression    IMPRESSION:  1.  No deep venous thrombosis in the bilateral lower extremities.   Echocardiogram Complete     Value    LVEF  55-60%    [facility]    [id number]  [id number]  [id number]  [id number]^MARCELA^RADHA^[place] [address]     Name: NICHOLAS CARRILLO  MRN: [id number]  : 1950  Study Date: 10/26/2021 11:46 AM  Age: 71 yrs  Gender: Female  Patient Location: [facility]  Reason For Study: Syncope and Collapse  Ordering Physician: RADHA MEDRANO  Performed By: ROBERTO     BSA: 2.3 m2  Height: 72 in  Weight: 231 lb  HR: 60  BP: 167/77 mmHg  ______________________________________________________________________________  Procedure  Definity (NDC #64520-256) given intravenously. Technically difficult study.  ______________________________________________________________________________  Interpretation Summary     Definity contrast utilized  The left ventricle is normal in size.  There is mild concentric left ventricular hypertrophy.  The left ventricular ejection fraction is normal.  The visual ejection fraction is 55-60%.  Normal left ventricular wall motion  Normal right ventricle size and systolic function.  The left atrium is mildly dilated.  No significant valve abnormality  No prior study made available for comparison  ______________________________________________________________________________  I      WMSI = 1.00     % Normal = 100     X - Cannot   0 -                      (2) - Mildly 2 -          Segments  Size  Interpret    Hyperkinetic 1 - Normal  Hypokinetic  Hypokinetic  1-2     small                                                     7 -          3-5      moderate  3 - Akinetic 4 -          5 -         6  - Akinetic Dyskinetic   6-14    large               Dyskinetic   Aneurysmal  w/scar       w/scar       15-16   diffuse     Left Ventricle  The left ventricle is normal in size. There is mild concentric left  ventricular hypertrophy. Diastolic Doppler findings (E/E' ratio and/or other  parameters) suggest left ventricular filling pressures are normal. The left  ventricular ejection fraction is normal. The visual ejection fraction is 55-  60%. Left ventricular diastolic function is normal. Normal left ventricular  wall motion.     Right Ventricle  Normal right ventricle size and systolic function. TAPSE is normal, which is  consistent with normal right ventricular systolic function.     Atria  The left atrium is mildly dilated. Right atrial size is normal.     Mitral Valve  The mitral valve leaflets appear thickened, but open well. There is mild  mitral annular calcification. There is trace mitral regurgitation.     Tricuspid Valve  There is trace tricuspid regurgitation.     Aortic Valve  The aortic valve is trileaflet. No hemodynamically significant valvular aortic  stenosis.     Pulmonic Valve  The pulmonic valve is not well visualized. There is trace to mild pulmonic  valvular regurgitation.     Vessels  The aorta root is normal. IVC diameter <2.1 cm collapsing >50% with sniff  suggests a normal RA pressure of 3 mmHg.     Pericardium  No significant pericardial effusion.     ______________________________________________________________________________  MMode/2D Measurements & Calculations  IVSd: 1.1 cm  LVIDd: 4.8 cm  LVIDs: 3.1 cm  LVPWd: 0.94 cm  FS: 34.3 %  LV mass(C)d: 176.3 grams  LV mass(C)dI: 77.8 grams/m2  Ao root diam: 3.3 cm  LA dimension: 3.0 cm  asc Aorta Diam: 3.1 cm     LA/Ao: 0.91  LVOT diam: 2.4 cm  LVOT area: 4.4 cm2  LA Volume Indexed (AL/bp): 30.2 ml/m2  RWT: 0.39     Doppler Measurements & Calculations  MV E max geovanna: 85.9 cm/sec  MV A max geovanna: 99.4 cm/sec  MV E/A: 0.86  MV max PG: 3.7 mmHg  MV  mean P.5 mmHg  MV V2 VTI: 28.1 cm  MVA(VTI): 3.7 cm2  MV dec slope: 329.5 cm/sec2  MV dec time: 0.26 sec  Ao V2 max: 135.7 cm/sec  Ao max P.0 mmHg  Ao V2 mean: 84.7 cm/sec  Ao mean PG: 3.6 mmHg  Ao V2 VTI: 35.2 cm  SANTINO(I,D): 2.9 cm2  SANTINO(V,D): 2.8 cm2  LV V1 max PG: 3.1 mmHg  LV V1 max: 87.8 cm/sec  LV V1 VTI: 23.6 cm     SV(LVOT): 103.6 ml  SI(LVOT): 45.7 ml/m2  PA acc time: 0.12 sec  PI end-d breezy: 80.5 cm/sec  TR max breezy: 192.0 cm/sec  TR max P.7 mmHg  AV Breezy Ratio (DI): 0.65  SANTINO Index (cm2/m2): 1.3  E/E' av.3  Lateral E/e': 10.5  Medial E/e': 14.1     ______________________________________________________________________________  Report approved by: Anatoly Ahn 10/26/2021 01:36 PM

## 2021-10-29 NOTE — PLAN OF CARE
Physical Therapy Discharge Summary    Reason for therapy discharge:    Discharged to transitional care facility.    Progress towards therapy goal(s). See goals on Care Plan in [facility] electronic health record for goal details.  Goals partially met.  Barriers to achieving goals:   Pt was making progress towards the goals.  .    Therapy recommendation(s):    Continued therapy is recommended.  Rationale/Recommendations:  Continued PT is recommended to improve mobility, and decrease back pain. .

## 2021-10-29 NOTE — PROGRESS NOTES
NEUROLOGY PROGRESS NOTE     Ebonie Bowman,  1950, MRN [id number] Date: 10/29/2021     [facility]   Code status:  Full Code   PCP: Marichuy Rubio, [phone]      ASSESSMENT & PLAN   Diagnosis code: Spell of loss of consciousness/rule out seizure    Spell of loss of consciousness/rule out seizure      Head CT negative for structural lesions    MRI brain recommended though patient is claustrophobic and does not want to do that.  Patient agreed to this to later changed her mind.    EEG negative for epilepsy    Hold off antiepileptic medication for right now.  If she has more spells and no cardiac cause can be found could consider seizure medication trial.    Cardiac monitoring to rule out arrhythmias    Echocardiogram shows 55-60% action fraction.  Cardiology recommending outpatient event monitor for 30 days.  Loop monitor if she has more events.    Primary team considering other medication related spells of loss of consciousness.    3-month driving restriction per [place] state law    Discharge:-Per primary team.  Okay discharge from neurology end.  Discharge planned for later today.  Placement main issue for the patient.  Will sign off.       Chief Complaint   Patient presents with     Fall        HISTORY OF PRESENT ILLNESS     We have been requested by Dr. Tan to evaluate Ebonie Bowman who is a 71 year old  female for spells of loss of consciousness.  This is a 71-year-old female with diabetes hypertension who had 2 syncopal events.  The first 1 happened while she was on the stairs.  Second 1 happened when she got out of bed and then went to the bathroom and try to sit down on the toilet seat and kept on slipping.  She is not sure how long she was out for.  She awoke and had her son called 911.  She thinks she was out for several minutes.  Denies any chest pains palpitations headaches.  Denies any other provoking factors.  No recent head injuries.  Denies any shortness of breath.  No changes in her  medications.  Currently she feels she is back to her normal self.  There was no confusion or any premonitions.  Has not had passing out events in the past.    10/27/21  Patient reports no overnight seizures.  No chest pains or palpitations.  Discussed with her about getting MRI and she is interested in trying that with a pill delay.  After I ordered the MRI she change her mind and did not want to do it.  MRI was then canceled.  She reports no other new issues.  Echocardiogram was noncontributory.    10/28/21  Patient reports no overnight seizures.  No chest pains or palpitations.  Cardiology was seen with the patient and they are recommending an outpatient 30-day monitor.  Potential discharge later today.    10/29/21  Patient still refusing MRI.  No overnight seizures.  Patient awaiting placement.  Discharge planned for later today.  We will hold off on seizure medications.  Discussed not starting seizure medication during the hospitalization rational for that     PAST MEDICAL & SURGICAL HISTORY     Medical History  Past Medical History:   Diagnosis Date     BBB (bundle branch block)      Chronic back pain      Chronic kidney disease     stage 3     Chronic kidney disease, unspecified     Created by Conversion      Depression      Diabetes (H)     Type 2     Diabetes mellitus, type 2 (H)     Created by Conversion      Frequent headaches      Ganglion of tendon sheath     Created by Conversion      GERD (gastroesophageal reflux disease)      HLD (hyperlipidemia)      Hypertension     Per H&P dated 1/03/2013     Incarcerated ventral hernia     Per H&P dated 1/03/2013     Osteoarthritis      Rheumatoid arthritis (H)      Shortness of breath      Thrombocytopenia (H)      Venous insufficiency      Surgical History  Past Surgical History:   Procedure Laterality Date     BACK SURGERY       C UNLISTED PROCEDURE, ABDOMEN/PERITONEUM/OMENTUM      Description: Hernia Repair;  Recorded: 10/23/2013;     CHOLECYSTECTOMY        HERNIA REPAIR       HYSTERECTOMY  1984     IR FINE NEEDLE ASPIRATION  2020     JOINT REPLACEMENT Left     knee     OTHER SURGICAL HISTORY  ,     Two left wrist     OTHER SURGICAL HISTORY      FATTY TUMORLT SHOULDER BLADE     PICC MIDLINE INSERTION  2020          MD IMPLANT SPINAL NEUROSTIM/ Left 2019    Procedure: LEFT FLANK INCISION REPLACE NEUROSTIMULATOR;  Surgeon: Vishal Pretty MD;  Location: [facility];  Service: Pain     SPINAL CORD STIMULATOR IMPLANT          SOCIAL HISTORY     Social History     Tobacco Use     Smoking status: Former Smoker     Packs/day: 1.00     Years: 43.00     Pack years: 43.00     Start date: 1965     Quit date: 2008     Years since quittin.2     Smokeless tobacco: Never Used   Substance Use Topics     Alcohol use: Yes     Comment: Alcoholic Drinks/day: rare     Drug use: No     Comment: Drug use: takes percocet for chronic pain        FAMILY HISTORY     Reviewed, and no family history of seizures     ALLERGIES     Allergies   Allergen Reactions     Morphine Nausea and Vomiting     Penicillins Hives     Tolerates ceftriaxone     Sulfa (Sulfonamide Antibiotics) [Sulfa Drugs] Hives        REVIEW OF SYSTEMS     12 system ROS was done in the HPI this was negative.  No changes.     HOME & HOSPITAL MEDICATIONS     Prior to Admission Medications  Medications Prior to Admission   Medication Sig Dispense Refill Last Dose     amLODIPine (NORVASC) 10 MG tablet [AMLODIPINE (NORVASC) 10 MG TABLET] TAKE 1 TABLET EVERY DAY 90 tablet 2 10/25/2021 at Unknown time     ascorbic acid (ASCORBIC ACID WITH LATRICIA HIPS) 500 MG tablet Take 500 mg by mouth daily    10/25/2021 at Unknown time     aspirin 81 MG EC tablet Take 81 mg by mouth daily   10/25/2021 at Unknown time     atorvastatin (LIPITOR) 40 MG tablet [ATORVASTATIN (LIPITOR) 40 MG TABLET] Take 1 tablet (40 mg total) by mouth at bedtime. 90 tablet 2 10/25/2021 at AM     b complex vitamins  tablet [B COMPLEX VITAMINS TABLET] Take 1 tablet by mouth daily.   10/25/2021 at Unknown time     fish oil-omega-3 fatty acids 1000 MG capsule Take 1 g by mouth daily   10/25/2021 at Unknown time     hydroxychloroquine (PLAQUENIL) 200 MG tablet Take 1 tablet (200 mg) by mouth 2 times daily 180 tablet 1 10/25/2021 at Unknown time     loratadine (CLARITIN) 10 mg tablet [LORATADINE (CLARITIN) 10 MG TABLET] Take 1 tablet (10 mg total) by mouth at bedtime. 90 tablet 3 10/25/2021 at AM     methotrexate 2.5 MG tablet Take 3 tablets (7.5 mg) by mouth once a week 36 tablet 0 10/22/2021 at Unknown time     metoprolol succinate ER (TOPROL-XL) 50 MG 24 hr tablet [METOPROLOL SUCCINATE (TOPROL-XL) 50 MG 24 HR TABLET] TAKE 1 TABLET EVERY DAY 90 tablet 2 10/25/2021 at Unknown time     MULTIVITAMIN ORAL Take 1 tablet by mouth daily    10/25/2021 at Unknown time     traZODone (DESYREL) 150 MG tablet Take 1 tablet (150 mg) by mouth At Bedtime 90 tablet 1 10/25/2021 at Unknown time     Vitamin D3 (VITAMIN D3) 25 mcg (1000 units) tablet Take 1,000 Units by mouth daily    10/25/2021 at Unknown time     alcohol swab prep pads Use to swab area of injection/jacquelyn as directed. 100 each 3      blood glucose (NO BRAND SPECIFIED) test strip Use to test blood sugar 3 times daily or as directed. To accompany: Blood Glucose Monitor Brands: per insurance. 100 strip 6      blood glucose calibration (NO BRAND SPECIFIED) solution To accompany: Blood Glucose Monitor Brands: per insurance. 1 each 0      blood glucose monitoring (NO BRAND SPECIFIED) meter device kit Use to test blood sugar 3 times daily or as directed. Preferred blood glucose meter OR supplies to accompany: Blood Glucose Monitor Brands: per insurance. 1 kit 0      blood glucose test strips [BLOOD GLUCOSE TEST STRIPS] Check blood sugar four times daily. Dispense brand per patient's insurance at pharmacy discretion. Dx: e11.9 400 strip 3      blood-glucose meter Misc [BLOOD-GLUCOSE METER  "MISC] Dispense one meter. Use as directed. Dx: e11.9 1 each 0      flash glucose scanning reader (FREESTYLE BRE 14 DAY READER) Misc [FLASH GLUCOSE SCANNING READER (FREESTYLE BRE 14 DAY READER) MISC] Use 1 each As Directed see administration instructions. Use as directed with sensor 1 each 0      flash glucose sensor (FREESTYLE BRE 14 DAY SENSOR) Kit [FLASH GLUCOSE SENSOR (FREESTYLE BRE 14 DAY SENSOR) KIT] Use 1 each As Directed every 14 (fourteen) days. 2 kit 11      insulin syringe 31G X 5/16\" 0.5 ML MISC 1 each 2 times daily 100 each 11      insulin syringe-needle U-100 (BD INSULIN SYRINGE ULT-FINE II) 1 mL 31 gauge x 5/16 Syrg [INSULIN SYRINGE-NEEDLE U-100 (BD INSULIN SYRINGE ULT-FINE II) 1 ML 31 GAUGE X 5/16 SYRG] USE 4 TIMES DAILY AS DIRECTED 500 each 1      lancing device (ACCU-CHEK SOFTCLIX) Misc [LANCING DEVICE (ACCU-CHEK SOFTCLIX) MISC] Use 1 applicator As Directed 4 (four) times a day. 300 each 3      thin (NO BRAND SPECIFIED) lancets Use with lanceting device. To accompany: Blood Glucose Monitor Brands: per insurance. 100 each 11      [DISCONTINUED] insulin NPH (NOVOLIN) 100 unit/mL injection Inject 34-38 Units Subcutaneous 2 times daily (before meals) AM: 38 units  PM: 34 units if BG < 250, 38 units if BG > 250   10/25/2021 at Unknown time     [DISCONTINUED] lidocaine (LMX) 4 % cream [LIDOCAINE (LMX) 4 % CREAM] Apply to affected area twice daily as needed 30 g 3 Unknown at Unknown time     [DISCONTINUED] lisinopril (ZESTRIL) 10 MG tablet [LISINOPRIL (PRINIVIL,ZESTRIL) 10 MG TABLET] TAKE 1 TABLET AT BEDTIME 90 tablet 1 10/25/2021 at AM     [DISCONTINUED] naloxone (NARCAN) 4 MG/0.1ML nasal spray Spray 1 spray (4 mg) into one nostril alternating nostrils once as needed for opioid reversal every 2-3 minutes until assistance arrives 0.2 mL 0 Unknown at Unknown time     [DISCONTINUED] oxyCODONE-acetaminophen (PERCOCET) 5-325 MG tablet Take 1-2 tablets by mouth every 6 hours as needed for severe pain " (Max of 6 tablets daily) 180 tablet 0 10/24/2021 at Unknown time     [DISCONTINUED] predniSONE (DELTASONE) 1 MG tablet Take 2 tablets p.o. daily for 1 month then if stable decrease by  1/2 tablet (1/2 mg) every month. (Patient taking differently: Take 1 mg by mouth daily If stable decrease by  1/2 tablet (1/2 mg) every month.) 60 tablet 2 10/25/2021 at Unknown time       Hospital Medications    amLODIPine  10 mg Oral Daily     atorvastatin  40 mg Oral Daily     heparin ANTICOAGULANT  5,000 Units Subcutaneous Q12H     hydroxychloroquine  200 mg Oral BID     insulin aspart  1-3 Units Subcutaneous TID AC     insulin aspart  1-3 Units Subcutaneous At Bedtime     insulin NPH  32 Units Subcutaneous BID AC     loratadine  10 mg Oral At Bedtime     methotrexate  7.5 mg Oral Weekly     metoprolol succinate ER  50 mg Oral Daily     predniSONE  1 mg Oral Daily     traZODone  150 mg Oral At Bedtime     vitamin C  500 mg Oral Daily     Vitamin D3  25 mcg Oral Daily        PHYSICAL EXAM     Vital signs  Temp:  [97.3  F (36.3  C)-98.3  F (36.8  C)] 97.6  F (36.4  C)  Pulse:  [57-65] 58  Resp:  [18-20] 18  BP: (125-162)/(60-72) 162/72  SpO2:  [94 %-99 %] 99 %    VITALS: BP (!) 162/72 (BP Location: Right arm)   Pulse 58   Temp 97.6  F (36.4  C) (Oral)   Resp 18   Ht 1.829 m (6')   Wt 103.1 kg (227 lb 6.4 oz)   SpO2 99%   BMI 30.84 kg/m    GENERAL -Health appearing, No apparent distress  EYES- No scleral icterus, no eyelid droop, Pupils - see Neuro section  HEENT - Normocephalic, atraumatic, Hearing grossly intact; Oral mucosa moist and pink in color. External Ears and nose intact.   Neck - supple with no obvious lymphadenopathy or thyromegaly  PULM - Good spontaneous respiratory effort; Normal palpation of chest.   CV- Pedal pulses present with no peripheral edema/ No significant varicosities.  MSK- Gait - see Neuro section; Strength and tone- see Neuro section; Range of motion grossly intact.  PSYCH  -cooperative    Neurological  Mental status - Patient is awake and oriented to self, place and time. Attention span is normal. Language is fluent and follows commands appropriately.   Cranial nerves - CN II-XII intact. Pupils are reactive and symmetric; EOMI, VFIT, NLF symmetric  Motor - There is no pronator drift. Motor exam shows 5/5 strength in all extremities.  Tone - Tone is symmetric bilaterally in upper and lower extremities.  Coordination - Finger to nose and heel to shin is without dysmetria.  Gait and station --able to ambulate with assistance.  Formal gait testing cannot be done because of safety concerns from ongoing medical issues  No significant change in exam today.     DIAGNOSTIC STUDIES     Pertinent Radiology   Carotis US                                                      IMPRESSION:  1.  Mild plaque formation, velocities consistent with less than 50% stenosis in the right internal carotid artery.  2.  Mild plaque formation, velocities consistent with less than 50% stenosis in the left internal carotid artery.  3.  Flow within the vertebral arteries is antegrade.    CT head  IMPRESSION:  1.  No CT finding of a mass, hemorrhage or focal area suggestive of acute infarct.  2.  Minimal age related changes.    CT C spine  IMPRESSION:  1. No evidence of acute cervical spine fracture.  2. No significant canal compromise with scattered areas of neural foraminal narrowing as described above.    EEG  IMPRESSION/REPORT/PLAN  This is a normal EEG during wakefulness and drowsiness/sleep except for mild generalized background dysrhythmia. Further clinical correlation is needed.     Please note that the absence of epileptiform abnormalities does not exclude the possibility of epilepsy in any patient.     EEG 2018  Impression  Normal awake and sleep stage I EEG     Signed  Dr. Monroe Salas    ECHO  Definity contrast utilized  The left ventricle is normal in size.  There is mild concentric left ventricular  hypertrophy.  The left ventricular ejection fraction is normal.  The visual ejection fraction is 55-60%.  Normal left ventricular wall motion  Normal right ventricle size and systolic function.  The left atrium is mildly dilated.  No significant valve abnormality  No prior study made available for comparison    Cardiology consultation notes reviewed.    Recent Results (from the past 24 hour(s))   Glucose by meter    Collection Time: 10/28/21  5:01 PM   Result Value Ref Range    GLUCOSE BY METER POCT 155 (H) 70 - 99 mg/dL   Glucose by meter    Collection Time: 10/28/21  9:10 PM   Result Value Ref Range    GLUCOSE BY METER POCT 234 (H) 70 - 99 mg/dL   Comprehensive metabolic panel    Collection Time: 10/29/21  7:04 AM   Result Value Ref Range    Sodium 141 136 - 145 mmol/L    Potassium 3.8 3.5 - 5.0 mmol/L    Chloride 108 (H) 98 - 107 mmol/L    Carbon Dioxide (CO2) 27 22 - 31 mmol/L    Anion Gap 6 5 - 18 mmol/L    Urea Nitrogen 20 8 - 28 mg/dL    Creatinine 1.22 (H) 0.60 - 1.10 mg/dL    Calcium 9.3 8.5 - 10.5 mg/dL    Glucose 127 (H) 70 - 125 mg/dL    Alkaline Phosphatase 53 45 - 120 U/L    AST 10 0 - 40 U/L    ALT 12 0 - 45 U/L    Protein Total 6.0 6.0 - 8.0 g/dL    Albumin 3.1 (L) 3.5 - 5.0 g/dL    Bilirubin Total 0.7 0.0 - 1.0 mg/dL    GFR Estimate 44 (L) >60 mL/min/1.73m2   CBC with platelets    Collection Time: 10/29/21  7:04 AM   Result Value Ref Range    WBC Count 8.4 4.0 - 11.0 10e3/uL    RBC Count 3.96 3.80 - 5.20 10e6/uL    Hemoglobin 12.4 11.7 - 15.7 g/dL    Hematocrit 37.8 35.0 - 47.0 %    MCV 96 78 - 100 fL    MCH 31.3 26.5 - 33.0 pg    MCHC 32.8 31.5 - 36.5 g/dL    RDW 13.0 10.0 - 15.0 %    Platelet Count 121 (L) 150 - 450 10e3/uL   Glucose by meter    Collection Time: 10/29/21  7:52 AM   Result Value Ref Range    GLUCOSE BY METER POCT 139 (H) 70 - 99 mg/dL   Glucose by meter    Collection Time: 10/29/21 12:29 PM   Result Value Ref Range    GLUCOSE BY METER POCT 193 (H) 70 - 99 mg/dL       Total time  spent for face to face visit, reviewing labs/imaging studies, counseling and coordination of care was: Over 30 min More than 50% of this time was spent on counseling and coordination of care.    Counseling patient.  Discussing discharge with the patient.  Coordination of care with nursing staff.    David Frances MD  Neurologist  [facility]  Tel:- [phone]

## 2021-10-29 NOTE — PLAN OF CARE
Occupational Therapy Discharge Summary    Reason for therapy discharge:    Discharged to transitional care facility.    Progress towards therapy goal(s). See goals on Care Plan in [facility] electronic health record for goal details.  Goals partially met.  Barriers to achieving goals:   discharge from facility.    Therapy recommendation(s):    Continued therapy is recommended.  Rationale/Recommendations:  pt not at baseline ADL functioning.

## 2021-10-29 NOTE — PLAN OF CARE
Care Management Discharge Note    Discharge Date: 10/29/2021  Expected Time of Departure: 5:45pm    Discharge Disposition: Transitional Care    Discharge Services:      Discharge DME:      Discharge Transportation: health plan transportation    Private pay costs discussed: Not applicable    PAS Confirmation Code: [id number] ([id number])  Patient/family educated on Medicare website which has current facility and service quality ratings: yes    Education Provided on the Discharge Plan:    Persons Notified of Discharge Plans: Pt, RN, hospitalist  Patient/Family in Agreement with the Plan: yes    Handoff Referral Completed: Yes    Additional Information:  Pt to discharge to [facility] via [facility] wheelchair 5:45pm      TERE Dwyer

## 2021-10-29 NOTE — PLAN OF CARE
Problem: Adult Inpatient Plan of Care  Goal: Absence of Hospital-Acquired Illness or Injury  Intervention: Identify and Manage Fall Risk  Recent Flowsheet Documentation  Taken 10/29/2021 1630 by Ellen Hunter RN  Safety Promotion/Fall Prevention:   activity supervised   assistive device/personal items within reach   bed alarm on   chair alarm on  Taken 10/29/2021 0830 by Ellen Hunter RN  Safety Promotion/Fall Prevention:   activity supervised   assistive device/personal items within reach   bed alarm on   chair alarm on    Problem: Adult Inpatient Plan of Care  Goal: Absence of Hospital-Acquired Illness or Injury  Intervention: Prevent Skin Injury  Recent Flowsheet Documentation  Taken 10/29/2021 1630 by Ellen Hunter RN  Body Position: position changed independently  Taken 10/29/2021 0830 by Ellen Hunter RN  Body Position: position changed independently

## 2021-10-29 NOTE — PROGRESS NOTES
"[facility]  Hospitalist Progress Note    Admit Date:  10/25/2021  Date of Service (when I saw the patient): 10/29/2021   Provider:  Gabriela Couch, DO    Assessment & Plan   Ebonie Bowman is a 71 year old female who was admitted on 10/25/2021. She has a history significant for Chronic back pain, Chronic kidney disease, Diabetes mellitus, type 2 (H), GERD (gastroesophageal reflux disease), HLD (hyperlipidemia), Hypertension, and Rheumatoid arthritis (H) comes in after two syncopal episodes on 10/25.      Problem List:  1. Syncope and collapse  She reports \"34 similar episodes over the last 4 years\"  Had not been evaluated for this in the past, however  No episodes since admission    Neuro consulted - CT head neg for bleed, mass, CVA  Pt refusing MRI recommended by Neuro   Bilateral US unremarkable  -echo showed normal LVEF of 55-60%.   -EEG unremarkable    -certain meds like oxy, trazodone and claritin can contribute to lightheadedness/dizziness which can lead to the fall but again patient denies any lightheadedness or dizziness (no consistent prodrome)    Cardiology consulted -     -EKG showed sinus bradycardia with 1st degree AV block with prolonged QTc interval of 511 ms. Troponin x 3 negative.-->base on previous recent OVs, HR is usually 60 bpm or greater   Remains on tele but no concerning arrythmias yet identified    Concern as patient does not have prodrome and does have evidence of conduction disease and prolonged QT on her EKG.  Recommend outpatient 30-day event monitor.  Will consider ILR especially if recurrent events. Follow-up in cardiology clinic in 4 to 6 weeks     2.  Prolonged QTc interval   -K level is WNL  -mag level WNL  -repeat EKG 10/28/21 showed improved QTc interval than from 10/26/21  -avoid QTc prolonged/ meds but will c/w home trazodone and home plaquenil for now  -c/w tele monitoring      3. B/l hip pain  -patient states she fell on her right hip with the " "first fall and she fell on her left hip with the second fall on 10/25.   -pelvic x-ray negative for fracture    4. Neck pain  -CT neck negative for acute fracture  -patient denies any neck pain at this time      5. B/l calf pain  -Qiana's sign + for both legs  -doppler US of BLEs-->negative for DVT  -c/w home percocet for pain control       6. Elevated creatinine on CKD  -baseline creatinine is around 1.2  -creatinine on admission was 1.34-->1.33-->1.31  Creatinine 1.22 this am     7. HLD  -c/w home lipitor     8. DM type 2  -last HgbA1c was 9.7 on 9/16/2021 which is above goal  -on NPH 34-38 units BID  -c/w NPH 32 units BID for now  -c/w low resistance sliding scale insulin for now  -c/w accuchecks and adjust insulin regimen PRN     9.  Hx of allergic rhinitis  -c/w home claritin      10. HTN  -SBP improved - ranging between 120-130's  -c/w home norvasc  -c/w home metoprolol   -creatinine is mildly elevated compared to baseline-->cotninue to hold hold lisinopril, for now  -IV hydralazine PRN      11. Hx of RA  -c/w home plaquenil and methotrexate.   -c/w home prednisone     12. Hx of chronic back pain  -c/w home percocet for now     13. Insomnia  -c/w home trazodone      Disposition  Plan to discharge to TCU once bed available  Medically ready for discharge when placement available     ADDENDUM - 1500; SW let me know that TCU bed available.  discharge orders completed    Diet: Combination Diet Consistent Carb 60 Grams CHO per Meal Diet; 2 gm NA Diet; Low Fat Diet    DVT Prophylaxis: Heparin SQ  Kraus Catheter: Not present  Code Status: Full Code      Entered: Gabriela Couch DO 10/29/2021, 7:04 AM     The patient's care was discussed with the Patient.    Interval History   Has \"sore back\" like at home.  No new HA, CP, SOB.  No light-headedness or dizziness.  No F/C, N/V or other new c/o.  No palpitations.  No new rhythms on tele monitoring.     -Data reviewed today: I reviewed all new labs and imaging " results over the last 24 hours. I personally reviewed no images or EKG's today.    Physical Exam   Temp: 98  F (36.7  C) Temp src: Oral BP: 126/60 Pulse: 58   Resp: 20 SpO2: 95 % O2 Device: None (Room air)    Vitals:    10/26/21 1756   Weight: 103.1 kg (227 lb 6.4 oz)     Vital Signs with Ranges  Temp:  [97.3  F (36.3  C)-99.1  F (37.3  C)] 98  F (36.7  C)  Pulse:  [58-70] 58  Resp:  [16-20] 20  BP: (111-139)/(57-65) 126/60  SpO2:  [92 %-96 %] 95 %  I/O last 3 completed shifts:  In: 720 [P.O.:720]  Out: -     GEN:  Alert, oriented x 3, comfortable, no overt distress.  Easily conversant  HEENT:  Normocephalic/atraumatic, no scleral icterus, no nasal discharge, mouth moist,  NECK:  No clear thyromegaly or clear JVD  CV:  Somewhat distant but regular rate and rhythm, no loud murmur to ausc.  S1 + S2 noted, no S3 or S4.  LUNGS:  Clear to auscultation ant/lat bilaterally.  No rales/rhonchi/wheezing auscultated bilaterally.  No costal retractions bilaterally.  Symmetric chest rise on inhalation noted.  ABD:  Active bowel sounds, soft, non-tender/non-distended.  No rebound/guarding/rigidity.  No masses palpated.  No obvious HSM to exam although exam somewhat limited secondary to body habitus.  EXT:  No pitting pretibial edema or cyanosis bilaterally. No joint synovitis noted.  No calf-tenderness or asymmetry noted.  SKIN:  Dry to touch, no rashes or jaundice noted.  PSYCH:  Mood appropriate, Not tearful or depressed.  Maintains direct eye contact.    NEURO:  No tremors at rest, speech is clear and appropriate.  CN 2-12 intact to gross testing bilaterally    Data   Labs:  No results for input(s): CULT in the last 168 hours.  Recent Labs   Lab 10/29/21  0752 10/29/21  0704 10/28/21  2110 10/28/21  0845 10/28/21  0657 10/27/21  1148 10/27/21  0720   NA  --  141  --   --  141  --  141   POTASSIUM  --  3.8  --   --  4.2  --  4.1   CHLORIDE  --  108*  --   --  108*  --  106   CO2  --  27  --   --  24  --  25   ANIONGAP  --  6   --   --  9  --  10   * 127* 234*   < > 127*   < > 192*   BUN  --  20  --   --  20  --  18   CR  --  1.22*  --   --  1.31*  --  1.33*   GFRESTIMATED  --  44*  --   --  40*  --  40*   GRACY  --  9.3  --   --  9.2  --  9.1    < > = values in this interval not displayed.     Recent Labs   Lab 10/29/21  0704 10/28/21  0657 10/27/21  0720   WBC 8.4 9.4 9.6   HGB 12.4 12.4 13.2   HCT 37.8 38.3 40.1   MCV 96 95 94   * 126* 136*     Recent Labs   Lab 10/29/21  0752 10/29/21  0704 10/28/21  2110 10/28/21  0845 10/28/21  0657 10/27/21  1148 10/27/21  0720 10/26/21  1040 10/26/21  0739 10/26/21  0031   0000   NA  --  141  --   --  141  --  141  --   --  140   < >   POTASSIUM  --  3.8  --   --  4.2  --  4.1  --   --  3.9   < >   CHLORIDE  --  108*  --   --  108*  --  106  --   --  105   < >   CO2  --  27  --   --  24  --  25  --   --  28   < >   ANIONGAP  --  6  --   --  9  --  10  --   --  7   < >   * 127* 234*   < > 127*   < > 192*   < >  --  158*   < >   BUN  --  20  --   --  20  --  18  --   --  19   < >   CR  --  1.22*  --   --  1.31*  --  1.33*  --   --  1.34*   < >   GFRESTIMATED  --  44*  --   --  40*  --  40*  --   --  39*   < >   GRACY  --  9.3  --   --  9.2  --  9.1  --   --  9.4   < >   MAG  --   --   --   --   --   --   --   --  1.9 1.8  --    PROTTOTAL  --  6.0  --   --  5.8*  --  6.1  --   --  6.6   < >   ALBUMIN  --  3.1*  --   --  3.1*  --  3.3*  --   --  3.5   < >   BILITOTAL  --  0.7  --   --  0.6  --  0.7  --   --  0.7   < >   ALKPHOS  --  53  --   --  52  --  51  --   --  52   < >   AST  --  10  --   --  14  --  16  --   --  14   < >   ALT  --  12  --   --  11  --  12  --   --  14   < >    < > = values in this interval not displayed.      Recent Imaging:   No results found for this or any previous visit (from the past 24 hour(s)).    Medications       amLODIPine  10 mg Oral Daily     atorvastatin  40 mg Oral Daily     hydroxychloroquine  200 mg Oral BID     insulin aspart  1-3 Units  Subcutaneous TID AC     insulin aspart  1-3 Units Subcutaneous At Bedtime     insulin NPH  32 Units Subcutaneous BID AC     loratadine  10 mg Oral At Bedtime     methotrexate  7.5 mg Oral Weekly     metoprolol succinate ER  50 mg Oral Daily     predniSONE  1 mg Oral Daily     traZODone  150 mg Oral At Bedtime     vitamin C  500 mg Oral Daily     Vitamin D3  25 mcg Oral Daily

## 2021-10-29 NOTE — PROGRESS NOTES
Pt alert and oriented x 4. Given Percocet PRN for chronic back pain. Pt to discharge to TCU this evening. Cardiac event monitor placed prior to discharge.

## 2021-10-29 NOTE — PLAN OF CARE
Problem: Pain Chronic (Persistent)  Goal: Acceptable Pain Control and Functional Ability  10/28/2021 0118 by Margie Calvillo, RN  Outcome: Improving  Pt has chronic low back pain 2/2 hx of back surgeries.  Percocet  and heating pad has been effective in managing pain;  now rating pain in the 6 range.  Pt noted to be sleeping peacefully upon reassessment.       Problem: Syncope  Goal: Absence of Syncopal Symptoms  10/28/2021 0118 by Margie Calvillo, RN  Outcome: Improving  There have been no s/sx of syncope.  Cardiac rhythm: SR with 1st degree AVB, BBB; Prolonged QT segment,  denies any symptoms.    Pt up with walker, SBA.

## 2021-11-01 ENCOUNTER — TRANSITIONAL CARE UNIT VISIT (OUTPATIENT)
Dept: GERIATRICS | Facility: CLINIC | Age: 71
End: 2021-11-01
Payer: COMMERCIAL

## 2021-11-01 VITALS
WEIGHT: 223.8 LBS | DIASTOLIC BLOOD PRESSURE: 81 MMHG | TEMPERATURE: 98.5 F | SYSTOLIC BLOOD PRESSURE: 153 MMHG | OXYGEN SATURATION: 92 % | HEIGHT: 72 IN | RESPIRATION RATE: 18 BRPM | BODY MASS INDEX: 30.31 KG/M2 | HEART RATE: 71 BPM

## 2021-11-01 DIAGNOSIS — E11.00 TYPE 2 DIABETES MELLITUS WITH HYPEROSMOLARITY WITHOUT COMA, WITH LONG-TERM CURRENT USE OF INSULIN (H): ICD-10-CM

## 2021-11-01 DIAGNOSIS — I10 ESSENTIAL HYPERTENSION: ICD-10-CM

## 2021-11-01 DIAGNOSIS — I44.0 FIRST DEGREE HEART BLOCK: ICD-10-CM

## 2021-11-01 DIAGNOSIS — R55 SYNCOPE, UNSPECIFIED SYNCOPE TYPE: Primary | ICD-10-CM

## 2021-11-01 DIAGNOSIS — G89.4 CHRONIC PAIN SYNDROME: ICD-10-CM

## 2021-11-01 DIAGNOSIS — M06.9 RHEUMATOID ARTHRITIS, INVOLVING UNSPECIFIED SITE, UNSPECIFIED WHETHER RHEUMATOID FACTOR PRESENT (H): ICD-10-CM

## 2021-11-01 DIAGNOSIS — Z79.4 TYPE 2 DIABETES MELLITUS WITH HYPEROSMOLARITY WITHOUT COMA, WITH LONG-TERM CURRENT USE OF INSULIN (H): ICD-10-CM

## 2021-11-01 DIAGNOSIS — R94.31 PROLONGED QT INTERVAL: ICD-10-CM

## 2021-11-01 DIAGNOSIS — G89.29 CHRONIC LOW BACK PAIN WITH SCIATICA, SCIATICA LATERALITY UNSPECIFIED, UNSPECIFIED BACK PAIN LATERALITY: ICD-10-CM

## 2021-11-01 DIAGNOSIS — M54.40 CHRONIC LOW BACK PAIN WITH SCIATICA, SCIATICA LATERALITY UNSPECIFIED, UNSPECIFIED BACK PAIN LATERALITY: ICD-10-CM

## 2021-11-01 DIAGNOSIS — R00.1 BRADYCARDIA: ICD-10-CM

## 2021-11-01 PROCEDURE — 99306 1ST NF CARE HIGH MDM 50: CPT | Performed by: NURSE PRACTITIONER

## 2021-11-01 PROCEDURE — 99207 PR CDG-CODE CATEGORY CHANGED: CPT | Performed by: NURSE PRACTITIONER

## 2021-11-01 RX ORDER — OXYCODONE AND ACETAMINOPHEN 5; 325 MG/1; MG/1
1-2 TABLET ORAL EVERY 6 HOURS PRN
Qty: 80 TABLET | Refills: 0 | Status: SHIPPED | OUTPATIENT
Start: 2021-11-01 | End: 2021-11-08

## 2021-11-01 ASSESSMENT — MIFFLIN-ST. JEOR: SCORE: 1632.15

## 2021-11-02 NOTE — TELEPHONE ENCOUNTER
"Lab orders were placed in patient chart.  Patient did not come to the lab the day orders were placed.    Lab orders must be cancelled and reordered as \"Future\" with an expected date.  Thank you  "
Done    
good hygiene

## 2021-11-02 NOTE — PROGRESS NOTES
[facility] GERIATRIC SERVICES    Code Status:  FULL CODE   Visit Type:   Chief Complaint   Patient presents with     Nursing Home Acute     TCU New Admit     Facility:  [facility] ([facility]) [[id number]]           History of Present Illness: Ebonie Bowman is a 71 year old female who I am seeing today for admit to the TCU.  Patient recently hospitalized on 10/25/2021 secondary to syncope and collapse.  Past medical history includes chronic back pain, chronic kidney disease, RA, diabetes mellitus type 2, GERD, hyperlipidemia, hypertension and previous syncopal episodes.  Patient reports 34 similar episodes over the last 4 years.  She underwent neuro consult.  Head CT was negative.  Bilateral ultrasound unremarkable.  Echo showed a normal LVEF of 55 to 60%.  EEG unremarkable.  She also underwent cardiology consult.  EKG showed sinus bradycardia with first-degree AV block with prolonged QT interval of 511.  Troponin x3 was negative.  Heart rate was in the 60s.  Patient continues in a 30-day event monitor.  Patient is to follow-up with cardiology in 4 to 6 weeks.  There is some discussion of possible ILR if recurrent events.  Prolonged QT interval.  Electrolytes within normal limits.  A repeat EKG on 10/28 6/21 showed improved QT interval.  She does continue on trazodone and Plaquenil which can cause QT prolongation.  Bilateral hip pain.  X-ray was negative.  She does have chronic neck pain.  CT of the neck was a negative for acute fracture.  Chronic back pain.  She does continue on Percocet.  History of RA on Plaquenil and methotrexate.  She is also on prednisone.  Elevated CKD.  Baseline is around 1.2.  On admit she was 1.34.  This did improve.  Hyperlipidemia on Lipitor.  Diabetes mellitus type 2.  Last hemoglobin A1c was 9.7 on 9/16/2021.  She continues on NPH and sliding scale.  History of allergic rhinitis on Claritin.  Hypertension.  She is on Norvasc and metoprolol.  She did have mildly elevated blood pressures.   Her lisinopril was on hold due to kidney function.  IV hydralazine was ordered.  Insomnia on trazodone.    Today patient sitting up in wheelchair.  She continues with a cardiac event monitor on.  She denies any chest palpitations.  No shortness of breath.  No chest pain.  She was somewhat tearful when discussing her overall health concerns and what they found during hospitalization.  Hypertension.  Blood pressure slightly on the elevated side.  She does continue on metoprolol and Norvasc.  She does have as needed hydralazine for BP greater than 175 systolically.  She does have chronic pain however this is well controlled Percocet.  Insomnia on trazodone.  Diabetes mellitus type 2.  Review of her blood sugars show they are elevated in the 200s.  She is reporting that she has been given orange juice with every meal and this is causing her to have diarrhea.  I do suspect this is also driving up her blood sugars.  CKD.  Creatinine 1.22.    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     Type 2 diabetes mellitus with chronic kidney disease, with long-term current use of insulin (H) 01/21/2021     Paraparesis (H) 01/21/2021     Osteoarthrosis 01/01/2018     Allergic rhinitis due to cats 11/22/2019     Arthropathy of spinal facet joint concurrent with and due to effusion 09/16/2021     Atherosclerotic cardiovascular disease 09/18/2017     Backache 11/27/2020     Bilateral primary osteoarthritis of hip 07/21/2017     Chronic pain 04/17/2017     Controlled substance agreement signed 08/23/2019     Depression 05/04/2015     Essential hypertension 09/16/2021     GERD (gastroesophageal reflux disease) 07/12/2017     High risk medication use 02/15/2017     Hyperlipidemia 09/16/2021     Insomnia 09/16/2021     Lumbar radiculopathy 09/16/2021     Obesity 12/11/2014     Pain in joint, pelvic region and  thigh 09/16/2021     Positive FIT (fecal immunochemical test) 11/22/2019     Radiculopathy of cervicothoracic region 09/16/2021     Seropositive rheumatoid arthritis (H) 01/20/2016     S/P insertion of spinal cord stimulator 06/24/2019     Venous insufficiency 09/16/2021     Stage 3b chronic kidney disease (H) 09/17/2021     Syncope, unspecified syncope type 10/26/2021     Resolved Ambulatory Problems     Diagnosis Date Noted     Aftercare following joint replacement 04/22/2011     Rhythm Disorder      Orthostatic hypotension 12/27/2017     JAYNE (acute kidney injury) (H) 12/27/2017     Thrombocytopenia (H) 10/14/2015     Sepsis (H)      Uncomplicated opioid dependence (H) 08/23/2019     Left hemiparesis (H)      Prolonged Q-T interval on ECG 09/16/2021     Past Medical History:   Diagnosis Date     BBB (bundle branch block)      Chronic back pain      Chronic kidney disease      Chronic kidney disease, unspecified      Diabetes (H)      Diabetes mellitus, type 2 (H)      Frequent headaches      Ganglion of tendon sheath      HLD (hyperlipidemia)      Hypertension      Incarcerated ventral hernia      Osteoarthritis      Rheumatoid arthritis (H)      Shortness of breath        Current Outpatient Medications:      alcohol swab prep pads, Use to swab area of injection/jacquelyn as directed., Disp: 100 each, Rfl: 3     amLODIPine (NORVASC) 10 MG tablet, [AMLODIPINE (NORVASC) 10 MG TABLET] TAKE 1 TABLET EVERY DAY, Disp: 90 tablet, Rfl: 2     ascorbic acid (ASCORBIC ACID WITH LATRICIA HIPS) 500 MG tablet, Take 500 mg by mouth daily , Disp: , Rfl:      aspirin 81 MG EC tablet, Take 81 mg by mouth daily, Disp: , Rfl:      atorvastatin (LIPITOR) 40 MG tablet, [ATORVASTATIN (LIPITOR) 40 MG TABLET] Take 1 tablet (40 mg total) by mouth at bedtime., Disp: 90 tablet, Rfl: 2     b complex vitamins tablet, [B COMPLEX VITAMINS TABLET] Take 1 tablet by mouth daily., Disp: , Rfl:      blood glucose (NO BRAND SPECIFIED) test strip, Use to test  "blood sugar 3 times daily or as directed. To accompany: Blood Glucose Monitor Brands: per insurance., Disp: 100 strip, Rfl: 6     blood glucose calibration (NO BRAND SPECIFIED) solution, To accompany: Blood Glucose Monitor Brands: per insurance., Disp: 1 each, Rfl: 0     blood glucose monitoring (NO BRAND SPECIFIED) meter device kit, Use to test blood sugar 3 times daily or as directed. Preferred blood glucose meter OR supplies to accompany: Blood Glucose Monitor Brands: per insurance., Disp: 1 kit, Rfl: 0     blood glucose test strips, [BLOOD GLUCOSE TEST STRIPS] Check blood sugar four times daily. Dispense brand per patient's insurance at pharmacy discretion. Dx: e11.9, Disp: 400 strip, Rfl: 3     blood-glucose meter Misc, [BLOOD-GLUCOSE METER MISC] Dispense one meter. Use as directed. Dx: e11.9, Disp: 1 each, Rfl: 0     fish oil-omega-3 fatty acids 1000 MG capsule, Take 1 g by mouth daily, Disp: , Rfl:      flash glucose scanning reader (FREESTYLE BRE 14 DAY READER) Misc, [FLASH GLUCOSE SCANNING READER (FREESTYLE BRE 14 DAY READER) MISC] Use 1 each As Directed see administration instructions. Use as directed with sensor, Disp: 1 each, Rfl: 0     flash glucose sensor (FREESTYLE BRE 14 DAY SENSOR) Kit, [FLASH GLUCOSE SENSOR (FREESTYLE BRE 14 DAY SENSOR) KIT] Use 1 each As Directed every 14 (fourteen) days., Disp: 2 kit, Rfl: 11     hydrALAZINE (APRESOLINE) 10 MG tablet, Take 1 tablet (10 mg) by mouth 3 times daily as needed (sbp >175), Disp: , Rfl:      hydroxychloroquine (PLAQUENIL) 200 MG tablet, Take 1 tablet (200 mg) by mouth 2 times daily, Disp: 180 tablet, Rfl: 1     insulin  UNIT/ML injection, Inject 32 Units Subcutaneous 2 times daily (before meals), Disp: , Rfl:      insulin syringe 31G X 5/16\" 0.5 ML MISC, 1 each 2 times daily, Disp: 100 each, Rfl: 11     insulin syringe-needle U-100 (BD INSULIN SYRINGE ULT-FINE II) 1 mL 31 gauge x 5/16 Syrg, [INSULIN SYRINGE-NEEDLE U-100 (BD INSULIN " SYRINGE ULT-FINE II) 1 ML 31 GAUGE X 5/16 SYRG] USE 4 TIMES DAILY AS DIRECTED, Disp: 500 each, Rfl: 1     lancing device (ACCU-CHEK SOFTCLIX) Misc, [LANCING DEVICE (ACCU-CHEK SOFTCLIX) MISC] Use 1 applicator As Directed 4 (four) times a day., Disp: 300 each, Rfl: 3     loratadine (CLARITIN) 10 mg tablet, [LORATADINE (CLARITIN) 10 MG TABLET] Take 1 tablet (10 mg total) by mouth at bedtime., Disp: 90 tablet, Rfl: 3     methotrexate 2.5 MG tablet, Take 3 tablets (7.5 mg) by mouth once a week, Disp: 36 tablet, Rfl: 0     metoprolol succinate ER (TOPROL-XL) 50 MG 24 hr tablet, [METOPROLOL SUCCINATE (TOPROL-XL) 50 MG 24 HR TABLET] TAKE 1 TABLET EVERY DAY, Disp: 90 tablet, Rfl: 2     MULTIVITAMIN ORAL, Take 1 tablet by mouth daily , Disp: , Rfl:      oxyCODONE-acetaminophen (PERCOCET) 5-325 MG tablet, Take 1-2 tablets by mouth every 6 hours as needed for severe pain (Max of 6 tablets daily), Disp: 80 tablet, Rfl: 0     predniSONE (DELTASONE) 1 MG tablet, Take 1 tablet (1 mg) by mouth daily, Disp: , Rfl: 0     thin (NO BRAND SPECIFIED) lancets, Use with lanceting device. To accompany: Blood Glucose Monitor Brands: per insurance., Disp: 100 each, Rfl: 11     traZODone (DESYREL) 150 MG tablet, Take 1 tablet (150 mg) by mouth At Bedtime, Disp: 90 tablet, Rfl: 1     Vitamin D3 (VITAMIN D3) 25 mcg (1000 units) tablet, Take 1,000 Units by mouth daily , Disp: , Rfl:   Allergies   Allergen Reactions     Morphine Nausea and Vomiting     Penicillins Hives     Tolerates ceftriaxone     Sulfa (Sulfonamide Antibiotics) [Sulfa Drugs] Hives       All Meds and Allergies reviewed in the record at the facility and is the most up-to-date    REVIEW OF SYSTEMS:   Review of Systems  No fevers or chills. No headache, lightheadedness or dizziness. No SOB, chest pains or palpitations. Appetite is good. No nausea, vomiting, constipation.  Reports diarrhea from drinking orange juice.  No dysuria, frequency, burning or pain with urination.  Chronic  pain syndrome secondary to chronic back pain as well as RA.  She does continue on suppressive therapy and uses Percocet for pain.  Chronic insomnia on trazodone.  Otherwise review of systems are negative.     PHYSICAL EXAMINATION:  Physical Exam     Vital signs: BP (!) 153/81   Pulse 71   Temp 98.5  F (36.9  C)   Resp 18   Ht 1.829 m (6')   Wt 101.5 kg (223 lb 12.8 oz)   SpO2 92%   BMI 30.35 kg/m    General: Awake, Alert, oriented x3, appropriately, follows simple commands, conversant  HEENT:PERRLA, Pink conjunctiva, anicteric sclerae, moist oral mucosa  NECK: Supple, without any lymphadenopathy, or masses  CVS:  Regular rate and rhythm.  She continues with cardiac event monitor.  LUNG: Clear to auscultation, No wheezes, rales or rhonci.  BACK: No kyphosis of the thoracic spine  ABDOMEN: Soft, obese, nontender to palpation, with positive bowel sounds  EXTREMITIES: Moves both upper and lower extremities with generalized weakness, trace pedal edema, no calf tenderness  SKIN: Warm and dry, no rashes or erythema noted  NEUROLOGIC: Intact, pulses palpable  PSYCHIATRIC: Mild cognitive impairment.  Tearful on exam.      Labs:  All labs reviewed in the nursing home record and m2p-labs   @  Lab Results   Component Value Date    WBC 8.4 10/29/2021     Lab Results   Component Value Date    RBC 3.96 10/29/2021     Lab Results   Component Value Date    HGB 12.4 10/29/2021     Lab Results   Component Value Date    HCT 37.8 10/29/2021     Lab Results   Component Value Date    MCV 96 10/29/2021     Lab Results   Component Value Date    MCH 31.3 10/29/2021     Lab Results   Component Value Date    MCHC 32.8 10/29/2021     Lab Results   Component Value Date    RDW 13.0 10/29/2021     Lab Results   Component Value Date     10/29/2021        @Last Comprehensive Metabolic Panel:  Sodium   Date Value Ref Range Status   10/29/2021 141 136 - 145 mmol/L Final     Potassium   Date Value Ref Range Status   10/29/2021 3.8 3.5 - 5.0  mmol/L Final     Chloride   Date Value Ref Range Status   10/29/2021 108 (H) 98 - 107 mmol/L Final     Carbon Dioxide (CO2)   Date Value Ref Range Status   10/29/2021 27 22 - 31 mmol/L Final     Anion Gap   Date Value Ref Range Status   10/29/2021 6 5 - 18 mmol/L Final     Glucose   Date Value Ref Range Status   10/29/2021 127 (H) 70 - 125 mg/dL Final     GLUCOSE BY METER POCT   Date Value Ref Range Status   10/29/2021 191 (H) 70 - 99 mg/dL Final     Urea Nitrogen   Date Value Ref Range Status   10/29/2021 20 8 - 28 mg/dL Final     Creatinine   Date Value Ref Range Status   10/29/2021 1.22 (H) 0.60 - 1.10 mg/dL Final     GFR Estimate   Date Value Ref Range Status   10/29/2021 44 (L) >60 mL/min/1.73m2 Final     Comment:     As of July 11, 2021, eGFR is calculated by the CKD-EPI creatinine equation, without race adjustment. eGFR can be influenced by muscle mass, exercise, and diet. The reported eGFR is an estimation only and is only applicable if the renal function is stable.   05/07/2021 40 (L) >60 mL/min/1.73m2 Final     Calcium   Date Value Ref Range Status   10/29/2021 9.3 8.5 - 10.5 mg/dL Final         Assessment/Plan:    ICD-10-CM    1. Syncope, unspecified syncope type  R55  no further events.   2. Bradycardia  R00.1  continues in cardiac event monitor for 30 days.  Follow-up with cardiology in 4 to 6 weeks.  Denies any chest pain or palpitations.   3. First degree heart block  I44.0  denies any chest pain.  Continues on beta-blocker.   4. Prolonged QT interval  R94.31  she does continue on trazodone and Plaquenil which can increase QT interval.  Repeat EKG during hospitalization showed improvement.   5. Type 2 diabetes mellitus with hyperosmolarity without coma, with long-term current use of insulin (H)  E11.00  blood sugars consistently elevated in the 200s.  She is on NovoLog and sliding scale insulin.  We will continue to make adjustments.  RD to consult.  Recent hemoglobin A1c 9.7.    Z79.4    6. Essential  hypertension  I10  blood pressure slightly elevated.  She does continue on metoprolol and Norvasc.  She also has as needed hydralazine.  Continue to monitor blood pressure and pulse.  Follow-up BMP on Thursday.   7. Rheumatoid arthritis, involving unspecified site, unspecified whether rheumatoid factor present (H)  M06.9  on suppressive therapy.  She is followed outpatient by rheumatology.   8. Chronic low back pain with sciatica, sciatica laterality unspecified, unspecified back pain laterality  M54.40  continues on Percocet for pain.    G89.29            45 minutes spent of which greater than 50% was face to face communication with the patient reviewing results of EKG, plans and purpose of event monitoring as well as follow-ups as well as collaborating with nursing staff and therapy.  Okay for PT OT and speech eval and treat.    This note has been dictated using voice recognition software. Any grammatical or context distortions are unintentional and inherent to the software    Electronically signed by: Nikki Bourgeois CNP

## 2021-11-04 ENCOUNTER — TRANSITIONAL CARE UNIT VISIT (OUTPATIENT)
Dept: GERIATRICS | Facility: CLINIC | Age: 71
End: 2021-11-04
Payer: COMMERCIAL

## 2021-11-04 VITALS
HEIGHT: 72 IN | SYSTOLIC BLOOD PRESSURE: 165 MMHG | OXYGEN SATURATION: 97 % | HEART RATE: 62 BPM | TEMPERATURE: 97.8 F | BODY MASS INDEX: 30.12 KG/M2 | WEIGHT: 222.4 LBS | RESPIRATION RATE: 20 BRPM | DIASTOLIC BLOOD PRESSURE: 96 MMHG

## 2021-11-04 DIAGNOSIS — R55 SYNCOPE, UNSPECIFIED SYNCOPE TYPE: ICD-10-CM

## 2021-11-04 DIAGNOSIS — M06.9 RHEUMATOID ARTHRITIS, INVOLVING UNSPECIFIED SITE, UNSPECIFIED WHETHER RHEUMATOID FACTOR PRESENT (H): ICD-10-CM

## 2021-11-04 DIAGNOSIS — I44.0 FIRST DEGREE HEART BLOCK: ICD-10-CM

## 2021-11-04 DIAGNOSIS — R94.31 PROLONGED QT INTERVAL: ICD-10-CM

## 2021-11-04 DIAGNOSIS — E11.00 TYPE 2 DIABETES MELLITUS WITH HYPEROSMOLARITY WITHOUT COMA, WITH LONG-TERM CURRENT USE OF INSULIN (H): ICD-10-CM

## 2021-11-04 DIAGNOSIS — G89.4 CHRONIC PAIN SYNDROME: ICD-10-CM

## 2021-11-04 DIAGNOSIS — I10 ESSENTIAL HYPERTENSION: ICD-10-CM

## 2021-11-04 DIAGNOSIS — R00.1 BRADYCARDIA: ICD-10-CM

## 2021-11-04 DIAGNOSIS — Z79.4 TYPE 2 DIABETES MELLITUS WITH HYPEROSMOLARITY WITHOUT COMA, WITH LONG-TERM CURRENT USE OF INSULIN (H): ICD-10-CM

## 2021-11-04 DIAGNOSIS — G89.29 CHRONIC LOW BACK PAIN WITH SCIATICA, SCIATICA LATERALITY UNSPECIFIED, UNSPECIFIED BACK PAIN LATERALITY: ICD-10-CM

## 2021-11-04 DIAGNOSIS — M54.40 CHRONIC LOW BACK PAIN WITH SCIATICA, SCIATICA LATERALITY UNSPECIFIED, UNSPECIFIED BACK PAIN LATERALITY: ICD-10-CM

## 2021-11-04 PROBLEM — F41.9 ANXIETY: Status: ACTIVE | Noted: 2021-11-04

## 2021-11-04 PROBLEM — M15.9 GENERALIZED OA: Status: ACTIVE | Noted: 2021-10-12

## 2021-11-04 PROCEDURE — 99309 SBSQ NF CARE MODERATE MDM 30: CPT | Performed by: NURSE PRACTITIONER

## 2021-11-04 ASSESSMENT — MIFFLIN-ST. JEOR: SCORE: 1625.8

## 2021-11-05 NOTE — PROGRESS NOTES
[facility] GERIATRIC SERVICES    Code Status:  FULL CODE   Visit Type:   Chief Complaint   Patient presents with     Nursing Home Acute     TCU Follow up     Facility:  [facility] ([facility]) [[id number]]           History of Present Illness: Ebonie Bowman is a 71 year old female who I am seeing today for follow-up on the TCU.  Patient recently hospitalized on 10/25/2021 secondary to syncope and collapse.  Past medical history includes chronic back pain, chronic kidney disease, RA, diabetes mellitus type 2, GERD, hyperlipidemia, hypertension and previous syncopal episodes.  Patient reports 34 similar episodes over the last 4 years.  She underwent neuro consult.  Head CT was negative.  Bilateral ultrasound unremarkable.  Echo showed a normal LVEF of 55 to 60%.  EEG unremarkable.  She also underwent cardiology consult.  EKG showed sinus bradycardia with first-degree AV block with prolonged QT interval of 511.  Troponin x3 was negative.  Heart rate was in the 60s.  Patient continues in a 30-day event monitor.  Patient is to follow-up with cardiology in 4 to 6 weeks.  There is some discussion of possible ILR if recurrent events.  Prolonged QT interval.  Electrolytes within normal limits.  A repeat EKG on 10/28 6/21 showed improved QT interval.  She does continue on trazodone and Plaquenil which can cause QT prolongation.  Bilateral hip pain.  X-ray was negative.  She does have chronic neck pain.  CT of the neck was a negative for acute fracture.  Chronic back pain.  She does continue on Percocet.  History of RA on Plaquenil and methotrexate.  She is also on prednisone.  Elevated CKD.  Baseline is around 1.2.  On admit she was 1.34.  This did improve.  Hyperlipidemia on Lipitor.  Diabetes mellitus type 2.  Last hemoglobin A1c was 9.7 on 9/16/2021.  She continues on NPH and sliding scale.  History of allergic rhinitis on Claritin.  Hypertension.  She is on Norvasc and metoprolol.  She did have mildly elevated blood  pressures.  Her lisinopril was on hold due to kidney function.  IV hydralazine was ordered.  Insomnia on trazodone.    Today patient lying in bed.  Recent syncopal event.  She is had no further like events.  She does continue in cardiac event monitor.  She denies any chest pain or palpitations.  Blood pressure satisfactory.  She continues on metoprolol and Norvasc.  Patient with history of chronic pain syndrome.  She does have underlying RA on suppressive therapy.  She continues on Percocet.  Diabetes mellitus type 2.  Blood sugars consistently in the 200s.  She continues on Humulin twice daily.  Insomnia.  On trazodone.  Patient is reporting dry eyes today.  This is most likely related to her suppressive therapy for RA.      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     Type 2 diabetes mellitus with hyperosmolarity without coma, with long-term current use of insulin (H) 01/21/2021     Paraparesis (H) 01/21/2021     Osteoarthrosis 01/01/2018     Allergic rhinitis due to cats 11/22/2019     Arthropathy of spinal facet joint concurrent with and due to effusion 09/16/2021     HTN (hypertension) 09/18/2017     Chronic back pain 11/27/2020     Bilateral primary osteoarthritis of hip 07/21/2017     Chronic pain 04/17/2017     Controlled substance agreement signed 08/23/2019     Depression 05/04/2015     Essential hypertension 09/16/2021     Chronic GERD 07/12/2017     High risk medication use 02/15/2017     HLD (hyperlipidemia) 09/16/2021     Insomnia 09/16/2021     Lumbar radiculopathy 09/16/2021     Obesity 12/11/2014     Pain in joint, pelvic region and thigh 09/16/2021     Positive FIT (fecal immunochemical test) 11/22/2019     Prolonged QT interval 09/16/2021     Radiculopathy of cervicothoracic region 09/16/2021     Rheumatoid arthritis, involving unspecified site, unspecified whether  rheumatoid factor present (H) 01/20/2016     S/P insertion of spinal cord stimulator 06/24/2019     Venous insufficiency 09/16/2021     Stage 3b chronic kidney disease (H) 09/17/2021     Syncope, unspecified syncope type 10/26/2021     Generalized OA 10/12/2021     Anxiety 11/04/2021     Chronic low back pain with sciatica, sciatica laterality unspecified, unspecified back pain laterality 11/04/2021     First degree heart block 11/04/2021     Bradycardia 11/04/2021     Resolved Ambulatory Problems     Diagnosis Date Noted     Aftercare following joint replacement 04/22/2011     Rhythm Disorder      Orthostatic hypotension 12/27/2017     JAYNE (acute kidney injury) (H) 12/27/2017     Thrombocytopenia (H) 10/14/2015     Sepsis (H)      Uncomplicated opioid dependence (H) 08/23/2019     Left hemiparesis (H)      Past Medical History:   Diagnosis Date     BBB (bundle branch block)      Chronic kidney disease      Chronic kidney disease, unspecified      Diabetes (H)      Diabetes mellitus, type 2 (H)      Frequent headaches      Ganglion of tendon sheath      GERD (gastroesophageal reflux disease)      Hypertension      Incarcerated ventral hernia      Osteoarthritis      Rheumatoid arthritis (H)      Shortness of breath        Current Outpatient Medications:      alcohol swab prep pads, Use to swab area of injection/jacquelyn as directed., Disp: 100 each, Rfl: 3     amLODIPine (NORVASC) 10 MG tablet, [AMLODIPINE (NORVASC) 10 MG TABLET] TAKE 1 TABLET EVERY DAY, Disp: 90 tablet, Rfl: 2     ascorbic acid (ASCORBIC ACID WITH LATRICIA HIPS) 500 MG tablet, Take 500 mg by mouth daily , Disp: , Rfl:      aspirin 81 MG EC tablet, Take 81 mg by mouth daily, Disp: , Rfl:      atorvastatin (LIPITOR) 40 MG tablet, [ATORVASTATIN (LIPITOR) 40 MG TABLET] Take 1 tablet (40 mg total) by mouth at bedtime., Disp: 90 tablet, Rfl: 2     b complex vitamins tablet, [B COMPLEX VITAMINS TABLET] Take 1 tablet by mouth daily., Disp: , Rfl:      blood  "glucose (NO BRAND SPECIFIED) test strip, Use to test blood sugar 3 times daily or as directed. To accompany: Blood Glucose Monitor Brands: per insurance., Disp: 100 strip, Rfl: 6     blood glucose calibration (NO BRAND SPECIFIED) solution, To accompany: Blood Glucose Monitor Brands: per insurance., Disp: 1 each, Rfl: 0     blood glucose monitoring (NO BRAND SPECIFIED) meter device kit, Use to test blood sugar 3 times daily or as directed. Preferred blood glucose meter OR supplies to accompany: Blood Glucose Monitor Brands: per insurance., Disp: 1 kit, Rfl: 0     blood glucose test strips, [BLOOD GLUCOSE TEST STRIPS] Check blood sugar four times daily. Dispense brand per patient's insurance at pharmacy discretion. Dx: e11.9, Disp: 400 strip, Rfl: 3     blood-glucose meter Misc, [BLOOD-GLUCOSE METER MISC] Dispense one meter. Use as directed. Dx: e11.9, Disp: 1 each, Rfl: 0     fish oil-omega-3 fatty acids 1000 MG capsule, Take 1 g by mouth daily, Disp: , Rfl:      flash glucose scanning reader (FREESTYLE BRE 14 DAY READER) Misc, [FLASH GLUCOSE SCANNING READER (FREESTYLE BRE 14 DAY READER) MISC] Use 1 each As Directed see administration instructions. Use as directed with sensor, Disp: 1 each, Rfl: 0     flash glucose sensor (FREESTYLE BRE 14 DAY SENSOR) Kit, [FLASH GLUCOSE SENSOR (FREESTYLE BRE 14 DAY SENSOR) KIT] Use 1 each As Directed every 14 (fourteen) days., Disp: 2 kit, Rfl: 11     hydrALAZINE (APRESOLINE) 10 MG tablet, Take 1 tablet (10 mg) by mouth 3 times daily as needed (sbp >175), Disp: , Rfl:      hydroxychloroquine (PLAQUENIL) 200 MG tablet, Take 1 tablet (200 mg) by mouth 2 times daily, Disp: 180 tablet, Rfl: 1     insulin  UNIT/ML injection, Inject 32 Units Subcutaneous 2 times daily (before meals), Disp: , Rfl:      insulin syringe 31G X 5/16\" 0.5 ML MISC, 1 each 2 times daily, Disp: 100 each, Rfl: 11     insulin syringe-needle U-100 (BD INSULIN SYRINGE ULT-FINE II) 1 mL 31 gauge x 5/16 " Syrg, [INSULIN SYRINGE-NEEDLE U-100 (BD INSULIN SYRINGE ULT-FINE II) 1 ML 31 GAUGE X 5/16 SYRG] USE 4 TIMES DAILY AS DIRECTED, Disp: 500 each, Rfl: 1     lancing device (ACCU-CHEK SOFTCLIX) Misc, [LANCING DEVICE (ACCU-CHEK SOFTCLIX) MISC] Use 1 applicator As Directed 4 (four) times a day., Disp: 300 each, Rfl: 3     loratadine (CLARITIN) 10 mg tablet, [LORATADINE (CLARITIN) 10 MG TABLET] Take 1 tablet (10 mg total) by mouth at bedtime., Disp: 90 tablet, Rfl: 3     methotrexate 2.5 MG tablet, Take 3 tablets (7.5 mg) by mouth once a week, Disp: 36 tablet, Rfl: 0     metoprolol succinate ER (TOPROL-XL) 50 MG 24 hr tablet, [METOPROLOL SUCCINATE (TOPROL-XL) 50 MG 24 HR TABLET] TAKE 1 TABLET EVERY DAY, Disp: 90 tablet, Rfl: 2     MULTIVITAMIN ORAL, Take 1 tablet by mouth daily , Disp: , Rfl:      oxyCODONE-acetaminophen (PERCOCET) 5-325 MG tablet, Take 1-2 tablets by mouth every 6 hours as needed for severe pain (Max of 6 tablets daily), Disp: 80 tablet, Rfl: 0     predniSONE (DELTASONE) 1 MG tablet, Take 1 tablet (1 mg) by mouth daily, Disp: , Rfl: 0     thin (NO BRAND SPECIFIED) lancets, Use with lanceting device. To accompany: Blood Glucose Monitor Brands: per insurance., Disp: 100 each, Rfl: 11     traZODone (DESYREL) 150 MG tablet, Take 1 tablet (150 mg) by mouth At Bedtime, Disp: 90 tablet, Rfl: 1     Vitamin D3 (VITAMIN D3) 25 mcg (1000 units) tablet, Take 1,000 Units by mouth daily , Disp: , Rfl:   Allergies   Allergen Reactions     Morphine Nausea and Vomiting     Penicillins Hives     Tolerates ceftriaxone     Sulfa (Sulfonamide Antibiotics) [Sulfa Drugs] Hives       All Meds and Allergies reviewed in the record at the facility and is the most up-to-date    REVIEW OF SYSTEMS:   Review of Systems  No fevers or chills. No headache, lightheadedness or dizziness. + Dry eyes.  No SOB, chest pains or palpitations. Appetite is good. No nausea, vomiting, constipation.  Reports diarrhea from drinking orange juice.  No  dysuria, frequency, burning or pain with urination.  Chronic pain syndrome secondary to chronic back pain as well as RA.  She does continue on suppressive therapy and uses Percocet for pain.  Chronic insomnia on trazodone.  Otherwise review of systems are negative.     PHYSICAL EXAMINATION:  Physical Exam     Vital signs: BP (!) 165/96   Pulse 62   Temp 97.8  F (36.6  C)   Resp 20   Ht 1.829 m (6')   Wt 100.9 kg (222 lb 6.4 oz)   SpO2 97%   BMI 30.16 kg/m    General: Awake, Alert, oriented x3, appropriately, follows simple commands, conversant  HEENT:Pink conjunctiva, anicteric sclerae, moist oral mucosa  NECK: Supple, without any lymphadenopathy, or masses  CVS:  Regular rate and rhythm.  She continues with cardiac event monitor.  LUNG: Clear to auscultation, No wheezes, rales or rhonci.  BACK: No kyphosis of the thoracic spine  ABDOMEN: Soft, obese, nontender to palpation, with positive bowel sounds  EXTREMITIES: Moves both upper and lower extremities with generalized weakness, trace pedal edema, no calf tenderness  SKIN: Warm and dry, no rashes or erythema noted  NEUROLOGIC: Intact, pulses palpable  PSYCHIATRIC: Underlying cognitive impairment noted.      Labs:  All labs reviewed in the nursing home record and [facility]   @  Lab Results   Component Value Date    WBC 8.4 10/29/2021     Lab Results   Component Value Date    RBC 3.96 10/29/2021     Lab Results   Component Value Date    HGB 12.4 10/29/2021     Lab Results   Component Value Date    HCT 37.8 10/29/2021     Lab Results   Component Value Date    MCV 96 10/29/2021     Lab Results   Component Value Date    MCH 31.3 10/29/2021     Lab Results   Component Value Date    MCHC 32.8 10/29/2021     Lab Results   Component Value Date    RDW 13.0 10/29/2021     Lab Results   Component Value Date     10/29/2021        @Last Comprehensive Metabolic Panel:  Sodium   Date Value Ref Range Status   10/29/2021 141 136 - 145 mmol/L Final     Potassium   Date Value  Ref Range Status   10/29/2021 3.8 3.5 - 5.0 mmol/L Final     Chloride   Date Value Ref Range Status   10/29/2021 108 (H) 98 - 107 mmol/L Final     Carbon Dioxide (CO2)   Date Value Ref Range Status   10/29/2021 27 22 - 31 mmol/L Final     Anion Gap   Date Value Ref Range Status   10/29/2021 6 5 - 18 mmol/L Final     Glucose   Date Value Ref Range Status   10/29/2021 127 (H) 70 - 125 mg/dL Final     GLUCOSE BY METER POCT   Date Value Ref Range Status   10/29/2021 191 (H) 70 - 99 mg/dL Final     Urea Nitrogen   Date Value Ref Range Status   10/29/2021 20 8 - 28 mg/dL Final     Creatinine   Date Value Ref Range Status   10/29/2021 1.22 (H) 0.60 - 1.10 mg/dL Final     GFR Estimate   Date Value Ref Range Status   10/29/2021 44 (L) >60 mL/min/1.73m2 Final     Comment:     As of July 11, 2021, eGFR is calculated by the CKD-EPI creatinine equation, without race adjustment. eGFR can be influenced by muscle mass, exercise, and diet. The reported eGFR is an estimation only and is only applicable if the renal function is stable.   05/07/2021 40 (L) >60 mL/min/1.73m2 Final     Calcium   Date Value Ref Range Status   10/29/2021 9.3 8.5 - 10.5 mg/dL Final         Assessment/Plan:    ICD-10-CM    1. Syncope, unspecified syncope type  R55  no further events.   2. Bradycardia  R00.1  continues in cardiac event monitor for 30 days.  Follow-up with cardiology in 4 to 6 weeks.  Denies any chest pain or palpitations.   3. First degree heart block  I44.0  denies any chest pain.  Continues on beta-blocker.   4. Prolonged QT interval  R94.31  she does continue on trazodone and Plaquenil which can increase QT interval.  Repeat EKG during hospitalization showed improvement.   5. Type 2 diabetes mellitus with hyperosmolarity without coma, with long-term current use of insulin (H)  E11.00  blood sugars consistently elevated in the 200s.  She is on NovoLog and sliding scale insulin.   Increase a.m. NovoLog to 38 units.  Continue 32 units in the  p.m.  RD to consult.  Recent hemoglobin A1c 9.7.    Z79.4    6. Essential hypertension  I10 continue on metoprolol and Norvasc.  She also has as needed hydralazine.  Continue to monitor blood pressure and pulse.  Follow-up BMP unremarkable.   7. Rheumatoid arthritis, involving unspecified site, unspecified whether rheumatoid factor present (H)  M06.9  on suppressive therapy.  She is followed outpatient by rheumatology.  Patient reporting dry eyes most likely related to her suppressive therapy.  Start natural tears 4 times daily as needed.   8. Chronic low back pain with sciatica, sciatica laterality unspecified, unspecified back pain laterality  M54.40  continues on Percocet for pain.    G89.29            45 minutes spent of which greater than 50% was face to face communication with the patient reviewing results of EKG, plans and purpose of event monitoring as well as follow-ups as well as collaborating with nursing staff and therapy.  Okay for PT OT and speech eval and treat.    This note has been dictated using voice recognition software. Any grammatical or context distortions are unintentional and inherent to the software    Electronically signed by: Nikki Bourgeois, CNP

## 2021-11-08 ENCOUNTER — TRANSITIONAL CARE UNIT VISIT (OUTPATIENT)
Dept: GERIATRICS | Facility: CLINIC | Age: 71
End: 2021-11-08
Payer: COMMERCIAL

## 2021-11-08 VITALS
TEMPERATURE: 98 F | WEIGHT: 221.6 LBS | BODY MASS INDEX: 30.02 KG/M2 | HEIGHT: 72 IN | SYSTOLIC BLOOD PRESSURE: 145 MMHG | OXYGEN SATURATION: 95 % | HEART RATE: 72 BPM | RESPIRATION RATE: 14 BRPM | DIASTOLIC BLOOD PRESSURE: 75 MMHG

## 2021-11-08 DIAGNOSIS — I44.0 FIRST DEGREE HEART BLOCK: ICD-10-CM

## 2021-11-08 DIAGNOSIS — G89.4 CHRONIC PAIN SYNDROME: ICD-10-CM

## 2021-11-08 DIAGNOSIS — E11.00 TYPE 2 DIABETES MELLITUS WITH HYPEROSMOLARITY WITHOUT COMA, WITH LONG-TERM CURRENT USE OF INSULIN (H): ICD-10-CM

## 2021-11-08 DIAGNOSIS — M54.40 CHRONIC LOW BACK PAIN WITH SCIATICA, SCIATICA LATERALITY UNSPECIFIED, UNSPECIFIED BACK PAIN LATERALITY: ICD-10-CM

## 2021-11-08 DIAGNOSIS — G89.29 CHRONIC LOW BACK PAIN WITH SCIATICA, SCIATICA LATERALITY UNSPECIFIED, UNSPECIFIED BACK PAIN LATERALITY: ICD-10-CM

## 2021-11-08 DIAGNOSIS — Z79.4 TYPE 2 DIABETES MELLITUS WITH HYPEROSMOLARITY WITHOUT COMA, WITH LONG-TERM CURRENT USE OF INSULIN (H): ICD-10-CM

## 2021-11-08 DIAGNOSIS — I10 ESSENTIAL HYPERTENSION: ICD-10-CM

## 2021-11-08 DIAGNOSIS — M06.9 RHEUMATOID ARTHRITIS, INVOLVING UNSPECIFIED SITE, UNSPECIFIED WHETHER RHEUMATOID FACTOR PRESENT (H): ICD-10-CM

## 2021-11-08 DIAGNOSIS — R94.31 PROLONGED QT INTERVAL: ICD-10-CM

## 2021-11-08 DIAGNOSIS — R00.1 BRADYCARDIA: ICD-10-CM

## 2021-11-08 PROBLEM — G82.20 PARAPARESIS (H): Status: RESOLVED | Noted: 2021-01-21 | Resolved: 2021-11-08

## 2021-11-08 PROCEDURE — 99309 SBSQ NF CARE MODERATE MDM 30: CPT | Performed by: NURSE PRACTITIONER

## 2021-11-08 RX ORDER — OXYCODONE AND ACETAMINOPHEN 5; 325 MG/1; MG/1
1-2 TABLET ORAL EVERY 6 HOURS PRN
Qty: 80 TABLET | Refills: 0 | Status: SHIPPED | OUTPATIENT
Start: 2021-11-08 | End: 2021-11-30

## 2021-11-08 ASSESSMENT — MIFFLIN-ST. JEOR: SCORE: 1622.17

## 2021-11-09 NOTE — PROGRESS NOTES
[facility] GERIATRIC SERVICES    Code Status:  FULL CODE   Visit Type:   Chief Complaint   Patient presents with     Nursing Home Acute     TCU Follow up     Facility:  [facility] ([facility]) [[id number]]           History of Present Illness: Ebonie Bowman is a 71 year old female who I am seeing today for follow-up on the TCU.  Patient recently hospitalized on 10/25/2021 secondary to syncope and collapse.  Past medical history includes chronic back pain, chronic kidney disease, RA, diabetes mellitus type 2, GERD, hyperlipidemia, hypertension and previous syncopal episodes.  Patient reports 34 similar episodes over the last 4 years.  She underwent neuro consult.  Head CT was negative.  Bilateral ultrasound unremarkable.  Echo showed a normal LVEF of 55 to 60%.  EEG unremarkable.  She also underwent cardiology consult.  EKG showed sinus bradycardia with first-degree AV block with prolonged QT interval of 511.  Troponin x3 was negative.  Heart rate was in the 60s.  Patient continues in a 30-day event monitor.  Patient is to follow-up with cardiology in 4 to 6 weeks.  There is some discussion of possible ILR if recurrent events.  Prolonged QT interval.  Electrolytes within normal limits.  A repeat EKG on 10/28 6/21 showed improved QT interval.  She does continue on trazodone and Plaquenil which can cause QT prolongation.  Bilateral hip pain.  X-ray was negative.  She does have chronic neck pain.  CT of the neck was a negative for acute fracture.  Chronic back pain.  She does continue on Percocet.  History of RA on Plaquenil and methotrexate.  She is also on prednisone.  Elevated CKD.  Baseline is around 1.2.  On admit she was 1.34.  This did improve.  Hyperlipidemia on Lipitor.  Diabetes mellitus type 2.  Last hemoglobin A1c was 9.7 on 9/16/2021.  She continues on NPH and sliding scale.  History of allergic rhinitis on Claritin.  Hypertension.  She is on Norvasc and metoprolol.  She did have mildly elevated blood  pressures.  Her lisinopril was on hold due to kidney function.  IV hydralazine was ordered.  Insomnia on trazodone.    Today patient lying in bed.  Patient denies any lightheaded or dizziness.  No further syncopal events.  Heart rates occasionally in the upper 50s.  She is asymptomatic.  Blood pressure satisfactory.  She continues on event monitor.  Diabetes mellitus type 2.  Blood sugars continue to be elevated midday in the 200s.  Her Humulin was recently increased.  She does continue on prednisone 1 mg for her rheumatoid arthritis.  She is also on Plaquenil and methotrexate.  Today she tells me that before hospitalization she was seen by her rheumatologist and this was to be decreased by half a milligram until discontinued.  I did look back through his notes and verified.  She does continue on Percocet for chronic back pain.  Insomnia.  On trazodone.  She did have some QT prolongation during hospitalization but was to continue on trazodone.          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     Type 2 diabetes mellitus with hyperosmolarity without coma, with long-term current use of insulin (H) 01/21/2021     Osteoarthrosis 01/01/2018     Allergic rhinitis due to cats 11/22/2019     Arthropathy of spinal facet joint concurrent with and due to effusion 09/16/2021     HTN (hypertension) 09/18/2017     Chronic back pain 11/27/2020     Bilateral primary osteoarthritis of hip 07/21/2017     Chronic pain 04/17/2017     Controlled substance agreement signed 08/23/2019     Depression 05/04/2015     Essential hypertension 09/16/2021     Chronic GERD 07/12/2017     High risk medication use 02/15/2017     HLD (hyperlipidemia) 09/16/2021     Insomnia 09/16/2021     Lumbar radiculopathy 09/16/2021     Obesity 12/11/2014     Pain in joint, pelvic region and thigh 09/16/2021     Positive FIT (fecal  immunochemical test) 11/22/2019     Prolonged QT interval 09/16/2021     Radiculopathy of cervicothoracic region 09/16/2021     Rheumatoid arthritis, involving unspecified site, unspecified whether rheumatoid factor present (H) 01/20/2016     S/P insertion of spinal cord stimulator 06/24/2019     Venous insufficiency 09/16/2021     Stage 3b chronic kidney disease (H) 09/17/2021     Syncope, unspecified syncope type 10/26/2021     Generalized OA 10/12/2021     Anxiety 11/04/2021     Chronic low back pain with sciatica, sciatica laterality unspecified, unspecified back pain laterality 11/04/2021     First degree heart block 11/04/2021     Bradycardia 11/04/2021     Resolved Ambulatory Problems     Diagnosis Date Noted     Aftercare following joint replacement 04/22/2011     Paraparesis (H) 01/21/2021     Rhythm Disorder      Orthostatic hypotension 12/27/2017     JAYNE (acute kidney injury) (H) 12/27/2017     Thrombocytopenia (H) 10/14/2015     Sepsis (H)      Uncomplicated opioid dependence (H) 08/23/2019     Left hemiparesis (H)      Past Medical History:   Diagnosis Date     BBB (bundle branch block)      Chronic kidney disease      Chronic kidney disease, unspecified      Diabetes (H)      Diabetes mellitus, type 2 (H)      Frequent headaches      Ganglion of tendon sheath      GERD (gastroesophageal reflux disease)      Hypertension      Incarcerated ventral hernia      Osteoarthritis      Rheumatoid arthritis (H)      Shortness of breath        Current Outpatient Medications:      alcohol swab prep pads, Use to swab area of injection/jacquelyn as directed., Disp: 100 each, Rfl: 3     amLODIPine (NORVASC) 10 MG tablet, [AMLODIPINE (NORVASC) 10 MG TABLET] TAKE 1 TABLET EVERY DAY, Disp: 90 tablet, Rfl: 2     ascorbic acid (ASCORBIC ACID WITH LATRICIA HIPS) 500 MG tablet, Take 500 mg by mouth daily , Disp: , Rfl:      aspirin 81 MG EC tablet, Take 81 mg by mouth daily, Disp: , Rfl:      atorvastatin (LIPITOR) 40 MG tablet,  [ATORVASTATIN (LIPITOR) 40 MG TABLET] Take 1 tablet (40 mg total) by mouth at bedtime., Disp: 90 tablet, Rfl: 2     b complex vitamins tablet, [B COMPLEX VITAMINS TABLET] Take 1 tablet by mouth daily., Disp: , Rfl:      blood glucose (NO BRAND SPECIFIED) test strip, Use to test blood sugar 3 times daily or as directed. To accompany: Blood Glucose Monitor Brands: per insurance., Disp: 100 strip, Rfl: 6     blood glucose calibration (NO BRAND SPECIFIED) solution, To accompany: Blood Glucose Monitor Brands: per insurance., Disp: 1 each, Rfl: 0     blood glucose monitoring (NO BRAND SPECIFIED) meter device kit, Use to test blood sugar 3 times daily or as directed. Preferred blood glucose meter OR supplies to accompany: Blood Glucose Monitor Brands: per insurance., Disp: 1 kit, Rfl: 0     blood glucose test strips, [BLOOD GLUCOSE TEST STRIPS] Check blood sugar four times daily. Dispense brand per patient's insurance at pharmacy discretion. Dx: e11.9, Disp: 400 strip, Rfl: 3     blood-glucose meter Misc, [BLOOD-GLUCOSE METER MISC] Dispense one meter. Use as directed. Dx: e11.9, Disp: 1 each, Rfl: 0     fish oil-omega-3 fatty acids 1000 MG capsule, Take 1 g by mouth daily, Disp: , Rfl:      flash glucose scanning reader (FREESTYLE BRE 14 DAY READER) Misc, [FLASH GLUCOSE SCANNING READER (FREESTYLE BRE 14 DAY READER) MISC] Use 1 each As Directed see administration instructions. Use as directed with sensor, Disp: 1 each, Rfl: 0     flash glucose sensor (FREESTYLE BRE 14 DAY SENSOR) Kit, [FLASH GLUCOSE SENSOR (FREESTYLE BRE 14 DAY SENSOR) KIT] Use 1 each As Directed every 14 (fourteen) days., Disp: 2 kit, Rfl: 11     hydrALAZINE (APRESOLINE) 10 MG tablet, Take 1 tablet (10 mg) by mouth 3 times daily as needed (sbp >175), Disp: , Rfl:      hydroxychloroquine (PLAQUENIL) 200 MG tablet, Take 1 tablet (200 mg) by mouth 2 times daily, Disp: 180 tablet, Rfl: 1     insulin  UNIT/ML injection, Inject 32 Units  "Subcutaneous 2 times daily (before meals) (Patient taking differently: Inject 32 Units Subcutaneous 2 times daily (before meals) Pt taking 44 units in am and 32 units at HS.), Disp: , Rfl:      insulin syringe 31G X 5/16\" 0.5 ML MISC, 1 each 2 times daily, Disp: 100 each, Rfl: 11     insulin syringe-needle U-100 (BD INSULIN SYRINGE ULT-FINE II) 1 mL 31 gauge x 5/16 Syrg, [INSULIN SYRINGE-NEEDLE U-100 (BD INSULIN SYRINGE ULT-FINE II) 1 ML 31 GAUGE X 5/16 SYRG] USE 4 TIMES DAILY AS DIRECTED, Disp: 500 each, Rfl: 1     lancing device (ACCU-CHEK SOFTCLIX) Misc, [LANCING DEVICE (ACCU-CHEK SOFTCLIX) MISC] Use 1 applicator As Directed 4 (four) times a day., Disp: 300 each, Rfl: 3     loratadine (CLARITIN) 10 mg tablet, [LORATADINE (CLARITIN) 10 MG TABLET] Take 1 tablet (10 mg total) by mouth at bedtime., Disp: 90 tablet, Rfl: 3     methotrexate 2.5 MG tablet, Take 3 tablets (7.5 mg) by mouth once a week, Disp: 36 tablet, Rfl: 0     metoprolol succinate ER (TOPROL-XL) 50 MG 24 hr tablet, [METOPROLOL SUCCINATE (TOPROL-XL) 50 MG 24 HR TABLET] TAKE 1 TABLET EVERY DAY, Disp: 90 tablet, Rfl: 2     MULTIVITAMIN ORAL, Take 1 tablet by mouth daily , Disp: , Rfl:      oxyCODONE-acetaminophen (PERCOCET) 5-325 MG tablet, Take 1-2 tablets by mouth every 6 hours as needed for severe pain (Max of 6 tablets daily), Disp: 80 tablet, Rfl: 0     predniSONE (DELTASONE) 1 MG tablet, Take 1 tablet (1 mg) by mouth daily (Patient taking differently: Take 0.5 mg by mouth daily Pt to continue then discontinue in 1 month on 12/8/21.), Disp: , Rfl: 0     thin (NO BRAND SPECIFIED) lancets, Use with lanceting device. To accompany: Blood Glucose Monitor Brands: per insurance., Disp: 100 each, Rfl: 11     traZODone (DESYREL) 150 MG tablet, Take 1 tablet (150 mg) by mouth At Bedtime, Disp: 90 tablet, Rfl: 1     Vitamin D3 (VITAMIN D3) 25 mcg (1000 units) tablet, Take 1,000 Units by mouth daily , Disp: , Rfl:   Allergies   Allergen Reactions     Morphine " Nausea and Vomiting     Penicillins Hives     Tolerates ceftriaxone     Sulfa (Sulfonamide Antibiotics) [Sulfa Drugs] Hives       All Meds and Allergies reviewed in the record at the facility and is the most up-to-date    REVIEW OF SYSTEMS:   Review of Systems  No fevers or chills. No headache, lightheadedness or dizziness. + Dry eyes.  No SOB, chest pains or palpitations. Appetite is good. No nausea, vomiting, constipation.  Reports diarrhea from drinking orange juice.  No dysuria, frequency, burning or pain with urination.  Chronic pain syndrome secondary to chronic back pain as well as RA.  She does continue on suppressive therapy and uses Percocet for pain.  Chronic insomnia on trazodone.  Otherwise review of systems are negative.     PHYSICAL EXAMINATION:  Physical Exam     Vital signs: BP (!) 145/75   Pulse 72   Temp 98  F (36.7  C)   Resp 14   Ht 1.829 m (6')   Wt 100.5 kg (221 lb 9.6 oz)   SpO2 95%   BMI 30.05 kg/m    General: Awake, Alert, oriented x3, appropriately, follows simple commands, conversant  HEENT:Pink conjunctiva, anicteric sclerae, moist oral mucosa  NECK: Supple, without any lymphadenopathy, or masses  CVS:  Regular rate and rhythm.  She continues with cardiac event monitor.  LUNG: Clear to auscultation, No wheezes, rales or rhonci.  BACK: No kyphosis of the thoracic spine  ABDOMEN: Soft, obese, nontender to palpation, with positive bowel sounds  EXTREMITIES: Moves both upper and lower extremities with generalized weakness, trace pedal edema, no calf tenderness  SKIN: Warm and dry, no rashes or erythema noted  NEUROLOGIC: Intact, pulses palpable  PSYCHIATRIC: Underlying cognitive impairment noted.      Labs:  All labs reviewed in the nursing home record and [facility] [employer]   @  Lab Results   Component Value Date    WBC 8.4 10/29/2021     Lab Results   Component Value Date    RBC 3.96 10/29/2021     Lab Results   Component Value Date    HGB 12.4 10/29/2021     Lab Results   Component Value Date     HCT 37.8 10/29/2021     Lab Results   Component Value Date    MCV 96 10/29/2021     Lab Results   Component Value Date    MCH 31.3 10/29/2021     Lab Results   Component Value Date    MCHC 32.8 10/29/2021     Lab Results   Component Value Date    RDW 13.0 10/29/2021     Lab Results   Component Value Date     10/29/2021        @Last Comprehensive Metabolic Panel:  Sodium   Date Value Ref Range Status   10/29/2021 141 136 - 145 mmol/L Final     Potassium   Date Value Ref Range Status   10/29/2021 3.8 3.5 - 5.0 mmol/L Final     Chloride   Date Value Ref Range Status   10/29/2021 108 (H) 98 - 107 mmol/L Final     Carbon Dioxide (CO2)   Date Value Ref Range Status   10/29/2021 27 22 - 31 mmol/L Final     Anion Gap   Date Value Ref Range Status   10/29/2021 6 5 - 18 mmol/L Final     Glucose   Date Value Ref Range Status   10/29/2021 127 (H) 70 - 125 mg/dL Final     GLUCOSE BY METER POCT   Date Value Ref Range Status   10/29/2021 191 (H) 70 - 99 mg/dL Final     Urea Nitrogen   Date Value Ref Range Status   10/29/2021 20 8 - 28 mg/dL Final     Creatinine   Date Value Ref Range Status   10/29/2021 1.22 (H) 0.60 - 1.10 mg/dL Final     GFR Estimate   Date Value Ref Range Status   10/29/2021 44 (L) >60 mL/min/1.73m2 Final     Comment:     As of July 11, 2021, eGFR is calculated by the CKD-EPI creatinine equation, without race adjustment. eGFR can be influenced by muscle mass, exercise, and diet. The reported eGFR is an estimation only and is only applicable if the renal function is stable.   05/07/2021 40 (L) >60 mL/min/1.73m2 Final     Calcium   Date Value Ref Range Status   10/29/2021 9.3 8.5 - 10.5 mg/dL Final         Assessment/Plan:    ICD-10-CM    1. Syncope, unspecified syncope type  R55  no further events.   2. Bradycardia  R00.1  continues in cardiac event monitor for 30 days.  Follow-up with cardiology in 4 to 6 weeks.  Denies any chest pain or palpitations.   3. First degree heart block  I44.0  denies any  chest pain.  Continues on beta-blocker.   4. Prolonged QT interval  R94.31  she does continue on trazodone and Plaquenil which can increase QT interval.  Repeat EKG during hospitalization showed improvement.   5. Type 2 diabetes mellitus with hyperosmolarity without coma, with long-term current use of insulin (H)  E11.00  blood sugars consistently elevated in the 200s.  She is on NovoLog and sliding scale insulin.   Increase a.m. NovoLog to 44 units.  Continue 32 units in the p.m.  RD to consult.  Recent hemoglobin A1c 9.7.    Z79.4    6. Essential hypertension  I10 continue on metoprolol and Norvasc.  She also has as needed hydralazine.  Continue to monitor blood pressure and pulse.  Follow-up BMP unremarkable.   7. Rheumatoid arthritis, involving unspecified site, unspecified whether rheumatoid factor present (H)  M06.9  on suppressive therapy.  She is followed outpatient by rheumatology.  Decrease prednisone to half milligram daily with plans to DC in 1 month.     8. Chronic low back pain with sciatica, sciatica laterality unspecified, unspecified back pain laterality  M54.40  continues on Percocet for pain.    G89.29          This note has been dictated using voice recognition software. Any grammatical or context distortions are unintentional and inherent to the software    Electronically signed by: Nikki Bourgeois CNP

## 2021-11-11 ENCOUNTER — TRANSITIONAL CARE UNIT VISIT (OUTPATIENT)
Dept: GERIATRICS | Facility: CLINIC | Age: 71
End: 2021-11-11
Payer: COMMERCIAL

## 2021-11-11 ENCOUNTER — ALLIED HEALTH/NURSE VISIT (OUTPATIENT)
Dept: EDUCATION SERVICES | Facility: CLINIC | Age: 71
End: 2021-11-11
Payer: COMMERCIAL

## 2021-11-11 VITALS
DIASTOLIC BLOOD PRESSURE: 69 MMHG | BODY MASS INDEX: 30.5 KG/M2 | RESPIRATION RATE: 16 BRPM | HEART RATE: 64 BPM | TEMPERATURE: 98.2 F | WEIGHT: 225.2 LBS | SYSTOLIC BLOOD PRESSURE: 144 MMHG | OXYGEN SATURATION: 98 % | HEIGHT: 72 IN

## 2021-11-11 DIAGNOSIS — Z79.4 TYPE 2 DIABETES MELLITUS WITH HYPEROSMOLARITY WITHOUT COMA, WITH LONG-TERM CURRENT USE OF INSULIN (H): ICD-10-CM

## 2021-11-11 DIAGNOSIS — I44.0 FIRST DEGREE HEART BLOCK: ICD-10-CM

## 2021-11-11 DIAGNOSIS — G89.4 CHRONIC PAIN SYNDROME: ICD-10-CM

## 2021-11-11 DIAGNOSIS — M06.9 RHEUMATOID ARTHRITIS, INVOLVING UNSPECIFIED SITE, UNSPECIFIED WHETHER RHEUMATOID FACTOR PRESENT (H): ICD-10-CM

## 2021-11-11 DIAGNOSIS — M54.40 CHRONIC LOW BACK PAIN WITH SCIATICA, SCIATICA LATERALITY UNSPECIFIED, UNSPECIFIED BACK PAIN LATERALITY: ICD-10-CM

## 2021-11-11 DIAGNOSIS — E11.00 TYPE 2 DIABETES MELLITUS WITH HYPEROSMOLARITY WITHOUT COMA, WITH LONG-TERM CURRENT USE OF INSULIN (H): ICD-10-CM

## 2021-11-11 DIAGNOSIS — I10 ESSENTIAL HYPERTENSION: ICD-10-CM

## 2021-11-11 DIAGNOSIS — R94.31 PROLONGED QT INTERVAL: ICD-10-CM

## 2021-11-11 DIAGNOSIS — E11.9 DIABETES MELLITUS (H): Primary | ICD-10-CM

## 2021-11-11 DIAGNOSIS — G89.29 CHRONIC LOW BACK PAIN WITH SCIATICA, SCIATICA LATERALITY UNSPECIFIED, UNSPECIFIED BACK PAIN LATERALITY: ICD-10-CM

## 2021-11-11 DIAGNOSIS — R00.1 BRADYCARDIA: ICD-10-CM

## 2021-11-11 PROCEDURE — 99309 SBSQ NF CARE MODERATE MDM 30: CPT | Performed by: NURSE PRACTITIONER

## 2021-11-11 PROCEDURE — 99207 PR NO CHARGE NURSE ONLY: CPT

## 2021-11-11 PROCEDURE — 99207 PR CDG-CODE CATEGORY CHANGED: CPT | Performed by: NURSE PRACTITIONER

## 2021-11-11 ASSESSMENT — MIFFLIN-ST. JEOR: SCORE: 1638.5

## 2021-11-11 NOTE — LETTER
11/11/2021         RE: Ebonie Bowman  [address]  [address]  [place] [address]        Dear Colleague,    Thank you for referring your patient, Ebonie Bowman, to the [facility]. Please see a copy of my visit note below.    Pt seen today for f/u.   She has been in the nursing home ([facility]) since 10/29 after a fall about 2 days prior to that.   Pt notes they increased her NPH to 44 units in the morning and 32 units in the evening. At our last visit 10/21 we had increased to 38 units in the morning and in the evening she was to take 34 units if <250 and 38 units if >250.   Do not want to make any changes today as there are a lot of factors affecting BG right now (stress, different meals etc).   Pt is going to be discharged from [facility] on 11/18. Will f/u here 12/3 as scheduled.     Libreview:

## 2021-11-11 NOTE — PROGRESS NOTES
Pt seen today for f/u.   She has been in the nursing home ([facility]) since 10/29 after a fall about 2 days prior to that.   Pt notes they increased her NPH to 44 units in the morning and 32 units in the evening. At our last visit 10/21 we had increased to 38 units in the morning and in the evening she was to take 34 units if <250 and 38 units if >250.   Do not want to make any changes today as there are a lot of factors affecting BG right now (stress, different meals etc).   Pt is going to be discharged from [facility] on 11/18. Will f/u here 12/3 as scheduled.     Libreview:

## 2021-11-12 NOTE — PROGRESS NOTES
[facility] GERIATRIC SERVICES    Code Status:  FULL CODE   Visit Type:   Chief Complaint   Patient presents with     Nursing Home Acute     TCU Follow up     Facility:  [facility] ([facility]) [[id number]]           History of Present Illness: Ebonie Bowman is a 71 year old female who I am seeing today for follow-up on the TCU.  Patient recently hospitalized on 10/25/2021 secondary to syncope and collapse.  Past medical history includes chronic back pain, chronic kidney disease, RA, diabetes mellitus type 2, GERD, hyperlipidemia, hypertension and previous syncopal episodes.  Patient reports 34 similar episodes over the last 4 years.  She underwent neuro consult.  Head CT was negative.  Bilateral ultrasound unremarkable.  Echo showed a normal LVEF of 55 to 60%.  EEG unremarkable.  She also underwent cardiology consult.  EKG showed sinus bradycardia with first-degree AV block with prolonged QT interval of 511.  Troponin x3 was negative.  Heart rate was in the 60s.  Patient continues in a 30-day event monitor.  Patient is to follow-up with cardiology in 4 to 6 weeks.  There is some discussion of possible ILR if recurrent events.  Prolonged QT interval.  Electrolytes within normal limits.  A repeat EKG on 10/28 6/21 showed improved QT interval.  She does continue on trazodone and Plaquenil which can cause QT prolongation.  Bilateral hip pain.  X-ray was negative.  She does have chronic neck pain.  CT of the neck was a negative for acute fracture.  Chronic back pain.  She does continue on Percocet.  History of RA on Plaquenil and methotrexate.  She is also on prednisone.  Elevated CKD.  Baseline is around 1.2.  On admit she was 1.34.  This did improve.  Hyperlipidemia on Lipitor.  Diabetes mellitus type 2.  Last hemoglobin A1c was 9.7 on 9/16/2021.  She continues on NPH and sliding scale.  History of allergic rhinitis on Claritin.  Hypertension.  She is on Norvasc and metoprolol.  She did have mildly elevated blood  pressures.  Her lisinopril was on hold due to kidney function.  IV hydralazine was ordered.  Insomnia on trazodone.    Today patient lying in bed.  Patient with underlying diabetes mellitus type 2.  Blood sugars continue to be in the 200s midday.  I have increased her insulin.  Recent reduction in her prednisone to half a milligram for her underlying rheumatoid arthritis.  She also continues on Plaquenil and methotrexate.  Today she has a follow-up with diabetes educator.  Previous hemoglobin A1c was 9.7.  Patient reports she likes to drink juice.  I did instruct her on this today.  She has been working with the in-house dietitian.  Patient with recent syncopal episode.  She continues in cardiac event monitor.  Heart rates occasionally in the upper 50s.  Patient currently asymptomatic.  Blood pressure satisfactory.  Chronic back pain with sciatica.  Continues on Percocet.  Insomnia on trazodone.    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     Type 2 diabetes mellitus with hyperosmolarity without coma, with long-term current use of insulin (H) 01/21/2021     Osteoarthrosis 01/01/2018     Allergic rhinitis due to cats 11/22/2019     Arthropathy of spinal facet joint concurrent with and due to effusion 09/16/2021     HTN (hypertension) 09/18/2017     Chronic back pain 11/27/2020     Bilateral primary osteoarthritis of hip 07/21/2017     Chronic pain 04/17/2017     Controlled substance agreement signed 08/23/2019     Depression 05/04/2015     Essential hypertension 09/16/2021     Chronic GERD 07/12/2017     High risk medication use 02/15/2017     HLD (hyperlipidemia) 09/16/2021     Insomnia 09/16/2021     Lumbar radiculopathy 09/16/2021     Obesity 12/11/2014     Pain in joint, pelvic region and thigh 09/16/2021     Positive FIT (fecal immunochemical test) 11/22/2019     Prolonged QT  interval 09/16/2021     Radiculopathy of cervicothoracic region 09/16/2021     Rheumatoid arthritis, involving unspecified site, unspecified whether rheumatoid factor present (H) 01/20/2016     S/P insertion of spinal cord stimulator 06/24/2019     Venous insufficiency 09/16/2021     Stage 3b chronic kidney disease (H) 09/17/2021     Syncope, unspecified syncope type 10/26/2021     Generalized OA 10/12/2021     Anxiety 11/04/2021     Chronic low back pain with sciatica, sciatica laterality unspecified, unspecified back pain laterality 11/04/2021     First degree heart block 11/04/2021     Bradycardia 11/04/2021     Resolved Ambulatory Problems     Diagnosis Date Noted     Aftercare following joint replacement 04/22/2011     Paraparesis (H) 01/21/2021     Rhythm Disorder      Orthostatic hypotension 12/27/2017     JAYNE (acute kidney injury) (H) 12/27/2017     Thrombocytopenia (H) 10/14/2015     Sepsis (H)      Uncomplicated opioid dependence (H) 08/23/2019     Left hemiparesis (H)      Past Medical History:   Diagnosis Date     BBB (bundle branch block)      Chronic kidney disease      Chronic kidney disease, unspecified      Diabetes (H)      Diabetes mellitus, type 2 (H)      Frequent headaches      Ganglion of tendon sheath      GERD (gastroesophageal reflux disease)      Hypertension      Incarcerated ventral hernia      Osteoarthritis      Rheumatoid arthritis (H)      Shortness of breath        Current Outpatient Medications:      alcohol swab prep pads, Use to swab area of injection/jacquelyn as directed., Disp: 100 each, Rfl: 3     amLODIPine (NORVASC) 10 MG tablet, [AMLODIPINE (NORVASC) 10 MG TABLET] TAKE 1 TABLET EVERY DAY, Disp: 90 tablet, Rfl: 2     ascorbic acid (ASCORBIC ACID WITH LATRICIA HIPS) 500 MG tablet, Take 500 mg by mouth daily , Disp: , Rfl:      aspirin 81 MG EC tablet, Take 81 mg by mouth daily, Disp: , Rfl:      atorvastatin (LIPITOR) 40 MG tablet, [ATORVASTATIN (LIPITOR) 40 MG TABLET] Take 1 tablet  (40 mg total) by mouth at bedtime., Disp: 90 tablet, Rfl: 2     b complex vitamins tablet, [B COMPLEX VITAMINS TABLET] Take 1 tablet by mouth daily., Disp: , Rfl:      blood glucose (NO BRAND SPECIFIED) test strip, Use to test blood sugar 3 times daily or as directed. To accompany: Blood Glucose Monitor Brands: per insurance., Disp: 100 strip, Rfl: 6     blood glucose calibration (NO BRAND SPECIFIED) solution, To accompany: Blood Glucose Monitor Brands: per insurance., Disp: 1 each, Rfl: 0     blood glucose monitoring (NO BRAND SPECIFIED) meter device kit, Use to test blood sugar 3 times daily or as directed. Preferred blood glucose meter OR supplies to accompany: Blood Glucose Monitor Brands: per insurance., Disp: 1 kit, Rfl: 0     blood glucose test strips, [BLOOD GLUCOSE TEST STRIPS] Check blood sugar four times daily. Dispense brand per patient's insurance at pharmacy discretion. Dx: e11.9, Disp: 400 strip, Rfl: 3     blood-glucose meter Misc, [BLOOD-GLUCOSE METER MISC] Dispense one meter. Use as directed. Dx: e11.9, Disp: 1 each, Rfl: 0     fish oil-omega-3 fatty acids 1000 MG capsule, Take 1 g by mouth daily, Disp: , Rfl:      flash glucose scanning reader (FREESTYLE BRE 14 DAY READER) Misc, [FLASH GLUCOSE SCANNING READER (FREESTYLE BRE 14 DAY READER) MISC] Use 1 each As Directed see administration instructions. Use as directed with sensor, Disp: 1 each, Rfl: 0     flash glucose sensor (FREESTYLE BRE 14 DAY SENSOR) Kit, [FLASH GLUCOSE SENSOR (FREESTYLE BRE 14 DAY SENSOR) KIT] Use 1 each As Directed every 14 (fourteen) days., Disp: 2 kit, Rfl: 11     hydrALAZINE (APRESOLINE) 10 MG tablet, Take 1 tablet (10 mg) by mouth 3 times daily as needed (sbp >175), Disp: , Rfl:      hydroxychloroquine (PLAQUENIL) 200 MG tablet, Take 1 tablet (200 mg) by mouth 2 times daily, Disp: 180 tablet, Rfl: 1     insulin  UNIT/ML injection, Inject 32 Units Subcutaneous 2 times daily (before meals) (Patient taking  "differently: Inject 32 Units Subcutaneous 2 times daily (before meals) Pt taking 44 units in am and 32 units at HS.), Disp: , Rfl:      insulin syringe 31G X 5/16\" 0.5 ML MISC, 1 each 2 times daily, Disp: 100 each, Rfl: 11     insulin syringe-needle U-100 (BD INSULIN SYRINGE ULT-FINE II) 1 mL 31 gauge x 5/16 Syrg, [INSULIN SYRINGE-NEEDLE U-100 (BD INSULIN SYRINGE ULT-FINE II) 1 ML 31 GAUGE X 5/16 SYRG] USE 4 TIMES DAILY AS DIRECTED, Disp: 500 each, Rfl: 1     lancing device (ACCU-CHEK SOFTCLIX) Misc, [LANCING DEVICE (ACCU-CHEK SOFTCLIX) MISC] Use 1 applicator As Directed 4 (four) times a day., Disp: 300 each, Rfl: 3     loratadine (CLARITIN) 10 mg tablet, [LORATADINE (CLARITIN) 10 MG TABLET] Take 1 tablet (10 mg total) by mouth at bedtime., Disp: 90 tablet, Rfl: 3     methotrexate 2.5 MG tablet, Take 3 tablets (7.5 mg) by mouth once a week, Disp: 36 tablet, Rfl: 0     metoprolol succinate ER (TOPROL-XL) 50 MG 24 hr tablet, [METOPROLOL SUCCINATE (TOPROL-XL) 50 MG 24 HR TABLET] TAKE 1 TABLET EVERY DAY, Disp: 90 tablet, Rfl: 2     MULTIVITAMIN ORAL, Take 1 tablet by mouth daily , Disp: , Rfl:      oxyCODONE-acetaminophen (PERCOCET) 5-325 MG tablet, Take 1-2 tablets by mouth every 6 hours as needed for severe pain (Max of 6 tablets daily), Disp: 80 tablet, Rfl: 0     predniSONE (DELTASONE) 1 MG tablet, Take 1 tablet (1 mg) by mouth daily (Patient taking differently: Take 0.5 mg by mouth daily Pt to continue then discontinue in 1 month on 12/8/21.), Disp: , Rfl: 0     thin (NO BRAND SPECIFIED) lancets, Use with lanceting device. To accompany: Blood Glucose Monitor Brands: per insurance., Disp: 100 each, Rfl: 11     traZODone (DESYREL) 150 MG tablet, Take 1 tablet (150 mg) by mouth At Bedtime, Disp: 90 tablet, Rfl: 1     Vitamin D3 (VITAMIN D3) 25 mcg (1000 units) tablet, Take 1,000 Units by mouth daily , Disp: , Rfl:   Allergies   Allergen Reactions     Morphine Nausea and Vomiting     Penicillins Hives     Tolerates " ceftriaxone     Sulfa (Sulfonamide Antibiotics) [Sulfa Drugs] Hives       All Meds and Allergies reviewed in the record at the facility and is the most up-to-date    REVIEW OF SYSTEMS:   Review of Systems  No fevers or chills. No headache, lightheadedness or dizziness. + Dry eyes.  No SOB, chest pains or palpitations. Appetite is good. No nausea, vomiting, constipation.  Reports diarrhea from drinking orange juice.  No dysuria, frequency, burning or pain with urination.  Chronic pain syndrome secondary to chronic back pain as well as RA.  She does continue on suppressive therapy and uses Percocet for pain.  Chronic insomnia on trazodone.  Otherwise review of systems are negative.     PHYSICAL EXAMINATION:  Physical Exam     Vital signs: BP (!) 144/69   Pulse 64   Temp 98.2  F (36.8  C)   Resp 16   Ht 1.829 m (6')   Wt 102.2 kg (225 lb 3.2 oz)   SpO2 98%   BMI 30.54 kg/m    General: Awake, Alert, oriented x3, appropriately, follows simple commands, conversant  HEENT:Pink conjunctiva, anicteric sclerae, moist oral mucosa  NECK: Supple, without any lymphadenopathy, or masses  CVS:  Regular rate and rhythm.  She continues with cardiac event monitor.  LUNG: Clear to auscultation, No wheezes, rales or rhonci.  BACK: No kyphosis of the thoracic spine  ABDOMEN: Soft, obese, nontender to palpation, with positive bowel sounds  EXTREMITIES: Moves both upper and lower extremities with generalized weakness, trace pedal edema, no calf tenderness  SKIN: Warm and dry, no rashes or erythema noted  NEUROLOGIC: Intact, pulses palpable  PSYCHIATRIC: Underlying cognitive impairment noted.      Labs:  All labs reviewed in the nursing home record and Epic   @  Lab Results   Component Value Date    WBC 8.4 10/29/2021     Lab Results   Component Value Date    RBC 3.96 10/29/2021     Lab Results   Component Value Date    HGB 12.4 10/29/2021     Lab Results   Component Value Date    HCT 37.8 10/29/2021     Lab Results   Component Value  Date    MCV 96 10/29/2021     Lab Results   Component Value Date    MCH 31.3 10/29/2021     Lab Results   Component Value Date    MCHC 32.8 10/29/2021     Lab Results   Component Value Date    RDW 13.0 10/29/2021     Lab Results   Component Value Date     10/29/2021        @Last Comprehensive Metabolic Panel:  Sodium   Date Value Ref Range Status   10/29/2021 141 136 - 145 mmol/L Final     Potassium   Date Value Ref Range Status   10/29/2021 3.8 3.5 - 5.0 mmol/L Final     Chloride   Date Value Ref Range Status   10/29/2021 108 (H) 98 - 107 mmol/L Final     Carbon Dioxide (CO2)   Date Value Ref Range Status   10/29/2021 27 22 - 31 mmol/L Final     Anion Gap   Date Value Ref Range Status   10/29/2021 6 5 - 18 mmol/L Final     Glucose   Date Value Ref Range Status   10/29/2021 127 (H) 70 - 125 mg/dL Final     GLUCOSE BY METER POCT   Date Value Ref Range Status   10/29/2021 191 (H) 70 - 99 mg/dL Final     Urea Nitrogen   Date Value Ref Range Status   10/29/2021 20 8 - 28 mg/dL Final     Creatinine   Date Value Ref Range Status   10/29/2021 1.22 (H) 0.60 - 1.10 mg/dL Final     GFR Estimate   Date Value Ref Range Status   10/29/2021 44 (L) >60 mL/min/1.73m2 Final     Comment:     As of July 11, 2021, eGFR is calculated by the CKD-EPI creatinine equation, without race adjustment. eGFR can be influenced by muscle mass, exercise, and diet. The reported eGFR is an estimation only and is only applicable if the renal function is stable.   05/07/2021 40 (L) >60 mL/min/1.73m2 Final     Calcium   Date Value Ref Range Status   10/29/2021 9.3 8.5 - 10.5 mg/dL Final         Assessment/Plan:    ICD-10-CM    1. Syncope, unspecified syncope type  R55  no further events.   2. Bradycardia  R00.1  continues in cardiac event monitor for 30 days.  Follow-up with cardiology in 4 to 6 weeks.  Denies any chest pain or palpitations.   3. First degree heart block  I44.0  denies any chest pain.  Continues on beta-blocker.   4. Prolonged QT  interval  R94.31  she does continue on trazodone and Plaquenil which can increase QT interval.  Repeat EKG during hospitalization showed improvement.   5. Type 2 diabetes mellitus with hyperosmolarity without coma, with long-term current use of insulin (H)  E11.00  blood sugars consistently elevated in the 200s.  She is on NovoLog and sliding scale insulin.   Recent increase in a.m. NovoLog to 44 units.  Continue 32 units in the p.m.  RD to consult.  Recent hemoglobin A1c 9.7.  Follow-up with diabetic educator today at [employer].    Z79.4    6. Essential hypertension  I10 continue on metoprolol and Norvasc.  She also has as needed hydralazine.  Continue to monitor blood pressure and pulse.  Follow-up BMP unremarkable.   7. Rheumatoid arthritis, involving unspecified site, unspecified whether rheumatoid factor present (H)  M06.9  on suppressive therapy.  She is followed outpatient by rheumatology.  Decrease prednisone to half milligram daily with plans to DC in 1 month.     8. Chronic low back pain with sciatica, sciatica laterality unspecified, unspecified back pain laterality  M54.40  continues on Percocet for pain.    G89.29          This note has been dictated using voice recognition software. Any grammatical or context distortions are unintentional and inherent to the software    Electronically signed by: Nikki Bourgeois CNP

## 2021-11-15 ENCOUNTER — TRANSITIONAL CARE UNIT VISIT (OUTPATIENT)
Dept: GERIATRICS | Facility: CLINIC | Age: 71
End: 2021-11-15
Payer: COMMERCIAL

## 2021-11-15 VITALS
RESPIRATION RATE: 18 BRPM | WEIGHT: 225.2 LBS | TEMPERATURE: 98.2 F | SYSTOLIC BLOOD PRESSURE: 149 MMHG | OXYGEN SATURATION: 96 % | BODY MASS INDEX: 30.5 KG/M2 | HEART RATE: 66 BPM | DIASTOLIC BLOOD PRESSURE: 79 MMHG | HEIGHT: 72 IN

## 2021-11-15 DIAGNOSIS — I10 ESSENTIAL HYPERTENSION: ICD-10-CM

## 2021-11-15 DIAGNOSIS — M06.9 RHEUMATOID ARTHRITIS, INVOLVING UNSPECIFIED SITE, UNSPECIFIED WHETHER RHEUMATOID FACTOR PRESENT (H): ICD-10-CM

## 2021-11-15 DIAGNOSIS — Z79.4 TYPE 2 DIABETES MELLITUS WITH HYPEROSMOLARITY WITHOUT COMA, WITH LONG-TERM CURRENT USE OF INSULIN (H): ICD-10-CM

## 2021-11-15 DIAGNOSIS — G89.4 CHRONIC PAIN SYNDROME: ICD-10-CM

## 2021-11-15 DIAGNOSIS — R55 SYNCOPE, UNSPECIFIED SYNCOPE TYPE: ICD-10-CM

## 2021-11-15 DIAGNOSIS — R94.31 PROLONGED QT INTERVAL: ICD-10-CM

## 2021-11-15 DIAGNOSIS — E11.00 TYPE 2 DIABETES MELLITUS WITH HYPEROSMOLARITY WITHOUT COMA, WITH LONG-TERM CURRENT USE OF INSULIN (H): ICD-10-CM

## 2021-11-15 DIAGNOSIS — R00.1 BRADYCARDIA: Primary | ICD-10-CM

## 2021-11-15 DIAGNOSIS — I44.0 FIRST DEGREE HEART BLOCK: ICD-10-CM

## 2021-11-15 PROCEDURE — 99309 SBSQ NF CARE MODERATE MDM 30: CPT | Performed by: NURSE PRACTITIONER

## 2021-11-15 ASSESSMENT — MIFFLIN-ST. JEOR: SCORE: 1638.5

## 2021-11-15 NOTE — PROGRESS NOTES
[facility] GERIATRIC SERVICES    Code Status:  FULL CODE   Visit Type:   Chief Complaint   Patient presents with     Nursing Home Acute     TCU Follow up     Facility:  [facility] ([facility]) [[id number]]           History of Present Illness: Ebonie Bowman is a 71 year old female who I am seeing today for follow-up on the TCU.  Patient recently hospitalized on 10/25/2021 secondary to syncope and collapse.  Past medical history includes chronic back pain, chronic kidney disease, RA, diabetes mellitus type 2, GERD, hyperlipidemia, hypertension and previous syncopal episodes.  Patient reports 34 similar episodes over the last 4 years.  She underwent neuro consult.  Head CT was negative.  Bilateral ultrasound unremarkable.  Echo showed a normal LVEF of 55 to 60%.  EEG unremarkable.  She also underwent cardiology consult.  EKG showed sinus bradycardia with first-degree AV block with prolonged QT interval of 511.  Troponin x3 was negative.  Heart rate was in the 60s.  Patient continues in a 30-day event monitor.  Patient is to follow-up with cardiology in 4 to 6 weeks.  There is some discussion of possible ILR if recurrent events.  Prolonged QT interval.  Electrolytes within normal limits.  A repeat EKG on 10/28 6/21 showed improved QT interval.  She does continue on trazodone and Plaquenil which can cause QT prolongation.  Bilateral hip pain.  X-ray was negative.  She does have chronic neck pain.  CT of the neck was a negative for acute fracture.  Chronic back pain.  She does continue on Percocet.  History of RA on Plaquenil and methotrexate.  She is also on prednisone.  Elevated CKD.  Baseline is around 1.2.  On admit she was 1.34.  This did improve.  Hyperlipidemia on Lipitor.  Diabetes mellitus type 2.  Last hemoglobin A1c was 9.7 on 9/16/2021.  She continues on NPH and sliding scale.  History of allergic rhinitis on Claritin.  Hypertension.  She is on Norvasc and metoprolol.  She did have mildly elevated blood  pressures.  Her lisinopril was on hold due to kidney function.  IV hydralazine was ordered.  Insomnia on trazodone.    Today patient sitting up on the edge of the bed.  Patient recently hospitalized with syncope.  She has had no further episodes.  Blood pressure satisfactory.  Patient continues in a heart monitor.  No palpitations.  No chest pain.  Type 2 diabetes mellitus.  Patient recently seen by diabetic educator over at [facility] late last week.  No changes made in medications.  Review of her blood sugars today show that she is consistently around the 200s midday.  Recent adjustments were made in her insulin.  Morning meals are quite close together.  Patient continues to drink juice.  She has been educated on this.  Patient with underlying RA.  She does continue on Plaquenil.  Recent reduction in her prednisone with hopes to wean off in 1 month.  Chronic back pain with sciatica. Pain well controlled with Percocet.  Chronic insomnia on trazodone.      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     Type 2 diabetes mellitus with hyperosmolarity without coma, with long-term current use of insulin (H) 01/21/2021     Osteoarthrosis 01/01/2018     Allergic rhinitis due to cats 11/22/2019     Arthropathy of spinal facet joint concurrent with and due to effusion 09/16/2021     HTN (hypertension) 09/18/2017     Chronic back pain 11/27/2020     Bilateral primary osteoarthritis of hip 07/21/2017     Chronic pain 04/17/2017     Controlled substance agreement signed 08/23/2019     Depression 05/04/2015     Essential hypertension 09/16/2021     Chronic GERD 07/12/2017     High risk medication use 02/15/2017     HLD (hyperlipidemia) 09/16/2021     Insomnia 09/16/2021     Lumbar radiculopathy 09/16/2021     Obesity 12/11/2014     Pain in joint, pelvic region and thigh 09/16/2021     Positive FIT  (fecal immunochemical test) 11/22/2019     Prolonged QT interval 09/16/2021     Radiculopathy of cervicothoracic region 09/16/2021     Rheumatoid arthritis, involving unspecified site, unspecified whether rheumatoid factor present (H) 01/20/2016     S/P insertion of spinal cord stimulator 06/24/2019     Venous insufficiency 09/16/2021     Stage 3b chronic kidney disease (H) 09/17/2021     Syncope, unspecified syncope type 10/26/2021     Generalized OA 10/12/2021     Anxiety 11/04/2021     Chronic low back pain with sciatica, sciatica laterality unspecified, unspecified back pain laterality 11/04/2021     First degree heart block 11/04/2021     Bradycardia 11/04/2021     Resolved Ambulatory Problems     Diagnosis Date Noted     Aftercare following joint replacement 04/22/2011     Paraparesis (H) 01/21/2021     Rhythm Disorder      Orthostatic hypotension 12/27/2017     JAYNE (acute kidney injury) (H) 12/27/2017     Thrombocytopenia (H) 10/14/2015     Sepsis (H)      Uncomplicated opioid dependence (H) 08/23/2019     Left hemiparesis (H)      Past Medical History:   Diagnosis Date     BBB (bundle branch block)      Chronic kidney disease      Chronic kidney disease, unspecified      Diabetes (H)      Diabetes mellitus, type 2 (H)      Frequent headaches      Ganglion of tendon sheath      GERD (gastroesophageal reflux disease)      Hypertension      Incarcerated ventral hernia      Osteoarthritis      Rheumatoid arthritis (H)      Shortness of breath        Current Outpatient Medications:      alcohol swab prep pads, Use to swab area of injection/jacquelyn as directed., Disp: 100 each, Rfl: 3     amLODIPine (NORVASC) 10 MG tablet, [AMLODIPINE (NORVASC) 10 MG TABLET] TAKE 1 TABLET EVERY DAY, Disp: 90 tablet, Rfl: 2     ascorbic acid (ASCORBIC ACID WITH LATRICIA HIPS) 500 MG tablet, Take 500 mg by mouth daily , Disp: , Rfl:      aspirin 81 MG EC tablet, Take 81 mg by mouth daily, Disp: , Rfl:      atorvastatin (LIPITOR) 40 MG  tablet, [ATORVASTATIN (LIPITOR) 40 MG TABLET] Take 1 tablet (40 mg total) by mouth at bedtime., Disp: 90 tablet, Rfl: 2     b complex vitamins tablet, [B COMPLEX VITAMINS TABLET] Take 1 tablet by mouth daily., Disp: , Rfl:      blood glucose (NO BRAND SPECIFIED) test strip, Use to test blood sugar 3 times daily or as directed. To accompany: Blood Glucose Monitor Brands: per insurance., Disp: 100 strip, Rfl: 6     blood glucose calibration (NO BRAND SPECIFIED) solution, To accompany: Blood Glucose Monitor Brands: per insurance., Disp: 1 each, Rfl: 0     blood glucose monitoring (NO BRAND SPECIFIED) meter device kit, Use to test blood sugar 3 times daily or as directed. Preferred blood glucose meter OR supplies to accompany: Blood Glucose Monitor Brands: per insurance., Disp: 1 kit, Rfl: 0     blood glucose test strips, [BLOOD GLUCOSE TEST STRIPS] Check blood sugar four times daily. Dispense brand per patient's insurance at pharmacy discretion. Dx: e11.9, Disp: 400 strip, Rfl: 3     blood-glucose meter Misc, [BLOOD-GLUCOSE METER MISC] Dispense one meter. Use as directed. Dx: e11.9, Disp: 1 each, Rfl: 0     fish oil-omega-3 fatty acids 1000 MG capsule, Take 1 g by mouth daily, Disp: , Rfl:      flash glucose scanning reader (FREESTYLE BRE 14 DAY READER) Misc, [FLASH GLUCOSE SCANNING READER (FREESTYLE BRE 14 DAY READER) MISC] Use 1 each As Directed see administration instructions. Use as directed with sensor, Disp: 1 each, Rfl: 0     flash glucose sensor (FREESTYLE BRE 14 DAY SENSOR) Kit, [FLASH GLUCOSE SENSOR (FREESTYLE BRE 14 DAY SENSOR) KIT] Use 1 each As Directed every 14 (fourteen) days., Disp: 2 kit, Rfl: 11     hydrALAZINE (APRESOLINE) 10 MG tablet, Take 1 tablet (10 mg) by mouth 3 times daily as needed (sbp >175), Disp: , Rfl:      hydroxychloroquine (PLAQUENIL) 200 MG tablet, Take 1 tablet (200 mg) by mouth 2 times daily, Disp: 180 tablet, Rfl: 1     insulin  UNIT/ML injection, Inject 32 Units  "Subcutaneous 2 times daily (before meals) (Patient taking differently: Inject 32 Units Subcutaneous 2 times daily (before meals) Pt taking 44 units in am and 32 units at HS.), Disp: , Rfl:      insulin syringe 31G X 5/16\" 0.5 ML MISC, 1 each 2 times daily, Disp: 100 each, Rfl: 11     insulin syringe-needle U-100 (BD INSULIN SYRINGE ULT-FINE II) 1 mL 31 gauge x 5/16 Syrg, [INSULIN SYRINGE-NEEDLE U-100 (BD INSULIN SYRINGE ULT-FINE II) 1 ML 31 GAUGE X 5/16 SYRG] USE 4 TIMES DAILY AS DIRECTED, Disp: 500 each, Rfl: 1     lancing device (ACCU-CHEK SOFTCLIX) Misc, [LANCING DEVICE (ACCU-CHEK SOFTCLIX) MISC] Use 1 applicator As Directed 4 (four) times a day., Disp: 300 each, Rfl: 3     loratadine (CLARITIN) 10 mg tablet, [LORATADINE (CLARITIN) 10 MG TABLET] Take 1 tablet (10 mg total) by mouth at bedtime., Disp: 90 tablet, Rfl: 3     methotrexate 2.5 MG tablet, Take 3 tablets (7.5 mg) by mouth once a week, Disp: 36 tablet, Rfl: 0     metoprolol succinate ER (TOPROL-XL) 50 MG 24 hr tablet, [METOPROLOL SUCCINATE (TOPROL-XL) 50 MG 24 HR TABLET] TAKE 1 TABLET EVERY DAY, Disp: 90 tablet, Rfl: 2     MULTIVITAMIN ORAL, Take 1 tablet by mouth daily , Disp: , Rfl:      oxyCODONE-acetaminophen (PERCOCET) 5-325 MG tablet, Take 1-2 tablets by mouth every 6 hours as needed for severe pain (Max of 6 tablets daily), Disp: 80 tablet, Rfl: 0     predniSONE (DELTASONE) 1 MG tablet, Take 1 tablet (1 mg) by mouth daily (Patient taking differently: Take 0.5 mg by mouth daily Pt to continue then discontinue in 1 month on 12/8/21.), Disp: , Rfl: 0     thin (NO BRAND SPECIFIED) lancets, Use with lanceting device. To accompany: Blood Glucose Monitor Brands: per insurance., Disp: 100 each, Rfl: 11     traZODone (DESYREL) 150 MG tablet, Take 1 tablet (150 mg) by mouth At Bedtime, Disp: 90 tablet, Rfl: 1     Vitamin D3 (VITAMIN D3) 25 mcg (1000 units) tablet, Take 1,000 Units by mouth daily , Disp: , Rfl:   Allergies   Allergen Reactions     Morphine " Nausea and Vomiting     Penicillins Hives     Tolerates ceftriaxone     Sulfa (Sulfonamide Antibiotics) [Sulfa Drugs] Hives       All Meds and Allergies reviewed in the record at the facility and is the most up-to-date    REVIEW OF SYSTEMS:   Review of Systems  No fevers or chills. No headache, lightheadedness or dizziness. + Dry eyes.  No SOB, chest pains or palpitations. Appetite is good. No nausea, vomiting, constipation.  Reports diarrhea from drinking orange juice.  No dysuria, frequency, burning or pain with urination.  Chronic pain syndrome secondary to chronic back pain as well as RA.  She does continue on suppressive therapy and uses Percocet for pain.  Chronic insomnia on trazodone.  Otherwise review of systems are negative.     PHYSICAL EXAMINATION:  Physical Exam     Vital signs: BP (!) 149/79   Pulse 66   Temp 98.2  F (36.8  C)   Resp 18   Ht 1.829 m (6')   Wt 102.2 kg (225 lb 3.2 oz)   SpO2 96%   BMI 30.54 kg/m    General: Awake, Alert, oriented x3, appropriately, follows simple commands, conversant  HEENT:Pink conjunctiva, anicteric sclerae, moist oral mucosa  NECK: Supple, without any lymphadenopathy, or masses  CVS:  Regular rate and rhythm.  She continues with cardiac event monitor.  LUNG: Clear to auscultation, No wheezes, rales or rhonci.  BACK: No kyphosis of the thoracic spine  ABDOMEN: Soft, obese, nontender to palpation, with positive bowel sounds  EXTREMITIES: Moves both upper and lower extremities with generalized weakness, trace pedal edema, no calf tenderness  SKIN: Warm and dry, no rashes or erythema noted  NEUROLOGIC: Intact, pulses palpable  PSYCHIATRIC: Underlying cognitive impairment noted.      Labs:  All labs reviewed in the nursing home record and [facility]   @  Lab Results   Component Value Date    WBC 8.4 10/29/2021     Lab Results   Component Value Date    RBC 3.96 10/29/2021     Lab Results   Component Value Date    HGB 12.4 10/29/2021     Lab Results   Component Value Date     HCT 37.8 10/29/2021     Lab Results   Component Value Date    MCV 96 10/29/2021     Lab Results   Component Value Date    MCH 31.3 10/29/2021     Lab Results   Component Value Date    MCHC 32.8 10/29/2021     Lab Results   Component Value Date    RDW 13.0 10/29/2021     Lab Results   Component Value Date     10/29/2021        @Last Comprehensive Metabolic Panel:  Sodium   Date Value Ref Range Status   10/29/2021 141 136 - 145 mmol/L Final     Potassium   Date Value Ref Range Status   10/29/2021 3.8 3.5 - 5.0 mmol/L Final     Chloride   Date Value Ref Range Status   10/29/2021 108 (H) 98 - 107 mmol/L Final     Carbon Dioxide (CO2)   Date Value Ref Range Status   10/29/2021 27 22 - 31 mmol/L Final     Anion Gap   Date Value Ref Range Status   10/29/2021 6 5 - 18 mmol/L Final     Glucose   Date Value Ref Range Status   10/29/2021 127 (H) 70 - 125 mg/dL Final     GLUCOSE BY METER POCT   Date Value Ref Range Status   10/29/2021 191 (H) 70 - 99 mg/dL Final     Urea Nitrogen   Date Value Ref Range Status   10/29/2021 20 8 - 28 mg/dL Final     Creatinine   Date Value Ref Range Status   10/29/2021 1.22 (H) 0.60 - 1.10 mg/dL Final     GFR Estimate   Date Value Ref Range Status   10/29/2021 44 (L) >60 mL/min/1.73m2 Final     Comment:     As of July 11, 2021, eGFR is calculated by the CKD-EPI creatinine equation, without race adjustment. eGFR can be influenced by muscle mass, exercise, and diet. The reported eGFR is an estimation only and is only applicable if the renal function is stable.   05/07/2021 40 (L) >60 mL/min/1.73m2 Final     Calcium   Date Value Ref Range Status   10/29/2021 9.3 8.5 - 10.5 mg/dL Final         Assessment/Plan:    ICD-10-CM    1. Syncope, unspecified syncope type  R55  no further events.   2. Bradycardia  R00.1  continues in cardiac event monitor for 30 days.  Follow-up with cardiology in 4  weeks.  Denies any chest pain or palpitations.   3. First degree heart block  I44.0  denies any chest  pain.  Continues on beta-blocker.   4. Prolonged QT interval  R94.31  she does continue on trazodone and Plaquenil which can increase QT interval.  Repeat EKG during hospitalization showed improvement.   5. Type 2 diabetes mellitus with hyperosmolarity without coma, with long-term current use of insulin (H)  E11.00  Blood sugars improving. Continues with occasional 200s mid day. Irregular eating times at the TCU.   Recent increase in a.m. NovoLog to 44 units.  Continue 32 units in the p.m. Pt also continues on sliding scale.    hemoglobin A1c 9.7.  Pt seen by diabetic educator at [place] last Thursday. Follow up in 2 weeks.     Z79.4    6. Essential hypertension  I10 continue on metoprolol and Norvasc.   Discontinueas needed hydralazine.  BP satisfactory.   Follow-up BMP unremarkable.   7. Rheumatoid arthritis, involving unspecified site, unspecified whether rheumatoid factor present (H)  M06.9  on suppressive therapy.  She is followed outpatient by rheumatology.  Decrease prednisone to half milligram daily with plans to DC in 1 month.     8. Chronic low back pain with sciatica, sciatica laterality unspecified, unspecified back pain laterality  M54.40  continues on Percocet for pain.    G89.29          This note has been dictated using voice recognition software. Any grammatical or context distortions are unintentional and inherent to the software    Electronically signed by: Nikki Bourgeois, CNP

## 2021-11-17 ENCOUNTER — DISCHARGE SUMMARY NURSING HOME (OUTPATIENT)
Dept: GERIATRICS | Facility: CLINIC | Age: 71
End: 2021-11-17
Payer: COMMERCIAL

## 2021-11-17 VITALS
SYSTOLIC BLOOD PRESSURE: 127 MMHG | BODY MASS INDEX: 30.48 KG/M2 | DIASTOLIC BLOOD PRESSURE: 84 MMHG | OXYGEN SATURATION: 97 % | HEART RATE: 62 BPM | WEIGHT: 225 LBS | TEMPERATURE: 97.8 F | RESPIRATION RATE: 16 BRPM | HEIGHT: 72 IN

## 2021-11-17 DIAGNOSIS — Z79.4 TYPE 2 DIABETES MELLITUS WITH HYPEROSMOLARITY WITHOUT COMA, WITH LONG-TERM CURRENT USE OF INSULIN (H): ICD-10-CM

## 2021-11-17 DIAGNOSIS — I10 ESSENTIAL HYPERTENSION: ICD-10-CM

## 2021-11-17 DIAGNOSIS — G89.4 CHRONIC PAIN SYNDROME: ICD-10-CM

## 2021-11-17 DIAGNOSIS — R94.31 PROLONGED QT INTERVAL: ICD-10-CM

## 2021-11-17 DIAGNOSIS — M06.9 RHEUMATOID ARTHRITIS, INVOLVING UNSPECIFIED SITE, UNSPECIFIED WHETHER RHEUMATOID FACTOR PRESENT (H): ICD-10-CM

## 2021-11-17 DIAGNOSIS — I44.0 FIRST DEGREE HEART BLOCK: ICD-10-CM

## 2021-11-17 DIAGNOSIS — M54.40 CHRONIC LOW BACK PAIN WITH SCIATICA, SCIATICA LATERALITY UNSPECIFIED, UNSPECIFIED BACK PAIN LATERALITY: ICD-10-CM

## 2021-11-17 DIAGNOSIS — E11.00 TYPE 2 DIABETES MELLITUS WITH HYPEROSMOLARITY WITHOUT COMA, WITH LONG-TERM CURRENT USE OF INSULIN (H): ICD-10-CM

## 2021-11-17 DIAGNOSIS — R00.1 BRADYCARDIA: Primary | ICD-10-CM

## 2021-11-17 DIAGNOSIS — R55 SYNCOPE, UNSPECIFIED SYNCOPE TYPE: ICD-10-CM

## 2021-11-17 DIAGNOSIS — G89.29 CHRONIC LOW BACK PAIN WITH SCIATICA, SCIATICA LATERALITY UNSPECIFIED, UNSPECIFIED BACK PAIN LATERALITY: ICD-10-CM

## 2021-11-17 PROCEDURE — 99316 NF DSCHRG MGMT 30 MIN+: CPT | Performed by: NURSE PRACTITIONER

## 2021-11-17 ASSESSMENT — MIFFLIN-ST. JEOR: SCORE: 1637.59

## 2021-11-18 NOTE — PROGRESS NOTES
[facility] GERIATRIC SERVICES    Code Status:  FULL CODE   Visit Type:   Chief Complaint   Patient presents with     Discharge Summary Nursing Home     Facility:  [facility] ([facility]) [[id number]]           History of Present Illness: Ebonie Bowman is a 71 year old female who I am seeing today for follow-up on the TCU.  Patient recently hospitalized on 10/25/2021 secondary to syncope and collapse.  Past medical history includes chronic back pain, chronic kidney disease, RA, diabetes mellitus type 2, GERD, hyperlipidemia, hypertension and previous syncopal episodes.  Patient reports 34 similar episodes over the last 4 years.  She underwent neuro consult.  Head CT was negative.  Bilateral ultrasound unremarkable.  Echo showed a normal LVEF of 55 to 60%.  EEG unremarkable.  She also underwent cardiology consult.  EKG showed sinus bradycardia with first-degree AV block with prolonged QT interval of 511.  Troponin x3 was negative.  Heart rate was in the 60s.  Patient continues in a 30-day event monitor.  Patient is to follow-up with cardiology in 4 to 6 weeks.  There is some discussion of possible ILR if recurrent events.  Prolonged QT interval.  Electrolytes within normal limits.  A repeat EKG on 10/28 6/21 showed improved QT interval.  She does continue on trazodone and Plaquenil which can cause QT prolongation.  Bilateral hip pain.  X-ray was negative.  She does have chronic neck pain.  CT of the neck was a negative for acute fracture.  Chronic back pain.  She does continue on Percocet.  History of RA on Plaquenil and methotrexate.  She is also on prednisone.  Elevated CKD.  Baseline is around 1.2.  On admit she was 1.34.  This did improve.  Hyperlipidemia on Lipitor.  Diabetes mellitus type 2.  Last hemoglobin A1c was 9.7 on 9/16/2021.  She continues on NPH and sliding scale.  History of allergic rhinitis on Claritin.  Hypertension.  She is on Norvasc and metoprolol.  She did have mildly elevated blood pressures.   Her lisinopril was on hold due to kidney function.  IV hydralazine was ordered.  Insomnia on trazodone.    Today patient lying in bed.  Patient has had no further episodes of syncope.  She did have a one-time low pulse in the 40s however pulse is consistently in the upper 50s to 70s.  She continues in a cardiac monitor.  She denies any chest pain.  No palpitations.  She denies dizziness.  Blood pressures have been satisfactory.  Type 2 diabetes.  Insulin was increased during her TCU stay.  She continues on NPH twice daily.  She did meet with a diabetic educator at [facility] last week.  Blood sugars occasionally in the 200s around midday however meals are close together here at the TCU.  Patient continues to drink juice.  Patient with underlying RA.  She continues on Plaquenil.  She was on prednisone.  This has been decreased to 0.5 mg daily with orders to wean off in 1 month.  Chronic back pain with sciatica.  Chronic use of Percocet.  Chronic insomnia on trazodone.      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     Type 2 diabetes mellitus with hyperosmolarity without coma, with long-term current use of insulin (H) 01/21/2021     Osteoarthrosis 01/01/2018     Allergic rhinitis due to cats 11/22/2019     Arthropathy of spinal facet joint concurrent with and due to effusion 09/16/2021     HTN (hypertension) 09/18/2017     Chronic back pain 11/27/2020     Bilateral primary osteoarthritis of hip 07/21/2017     Chronic pain 04/17/2017     Controlled substance agreement signed 08/23/2019     Depression 05/04/2015     Essential hypertension 09/16/2021     Chronic GERD 07/12/2017     High risk medication use 02/15/2017     HLD (hyperlipidemia) 09/16/2021     Insomnia 09/16/2021     Lumbar radiculopathy 09/16/2021     Obesity 12/11/2014     Pain in joint, pelvic region and thigh 09/16/2021      Positive FIT (fecal immunochemical test) 11/22/2019     Prolonged QT interval 09/16/2021     Radiculopathy of cervicothoracic region 09/16/2021     Rheumatoid arthritis, involving unspecified site, unspecified whether rheumatoid factor present (H) 01/20/2016     S/P insertion of spinal cord stimulator 06/24/2019     Venous insufficiency 09/16/2021     Stage 3b chronic kidney disease (H) 09/17/2021     Syncope, unspecified syncope type 10/26/2021     Generalized OA 10/12/2021     Anxiety 11/04/2021     Chronic low back pain with sciatica, sciatica laterality unspecified, unspecified back pain laterality 11/04/2021     First degree heart block 11/04/2021     Bradycardia 11/04/2021     Resolved Ambulatory Problems     Diagnosis Date Noted     Aftercare following joint replacement 04/22/2011     Paraparesis (H) 01/21/2021     Rhythm Disorder      Orthostatic hypotension 12/27/2017     JAYNE (acute kidney injury) (H) 12/27/2017     Thrombocytopenia (H) 10/14/2015     Sepsis (H)      Uncomplicated opioid dependence (H) 08/23/2019     Left hemiparesis (H)      Past Medical History:   Diagnosis Date     BBB (bundle branch block)      Chronic kidney disease      Chronic kidney disease, unspecified      Diabetes (H)      Diabetes mellitus, type 2 (H)      Frequent headaches      Ganglion of tendon sheath      GERD (gastroesophageal reflux disease)      Hypertension      Incarcerated ventral hernia      Osteoarthritis      Rheumatoid arthritis (H)      Shortness of breath        Current Outpatient Medications:      alcohol swab prep pads, Use to swab area of injection/jacquelyn as directed., Disp: 100 each, Rfl: 3     amLODIPine (NORVASC) 10 MG tablet, [AMLODIPINE (NORVASC) 10 MG TABLET] TAKE 1 TABLET EVERY DAY, Disp: 90 tablet, Rfl: 2     ascorbic acid (ASCORBIC ACID WITH LATRICIA HIPS) 500 MG tablet, Take 500 mg by mouth daily , Disp: , Rfl:      aspirin 81 MG EC tablet, Take 81 mg by mouth daily, Disp: , Rfl:      atorvastatin  (LIPITOR) 40 MG tablet, [ATORVASTATIN (LIPITOR) 40 MG TABLET] Take 1 tablet (40 mg total) by mouth at bedtime., Disp: 90 tablet, Rfl: 2     b complex vitamins tablet, [B COMPLEX VITAMINS TABLET] Take 1 tablet by mouth daily., Disp: , Rfl:      blood glucose (NO BRAND SPECIFIED) test strip, Use to test blood sugar 3 times daily or as directed. To accompany: Blood Glucose Monitor Brands: per insurance., Disp: 100 strip, Rfl: 6     blood glucose calibration (NO BRAND SPECIFIED) solution, To accompany: Blood Glucose Monitor Brands: per insurance., Disp: 1 each, Rfl: 0     blood glucose monitoring (NO BRAND SPECIFIED) meter device kit, Use to test blood sugar 3 times daily or as directed. Preferred blood glucose meter OR supplies to accompany: Blood Glucose Monitor Brands: per insurance., Disp: 1 kit, Rfl: 0     blood glucose test strips, [BLOOD GLUCOSE TEST STRIPS] Check blood sugar four times daily. Dispense brand per patient's insurance at pharmacy discretion. Dx: e11.9, Disp: 400 strip, Rfl: 3     blood-glucose meter Misc, [BLOOD-GLUCOSE METER MISC] Dispense one meter. Use as directed. Dx: e11.9, Disp: 1 each, Rfl: 0     fish oil-omega-3 fatty acids 1000 MG capsule, Take 1 g by mouth daily, Disp: , Rfl:      flash glucose scanning reader (FREESTYLE BRE 14 DAY READER) Misc, [FLASH GLUCOSE SCANNING READER (FREESTYLE BRE 14 DAY READER) MISC] Use 1 each As Directed see administration instructions. Use as directed with sensor, Disp: 1 each, Rfl: 0     flash glucose sensor (FREESTYLE BRE 14 DAY SENSOR) Kit, [FLASH GLUCOSE SENSOR (FREESTYLE BRE 14 DAY SENSOR) KIT] Use 1 each As Directed every 14 (fourteen) days., Disp: 2 kit, Rfl: 11     hydrALAZINE (APRESOLINE) 10 MG tablet, Take 1 tablet (10 mg) by mouth 3 times daily as needed (sbp >175), Disp: , Rfl:      hydroxychloroquine (PLAQUENIL) 200 MG tablet, Take 1 tablet (200 mg) by mouth 2 times daily, Disp: 180 tablet, Rfl: 1     insulin  UNIT/ML injection,  "Inject 32 Units Subcutaneous 2 times daily (before meals) (Patient taking differently: Inject 32 Units Subcutaneous 2 times daily (before meals) Pt taking 44 units in am and 32 units at HS.), Disp: , Rfl:      insulin syringe 31G X 5/16\" 0.5 ML MISC, 1 each 2 times daily, Disp: 100 each, Rfl: 11     insulin syringe-needle U-100 (BD INSULIN SYRINGE ULT-FINE II) 1 mL 31 gauge x 5/16 Syrg, [INSULIN SYRINGE-NEEDLE U-100 (BD INSULIN SYRINGE ULT-FINE II) 1 ML 31 GAUGE X 5/16 SYRG] USE 4 TIMES DAILY AS DIRECTED, Disp: 500 each, Rfl: 1     lancing device (ACCU-CHEK SOFTCLIX) Misc, [LANCING DEVICE (ACCU-CHEK SOFTCLIX) MISC] Use 1 applicator As Directed 4 (four) times a day., Disp: 300 each, Rfl: 3     loratadine (CLARITIN) 10 mg tablet, [LORATADINE (CLARITIN) 10 MG TABLET] Take 1 tablet (10 mg total) by mouth at bedtime., Disp: 90 tablet, Rfl: 3     methotrexate 2.5 MG tablet, Take 3 tablets (7.5 mg) by mouth once a week, Disp: 36 tablet, Rfl: 0     metoprolol succinate ER (TOPROL-XL) 50 MG 24 hr tablet, [METOPROLOL SUCCINATE (TOPROL-XL) 50 MG 24 HR TABLET] TAKE 1 TABLET EVERY DAY, Disp: 90 tablet, Rfl: 2     MULTIVITAMIN ORAL, Take 1 tablet by mouth daily , Disp: , Rfl:      oxyCODONE-acetaminophen (PERCOCET) 5-325 MG tablet, Take 1-2 tablets by mouth every 6 hours as needed for severe pain (Max of 6 tablets daily), Disp: 80 tablet, Rfl: 0     predniSONE (DELTASONE) 1 MG tablet, Take 1 tablet (1 mg) by mouth daily (Patient taking differently: Take 0.5 mg by mouth daily Pt to continue then discontinue in 1 month on 12/8/21.), Disp: , Rfl: 0     thin (NO BRAND SPECIFIED) lancets, Use with lanceting device. To accompany: Blood Glucose Monitor Brands: per insurance., Disp: 100 each, Rfl: 11     traZODone (DESYREL) 150 MG tablet, Take 1 tablet (150 mg) by mouth At Bedtime, Disp: 90 tablet, Rfl: 1     Vitamin D3 (VITAMIN D3) 25 mcg (1000 units) tablet, Take 1,000 Units by mouth daily , Disp: , Rfl:   Allergies   Allergen " Reactions     Morphine Nausea and Vomiting     Penicillins Hives     Tolerates ceftriaxone     Sulfa (Sulfonamide Antibiotics) [Sulfa Drugs] Hives       All Meds and Allergies reviewed in the record at the facility and is the most up-to-date    REVIEW OF SYSTEMS:   Review of Systems  No fevers or chills. No headache, lightheadedness or dizziness. + Dry eyes.  No SOB, chest pains or palpitations. Appetite is good. No nausea, vomiting, constipation.  Reports diarrhea from drinking orange juice.  No dysuria, frequency, burning or pain with urination.  Chronic pain syndrome secondary to chronic back pain as well as RA.  She does continue on suppressive therapy and uses Percocet for pain.  Chronic insomnia on trazodone.  Otherwise review of systems are negative.     PHYSICAL EXAMINATION:  Physical Exam     Vital signs: /84   Pulse 62   Temp 97.8  F (36.6  C)   Resp 16   Ht 1.829 m (6')   Wt 102.1 kg (225 lb)   SpO2 97%   BMI 30.52 kg/m    General: Awake, Alert, oriented x3, appropriately, follows simple commands, conversant  HEENT:Pink conjunctiva, anicteric sclerae, moist oral mucosa  NECK: Supple, without any lymphadenopathy, or masses  CVS:  Regular rate and rhythm.  She continues with cardiac event monitor.  LUNG: Clear to auscultation, No wheezes, rales or rhonci.  BACK: No kyphosis of the thoracic spine  ABDOMEN: Soft, obese, nontender to palpation, with positive bowel sounds  EXTREMITIES: Moves both upper and lower extremities with generalized weakness, trace pedal edema, no calf tenderness  SKIN: Warm and dry, no rashes or erythema noted  NEUROLOGIC: Intact, pulses palpable  PSYCHIATRIC: Underlying cognitive impairment noted.      Labs:  All labs reviewed in the nursing home record and [employer]   @  Lab Results   Component Value Date    WBC 8.4 10/29/2021     Lab Results   Component Value Date    RBC 3.96 10/29/2021     Lab Results   Component Value Date    HGB 12.4 10/29/2021     Lab Results   Component  Value Date    HCT 37.8 10/29/2021     Lab Results   Component Value Date    MCV 96 10/29/2021     Lab Results   Component Value Date    MCH 31.3 10/29/2021     Lab Results   Component Value Date    MCHC 32.8 10/29/2021     Lab Results   Component Value Date    RDW 13.0 10/29/2021     Lab Results   Component Value Date     10/29/2021        @Last Comprehensive Metabolic Panel:  Sodium   Date Value Ref Range Status   10/29/2021 141 136 - 145 mmol/L Final     Potassium   Date Value Ref Range Status   10/29/2021 3.8 3.5 - 5.0 mmol/L Final     Chloride   Date Value Ref Range Status   10/29/2021 108 (H) 98 - 107 mmol/L Final     Carbon Dioxide (CO2)   Date Value Ref Range Status   10/29/2021 27 22 - 31 mmol/L Final     Anion Gap   Date Value Ref Range Status   10/29/2021 6 5 - 18 mmol/L Final     Glucose   Date Value Ref Range Status   10/29/2021 127 (H) 70 - 125 mg/dL Final     GLUCOSE BY METER POCT   Date Value Ref Range Status   10/29/2021 191 (H) 70 - 99 mg/dL Final     Urea Nitrogen   Date Value Ref Range Status   10/29/2021 20 8 - 28 mg/dL Final     Creatinine   Date Value Ref Range Status   10/29/2021 1.22 (H) 0.60 - 1.10 mg/dL Final     GFR Estimate   Date Value Ref Range Status   10/29/2021 44 (L) >60 mL/min/1.73m2 Final     Comment:     As of July 11, 2021, eGFR is calculated by the CKD-EPI creatinine equation, without race adjustment. eGFR can be influenced by muscle mass, exercise, and diet. The reported eGFR is an estimation only and is only applicable if the renal function is stable.   05/07/2021 40 (L) >60 mL/min/1.73m2 Final     Calcium   Date Value Ref Range Status   10/29/2021 9.3 8.5 - 10.5 mg/dL Final         Assessment/Plan:    ICD-10-CM    1. Syncope, unspecified syncope type  R55  no further events.   2. Bradycardia  R00.1  continues in cardiac event monitor for 30 days.  End of the 30 days will be the end of this week.  She is to package the device and put it in the mail.  Follow-up with  cardiology in 3 weeks.  Denies any chest pain or palpitations.   3. First degree heart block  I44.0  denies any chest pain.  Continues on beta-blocker.   4. Prolonged QT interval  R94.31  she does continue on trazodone and Plaquenil which can increase QT interval.  Repeat EKG during hospitalization showed improvement.   5. Type 2 diabetes mellitus with hyperosmolarity without coma, with long-term current use of insulin (H)  E11.00  Blood sugars improving. Continues with occasional 200s mid day. Irregular eating times at the TCU.   Recent increase in a.m. NovoLog to 44 units.  Continue 32 units in the p.m. Pt also continues on sliding scale.    hemoglobin A1c 9.7.  Pt seen by diabetic educator at [facility] last Thursday. Follow up in 2 weeks.     Z79.4    6. Essential hypertension  I10 continue on metoprolol and Norvasc.   Discontinueas needed hydralazine.  BP satisfactory.   Follow-up BMP unremarkable.   7. Rheumatoid arthritis, involving unspecified site, unspecified whether rheumatoid factor present (H)  M06.9  on suppressive therapy.  She is followed outpatient by rheumatology.  Decrease prednisone to half milligram daily with plans to DC in 1 month.     8. Chronic low back pain with sciatica, sciatica laterality unspecified, unspecified back pain laterality  M54.40  continues on Percocet for pain.    G89.29        Okay to DC home with current meds and treatments and remainder of Percocet.  Follow-up with primary care provider in 7 to 10 days.  Follow-up outpatient with cardiology.  Home PT, OT, home health aide and RN for medication management and further monitoring of her diabetes.    DISCHARGE PLAN/FACE TO FACE:  I certify that this patient is under my care and that I, or a nurse practitioner or physician's assistant working with me, had a face-to-face encounter that meets the physician face-to-face encounter requirements with this patient.       I certify that, based on my findings, the following services are  medically necessary home health services.    My clinical findings support the need for the above skilled services.    This patient is homebound because: Recent hospitalization with syncope    The patient is, or has been, under my care and I have initiated the establishment of the plan of care. This patient will be followed by a physician who will periodically review the plan of care.    Total time spent for this visit was 35 minutes with greater than 50% of time spent face-to-face with patient reviewing discharge instructions including medications, home care services and follow-ups.    This note has been dictated using voice recognition software. Any grammatical or context distortions are unintentional and inherent to the software    Electronically signed by: Nikki Bourgeois CNP

## 2021-11-19 ENCOUNTER — TELEPHONE (OUTPATIENT)
Dept: INTERNAL MEDICINE | Facility: CLINIC | Age: 71
End: 2021-11-19
Payer: COMMERCIAL

## 2021-11-19 NOTE — TELEPHONE ENCOUNTER
Chelita calling today for verbal orders on the following:    Skilled nursing 1x per week for 2 weeks.      PT/OT eval and treat following hospitalization.    Please call Chelita at [phone]. Secure VM if needed.

## 2021-11-22 ENCOUNTER — TELEPHONE (OUTPATIENT)
Dept: INTERNAL MEDICINE | Facility: CLINIC | Age: 71
End: 2021-11-22
Payer: COMMERCIAL

## 2021-11-22 NOTE — TELEPHONE ENCOUNTER
Patient's insurance denied coverage of the continuous glucose meter because she did not meet the insulin requirements of at least 3 injections daily.  Because her diabetes has been uncontrolled and has required adjustments to her insulin regimen, I wrote an appeal letter.  Please submit the appeal.

## 2021-11-22 NOTE — TELEPHONE ENCOUNTER
Pt out of pocket cost for the freestyle Saida is $120.00 for one month.  This is too expensive for the patient.    She is wondering if there another alternative that can be provided that is affordable or reduces the cost.    [phone] is the best number to reach patient.

## 2021-11-22 NOTE — LETTER
11/22/2021        RE: Ebonie Bowman  [address]  [address]  [place] [address]        Ebonie Bowman has a diagnosis of T2DM which has been getting worse recently. She is reliant on 2 insulin injections daily and this has required frequent dose adjustments because of uncontrolled diabetes. Please reconsider coverage of a continuous glucose meter to help her to better and more safely manage her insulin dependent diabetes.       Sincerely,        Marichuy Rubio NP

## 2021-11-23 NOTE — TELEPHONE ENCOUNTER
Medication Appeal Initiation    We have initiated an appeal for the requested medication:  Medication: flash glucose sensor (FREESTYLE BRE 14 DAY SENSOR) Kit--DENIED  Appeal Start Date:  11/23/2021  Insurance Company: [employer] - Phone [phone] Fax [phone]  Comments:

## 2021-11-26 ENCOUNTER — TELEPHONE (OUTPATIENT)
Dept: INTERNAL MEDICINE | Facility: CLINIC | Age: 71
End: 2021-11-26

## 2021-11-26 NOTE — TELEPHONE ENCOUNTER
Reason for Call:  Yara is calling for verbal PT orders.  1x per week for 1 week, then 2 x per week for 3 weeks.  Working gait, stairs, balance and lower extremity strengthening.    Detailed comments: please call back with verbal orders.    Phone Number Patient can be reached at: Other phone number:  [phone]    Best Time: any    Can we leave a detailed message on this number? YES    Call taken on 11/26/2021 at 3:59 PM by Diane Melendez

## 2021-11-30 DIAGNOSIS — E78.2 MIXED HYPERLIPIDEMIA: ICD-10-CM

## 2021-11-30 DIAGNOSIS — G89.4 CHRONIC PAIN SYNDROME: ICD-10-CM

## 2021-11-30 PROCEDURE — 93272 ECG/REVIEW INTERPRET ONLY: CPT | Performed by: INTERNAL MEDICINE

## 2021-11-30 RX ORDER — ATORVASTATIN CALCIUM 40 MG/1
40 TABLET, FILM COATED ORAL AT BEDTIME
Qty: 90 TABLET | Refills: 0 | Status: SHIPPED | OUTPATIENT
Start: 2021-11-30 | End: 2022-03-03

## 2021-11-30 RX ORDER — OXYCODONE AND ACETAMINOPHEN 5; 325 MG/1; MG/1
1-2 TABLET ORAL EVERY 6 HOURS PRN
Qty: 80 TABLET | Refills: 0 | Status: SHIPPED | OUTPATIENT
Start: 2021-11-30 | End: 2021-12-22

## 2021-11-30 NOTE — TELEPHONE ENCOUNTER
Controlled Substance Refill Request for Percocet    Last refill: 11-8-21 for 80 with 0    Last clinic visit: 9-16-21     Clinic visit frequency required: Q 3 months  Next appt: 12-16-21    Controlled substance agreement on file: Yes:  Date 4/5/21.    Documentation in problem list reviewed:  Yes    Processing:  Rx to be electronically transmitted to pharmacy by provider

## 2021-11-30 NOTE — TELEPHONE ENCOUNTER
Reason for Call:  Medication or medication refill:    Do you use a [facility]?  Name of the pharmacy and phone number for the current request:      [facility] on file    Name of the medication requested:     Oxycodone-acetaminophen 5-325 mg tablet    Atorvastatin 40 mg    Other request: N/A    Can we leave a detailed message on this number? YES    Phone number patient can be reached at: Home number on file [phone] (home)    Best Time: Any time    Call taken on 11/30/2021 at 12:14 PM by Darren Fry

## 2021-12-02 ENCOUNTER — MEDICAL CORRESPONDENCE (OUTPATIENT)
Dept: HEALTH INFORMATION MANAGEMENT | Facility: CLINIC | Age: 71
End: 2021-12-02
Payer: COMMERCIAL

## 2021-12-03 ENCOUNTER — ALLIED HEALTH/NURSE VISIT (OUTPATIENT)
Dept: EDUCATION SERVICES | Facility: CLINIC | Age: 71
End: 2021-12-03
Payer: COMMERCIAL

## 2021-12-03 DIAGNOSIS — E11.9 DIABETES MELLITUS, TYPE 2 (H): Primary | ICD-10-CM

## 2021-12-03 PROCEDURE — G0108 DIAB MANAGE TRN  PER INDIV: HCPCS | Mod: AE

## 2021-12-03 NOTE — TELEPHONE ENCOUNTER
Encounter for 10/11/21 shows denial that was PRIOR to Appeal letter written on 11/22/21 by Marichuy Rubio NP.     Please resubmit appeal for tamara to include this appeal letter written on 11/22.

## 2021-12-03 NOTE — LETTER
12/3/2021         RE: Ebonie Bowman  [address]  [address]  [place] [address]        Dear Colleague,    Thank you for referring your patient, Ebonie Bowman, to the [facility]. Please see a copy of my visit note below.    Diabetes Self-Management Education & Support    Presents for: Individual review    ASSESSMENT:  Pt seen today for f/u. She was discharged from [facility] on 11/18. Pt feels things have been going well. She is taking NPH 44 units in the morning and 32 units in the evening. She has not had any lows. Saida uploaded, she only has up until 11/21 when her sensor ran out and now her insurance is denying coverage.             BG control is improved over recent visits. Also do not have data for the last couple weeks. Pt notes elevations are likely related to juice. She also explains she has been really trying to do better with diet and sweets, she feels she is doing well.     Noted Marichuy Rubio wrote appeal letter 11/22 and it looks like the appeal was never resubmitted with this letter. Sent message to PA team regarding resubmitting w/ the appeal letter.   Pt was provided w/ sample saida today, placed on left arm. She has her own 14 day reader.       Patient's most recent   Lab Results   Component Value Date    A1C 9.7 09/16/2021    is not meeting goal of <8.0    PLAN  No changes to dosing today. BG are improving.   Will re-submit appeal.   Pt will f/u 12/16 with Marichuy and have A1c done at that time.   Will f/u here 1/6 - to call sooner w/ any concerns.     SUBJECTIVE/OBJECTIVE:  Presents for: Individual review  Accompanied by: Self  Diabetes education in the past 24mo: No  Focus of Visit: Monitoring,Healthy Eating  Diabetes type: Type 2  Date of diagnosis: 1995  Disease course: Worsening  Cultural Influences/Ethnic Background:  Not  or       Diabetes Symptoms & Complications:  Fatigue: Yes (afternoons)  Neuropathy: Yes  Polydipsia: Yes  Polyphagia: No  Polyuria:  Sometimes  Visual change: Yes  Slow healing wounds: No  Complications assessed today?: Yes  Autonomic neuropathy: Yes  CVA: No  Heart disease:  (family history - father)  Retinopathy: No    Patient Problem List and Family Medical History reviewed for relevant medical history, current medical status, and diabetes risk factors.    Vitals:  There were no vitals taken for this visit.  Estimated body mass index is 30.52 kg/m  as calculated from the following:    Height as of 11/17/21: 1.829 m (6').    Weight as of 11/17/21: 102.1 kg (225 lb).   Last 3 BP:   BP Readings from Last 3 Encounters:   11/17/21 127/84   11/15/21 (!) 149/79   11/11/21 (!) 144/69       History   Smoking Status     Former Smoker     Packs/day: 1.00     Years: 43.00     Start date: 1/1/1965     Quit date: 8/1/2008   Smokeless Tobacco     Never Used       Labs:  Lab Results   Component Value Date    A1C 9.7 09/16/2021     Lab Results   Component Value Date     10/29/2021     10/29/2021     Lab Results   Component Value Date    LDL 57 09/16/2021     Direct Measure HDL   Date Value Ref Range Status   09/16/2021 47 (L) >=50 mg/dL Final     Comment:     HDL Cholesterol Reference Range:     0-2 years:   No reference ranges established for patients under 2 years old  at [facility] for lipid analytes.    2-8 years:  Greater than 45 mg/dL     18 years and older:   Female: Greater than or equal to 50 mg/dL   Male:   Greater than or equal to 40 mg/dL   ]  GFR Estimate   Date Value Ref Range Status   10/29/2021 44 (L) >60 mL/min/1.73m2 Final     Comment:     As of July 11, 2021, eGFR is calculated by the CKD-EPI creatinine equation, without race adjustment. eGFR can be influenced by muscle mass, exercise, and diet. The reported eGFR is an estimation only and is only applicable if the renal function is stable.   05/07/2021 40 (L) >60 mL/min/1.73m2 Final     GFR Estimate If Black   Date Value Ref Range Status   05/07/2021 48 (L) >60  mL/min/1.73m2 Final     Lab Results   Component Value Date    CR 1.22 10/29/2021     No results found for: MICROALBUMIN    Healthy Eating:  Healthy Eating Assessed Today: Yes  Meals include: Lunch,Dinner,Afternoon Snack,Evening Snack  Breakfast: only eating sometimes, around 9am  Lunch: 5225-2324: meals on wheels  Dinner: 430-630pm  Snacks: sometimes HS snack, about 50% of the time  Beverages: Water,Tea,Milk,Juice,Soda    Being Active:  Being Active Assessed Today: Yes  Exercise:: Currently not exercising  Barrier to exercise: Physical limitation (back problems)    Monitoring:  Monitoring Assessed Today: Yes  Did patient bring glucose meter to appointment? : Yes  Blood Glucose Meter: CGM    Taking Medications:  Diabetes Medication(s)     Insulin       insulin  UNIT/ML injection    Inject 32 Units Subcutaneous 2 times daily (before meals)     Patient taking differently: Inject 32 Units Subcutaneous 2 times daily (before meals) Pt taking 44 units in am and 32 units at HS.          Taking Medication Assessed Today: Yes  Current Treatments: Insulin Injections  Dose schedule:  (NPH 9am and 9pm)  Problems taking diabetes medications regularly?: No    Problem Solving:  Is the patient at risk for hypoglycemia?: Yes  Hypoglycemia Frequency: Monthly  Hypoglycemia Treatment: Juice,Candy  Medical ID: No    Hypoglycemia symptoms  Dizziness or Light-Headedness: Yes  Hunger: No  Mood changes: Yes  Nervousness/Anxiety: Yes  Sleepiness: No  Sweats: Yes  Feeling shaky: Yes    Hypoglycemia Complications  Blackouts: No  Hospitalization: No  Nocturnal hypoglycemia: Yes  Required assistance: Yes  Seizures: No    Reducing Risks:  Reducing Risks Assessed Today: Yes  Diabetes Risks: Age over 45 years,Sedentary Lifestyle  CAD Risks: Diabetes Mellitus,Family history,Obesity,Sedentary lifestyle  Has dilated eye exam at least once a year?: Yes  Sees dentist every 6 months?: No    Healthy Coping:  Healthy Coping Assessed Today:  Yes  Emotional response to diabetes: Ready to learn  Informal Support system:: Family,Children  Stage of change: PREPARATION (Decided to change - considering how)  Patient Activation Measure Survey Score:  No flowsheet data found.      Time Spent: 30 minutes  Encounter Type: Individual    Any diabetes medication dose changes were made via the CDE Protocol and Collaborative Practice Agreement with the patient's referring provider. A copy of this encounter was shared with the provider.

## 2021-12-03 NOTE — TELEPHONE ENCOUNTER
MEDICATION APPEAL DENIED    Medication: flash glucose sensor (FREESTYLE BRE 14 DAY SENSOR) Kit--DENIED    Denial Date: 11/24/2021    Denial Rational: Per voicemail from Jewell at [facility], this is denied due to patient not injecting insulin 3 or more times daily.    Second Level Appeal Information:     Second level appeals will be managed by the clinic staff and provider. Please contact the [facility] Prior Authorization Team if additional information about the denial is needed.        I contacted Jewell and left her a voicemail to resend a copy of the appeal denial letter.

## 2021-12-03 NOTE — PROGRESS NOTES
Diabetes Self-Management Education & Support    Presents for: Individual review    ASSESSMENT:  Pt seen today for f/u. She was discharged from [facility] on 11/18. Pt feels things have been going well. She is taking NPH 44 units in the morning and 32 units in the evening. She has not had any lows. Saida uploaded, she only has up until 11/21 when her sensor ran out and now her insurance is denying coverage.             BG control is improved over recent visits. Also do not have data for the last couple weeks. Pt notes elevations are likely related to juice. She also explains she has been really trying to do better with diet and sweets, she feels she is doing well.     Noted Marichuykimberlyn Rubio wrote appeal letter 11/22 and it looks like the appeal was never resubmitted with this letter. Sent message to PA team regarding resubmitting w/ the appeal letter.   Pt was provided w/ sample saida today, placed on left arm. She has her own 14 day reader.       Patient's most recent   Lab Results   Component Value Date    A1C 9.7 09/16/2021    is not meeting goal of <8.0    PLAN  No changes to dosing today. BG are improving.   Will re-submit appeal.   Pt will f/u 12/16 with Marichuy and have A1c done at that time.   Will f/u here 1/6 - to call sooner w/ any concerns.     SUBJECTIVE/OBJECTIVE:  Presents for: Individual review  Accompanied by: Self  Diabetes education in the past 24mo: No  Focus of Visit: Monitoring,Healthy Eating  Diabetes type: Type 2  Date of diagnosis: 1995  Disease course: Worsening  Cultural Influences/Ethnic Background:  Not  or       Diabetes Symptoms & Complications:  Fatigue: Yes (afternoons)  Neuropathy: Yes  Polydipsia: Yes  Polyphagia: No  Polyuria: Sometimes  Visual change: Yes  Slow healing wounds: No  Complications assessed today?: Yes  Autonomic neuropathy: Yes  CVA: No  Heart disease:  (family history - father)  Retinopathy: No    Patient Problem List and Family Medical History reviewed for  relevant medical history, current medical status, and diabetes risk factors.    Vitals:  There were no vitals taken for this visit.  Estimated body mass index is 30.52 kg/m  as calculated from the following:    Height as of 11/17/21: 1.829 m (6').    Weight as of 11/17/21: 102.1 kg (225 lb).   Last 3 BP:   BP Readings from Last 3 Encounters:   11/17/21 127/84   11/15/21 (!) 149/79   11/11/21 (!) 144/69       History   Smoking Status     Former Smoker     Packs/day: 1.00     Years: 43.00     Start date: 1/1/1965     Quit date: 8/1/2008   Smokeless Tobacco     Never Used       Labs:  Lab Results   Component Value Date    A1C 9.7 09/16/2021     Lab Results   Component Value Date     10/29/2021     10/29/2021     Lab Results   Component Value Date    LDL 57 09/16/2021     Direct Measure HDL   Date Value Ref Range Status   09/16/2021 47 (L) >=50 mg/dL Final     Comment:     HDL Cholesterol Reference Range:     0-2 years:   No reference ranges established for patients under 2 years old  at [facility] for lipid analytes.    2-8 years:  Greater than 45 mg/dL     18 years and older:   Female: Greater than or equal to 50 mg/dL   Male:   Greater than or equal to 40 mg/dL   ]  GFR Estimate   Date Value Ref Range Status   10/29/2021 44 (L) >60 mL/min/1.73m2 Final     Comment:     As of July 11, 2021, eGFR is calculated by the CKD-EPI creatinine equation, without race adjustment. eGFR can be influenced by muscle mass, exercise, and diet. The reported eGFR is an estimation only and is only applicable if the renal function is stable.   05/07/2021 40 (L) >60 mL/min/1.73m2 Final     GFR Estimate If Black   Date Value Ref Range Status   05/07/2021 48 (L) >60 mL/min/1.73m2 Final     Lab Results   Component Value Date    CR 1.22 10/29/2021     No results found for: MICROALBUMIN    Healthy Eating:  Healthy Eating Assessed Today: Yes  Meals include: Lunch,Dinner,Afternoon Snack,Evening Snack  Breakfast: only  eating sometimes, around 9am  Lunch: 7874-0530: meals on wheels  Dinner: 430-630pm  Snacks: sometimes HS snack, about 50% of the time  Beverages: Water,Tea,Milk,Juice,Soda    Being Active:  Being Active Assessed Today: Yes  Exercise:: Currently not exercising  Barrier to exercise: Physical limitation (back problems)    Monitoring:  Monitoring Assessed Today: Yes  Did patient bring glucose meter to appointment? : Yes  Blood Glucose Meter: CGM    Taking Medications:  Diabetes Medication(s)     Insulin       insulin  UNIT/ML injection    Inject 32 Units Subcutaneous 2 times daily (before meals)     Patient taking differently: Inject 32 Units Subcutaneous 2 times daily (before meals) Pt taking 44 units in am and 32 units at HS.          Taking Medication Assessed Today: Yes  Current Treatments: Insulin Injections  Dose schedule:  (NPH 9am and 9pm)  Problems taking diabetes medications regularly?: No    Problem Solving:  Is the patient at risk for hypoglycemia?: Yes  Hypoglycemia Frequency: Monthly  Hypoglycemia Treatment: Juice,Candy  Medical ID: No    Hypoglycemia symptoms  Dizziness or Light-Headedness: Yes  Hunger: No  Mood changes: Yes  Nervousness/Anxiety: Yes  Sleepiness: No  Sweats: Yes  Feeling shaky: Yes    Hypoglycemia Complications  Blackouts: No  Hospitalization: No  Nocturnal hypoglycemia: Yes  Required assistance: Yes  Seizures: No    Reducing Risks:  Reducing Risks Assessed Today: Yes  Diabetes Risks: Age over 45 years,Sedentary Lifestyle  CAD Risks: Diabetes Mellitus,Family history,Obesity,Sedentary lifestyle  Has dilated eye exam at least once a year?: Yes  Sees dentist every 6 months?: No    Healthy Coping:  Healthy Coping Assessed Today: Yes  Emotional response to diabetes: Ready to learn  Informal Support system:: Family,Children  Stage of change: PREPARATION (Decided to change - considering how)  Patient Activation Measure Survey Score:  No flowsheet data found.      Time Spent: 30  minutes  Encounter Type: Individual    Any diabetes medication dose changes were made via the CDE Protocol and Collaborative Practice Agreement with the patient's referring provider. A copy of this encounter was shared with the provider.

## 2021-12-07 NOTE — TELEPHONE ENCOUNTER
MEDICATION APPEAL DENIED    Medication: flash glucose sensor (FREESTYLE BRE 14 DAY SENSOR) Kit--DENIED    Denial Date: 11/24/2021    Denial Rational: Per voicemail from Jewell at [facility], this is denied due to patient not injecting insulin 3 or more times daily.    Second Level Appeal Information:       Second level appeals will be managed by the clinic staff and provider. Please contact the [facility] Prior Authorization Team if additional information about the denial is needed.

## 2021-12-07 NOTE — TELEPHONE ENCOUNTER
Contacted manoj to get denial fax.  She didn't answer.  Left voicemail requesting denial fax be sent to PA team

## 2021-12-08 ENCOUNTER — MEDICAL CORRESPONDENCE (OUTPATIENT)
Dept: HEALTH INFORMATION MANAGEMENT | Facility: CLINIC | Age: 71
End: 2021-12-08
Payer: COMMERCIAL

## 2021-12-09 ENCOUNTER — MEDICAL CORRESPONDENCE (OUTPATIENT)
Dept: HEALTH INFORMATION MANAGEMENT | Facility: CLINIC | Age: 71
End: 2021-12-09
Payer: COMMERCIAL

## 2021-12-09 DIAGNOSIS — Z53.9 DIAGNOSIS NOT YET DEFINED: Primary | ICD-10-CM

## 2021-12-09 PROCEDURE — G0180 MD CERTIFICATION HHA PATIENT: HCPCS | Performed by: NURSE PRACTITIONER

## 2021-12-14 NOTE — TELEPHONE ENCOUNTER
Contacted patient and informed of denial.  Patient informed due to not injecting insulin 3 or more times per day.

## 2021-12-15 ENCOUNTER — MEDICAL CORRESPONDENCE (OUTPATIENT)
Dept: HEALTH INFORMATION MANAGEMENT | Facility: CLINIC | Age: 71
End: 2021-12-15

## 2021-12-17 ENCOUNTER — MEDICAL CORRESPONDENCE (OUTPATIENT)
Dept: HEALTH INFORMATION MANAGEMENT | Facility: CLINIC | Age: 71
End: 2021-12-17
Payer: COMMERCIAL

## 2021-12-20 ENCOUNTER — HOSPITAL ENCOUNTER (OUTPATIENT)
Dept: PHYSICAL THERAPY | Facility: REHABILITATION | Age: 71
End: 2021-12-20
Payer: COMMERCIAL

## 2021-12-20 DIAGNOSIS — M53.3 PAIN IN THE COCCYX: ICD-10-CM

## 2021-12-20 DIAGNOSIS — M53.3 SI (SACROILIAC) JOINT DYSFUNCTION: ICD-10-CM

## 2021-12-20 DIAGNOSIS — G89.29 CHRONIC MIDLINE LOW BACK PAIN WITHOUT SCIATICA: Primary | ICD-10-CM

## 2021-12-20 DIAGNOSIS — M62.81 GENERALIZED MUSCLE WEAKNESS: ICD-10-CM

## 2021-12-20 DIAGNOSIS — M54.50 CHRONIC MIDLINE LOW BACK PAIN WITHOUT SCIATICA: Primary | ICD-10-CM

## 2021-12-20 PROCEDURE — 97140 MANUAL THERAPY 1/> REGIONS: CPT | Mod: GP | Performed by: PHYSICAL THERAPIST

## 2021-12-20 PROCEDURE — 97110 THERAPEUTIC EXERCISES: CPT | Mod: GP | Performed by: PHYSICAL THERAPIST

## 2021-12-20 NOTE — PROGRESS NOTES
[facility]    OUTPATIENT PHYSICAL THERAPY  PLAN OF TREATMENT FOR OUTPATIENT REHABILITATION AND PROGRESS NOTE           Patient's Last Name, First Name, Ebonie Titus Date of Birth  1950   Provider's Name  [facility] Medical Record No.  [id number]    Onset Date  7/12/21 Start of Care Date  7/12/21   Type:     _X_PT   ___OT   ___SLP Medical Diagnosis  Chronic Pain Syndrome, Sacroiliitis, Chronic B Thoracic pain   PT Diagnosis  Chronic pain syndrome, sacroiliitis Plan of Treatment  Frequency/Duration: 1x/wk  Certification date from 10/9/21 to 1/7/22     Goals:  Goal Identifier HEP/self management   Goal Description Patient will be independent in HEP and self management of condition.   Target Date 11/26/21   Date Met      Progress (detail required for progress note): Pt reports current 2 exercises do not hurt to perform     Goal Identifier Sleeping   Goal Description Patient will be able to sleep more comfortably and wake only 1-2x/night d/t pain   Target Date 11/26/21   Date Met      Progress (detail required for progress note): No changes     Goal Identifier Walking   Goal Description Patient will be able to walk 1 block with <5/10 pain in the lower back   Target Date (P) 01/07/22   Date Met      Progress (detail required for progress note): No changes     Goal Identifier Sitting   Goal Description Patient will be able to sit for 30 minutes with <5/10 pain in the lower back   Target Date (P) 01/07/22   Date Met      Progress (detail required for progress note): (P) Pt can sit 20 minutes and then has LBP increase to 8/10     Goal Identifier     Goal Description     Target Date     Date Met      Progress (detail required for progress note):       Goal Identifier     Goal Description     Target Date     Date Met      Progress (detail required for progress note):        Goal Identifier     Goal Description     Target Date     Date Met      Progress (detail required for progress note):       Goal Identifier     Goal Description     Target Date     Date Met      Progress (detail required for progress note):             Beginning/End Dates of Progress Note Reporting Period:  10/9/21 to 1/7/21    Progress Toward Goals:   Progress limited due to fall due to syncope and hospitalization and TCU stay    Client Self (Subjective) Report for Progress Note Reporting Period: 8/10 Right hip and LBP. I was in the ER on 10/25, and nursing home for 4 weeks and got out on 11/17/21. I had home PT 2x/wk for 3 weeks.. I was blacking out and falling down, and they still don't know why this happened even with testing my heart and head.       Objective Measurements:   Objective Measure: Posture  Details: Slight trunk flexion, Marked FHP  Objective Measure: SI  Details: Right pelvis and trochanter high in standing but level in sitting  Objective Measure: Gait  Details: Pt walks with SEC- wide based and slow gait, Hard to stand erect after sitting, Uses both hands to transfer sit to stand with effort and at times multiple attempts  Objective Measure: Cranial scan  Details: Mesentary and scar of lumbar and pelvis, + L > R post pelvic neural and entire left leg,  LV of pelvic and back right more than left , Left pelvic viscera            I CERTIFY THE NEED FOR THESE SERVICES FURNISHED UNDER        THIS PLAN OF TREATMENT AND WHILE UNDER MY CARE     (Physician co-signature of this document indicates review and certification of the therapy plan).                Referring Provider: MAGGIE Espana, PT         12/20/21 9727   Signing Clinician's Name / Credentials   Signing clinician's name / credentials Paris Sandoval PT   Session Number   Session Number 4   Progress Note/Recertification   Progress Note Due Date 01/07/22   Progress Note Completed Date 12/20/21   Recertification Due Date 01/07/22    Recertification Completed Date  12/20/21   Adult Goals   PT Ortho Eval Goals 1;2;3;4   Ortho Goal 1   Goal Identifier HEP/self management   Goal Description Patient will be independent in HEP and self management of condition.   Goal Progress Pt reports current 2 exercises do not hurt to perform   Target Date 11/26/21   Ortho Goal 2   Goal Identifier Sleeping   Goal Description Patient will be able to sleep more comfortably and wake only 1-2x/night d/t pain   Goal Progress No changes   Target Date 11/26/21   Ortho Goal 3   Goal Identifier Walking   Goal Description Patient will be able to walk 1 block with <5/10 pain in the lower back   Goal Progress No changes   Target Date 01/07/22   Ortho Goal 4   Goal Identifier Sitting   Goal Description Patient will be able to sit for 30 minutes with <5/10 pain in the lower back   Goal Progress Pt can sit 20 minutes and then has LBP increase to 8/10   Target Date 01/07/22   Subjective Report   Subjective Report 8/10 Right hip and LBP. I was in the ER om 10/25, and nursing home for 4 weeks and got out on 11/17/21. I had home PT 2x/wk for 3 weeks.. I was blacking out and falling down, and they still don't know why this happened even with testing my heart and head.    Objective Measures   Objective Measures Objective Measure 1;Objective Measure 2;Objective Measure 3;Objective Measure 4   Objective Measure 1   Objective Measure Posture   Details Slight trunk flexion, Marked FHP   Objective Measure 2   Objective Measure SI   Details Right pelvis and trochanter high in standing but level in sitting   Objective Measure 3   Objective Measure Gait   Details Pt walks with SEC- wide based and slow gait, Hard to stand erect after sitting   Objective Measure 4   Objective Measure Cranial scan   Details Mesentary and scar of lumbar and pelvis, + L > R post pelvic neural and entire left leg,  LV of pelvic and back right more than left , Left pelvic viscera   Treatment Interventions    Interventions Therapeutic Procedure/Exercise;Manual Therapy   Therapeutic Procedure/exercise   Therapeutic Procedures: strength, endurance, ROM, flexibillity minutes (90248) 40   Skilled Intervention Exercise updare/review, discuss self management   Patient Response Decreased buttock pain   Treatment Detail Continue supine and standing back extension. Pt told to sit in higher chair or use pillow under seat to raise seat height as she is nearly 6 feet tall. Pt also told to use SE cane and take shorter steps to decrease left gluteal work. We did talk about taking stairs 1 step at a time with right foot going up only, but pt is not sure she can do this due to time it takes to go up one flight of stairs to her second floor apartment.   Progress SI corrected, marked gluteal pain   Manual Therapy   Manual Therapy: Mobilization, MFR, MLD, friction massage minutes (35197) 15   Skilled Intervention SCS/MT to pelvis and back   Treatment Detail MMESL1-5-MS stacked   Education   Learner Patient   Readiness Nonacceptance   Method Booklet/handout;Literature;Explanation;Demonstration   Response Needs Reinforcement   Education Comments Pt already has pain and this did increase her pain. Pt told to modify the intensity of the exercises by going slow and just until pain increases.   Plan   Home program EIS, supine trunk arch, Seated knee isometrics, double heel raises   Updates to plan of care Use pillow to raise seat height in all chairs, and use SEC at all times until pain decreased.   Plan for next session Recert next visit. MT for LV of LB and pelvis, posterior and ant LE neural   Comments   Comments Patient has not been in for PT in 2 months, and LBP and right hip pain are essentially unchanged. Pt was admitted to the ER 10/25/21 with syncopal episode and fall. Echo, EEG and Head CT were all normal. US of carotid arteries was negative. Potassium, Magnesium, and blood sugars are all normal.  Telemetry in hospital had no further  episodes. Pt went to [facility], received PT, and discharged home on 11/17/21. Pt did have home PT 2x/wk, for 3 wks.    Total Session Time   Timed Code Treatment Minutes 55   Total Treatment Time (sum of timed and untimed services) 55   AMBULATORY CLINICS ONLY-MEDICAL AND TREATMENT DIAGNOSIS   Medical Diagnosis Chronic Pain Syndrome, sacroilitis, Chronic thoracic  back pain

## 2021-12-21 NOTE — ADDENDUM NOTE
Encounter addended by: Paris Sandoval, PT on: 12/21/2021 4:19 PM   Actions taken: Document created, Document edited

## 2021-12-22 DIAGNOSIS — G89.4 CHRONIC PAIN SYNDROME: ICD-10-CM

## 2021-12-22 RX ORDER — OXYCODONE AND ACETAMINOPHEN 5; 325 MG/1; MG/1
1-2 TABLET ORAL EVERY 6 HOURS PRN
Qty: 180 TABLET | Refills: 0 | Status: SHIPPED | OUTPATIENT
Start: 2021-12-22 | End: 2022-01-21

## 2021-12-22 NOTE — TELEPHONE ENCOUNTER
Pending Prescriptions:                       Disp   Refills    oxyCODONE-acetaminophen (PERCOCET) 5-325 *80 tab*0            Sig: Take 1-2 tablets by mouth every 6 hours as needed           for severe pain (Max of 6 tablets daily)      Last filled 80 tabs on 11/30/2021

## 2021-12-22 NOTE — TELEPHONE ENCOUNTER
Reason for Call:  Medication or medication refill:    Do you use a [facility]?  Name of the pharmacy and phone number for the current request:      [facility] on file    Name of the medication requested:     Oxycodone acetaminophen 5-325 mg tablet    Other request: Patient reports she has just one day of meds left at this time. Please expedite if able.    Can we leave a detailed message on this number? YES    Phone number patient can be reached at: Home number on file [phone] (home)    Best Time: Any time    Call taken on 12/22/2021 at 1:05 PM by Darren Fry

## 2021-12-22 NOTE — TELEPHONE ENCOUNTER
Let patient know she is due for a follow up appointment next month for further refills. Please schedule.

## 2021-12-23 ENCOUNTER — OFFICE VISIT (OUTPATIENT)
Dept: PALLIATIVE MEDICINE | Facility: OTHER | Age: 71
End: 2021-12-23
Payer: COMMERCIAL

## 2021-12-23 VITALS — SYSTOLIC BLOOD PRESSURE: 111 MMHG | OXYGEN SATURATION: 95 % | DIASTOLIC BLOOD PRESSURE: 59 MMHG | HEART RATE: 63 BPM

## 2021-12-23 DIAGNOSIS — G89.29 CHRONIC MIDLINE LOW BACK PAIN WITHOUT SCIATICA: Primary | ICD-10-CM

## 2021-12-23 DIAGNOSIS — M54.50 CHRONIC MIDLINE LOW BACK PAIN WITHOUT SCIATICA: Primary | ICD-10-CM

## 2021-12-23 PROCEDURE — 99213 OFFICE O/P EST LOW 20 MIN: CPT | Performed by: NURSE PRACTITIONER

## 2021-12-23 PROCEDURE — G0463 HOSPITAL OUTPT CLINIC VISIT: HCPCS

## 2021-12-23 ASSESSMENT — PAIN SCALES - GENERAL: PAINLEVEL: EXTREME PAIN (8)

## 2021-12-23 NOTE — PROGRESS NOTES
Ebonie Bowman is a 71 year old person last evaluated 10/11/21.  h/o morbid obesity, RA, Stage 3 kidney disease, DM, facet arthropathy, SCS, spine surgery, knee replacement, hernial repair and eye surgery. Is being evaluated for sacroiliitis, myalgia upper trap, chronic low back pain.      Major issues:  1. Chronic midline low back pain without sciatica      Patient Active Problem List   Diagnosis     S/P TKR (total knee replacement) using cement, left     Ataxia     Axonal neuropathy     Type 2 diabetes mellitus with hyperosmolarity without coma, with long-term current use of insulin (H)     Osteoarthrosis     Allergic rhinitis due to cats     Arthropathy of spinal facet joint concurrent with and due to effusion     HTN (hypertension)     Chronic back pain     Bilateral primary osteoarthritis of hip     Chronic pain     Controlled substance agreement signed     Depression     Essential hypertension     Chronic GERD     High risk medication use     HLD (hyperlipidemia)     Insomnia     Lumbar radiculopathy     Obesity     Pain in joint, pelvic region and thigh     Positive FIT (fecal immunochemical test)     Prolonged QT interval     Radiculopathy of cervicothoracic region     Rheumatoid arthritis, involving unspecified site, unspecified whether rheumatoid factor present (H)     S/P insertion of spinal cord stimulator     Venous insufficiency     Stage 3b chronic kidney disease (H)     Syncope, unspecified syncope type     Generalized OA     Anxiety     Chronic low back pain with sciatica, sciatica laterality unspecified, unspecified back pain laterality     First degree heart block     Bradycardia       _____  HPI:  _____      Location/Laterality of the pain: low back  DME:eamon Colunga.     SCS Serial number [id number] implanted 7/8/2016 batter replacement 4/25/2019 (She met with  [employer] rep to check the stimulator - going better could use some added support planning to follow up after the  holidays)     Quality: hurts not able to describe  Timing: constant (mornings are the worse time of the day has been without medication for a period of time and needs to catch up)  Aggravating factors: medication is not lasting long enough, standing, cooking   Alleviating factors: medication for short periods of time, heating pad that she was able to use in the hospital    Transportation: uses [employer] mobility - today had scheduled for 1 hour turn around and this timing was a bit tight    Any New pain, injuries, falls:hospital to TCU  Since last visit pain has: unchanged      Impact of pain treatments:  Patient reports function has improved with current pain treatment: continued discomfort getting some benefit from treatments      Pain Plan of Care Review:  Medication changes: she will get stressed when she is worried about not having medication - previously discussed working with MTM related to set up of medication    Current Outpatient Medications:      alcohol swab prep pads, Use to swab area of injection/jacquelyn as directed., Disp: 100 each, Rfl: 3     amLODIPine (NORVASC) 10 MG tablet, [AMLODIPINE (NORVASC) 10 MG TABLET] TAKE 1 TABLET EVERY DAY, Disp: 90 tablet, Rfl: 2     ascorbic acid (ASCORBIC ACID WITH LATRICIA HIPS) 500 MG tablet, Take 500 mg by mouth daily , Disp: , Rfl:      aspirin 81 MG EC tablet, Take 81 mg by mouth daily, Disp: , Rfl:      atorvastatin (LIPITOR) 40 MG tablet, Take 1 tablet (40 mg) by mouth At Bedtime, Disp: 90 tablet, Rfl: 0     b complex vitamins tablet, [B COMPLEX VITAMINS TABLET] Take 1 tablet by mouth daily., Disp: , Rfl:      blood glucose (NO BRAND SPECIFIED) test strip, Use to test blood sugar 3 times daily or as directed. To accompany: Blood Glucose Monitor Brands: per insurance., Disp: 100 strip, Rfl: 6     blood glucose calibration (NO BRAND SPECIFIED) solution, To accompany: Blood Glucose Monitor Brands: per insurance., Disp: 1 each, Rfl: 0     blood glucose monitoring (NO BRAND  "SPECIFIED) meter device kit, Use to test blood sugar 3 times daily or as directed. Preferred blood glucose meter OR supplies to accompany: Blood Glucose Monitor Brands: per insurance., Disp: 1 kit, Rfl: 0     blood glucose test strips, [BLOOD GLUCOSE TEST STRIPS] Check blood sugar four times daily. Dispense brand per patient's insurance at pharmacy discretion. Dx: e11.9, Disp: 400 strip, Rfl: 3     blood-glucose meter Misc, [BLOOD-GLUCOSE METER MISC] Dispense one meter. Use as directed. Dx: e11.9, Disp: 1 each, Rfl: 0     fish oil-omega-3 fatty acids 1000 MG capsule, Take 1 g by mouth daily, Disp: , Rfl:      flash glucose scanning reader (FREESTYLE BRE 14 DAY READER) Misc, [FLASH GLUCOSE SCANNING READER (FREESTYLE BRE 14 DAY READER) MISC] Use 1 each As Directed see administration instructions. Use as directed with sensor, Disp: 1 each, Rfl: 0     flash glucose sensor (FREESTYLE BRE 14 DAY SENSOR) Kit, [FLASH GLUCOSE SENSOR (FREESTYLE BRE 14 DAY SENSOR) KIT] Use 1 each As Directed every 14 (fourteen) days., Disp: 2 kit, Rfl: 11     hydrALAZINE (APRESOLINE) 10 MG tablet, Take 1 tablet (10 mg) by mouth 3 times daily as needed (sbp >175), Disp: , Rfl:      hydroxychloroquine (PLAQUENIL) 200 MG tablet, Take 1 tablet (200 mg) by mouth 2 times daily, Disp: 180 tablet, Rfl: 1     insulin  UNIT/ML injection, Inject 32 Units Subcutaneous 2 times daily (before meals) (Patient taking differently: Inject 32 Units Subcutaneous 2 times daily (before meals) Pt taking 44 units in am and 32 units at HS.), Disp: , Rfl:      insulin syringe 31G X 5/16\" 0.5 ML MISC, 1 each 2 times daily, Disp: 100 each, Rfl: 11     insulin syringe-needle U-100 (BD INSULIN SYRINGE ULT-FINE II) 1 mL 31 gauge x 5/16 Syrg, [INSULIN SYRINGE-NEEDLE U-100 (BD INSULIN SYRINGE ULT-FINE II) 1 ML 31 GAUGE X 5/16 SYRG] USE 4 TIMES DAILY AS DIRECTED, Disp: 500 each, Rfl: 1     lancing device (ACCU-CHEK SOFTCLIX) Misc, [LANCING DEVICE (ACCU-CHEK " SOFTCLIX) MISC] Use 1 applicator As Directed 4 (four) times a day., Disp: 300 each, Rfl: 3     loratadine (CLARITIN) 10 mg tablet, [LORATADINE (CLARITIN) 10 MG TABLET] Take 1 tablet (10 mg total) by mouth at bedtime., Disp: 90 tablet, Rfl: 3     methotrexate 2.5 MG tablet, Take 3 tablets (7.5 mg) by mouth once a week, Disp: 36 tablet, Rfl: 0     metoprolol succinate ER (TOPROL-XL) 50 MG 24 hr tablet, [METOPROLOL SUCCINATE (TOPROL-XL) 50 MG 24 HR TABLET] TAKE 1 TABLET EVERY DAY, Disp: 90 tablet, Rfl: 2     MULTIVITAMIN ORAL, Take 1 tablet by mouth daily , Disp: , Rfl:      oxyCODONE-acetaminophen (PERCOCET) 5-325 MG tablet, Take 1-2 tablets by mouth every 6 hours as needed for severe pain (Max of 6 tablets daily) For 12/22/21-1/21/22, Disp: 180 tablet, Rfl: 0 - taking 2 in the morning 1 in the afternoon 1 evening, 2 at 10 pm this is working up to a point no other medication      thin (NO BRAND SPECIFIED) lancets, Use with lanceting device. To accompany: Blood Glucose Monitor Brands: per insurance., Disp: 100 each, Rfl: 11     traZODone (DESYREL) 150 MG tablet, Take 1 tablet (150 mg) by mouth At Bedtime, Disp: 90 tablet, Rfl: 1     Vitamin D3 (VITAMIN D3) 25 mcg (1000 units) tablet, Take 1,000 Units by mouth daily , Disp: , Rfl:      and chart:   12/23/2021 Reviewed to aid with decision regarding medication management    MTM:   Recommended as a means of supporting medication set up     Rehabilitation:  Physical Therapy: working with Paris  _________  Objective:  _________  Vitals:    12/23/21 1416   BP: 111/59   Pulse: 63   SpO2: 95%   PainSc: Extreme Pain (8)       Constitutional:  Pleasant and cooperative person who presents alone today.  Psychiatric: Mood and affect are appropriate for the situation, setting and topic of discussion.  Patient does not appear sedated.  Integumentary:  Observed skin WNL.   HEENT: EOM's grossly intact.    Chest: Breathing is non-labored.   Neurological:  Alert and oriented in all  spheres including: time, place, person and situation.  Durable Medical Equipment: mask    Diagnostics:  Imaging:  Imaging pulled forward today - not specifically reviewed       EXAM: CT LUMBAR SPINE WO CONTRAST  LOCATION: [facility]  DATE/TIME: 2/22/2020 5:09 PM     INDICATION: Pain after fall  IMPRESSION:   1.  No evidence of an acute osseous abnormality.  2.  Multilevel degenerative changes of the lumbar spine, with at least moderate canal stenosis at L2-L3, L3-L4, and L4-L5.  3.  Diffuse bony demineralization, suggestive of osteoporosis.  4.  Bilateral hydronephrosis, new from the prior CT of the abdomen and pelvis and of uncertain etiology. No evidence of an obstructing renal calculus.  ______________  Assessment:  ___________  Ebonie Bowman is a 71 year old person h/o morbid obesity, RA, Stage 3 kidney disease, DM, facet arthropathy, SCS, spine surgery, knee replacement, hernial repair and eye surgery.     Clinic evaluation for sacroiliitis, myalgia upper trap, chronic low back pain.     PT - re-start  OT - not fully assessed today  [employer] - encouraging additional visits  She is working with primary care for pain medication.  Dicussed using a medminder - she is a pt who may do better with weekly or every 2 week script     *Universal Precautions:   UDT/Swab-   She did not leave enough urine for a UDT - she was given water during her visit - beyond RN connection after the visit she did not come back within 24 hours. This is unexpected, in this setting will not assume opioid prescribing until there is further assessment by the pain center clinician. Primary care may continue in their current fashion.  Consent- if prescribing opioids  Agreement- if prescribing opioids  Pharmacy- as documented   Count- n/a  Psychological evaluation - information provided  Pharmacogenetic testing- n/a  MME- 43 at 6 oxycodone per day  MTM - consider  Naloxone safety:   Medical Cannabis: na        Previously tried  medications: H=Helpful. NH=Not Helpful. U=Unsure. (n/a)=Not applicable  Acetaminophen: (nh)  NSAIDs:not able to take              Ibuprofen               Naproxen               Celecobix               Diclofenac   Gabapentinoids:               Gabapentin: (na)              Pregabalin: (na)  Antidepressants:               Amitriptyline: (na)              Nortriptyline: (na)              Duloxetine: (na)              Venlafaxine: (na)  Muscle Relaxants:               Tizanidine: (na)              Methocarbamol: (na)              Cyclobenzaprine: (na)              Metaxalone: (na)              Carisoprodol: (na)              Baclofen: (na)    Topicals:               Lidocaine: (limited impact)              Diclofenac gel: (na)              Compounded pain creams: (na)              OTC/Herbal topical pain applications: (tiger balm)  Opioids:               Codeine: (na)              Hydrocodone: (in the past)              Oxycodone: (taking)              Tramadol: (na)              Morphine: (makes me sick)              Hydromorphone: (felt sad)              Methadone: (na)              Fentanyl: (na)              Buprenorphine: (na)              Tapentadol: (na)              Oxymorphone: (na)  Supplements:               Tumeric: (na)              CBD: (na)              Vitamin D: (+)              Glucosamine/Chondroitin: (na)     Management of opioid medication is inherently a moderate to high complex medial interaction based on the risk management required at each contact r/t risks and side effects.    ______  Plan:  _____  Thank you for participating in the clinic visit - Here is the Plan of Care / NextSteps:    Contact [phone] to reserve a time. Please follow up by with Nilesh to help review medication    I want you to be aware I have resigned my position with [facility]. I will be here until Jan 14, 2022. I am working with administration to enhance the transition of patients. I am not clear to whom patients will be  transferred within the pain team at this time.     I understand for patients who have prescriptions through the pain center, those prescriptions will sustained and there should be no issues with getting refills as it relates to my departure.     Rehabilitation: Please continue with PT lets ask Paris about the hot / cold pad from the hospital.        Deirdre BO FNP-BC  [address] [facility] [address]  [phone]  [phone]      ANUPAM Waters CNP

## 2021-12-23 NOTE — PATIENT INSTRUCTIONS
Plan:  _____  Thank you for participating in the clinic visit - Here is the Plan of Care / NextSteps:    Contact [phone] to reserve a time. Please follow up by with Nilesh to help review medication    I want you to be aware I have resigned my position with [facility]. I will be here until Jan 14, 2022. I am working with administration to enhance the transition of patients. I am not clear to whom patients will be transferred within the pain team at this time.     I understand for patients who have prescriptions through the pain center, those prescriptions will sustained and there should be no issues with getting refills as it relates to my departure.     Rehabilitation: Please continue with PT lets ask Paris about the hot / cold pad from the hospital.        [facility]    Clinic Number:  [phone]    Call with any questions about your care and for scheduling assistance.     Calls are returned Monday through Friday between 8 AM and 4:30 PM. We usually get back to you within 2 business days depending on the issue/request.    If we are prescribing your medications:    For opioid medication refills, call the clinic or send a Day[employer] message 7 days in advance.  Please include:    Name of requested medication    Name of the pharmacy.    For non-opioid medications, call your pharmacy directly to request a refill. Please allow 3-4 days to be processed.     Per [place] State Law:    All controlled substance prescriptions must be filled within 30 days of being written.      For those controlled substances allowing refills, pickup must occur within 30 days of last fill.      We believe regular attendance is key to your success in our program!      Any time you are unable to keep your appointment we ask that you call us at least 24 hours in advance to cancel.This will allow us to offer the appointment time to another patient.     Multiple missed appointments may lead to dismissal from  the clinic.

## 2021-12-23 NOTE — PROGRESS NOTES
Patient presents to the clinic today for a follow up with ANUPAM Waters CNP  regarding Pain Management.       PEG Score 12/23/2021   PEG Total Score 4.67        UDT = 6/21/2021  CSA = 4/6/2021      QUESTIONS:    TORIE Morrell MA  [facility]

## 2021-12-27 ENCOUNTER — HOSPITAL ENCOUNTER (OUTPATIENT)
Dept: PHYSICAL THERAPY | Facility: REHABILITATION | Age: 71
End: 2021-12-27
Payer: COMMERCIAL

## 2021-12-27 DIAGNOSIS — G89.29 CHRONIC MIDLINE LOW BACK PAIN WITHOUT SCIATICA: Primary | ICD-10-CM

## 2021-12-27 DIAGNOSIS — M53.3 PAIN IN THE COCCYX: ICD-10-CM

## 2021-12-27 DIAGNOSIS — M53.3 SI (SACROILIAC) JOINT DYSFUNCTION: ICD-10-CM

## 2021-12-27 DIAGNOSIS — M54.50 CHRONIC MIDLINE LOW BACK PAIN WITHOUT SCIATICA: Primary | ICD-10-CM

## 2021-12-27 DIAGNOSIS — M62.81 GENERALIZED MUSCLE WEAKNESS: ICD-10-CM

## 2021-12-27 PROCEDURE — 97140 MANUAL THERAPY 1/> REGIONS: CPT | Mod: GP | Performed by: PHYSICAL THERAPIST

## 2021-12-27 NOTE — PROGRESS NOTES
[facility]    OUTPATIENT PHYSICAL THERAPY  PLAN OF TREATMENT FOR OUTPATIENT REHABILITATION AND PROGRESS NOTE           Patient's Last Name, First Name, Ebonie Titus Date of Birth  1950   Provider's Name  [facility] Medical Record No.  [id number]    Onset Date  7/12/21 Start of Care Date  7/12/21   Type:     _X_PT   ___OT   ___SLP Medical Diagnosis  Chronic Pain Syndrome, Sacroiliitis, chronic thoracic back pain    PT Diagnosis  Chronic Pain Syndrome, Sacroiliitis, chronic thoracic back pain  Plan of Treatment  Frequency/Duration: 1x/wk  Certification date from 12/27/21 to 3/23/22     Goals:  Goal Identifier HEP/self management   Goal Description Patient will be independent in HEP and self management of condition.   Target Date 11/26/21   Date Met      Progress (detail required for progress note): Pt reports current 2 exercises do not hurt to perform     Goal Identifier Sleeping   Goal Description Patient will be able to sleep more comfortably and wake only 1-2x/night d/t pain   Target Date (P) 03/23/22   Date Met      Progress (detail required for progress note): (P) Patient wakes 2x/night due to need to urinate, notes LBP 8/10      Goal Identifier Walking   Goal Description Patient will be able to walk 1 block with <5/10 pain in the lower back   Target Date (P) 03/23/22   Date Met      Progress (detail required for progress note): (P) Not met, walks 5 minutes with 7/10 LBP     Goal Identifier Sitting   Goal Description Patient will be able to sit for 30 minutes with <5/10 pain in the lower back   Target Date (P) 03/23/22   Date Met      Progress (detail required for progress note): (P) Pt can sit 10minutes and then has LBP increase to 8/10     Goal Identifier     Goal Description     Target Date     Date Met      Progress (detail required for progress  note):       Goal Identifier     Goal Description     Target Date     Date Met      Progress (detail required for progress note):       Goal Identifier     Goal Description     Target Date     Date Met      Progress (detail required for progress note):       Goal Identifier     Goal Description     Target Date     Date Met      Progress (detail required for progress note):             Beginning/End Dates of Progress Note Reporting Period:  11/30/21 to 12/27/21    Progress Toward Goals:   Progress this reporting period: Limited as patient was gone for 2 months due to syncopal episode and TCU care/homecare.    Client Self (Subjective) Report for Progress Note Reporting Period: (P) 7/10 Central LBP     Objective Measurements:   Objective Measure: Posture  Details: (P) Slight trunk flexion, Marked FHP- pt working on standing more erect  Objective Measure: SI  Details: Right pelvis and trochanter high in standing but level in sitting  Objective Measure: Gait  Details: Pt walks with SEC- wide based and slow gait, Hard to stand erect after sitting  Objective Measure: Cranial scan  Details: Mesentary and scar of lumbar and pelvis, + L > R post pelvic neural and entire left leg,  LV of pelvic and back right more than left , Left pelvic viscera                        I CERTIFY THE NEED FOR THESE SERVICES FURNISHED UNDER        THIS PLAN OF TREATMENT AND WHILE UNDER MY CARE     (Physician co-signature of this document indicates review and certification of the therapy plan).                Referring Provider: Chelo Samano, MAGGIE Sandoval, PT       12/27/21 4539   Signing Clinician's Name / Credentials   Signing clinician's name / credentials Paris Sandoval PT   Session Number   Session Number 5   Progress Note/Recertification   Progress Note Due Date 01/07/22   Progress Note Completed Date 12/20/21   Recertification Due Date 01/07/22   Recertification Completed Date  12/20/21   Adult Goals   PT Ortho Eval Goals 1;2;3;4   Ortho  Goal 1   Goal Identifier HEP/self management   Goal Description Patient will be independent in HEP and self management of condition.   Goal Progress Pt reports current 2 exercises do not hurt to perform   Target Date 11/26/21   Ortho Goal 2   Goal Identifier Sleeping   Goal Description Patient will be able to sleep more comfortably and wake only 1-2x/night d/t pain   Goal Progress Patient wakes 2x/night due to need to urinate, notes LBP 8/10    Target Date 03/23/22   Ortho Goal 3   Goal Identifier Walking   Goal Description Patient will be able to walk 1 block with <5/10 pain in the lower back   Goal Progress Not met, walks 5 minutes with 7/10 LBP   Target Date 03/23/22   Ortho Goal 4   Goal Identifier Sitting   Goal Description Patient will be able to sit for 30 minutes with <5/10 pain in the lower back   Goal Progress Pt can sit 10minutes and then has LBP increase to 8/10   Target Date 03/23/22   Subjective Report   Subjective Report 7/10 Central LBP    Objective Measures   Objective Measures Objective Measure 1;Objective Measure 2;Objective Measure 3;Objective Measure 4   Objective Measure 1   Objective Measure Posture   Details Slight trunk flexion, Marked FHP- pt working on standing more erect   Objective Measure 2   Objective Measure SI   Details Right pelvis and trochanter high in standing but level in sitting   Objective Measure 3   Objective Measure Gait   Details Pt walks with SEC- wide based and slow gait, Hard to stand erect after sitting   Objective Measure 4   Objective Measure Cranial scan   Details Mesentary and scar of lumbar and pelvis, + L > R post pelvic neural and entire left leg,  LV of pelvic and back right more than left , Left pelvic viscera   Treatment Interventions   Interventions Manual Therapy   Manual Therapy   Manual Therapy: Mobilization, MFR, MLD, friction massage minutes ([id number]) 55   Skilled Intervention SCS/MT to pelvis and back in sidelying   Patient Response Decreased LBP and  tender points   Treatment Detail stacked PEPT12- S2-N   Education   Learner Patient   Readiness Nonacceptance   Method Booklet/handout;Literature;Explanation;Demonstration   Response Needs Reinforcement   Education Comments Pt already has pain and this did increase her pain. Pt told to modify the intensity of the exercises by going slow and just until pain increases.   Plan   Home program EIS, supine trunk arch, Seated knee isometrics, double heel raises   Updates to plan of care Use pillow to raise seat height in all chairs, and use SEC at all times until pain decreased.   Plan for next session Recert next visit. MT for LV of LB and pelvis, posterior and ant LE neural   Comments   Comments Pt feels PT has been benficial. Outpatient PT had been interupted for 2 months when pt was admitted to the ER 10/25/21 with syncopal episode and fall. Echo, EEG and Head CT were all normal. US of carotid arteries was negative. Potassium, Magnesium, and blood sugars are all normal.  Telemetry in hospital had no further episodes. Pt went to [facility], received PT, and discharged home on 11/17/21. Pt did have home PT 2x/wk, for 3 wks.    Total Session Time   Timed Code Treatment Minutes 55   Total Treatment Time (sum of timed and untimed services) 55   AMBULATORY CLINICS ONLY-MEDICAL AND TREATMENT DIAGNOSIS   Medical Diagnosis Chronic Pain Syndrome, sacroilitis, Chronic thoracic  back pain

## 2021-12-28 NOTE — ADDENDUM NOTE
Encounter addended by: Paris Sandoval, PT on: 12/28/2021 11:03 AM   Actions taken: Specialty comments modified, Document created, Document edited

## 2021-12-29 ENCOUNTER — TELEPHONE (OUTPATIENT)
Dept: RHEUMATOLOGY | Facility: CLINIC | Age: 71
End: 2021-12-29

## 2021-12-29 ENCOUNTER — MEDICAL CORRESPONDENCE (OUTPATIENT)
Dept: HEALTH INFORMATION MANAGEMENT | Facility: CLINIC | Age: 71
End: 2021-12-29

## 2021-12-29 NOTE — ADDENDUM NOTE
Encounter addended by: Isabel Dumont, OTR on: 12/29/2021 9:37 AM   Actions taken: Clinical Note Signed, Episode resolved

## 2021-12-29 NOTE — TELEPHONE ENCOUNTER
Dr. Londono's first available appointment is not until 5/2/21--does he want her to wait until then?

## 2021-12-29 NOTE — TELEPHONE ENCOUNTER
Pt was here for her appointment today 12/29/2021 @ 2pm and  forgot to arrive her. Pt has been waiting in the lobby until her ride arrived. Dr. Londono was notified and we can't squeeze pt in since schedule is full today.     Please call pt to re-schedule her appointment with Dr. Londono.

## 2021-12-29 NOTE — PROGRESS NOTES
[facility] Discharge Summary  Patient Name: Ebonie Bowman  Date: 12/29/2021  Referral Diagnosis:   M54.50, G89.29 (ICD-10-CM) - Chronic midline low back pain without sciatica   M53.3 (ICD-10-CM) - SI (sacroiliac) joint dysfunction   M53.3 (ICD-10-CM) - Pain in the coccyx     Referring provider: Flora Isaac  Visit Diagnosis:   (Z78.9) Decreased activities of daily living (ADL)  (primary encounter diagnosis)    (Z78.9) Impaired instrumental activities of daily living (IADL)    (M54.5,  G89.29) Chronic midline low back pain without sciatica    (M53.3) SI (sacroiliac) joint dysfunction    (M53.3) Pain in the coccyx    Goals:  See eval note - goals not met as pt did not return for treatment sessions.    Patient was seen for 1 visit on 7/28/21 with one canceled appointment.  The patient discontinued therapy, did not return.    Therapy will be discontinued at this time.     Thank you for your referral.  Isabel Dumont, OTR  12/29/2021  9:35 AM

## 2021-12-30 ENCOUNTER — MEDICAL CORRESPONDENCE (OUTPATIENT)
Dept: HEALTH INFORMATION MANAGEMENT | Facility: CLINIC | Age: 71
End: 2021-12-30
Payer: COMMERCIAL

## 2021-12-30 NOTE — TELEPHONE ENCOUNTER
We can offer her Monday, January 3rd at 12 PM.      I believe patient receives designated rides to clinic visits hence, please make her aware to allocate 90 minutes to 2 hours in total time (may be sooner however, better to play it safe), as we are squeezing patient in at the end of the morning session.

## 2022-01-03 ENCOUNTER — OFFICE VISIT (OUTPATIENT)
Dept: RHEUMATOLOGY | Facility: CLINIC | Age: 72
End: 2022-01-03
Payer: COMMERCIAL

## 2022-01-03 VITALS
HEART RATE: 68 BPM | BODY MASS INDEX: 30.72 KG/M2 | HEIGHT: 72 IN | DIASTOLIC BLOOD PRESSURE: 68 MMHG | SYSTOLIC BLOOD PRESSURE: 118 MMHG | WEIGHT: 226.8 LBS

## 2022-01-03 DIAGNOSIS — N28.9 RENAL INSUFFICIENCY: ICD-10-CM

## 2022-01-03 DIAGNOSIS — D69.6 THROMBOCYTOPENIA (H): ICD-10-CM

## 2022-01-03 DIAGNOSIS — M15.9 OSTEOARTHRITIS OF MULTIPLE JOINTS, UNSPECIFIED OSTEOARTHRITIS TYPE: ICD-10-CM

## 2022-01-03 DIAGNOSIS — Z79.899 HIGH RISK MEDICATION USE: ICD-10-CM

## 2022-01-03 DIAGNOSIS — M05.9 SEROPOSITIVE RHEUMATOID ARTHRITIS (H): Primary | ICD-10-CM

## 2022-01-03 PROCEDURE — 99215 OFFICE O/P EST HI 40 MIN: CPT | Performed by: INTERNAL MEDICINE

## 2022-01-03 RX ORDER — ETANERCEPT 50 MG/ML
50 SOLUTION SUBCUTANEOUS WEEKLY
Qty: 4 ML | Refills: 3 | Status: SHIPPED | OUTPATIENT
Start: 2022-01-03 | End: 2022-08-02

## 2022-01-03 RX ORDER — FOLIC ACID 1 MG/1
TABLET ORAL
Qty: 90 TABLET | Refills: 1 | Status: SHIPPED | OUTPATIENT
Start: 2022-01-03 | End: 2022-11-14

## 2022-01-03 ASSESSMENT — PAIN SCALES - GENERAL: PAINLEVEL: MODERATE PAIN (5)

## 2022-01-03 ASSESSMENT — MIFFLIN-ST. JEOR: SCORE: 1645.76

## 2022-01-03 NOTE — PROGRESS NOTES
Sobeida Potedilia who presents today with a chief complaint of  No chief complaint on file.      Joint Pains: yes  Location: hands  Onset: constantlys  Intensity:  5/10  AM Stiffness: 5-10 Minutes  Alleviating/Aggravating Factors: cold weather increase pain. Heat helps  Tolerating Meds: yes  Other:      ROS:  Patient denies having any chest pain, +shortness of breath, cough, abdominal pain, nausea, vomiting, rashes, fevers, oral ulcers and recent infections.  Patient admits to having a good appetite      Problem List:  Patient Active Problem List   Diagnosis     S/P TKR (total knee replacement) using cement, left     Ataxia     Axonal neuropathy     Type 2 diabetes mellitus with hyperosmolarity without coma, with long-term current use of insulin (H)     Osteoarthrosis     Allergic rhinitis due to cats     Arthropathy of spinal facet joint concurrent with and due to effusion     HTN (hypertension)     Chronic back pain     Bilateral primary osteoarthritis of hip     Chronic pain     Controlled substance agreement signed     Depression     Essential hypertension     Chronic GERD     High risk medication use     HLD (hyperlipidemia)     Insomnia     Lumbar radiculopathy     Obesity     Pain in joint, pelvic region and thigh     Positive FIT (fecal immunochemical test)     Prolonged QT interval     Radiculopathy of cervicothoracic region     Rheumatoid arthritis, involving unspecified site, unspecified whether rheumatoid factor present (H)     S/P insertion of spinal cord stimulator     Venous insufficiency     Stage 3b chronic kidney disease (H)     Syncope, unspecified syncope type     Generalized OA     Anxiety     Chronic low back pain with sciatica, sciatica laterality unspecified, unspecified back pain laterality     First degree heart block     Bradycardia        PMH:   Past Medical History:   Diagnosis Date     BBB (bundle branch block)      Chronic back pain      Chronic kidney disease     stage 3     Chronic kidney  disease, unspecified     Created by Conversion      Depression      Diabetes (H)     Type 2     Diabetes mellitus, type 2 (H)     Created by Conversion      Frequent headaches      Ganglion of tendon sheath     Created by Conversion      GERD (gastroesophageal reflux disease)      HLD (hyperlipidemia)      Hypertension     Per H&P dated 1/03/2013     Incarcerated ventral hernia     Per H&P dated 1/03/2013     Osteoarthritis      Rheumatoid arthritis (H)      Shortness of breath      Thrombocytopenia (H)      Venous insufficiency        Surgical History:  Past Surgical History:   Procedure Laterality Date     BACK SURGERY       C UNLISTED PROCEDURE, ABDOMEN/PERITONEUM/OMENTUM      Description: Hernia Repair;  Recorded: 10/23/2013;     CHOLECYSTECTOMY       HERNIA REPAIR       HYSTERECTOMY  1984     IR FINE NEEDLE ASPIRATION  11/29/2020     JOINT REPLACEMENT Left     knee     OTHER SURGICAL HISTORY  1990, 1995    Two left wrist     OTHER SURGICAL HISTORY  1996    FATTY TUMORLT SHOULDER BLADE     PICC MIDLINE INSERTION  11/30/2020          WI IMPLANT SPINAL NEUROSTIM/ Left 4/25/2019    Procedure: LEFT FLANK INCISION REPLACE NEUROSTIMULATOR;  Surgeon: Vishal Pretty MD;  Location: [facility];  Service: Pain     SPINAL CORD STIMULATOR IMPLANT         Family History:  Family History   Problem Relation Age of Onset     Myocardial Infarction Father      Cancer Father      Chronic Obstructive Pulmonary Disease Father      Heart Disease Father      Diabetes Sister      Diabetes Brother      Diabetes Maternal Grandmother      Melanoma Son      Arthritis Mother      Acute Myocardial Infarction Father 62.00     Diabetes Sister      Diabetes Brother      Melanoma Son      Kidney failure Son      Glomerulonephritis Son        Social History:   reports that she quit smoking about 13 years ago. She started smoking about 57 years ago. She has a 43.00 pack-year smoking history. She has never used smokeless  tobacco. She reports current alcohol use. She reports that she does not use drugs.    Allergies:  Allergies   Allergen Reactions     Morphine Nausea and Vomiting     Penicillins Hives     Tolerates ceftriaxone     Sulfa (Sulfonamide Antibiotics) [Sulfa Drugs] Hives        Current Medications:  Current Outpatient Medications   Medication Sig Dispense Refill     alcohol swab prep pads Use to swab area of injection/jacquelyn as directed. 100 each 3     amLODIPine (NORVASC) 10 MG tablet [AMLODIPINE (NORVASC) 10 MG TABLET] TAKE 1 TABLET EVERY DAY 90 tablet 2     ascorbic acid (ASCORBIC ACID WITH LATRICIA HIPS) 500 MG tablet Take 500 mg by mouth daily        aspirin 81 MG EC tablet Take 81 mg by mouth daily       atorvastatin (LIPITOR) 40 MG tablet Take 1 tablet (40 mg) by mouth At Bedtime 90 tablet 0     b complex vitamins tablet [B COMPLEX VITAMINS TABLET] Take 1 tablet by mouth daily.       blood glucose (NO BRAND SPECIFIED) test strip Use to test blood sugar 3 times daily or as directed. To accompany: Blood Glucose Monitor Brands: per insurance. 100 strip 6     blood glucose calibration (NO BRAND SPECIFIED) solution To accompany: Blood Glucose Monitor Brands: per insurance. 1 each 0     blood glucose monitoring (NO BRAND SPECIFIED) meter device kit Use to test blood sugar 3 times daily or as directed. Preferred blood glucose meter OR supplies to accompany: Blood Glucose Monitor Brands: per insurance. 1 kit 0     blood glucose test strips [BLOOD GLUCOSE TEST STRIPS] Check blood sugar four times daily. Dispense brand per patient's insurance at pharmacy discretion. Dx: e11.9 400 strip 3     blood-glucose meter Misc [BLOOD-GLUCOSE METER MISC] Dispense one meter. Use as directed. Dx: e11.9 1 each 0     fish oil-omega-3 fatty acids 1000 MG capsule Take 1 g by mouth daily       flash glucose scanning reader (FREESTYLE BRE 14 DAY READER) Misc [FLASH GLUCOSE SCANNING READER (FREESTYLE BRE 14 DAY READER) MISC] Use 1 each As  "Directed see administration instructions. Use as directed with sensor 1 each 0     flash glucose sensor (FREESTYLE BRE 14 DAY SENSOR) Kit [FLASH GLUCOSE SENSOR (FREESTYLE BRE 14 DAY SENSOR) KIT] Use 1 each As Directed every 14 (fourteen) days. 2 kit 11     hydrALAZINE (APRESOLINE) 10 MG tablet Take 1 tablet (10 mg) by mouth 3 times daily as needed (sbp >175)       hydroxychloroquine (PLAQUENIL) 200 MG tablet Take 1 tablet (200 mg) by mouth 2 times daily 180 tablet 1     insulin  UNIT/ML injection Inject 32 Units Subcutaneous 2 times daily (before meals) (Patient taking differently: Inject 32 Units Subcutaneous 2 times daily (before meals) Pt taking 44 units in am and 32 units at HS.)       insulin syringe 31G X 5/16\" 0.5 ML MISC 1 each 2 times daily 100 each 11     insulin syringe-needle U-100 (BD INSULIN SYRINGE ULT-FINE II) 1 mL 31 gauge x 5/16 Syrg [INSULIN SYRINGE-NEEDLE U-100 (BD INSULIN SYRINGE ULT-FINE II) 1 ML 31 GAUGE X 5/16 SYRG] USE 4 TIMES DAILY AS DIRECTED 500 each 1     lancing device (ACCU-CHEK SOFTCLIX) Misc [LANCING DEVICE (ACCU-CHEK SOFTCLIX) MISC] Use 1 applicator As Directed 4 (four) times a day. 300 each 3     loratadine (CLARITIN) 10 mg tablet [LORATADINE (CLARITIN) 10 MG TABLET] Take 1 tablet (10 mg total) by mouth at bedtime. 90 tablet 3     methotrexate 2.5 MG tablet Take 3 tablets (7.5 mg) by mouth once a week 36 tablet 0     metoprolol succinate ER (TOPROL-XL) 50 MG 24 hr tablet [METOPROLOL SUCCINATE (TOPROL-XL) 50 MG 24 HR TABLET] TAKE 1 TABLET EVERY DAY 90 tablet 2     MULTIVITAMIN ORAL Take 1 tablet by mouth daily        oxyCODONE-acetaminophen (PERCOCET) 5-325 MG tablet Take 1-2 tablets by mouth every 6 hours as needed for severe pain (Max of 6 tablets daily) For 12/22/21-1/21/22 180 tablet 0     thin (NO BRAND SPECIFIED) lancets Use with lanceting device. To accompany: Blood Glucose Monitor Brands: per insurance. 100 each 11     traZODone (DESYREL) 150 MG tablet Take 1 " tablet (150 mg) by mouth At Bedtime 90 tablet 1     Vitamin D3 (VITAMIN D3) 25 mcg (1000 units) tablet Take 1,000 Units by mouth daily              Physical Exam:  There were no vitals taken for this visit.  General: A & O x 3 in NAD  HEENT: EOMI, Non injected/non icteric sclera  CV: s1s2 with RRR, no rubs appreciated   Lungs: CTA B/L, no wheezing , rales or rhonci appreciated  GI: Soft, NT/ND, no rebound, no guarding noted  MS: Some fullness with tenderness noted involving MCP joints second through fifth on the left and right third.  Otherwise patient demonstrated good passive/active ROM over other joints with no warmth, erythema, tenderness or synovitis noted over these joints.        Summary/Assessment:      History that includes seropositive rheumatoid arthritis, osteoarthritis, renal insufficiency.    Presents for follow-up visit.      Has been experiencing hand pains.    Noted to have some fullness involving multiple MCP joints.    Did not follow through with recommendation last visit for right third MCP cortisone injection.    Patient decrease prednisone to off.    Has tried hydroxychloroquine with insufficient benefit.    Claims compliance with methotrexate 3 tablets weekly (limited dose due to renal insufficiency and thrombocytopenia)    Is interested in adding/trying Enbrel.    Takes Percocet on average 6-8 tablets daily,, which provides relief.    Please see below for management plan.      Pertinent rheumatology/past medical history (please refer to above for more detailed history):      Seropositive rheumatoid arthritis    High risk medication use    Osteoarthritis, multiple joints    Hx left knee replacement    Chronic right knee pain (cortisone injection not helpful)    Chronic neck and back pain (managed by another provider)    Renal insufficiency    Chronic opioid use    Diabetes, type II    History of urosepsis    Osteopenia (with nonhigh risk FRAX score).    Chronic low-dose steroid  use      Rheumatology medications provided/suggested:    Methotrexate  Folic acid  Enbrel      Pertinent medication from other providers or from otc (please refer to above for more detailed med list):    Percocet  Insulin  Calcium  Vitamin D    Pertinent medications already tried:     Humira (worsened joint pains)  Leflunomide (cost prohibitive)  Hydroxychloroquine (ineffective and costly)      Pertinent lab history:    Positive: CCP antibody    Negative: Rheumatoid factor, HUGH, c- ANCA      Pertinent imaging/test history:    X-ray of right hand from February 2019 that showed some signs of degenerative joint disease, no erosive changes identified.    9/2020, bone density test showed osteopenia with non-high risk FRAX score.      Other:    Standing order for labs placed every 3 months good through April 2020    Denies tobacco use or regular alcohol beverage intake.  Quit smoking greater than 10 years ago.    Was a patient of Dr. Schofield, last seen July 2018.    Denies any history of macular degeneration, retinal disease or glaucoma.      Plan:      Continue methotrexate 3 tablets weekly (was on 5 tablets weekly, limiting dose given mild renal insufficiency also has thrombocytopenia) .  Hold if develops any infection.     Continue folic acid 1 mg daily, except the day when taking methotrexate.    We will add Enbrel.  If Enbrel too costly or not approved, she can contact us for leflunomide or low-dose prednisone (2.5 mg daily), written on AVS by MA.  In the past leflunomide was costly as well.  Other option is trying another biologic that may be less costly, depending on her insurance.     Handouts on Enbrel and leflunomide provided to patient today.    Continue calcium and vitamin D daily.  On prior visit suggested 600 mg of calcium daily either via diet or supplements.  Was taking vitamin D 125 mcg equivalent of 5000 units daily.     On future visit we will consider repeating bone density test (last checked  9/17/2020), currently off of steroids.    Takes Percocet 6-8 tablets daily, established with pain clinic.      Recheck labs in 1 to 4 weeks (desires to combine with PCP labs) recheck labs 8 weeks after initiating Enbrel.  If not on Enbrel  then 12 weeks.      Follow-up in 4 months, preferably in clinic.      Total time spent 42 minutes involved with patient care, includes placing orders, reviewing records and formulating management plan.    This note was transcribed using Dragon voice recognition software as a result unintentional grammatical errors or word substitutions may have occurred. Please contact our Rheumatology department if you need any clarification or if you have any related inquiries.    Major side effect profile of medications provided were discussed with the patient.      Raheem Londono DO   ....................  1/3/2022   12:04 PM

## 2022-01-03 NOTE — PATIENT INSTRUCTIONS
Summary of Your Rheumatology Visit    Next Appointment:  4 months    Medications:    Please follow directives on pill bottle on how to take medication(s) provided.      Referrals:      Tests:       Please have labs performed in about 1-4 weeks. If Enbrel started repeat labs about 8 weeks. If enbrel not started then 12 weeks thereafter.       Injections:        Other:

## 2022-01-06 ENCOUNTER — ALLIED HEALTH/NURSE VISIT (OUTPATIENT)
Dept: EDUCATION SERVICES | Facility: CLINIC | Age: 72
End: 2022-01-06
Payer: COMMERCIAL

## 2022-01-06 DIAGNOSIS — E11.9 DIABETES MELLITUS, TYPE 2 (H): Primary | ICD-10-CM

## 2022-01-06 PROCEDURE — 95250 CONT GLUC MNTR PHYS/QHP EQP: CPT

## 2022-01-06 PROCEDURE — G0108 DIAB MANAGE TRN  PER INDIV: HCPCS | Mod: AE

## 2022-01-06 RX ORDER — FLASH GLUCOSE SENSOR
KIT MISCELLANEOUS
Qty: 2 EACH | Refills: 11 | Status: SHIPPED | OUTPATIENT
Start: 2022-01-06

## 2022-01-06 NOTE — PROGRESS NOTES
"Diabetes Self-Management Education & Support    Presents for: Individual review    ASSESSMENT:  Pt seen today for f/u. Last tamara sensor ended 12/31. She has another sensor w/ her but wanted assistance putting it on today. She has not been checking BG since 12/31.    She is taking NPH 44 units in the morning and 32 units in the evening. Pt notes she has not been having issues w/ lows. (Low noted on 12/30 was likely a false low).  Pt feels things have been going ok. She notes her son is now \"making\" her eat breakfast. So this is a positive thing. She is still getting MOW and typically having this for lunch and is consistent with dinner as well.   Elevations noted over the holidays, pt states there were many delicious options during that time. She also baked some pumpkin bread and attributes that to the elevation on 12/31.     Honestly unsure what is going on w/ tamara coverage. They were covering it in the fall of 2021 and then declined coverage as she is not taking 3 injections/day. Sent new Rx for tamara sensors to  speciality pharmacy. Will see what we can find out regarding coverage and if there are any changes in 2022.     New sensor inserted. Pt states she can have her son help her insert the next one (sample provided today) in 2 weeks. Of note son is living w/ pt.                        Patient's most recent   Lab Results   Component Value Date    A1C 9.7 09/16/2021    is not meeting goal of <8.0    PLAN  Will increase am NP dose to 46 units, continue 32 units for evening dose.   Pt will f/u with PCP next week and have A1c done at that time.   Pt will f/u here in 4 weeks.   Will contact  pharmacy to check on tamara sensor coverage.       SUBJECTIVE/OBJECTIVE:  Presents for: Individual review  Accompanied by: Self  Diabetes education in the past 24mo: Yes  Focus of Visit: Monitoring,Healthy Eating  Diabetes type: Type 2  Date of diagnosis: 1995  Cultural Influences/Ethnic Background:  Not  or "       Diabetes Symptoms & Complications:  Fatigue: Yes (afternoons)  Neuropathy: Yes  Polydipsia: Yes  Polyphagia: No  Polyuria: Sometimes  Visual change: Yes  Slow healing wounds: No  Complications assessed today?: Yes  Autonomic neuropathy: Yes  CVA: No  Heart disease:  (family history - father)  Retinopathy: No    Patient Problem List and Family Medical History reviewed for relevant medical history, current medical status, and diabetes risk factors.    Vitals:  There were no vitals taken for this visit.  Estimated body mass index is 30.76 kg/m  as calculated from the following:    Height as of 1/3/22: 1.829 m (6').    Weight as of 1/3/22: 102.9 kg (226 lb 12.8 oz).   Last 3 BP:   BP Readings from Last 3 Encounters:   01/03/22 118/68   12/23/21 111/59   11/17/21 127/84       History   Smoking Status     Former Smoker     Packs/day: 1.00     Years: 43.00     Start date: 1/1/1965     Quit date: 8/1/2008   Smokeless Tobacco     Never Used       Labs:  Lab Results   Component Value Date    A1C 9.7 09/16/2021     Lab Results   Component Value Date     10/29/2021     10/29/2021     Lab Results   Component Value Date    LDL 57 09/16/2021     Direct Measure HDL   Date Value Ref Range Status   09/16/2021 47 (L) >=50 mg/dL Final     Comment:     HDL Cholesterol Reference Range:     0-2 years:   No reference ranges established for patients under 2 years old  at [facility] for lipid analytes.    2-8 years:  Greater than 45 mg/dL     18 years and older:   Female: Greater than or equal to 50 mg/dL   Male:   Greater than or equal to 40 mg/dL   ]  GFR Estimate   Date Value Ref Range Status   10/29/2021 44 (L) >60 mL/min/1.73m2 Final     Comment:     As of July 11, 2021, eGFR is calculated by the CKD-EPI creatinine equation, without race adjustment. eGFR can be influenced by muscle mass, exercise, and diet. The reported eGFR is an estimation only and is only applicable if the renal function is  stable.   05/07/2021 40 (L) >60 mL/min/1.73m2 Final     GFR Estimate If Black   Date Value Ref Range Status   05/07/2021 48 (L) >60 mL/min/1.73m2 Final     Lab Results   Component Value Date    CR 1.22 10/29/2021     No results found for: MICROALBUMIN    Healthy Eating:  Healthy Eating Assessed Today: Yes  Meals include: Lunch,Dinner,Afternoon Snack,Evening Snack  Breakfast: only eating sometimes, around 9am  Lunch: 9313-0486: meals on wheels  Dinner: 430-630pm  Snacks: sometimes HS snack, about 50% of the time  Beverages: Water,Tea,Milk,Juice,Soda    Being Active:  Being Active Assessed Today: Yes  Exercise:: Currently not exercising  Barrier to exercise: Physical limitation (back problems)    Monitoring:  Monitoring Assessed Today: Yes  Did patient bring glucose meter to appointment? : Yes  Blood Glucose Meter: CGM      Taking Medications:  Diabetes Medication(s)     Insulin       insulin  UNIT/ML injection    Inject 32 Units Subcutaneous 2 times daily (before meals)     Patient taking differently: Inject 32 Units Subcutaneous 2 times daily (before meals) Pt taking 44 units in am and 32 units at HS.          Taking Medication Assessed Today: Yes  Current Treatments: Insulin Injections  Dose schedule:  (NPH 9am and 9pm)  Problems taking diabetes medications regularly?: No    Problem Solving:  Is the patient at risk for hypoglycemia?: Yes  Hypoglycemia Frequency: Monthly  Hypoglycemia Treatment: Juice,Candy  Medical ID: No    Hypoglycemia symptoms  Dizziness or Light-Headedness: Yes  Hunger: No  Mood changes: Yes  Nervousness/Anxiety: Yes  Sleepiness: No  Sweats: Yes  Feeling shaky: Yes    Hypoglycemia Complications  Blackouts: No  Hospitalization: No  Nocturnal hypoglycemia: Yes  Required assistance: Yes  Seizures: No    Reducing Risks:  Reducing Risks Assessed Today: Yes  Diabetes Risks: Age over 45 years,Sedentary Lifestyle  CAD Risks: Diabetes Mellitus,Family history,Obesity,Sedentary lifestyle  Has  dilated eye exam at least once a year?: Yes  Sees dentist every 6 months?: No    Healthy Coping:  Healthy Coping Assessed Today: Yes  Emotional response to diabetes: Ready to learn  Informal Support system:: Family,Children  Stage of change: PREPARATION (Decided to change - considering how)  Patient Activation Measure Survey Score:  No flowsheet data found.        INTERVENTIONS:    Education provided today on:  AADE Self-Care Behaviors:  Diabetes Pathophysiology  Healthy Eating: consistency in amount, composition, and timing of food intake, portion control and plate planning method  Being Active: relationship to blood glucose  Monitoring: purpose and individual blood glucose targets  Taking Medication: taking  Problem Solving: high blood glucose - causes, signs/symptoms, treatment and prevention, low blood glucose - causes, signs/symptoms, treatment and prevention, carrying a carbohydrate source at all times and when to call health care provider  Reducing Risks: A1C - goals, relating to blood glucose levels, how often to check  Healthy Coping: utilize support systems and methods for coping with stress    Opportunities for ongoing education and support in diabetes-self management were discussed.    Pt verbalized understanding of concepts discussed and recommendations provided today.         Time Spent: 60 minutes  Encounter Type: Individual    Any diabetes medication dose changes were made via the CDE Protocol and Collaborative Practice Agreement with the patient's referring provider. A copy of this encounter was shared with the provider.

## 2022-01-06 NOTE — LETTER
"    1/6/2022         RE: Ebonie Bowman  [address]  [address]  [place] [address]        Dear Colleague,    Thank you for referring your patient, Ebonie Bowman, to the [facility]. Please see a copy of my visit note below.    Diabetes Self-Management Education & Support    Presents for: Individual review    ASSESSMENT:  Pt seen today for f/u. Last tamara sensor ended 12/31. She has another sensor w/ her but wanted assistance putting it on today. She has not been checking BG since 12/31.    She is taking NPH 44 units in the morning and 32 units in the evening. Pt notes she has not been having issues w/ lows. (Low noted on 12/30 was likely a false low).  Pt feels things have been going ok. She notes her son is now \"making\" her eat breakfast. So this is a positive thing. She is still getting MOW and typically having this for lunch and is consistent with dinner as well.   Elevations noted over the holidays, pt states there were many delicious options during that time. She also baked some pumpkin bread and attributes that to the elevation on 12/31.     Honestly unsure what is going on w/ tamara coverage. They were covering it in the fall of 2021 and then declined coverage as she is not taking 3 injections/day. Sent new Rx for tamara sensors to  speciality pharmacy. Will see what we can find out regarding coverage and if there are any changes in 2022.     New sensor inserted. Pt states she can have her son help her insert the next one (sample provided today) in 2 weeks. Of note son is living w/ pt.                        Patient's most recent   Lab Results   Component Value Date    A1C 9.7 09/16/2021    is not meeting goal of <8.0    PLAN  Will increase am NP dose to 46 units, continue 32 units for evening dose.   Pt will f/u with PCP next week and have A1c done at that time.   Pt will f/u here in 4 weeks.   Will contact  pharmacy to check on tamara sensor coverage. "       SUBJECTIVE/OBJECTIVE:  Presents for: Individual review  Accompanied by: Self  Diabetes education in the past 24mo: Yes  Focus of Visit: Monitoring,Healthy Eating  Diabetes type: Type 2  Date of diagnosis: 1995  Cultural Influences/Ethnic Background:  Not  or       Diabetes Symptoms & Complications:  Fatigue: Yes (afternoons)  Neuropathy: Yes  Polydipsia: Yes  Polyphagia: No  Polyuria: Sometimes  Visual change: Yes  Slow healing wounds: No  Complications assessed today?: Yes  Autonomic neuropathy: Yes  CVA: No  Heart disease:  (family history - father)  Retinopathy: No    Patient Problem List and Family Medical History reviewed for relevant medical history, current medical status, and diabetes risk factors.    Vitals:  There were no vitals taken for this visit.  Estimated body mass index is 30.76 kg/m  as calculated from the following:    Height as of 1/3/22: 1.829 m (6').    Weight as of 1/3/22: 102.9 kg (226 lb 12.8 oz).   Last 3 BP:   BP Readings from Last 3 Encounters:   01/03/22 118/68   12/23/21 111/59   11/17/21 127/84       History   Smoking Status     Former Smoker     Packs/day: 1.00     Years: 43.00     Start date: 1/1/1965     Quit date: 8/1/2008   Smokeless Tobacco     Never Used       Labs:  Lab Results   Component Value Date    A1C 9.7 09/16/2021     Lab Results   Component Value Date     10/29/2021     10/29/2021     Lab Results   Component Value Date    LDL 57 09/16/2021     Direct Measure HDL   Date Value Ref Range Status   09/16/2021 47 (L) >=50 mg/dL Final     Comment:     HDL Cholesterol Reference Range:     0-2 years:   No reference ranges established for patients under 2 years old  at [facility] for lipid analytes.    2-8 years:  Greater than 45 mg/dL     18 years and older:   Female: Greater than or equal to 50 mg/dL   Male:   Greater than or equal to 40 mg/dL   ]  GFR Estimate   Date Value Ref Range Status   10/29/2021 44 (L) >60 mL/min/1.73m2  Final     Comment:     As of July 11, 2021, eGFR is calculated by the CKD-EPI creatinine equation, without race adjustment. eGFR can be influenced by muscle mass, exercise, and diet. The reported eGFR is an estimation only and is only applicable if the renal function is stable.   05/07/2021 40 (L) >60 mL/min/1.73m2 Final     GFR Estimate If Black   Date Value Ref Range Status   05/07/2021 48 (L) >60 mL/min/1.73m2 Final     Lab Results   Component Value Date    CR 1.22 10/29/2021     No results found for: MICROALBUMIN    Healthy Eating:  Healthy Eating Assessed Today: Yes  Meals include: Lunch,Dinner,Afternoon Snack,Evening Snack  Breakfast: only eating sometimes, around 9am  Lunch: 8720-9222: meals on wheels  Dinner: 430-630pm  Snacks: sometimes HS snack, about 50% of the time  Beverages: Water,Tea,Milk,Juice,Soda    Being Active:  Being Active Assessed Today: Yes  Exercise:: Currently not exercising  Barrier to exercise: Physical limitation (back problems)    Monitoring:  Monitoring Assessed Today: Yes  Did patient bring glucose meter to appointment? : Yes  Blood Glucose Meter: CGM      Taking Medications:  Diabetes Medication(s)     Insulin       insulin  UNIT/ML injection    Inject 32 Units Subcutaneous 2 times daily (before meals)     Patient taking differently: Inject 32 Units Subcutaneous 2 times daily (before meals) Pt taking 44 units in am and 32 units at HS.          Taking Medication Assessed Today: Yes  Current Treatments: Insulin Injections  Dose schedule:  (NPH 9am and 9pm)  Problems taking diabetes medications regularly?: No    Problem Solving:  Is the patient at risk for hypoglycemia?: Yes  Hypoglycemia Frequency: Monthly  Hypoglycemia Treatment: Juice,Candy  Medical ID: No    Hypoglycemia symptoms  Dizziness or Light-Headedness: Yes  Hunger: No  Mood changes: Yes  Nervousness/Anxiety: Yes  Sleepiness: No  Sweats: Yes  Feeling shaky: Yes    Hypoglycemia Complications  Blackouts:  No  Hospitalization: No  Nocturnal hypoglycemia: Yes  Required assistance: Yes  Seizures: No    Reducing Risks:  Reducing Risks Assessed Today: Yes  Diabetes Risks: Age over 45 years,Sedentary Lifestyle  CAD Risks: Diabetes Mellitus,Family history,Obesity,Sedentary lifestyle  Has dilated eye exam at least once a year?: Yes  Sees dentist every 6 months?: No    Healthy Coping:  Healthy Coping Assessed Today: Yes  Emotional response to diabetes: Ready to learn  Informal Support system:: Family,Children  Stage of change: PREPARATION (Decided to change - considering how)  Patient Activation Measure Survey Score:  No flowsheet data found.        INTERVENTIONS:    Education provided today on:  AADE Self-Care Behaviors:  Diabetes Pathophysiology  Healthy Eating: consistency in amount, composition, and timing of food intake, portion control and plate planning method  Being Active: relationship to blood glucose  Monitoring: purpose and individual blood glucose targets  Taking Medication: taking  Problem Solving: high blood glucose - causes, signs/symptoms, treatment and prevention, low blood glucose - causes, signs/symptoms, treatment and prevention, carrying a carbohydrate source at all times and when to call health care provider  Reducing Risks: A1C - goals, relating to blood glucose levels, how often to check  Healthy Coping: utilize support systems and methods for coping with stress    Opportunities for ongoing education and support in diabetes-self management were discussed.    Pt verbalized understanding of concepts discussed and recommendations provided today.         Time Spent: 60 minutes  Encounter Type: Individual    Any diabetes medication dose changes were made via the CDE Protocol and Collaborative Practice Agreement with the patient's referring provider. A copy of this encounter was shared with the provider.

## 2022-01-10 ENCOUNTER — OFFICE VISIT (OUTPATIENT)
Dept: CARDIOLOGY | Facility: CLINIC | Age: 72
End: 2022-01-10
Attending: INTERNAL MEDICINE
Payer: COMMERCIAL

## 2022-01-10 VITALS
RESPIRATION RATE: 14 BRPM | BODY MASS INDEX: 31.06 KG/M2 | WEIGHT: 229 LBS | DIASTOLIC BLOOD PRESSURE: 74 MMHG | SYSTOLIC BLOOD PRESSURE: 118 MMHG | HEART RATE: 69 BPM

## 2022-01-10 DIAGNOSIS — I10 ESSENTIAL HYPERTENSION: ICD-10-CM

## 2022-01-10 DIAGNOSIS — R55 SYNCOPE, UNSPECIFIED SYNCOPE TYPE: ICD-10-CM

## 2022-01-10 DIAGNOSIS — I25.10 ATHEROSCLEROTIC CARDIOVASCULAR DISEASE: ICD-10-CM

## 2022-01-10 PROCEDURE — 99213 OFFICE O/P EST LOW 20 MIN: CPT | Performed by: INTERNAL MEDICINE

## 2022-01-10 NOTE — LETTER
1/10/2022    Marichuy Rubio NP  [address]  [facility] [address]    RE: Ebonie Bowman       Dear Colleague,     I had the pleasure of seeing Ebonie Bowman in the [facility].         [facility]   [address], [place] [address]   [email or web address]   OFFICE: [phone]          Thank you Marichuy Curtis for asking the [facility] team to participate in the care of your patient, Ebonie Bowman.     Impression and Plan       1.  History of syncope. Ebonie was seen by Dr. Mg 27 October 2021 after she had presented with a syncopal spell.  As part of her work-up, she underwent an echocardiogram 26 October 2021 which was fairly unremarkable.  Ambulatory monitor 29 October 2021 through 27 November 2021 revealed no evidence of significant bradycardia or advanced conduction disease.  On interview today, Ebonie denies any recurrent concerning episodes.  Ultimately her work-up thus far has been fairly unremarkable.  Given unremarkable test results and lack of recurrent symptoms feel Ebonie could simply follow-up with us on an as-needed basis.  She is comfortable with that plan.    2.  Hypertension.  Blood pressure is very reasonable in the office today at 118/74 mmHg.    30 minutes spent reviewing prior records (including documentation, laboratory studies, cardiac testing/imaging), interview with patient along with physical exam, planning, and subsequent documentation/crafting of note.           History of Present Illness    Once again I would like to thank you again for asking me to participate in the care of your patient, Ebonie Bowman.  As you know, but to reiterate for my own records, Ebonie Bowman is a 71 year old female historically followed by my colleague, Dr. Modesta Mg.  Ebonie was seen by Dr. Mg 27 October 2021 after she had presented with a syncopal spell.  As part of her work-up, she underwent an echocardiogram 26 October  "2021 which was fairly unremarkable.  Ambulatory monitor 29 October 2021 through 27 November 2021 revealed no evidence of significant bradycardia or advanced conduction disease.    On interview, Ebonie reports no recurrent \"blackout spells\".  She states that since her episode in October she has been doing well.  She denies chest pain or shortness of breath.  No palpitations.  No fevers, chills, or other constitutional symptoms.    Further review of systems is otherwise negative/noncontributory (medical record and 13 point review of systems reviewed as well and pertinent positives noted).         Cardiac Diagnostics      Echocardiogram 26 October 2021:  1. Normal left ventricular size and systolic performance with ejection fraction of 55 to 60%.  2. Mild increase in left ventricular wall thickness.  3. No significant valvular heart disease.  4. Normal right ventricular size and systolic performance.  5. Mild left atrial enlargement.  Right show of normal dimension.    Ambulatory monitor 29 October 2021 through 27 November 2021:  1. Abnormal baseline tracing with borderline AR prolongation and a IVCD pattern  2. There were no Bradycardia, pauses or AV block  3. Rare singular noncomplex atrial, ventricular ectopy  4. Patient does have some potential conduction abnormalities with AR prolongation and bundle branch block but high-grade heart block not identified on this report recording    Twelve-lead ECG (personally reviewed) 28 October 2021: Sinus rhythm with heart rate of 71 bpm.  Right bundle branch block.           Physical Examination       /74 (BP Location: Left arm, Patient Position: Sitting, Cuff Size: Adult Regular)   Pulse 69   Resp 14   Wt 103.9 kg (229 lb)   BMI 31.06 kg/m          Wt Readings from Last 3 Encounters:   01/10/22 103.9 kg (229 lb)   01/03/22 102.9 kg (226 lb 12.8 oz)   11/17/21 102.1 kg (225 lb)     The patient is alert and oriented times three. Sclerae are anicteric. Mucosal membranes " are moist. Jugular venous pressure is normal. No significant adenopathy/thyromegally appreciated. Lungs are clear with good expansion. On cardiovascular exam, the patient has a regular S1 and S2. Abdomen is soft and non-tender. Extremities reveal no clubbing, cyanosis, or edema.         Medications  Allergies   Current Outpatient Medications   Medication Sig Dispense Refill     alcohol swab prep pads Use to swab area of injection/jacquelyn as directed. 100 each 3     amLODIPine (NORVASC) 10 MG tablet [AMLODIPINE (NORVASC) 10 MG TABLET] TAKE 1 TABLET EVERY DAY 90 tablet 2     ascorbic acid (ASCORBIC ACID WITH LATRICIA HIPS) 500 MG tablet Take 500 mg by mouth daily        aspirin 81 MG EC tablet Take 81 mg by mouth daily       atorvastatin (LIPITOR) 40 MG tablet Take 1 tablet (40 mg) by mouth At Bedtime 90 tablet 0     b complex vitamins tablet [B COMPLEX VITAMINS TABLET] Take 1 tablet by mouth daily.       blood glucose (NO BRAND SPECIFIED) test strip Use to test blood sugar 3 times daily or as directed. To accompany: Blood Glucose Monitor Brands: per insurance. 100 strip 6     blood glucose calibration (NO BRAND SPECIFIED) solution To accompany: Blood Glucose Monitor Brands: per insurance. 1 each 0     blood glucose monitoring (NO BRAND SPECIFIED) meter device kit Use to test blood sugar 3 times daily or as directed. Preferred blood glucose meter OR supplies to accompany: Blood Glucose Monitor Brands: per insurance. 1 kit 0     blood glucose test strips [BLOOD GLUCOSE TEST STRIPS] Check blood sugar four times daily. Dispense brand per patient's insurance at pharmacy discretion. Dx: e11.9 400 strip 3     blood-glucose meter Misc [BLOOD-GLUCOSE METER MISC] Dispense one meter. Use as directed. Dx: e11.9 1 each 0     Continuous Blood Gluc Sensor (FREESTYLE BRE 14 DAY SENSOR) MISC Change every 14 days. Use daily to scan blood sugars, use per manufactures guidelines. Pt already has reader. 2 each 11     etanercept (ENBREL) 50  "MG/ML injection Inject 1 mL (50 mg) Subcutaneous once a week Hold for signs of infection, and seek medical attention. 4 mL 3     fish oil-omega-3 fatty acids 1000 MG capsule Take 1 g by mouth daily       flash glucose scanning reader (FREESTYLE BRE 14 DAY READER) Misc [FLASH GLUCOSE SCANNING READER (FREESTYLE BRE 14 DAY READER) MISC] Use 1 each As Directed see administration instructions. Use as directed with sensor 1 each 0     flash glucose sensor (FREESTYLE BRE 14 DAY SENSOR) Kit [FLASH GLUCOSE SENSOR (FREESTYLE BRE 14 DAY SENSOR) KIT] Use 1 each As Directed every 14 (fourteen) days. 2 kit 11     folic acid (FOLVITE) 1 MG tablet Take one tab daily, except the day when taking methotrexate. 90 tablet 1     hydrALAZINE (APRESOLINE) 10 MG tablet Take 1 tablet (10 mg) by mouth 3 times daily as needed (sbp >175)       insulin  UNIT/ML injection Inject 32 Units Subcutaneous 2 times daily (before meals) (Patient taking differently: Inject 32 Units Subcutaneous 2 times daily (before meals) Pt taking 44 units in am and 32 units at HS.)       insulin syringe 31G X 5/16\" 0.5 ML MISC 1 each 2 times daily 100 each 11     insulin syringe-needle U-100 (BD INSULIN SYRINGE ULT-FINE II) 1 mL 31 gauge x 5/16 Syrg [INSULIN SYRINGE-NEEDLE U-100 (BD INSULIN SYRINGE ULT-FINE II) 1 ML 31 GAUGE X 5/16 SYRG] USE 4 TIMES DAILY AS DIRECTED 500 each 1     lancing device (ACCU-CHEK SOFTCLIX) Misc [LANCING DEVICE (ACCU-CHEK SOFTCLIX) MISC] Use 1 applicator As Directed 4 (four) times a day. 300 each 3     loratadine (CLARITIN) 10 mg tablet [LORATADINE (CLARITIN) 10 MG TABLET] Take 1 tablet (10 mg total) by mouth at bedtime. 90 tablet 3     methotrexate 2.5 MG tablet Take 3 tablets (7.5 mg) by mouth once a week 36 tablet 0     metoprolol succinate ER (TOPROL-XL) 50 MG 24 hr tablet [METOPROLOL SUCCINATE (TOPROL-XL) 50 MG 24 HR TABLET] TAKE 1 TABLET EVERY DAY 90 tablet 2     MULTIVITAMIN ORAL Take 1 tablet by mouth daily        " oxyCODONE-acetaminophen (PERCOCET) 5-325 MG tablet Take 1-2 tablets by mouth every 6 hours as needed for severe pain (Max of 6 tablets daily) For 12/22/21-1/21/22 180 tablet 0     thin (NO BRAND SPECIFIED) lancets Use with lanceting device. To accompany: Blood Glucose Monitor Brands: per insurance. 100 each 11     traZODone (DESYREL) 150 MG tablet Take 1 tablet (150 mg) by mouth At Bedtime 90 tablet 1     Vitamin D3 (VITAMIN D3) 25 mcg (1000 units) tablet Take 1,000 Units by mouth daily          Allergies   Allergen Reactions     Morphine Nausea and Vomiting     Penicillins Hives     Tolerates ceftriaxone     Sulfa (Sulfonamide Antibiotics) [Sulfa Drugs] Hives          Lab Results    Chemistry/lipid CBC Cardiac Enzymes/BNP/TSH/INR   Recent Labs   Lab Test 09/16/21  0936   CHOL 130   HDL 47*   LDL 57   TRIG 129     Recent Labs   Lab Test 09/16/21  0936 10/20/20  1413 08/21/19  1227   LDL 57 54 53     Recent Labs   Lab Test 10/29/21  1646 10/29/21  0752 10/29/21  0704   NA  --   --  141   POTASSIUM  --   --  3.8   CHLORIDE  --   --  108*   CO2  --   --  27   *   < > 127*   BUN  --   --  20   CR  --   --  1.22*   GFRESTIMATED  --   --  44*   GRACY  --   --  9.3    < > = values in this interval not displayed.     Recent Labs   Lab Test 10/29/21  0704 10/28/21  0657 10/27/21  0720   CR 1.22* 1.31* 1.33*     Recent Labs   Lab Test 09/16/21  0936 05/07/21  1237 12/07/20  0830   A1C 9.7* 9.5* 6.6*          Recent Labs   Lab Test 10/29/21  0704   WBC 8.4   HGB 12.4   HCT 37.8   MCV 96   *     Recent Labs   Lab Test 10/29/21  0704 10/28/21  0657 10/27/21  0720   HGB 12.4 12.4 13.2    Recent Labs   Lab Test 10/26/21  1807 10/26/21  1405 10/26/21  0739   TROPONINI 0.03 0.03 0.04     No results for input(s): BNP, NTBNPI, NTBNP in the last 34907 hours.  Recent Labs   Lab Test 05/07/21  1237   TSH 1.93     Recent Labs   Lab Test 11/30/20  0620 11/27/20  2308 01/01/18  0805   INR 1.34* 1.27* 1.21*        Medical History   Surgical History Family History Social History   Past Medical History:   Diagnosis Date     BBB (bundle branch block)      Chronic back pain      Chronic kidney disease     stage 3     Chronic kidney disease, unspecified     Created by Conversion      Depression      Diabetes (H)     Type 2     Diabetes mellitus, type 2 (H)     Created by Conversion      Frequent headaches      Ganglion of tendon sheath     Created by Conversion      GERD (gastroesophageal reflux disease)      HLD (hyperlipidemia)      Hypertension     Per H&P dated 1/03/2013     Incarcerated ventral hernia     Per H&P dated 1/03/2013     Osteoarthritis      Rheumatoid arthritis (H)      Shortness of breath      Thrombocytopenia (H)      Venous insufficiency      Past Surgical History:   Procedure Laterality Date     BACK SURGERY       C UNLISTED PROCEDURE, ABDOMEN/PERITONEUM/OMENTUM      Description: Hernia Repair;  Recorded: 10/23/2013;     CHOLECYSTECTOMY       HERNIA REPAIR       HYSTERECTOMY  1984     IR FINE NEEDLE ASPIRATION  11/29/2020     JOINT REPLACEMENT Left     knee     OTHER SURGICAL HISTORY  1990, 1995    Two left wrist     OTHER SURGICAL HISTORY  1996    FATTY TUMORLT SHOULDER BLADE     PICC MIDLINE INSERTION  11/30/2020          MS IMPLANT SPINAL NEUROSTIM/ Left 4/25/2019    Procedure: LEFT FLANK INCISION REPLACE NEUROSTIMULATOR;  Surgeon: Vishal Pretty MD;  Location: [facility];  Service: Pain     SPINAL CORD STIMULATOR IMPLANT       Family History   Problem Relation Age of Onset     Myocardial Infarction Father      Cancer Father      Chronic Obstructive Pulmonary Disease Father      Heart Disease Father      Diabetes Sister      Diabetes Brother      Diabetes Maternal Grandmother      Melanoma Son      Arthritis Mother      Acute Myocardial Infarction Father 62.00     Diabetes Sister      Diabetes Brother      Melanoma Son      Kidney failure Son      Glomerulonephritis Son         Social History      Socioeconomic History     Marital status:      Spouse name: Not on file     Number of children: Not on file     Years of education: Not on file     Highest education level: Not on file   Occupational History     Not on file   Tobacco Use     Smoking status: Former Smoker     Packs/day: 1.00     Years: 43.00     Pack years: 43.00     Start date: 1965     Quit date: 2008     Years since quittin.4     Smokeless tobacco: Never Used   Substance and Sexual Activity     Alcohol use: Yes     Comment: Alcoholic Drinks/day: rare     Drug use: No     Comment: Drug use: takes percocet for chronic pain     Sexual activity: Not Currently   Other Topics Concern     Not on file   Social History Narrative    Born in [place]. Lives with her son. Lost one son when he was age 30 due to melanoma. Moved around growing up since her father was in the . Play games on phone and watch television.       Social Determinants of Health     Financial Resource Strain: Not on file   Food Insecurity: Not on file   Transportation Needs: Not on file   Physical Activity: Not on file   Stress: Not on file   Social Connections: Not on file   Intimate Partner Violence: Not on file   Housing Stability: Not on file                cc:   Modesta Mg MD  [address]  [address]  [place] [address]

## 2022-01-10 NOTE — PROGRESS NOTES
[facility]   [address], [place] [address]   [email or web address]   OFFICE: [phone]          Thank you Marichuy Curtis for asking the [facility] team to participate in the care of your patient, Ebonie Bowman.     Impression and Plan       1.  History of syncope. Ebonie was seen by Dr. Mg 27 October 2021 after she had presented with a syncopal spell.  As part of her work-up, she underwent an echocardiogram 26 October 2021 which was fairly unremarkable.  Ambulatory monitor 29 October 2021 through 27 November 2021 revealed no evidence of significant bradycardia or advanced conduction disease.  On interview today, Ebonie denies any recurrent concerning episodes.  Ultimately her work-up thus far has been fairly unremarkable.  Given unremarkable test results and lack of recurrent symptoms feel Ebonie could simply follow-up with us on an as-needed basis.  She is comfortable with that plan.    2.  Hypertension.  Blood pressure is very reasonable in the office today at 118/74 mmHg.    30 minutes spent reviewing prior records (including documentation, laboratory studies, cardiac testing/imaging), interview with patient along with physical exam, planning, and subsequent documentation/crafting of note.           History of Present Illness    Once again I would like to thank you again for asking me to participate in the care of your patient, Ebonie Bowman.  As you know, but to reiterate for my own records, Ebonie Bowman is a 71 year old female historically followed by my colleague, Dr. Modesta Mg.  Ebonie was seen by Dr. Mg 27 October 2021 after she had presented with a syncopal spell.  As part of her work-up, she underwent an echocardiogram 26 October 2021 which was fairly unremarkable.  Ambulatory monitor 29 October 2021 through 27 November 2021 revealed no evidence of significant bradycardia or advanced conduction disease.    On interview, Ebonie  "reports no recurrent \"blackout spells\".  She states that since her episode in October she has been doing well.  She denies chest pain or shortness of breath.  No palpitations.  No fevers, chills, or other constitutional symptoms.    Further review of systems is otherwise negative/noncontributory (medical record and 13 point review of systems reviewed as well and pertinent positives noted).         Cardiac Diagnostics      Echocardiogram 26 October 2021:  1. Normal left ventricular size and systolic performance with ejection fraction of 55 to 60%.  2. Mild increase in left ventricular wall thickness.  3. No significant valvular heart disease.  4. Normal right ventricular size and systolic performance.  5. Mild left atrial enlargement.  Right show of normal dimension.    Ambulatory monitor 29 October 2021 through 27 November 2021:  1. Abnormal baseline tracing with borderline MI prolongation and a IVCD pattern  2. There were no Bradycardia, pauses or AV block  3. Rare singular noncomplex atrial, ventricular ectopy  4. Patient does have some potential conduction abnormalities with MI prolongation and bundle branch block but high-grade heart block not identified on this report recording    Twelve-lead ECG (personally reviewed) 28 October 2021: Sinus rhythm with heart rate of 71 bpm.  Right bundle branch block.           Physical Examination       /74 (BP Location: Left arm, Patient Position: Sitting, Cuff Size: Adult Regular)   Pulse 69   Resp 14   Wt 103.9 kg (229 lb)   BMI 31.06 kg/m          Wt Readings from Last 3 Encounters:   01/10/22 103.9 kg (229 lb)   01/03/22 102.9 kg (226 lb 12.8 oz)   11/17/21 102.1 kg (225 lb)     The patient is alert and oriented times three. Sclerae are anicteric. Mucosal membranes are moist. Jugular venous pressure is normal. No significant adenopathy/thyromegally appreciated. Lungs are clear with good expansion. On cardiovascular exam, the patient has a regular S1 and S2. " Abdomen is soft and non-tender. Extremities reveal no clubbing, cyanosis, or edema.         Medications  Allergies   Current Outpatient Medications   Medication Sig Dispense Refill     alcohol swab prep pads Use to swab area of injection/jacquelyn as directed. 100 each 3     amLODIPine (NORVASC) 10 MG tablet [AMLODIPINE (NORVASC) 10 MG TABLET] TAKE 1 TABLET EVERY DAY 90 tablet 2     ascorbic acid (ASCORBIC ACID WITH LATRICIA HIPS) 500 MG tablet Take 500 mg by mouth daily        aspirin 81 MG EC tablet Take 81 mg by mouth daily       atorvastatin (LIPITOR) 40 MG tablet Take 1 tablet (40 mg) by mouth At Bedtime 90 tablet 0     b complex vitamins tablet [B COMPLEX VITAMINS TABLET] Take 1 tablet by mouth daily.       blood glucose (NO BRAND SPECIFIED) test strip Use to test blood sugar 3 times daily or as directed. To accompany: Blood Glucose Monitor Brands: per insurance. 100 strip 6     blood glucose calibration (NO BRAND SPECIFIED) solution To accompany: Blood Glucose Monitor Brands: per insurance. 1 each 0     blood glucose monitoring (NO BRAND SPECIFIED) meter device kit Use to test blood sugar 3 times daily or as directed. Preferred blood glucose meter OR supplies to accompany: Blood Glucose Monitor Brands: per insurance. 1 kit 0     blood glucose test strips [BLOOD GLUCOSE TEST STRIPS] Check blood sugar four times daily. Dispense brand per patient's insurance at pharmacy discretion. Dx: e11.9 400 strip 3     blood-glucose meter Misc [BLOOD-GLUCOSE METER MISC] Dispense one meter. Use as directed. Dx: e11.9 1 each 0     Continuous Blood Gluc Sensor (FREESTYLE BRE 14 DAY SENSOR) MISC Change every 14 days. Use daily to scan blood sugars, use per manufactures guidelines. Pt already has reader. 2 each 11     etanercept (ENBREL) 50 MG/ML injection Inject 1 mL (50 mg) Subcutaneous once a week Hold for signs of infection, and seek medical attention. 4 mL 3     fish oil-omega-3 fatty acids 1000 MG capsule Take 1 g by mouth daily  "      flash glucose scanning reader (FREESTYLE BRE 14 DAY READER) Misc [FLASH GLUCOSE SCANNING READER (FREESTYLE BRE 14 DAY READER) MISC] Use 1 each As Directed see administration instructions. Use as directed with sensor 1 each 0     flash glucose sensor (FREESTYLE BRE 14 DAY SENSOR) Kit [FLASH GLUCOSE SENSOR (FREESTYLE BRE 14 DAY SENSOR) KIT] Use 1 each As Directed every 14 (fourteen) days. 2 kit 11     folic acid (FOLVITE) 1 MG tablet Take one tab daily, except the day when taking methotrexate. 90 tablet 1     hydrALAZINE (APRESOLINE) 10 MG tablet Take 1 tablet (10 mg) by mouth 3 times daily as needed (sbp >175)       insulin  UNIT/ML injection Inject 32 Units Subcutaneous 2 times daily (before meals) (Patient taking differently: Inject 32 Units Subcutaneous 2 times daily (before meals) Pt taking 44 units in am and 32 units at HS.)       insulin syringe 31G X 5/16\" 0.5 ML MISC 1 each 2 times daily 100 each 11     insulin syringe-needle U-100 (BD INSULIN SYRINGE ULT-FINE II) 1 mL 31 gauge x 5/16 Syrg [INSULIN SYRINGE-NEEDLE U-100 (BD INSULIN SYRINGE ULT-FINE II) 1 ML 31 GAUGE X 5/16 SYRG] USE 4 TIMES DAILY AS DIRECTED 500 each 1     lancing device (ACCU-CHEK SOFTCLIX) Misc [LANCING DEVICE (ACCU-CHEK SOFTCLIX) MISC] Use 1 applicator As Directed 4 (four) times a day. 300 each 3     loratadine (CLARITIN) 10 mg tablet [LORATADINE (CLARITIN) 10 MG TABLET] Take 1 tablet (10 mg total) by mouth at bedtime. 90 tablet 3     methotrexate 2.5 MG tablet Take 3 tablets (7.5 mg) by mouth once a week 36 tablet 0     metoprolol succinate ER (TOPROL-XL) 50 MG 24 hr tablet [METOPROLOL SUCCINATE (TOPROL-XL) 50 MG 24 HR TABLET] TAKE 1 TABLET EVERY DAY 90 tablet 2     MULTIVITAMIN ORAL Take 1 tablet by mouth daily        oxyCODONE-acetaminophen (PERCOCET) 5-325 MG tablet Take 1-2 tablets by mouth every 6 hours as needed for severe pain (Max of 6 tablets daily) For 12/22/21-1/21/22 180 tablet 0     thin (NO BRAND " SPECIFIED) lancets Use with lanceting device. To accompany: Blood Glucose Monitor Brands: per insurance. 100 each 11     traZODone (DESYREL) 150 MG tablet Take 1 tablet (150 mg) by mouth At Bedtime 90 tablet 1     Vitamin D3 (VITAMIN D3) 25 mcg (1000 units) tablet Take 1,000 Units by mouth daily          Allergies   Allergen Reactions     Morphine Nausea and Vomiting     Penicillins Hives     Tolerates ceftriaxone     Sulfa (Sulfonamide Antibiotics) [Sulfa Drugs] Hives          Lab Results    Chemistry/lipid CBC Cardiac Enzymes/BNP/TSH/INR   Recent Labs   Lab Test 09/16/21  0936   CHOL 130   HDL 47*   LDL 57   TRIG 129     Recent Labs   Lab Test 09/16/21  0936 10/20/20  1413 08/21/19  1227   LDL 57 54 53     Recent Labs   Lab Test 10/29/21  1646 10/29/21  0752 10/29/21  0704   NA  --   --  141   POTASSIUM  --   --  3.8   CHLORIDE  --   --  108*   CO2  --   --  27   *   < > 127*   BUN  --   --  20   CR  --   --  1.22*   GFRESTIMATED  --   --  44*   GRACY  --   --  9.3    < > = values in this interval not displayed.     Recent Labs   Lab Test 10/29/21  0704 10/28/21  0657 10/27/21  0720   CR 1.22* 1.31* 1.33*     Recent Labs   Lab Test 09/16/21  0936 05/07/21  1237 12/07/20  0830   A1C 9.7* 9.5* 6.6*          Recent Labs   Lab Test 10/29/21  0704   WBC 8.4   HGB 12.4   HCT 37.8   MCV 96   *     Recent Labs   Lab Test 10/29/21  0704 10/28/21  0657 10/27/21  0720   HGB 12.4 12.4 13.2    Recent Labs   Lab Test 10/26/21  1807 10/26/21  1405 10/26/21  0739   TROPONINI 0.03 0.03 0.04     No results for input(s): BNP, NTBNPI, NTBNP in the last 76518 hours.  Recent Labs   Lab Test 05/07/21  1237   TSH 1.93     Recent Labs   Lab Test 11/30/20  0620 11/27/20  2308 01/01/18  0805   INR 1.34* 1.27* 1.21*        Medical History  Surgical History Family History Social History   Past Medical History:   Diagnosis Date     BBB (bundle branch block)      Chronic back pain      Chronic kidney disease     stage 3     Chronic  kidney disease, unspecified     Created by Conversion      Depression      Diabetes (H)     Type 2     Diabetes mellitus, type 2 (H)     Created by Conversion      Frequent headaches      Ganglion of tendon sheath     Created by Conversion      GERD (gastroesophageal reflux disease)      HLD (hyperlipidemia)      Hypertension     Per H&P dated 1/03/2013     Incarcerated ventral hernia     Per H&P dated 1/03/2013     Osteoarthritis      Rheumatoid arthritis (H)      Shortness of breath      Thrombocytopenia (H)      Venous insufficiency      Past Surgical History:   Procedure Laterality Date     BACK SURGERY       C UNLISTED PROCEDURE, ABDOMEN/PERITONEUM/OMENTUM      Description: Hernia Repair;  Recorded: 10/23/2013;     CHOLECYSTECTOMY       HERNIA REPAIR       HYSTERECTOMY  1984     IR FINE NEEDLE ASPIRATION  11/29/2020     JOINT REPLACEMENT Left     knee     OTHER SURGICAL HISTORY  1990, 1995    Two left wrist     OTHER SURGICAL HISTORY  1996    FATTY TUMORLT SHOULDER BLADE     PICC MIDLINE INSERTION  11/30/2020          WA IMPLANT SPINAL NEUROSTIM/ Left 4/25/2019    Procedure: LEFT FLANK INCISION REPLACE NEUROSTIMULATOR;  Surgeon: Vishal Pretty MD;  [facility];  Service: Pain     SPINAL CORD STIMULATOR IMPLANT       Family History   Problem Relation Age of Onset     Myocardial Infarction Father      Cancer Father      Chronic Obstructive Pulmonary Disease Father      Heart Disease Father      Diabetes Sister      Diabetes Brother      Diabetes Maternal Grandmother      Melanoma Son      Arthritis Mother      Acute Myocardial Infarction Father 62.00     Diabetes Sister      Diabetes Brother      Melanoma Son      Kidney failure Son      Glomerulonephritis Son         Social History     Socioeconomic History     Marital status:      Spouse name: Not on file     Number of children: Not on file     Years of education: Not on file     Highest education level: Not on file    Occupational History     Not on file   Tobacco Use     Smoking status: Former Smoker     Packs/day: 1.00     Years: 43.00     Pack years: 43.00     Start date: 1965     Quit date: 2008     Years since quittin.4     Smokeless tobacco: Never Used   Substance and Sexual Activity     Alcohol use: Yes     Comment: Alcoholic Drinks/day: rare     Drug use: No     Comment: Drug use: takes percocet for chronic pain     Sexual activity: Not Currently   Other Topics Concern     Not on file   Social History Narrative    Born in [place]. Lives with her son. Lost one son when he was age 30 due to melanoma. Moved around growing up since her father was in the . Play games on phone and watch television.       Social Determinants of Health     Financial Resource Strain: Not on file   Food Insecurity: Not on file   Transportation Needs: Not on file   Physical Activity: Not on file   Stress: Not on file   Social Connections: Not on file   Intimate Partner Violence: Not on file   Housing Stability: Not on file

## 2022-01-11 ENCOUNTER — TELEPHONE (OUTPATIENT)
Dept: INTERNAL MEDICINE | Facility: CLINIC | Age: 72
End: 2022-01-11
Payer: COMMERCIAL

## 2022-01-11 NOTE — TELEPHONE ENCOUNTER
FYI- when the Saida was covered last May- I checked into the claim- it was covered under [facility]'s transitional benefit. (They will cover it one time only without a prior authorization but you don't know that unless you dig into the claim). And it looks like [facility] won't approve the prior authorization because patient isn't injecting insulin 3x day. I do not know if [facility] has changed their requirements this calendar year. I will put in another PA request to the team- it might still have to go through an appeal process as it was just in October that we recd the denial.  Thanks!  Claudia  [facility] Team  Phone: [phone]  Fax: [phone]  Pool: Pharm Diabetes

## 2022-01-18 ENCOUNTER — TELEPHONE (OUTPATIENT)
Dept: INTERNAL MEDICINE | Facility: CLINIC | Age: 72
End: 2022-01-18
Payer: COMMERCIAL

## 2022-01-18 DIAGNOSIS — B35.6 TINEA CRURIS: Primary | ICD-10-CM

## 2022-01-18 DIAGNOSIS — J30.81 ALLERGIC RHINITIS DUE TO CATS: ICD-10-CM

## 2022-01-18 DIAGNOSIS — G89.4 CHRONIC PAIN SYNDROME: ICD-10-CM

## 2022-01-18 NOTE — TELEPHONE ENCOUNTER
Reason for Call: Medication Refill  Do you use a [facility]? No  Name of the pharmacy and phone number for the current request:  [facility]   Name of the medication requested:     Oxycodone (Percocet) 5-325 mg tablet  Nystatin - cream  Loratadine (Claritin) 10 mg    Other request: Pt states she will be out of medication in two days, she is completely out of the Nystatin.    Can we leave a detailed message on this number? YES    Phone number patient can be reached at: Home number on file [phone] (home)    Best Time: Any    Call taken on 1/18/2022 at 11:14 AM by Selma Daniels

## 2022-01-18 NOTE — TELEPHONE ENCOUNTER
Last seen 9/16/21  Last UDS 9/16/21, CSA 4/5/21   Return in about 3 months (around 12/16/2021) for Follow up.  Next appointment 2/14/22    Chronic pain        Pt reports inadequate pain relief at this time. Due to age and co-morbidities, I am not comfortable escalating her pain medication at this time. She is seeing the pain clinic but due to lack of adequate UDS and failure to repeat the screening test, they are unable to titrate any controlled substances for her. She will continue the current dose of Percocet as well as work with PT.        Nystatin cream was not on the pts medication list.

## 2022-01-21 RX ORDER — OXYCODONE AND ACETAMINOPHEN 5; 325 MG/1; MG/1
1-2 TABLET ORAL EVERY 6 HOURS PRN
Qty: 180 TABLET | Refills: 0 | Status: SHIPPED | OUTPATIENT
Start: 2022-01-21 | End: 2022-02-14

## 2022-01-21 RX ORDER — LORATADINE 10 MG/1
10 TABLET ORAL AT BEDTIME
Qty: 90 TABLET | Refills: 3 | Status: SHIPPED | OUTPATIENT
Start: 2022-01-21 | End: 2022-02-14

## 2022-01-21 RX ORDER — NYSTATIN 100000 U/G
CREAM TOPICAL 2 TIMES DAILY
Qty: 30 G | Refills: 1 | Status: SHIPPED | OUTPATIENT
Start: 2022-01-21

## 2022-01-21 NOTE — PROGRESS NOTES
Medication Therapy Management (MTM) Encounter    ASSESSMENT:                            Medication Adherence/Access: Multiple areas of med confusion. See plan as below. May benefit from Community Paramedic support.     Chronic pain: Not well controlled - continues to have breakthrough pain. We discussed starting duloxetine as adjunctive therapy for pain control, bladder symptom control and along with helping with mood and sleep.     Rheumatoid arthritis: we re-educated plan with methotrexate and Enbrel per Rheumatology note. Will discuss with care team if resumption of methotrexate is indicated.     Insomnia: hopeful with duloxetine start will be helpful with sleep and pain. Will discuss with primary to initiate, per pt preference.     Low Vitamin D: last level within goal, continue with vitamin D 1000mg daily.     Diabetes: uncontrolled A1c of <8% per ADA guideline. Due for repeat. Discussed with patient of other medication options helpful for weight loss, blood sugar control, and heart and kidney health. She is seeing diabetes educator on 2/3/22; will discuss to initiate GLP1 agonist. Discussed with patient about risks/benefits of aspirin therapy; patient hesitant on discontinuing aspirin given family history. Recommended she may continue aspirin but to consider discontinuation once home supply is complete given lack of benefit given her age and increased risk for polypharmacy/side effects.     Hyperlipidemia: controlled last LDL at goal <100.     Hypertension: BP today just above goal <140/90 - pt was overwhelmed due to other medication concerns. Has previously been well controlled. Pulse at goal . Due to confusion, pt has continued Lisinopril and not been using Hydralazine. Per review of nephrology notes, they were aware pt has been using Lisinopril. Okay to continue. Will discontinue Hydralazine. Recommend repeat BMP at next PCP visit.       Allergies: Fairly well managed per pt report.     Supplements:  "No strong indication for Fish Oil, Vit C - likely not harmful to continue. Encouraged pt to complete current supply then discontinue.     PLAN:                            1.  Recommend referral to community paramedics  2.  Pharm.D. to discuss restarting methotrexate with rheumatology  3.  Recommend start Ozempic 0.25 mg weekly Pharm.D. to discuss with CDE/PCP  4.  Discontinue hydralazine.  5.  Recommend duloxetine 60 mg daily.  Pharm.D. to discuss with PCP    Follow-up: Return in about 7 weeks (around 3/14/2022) for Medication Management Pharmacist, in person.       SUBJECTIVE/OBJECTIVE:                          Ebonie Bowman is a 71 year old female coming in for an initial visit. She was referred to me from Yanet Samano CNP.      Reason for visit: \"You wanted to see me\"   Medication Management.    Recommended as a means of supporting medication set up       Allergies/ADRs: Reviewed in chart  Past Medical History: Reviewed in chart  Tobacco: She reports that she quit smoking about 13 years ago. She started smoking about 57 years ago. She has a 43.00 pack-year smoking history. She has never used smokeless tobacco.  Alcohol:  reports current alcohol use.Rare        Medication Adherence/Access:   Brings medications to the visit - brings medications in two bags. One for morning and one for evening. Was hospitalized in September, then to TCU for about 4 weeks. Patient denies missed doses, but does report second guessing if she has taken insulins or not.       Evening meds:   Keto Weight loss Support    OTC folic acid 800 mcg   Trazodone 12/12/21 #90 - full   Lisinopril 12/12 #90 about #30+ remain   Atorvastatin #90 - 11/30 #45 remain     At 9:30-10 - percocet at bedtime.       AM:   Vit D3 2000 unit gummies   Vit B12 500 mcg gummies/2 gummies - has another at home - uses 3 gummies daily   Omega-3 1000 mg   MVI -3 gummies   Magnesium Oxide 250 mg   Aspirin 81 mg   Vitamin C   Metoprolol ER 12/12 #90 - mostly full " "  Amlodipine -12/12 #90 - mostly full     Denies missed doses - but does sometimes second guess if she had her insulin dose or not.     No hydralazine, methotrexate .     Chronic pain:  She reports chronic low back pain that is \"just there\" and does not go away. Per chart review, has history of sacroiliitis and myalgia of the upper trap. Prescribed oxycodone-acetaminophen 5/325 milligrams 1 to 2 tablets every 6 hours as needed (max 6 tabs daily). She currently takes 2 tab AM and 2 tabs at bedtime, 1 in afternoon and 1 evening; she tries to make the medications last 6 hrs but reports usually lasting ~ 3-4 hrs.       Having pain in the lower back. Sometimes pain radiates into the lower legs.   Pain is \"just there\" doesn't go away.       Rheumatoid arthritis: Prescribed Enbrel 50 mg weekly, folic acid 1 mg daily except when taking methotrexate, methotrexate 2.5 mg 3 tablets weekly. She has not started Enbrel but was just approved. She has NOT been taking methotrexate given she thought this medication was discontinued; unclear when she last took this medication. Per rheumatology, mextroxate is to be continued with Enbrel as add on therapy. She notes her joints are \"not good\" and are swollen and painful.       Insomnia: She currently takes trazodone 150mg at bedtime but is not sleeping well as she gets up 2-3 x nightly to urinate.      Mood has been pretty good.     Had some bad news recently related to sister-in-laws health. Has been away from the family for many years.     Pt was tearful and stressed during the visit. Felt overwhelmed.     Not sleeping well - gets up 2-3 times per night to urinate. Cat also wakes her up in the morning.     Lives with son -he's a kidney recipient - hard time finding a part time job, so she's supporting him. Some increased stress.       Low Vitamin D: Prescribed Vitamin D3 2000 units daily . Vs 1000mg daily?      Ref. Range 8/12/2020 11:58   Vitamin D, Total (25-Hydroxy) Latest Ref " Range: 30.0 - 80.0 ng/mL 70.9         Type 2 Diabetes:  Prescribed insulin NPH 32 units twice daily .  Patient reports she is actually using 46 units in a.m. and 32 units at bedtime. She does report second guessing herself at times if she has taken the medication.         She also is taking aspirin 81 mg for primary prevention. Reports family history (father) with 3 episodes of heart attack.     Blood sugar monitoring: Continuous Glucose Monitor. Ranges (patient reported):     Fasting: This week has been running higher this week - Eating some peppermint candy. Had a craving this weekend.     Notes she's trying to lose weight.       Symptoms of low blood sugar? None - in the past cold, and shaky.   Symptoms of high blood sugar? none  Diet/Exercise: Tends to avoid sweets at baseline, then gets cravings and eats a lot. Trying to lose weight. Trying a weight loss supplement - son is eating every 2 hours so it's harder for patient. Tries to portion her share.       Hemoglobin A1C   Date Value Ref Range Status   09/16/2021 9.7 (H) 0.0 - 5.6 % Final     Comment:     Normal <5.7%   Prediabetes 5.7-6.4%    Diabetes 6.5% or higher     Note: Adopted from ADA consensus guidelines.   05/07/2021 9.5 (H) <=5.6 % Final   12/07/2020 6.6 (H) <=5.6 % Final     Comment:     Prediabetes:   HBA1c       5.7 to 6.4%        Diabetes:        HBA1c        >=6.5%   Patients with Hgb F >5%, total bilirubin >10.0 mg/dL, abnormal red cell turnover, severe renal or hepatic disease or malignancy should not have this A1C method used to diagnose or monitor diabetes.            Microalbumin Urine mg/dL   Date Value Ref Range Status   09/16/2021 4.11 (H) 0.00 - 1.99 mg/dL Final      Creatinine Urine mg/dL   Date Value Ref Range Status   09/16/2021 113 mg/dL Final   09/16/2021 113 mg/dL Final     LDL Cholesterol Calculated   Date Value Ref Range Status   09/16/2021 57 <=129 mg/dL Final     Creatinine   Date Value Ref Range Status   10/29/2021 1.22 (H)  0.60 - 1.10 mg/dL Final     GFR Estimate   Date Value Ref Range Status   10/29/2021 44 (L) >60 mL/min/1.73m2 Final     Comment:     As of July 11, 2021, eGFR is calculated by the CKD-EPI creatinine equation, without race adjustment. eGFR can be influenced by muscle mass, exercise, and diet. The reported eGFR is an estimation only and is only applicable if the renal function is stable.   05/07/2021 40 (L) >60 mL/min/1.73m2 Final     GFR Estimate If Black   Date Value Ref Range Status   05/07/2021 48 (L) >60 mL/min/1.73m2 Final         Hyperlipidemia: Prescribed atorvastatin 40 mg daily which she is taking at bedtime. Also takes fish oil 1 cap daily.     Recent Labs   Lab Test 09/16/21  0936 10/20/20  1413   CHOL 130 121   HDL 47* 49*   LDL 57 54   TRIG 129 88         Hypertension: Prescribed amlodipine 10 mg daily, hydralazine 10 mg 3 times daily as needed, metoprolol succinate 50 mg daily    No Hydralazine at the visit. Does have Lisinopril 10 mg that's not on her med list.   Lisinopril was discontinued at October hospitalization and switched to Hydralazine. Pt was not aware of this.     History of syncopal episodes. Was seen by cardiology. Work up unremarkable.     Went up the stairs and fell. Hasn't happened since discharge.     BP tends to be higher at the doctors office.     BP Readings from Last 3 Encounters:   01/24/22 (!) 146/88   01/10/22 118/74   01/03/22 118/68      Pulse Readings from Last 3 Encounters:   01/10/22 69   01/03/22 68   12/23/21 63         Allergies: Prescribed loratadine 10 mg daily. Dry eyes. Has dry eye drops every couple days. Does sneeze when cats get close - she's allergic to cats. No congestion or post-nasal drip.       Supplements: Using Vitamin B12, Fish oil 1000 mg daily, vitamin C 500 mg daily, Magnesium oxide     Magnesium   Date Value Ref Range Status   10/26/2021 1.9 1.8 - 2.6 mg/dL Final     Vitamin C - not sure why she's using it.       Today's Vitals: BP (!) 146/88    ----------------      I spent 75 minutes with this patient today. All changes were made via collaborative practice agreement with Marichuy Rubio NP. A copy of the visit note was provided to the patient's provider(s).    The patient was given a summary of these recommendations.     Nilesh Toussaint, SidD  Medication Therapy Management (MTM) Pharmacist  [facility]         Medication Therapy Recommendations  Chronic back pain    Current Medication: oxyCODONE-acetaminophen (PERCOCET) 5-325 MG tablet   Rationale: Synergistic therapy - Needs additional medication therapy - Indication   Recommendation: Start Medication - DULoxetine HCl 60 MG Csdr   Status: Contact Provider - Awaiting Response         Essential hypertension    Current Medication: hydrALAZINE (APRESOLINE) 10 MG tablet   Rationale: Medication product not available - Adherence - Adherence   Recommendation: Discontinue Medication   Status: Accepted per CPA         Rheumatoid arthritis, involving unspecified site, unspecified whether rheumatoid factor present (H)    Current Medication: methotrexate 2.5 MG tablet   Rationale: Does not understand instructions - Adherence - Adherence   Recommendation: Provide Education   Status: Accepted per CPA         Type 2 diabetes mellitus with hyperosmolarity without coma, with long-term current use of insulin (H)    Current Medication: insulin  UNIT/ML injection   Rationale: Synergistic therapy - Needs additional medication therapy - Indication   Recommendation: Start Medication - Ozempic (0.25 or 0.5 MG/DOSE) 2 MG/1.5ML Sopn   Status: Contact Provider - Awaiting Response

## 2022-01-24 ENCOUNTER — OFFICE VISIT (OUTPATIENT)
Dept: PHARMACY | Facility: OTHER | Age: 72
End: 2022-01-24
Payer: COMMERCIAL

## 2022-01-24 VITALS — DIASTOLIC BLOOD PRESSURE: 88 MMHG | SYSTOLIC BLOOD PRESSURE: 146 MMHG

## 2022-01-24 DIAGNOSIS — E78.5 HYPERLIPIDEMIA, UNSPECIFIED HYPERLIPIDEMIA TYPE: ICD-10-CM

## 2022-01-24 DIAGNOSIS — Z79.4 TYPE 2 DIABETES MELLITUS WITH HYPEROSMOLARITY WITHOUT COMA, WITH LONG-TERM CURRENT USE OF INSULIN (H): ICD-10-CM

## 2022-01-24 DIAGNOSIS — Z78.9 TAKES DIETARY SUPPLEMENTS: ICD-10-CM

## 2022-01-24 DIAGNOSIS — M15.9 GENERALIZED OA: ICD-10-CM

## 2022-01-24 DIAGNOSIS — G47.00 INSOMNIA, UNSPECIFIED TYPE: ICD-10-CM

## 2022-01-24 DIAGNOSIS — G89.29 CHRONIC LOW BACK PAIN WITH SCIATICA, SCIATICA LATERALITY UNSPECIFIED, UNSPECIFIED BACK PAIN LATERALITY: Primary | ICD-10-CM

## 2022-01-24 DIAGNOSIS — I10 ESSENTIAL HYPERTENSION: ICD-10-CM

## 2022-01-24 DIAGNOSIS — E11.00 TYPE 2 DIABETES MELLITUS WITH HYPEROSMOLARITY WITHOUT COMA, WITH LONG-TERM CURRENT USE OF INSULIN (H): ICD-10-CM

## 2022-01-24 DIAGNOSIS — J30.81 ALLERGIC RHINITIS DUE TO CATS: ICD-10-CM

## 2022-01-24 DIAGNOSIS — M54.40 CHRONIC LOW BACK PAIN WITH SCIATICA, SCIATICA LATERALITY UNSPECIFIED, UNSPECIFIED BACK PAIN LATERALITY: Primary | ICD-10-CM

## 2022-01-24 PROCEDURE — 99607 MTMS BY PHARM ADDL 15 MIN: CPT | Performed by: PHARMACIST

## 2022-01-24 PROCEDURE — 99605 MTMS BY PHARM NP 15 MIN: CPT | Performed by: PHARMACIST

## 2022-01-24 RX ORDER — LISINOPRIL 10 MG/1
10 TABLET ORAL DAILY
COMMUNITY
Start: 2021-12-12 | End: 2022-06-08

## 2022-01-24 NOTE — Clinical Note
Pt has not been using Methotrexate -unclear how long she has been off- she was very confused about her medicines. Hasn't started Enbrel yet - okay to get her restarted on Methotrexate?     Working on getting her set up with community paramedic visits to help improve med adherence.     Thanks!   Nilesh Toussaint, PharmD  Medication Therapy Management (MTM) Pharmacist  [facility]

## 2022-01-24 NOTE — LETTER
"Recommended To-Do List      Prepared on: 1/24/2022     You can get the best results from your medications by completing the items on this \"To-Do List.\"      Bring your To-Do List when you go to your doctor. And, share it with your family or caregivers.    My To-Do List:  What we talked about: What I should do:   A new medication that may be of benefit to you    Start taking DULoxetine HCl 60 mg daily at bedtime.           What we talked about: What I should do:   A new medication that may be of benefit to you    Start taking Ozempic (0.25 or 0.5 MG/DOSE) 0.25 mg once weekly.           What we talked about: What I should do:   The importance of taking your medication as intended    Stop taking hydrALAZINE (APRESOLINE). Continue using Lisinopril as you have been.           What we talked about: What I should do:   The importance of taking your medication as intended    Education: I will clarify the dose of methotrexate with your rheumatologist so you can resume using this medication.            What we talked about: What I should do:                     "

## 2022-01-24 NOTE — PATIENT INSTRUCTIONS
Recommendations from today's MTM visit:                                                    MTM (medication therapy management) is a service provided by a clinical pharmacist designed to help you get the most of out of your medicines.   Today we reviewed what your medicines are for, how to know if they are working, that your medicines are safe and how to make your medicine regimen as easy as possible.       1. I will talk to your team about starting Duloxetine 60 mg daily for pain and urinary frequency     2. I will talk to Dr. Londono about restarting Methotrexate.     3. I will talk to Marichuy and Jewell about Metformin and Ozempic for diabetes.     4. Okay to stop Hydralazine (You haven't been using this anyway) and continue with Lisinopril.     5. I recommend finishing your supplies of Aspirin, Vitamin C, and Fish Oil and discontinuing when you're done.     6. I will talk to Marichuy about a referral to Community Paramedic to help with medicines at home.     Follow-up: Return in about 7 weeks (around 3/14/2022) for Medication Management Pharmacist, in person.    It was great to speak with you today.  I value your experience and would be very thankful for your time with providing feedback on our clinic survey. You may receive a survey via email or text message in the next few days.     To schedule another MTM appointment, please call the clinic directly or you may call the MTM scheduling line at [phone] or toll-free at [phone].     My Clinical Pharmacist's contact information:                                                      Please feel free to contact me with any questions or concerns you have.      Nilesh Toussaint, PharmD  Medication Therapy Management (MTM) Pharmacist  [facility]

## 2022-01-24 NOTE — Clinical Note
Chelo referred pt to MTM at [facility]. Pt wanted me to discuss recommendations with you before proceeeding. Please let me know if you would like to implement any of the recommendations now or if you would like to discuss at your next visit on 2/14.   1. Recommend Duloxetine for pain, mood, sleep, urinary frequency.  2. Recommend Ozempic for diabetes/weight loss   3. There was some confusion about her BP meds - I carried through on the plan per nephrology visit from November ie. Discontinued hydralazine and continued Lisinopril. Maybe repeat BMP at your next visit?   4. Recommend community paramedic referral - lots of confusion about medicines. Tearful and overwhelmed. Pt states she would appreciate the support. I can enter referral and rout to you if you'd like.     Thanks!   Nilesh Toussaint, PharmD  Medication Therapy Management (MTM) Pharmacist  [facility]

## 2022-01-24 NOTE — LETTER
_  Medication List        Prepared on: 1/24/2022     Bring your Medication List when you go to the doctor, hospital, or   emergency room. And, share it with your family or caregivers.     Note any changes to how you take your medications.  Cross out medications when you no longer use them.    Medication How I take it Why I use it Prescriber   alcohol swab prep pads Use to swab area of injection/jacquelyn as directed. Type 2 diabetes mellitus with stage 3b chronic kidney disease, with long-term current use of insulin (H) Marichuy Rubio NP   amLODIPine (NORVASC) 10 MG tablet [AMLODIPINE (NORVASC) 10 MG TABLET] TAKE 1 TABLET EVERY DAY Essential Hypertension Marichuy Rubio NP   ascorbic acid (ASCORBIC ACID WITH LATRICIA HIPS) 500 MG tablet Take 500 mg by mouth daily  Supplement Patient Reported    aspirin 81 MG EC tablet Take 81 mg by mouth daily Heart Protection Marichuy Rubio NP   atorvastatin (LIPITOR) 40 MG tablet Take 1 tablet (40 mg) by mouth At Bedtime Mixed Hyperlipidemia Marichuy Rubio NP   b complex vitamins tablet [B COMPLEX VITAMINS TABLET] Take 1 tablet by mouth daily. Supplement Patient Reported    Continuous Blood Gluc Sensor (FREESTYLE BRE 14 DAY SENSOR) MISC Change every 14 days. Use daily to scan blood sugars, use per manufactures guidelines. Pt already has reader. Diabetes Mellitus, Type 2 (H) Marichuy Rubio NP   etanercept (ENBREL) 50 MG/ML injection Inject 1 mL (50 mg) Subcutaneous once a week Hold for signs of infection, and seek medical attention. Seropositive rheumatoid arthritis (H) Raheem Londono,    fish oil-omega-3 fatty acids 1000 MG capsule Take 1 g by mouth daily Heart Health Patient Reported    flash glucose scanning reader (FREESTYLE BRE 14 DAY READER) Misc [FLASH GLUCOSE SCANNING READER (FREESTYLE BRE 14 DAY READER) MISC] Use 1 each As Directed see administration instructions. Use as directed with sensor Type 2 diabetes mellitus with hypoglycemia without coma, with long-term current use of insulin  (H) Oma Fallon NP   flash glucose sensor (FREESTYLE BRE 14 DAY SENSOR) Kit [FLASH GLUCOSE SENSOR (FREESTYLE BRE 14 DAY SENSOR) KIT] Use 1 each As Directed every 14 (fourteen) days. Type 2 diabetes mellitus with hypoglycemia without coma, with long-term current use of insulin (H) Oma Fallon NP   folic acid (FOLVITE) 1 MG tablet Take one tab daily, except the day when taking methotrexate. Seropositive rheumatoid arthritis (H); High risk medication use Raheem Londono DO   hydrALAZINE (APRESOLINE) 10 MG tablet Take 1 tablet (10 mg) by mouth 3 times daily as needed (sbp >175) Essential Hypertension Gabriela Couch DO   insulin  UNIT/ML injection Inject 32 Units Subcutaneous 2 times daily (before meals) Type 2 diabetes mellitus with stage 3 chronic kidney disease, with long-term current use of insulin, unspecified whether stage 3a or 3b CKD (H) Gabriela Couch DO   insulin syringe-needle U-100 (BD INSULIN SYRINGE ULT-FINE II) 1 mL 31 gauge x 5/16 Syrg [INSULIN SYRINGE-NEEDLE U-100 (BD INSULIN SYRINGE ULT-FINE II) 1 ML 31 GAUGE X 5/16 SYRG] USE 4 TIMES DAILY AS DIRECTED DM2 (diabetes mellitus, type 2) (H) Marichuy Rubio NP   lancing device (ACCU-CHEK SOFTCLIX) Misc [LANCING DEVICE (ACCU-CHEK SOFTCLIX) MISC] Use 1 applicator As Directed 4 (four) times a day. DM w/o Complication Type II Monroe Patino MD   loratadine (CLARITIN) 10 MG tablet Take 1 tablet (10 mg) by mouth At Bedtime Allergic Rhinitis due to Cats Marichuy Rubio NP   methotrexate 2.5 MG tablet Take 3 tablets (7.5 mg) by mouth once a week Seropositive rheumatoid arthritis (H) Raheem Londono DO   metoprolol succinate ER (TOPROL-XL) 50 MG 24 hr tablet [METOPROLOL SUCCINATE (TOPROL-XL) 50 MG 24 HR TABLET] TAKE 1 TABLET EVERY DAY Essential Hypertension Marichuy Rubio NP   MULTIVITAMIN ORAL Take 1 tablet by mouth daily  Supplement Patient Reported    nystatin (MYCOSTATIN) 312697 UNIT/GM external cream Apply topically 2  times daily Tinea Cruris Marichuy Rubio NP   oxyCODONE-acetaminophen (PERCOCET) 5-325 MG tablet Take 1-2 tablets by mouth every 6 hours as needed for severe pain (Max of 6 tablets daily) For 1/21/22-2/20/22 Chronic Pain Syndrome Marichuy Rubio NP   thin (NO BRAND SPECIFIED) lancets Use with lanceting device. To accompany: Blood Glucose Monitor Brands: per insurance. Type 2 diabetes mellitus with stage 3b chronic kidney disease, with long-term current use of insulin (H) Marichuy Rubio NP   traZODone (DESYREL) 150 MG tablet Take 1 tablet (150 mg) by mouth At Bedtime Primary Insomnia Marichuy Rubio NP   Vitamin D3 (VITAMIN D3) 25 mcg (1000 units) tablet Take 1,000 Units by mouth daily  Low Vitamin D  Marichuy Rubio NP         Add new medications, over-the-counter drugs, herbals, vitamins, or  minerals in the blank rows below.    Medication How I take it Why I use it Prescriber                          Allergies:      morphine; penicillins; sulfa (sulfonamide antibiotics) [sulfa drugs]        Side effects I have had:              Other Information:             My notes and questions:

## 2022-01-24 NOTE — LETTER
January 24, 2022  Ebonie Bowman  [address]    [place] [address]    Dear MsMele Colby, [facility]        Thank you for talking with me on Jan 24, 2022 about your health and medications. As a follow-up to our conversation, I have included two documents:      1. Your Recommended To-Do List has steps you should take to get the best results from your medications.  2. Your Medication List will help you keep track of your medications and how to take them.    If you want to talk about these documents, please call Nilesh Toussaint PharmD at phone: [phone], Monday-Friday 8-4:30pm.    I look forward to working with you and your doctors to make sure your medications work well for you.    Sincerely,    Nilesh Toussaint PharmD

## 2022-01-25 DIAGNOSIS — M05.9 SEROPOSITIVE RHEUMATOID ARTHRITIS (H): ICD-10-CM

## 2022-01-25 RX ORDER — ETANERCEPT 50 MG/ML
50 SOLUTION SUBCUTANEOUS WEEKLY
Qty: 4 ML | Refills: 3 | Status: CANCELLED | OUTPATIENT
Start: 2022-01-25

## 2022-01-28 ENCOUNTER — PATIENT OUTREACH (OUTPATIENT)
Dept: CARE COORDINATION | Facility: CLINIC | Age: 72
End: 2022-01-28
Payer: COMMERCIAL

## 2022-01-28 NOTE — PROGRESS NOTES
"Community Paramedic Program  Community Health Worker Initial Outreach    Referral source: Pt's PCP & MTM pharmacist  Referral reason:   Reason(s) for visit: Med Check/Setup  Home/Safety Assessment  Initial Assessment      Goals for visit(s): Medication Compliance  Clinic Appointment (face to face) Attendance    How often should patient be seen: Weekly     Preference on when patient should be seen: 3-7 Days     Comments   PCP: Marichuy Rubio  Other info that would be helpful:      Initial visit: Tuesday, February 8th / 2 pm / LACI Rosario  (date/time/CP)      Additional information:   Spoke with pt. She agreed to schedule an initial visit with LACI Rosario and said \"I am open to trying it.\"     Confirmed pt's address and apartment number ([id number]). Pt said that the CP \"needs to call me when she gets here so I can come down and let her in.\" Confirmed pt's phone number ([phone]). She said her son may be present during the visit but isn't sure. Pt also said she has 2 cats.          "

## 2022-02-03 ENCOUNTER — ALLIED HEALTH/NURSE VISIT (OUTPATIENT)
Dept: EDUCATION SERVICES | Facility: CLINIC | Age: 72
End: 2022-02-03
Payer: COMMERCIAL

## 2022-02-03 DIAGNOSIS — E11.9 DIABETES MELLITUS (H): Primary | ICD-10-CM

## 2022-02-03 PROCEDURE — G0108 DIAB MANAGE TRN  PER INDIV: HCPCS | Mod: AE

## 2022-02-03 NOTE — LETTER
2/3/2022         RE: Ebonie Bowman  [address]  [address]  [place] [address]        Dear Colleague,    Thank you for referring your patient, Ebonie Bowman, to the [facility]. Please see a copy of my visit note below.    Diabetes Self-Management Education & Support    Presents for: Individual review    ASSESSMENT:  Pt seen today for f/u. She was able to get saida from the pharmacy, there no longer seems to be an issue w/ insurance. Saida uploaded, avg is up to 215, last visit was 200. Time in range the same at 38%. No lows, noted low readings on saida - believe to be from compression (false low), pt denies any symptoms of lows or waking up low. Pt states she is not sure why afternoon/evenings are a little higher. She states she is not doing anything differently. No significant changes to diet.   Pt is taking NPH BID around 8:30-9am and 8:30-9pm. Last visit we increased am dose from 44 to 46 units, this did nothing. Will increase today to 50 units. Will keep evening dose the same at 32 units as she is waking up in target >50% of the time.                     Patient's most recent   Lab Results   Component Value Date    A1C 9.7 09/16/2021    is not meeting goal of <8.0    PLAN  Pt will have A1c done 2/14 prior to f/u with PCP.   Will increase morning dose to 50 units, continue pm dose at 32 units.   Will f/u here in 1 month as scheduled.     SUBJECTIVE/OBJECTIVE:  Presents for: Individual review  Accompanied by: Self  Diabetes education in the past 24mo: Yes  Focus of Visit: Monitoring,Healthy Eating  Diabetes type: Type 2  Date of diagnosis: 1995  Cultural Influences/Ethnic Background:  Not  or       Diabetes Symptoms & Complications:  Fatigue: Yes (afternoons)  Neuropathy: Yes  Polydipsia: Yes  Polyphagia: No  Polyuria: Sometimes  Visual change: Yes  Slow healing wounds: No  Complications assessed today?: Yes  Autonomic neuropathy: Yes  CVA: No  Heart disease:  (family  history - father)  Retinopathy: No    Patient Problem List and Family Medical History reviewed for relevant medical history, current medical status, and diabetes risk factors.    Vitals:  There were no vitals taken for this visit.  Estimated body mass index is 31.06 kg/m  as calculated from the following:    Height as of 1/3/22: 1.829 m (6').    Weight as of 1/10/22: 103.9 kg (229 lb).   Last 3 BP:   BP Readings from Last 3 Encounters:   01/24/22 (!) 146/88   01/10/22 118/74   01/03/22 118/68       History   Smoking Status     Former Smoker     Packs/day: 1.00     Years: 43.00     Start date: 1/1/1965     Quit date: 8/1/2008   Smokeless Tobacco     Never Used       Labs:  Lab Results   Component Value Date    A1C 9.7 09/16/2021     Lab Results   Component Value Date     10/29/2021     10/29/2021     Lab Results   Component Value Date    LDL 57 09/16/2021     Direct Measure HDL   Date Value Ref Range Status   09/16/2021 47 (L) >=50 mg/dL Final     Comment:     HDL Cholesterol Reference Range:     0-2 years:   No reference ranges established for patients under 2 years old  at [facility] for lipid analytes.    2-8 years:  Greater than 45 mg/dL     18 years and older:   Female: Greater than or equal to 50 mg/dL   Male:   Greater than or equal to 40 mg/dL   ]  GFR Estimate   Date Value Ref Range Status   10/29/2021 44 (L) >60 mL/min/1.73m2 Final     Comment:     As of July 11, 2021, eGFR is calculated by the CKD-EPI creatinine equation, without race adjustment. eGFR can be influenced by muscle mass, exercise, and diet. The reported eGFR is an estimation only and is only applicable if the renal function is stable.   05/07/2021 40 (L) >60 mL/min/1.73m2 Final     GFR Estimate If Black   Date Value Ref Range Status   05/07/2021 48 (L) >60 mL/min/1.73m2 Final     Lab Results   Component Value Date    CR 1.22 10/29/2021     No results found for: MICROALBUMIN    Healthy Eating:  Healthy Eating  Assessed Today: Yes  Meals include: Lunch,Dinner,Afternoon Snack,Evening Snack  Breakfast: has been eating regularily, son is making breakfast  Lunch: 1824-4150: meals on wheels  Dinner: 430-630pm - will cook or son will cook  Snacks: sometimes HS snack, about 50% of the time  Beverages: Water,Tea,Milk,Juice,Soda    Being Active:  Being Active Assessed Today: Yes  Exercise:: Currently not exercising  Barrier to exercise: Physical limitation (back problems)    Monitoring:  Monitoring Assessed Today: Yes  Did patient bring glucose meter to appointment? : Yes  Blood Glucose Meter: CGM    Taking Medications:  Diabetes Medication(s)     Insulin       insulin  UNIT/ML injection    Inject 32 Units Subcutaneous 2 times daily (before meals)     Patient taking differently: Inject 32 Units Subcutaneous 2 times daily (before meals) Pt taking 44 units in am and 32 units at HS.          Taking Medication Assessed Today: Yes  Current Treatments: Insulin Injections  Dose schedule:  (NPH 9am and 9pm)  Problems taking diabetes medications regularly?: No    Problem Solving:  Is the patient at risk for hypoglycemia?: Yes  Hypoglycemia Frequency: Monthly  Hypoglycemia Treatment: Juice,Candy  Medical ID: No    Hypoglycemia symptoms  Dizziness or Light-Headedness: Yes  Hunger: No  Mood changes: Yes  Nervousness/Anxiety: Yes  Sleepiness: No  Sweats: Yes  Feeling shaky: Yes    Hypoglycemia Complications  Blackouts: No  Hospitalization: No  Nocturnal hypoglycemia: Yes  Required assistance: Yes  Seizures: No    Reducing Risks:  Reducing Risks Assessed Today: Yes  Diabetes Risks: Age over 45 years,Sedentary Lifestyle  CAD Risks: Diabetes Mellitus,Family history,Obesity,Sedentary lifestyle  Has dilated eye exam at least once a year?: Yes  Sees dentist every 6 months?: No    Healthy Coping:  Healthy Coping Assessed Today: Yes  Emotional response to diabetes: Ready to learn  Informal Support system:: Family,Children  Stage of change:  PREPARATION (Decided to change - considering how)  Patient Activation Measure Survey Score:  No flowsheet data found.      Time Spent: 30 minutes  Encounter Type: Individual    Any diabetes medication dose changes were made via the CDE Protocol and Collaborative Practice Agreement with the patient's referring provider. A copy of this encounter was shared with the provider.

## 2022-02-03 NOTE — PROGRESS NOTES
Diabetes Self-Management Education & Support    Presents for: Individual review    ASSESSMENT:  Pt seen today for f/u. She was able to get saida from the pharmacy, there no longer seems to be an issue w/ insurance. Saida uploaded, avg is up to 215, last visit was 200. Time in range the same at 38%. No lows, noted low readings on saida - believe to be from compression (false low), pt denies any symptoms of lows or waking up low. Pt states she is not sure why afternoon/evenings are a little higher. She states she is not doing anything differently. No significant changes to diet.   Pt is taking NPH BID around 8:30-9am and 8:30-9pm. Last visit we increased am dose from 44 to 46 units, this did nothing. Will increase today to 50 units. Will keep evening dose the same at 32 units as she is waking up in target >50% of the time.                     Patient's most recent   Lab Results   Component Value Date    A1C 9.7 09/16/2021    is not meeting goal of <8.0    PLAN  Pt will have A1c done 2/14 prior to f/u with PCP.   Will increase morning dose to 50 units, continue pm dose at 32 units.   Will f/u here in 1 month as scheduled.     SUBJECTIVE/OBJECTIVE:  Presents for: Individual review  Accompanied by: Self  Diabetes education in the past 24mo: Yes  Focus of Visit: Monitoring,Healthy Eating  Diabetes type: Type 2  Date of diagnosis: 1995  Cultural Influences/Ethnic Background:  Not  or       Diabetes Symptoms & Complications:  Fatigue: Yes (afternoons)  Neuropathy: Yes  Polydipsia: Yes  Polyphagia: No  Polyuria: Sometimes  Visual change: Yes  Slow healing wounds: No  Complications assessed today?: Yes  Autonomic neuropathy: Yes  CVA: No  Heart disease:  (family history - father)  Retinopathy: No    Patient Problem List and Family Medical History reviewed for relevant medical history, current medical status, and diabetes risk factors.    Vitals:  There were no vitals taken for this visit.  Estimated body mass  index is 31.06 kg/m  as calculated from the following:    Height as of 1/3/22: 1.829 m (6').    Weight as of 1/10/22: 103.9 kg (229 lb).   Last 3 BP:   BP Readings from Last 3 Encounters:   01/24/22 (!) 146/88   01/10/22 118/74   01/03/22 118/68       History   Smoking Status     Former Smoker     Packs/day: 1.00     Years: 43.00     Start date: 1/1/1965     Quit date: 8/1/2008   Smokeless Tobacco     Never Used       Labs:  Lab Results   Component Value Date    A1C 9.7 09/16/2021     Lab Results   Component Value Date     10/29/2021     10/29/2021     Lab Results   Component Value Date    LDL 57 09/16/2021     Direct Measure HDL   Date Value Ref Range Status   09/16/2021 47 (L) >=50 mg/dL Final     Comment:     HDL Cholesterol Reference Range:     0-2 years:   No reference ranges established for patients under 2 years old  at [facility] for lipid analytes.    2-8 years:  Greater than 45 mg/dL     18 years and older:   Female: Greater than or equal to 50 mg/dL   Male:   Greater than or equal to 40 mg/dL   ]  GFR Estimate   Date Value Ref Range Status   10/29/2021 44 (L) >60 mL/min/1.73m2 Final     Comment:     As of July 11, 2021, eGFR is calculated by the CKD-EPI creatinine equation, without race adjustment. eGFR can be influenced by muscle mass, exercise, and diet. The reported eGFR is an estimation only and is only applicable if the renal function is stable.   05/07/2021 40 (L) >60 mL/min/1.73m2 Final     GFR Estimate If Black   Date Value Ref Range Status   05/07/2021 48 (L) >60 mL/min/1.73m2 Final     Lab Results   Component Value Date    CR 1.22 10/29/2021     No results found for: MICROALBUMIN    Healthy Eating:  Healthy Eating Assessed Today: Yes  Meals include: Lunch,Dinner,Afternoon Snack,Evening Snack  Breakfast: has been eating regularily, son is making breakfast  Lunch: 5715-6889: meals on wheels  Dinner: 430-630pm - will cook or son will cook  Snacks: sometimes HS snack,  about 50% of the time  Beverages: Water,Tea,Milk,Juice,Soda    Being Active:  Being Active Assessed Today: Yes  Exercise:: Currently not exercising  Barrier to exercise: Physical limitation (back problems)    Monitoring:  Monitoring Assessed Today: Yes  Did patient bring glucose meter to appointment? : Yes  Blood Glucose Meter: CGM    Taking Medications:  Diabetes Medication(s)     Insulin       insulin  UNIT/ML injection    Inject 32 Units Subcutaneous 2 times daily (before meals)     Patient taking differently: Inject 32 Units Subcutaneous 2 times daily (before meals) Pt taking 44 units in am and 32 units at HS.          Taking Medication Assessed Today: Yes  Current Treatments: Insulin Injections  Dose schedule:  (NPH 9am and 9pm)  Problems taking diabetes medications regularly?: No    Problem Solving:  Is the patient at risk for hypoglycemia?: Yes  Hypoglycemia Frequency: Monthly  Hypoglycemia Treatment: Juice,Candy  Medical ID: No    Hypoglycemia symptoms  Dizziness or Light-Headedness: Yes  Hunger: No  Mood changes: Yes  Nervousness/Anxiety: Yes  Sleepiness: No  Sweats: Yes  Feeling shaky: Yes    Hypoglycemia Complications  Blackouts: No  Hospitalization: No  Nocturnal hypoglycemia: Yes  Required assistance: Yes  Seizures: No    Reducing Risks:  Reducing Risks Assessed Today: Yes  Diabetes Risks: Age over 45 years,Sedentary Lifestyle  CAD Risks: Diabetes Mellitus,Family history,Obesity,Sedentary lifestyle  Has dilated eye exam at least once a year?: Yes  Sees dentist every 6 months?: No    Healthy Coping:  Healthy Coping Assessed Today: Yes  Emotional response to diabetes: Ready to learn  Informal Support system:: Family,Children  Stage of change: PREPARATION (Decided to change - considering how)  Patient Activation Measure Survey Score:  No flowsheet data found.      Time Spent: 30 minutes  Encounter Type: Individual    Any diabetes medication dose changes were made via the CDE Protocol and  Collaborative Practice Agreement with the patient's referring provider. A copy of this encounter was shared with the provider.

## 2022-02-08 ENCOUNTER — ALLIED HEALTH/NURSE VISIT (OUTPATIENT)
Dept: OTHER | Facility: CLINIC | Age: 72
End: 2022-02-08
Payer: COMMERCIAL

## 2022-02-08 DIAGNOSIS — J30.81 ALLERGIC RHINITIS DUE TO CATS: ICD-10-CM

## 2022-02-08 DIAGNOSIS — M54.40 CHRONIC LOW BACK PAIN WITH SCIATICA, SCIATICA LATERALITY UNSPECIFIED, UNSPECIFIED BACK PAIN LATERALITY: ICD-10-CM

## 2022-02-08 DIAGNOSIS — Z78.9 TAKES DIETARY SUPPLEMENTS: ICD-10-CM

## 2022-02-08 DIAGNOSIS — Z79.4 TYPE 2 DIABETES MELLITUS WITH HYPEROSMOLARITY WITHOUT COMA, WITH LONG-TERM CURRENT USE OF INSULIN (H): ICD-10-CM

## 2022-02-08 DIAGNOSIS — M15.9 GENERALIZED OA: ICD-10-CM

## 2022-02-08 DIAGNOSIS — G89.29 CHRONIC LOW BACK PAIN WITH SCIATICA, SCIATICA LATERALITY UNSPECIFIED, UNSPECIFIED BACK PAIN LATERALITY: ICD-10-CM

## 2022-02-08 DIAGNOSIS — G47.00 INSOMNIA, UNSPECIFIED TYPE: ICD-10-CM

## 2022-02-08 DIAGNOSIS — E78.5 HYPERLIPIDEMIA, UNSPECIFIED HYPERLIPIDEMIA TYPE: ICD-10-CM

## 2022-02-08 DIAGNOSIS — I10 ESSENTIAL HYPERTENSION: ICD-10-CM

## 2022-02-08 DIAGNOSIS — E11.00 TYPE 2 DIABETES MELLITUS WITH HYPEROSMOLARITY WITHOUT COMA, WITH LONG-TERM CURRENT USE OF INSULIN (H): ICD-10-CM

## 2022-02-08 PROCEDURE — 99207 PR COMMUNITY PARAMEDIC - PATIENT NOT BILLABLE: CPT

## 2022-02-08 NOTE — PROGRESS NOTES
Community Paramedics Initial Visit  February 8, 2022 TIME: 1400    Ebonie Bowman is a 71 year old female being seen at home for a Community Paramedic Home visit.    Present at appointment:  Patient, Community Paramedic         Chief Complaint   Patient presents with     Outreach       [facility] Utilization:      Utilization    Hospital Admissions  1             ED Visits  1             No Show Count (past year)  5                Current as of: 2/8/2022  8:02 PM              Clinical Concerns:  Current Medical Concerns:  Diabetes, Hypertension, Chronic back pain    Current Behavioral Concerns:     Education Provided to patient: Went through medications and talked about why she is taking the medications that are prescribed to her     Vitals: BP (!) 169/91   Pulse 72   Temp 98.1  F (36.7  C)   Resp 12   SpO2 95%   BMI: There is no height or weight on file to calculate BMI.       Health Maintenance Reviewed:      Clinical Pathway: None    Review of Symptoms/PE    Skin: negative  Eyes: negative  Ears/Nose/Throat: negative  Respiratory: No shortness of breath, dyspnea on exertion, cough, or hemoptysis  Cardiovascular: negative  Gastrointestinal: negative  Genitourinary: negative  Musculoskeletal: back pain  Neurologic: negative  Psychiatric: negative    Medication Management:  Patient manages her own medications    Medications need to be refilled:yes, patient was going to get them refilled after CP left     Medications set up: Yes  Pill Box issued: Yes  Scale issued: No  Flu Shot given: No  COVID Vaccine Given: No  Lab draw or specimen collection: No  Food box issued: No  Collaborative visit with PCP: No  Wound Care: No    Functional Status: Patient is able to walk around her house with a cane, she does have a cleaning person that comes in and does her dishes and vacuums        Living Situation: patient lives in a upper unit apartment (she needs to walk up approx 12 stairs to get to her level, no elevator) her son lives  with her       Community Paramedics Home Safety Assessment  Floors:  Are there throw rugs on the floor?: Yes (duct tape the edges)  Are there papers, books, towels, shoes, magazines on the floor?: Yes  Are there loose wires and cords you need to walk around? : No  Stairs and Steps  Are there papers, books, towels, shoes, magazines on the stairs?: No  Are some steps broken or uneven?: No  Is there a light over the stairway?: No  Has the stairway bulb burned out?: No  Is the carpet on the steps loose or torn?: No  Are the handrails loose or broken?: No  Kitchen:  Are the things you use often on high shelves?: No  Is your step stool unsteady?: No  Bathrooms:  Is the tub or shower floor slippery?: No  Do you need support when you get in and out of the tub?: No  Bedrooms:  Is the light near the bed hard to reach?: Yes  Is the path from your bed to the bathroom dark?: Yes       Diet/Exercise/Sleep:    Transportation:      Psychosocial:       Core Healthy Days Survey - Healthy Days Survey - (Centers for Disease Control and Prevention - Used with Permission.)  Would you say that in general your health is: : Good  Now thinking about your physical health, which includes physical illness and injury, for how many days during the past 30 days was your physical health not good?: 7  Now thinking about your mental health, which includes stress, depression, and problems with emotions, for how many days during the past 30 days was your mental health not good?: None  During the past 30 days, for about how many days did poor physical or mental health keep you from doing your usual activities, such as self-care, work, or recreation?: 30  CHDS SCORE: 7    No  Face to Face in Home / Community    Today's PHQ-2 Score:      Today's PHQ-9 Score:   PHQ-9 SCORE 8/12/2020   PHQ-9 Total Score 1        Today's GAD7 score: No flowsheet data found.        HEALTH CARE GOALS:  Goals Addressed as of 2/9/2022 at 12:11 PM                    2/8/22     10/7/21       Medication 1 (pt-stated)   20%      Added 2/9/22 by Marlene Bone NRP, CP      Patient able to set up weekly medication pill box         Monitoring (pt-stated)   50%  On track    Added 9/24/21 by Jewell Nicole, RN      Monitor BG daily with tamara           Patient Active Problem List   Diagnosis     S/P TKR (total knee replacement) using cement, left     Ataxia     Axonal neuropathy     Type 2 diabetes mellitus with hyperosmolarity without coma, with long-term current use of insulin (H)     Osteoarthrosis     Allergic rhinitis due to cats     Arthropathy of spinal facet joint concurrent with and due to effusion     HTN (hypertension)     Chronic back pain     Bilateral primary osteoarthritis of hip     Chronic pain     Controlled substance agreement signed     Depression     Essential hypertension     Chronic GERD     High risk medication use     HLD (hyperlipidemia)     Insomnia     Lumbar radiculopathy     Obesity     Pain in joint, pelvic region and thigh     Positive FIT (fecal immunochemical test)     Prolonged QT interval     Radiculopathy of cervicothoracic region     Rheumatoid arthritis, involving unspecified site, unspecified whether rheumatoid factor present (H)     S/P insertion of spinal cord stimulator     Venous insufficiency     Stage 3b chronic kidney disease (H)     Syncope, unspecified syncope type     Generalized OA     Anxiety     Chronic low back pain with sciatica, sciatica laterality unspecified, unspecified back pain laterality     First degree heart block     Bradycardia         Current Outpatient Medications:      alcohol swab prep pads, Use to swab area of injection/jacquelyn as directed., Disp: 100 each, Rfl: 3     amLODIPine (NORVASC) 10 MG tablet, [AMLODIPINE (NORVASC) 10 MG TABLET] TAKE 1 TABLET EVERY DAY, Disp: 90 tablet, Rfl: 2     aspirin 81 MG EC tablet, Take 81 mg by mouth daily, Disp: , Rfl:      atorvastatin (LIPITOR) 40 MG tablet, Take 1 tablet (40 mg) by mouth At  Bedtime, Disp: 90 tablet, Rfl: 0     blood glucose (NO BRAND SPECIFIED) test strip, Use to test blood sugar 3 times daily or as directed. To accompany: Blood Glucose Monitor Brands: per insurance., Disp: 100 strip, Rfl: 6     blood glucose calibration (NO BRAND SPECIFIED) solution, To accompany: Blood Glucose Monitor Brands: per insurance., Disp: 1 each, Rfl: 0     blood glucose monitoring (NO BRAND SPECIFIED) meter device kit, Use to test blood sugar 3 times daily or as directed. Preferred blood glucose meter OR supplies to accompany: Blood Glucose Monitor Brands: per insurance., Disp: 1 kit, Rfl: 0     blood glucose test strips, [BLOOD GLUCOSE TEST STRIPS] Check blood sugar four times daily. Dispense brand per patient's insurance at pharmacy discretion. Dx: e11.9, Disp: 400 strip, Rfl: 3     blood-glucose meter Misc, [BLOOD-GLUCOSE METER MISC] Dispense one meter. Use as directed. Dx: e11.9, Disp: 1 each, Rfl: 0     Continuous Blood Gluc Sensor (FREESTYLE BRE 14 DAY SENSOR) MISC, Change every 14 days. Use daily to scan blood sugars, use per manufactures guidelines. Pt already has reader., Disp: 2 each, Rfl: 11     fish oil-omega-3 fatty acids 1000 MG capsule, Take 1 g by mouth daily, Disp: , Rfl:      flash glucose scanning reader (FREESTYLE BRE 14 DAY READER) Misc, [FLASH GLUCOSE SCANNING READER (FREESTYLE BRE 14 DAY READER) MISC] Use 1 each As Directed see administration instructions. Use as directed with sensor, Disp: 1 each, Rfl: 0     flash glucose sensor (FREESTYLE BRE 14 DAY SENSOR) Kit, [FLASH GLUCOSE SENSOR (FREESTYLE BRE 14 DAY SENSOR) KIT] Use 1 each As Directed every 14 (fourteen) days., Disp: 2 kit, Rfl: 11     folic acid (FOLVITE) 1 MG tablet, Take one tab daily, except the day when taking methotrexate., Disp: 90 tablet, Rfl: 1     insulin syringe-needle U-100 (BD INSULIN SYRINGE ULT-FINE II) 1 mL 31 gauge x 5/16 Syrg, [INSULIN SYRINGE-NEEDLE U-100 (BD INSULIN SYRINGE ULT-FINE II) 1 ML 31  GAUGE X 5/16 SYRG] USE 4 TIMES DAILY AS DIRECTED, Disp: 500 each, Rfl: 1     lancing device (ACCU-CHEK SOFTCLIX) Misc, [LANCING DEVICE (ACCU-CHEK SOFTCLIX) MISC] Use 1 applicator As Directed 4 (four) times a day., Disp: 300 each, Rfl: 3     lisinopril (ZESTRIL) 10 MG tablet, Take 10 mg by mouth daily, Disp: , Rfl:      loratadine (CLARITIN) 10 MG tablet, Take 1 tablet (10 mg) by mouth At Bedtime, Disp: 90 tablet, Rfl: 3     metoprolol succinate ER (TOPROL-XL) 50 MG 24 hr tablet, [METOPROLOL SUCCINATE (TOPROL-XL) 50 MG 24 HR TABLET] TAKE 1 TABLET EVERY DAY, Disp: 90 tablet, Rfl: 2     MULTIVITAMIN ORAL, Take 1 tablet by mouth daily , Disp: , Rfl:      nystatin (MYCOSTATIN) 191994 UNIT/GM external cream, Apply topically 2 times daily, Disp: 30 g, Rfl: 1     oxyCODONE-acetaminophen (PERCOCET) 5-325 MG tablet, Take 1-2 tablets by mouth every 6 hours as needed for severe pain (Max of 6 tablets daily) For 1/21/22-2/20/22, Disp: 180 tablet, Rfl: 0     thin (NO BRAND SPECIFIED) lancets, Use with lanceting device. To accompany: Blood Glucose Monitor Brands: per insurance., Disp: 100 each, Rfl: 11     traZODone (DESYREL) 150 MG tablet, Take 1 tablet (150 mg) by mouth At Bedtime, Disp: 90 tablet, Rfl: 1     Vitamin D3 (VITAMIN D3) 25 mcg (1000 units) tablet, Take 1,000 Units by mouth daily , Disp: , Rfl:      ascorbic acid (ASCORBIC ACID WITH LATRICIA HIPS) 500 MG tablet, Take 500 mg by mouth daily , Disp: , Rfl:      b complex vitamins tablet, [B COMPLEX VITAMINS TABLET] Take 1 tablet by mouth daily. (Patient not taking: Reported on 1/24/2022), Disp: , Rfl:      etanercept (ENBREL) 50 MG/ML injection, Inject 1 mL (50 mg) Subcutaneous once a week Hold for signs of infection, and seek medical attention. (Patient not taking: Reported on 1/24/2022), Disp: 4 mL, Rfl: 3     hydrALAZINE (APRESOLINE) 10 MG tablet, Take 1 tablet (10 mg) by mouth 3 times daily as needed (sbp >175) (Patient not taking: Reported on 1/24/2022), Disp: , Rfl:       insulin  UNIT/ML injection, Inject 32 Units Subcutaneous 2 times daily (before meals) (Patient taking differently: Inject 32 Units Subcutaneous 2 times daily (before meals) Pt taking 44 units in am and 32 units at HS.), Disp: , Rfl:      methotrexate 2.5 MG tablet, Take 3 tablets (7.5 mg) by mouth once a week (Patient not taking: Reported on 1/24/2022), Disp: 36 tablet, Rfl: 0    Time spent with patient: 120    The patient meets one or more of the following criteria:  * Has been identified by their primary care provider at risk of nursing home placement    Acute concern/Follow-up recommendations: follow current treatment plan    Next CP visit scheduled: 2/16/22    Issues for Provider to follow up on: Community Paramedic visited with Ebonie in her home. Ebonie stated she is going good, but struggles with back pain. Went through Ebonie's medications and talked about her routine of taking her medications each day. Offered a one week pill pack. Ebonie accepted. Set up pill pack for one week. In one week will follow up with Ebonie to see how this new routine is doing.   Talked about eating a 40-60 carb meal and watching the intake of Carbs that she eats to help lower her A1C.   Supplements that Ebonie is taken that was not on her chart  **Vitamin C - 500mg  **Magnesium 250mg  **Keto Weight Loss    Provider follow up visit needed: 2/14/22 with Marichuy Rubio NP      Vitamin C - 500mg  Magnesium 250mg  Keto Weight Loss

## 2022-02-09 ENCOUNTER — TELEPHONE (OUTPATIENT)
Dept: INTERNAL MEDICINE | Facility: CLINIC | Age: 72
End: 2022-02-09
Payer: COMMERCIAL

## 2022-02-09 VITALS
TEMPERATURE: 98.1 F | RESPIRATION RATE: 12 BRPM | SYSTOLIC BLOOD PRESSURE: 169 MMHG | DIASTOLIC BLOOD PRESSURE: 91 MMHG | OXYGEN SATURATION: 95 % | HEART RATE: 72 BPM

## 2022-02-09 DIAGNOSIS — Z79.4 TYPE 2 DIABETES MELLITUS WITH HYPEROSMOLARITY WITHOUT COMA, WITH LONG-TERM CURRENT USE OF INSULIN (H): ICD-10-CM

## 2022-02-09 DIAGNOSIS — E11.00 TYPE 2 DIABETES MELLITUS WITH HYPEROSMOLARITY WITHOUT COMA, WITH LONG-TERM CURRENT USE OF INSULIN (H): ICD-10-CM

## 2022-02-09 NOTE — TELEPHONE ENCOUNTER
Reason for Call:  Other returning call    Detailed comments: Patient returned clinic call. Relayed message to patient. She will contact her pharmacy.     Phone Number Patient can be reached at:     Best Time:     Can we leave a detailed message on this number? Not Applicable    Call taken on 2/9/2022 at 12:44 PM by Lisa Mann

## 2022-02-09 NOTE — TELEPHONE ENCOUNTER
LM    Called pt to let her know that there were 3 refills sent to pharmacy on 01/21/22. These were 90-day fills. Writer suggested to call pharmacy for any questions.     Fabian Cantrell Jr., CMA on 2/9/2022 at 12:41 PM

## 2022-02-09 NOTE — TELEPHONE ENCOUNTER
Reason for Call:  Medication or medication refill:    Do you use a [facility]?  Name of the pharmacy and phone number for the current request:      [facility] on file    Name of the medication requested:     Loratadine 10 mg tablet    Other request: Patient reports she is currently out of this medication.    Can we leave a detailed message on this number? YES    Phone number patient can be reached at: Home number on file [phone] (home)    Best Time: Any time    Call taken on 2/9/2022 at 12:12 PM by Darrne Fry

## 2022-02-09 NOTE — LETTER
February 16, 2022      Ebonie Bowman  [address]    [place] [address]        Dear ,    We are writing to inform you of your test results.    Your A1c shows a type 2 diabetes is not controlled.  Continue to try to cut back on the amount of carbohydrates you are eating.  At the next appointment, we could discuss an additional medication that may help with diabetes management as well as weight loss.    Resulted Orders   Hemoglobin A1c   Result Value Ref Range    Hemoglobin A1C 8.7 (H) 0.0 - 5.6 %      Comment:      Normal <5.7%   Prediabetes 5.7-6.4%    Diabetes 6.5% or higher     Note: Adopted from ADA consensus guidelines.       If you have any questions or concerns, please call the clinic at the number listed above.       Sincerely,      Marichuy Rubio NP

## 2022-02-14 ENCOUNTER — OFFICE VISIT (OUTPATIENT)
Dept: INTERNAL MEDICINE | Facility: CLINIC | Age: 72
End: 2022-02-14
Payer: COMMERCIAL

## 2022-02-14 ENCOUNTER — LAB (OUTPATIENT)
Dept: LAB | Facility: CLINIC | Age: 72
End: 2022-02-14

## 2022-02-14 VITALS
OXYGEN SATURATION: 95 % | DIASTOLIC BLOOD PRESSURE: 68 MMHG | SYSTOLIC BLOOD PRESSURE: 150 MMHG | WEIGHT: 229.8 LBS | BODY MASS INDEX: 31.17 KG/M2 | HEART RATE: 67 BPM

## 2022-02-14 DIAGNOSIS — Z79.899 HIGH RISK MEDICATION USE: ICD-10-CM

## 2022-02-14 DIAGNOSIS — N28.9 RENAL INSUFFICIENCY: ICD-10-CM

## 2022-02-14 DIAGNOSIS — M06.9 RHEUMATOID ARTHRITIS, INVOLVING UNSPECIFIED SITE, UNSPECIFIED WHETHER RHEUMATOID FACTOR PRESENT (H): ICD-10-CM

## 2022-02-14 DIAGNOSIS — J30.81 ALLERGIC RHINITIS DUE TO CATS: ICD-10-CM

## 2022-02-14 DIAGNOSIS — L65.9 HAIR LOSS: ICD-10-CM

## 2022-02-14 DIAGNOSIS — G89.4 CHRONIC PAIN SYNDROME: ICD-10-CM

## 2022-02-14 DIAGNOSIS — M05.9 SEROPOSITIVE RHEUMATOID ARTHRITIS (H): ICD-10-CM

## 2022-02-14 DIAGNOSIS — E11.00 TYPE 2 DIABETES MELLITUS WITH HYPEROSMOLARITY WITHOUT COMA, WITH LONG-TERM CURRENT USE OF INSULIN (H): Primary | ICD-10-CM

## 2022-02-14 DIAGNOSIS — N18.32 STAGE 3B CHRONIC KIDNEY DISEASE (H): ICD-10-CM

## 2022-02-14 DIAGNOSIS — I10 PRIMARY HYPERTENSION: ICD-10-CM

## 2022-02-14 DIAGNOSIS — E66.9 OBESITY (BMI 30-39.9): ICD-10-CM

## 2022-02-14 DIAGNOSIS — Z79.4 TYPE 2 DIABETES MELLITUS WITH HYPEROSMOLARITY WITHOUT COMA, WITH LONG-TERM CURRENT USE OF INSULIN (H): Primary | ICD-10-CM

## 2022-02-14 LAB
ALBUMIN SERPL-MCNC: 3.5 G/DL (ref 3.5–5)
ALT SERPL W P-5'-P-CCNC: 13 U/L (ref 0–45)
AST SERPL W P-5'-P-CCNC: 13 U/L (ref 0–40)
BASOPHILS # BLD AUTO: 0 10E3/UL (ref 0–0.2)
BASOPHILS NFR BLD AUTO: 0 %
CREAT SERPL-MCNC: 1.56 MG/DL (ref 0.6–1.1)
EOSINOPHIL # BLD AUTO: 0.3 10E3/UL (ref 0–0.7)
EOSINOPHIL NFR BLD AUTO: 3 %
ERYTHROCYTE [DISTWIDTH] IN BLOOD BY AUTOMATED COUNT: 12.4 % (ref 10–15)
GFR SERPL CREATININE-BSD FRML MDRD: 35 ML/MIN/1.73M2
HCT VFR BLD AUTO: 42.3 % (ref 35–47)
HGB BLD-MCNC: 14 G/DL (ref 11.7–15.7)
IMM GRANULOCYTES # BLD: 0 10E3/UL
IMM GRANULOCYTES NFR BLD: 0 %
LYMPHOCYTES # BLD AUTO: 2.4 10E3/UL (ref 0.8–5.3)
LYMPHOCYTES NFR BLD AUTO: 26 %
MCH RBC QN AUTO: 29.8 PG (ref 26.5–33)
MCHC RBC AUTO-ENTMCNC: 33.1 G/DL (ref 31.5–36.5)
MCV RBC AUTO: 90 FL (ref 78–100)
MONOCYTES # BLD AUTO: 0.6 10E3/UL (ref 0–1.3)
MONOCYTES NFR BLD AUTO: 6 %
NEUTROPHILS # BLD AUTO: 5.7 10E3/UL (ref 1.6–8.3)
NEUTROPHILS NFR BLD AUTO: 64 %
PLATELET # BLD AUTO: 132 10E3/UL (ref 150–450)
RBC # BLD AUTO: 4.7 10E6/UL (ref 3.8–5.2)
WBC # BLD AUTO: 9.1 10E3/UL (ref 4–11)

## 2022-02-14 PROCEDURE — 83550 IRON BINDING TEST: CPT

## 2022-02-14 PROCEDURE — 82728 ASSAY OF FERRITIN: CPT

## 2022-02-14 PROCEDURE — 84460 ALANINE AMINO (ALT) (SGPT): CPT

## 2022-02-14 PROCEDURE — 82040 ASSAY OF SERUM ALBUMIN: CPT

## 2022-02-14 PROCEDURE — 99215 OFFICE O/P EST HI 40 MIN: CPT | Performed by: NURSE PRACTITIONER

## 2022-02-14 PROCEDURE — 83036 HEMOGLOBIN GLYCOSYLATED A1C: CPT | Performed by: NURSE PRACTITIONER

## 2022-02-14 PROCEDURE — 84450 TRANSFERASE (AST) (SGOT): CPT

## 2022-02-14 PROCEDURE — 85025 COMPLETE CBC W/AUTO DIFF WBC: CPT

## 2022-02-14 PROCEDURE — 36415 COLL VENOUS BLD VENIPUNCTURE: CPT

## 2022-02-14 PROCEDURE — 82565 ASSAY OF CREATININE: CPT

## 2022-02-14 RX ORDER — DULOXETIN HYDROCHLORIDE 30 MG/1
CAPSULE, DELAYED RELEASE ORAL
Qty: 60 CAPSULE | Refills: 0 | Status: SHIPPED | OUTPATIENT
Start: 2022-02-14 | End: 2022-03-14

## 2022-02-14 RX ORDER — OXYCODONE AND ACETAMINOPHEN 5; 325 MG/1; MG/1
1-2 TABLET ORAL EVERY 6 HOURS PRN
Qty: 180 TABLET | Refills: 0 | Status: SHIPPED | OUTPATIENT
Start: 2022-02-14 | End: 2022-03-18

## 2022-02-14 RX ORDER — LORATADINE 10 MG/1
10 TABLET ORAL AT BEDTIME
Qty: 90 TABLET | Refills: 3 | Status: SHIPPED | OUTPATIENT
Start: 2022-02-14 | End: 2022-12-12

## 2022-02-14 RX ORDER — OXYCODONE AND ACETAMINOPHEN 5; 325 MG/1; MG/1
1-2 TABLET ORAL EVERY 6 HOURS PRN
Qty: 180 TABLET | Refills: 0 | Status: SHIPPED | OUTPATIENT
Start: 2022-02-14 | End: 2022-02-14

## 2022-02-14 ASSESSMENT — ANXIETY QUESTIONNAIRES
5. BEING SO RESTLESS THAT IT IS HARD TO SIT STILL: NOT AT ALL
2. NOT BEING ABLE TO STOP OR CONTROL WORRYING: NOT AT ALL
6. BECOMING EASILY ANNOYED OR IRRITABLE: NOT AT ALL
7. FEELING AFRAID AS IF SOMETHING AWFUL MIGHT HAPPEN: NOT AT ALL
GAD7 TOTAL SCORE: 0
GAD7 TOTAL SCORE: 0
1. FEELING NERVOUS, ANXIOUS, OR ON EDGE: NOT AT ALL
3. WORRYING TOO MUCH ABOUT DIFFERENT THINGS: NOT AT ALL
GAD7 TOTAL SCORE: 0
4. TROUBLE RELAXING: NOT AT ALL
7. FEELING AFRAID AS IF SOMETHING AWFUL MIGHT HAPPEN: NOT AT ALL

## 2022-02-14 ASSESSMENT — PATIENT HEALTH QUESTIONNAIRE - PHQ9
SUM OF ALL RESPONSES TO PHQ QUESTIONS 1-9: 0
SUM OF ALL RESPONSES TO PHQ QUESTIONS 1-9: 0
10. IF YOU CHECKED OFF ANY PROBLEMS, HOW DIFFICULT HAVE THESE PROBLEMS MADE IT FOR YOU TO DO YOUR WORK, TAKE CARE OF THINGS AT HOME, OR GET ALONG WITH OTHER PEOPLE: NOT DIFFICULT AT ALL

## 2022-02-14 NOTE — LETTER
Ebonie Bowman  : 1950  Case number: [id number]      To Whom it May Concern,    Here is the information requested for Ebonie Bowman for the application for medication assistance. Please see the attached sheets as well.     Annual household income: $30,618.00, her annual income is $27,726.00 after paying insurance premiums    A prior authorization for Enbrel is on file.       Sincerely,    Marichuy Rubio, CARL, APRN, CNP   22

## 2022-02-14 NOTE — LETTER
Opioid / Opioid Plus Controlled Substance Agreement    This is an agreement between you and your provider about the safe and appropriate use of controlled substance/opioids prescribed by your care team. Controlled substances are medicines that can cause physical and mental dependence (abuse).    There are strict laws about having and using these medicines. We here at [facility] are committing to working with you in your efforts to get better. To support you in this work, we ll help you schedule regular office appointments for medicine refills. If we must cancel or change your appointment for any reason, we ll make sure you have enough medicine to last until your next appointment.     As a Provider, I will:    Listen carefully to your concerns and treat you with respect.     Recommend a treatment plan that I believe is in your best interest. This plan may involve therapies other than opioid pain medication.     Talk with you often about the possible benefits, and the risk of harm of any medicine that we prescribe for you.     Provide a plan on how to taper (discontinue or go off) using this medicine if the decision is made to stop its use.    As a Patient, I understand that opioid(s):     Are a controlled substance prescribed by my care team to help me function or work and manage my condition(s).     Are strong medicines and can cause serious side effects such as:    Drowsiness, which can seriously affect my driving ability    A lower breathing rate, enough to cause death    Harm to my thinking ability     Depression     Abuse of and addiction to this medicine    Need to be taken exactly as prescribed. Combining opioids with certain medicines or chemicals (such as illegal drugs, sedatives, sleeping pills, and benzodiazepines) can be dangerous or even fatal. If I stop opioids suddenly, I may have severe withdrawal symptoms.    Do not work for all types of pain nor for all patients. If they re not helpful, I may  be asked to stop them.        The risks, benefits and side effects of these medicine(s) were explained to me. I agree that:  1. I will take part in other treatments as advised by my care team. This may be psychiatry or counseling, physical therapy, behavioral therapy, group treatment or a referral to a specialist.     2. I will keep all my appointments. I understand that this is part of the monitoring of opioids. My care team may require an office visit for EVERY opioid/controlled substance refill. If I miss appointments or don t follow instructions, my care team may stop my medicine.    3. I will take my medicines as prescribed. I will not change the dose or schedule unless my care team tells me to. There will be no refills if I run out early.     4. I may be asked to come to the clinic and complete a urine drug test or complete a pill count at any time. If I don t give a urine sample or participate in a pill count, the care team may stop my medicine.    5. I will only receive prescriptions from this clinic for chronic pain. If I am treated by another provider for acute pain issues, I will tell them that I am taking opioid pain medication for chronic pain and that I have a treatment agreement with this provider. I will inform my [facility] care team within one business day if I am given a prescription for any pain medication by another healthcare provider. My [facility] care team can contact other providers and pharmacists about my use of any medicines.    6. It is up to me to make sure that I don t run out of my medicines on weekends or holidays. If my care team is willing to refill my opioid prescription without a visit, I must request refills only during office hours. Refills may take up to 3 business days to process. I will use one pharmacy to fill all my opioid and other controlled substance prescriptions. I will notify the clinic about any changes to my insurance or medication  availability.    7. I am responsible for my prescriptions. If the medicine/prescription is lost, stolen or destroyed, it will not be replaced. I also agree not to share controlled substance medicines with anyone.    8. I am aware I should not use any illegal or recreational drugs. I agree not to drink alcohol unless my care team says I can.       9. If I enroll in the [place] [facility] program, I will tell my care team prior to my next refill.     10. I will tell my care team right away if I become pregnant, have a new medical problem treated outside of my regular clinic, or have a change in my medications.    11. I understand that this medicine can affect my thinking, judgment and reaction time. Alcohol and drugs affect the brain and body, which can affect the safety of my driving. Being under the influence of alcohol or drugs can affect my decision-making, behaviors, personal safety, and the safety of others. Driving while impaired (DWI) can occur if a person is driving, operating, or in physical control of a car, motorcycle, boat, snowmobile, ATV, motorbike, off-road vehicle, or any other motor vehicle ([place] Statute 169A.20). I understand the risk if I choose to drive or operate any vehicle or machinery.    I understand that if I do not follow any of the conditions above, my prescriptions or treatment may be stopped or changed.          Opioids  What You Need to Know    What are opioids?   Opioids are pain medicines that must be prescribed by a doctor. They are also known as narcotics.     Examples are:   1. morphine (MS Contin, Ember)  2. oxycodone (Oxycontin)  3. oxycodone and acetaminophen (Percocet)  4. hydrocodone and acetaminophen (Vicodin, Norco)   5. fentanyl patch (Duragesic)   6. hydromorphone (Dilaudid)   7. methadone  8. codeine (Tylenol #3)     What do opioids do well?   Opioids are best for severe short-term pain such as after a surgery or injury. They may work well for cancer pain. They may  help some people with long-lasting (chronic) pain.     What do opioids NOT do well?   Opioids never get rid of pain entirely, and they don t work well for most patients with chronic pain. Opioids don t reduce swelling, one of the causes of pain.                                    Other ways to manage chronic pain and improve function include:       Treat the health problem that may be causing pain    Anti-inflammation medicines, which reduce swelling and tenderness, such as ibuprofen (Advil, Motrin) or naproxen (Aleve)    Acetaminophen (Tylenol)    Antidepressants and anti-seizure medicines, especially for nerve pain    Topical treatments such as patches or creams    Injections or nerve blocks    Chiropractic or osteopathic treatment    Acupuncture, massage, deep breathing, meditation, visual imagery, aromatherapy    Use heat or ice at the pain site    Physical therapy     Exercise    Stop smoking    Take part in therapy       Risks and side effects     Talk to your doctor before you start or decide to keep taking opioids. Possible side effects include:      Lowering your breathing rate enough to cause death    Overdose, including death, especially if taking higher than prescribed doses    Worse depression symptoms; less pleasure in things you usually enjoy    Feeling tired or sluggish    Slower thoughts or cloudy thinking    Being more sensitive to pain over time; pain is harder to control    Trouble sleeping or restless sleep    Changes in hormone levels (for example, less testosterone)    Changes in sex drive or ability to have sex    Constipation    Unsafe driving    Itching and sweating    Dizziness    Nausea, throwing up and dry mouth    What else should I know about opioids?    Opioids may lead to dependence, tolerance, or addiction.      Dependence means that if you stop or reduce the medicine too quickly, you will have withdrawal symptoms. These include loose poop (diarrhea), jitters, flu-like symptoms,  nervousness and tremors. Dependence is not the same as addiction.                       Tolerance means needing higher doses over time to get the same effect. This may increase the chance of serious side effects.      Addiction is when people improperly use a substance that harms their body, their mind or their relations with others. Use of opiates can cause a relapse of addiction if you have a history of drug or alcohol abuse.      People who have used opioids for a long time may have a lower quality of life, worse depression, higher levels of pain and more visits to doctors.    You can overdose on opioids. Take these steps to lower your risk of overdose:    1. Recognize the signs:  Signs of overdose include decrease or loss of consciousness (blackout), slowed breathing, trouble waking up and blue lips. If someone is worried about overdose, they should call 911.    2. Talk to your doctor about Narcan (naloxone).   If you are at risk for overdose, you may be given a prescription for Narcan. This medicine very quickly reverses the effects of opioids.   If you overdose, a friend or family member can give you Narcan while waiting for the ambulance. They need to know the signs of overdose and how to give Narcan.     3. Don't use alcohol or street drugs.   Taking them with opioids can cause death.    4. Do not take any of these medicines unless your doctor says it s OK. Taking these with opioids can cause death:    Benzodiazepines, such as lorazepam (Ativan), alprazolam (Xanax) or diazepam (Valium)    Muscle relaxers, such as cyclobenzaprine (Flexeril)    Sleeping pills like zolpidem (Ambien)     Other opioids      How to keep you and other people safe while taking opioids:    1. Never share your opioids with others.  Opioid medicines are regulated by the Drug Enforcement Agency (ANIA). Selling or sharing medications is a criminal act.    2. Be sure to store opioids in a secure place, locked up if possible. Young children  can easily swallow them and overdose.    3. When you are traveling with your medicines, keep them in the original bottles. If you use a pill box, be sure you also carry a copy of your medicine list from your clinic or pharmacy.    4. Safe disposal of opioids    Most pharmacies have places to get rid of medicine, called disposal kiosks. Medicine disposal options are also available in every [facility]. Search your county and  medication disposal  to find more options. You can find more details at:  [email or web address]-[email or web address]-[email or web address]     I agree that my provider, clinic care team, and pharmacy may work with any city, state or federal law enforcement agency that investigates the misuse, sale, or other diversion of my controlled medicine. I will allow my provider to discuss my care with, or share a copy of, this agreement with any other treating provider, pharmacy or emergency room where I receive care.    I have read this agreement and have asked questions about anything I did not understand.    _______________________________________________________  Patient Signature - Ebonie Bowman _____________________                   Date     _______________________________________________________  Provider Signature - Marichuy Rubio NP   _____________________                   Date     _______________________________________________________  Witness Signature (required if provider not present while patient signing)   _____________________                   Date

## 2022-02-14 NOTE — PATIENT INSTRUCTIONS
- Start duloxetine (Cymbalta) for pain  - Rogaine (monixidil) is a topical medication that you can use for hair loss

## 2022-02-14 NOTE — PROGRESS NOTES
Internal Medicine Office Visit  [facility]   Patient Name: Ebonie Bowman  Patient Age: 71 year old  YOB: 1950  MRN: [id number]    Date of Visit: 2/14/2022  Patient presents with:  Recheck Medication: would like nystatin + powder, something more for pain because mornings are very painful can hardly walk, hair thinning, lose weight, completely out of Claritin, pt states that  pharmacist for pain told her she does not need to take baby Aspirin anymore 01/24/2022 visit, discuss Enbrel injection         Assessment / Plan / Medical Decision Making:    Problem List Items Addressed This Visit        Nervous and Auditory    Chronic pain     Low back pain mostly. Not well controlled  START: duloxetine 30 mg daily x 1 week then increase to 60 mg daily  Follow up in 4-6 weeks to assess tolerability and pain level  Continue Percocet 1-2 tablets every 6 hours as needed         Relevant Medications    DULoxetine (CYMBALTA) 30 MG capsule    oxyCODONE-acetaminophen (PERCOCET) 5-325 MG tablet       Respiratory    Allergic rhinitis due to cats    Relevant Medications    loratadine (CLARITIN) 10 MG tablet       Digestive    RESOLVED: Morbid obesity (H)       Endocrine    Type 2 diabetes mellitus with hyperosmolarity without coma, with long-term current use of insulin (H) - Primary     Diabetes is not at goal but A1C has improved to 8.7%  Could consider Ozempic, did not review this today as she was already overwhelmed with the addition of a new medication, duloxetine, for back pain.  Continue current insulin regimen, she has had recent changes prompted by a recent visit with diabetic educator.         Relevant Orders    Hemoglobin A1c (Completed)       Circulatory    HTN (hypertension)     Blood pressure is not at goal today.  It did not improve with recheck.  She is very anxious related to issues with [employer] today.  I am hesitant to make any changes to her blood pressure regimen due to  the impact anxiety may be having on her blood pressure.  We will plan to check this again at her follow-up visit and make adjustments as needed.            Immune    Rheumatoid arthritis, involving unspecified site, unspecified whether rheumatoid factor present (H)     Pt applying for financial assistance for Enbrel. Letter written with information requested from [employer] and faxed for patient          Relevant Medications    loratadine (CLARITIN) 10 MG tablet    oxyCODONE-acetaminophen (PERCOCET) 5-325 MG tablet       Urinary    Stage 3b chronic kidney disease (H)     Avoid NSAID pain relievers and will continue to work on diabetes management to help prevent CKD progression            Other Visit Diagnoses     Hair loss        Relevant Medications    loratadine (CLARITIN) 10 MG tablet    Other Relevant Orders    Iron and iron binding capacity (Completed)    Ferritin (Completed)         - ok to stop aspirin     I have changed Ebonie Bowman's oxyCODONE-acetaminophen. I am also having her start on DULoxetine. Additionally, I am having her maintain her vitamin C, blood glucose, MULTIVITAMIN ORAL, insulin syringe-needle U-100, blood-glucose meter Misc, B Complex Formula 1, BLOOD GLUCOSE TEST STRIPS, Vitamin D3, FreeStyle Saida 14 Day [place], FreeStyle Saida 14 Day Sensor, amLODIPine, metoprolol succinate ER, blood glucose monitoring, blood glucose, blood glucose calibration, thin, alcohol swab, traZODone, aspirin, fish oil-omega-3 fatty acids, insulin NPH, hydrALAZINE, atorvastatin, Enbrel, methotrexate, folic acid, FreeStyle Saida 14 Day Sensor, nystatin, lisinopril, and loratadine.          Orders Placed This Encounter   Procedures     Hemoglobin A1c     Iron and iron binding capacity     Ferritin   Followup: Return in about 4 weeks (around 3/14/2022). earlier if needed.    47 minutes spent on the date of the encounter doing chart review, patient visit and documentation     Marichuy Rubio, SARAH, CNP        HPI:  Ebonie Bowman is  "a 71 year old year old who presents to the office today for follow up. I was running behind today and she relies on [employer] for rides to the clinic. She was very anxious about being able to get a ride back home due to me running late today so this impacted/limited some of our visit today.     She has horrible pain in her low back particularly in the morning. It \"hurts like hell\", not sharp/dull/or stabbing. \"It's just there, it's hard to describe\". She wants to know what else she can do for her back pain. The oxycodone takes a long time to start working in the mornings in particular. She doesn't think the spinal stimulator is doing anything to help with the pain.     DM was reviewed, her AM readings have been around 106-126. This afternoon her reading was 306. Afternoon readings tend to be higher, she attributes this to the types of foods that she eats with lunch or snacks. As an example, today she had some juice. She denies hypoglycemia.     She is concerned that she has hair loss and thinning.    She was hospitalized in October and I have not seen this hospital stay. She was seen in the ED for a c/o LOC when she stood from a chair and walked to the bathroom. She has had several such episodes in the prior years but has never been evaluated acutely. She did not have any arrythmias on telemetry and no further syncopal events during the hospitalization. CT imaging was negative, MRI not completed due to claustrophobia. Carotid U/s and EEG unremarkable. ECHO showed normal LVEF of 55-60%. Outpatient cardiac event monitor was recommended by cardiology.     Answers for HPI/ROS submitted by the patient on 2/14/2022  If you checked off any problems, how difficult have these problems made it for you to do your work, take care of things at home, or get along with other people?: Not difficult at all  PHQ9 TOTAL SCORE: 0  GENESIS 7 TOTAL SCORE: 0  Do you check your blood pressure regularly outside of the clinic?: No  Are your " blood pressures ever more than 140 on the top number (systolic) OR more than 90 on the bottom number (diastolic)? (For example, greater than 140/90): No  Are you following a low salt diet?: Yes  Frequency of checking blood sugars:: four or more times daily  What time of day are you checking your blood sugars : before and after meals  Have you had any blood sugars above 200?: Yes  Have you had any blood sugars below 70?: No  Hypoglycemia symptoms:: shakiness, blurred vision, confusion  Diabetic concerns:: none  Paraesthesia present:: blurry vision  Your back pain is: chronic  Where is your back pain located? : right lower back, left lower back, right buttock  How would you describe your back pain? : other  Where does your back pain spread? : right buttocks  Since you noticed your back pain, how has it changed? : gradually worsening  Does your back pain interfere with your job?: Not applicable  How many servings of fruits and vegetables do you eat daily?: 0-1  On average, how many sweetened beverages do you drink each day (Examples: soda, juice, sweet tea, etc.  Do NOT count diet or artificially sweetened beverages)?: 1  How many minutes a day do you exercise enough to make your heart beat faster?: 9 or less  How many days a week do you exercise enough to make your heart beat faster?: 3 or less  How many days per week do you miss taking your medication?: 0      Health Maintenance Review  Health Maintenance   Topic Date Due     PARATHYROID  Never done     PHOSPHORUS  Never done     DIABETIC FOOT EXAM  Never done     ANNUAL REVIEW OF HM ORDERS  Never done     ADVANCE CARE PLANNING  Never done     MEDICARE ANNUAL WELLNESS VISIT  Never done     ZOSTER IMMUNIZATION (2 of 3) 12/31/2014     MAMMO SCREENING  09/27/2018     INFLUENZA VACCINE (1) 09/01/2021     LUNG CANCER SCREENING  11/27/2021     MICROALBUMIN  03/16/2022     EYE EXAM  04/03/2022     A1C  08/14/2022     LIPID  09/16/2022     URINE DRUG SCREEN  09/16/2022      FALL RISK ASSESSMENT  09/16/2022     BMP  10/29/2022     HEMOGLOBIN  02/14/2023     DTAP/TDAP/TD IMMUNIZATION (4 - Td or Tdap) 07/18/2024     COLORECTAL CANCER SCREENING  12/14/2026     DEXA  09/17/2035     HEPATITIS C SCREENING  Completed     PHQ-2  Completed     Pneumococcal Vaccine: 65+ Years  Completed     URINALYSIS  Completed     ALK PHOS  Completed     IPV IMMUNIZATION  Aged Out     MENINGITIS IMMUNIZATION  Aged Out     COVID-19 Vaccine  Discontinued       Current Scheduled Meds:  Outpatient Encounter Medications as of 2/14/2022   Medication Sig Dispense Refill     alcohol swab prep pads Use to swab area of injection/jacquelyn as directed. 100 each 3     amLODIPine (NORVASC) 10 MG tablet [AMLODIPINE (NORVASC) 10 MG TABLET] TAKE 1 TABLET EVERY DAY 90 tablet 2     ascorbic acid (ASCORBIC ACID WITH LATRICIA HIPS) 500 MG tablet Take 500 mg by mouth daily        aspirin 81 MG EC tablet Take 81 mg by mouth daily       atorvastatin (LIPITOR) 40 MG tablet Take 1 tablet (40 mg) by mouth At Bedtime 90 tablet 0     blood glucose test strips [BLOOD GLUCOSE TEST STRIPS] Check blood sugar four times daily. Dispense brand per patient's insurance at pharmacy discretion. Dx: e11.9 400 strip 3     blood-glucose meter Misc [BLOOD-GLUCOSE METER MISC] Dispense one meter. Use as directed. Dx: e11.9 1 each 0     Continuous Blood Gluc Sensor (FREESTYLE BRE 14 DAY SENSOR) MISC Change every 14 days. Use daily to scan blood sugars, use per manufactures guidelines. Pt already has reader. 2 each 11     DULoxetine (CYMBALTA) 30 MG capsule Take 1 capsule (30 mg) by mouth daily for 14 days, THEN 2 capsules (60 mg) daily. 60 capsule 0     fish oil-omega-3 fatty acids 1000 MG capsule Take 1 g by mouth daily       flash glucose scanning reader (FREESTYLE BRE 14 DAY READER) Misc [FLASH GLUCOSE SCANNING READER (FREESTYLE BRE 14 DAY READER) MISC] Use 1 each As Directed see administration instructions. Use as directed with sensor 1 each 0     flash  glucose sensor (FREESTYLE BRE 14 DAY SENSOR) Kit [FLASH GLUCOSE SENSOR (FREESTYLE BRE 14 DAY SENSOR) KIT] Use 1 each As Directed every 14 (fourteen) days. 2 kit 11     insulin  UNIT/ML injection Inject 32 Units Subcutaneous 2 times daily (before meals) (Patient taking differently: Inject 32 Units Subcutaneous 2 times daily (before meals) Pt taking 44 units in am and 32 units at HS.)       insulin syringe-needle U-100 (BD INSULIN SYRINGE ULT-FINE II) 1 mL 31 gauge x 5/16 Syrg [INSULIN SYRINGE-NEEDLE U-100 (BD INSULIN SYRINGE ULT-FINE II) 1 ML 31 GAUGE X 5/16 SYRG] USE 4 TIMES DAILY AS DIRECTED 500 each 1     lisinopril (ZESTRIL) 10 MG tablet Take 10 mg by mouth daily       loratadine (CLARITIN) 10 MG tablet Take 1 tablet (10 mg) by mouth At Bedtime 90 tablet 3     metoprolol succinate ER (TOPROL-XL) 50 MG 24 hr tablet [METOPROLOL SUCCINATE (TOPROL-XL) 50 MG 24 HR TABLET] TAKE 1 TABLET EVERY DAY 90 tablet 2     MULTIVITAMIN ORAL Take 1 tablet by mouth daily        nystatin (MYCOSTATIN) 277429 UNIT/GM external cream Apply topically 2 times daily 30 g 1     oxyCODONE-acetaminophen (PERCOCET) 5-325 MG tablet Take 1-2 tablets by mouth every 6 hours as needed for severe pain (Max of 6 tablets daily) For 2/20/22- 3/22/22 180 tablet 0     thin (NO BRAND SPECIFIED) lancets Use with lanceting device. To accompany: Blood Glucose Monitor Brands: per insurance. 100 each 11     traZODone (DESYREL) 150 MG tablet Take 1 tablet (150 mg) by mouth At Bedtime 90 tablet 1     Vitamin D3 (VITAMIN D3) 25 mcg (1000 units) tablet Take 1,000 Units by mouth daily        b complex vitamins tablet [B COMPLEX VITAMINS TABLET] Take 1 tablet by mouth daily. (Patient not taking: Reported on 1/24/2022)       blood glucose (NO BRAND SPECIFIED) test strip Use to test blood sugar 3 times daily or as directed. To accompany: Blood Glucose Monitor Brands: per insurance. 100 strip 6     blood glucose calibration (NO BRAND SPECIFIED) solution To  accompany: Blood Glucose Monitor Brands: per insurance. 1 each 0     blood glucose monitoring (NO BRAND SPECIFIED) meter device kit Use to test blood sugar 3 times daily or as directed. Preferred blood glucose meter OR supplies to accompany: Blood Glucose Monitor Brands: per insurance. 1 kit 0     etanercept (ENBREL) 50 MG/ML injection Inject 1 mL (50 mg) Subcutaneous once a week Hold for signs of infection, and seek medical attention. (Patient not taking: Reported on 1/24/2022) 4 mL 3     folic acid (FOLVITE) 1 MG tablet Take one tab daily, except the day when taking methotrexate. (Patient not taking: Reported on 2/14/2022) 90 tablet 1     hydrALAZINE (APRESOLINE) 10 MG tablet Take 1 tablet (10 mg) by mouth 3 times daily as needed (sbp >175) (Patient not taking: Reported on 1/24/2022)       lancing device (ACCU-CHEK SOFTCLIX) Misc [LANCING DEVICE (ACCU-CHEK SOFTCLIX) MISC] Use 1 applicator As Directed 4 (four) times a day. 300 each 3     methotrexate 2.5 MG tablet Take 3 tablets (7.5 mg) by mouth once a week (Patient not taking: Reported on 1/24/2022) 36 tablet 0     [DISCONTINUED] loratadine (CLARITIN) 10 MG tablet Take 1 tablet (10 mg) by mouth At Bedtime 90 tablet 3     [DISCONTINUED] oxyCODONE-acetaminophen (PERCOCET) 5-325 MG tablet Take 1-2 tablets by mouth every 6 hours as needed for severe pain (Max of 6 tablets daily) For 1/21/22-2/20/22 180 tablet 0     [DISCONTINUED] oxyCODONE-acetaminophen (PERCOCET) 5-325 MG tablet Take 1-2 tablets by mouth every 6 hours as needed for severe pain (Max of 6 tablets daily) For 1/21/22-2/20/22 180 tablet 0     No facility-administered encounter medications on file as of 2/14/2022.          Objective / Physical Examination:  Vitals:    02/14/22 1534 02/14/22 1619   BP: (!) 148/72 (!) 150/68   BP Location: Right arm    Patient Position: Sitting    Cuff Size: Adult Regular    Pulse: 67    SpO2: 95%    Weight: 104.2 kg (229 lb 12.8 oz)      Wt Readings from Last 3 Encounters:    02/14/22 104.2 kg (229 lb 12.8 oz)   01/10/22 103.9 kg (229 lb)   01/03/22 102.9 kg (226 lb 12.8 oz)       Body mass index is 31.17 kg/m .     Constitutional: In no apparent distress  Respiratory: Clear to auscultation bilaterally. Normal inspiratory and expiratory effort  Cardiovascular: Regular rate and rhythm. No murmurs, rubs, or gallops. No edema. No carotid bruits.   Psych: Mood is anxious

## 2022-02-15 PROBLEM — Z79.899 CONTROLLED SUBSTANCE AGREEMENT SIGNED: Status: ACTIVE | Noted: 2019-08-23

## 2022-02-15 LAB
FERRITIN SERPL-MCNC: 132 NG/ML (ref 10–130)
HBA1C MFR BLD: 8.7 % (ref 0–5.6)
IRON SATN MFR SERPL: 28 % (ref 15–46)
IRON SERPL-MCNC: 75 UG/DL (ref 35–180)
TIBC SERPL-MCNC: 266 UG/DL (ref 240–430)

## 2022-02-15 ASSESSMENT — PATIENT HEALTH QUESTIONNAIRE - PHQ9: SUM OF ALL RESPONSES TO PHQ QUESTIONS 1-9: 0

## 2022-02-15 ASSESSMENT — ANXIETY QUESTIONNAIRES: GAD7 TOTAL SCORE: 0

## 2022-02-15 NOTE — ASSESSMENT & PLAN NOTE
Blood pressure is not at goal today.  It did not improve with recheck.  She is very anxious related to issues with [employer] today.  I am hesitant to make any changes to her blood pressure regimen due to the impact anxiety may be having on her blood pressure.  We will plan to check this again at her follow-up visit and make adjustments as needed.

## 2022-02-15 NOTE — ASSESSMENT & PLAN NOTE
Diabetes is not at goal but A1C has improved to 8.7%  Could consider Ozempic, did not review this today as she was already overwhelmed with the addition of a new medication, duloxetine, for back pain.  Continue current insulin regimen, she has had recent changes prompted by a recent visit with diabetic educator.

## 2022-02-16 ENCOUNTER — TELEPHONE (OUTPATIENT)
Dept: RHEUMATOLOGY | Facility: CLINIC | Age: 72
End: 2022-02-16

## 2022-02-16 ENCOUNTER — ALLIED HEALTH/NURSE VISIT (OUTPATIENT)
Dept: OTHER | Facility: CLINIC | Age: 72
End: 2022-02-16
Payer: COMMERCIAL

## 2022-02-16 DIAGNOSIS — I10 ESSENTIAL HYPERTENSION: ICD-10-CM

## 2022-02-16 DIAGNOSIS — G89.29 CHRONIC LOW BACK PAIN WITH SCIATICA, SCIATICA LATERALITY UNSPECIFIED, UNSPECIFIED BACK PAIN LATERALITY: Primary | ICD-10-CM

## 2022-02-16 DIAGNOSIS — Z79.4 TYPE 2 DIABETES MELLITUS WITH HYPEROSMOLARITY WITHOUT COMA, WITH LONG-TERM CURRENT USE OF INSULIN (H): ICD-10-CM

## 2022-02-16 DIAGNOSIS — E11.00 TYPE 2 DIABETES MELLITUS WITH HYPEROSMOLARITY WITHOUT COMA, WITH LONG-TERM CURRENT USE OF INSULIN (H): ICD-10-CM

## 2022-02-16 DIAGNOSIS — M54.40 CHRONIC LOW BACK PAIN WITH SCIATICA, SCIATICA LATERALITY UNSPECIFIED, UNSPECIFIED BACK PAIN LATERALITY: Primary | ICD-10-CM

## 2022-02-16 PROCEDURE — 99207 PR COMMUNITY PARAMEDIC-PATIENT MEETS CRITERIA: CPT

## 2022-02-16 NOTE — ASSESSMENT & PLAN NOTE
Pt applying for financial assistance for Enbrel. Letter written with information requested from [employer] and faxed for patient

## 2022-02-16 NOTE — ASSESSMENT & PLAN NOTE
Low back pain mostly. Not well controlled  START: duloxetine 30 mg daily x 1 week then increase to 60 mg daily  Follow up in 4-6 weeks to assess tolerability and pain level  Continue Percocet 1-2 tablets every 6 hours as needed

## 2022-02-16 NOTE — ASSESSMENT & PLAN NOTE
Avoid NSAID pain relievers and will continue to work on diabetes management to help prevent CKD progression

## 2022-02-17 NOTE — TELEPHONE ENCOUNTER
Please contact patient and see if she is taking methotrexate 3 tablets weekly, if not, recommend she take 3 tablets every 7 days and folic acid 1 mg daily except the day when taking methotrexate.      Please also inquire if started Enbrel 50 mg weekly, apparently recently approved.  If not started and experiencing joint pains with swelling, recommend she start.     Does Pt need Enbrel self injection teaching? If yes, please help coordinate.     Check cbc with diff, creatinine, ast/alt/albumin about 6 wks after initiating Enbrel.

## 2022-02-17 NOTE — TELEPHONE ENCOUNTER
Pt called back and informs me that she is not taking methotrexate because she thought the MD told her not to, the pt will restart this. The pt has not started Enbrel because she has not received the medication yet.

## 2022-02-22 VITALS
OXYGEN SATURATION: 92 % | SYSTOLIC BLOOD PRESSURE: 149 MMHG | TEMPERATURE: 97.8 F | RESPIRATION RATE: 12 BRPM | DIASTOLIC BLOOD PRESSURE: 77 MMHG | HEART RATE: 64 BPM

## 2022-02-22 NOTE — PROGRESS NOTES
[facility] Follow-up Visit  February 16, 2022  TIME: 1600    Ebonie Bowman is a 71 year old female being seen at home for a follow-up visit.    Present at appointment: patient, [facility]          Chief Complaint   Patient presents with     Outreach       [employer] Utilization:      Utilization    Hospital Admissions  1             ED Visits  1             No Show Count (past year)  5                Current as of: 2/20/2022  9:42 PM              BP (!) 149/77   Pulse 64   Temp 97.8  F (36.6  C)   Resp 12   SpO2 92%     Clinical Concerns:  Current Medical Concerns:  Diabetes    Current Behavioral Concerns: medication compliance    Education Provided to patient:    Medication set up? Yes  Pill Box issued: No  Scale issued: No  Flu Shot given: No  COVID Vaccine Given: No  Lab draw or specimen collection: No  Food box issued: No  Collaborative visit with PCP: No  Wound Care: No    Health Maintenance Reviewed:      Clinical Pathway: None    No  Face to Face in Home / Community    Recent blood sugars: 208    Review of Symptoms/PE    Skin: negative  Eyes: negative  Ears/Nose/Throat: negative  Respiratory: No shortness of breath, dyspnea on exertion, cough, or hemoptysis  Cardiovascular: negative  Gastrointestinal: negative  Genitourinary: negative  Musculoskeletal: back pain, arthritis, joint pain and muscular weakness  Neurologic: negative  Psychiatric: negative  Time spent with patient: 60    The patient meets one or more of the following criteria:  * Requires services to prevent readmission to a nursing home or hospital    Acute concern/Follow-up recommendations: follow current treatment plan    Next CP visit scheduled: patient graduated from [facility] program    Issues for Provider to follow up on: Community Paramedic visited with patient at her apartment. Patient had set up her medications on her own for the following week. Community Paramedic went through the pill box and all the  medications had been placed correctly. Wrote up a sheet with all her medications and when she is suppose to take it so it correlates with the wording on the pill box. Patient found this very helpful and easy to follow.   Provided patient with a list of different foods, proportion sizes and amount of carbs in those proportions to help patient with carb counting and keeping her meals between 40-60 carbs with 3-15 carb snacks a day. Patient was very appreciative of these sheets and believes this will help her keep her blood sugars under control.  Community Paramedic applied duct tape to the corner of the throw rug that she has sitting in the front of her rocking chair. Pt stated she did not want to remove the rug but would agree to putting something on the corner where it was curling up. Patient was very happy with the duct tape holding down the corner to avoid any falls.  Patient feels confident that she is able to continue to set up her medications on her own and take them as prescribed. Advised if she needed additional assistance to reach out to her care provider for another referral to the program. Patient currently placed in graduating from the Community [facility] program    Provider follow up visit needed: 3/4/22 Diabetic ED

## 2022-02-25 ENCOUNTER — TELEPHONE (OUTPATIENT)
Dept: CARE COORDINATION | Facility: CLINIC | Age: 72
End: 2022-02-25
Payer: COMMERCIAL

## 2022-02-27 NOTE — TELEPHONE ENCOUNTER
Ebonie was given my telephone number to contact me to request assistance with injecting her once weekly Enbrel medication. She had attempted to self inject the medication but without success. She did not attempt to contact the [employer] service phone number where the medication is shipped from and she did not attempt to call her clinic for support. I was not aware of the medication or how to use the injector so I google searched the company's website and found an instructional video. I kept Ebonie on the phone as we listened to the instructions together. It sounds as though she did not follow the instructions to push and release the injector button but held it down instead. She attempted to inject the medication using the correct injection technique however, she was still unsuccessful. She attempted again with a second medication pen and again was not successful. She was very frustrated. I asked her if injecting the medication was urgent and she advised it was not as she is just starting this medication and it's once a week. I recommended she try to contact customer support this weekend or on Monday if they are closed to try to identify if there is something she is still doing incorrectly. She is going to call the customer service number and she said she would call me back to update me on her progress.   Total call time was 45 minutes.

## 2022-02-28 ENCOUNTER — TELEPHONE (OUTPATIENT)
Dept: CARE COORDINATION | Facility: CLINIC | Age: 72
End: 2022-02-28
Payer: COMMERCIAL

## 2022-03-01 ENCOUNTER — TELEPHONE (OUTPATIENT)
Dept: RHEUMATOLOGY | Facility: CLINIC | Age: 72
End: 2022-03-01
Payer: COMMERCIAL

## 2022-03-01 DIAGNOSIS — E78.2 MIXED HYPERLIPIDEMIA: ICD-10-CM

## 2022-03-01 NOTE — TELEPHONE ENCOUNTER
Medication Refill Request    Atorvastatin (LIPITOR) 40 mg tablet  Patient would like 90 day supply    [facility] on [address] in [place] is the preferred pharmacy    Last Written Prescription Date: 11/30/2021  Last Fill Quantity: 90,  # refills: 0   Last office visit with provider:  02/14/22  Next visit with provider: 03/21/22

## 2022-03-01 NOTE — TELEPHONE ENCOUNTER
Health Call Center    Phone Message    May a detailed message be left on voicemail: no     Reason for Call: Medication Question or concern regarding medication   Prescription Clarification  Name of Medication: Enbrel Sure-Click  Prescribing Provider: Dr. Londono   Pharmacy: [facility] (they ship out pt's Enbrel)    What on the order needs clarification? Call received from [facility] pharmacy reporting that they received a call from pt stating that she had two Enbrel Sure-click pens that were damaged, and they just need an ok from Dr. Schofield's office for them to send out replacements   Please call [facility] at [phone] to give verbal 'ok' to send out replacements.         Action Taken: Message routed to:  Other: RHEUMATOLOGY SUPPORT POOL    Travel Screening: Not Applicable

## 2022-03-01 NOTE — TELEPHONE ENCOUNTER
Ebonie is no longer enrolled in the [facility] program, however, she asked me if she could still call in the future if she has questions. I advised her yes she can reach out to the CP team, however, if she has medical needs beyond a basic question, we will request a referral from her PCP for all medical related concerns but that she doesn't have to do anything. We will help her re-enroll. She agreed and thanked me for my time.

## 2022-03-03 RX ORDER — ATORVASTATIN CALCIUM 40 MG/1
40 TABLET, FILM COATED ORAL AT BEDTIME
Qty: 90 TABLET | Refills: 2 | Status: SHIPPED | OUTPATIENT
Start: 2022-03-03 | End: 2022-03-14

## 2022-03-03 NOTE — TELEPHONE ENCOUNTER
"Last Written Prescription Date:  11/30/21  Last Fill Quantity: 90,  # refills: 0   Last office visit provider:  2/14/22     Requested Prescriptions   Pending Prescriptions Disp Refills     atorvastatin (LIPITOR) 40 MG tablet 90 tablet 0     Sig: Take 1 tablet (40 mg) by mouth At Bedtime       Statins Protocol Passed - 3/3/2022  9:27 AM        Passed - LDL on file in past 12 months     Recent Labs   Lab Test 09/16/21  0936   LDL 57             Passed - No abnormal creatine kinase in past 12 months     Recent Labs   Lab Test 10/26/21  0031   CKT 37                Passed - Recent (12 mo) or future (30 days) visit within the authorizing provider's specialty     Patient has had an office visit with the authorizing provider or a provider within the authorizing providers department within the previous 12 mos or has a future within next 30 days. See \"Patient Info\" tab in inbasket, or \"Choose Columns\" in Meds & Orders section of the refill encounter.              Passed - Medication is active on med list        Passed - Patient is age 18 or older        Passed - No active pregnancy on record        Passed - No positive pregnancy test in past 12 months             Flaco Menendez RN 03/03/22 9:27 AM  "

## 2022-03-04 ENCOUNTER — ALLIED HEALTH/NURSE VISIT (OUTPATIENT)
Dept: EDUCATION SERVICES | Facility: CLINIC | Age: 72
End: 2022-03-04
Payer: COMMERCIAL

## 2022-03-04 DIAGNOSIS — E11.9 DIABETES MELLITUS, TYPE 2 (H): Primary | ICD-10-CM

## 2022-03-04 PROCEDURE — G0108 DIAB MANAGE TRN  PER INDIV: HCPCS | Mod: AE

## 2022-03-04 NOTE — PROGRESS NOTES
Diabetes Self-Management Education & Support    Presents for: Follow-up    SUBJECTIVE/OBJECTIVE:  Presents for: Follow-up  Accompanied by: Self  Diabetes education in the past 24mo: Yes  Focus of Visit: Monitoring, Healthy Eating  Diabetes type: Type 2  Date of diagnosis: 1995  Cultural Influences/Ethnic Background:  Not  or     Diabetes Symptoms & Complications:  Fatigue: Yes (afternoons)  Neuropathy: Yes  Polydipsia: Yes  Polyphagia: No  Polyuria: Sometimes  Visual change: Yes  Slow healing wounds: No  Complications assessed today?: Yes  Autonomic neuropathy: Yes  CVA: No  Heart disease:  (family history - father)  Retinopathy: No    Patient Problem List and Family Medical History reviewed for relevant medical history, current medical status, and diabetes risk factors.    Vitals:  There were no vitals taken for this visit.  Estimated body mass index is 31.17 kg/m  as calculated from the following:    Height as of 1/3/22: 1.829 m (6').    Weight as of 2/14/22: 104.2 kg (229 lb 12.8 oz).   Last 3 BP:   BP Readings from Last 3 Encounters:   02/16/22 (!) 149/77   02/14/22 (!) 150/68   02/08/22 (!) 169/91       History   Smoking Status     Former Smoker     Packs/day: 1.00     Years: 43.00     Start date: 1/1/1965     Quit date: 8/1/2008   Smokeless Tobacco     Never Used       Labs:  Lab Results   Component Value Date    A1C 8.7 02/14/2022     Lab Results   Component Value Date     10/29/2021     10/29/2021     Lab Results   Component Value Date    LDL 57 09/16/2021     Direct Measure HDL   Date Value Ref Range Status   09/16/2021 47 (L) >=50 mg/dL Final     Comment:     HDL Cholesterol Reference Range:     0-2 years:   No reference ranges established for patients under 2 years old  at [facility] for lipid analytes.    2-8 years:  Greater than 45 mg/dL     18 years and older:   Female: Greater than or equal to 50 mg/dL   Male:   Greater than or equal to 40 mg/dL   ]  GFR  Estimate   Date Value Ref Range Status   02/14/2022 35 (L) >60 mL/min/1.73m2 Final     Comment:     Effective December 21, 2021 eGFRcr in adults is calculated using the 2021 CKD-EPI creatinine equation which includes age and gender (Sondra branch al., NEJM, DOI: 10.1056/HSNXfm1981927)   05/07/2021 40 (L) >60 mL/min/1.73m2 Final     GFR Estimate If Black   Date Value Ref Range Status   05/07/2021 48 (L) >60 mL/min/1.73m2 Final     Lab Results   Component Value Date    CR 1.56 02/14/2022     No results found for: MICROALBUMIN    Healthy Eating:  Healthy Eating Assessed Today: Yes  Meals include: Lunch, Dinner, Afternoon Snack, Evening Snack  Breakfast: has been eating regularily, son is making breakfast  Lunch: 1565-4648: meals on wheels  Dinner: 430-630pm - will cook or son will cook  Snacks: sometimes HS snack, about 50% of the time  Beverages: Water, Tea, Milk, Juice, Soda    Being Active:  Being Active Assessed Today: Yes  Exercise:: Currently not exercising  Barrier to exercise: Physical limitation (back problems)    Monitoring:  Monitoring Assessed Today: Yes  Did patient bring glucose meter to appointment? : Yes  Blood Glucose Meter: CGM    Taking Medications:  Diabetes Medication(s)     Insulin       insulin  UNIT/ML injection    Inject 32 Units Subcutaneous 2 times daily (before meals)     Patient taking differently: Inject 32 Units Subcutaneous 2 times daily (before meals) Pt taking 44 units in am and 32 units at HS.          Taking Medication Assessed Today: Yes  Current Treatments: Insulin Injections  Dose schedule:  (NPH 9am and 9pm)  Problems taking diabetes medications regularly?: No    Problem Solving:  Is the patient at risk for hypoglycemia?: Yes  Hypoglycemia Frequency: Monthly  Hypoglycemia Treatment: Juice, Candy  Medical ID: No    Hypoglycemia symptoms  Dizziness or Light-Headedness: Yes  Hunger: No  Mood changes: Yes  Nervousness/Anxiety: Yes  Sleepiness: No  Sweats: Yes  Feeling shaky:  Yes    Hypoglycemia Complications  Blackouts: No  Hospitalization: No  Nocturnal hypoglycemia: Yes  Required assistance: Yes  Seizures: No    Reducing Risks:  Reducing Risks Assessed Today: Yes  Diabetes Risks: Age over 45 years, Sedentary Lifestyle  CAD Risks: Diabetes Mellitus, Family history, Obesity, Sedentary lifestyle  Has dilated eye exam at least once a year?: Yes  Sees dentist every 6 months?: No    Healthy Coping:  Healthy Coping Assessed Today: Yes  Emotional response to diabetes: Ready to learn  Informal Support system:: Family, Children  Stage of change: PREPARATION (Decided to change - considering how)  Patient Activation Measure Survey Score:  No flowsheet data found.    ASSESSMENT:  Pt was seen today for f/u. She brings in saida, upload below. Pt is taking NPH 50 units in the morning (8:30-9am) and 32 units in the evening (8:30-9am). Morning does was increased at our last visit 1 month ago from 46 units. Saida shows some improvement. Previous in target only 38%, now 48%. Previous avg BG at 200, now at 176.   Pt denies any s/s of low BG. It is believed lows recorded on saida are false.   Pt denies any significant changes to diet. Eating breakfast when her son is home (he is making) and has MOW for lunch and dinner son or pt will make. Discussed elevation of 333 yesterday, pt cannot recall what she ate.   Pt has a pattern of elevated BG mostly between 1pm-10pm.   Noted in chart review PCP was possibly considering Ozempic. Discussed the possibility w/ pt today and potential benefits (possibly decreasing insulin) and this seemed to be overwhelming. Pt would not like to add additional medication if she can help it and the cost is a concern for pt. Discussed the possibility of using short acting insulin w/ just lunch. This was also overwhelming for pt and states she feels like she would be going backwards in time as she was taking short acting before.   Has been having more back pain.                        Patient's most recent   Lab Results   Component Value Date    A1C 8.7 02/14/2022    is not meeting goal of <8.0    PLAN  Will increase morning dose to 54 units. Continue 32 units at evening dose. Pt was given samples of 1ml (up to 100 units) syringes as her current syringes are only 0.5ml (up to 50 units). She was able to demo back the correct line at 54 units on the 1ml syringe.   Will f/u with MTM 3/14 - pt to bring tmaara to that appt so BG can be reviewed.   Will f/u with CDE 4/14 as scheduled.     Time Spent: 30 minutes  Encounter Type: Individual    Any diabetes medication dose changes were made via the CDE Protocol and Collaborative Practice Agreement with the patient's referring provider. A copy of this encounter was shared with the provider.

## 2022-03-04 NOTE — LETTER
3/4/2022         RE: Ebonie Bowman  [address]  [place] [address]        Dear Colleague,    Thank you for referring your patient, Ebonie Bowman, to the [facility]. Please see a copy of my visit note below.    Diabetes Self-Management Education & Support    Presents for: Follow-up    SUBJECTIVE/OBJECTIVE:  Presents for: Follow-up  Accompanied by: Self  Diabetes education in the past 24mo: Yes  Focus of Visit: Monitoring, Healthy Eating  Diabetes type: Type 2  Date of diagnosis: 1995  Cultural Influences/Ethnic Background:  Not  or     Diabetes Symptoms & Complications:  Fatigue: Yes (afternoons)  Neuropathy: Yes  Polydipsia: Yes  Polyphagia: No  Polyuria: Sometimes  Visual change: Yes  Slow healing wounds: No  Complications assessed today?: Yes  Autonomic neuropathy: Yes  CVA: No  Heart disease:  (family history - father)  Retinopathy: No    Patient Problem List and Family Medical History reviewed for relevant medical history, current medical status, and diabetes risk factors.    Vitals:  There were no vitals taken for this visit.  Estimated body mass index is 31.17 kg/m  as calculated from the following:    Height as of 1/3/22: 1.829 m (6').    Weight as of 2/14/22: 104.2 kg (229 lb 12.8 oz).   Last 3 BP:   BP Readings from Last 3 Encounters:   02/16/22 (!) 149/77   02/14/22 (!) 150/68   02/08/22 (!) 169/91       History   Smoking Status     Former Smoker     Packs/day: 1.00     Years: 43.00     Start date: 1/1/1965     Quit date: 8/1/2008   Smokeless Tobacco     Never Used       Labs:  Lab Results   Component Value Date    A1C 8.7 02/14/2022     Lab Results   Component Value Date     10/29/2021     10/29/2021     Lab Results   Component Value Date    LDL 57 09/16/2021     Direct Measure HDL   Date Value Ref Range Status   09/16/2021 47 (L) >=50 mg/dL Final     Comment:     HDL Cholesterol Reference Range:     0-2 years:   No reference ranges  established for patients under 2 years old  at [facility] for lipid analytes.    2-8 years:  Greater than 45 mg/dL     18 years and older:   Female: Greater than or equal to 50 mg/dL   Male:   Greater than or equal to 40 mg/dL   ]  GFR Estimate   Date Value Ref Range Status   02/14/2022 35 (L) >60 mL/min/1.73m2 Final     Comment:     Effective December 21, 2021 eGFRcr in adults is calculated using the 2021 CKD-EPI creatinine equation which includes age and gender (Sondra branch al., NEJM, DOI: 10.1056/[id number])   05/07/2021 40 (L) >60 mL/min/1.73m2 Final     GFR Estimate If Black   Date Value Ref Range Status   05/07/2021 48 (L) >60 mL/min/1.73m2 Final     Lab Results   Component Value Date    CR 1.56 02/14/2022     No results found for: MICROALBUMIN    Healthy Eating:  Healthy Eating Assessed Today: Yes  Meals include: Lunch, Dinner, Afternoon Snack, Evening Snack  Breakfast: has been eating regularily, son is making breakfast  Lunch: 0497-0937: meals on wheels  Dinner: 430-630pm - will cook or son will cook  Snacks: sometimes HS snack, about 50% of the time  Beverages: Water, Tea, Milk, Juice, Soda    Being Active:  Being Active Assessed Today: Yes  Exercise:: Currently not exercising  Barrier to exercise: Physical limitation (back problems)    Monitoring:  Monitoring Assessed Today: Yes  Did patient bring glucose meter to appointment? : Yes  Blood Glucose Meter: CGM    Taking Medications:  Diabetes Medication(s)     Insulin       insulin  UNIT/ML injection    Inject 32 Units Subcutaneous 2 times daily (before meals)     Patient taking differently: Inject 32 Units Subcutaneous 2 times daily (before meals) Pt taking 44 units in am and 32 units at HS.          Taking Medication Assessed Today: Yes  Current Treatments: Insulin Injections  Dose schedule:  (NPH 9am and 9pm)  Problems taking diabetes medications regularly?: No    Problem Solving:  Is the patient at risk for hypoglycemia?:  Yes  Hypoglycemia Frequency: Monthly  Hypoglycemia Treatment: Juice, Candy  Medical ID: No    Hypoglycemia symptoms  Dizziness or Light-Headedness: Yes  Hunger: No  Mood changes: Yes  Nervousness/Anxiety: Yes  Sleepiness: No  Sweats: Yes  Feeling shaky: Yes    Hypoglycemia Complications  Blackouts: No  Hospitalization: No  Nocturnal hypoglycemia: Yes  Required assistance: Yes  Seizures: No    Reducing Risks:  Reducing Risks Assessed Today: Yes  Diabetes Risks: Age over 45 years, Sedentary Lifestyle  CAD Risks: Diabetes Mellitus, Family history, Obesity, Sedentary lifestyle  Has dilated eye exam at least once a year?: Yes  Sees dentist every 6 months?: No    Healthy Coping:  Healthy Coping Assessed Today: Yes  Emotional response to diabetes: Ready to learn  Informal Support system:: Family, Children  Stage of change: PREPARATION (Decided to change - considering how)  Patient Activation Measure Survey Score:  No flowsheet data found.    ASSESSMENT:  Pt was seen today for f/u. She brings in saida, upload below. Pt is taking NPH 50 units in the morning (8:30-9am) and 32 units in the evening (8:30-9am). Morning does was increased at our last visit 1 month ago from 46 units. Saida shows some improvement. Previous in target only 38%, now 48%. Previous avg BG at 200, now at 176.   Pt denies any s/s of low BG. It is believed lows recorded on saida are false.   Pt denies any significant changes to diet. Eating breakfast when her son is home (he is making) and has MOW for lunch and dinner son or pt will make. Discussed elevation of 333 yesterday, pt cannot recall what she ate.   Pt has a pattern of elevated BG mostly between 1pm-10pm.   Noted in chart review PCP was possibly considering Ozempic. Discussed the possibility w/ pt today and potential benefits (possibly decreasing insulin) and this seemed to be overwhelming. Pt would not like to add additional medication if she can help it and the cost is a concern for pt.  Discussed the possibility of using short acting insulin w/ just lunch. This was also overwhelming for pt and states she feels like she would be going backwards in time as she was taking short acting before.   Has been having more back pain.                       Patient's most recent   Lab Results   Component Value Date    A1C 8.7 02/14/2022    is not meeting goal of <8.0    PLAN  Will increase morning dose to 54 units. Continue 32 units at evening dose. Pt was given samples of 1ml (up to 100 units) syringes as her current syringes are only 0.5ml (up to 50 units). She was able to demo back the correct line at 54 units on the 1ml syringe.   Will f/u with MTM 3/14 - pt to bring tamara to that appt so BG can be reviewed.   Will f/u with CDE 4/14 as scheduled.     Time Spent: 30 minutes  Encounter Type: Individual    Any diabetes medication dose changes were made via the CDE Protocol and Collaborative Practice Agreement with the patient's referring provider. A copy of this encounter was shared with the provider.

## 2022-03-10 ENCOUNTER — TELEPHONE (OUTPATIENT)
Dept: INTERNAL MEDICINE | Facility: CLINIC | Age: 72
End: 2022-03-10
Payer: COMMERCIAL

## 2022-03-10 NOTE — TELEPHONE ENCOUNTER
Received [employer] forms from patient to fax out.    Forms faxed out to [phone]    Original sent to scan. Copy placed in writer's filing drawer.  Nelia James MA on 3/10/2022 at 3:53 PM

## 2022-03-14 ENCOUNTER — OFFICE VISIT (OUTPATIENT)
Dept: PHARMACY | Facility: OTHER | Age: 72
End: 2022-03-14
Payer: COMMERCIAL

## 2022-03-14 VITALS — DIASTOLIC BLOOD PRESSURE: 74 MMHG | SYSTOLIC BLOOD PRESSURE: 139 MMHG | HEART RATE: 67 BPM

## 2022-03-14 DIAGNOSIS — E78.5 HYPERLIPIDEMIA, UNSPECIFIED HYPERLIPIDEMIA TYPE: ICD-10-CM

## 2022-03-14 DIAGNOSIS — Z79.4 TYPE 2 DIABETES MELLITUS WITH HYPEROSMOLARITY WITHOUT COMA, WITH LONG-TERM CURRENT USE OF INSULIN (H): ICD-10-CM

## 2022-03-14 DIAGNOSIS — J30.81 ALLERGIC RHINITIS DUE TO CATS: ICD-10-CM

## 2022-03-14 DIAGNOSIS — I10 ESSENTIAL HYPERTENSION: ICD-10-CM

## 2022-03-14 DIAGNOSIS — E78.2 MIXED HYPERLIPIDEMIA: ICD-10-CM

## 2022-03-14 DIAGNOSIS — N18.30 TYPE 2 DIABETES MELLITUS WITH STAGE 3 CHRONIC KIDNEY DISEASE, WITH LONG-TERM CURRENT USE OF INSULIN, UNSPECIFIED WHETHER STAGE 3A OR 3B CKD (H): ICD-10-CM

## 2022-03-14 DIAGNOSIS — M15.9 GENERALIZED OA: ICD-10-CM

## 2022-03-14 DIAGNOSIS — G89.29 CHRONIC LOW BACK PAIN WITH SCIATICA, SCIATICA LATERALITY UNSPECIFIED, UNSPECIFIED BACK PAIN LATERALITY: Primary | ICD-10-CM

## 2022-03-14 DIAGNOSIS — Z78.9 TAKES DIETARY SUPPLEMENTS: ICD-10-CM

## 2022-03-14 DIAGNOSIS — Z79.4 TYPE 2 DIABETES MELLITUS WITH STAGE 3 CHRONIC KIDNEY DISEASE, WITH LONG-TERM CURRENT USE OF INSULIN, UNSPECIFIED WHETHER STAGE 3A OR 3B CKD (H): ICD-10-CM

## 2022-03-14 DIAGNOSIS — E11.22 TYPE 2 DIABETES MELLITUS WITH STAGE 3 CHRONIC KIDNEY DISEASE, WITH LONG-TERM CURRENT USE OF INSULIN, UNSPECIFIED WHETHER STAGE 3A OR 3B CKD (H): ICD-10-CM

## 2022-03-14 DIAGNOSIS — E11.00 TYPE 2 DIABETES MELLITUS WITH HYPEROSMOLARITY WITHOUT COMA, WITH LONG-TERM CURRENT USE OF INSULIN (H): ICD-10-CM

## 2022-03-14 DIAGNOSIS — G47.00 INSOMNIA, UNSPECIFIED TYPE: ICD-10-CM

## 2022-03-14 DIAGNOSIS — G89.4 CHRONIC PAIN SYNDROME: ICD-10-CM

## 2022-03-14 DIAGNOSIS — M54.40 CHRONIC LOW BACK PAIN WITH SCIATICA, SCIATICA LATERALITY UNSPECIFIED, UNSPECIFIED BACK PAIN LATERALITY: Primary | ICD-10-CM

## 2022-03-14 PROCEDURE — 99606 MTMS BY PHARM EST 15 MIN: CPT | Performed by: PHARMACIST

## 2022-03-14 PROCEDURE — 99607 MTMS BY PHARM ADDL 15 MIN: CPT | Performed by: PHARMACIST

## 2022-03-14 RX ORDER — ATORVASTATIN CALCIUM 40 MG/1
40 TABLET, FILM COATED ORAL AT BEDTIME
Qty: 30 TABLET | Refills: 1 | Status: SHIPPED | OUTPATIENT
Start: 2022-03-14 | End: 2022-06-13

## 2022-03-14 RX ORDER — DULOXETIN HYDROCHLORIDE 60 MG/1
60 CAPSULE, DELAYED RELEASE ORAL DAILY
Qty: 30 CAPSULE | Refills: 1 | Status: SHIPPED | OUTPATIENT
Start: 2022-03-14 | End: 2022-03-21

## 2022-03-14 NOTE — Clinical Note
Some continued adherence concerns - she's dependent on her son to drive her to the pharmacy so sometimes run out of meds and things get delayed. We discussed transition to [place] so they can mail medicines to her. I'll ask community paramedics to help with the transition.   She's having some AM lows - asymptomatic. Decreased evening insulin by 2 units. She can't afford ozempic. Thoughts on adding in low dose Metformin? Per chart review, it was discontinued in 2016 after JAYNE and never restarted. Current renal function would allow for up to 1000 mg daily.     Nilesh Toussaint, PharmD  Medication Therapy Management (MTM) Pharmacist  [facility]] and Pain Center

## 2022-03-14 NOTE — PATIENT INSTRUCTIONS
Recommendations from today's MTM visit:                                                       1. I will talk to Janneth about transitioning to [place] to have your medicines set in blister packs and mailed to your home.     2. Continue Duloxetine at bedtime. I will send a new prescription for the 60 mg capsules. Take 1 at bedtime.     3. Decrease Insulin to 30 units in the evening. Continue 54 units in the morning.      Follow-up: Return in about 6 weeks (around 4/25/2022) for Medication Management Pharmacist, in person.    It was great to speak with you today.  I value your experience and would be very thankful for your time with providing feedback on our clinic survey. You may receive a survey via email or text message in the next few days.     To schedule another MTM appointment, please call the clinic directly or you may call the MTM scheduling line at [phone] or toll-free at [phone].     My Clinical Pharmacist's contact information:                                                      Please feel free to contact me with any questions or concerns you have.      Nilesh Toussaint, PharmD  Medication Therapy Management (MTM) Pharmacist  [facility] and [facility] Center

## 2022-03-14 NOTE — PROGRESS NOTES
Medication Therapy Management (MTM) Encounter    ASSESSMENT:                            Medication Adherence/Access: Missing statin today - pt reports delays in medications because she's dependent on son to drive her to the pharmacy. Discussed mail order options such as [facility]. They can also do blister packaging. Pt interested. Will request community paramedic support through the transition period.       Chronic pain: Some improvement with addition of Duloxetine - expect additional improvement over the next 2-4 weeks. Continue to monitor.       Rheumatoid arthritis: Reports some initial benefit with Enbrel use. Reviewed that full benefit may take up to 3 months. Continue to monitor.       Insomnia: Addition of Duloxetine has not been helpful yet. May improve over the next 2-4 weeks. However, cause of sleep disturbances seem mostly related to external factors like her cats.     Low Vitamin D: last level within goal, continue with vitamin D 1000mg daily.     Diabetes: A1c above goal <8%, but improved from September. Still having some early morning lows without symptoms. Will decrease evening insulin dose. Previously discussed addition of GLP1, but cost is prohibitive. Could consider restarting metformin. Per chart review, previously discontinued in 2016 due to JAYNE. Current renal function would support addition of low dose metformin with careful titration to 1000 mg/day.       Hyperlipidemia: controlled last LDL at goal <100.     Hypertension: BP today at goal <140/90 - As pt has not been using hydralazine, and hasn't for several months, with BP now at goal, will discontinue.     Allergies: Fairly well managed per pt report.     Supplements: No strong indication for Fish Oil, Vit C - likely not harmful to continue. Encouraged pt to complete current supply then discontinue.     PLAN:                            1.  PharmD to initiate transfer to [place]. Community Paramedic support appreciated.   2. Decrease evening insulin  "to 30 units. Continue 54 units in the AM.   3. Consider Metformin 500 mg daily (pharmD to discuss with PCP and CDE)   4. Discontinue Hydralazine.       Follow-up: Return in about 7 weeks (around 5/2/2022) for Medication Management Pharmacist, in person.       SUBJECTIVE/OBJECTIVE:                          Ebonie Bowman is a 71 year old female coming in for a follow up visit. She was referred to me from Yanet Samano CNP. Today's visit is a follow-up MTM visit from 01/24/2021.      Reason for visit: \"You wanted to see me\"   Medication Management.    Recommended as a means of supporting medication set up       Allergies/ADRs: Reviewed in chart  Past Medical History: Reviewed in chart  Tobacco: She reports that she quit smoking about 13 years ago. She started smoking about 57 years ago. She has a 43.00 pack-year smoking history. She has never used smokeless tobacco.  Alcohol:  reports current alcohol use.Rare        Medication Adherence/Access:   Brings medications to the visit - brings medications in two bags. One for morning and one for evening. Was hospitalized in September, then to [facility] for about 4 weeks.     Worked with Community Paramedics for a short period and feels adherence is good. Pt felt these visits were helpful.     Does not some difficulty with getting to the pharmacy to get refills on time.     Evening meds:   Keto Weight loss Support    Trazodone 12/12/21 #90 - full   Lisinopril 12/12 #90 about #30+ remain   Duloxetine.     Previously also had these in the evening.   Atorvastatin #90 - Doesn't have at visit today.    Was [facility] at [facility] on 3/3.         AM:   Metoprolol ER 12/12 #90 - mostly full   Amlodipine -12/12 #90 - mostly full   Methotrexate.   Vit D3 2000 unit gummies   Magnesium Oxide 250 mg   Vitamin C   Omega-3 1000 mg  OTC folic acid 800 mcg   Vit B12 500 mcg gummies/2 gummies - has another at home - uses 3 gummies daily   MVI -3 gummies       Denies missed doses - but does sometimes " "second guess if she had her insulin dose or not.     No hydralazine at the visit.       Chronic pain:  She reports chronic low back pain that is \"just there\" and does not go away. Per chart review, has history of sacroiliitis and myalgia of the upper trap. Prescribed oxycodone-acetaminophen 5-325 milligrams 1 to 2 tablets every 6 hours as needed (max 6 tabs daily). She currently takes 2 tab AM and 2 tabs at bedtime, 1 in afternoon and 1 evening; she tries to make the medications last 6 hrs but reports usually lasting ~ 3-4 hrs. At last PCP visit, started Duloxetine 30 mg daily x 1 week then 60 mg daily.     Took Duloxetine during the day  And slept for 4 hours. Took at night and was up every night.     Since starting Duloxetine having an easier time getting up in the morning. Not as stiff as she was. Low back pain, still sometimes radiating down into the legs. Has a week of medicine set in the pill box + 4 days remaining in her bottle at the visit.     Waking up every hour - cat is in heat and has to go to the bathroom.         Rheumatoid arthritis: Prescribed Enbrel 50 mg weekly, folic acid 1 mg daily except when taking methotrexate, methotrexate 2.5 mg 3 tablets weekly. Had difficulty with the Enbrel injections. Was going to have replacements sent.     Has been able to get 2 doses of Enbrel in. Notices that her hands aren't hurting as much.       Insomnia: She currently takes trazodone 150mg at bedtime but is not sleeping well as she gets up 2-3 x nightly to urinate.  As above, was recently started on Duloxetine.     No changes in sleep since starting Duloxetine.     Mood has been pretty good. Looking for a house that has been stressful. Has some financial stressors.     Not sleeping well - gets up 2-3 times per night to urinate. Cat also wakes her up in the morning.     Low Vitamin D: Prescribed Vitamin D3 2000 units daily .      Ref. Range 8/12/2020 11:58   Vitamin D, Total (25-Hydroxy) Latest Ref Range: 30.0 - " 80.0 ng/mL 70.9         Type 2 Diabetes:  Prescribed insulin NPH 32 units twice daily .  Patient reports she is actually using 42 units in a.m. and 32 units at bedtime. She does report second guessing herself at times if she has taken the medication.     Using 54 in the morning and 32 at bedtime per recommendation from CDE.     Had previously discussed Ozempic  - cost is a limiting factor.       Blood sugar monitoring: Continuous Glucose Monitor. Ranges (patient reported):                     Symptoms of low blood sugar? None - in the past cold, and shaky.   Symptoms of high blood sugar? none  Diet/Exercise: Tends to avoid sweets at baseline, then gets cravings and eats a lot. Trying to lose weight. Trying a weight loss supplement - son is eating every 2 hours so it's harder for patient. Tries to portion her share.       Hemoglobin A1C   Date Value Ref Range Status   02/14/2022 8.7 (H) 0.0 - 5.6 % Final     Comment:     Normal <5.7%   Prediabetes 5.7-6.4%    Diabetes 6.5% or higher     Note: Adopted from ADA consensus guidelines.   09/16/2021 9.7 (H) 0.0 - 5.6 % Final     Comment:     Normal <5.7%   Prediabetes 5.7-6.4%    Diabetes 6.5% or higher     Note: Adopted from ADA consensus guidelines.   05/07/2021 9.5 (H) <=5.6 % Final      Microalbumin Urine mg/dL   Date Value Ref Range Status   09/16/2021 4.11 (H) 0.00 - 1.99 mg/dL Final      Creatinine Urine mg/dL   Date Value Ref Range Status   09/16/2021 113 mg/dL Final   09/16/2021 113 mg/dL Final     LDL Cholesterol Calculated   Date Value Ref Range Status   09/16/2021 57 <=129 mg/dL Final     Creatinine   Date Value Ref Range Status   02/14/2022 1.56 (H) 0.60 - 1.10 mg/dL Final     GFR Estimate   Date Value Ref Range Status   02/14/2022 35 (L) >60 mL/min/1.73m2 Final     Comment:     Effective December 21, 2021 eGFRcr in adults is calculated using the 2021 CKD-EPI creatinine equation which includes age and gender (Sondra et al., NEJM, DOI: 10.1056/MDRZzf8544254)    05/07/2021 40 (L) >60 mL/min/1.73m2 Final     GFR Estimate If Black   Date Value Ref Range Status   05/07/2021 48 (L) >60 mL/min/1.73m2 Final         Hyperlipidemia: Prescribed atorvastatin 40 mg daily which she is taking at bedtime. Also takes fish oil 1 cap daily.     Discontinued Aspirin at last visit.      Recent Labs   Lab Test 09/16/21  0936 10/20/20  1413   CHOL 130 121   HDL 47* 49*   LDL 57 54   TRIG 129 88         Hypertension: Prescribed amlodipine 10 mg daily, hydralazine 10 mg 3 times daily as needed, metoprolol succinate 50 mg daily    No Hydralazine at the visit.     History of syncopal episodes. Was seen by cardiology. Work up unremarkable.     Some orthostasis.     Went up the stairs and fell. Hasn't happened since discharge.     BP tends to be higher at the doctors office.     BP Readings from Last 3 Encounters:   03/14/22 139/74   02/16/22 (!) 149/77   02/14/22 (!) 150/68      Pulse Readings from Last 3 Encounters:   03/14/22 67   02/16/22 64   02/14/22 67       Supplements: Using Vitamin B12, Fish oil 1000 mg daily, vitamin C 500 mg daily, Magnesium oxide     Magnesium   Date Value Ref Range Status   10/26/2021 1.9 1.8 - 2.6 mg/dL Final         Today's Vitals: /74   Pulse 67   ----------------      I spent 75 minutes with this patient today. All changes were made via collaborative practice agreement with Marichuy Rubio NP. A copy of the visit note was provided to the patient's provider(s).    The patient was given a summary of these recommendations.     Nilesh Toussaint, Randy  Medication Therapy Management (MTM) Pharmacist  [facility]         Medication Therapy Recommendations  Chronic back pain    Current Medication: oxyCODONE-acetaminophen (PERCOCET) 5-325 MG tablet (Discontinued)   Rationale: Synergistic therapy - Needs additional medication therapy - Indication   Recommendation: Start Medication - DULoxetine HCl 60 MG Csdr   Status: Accepted per Provider          Essential hypertension    Current Medication: hydrALAZINE (APRESOLINE) 10 MG tablet (Discontinued)   Rationale: No medical indication at this time - Unnecessary medication therapy - Indication   Recommendation: Discontinue Medication   Status: Accepted per CPA         HLD (hyperlipidemia)    Current Medication: atorvastatin (LIPITOR) 40 MG tablet   Rationale: Medication product not available - Adherence - Adherence   Recommendation: Provide Adherence Intervention   Status: Accepted per CPA         Type 2 diabetes mellitus with hyperosmolarity without coma, with long-term current use of insulin (H)    Current Medication: insulin  UNIT/ML injection   Rationale: Dose too high - Dosage too high - Safety   Recommendation: Decrease Dose   Status: Accepted per CPA          Current Medication: insulin  UNIT/ML injection   Rationale: Synergistic therapy - Needs additional medication therapy - Indication   Recommendation: Start Medication - metFORMIN 500 MG tablet   Status: Contact Provider - Awaiting Response          Current Medication: insulin  UNIT/ML injection (Discontinued)   Rationale: Synergistic therapy - Needs additional medication therapy - Indication   Recommendation: Start Medication - Ozempic (0.25 or 0.5 MG/DOSE) 2 MG/1.5ML Sopn   Status: No Longer Relevant

## 2022-03-14 NOTE — Clinical Note
HI,     I met with pt again and she's still having some adherence concerns - related to delays getting to pharmacy because she's dependent on her son for this. We discuss [facility] blister packs/mail prescriptions - she was interested in this. Wondering if you could help with the transition.     Thanks!   Nilesh

## 2022-03-15 ENCOUNTER — HOSPITAL ENCOUNTER (OUTPATIENT)
Dept: PHYSICAL THERAPY | Facility: REHABILITATION | Age: 72
Discharge: HOME | End: 2022-03-15
Payer: COMMERCIAL

## 2022-03-15 DIAGNOSIS — M53.3 PAIN IN THE COCCYX: ICD-10-CM

## 2022-03-15 DIAGNOSIS — M62.81 GENERALIZED MUSCLE WEAKNESS: ICD-10-CM

## 2022-03-15 DIAGNOSIS — M54.50 CHRONIC MIDLINE LOW BACK PAIN WITHOUT SCIATICA: Primary | ICD-10-CM

## 2022-03-15 DIAGNOSIS — M53.3 SI (SACROILIAC) JOINT DYSFUNCTION: ICD-10-CM

## 2022-03-15 DIAGNOSIS — G89.29 CHRONIC MIDLINE LOW BACK PAIN WITHOUT SCIATICA: Primary | ICD-10-CM

## 2022-03-15 PROCEDURE — 97110 THERAPEUTIC EXERCISES: CPT | Mod: GP | Performed by: PHYSICAL THERAPIST

## 2022-03-15 NOTE — PROGRESS NOTES
[facility]    OUTPATIENT PHYSICAL THERAPY  PLAN OF TREATMENT FOR OUTPATIENT REHABILITATION AND PROGRESS NOTE           Patient's Last Name, First Name, Ebonie Titus Date of Birth  1950   Provider's Name  [facility] Medical Record No.  [id number]    Onset Date  7/12/21 Start of Care Date  7/12/21   Type:     _X_PT   ___OT   ___SLP Medical Diagnosis  Chronic Pain Syndrome, Sacroiliitis, Chronic Bilateral Thoracic back pain   PT Diagnosis  Chronic Pain Syndrome, Sacroiliitis, Chronic Bilateral Thoracic back pain Plan of Treatment  Frequency/Duration: 1x/wk  Certification date from 3/15/22 to 6/15/22     Goals:  Goal Identifier HEP/self management   Goal Description Patient will be independent in HEP and self management of condition.   Target Date 06/15/22   Date Met      Progress (detail required for progress note): TENS, Takes meds (Percocet, Methotrexate, and Duoxetine, Enbrel), Changing positions, stretching and exercises      Goal Identifier Sleeping   Goal Description Patient will be able to sleep more comfortably and wake only 1-2x/night d/t pain   Target Date 06/15/22   Date Met      Progress (detail required for progress note): Pt reports she is waking every hour due pain and bathroom (3x), 6-7 x/night total, very stiff and painful early morning     Goal Identifier Walking   Goal Description Patient will be able to walk 1 block with <5/10 pain in the lower back   Target Date 06/15/22   Date Met      Progress (detail required for progress note): Pt walks up to 10 minutes with 9/10 LBP and must sit down     Goal Identifier Sitting   Goal Description Patient will be able to sit for 30 minutes with <5/10 pain in the lower back   Target Date 06/15/22   Date Met      Progress (detail required for progress note): Pt able to sit 30 minutes with 8/10 LBP      Goal Identifier     Goal Description     Target Date     Date Met      Progress (detail required for progress note):       Goal Identifier     Goal Description     Target Date     Date Met      Progress (detail required for progress note):       Goal Identifier     Goal Description     Target Date     Date Met      Progress (detail required for progress note):       Goal Identifier     Goal Description     Target Date     Date Met      Progress (detail required for progress note):             Beginning/End Dates of Progress Note Reporting Period:  12/27/21 to 3/15/22    Progress Toward Goals:   Progress this reporting period: Very limited progress due to patient not attending PT for 10 weeks    Client Self (Subjective) Report for Progress Note Reporting Period: 8/10 Central LBP - 1x/wk she reports pain down the outer calves to toes both feet- outer 3 toes. Right knee 6/10 at rest    Objective Measurements:   Objective Measure: Posture  Details: Slight trunk flexion, Marked FHP- pt working on standing more erect  Objective Measure: SI  Details: (P) PSIS level in sitting- left quad atrophy noted- had L TKA  Objective Measure: Gait  Details: (P) Pt walks with SEC- wide based and slow gait, Pt uses both hands to transfer sit to stand and multiple attempts  Objective Measure: Cranial scan  Details: (P) Mesentary and scar of lumbar and pelvis, + L > R post pelvic neural and entire left leg,  LV of pelvic and back right more than left , Left pelvic viscera, cartilage of right knee, hips and spine.+  Objective Measure: (P) Trunk AROM  Details: (P) Flexion 17 inches fingertips to floor, Extension - barely to neutral. Pt unable to sit comfortably with full lumbar roll due to loss of lumbar extension  Objective Measure: (P) LE  Details: (P) R hip flexion 92, Left hip flexion 100 deg, Calf strength 2/5 bilat and pt unable to do even one SL heel raise- makes her vulnarable to falls and hip flexion tightness increases demand  of LB in ADL's            I CERTIFY THE NEED FOR THESE SERVICES FURNISHED UNDER        THIS PLAN OF TREATMENT AND WHILE UNDER MY CARE     (Physician co-signature of this document indicates review and certification of the therapy plan).                Referring Provider: MAGGIE Farrar, PT

## 2022-03-18 ENCOUNTER — TELEPHONE (OUTPATIENT)
Dept: INTERNAL MEDICINE | Facility: CLINIC | Age: 72
End: 2022-03-18
Payer: COMMERCIAL

## 2022-03-18 DIAGNOSIS — G89.4 CHRONIC PAIN SYNDROME: ICD-10-CM

## 2022-03-18 RX ORDER — OXYCODONE AND ACETAMINOPHEN 5; 325 MG/1; MG/1
1-2 TABLET ORAL EVERY 6 HOURS PRN
Qty: 180 TABLET | Refills: 0 | Status: SHIPPED | OUTPATIENT
Start: 2022-03-18 | End: 2022-04-25

## 2022-03-18 NOTE — TELEPHONE ENCOUNTER
Reason for Call:  Medication Refill    Do you use a [facility]?  No  Name of the pharmacy and phone number for the current request:    [facility]     Name of the medication requested:   oxycodone-acetaminophen (PERCOCET) 5-325 mg tablet    Last Written Prescription Date: 02/14/2022  Last Fill Quanity: 180, # refills: 0  Last office visit w/provider: 02/14/2022  Next visit w/provider: 3/21/2022

## 2022-03-21 ENCOUNTER — TELEPHONE (OUTPATIENT)
Dept: INTERNAL MEDICINE | Facility: CLINIC | Age: 72
End: 2022-03-21

## 2022-03-21 ENCOUNTER — VIRTUAL VISIT (OUTPATIENT)
Dept: INTERNAL MEDICINE | Facility: CLINIC | Age: 72
End: 2022-03-21
Payer: COMMERCIAL

## 2022-03-21 DIAGNOSIS — G89.4 CHRONIC PAIN SYNDROME: ICD-10-CM

## 2022-03-21 DIAGNOSIS — E11.00 TYPE 2 DIABETES MELLITUS WITH HYPEROSMOLARITY WITHOUT COMA, WITH LONG-TERM CURRENT USE OF INSULIN (H): Primary | ICD-10-CM

## 2022-03-21 DIAGNOSIS — Z79.4 TYPE 2 DIABETES MELLITUS WITH HYPEROSMOLARITY WITHOUT COMA, WITH LONG-TERM CURRENT USE OF INSULIN (H): Primary | ICD-10-CM

## 2022-03-21 PROCEDURE — 99441 PR PHYSICIAN TELEPHONE EVALUATION 5-10 MIN: CPT | Mod: 95 | Performed by: NURSE PRACTITIONER

## 2022-03-21 RX ORDER — DULAGLUTIDE 0.75 MG/.5ML
0.75 INJECTION, SOLUTION SUBCUTANEOUS
Qty: 2 ML | Refills: 0 | Status: SHIPPED | OUTPATIENT
Start: 2022-03-21 | End: 2022-04-18

## 2022-03-21 RX ORDER — DULOXETIN HYDROCHLORIDE 60 MG/1
60 CAPSULE, DELAYED RELEASE ORAL DAILY
Qty: 30 CAPSULE | Refills: 1 | Status: SHIPPED | OUTPATIENT
Start: 2022-03-21 | End: 2022-05-18

## 2022-03-21 NOTE — PROGRESS NOTES
Internal Medicine Telephone Visit  [facility]   Patient Name: Ebonie Bowman  Patient Age: 72 year old  YOB: 1950  MRN: [id number]    Date of Visit: 3/21/2022  Patient presents with:  RECHECK: pain         Assessment / Plan / Medical Decision Making:    Problem List Items Addressed This Visit        Nervous and Auditory    Chronic pain     She is tolerating the duloxetine better since changing the timing, improved drowsiness   Continue current dose of 60 mg daily and re-evaluate pain level at next visit  She will also continue with oxycodone-acetaminophen as prescribed for chronic pain  - Can see pain clinic at any time if she is not seeing adequate relief with the above measures          Relevant Medications    DULoxetine (CYMBALTA) 60 MG capsule       Endocrine    Type 2 diabetes mellitus with hyperosmolarity without coma, with long-term current use of insulin (H) - Primary     Diabetes is not at goal. She would benefit from the weight loss and glucose stabilizing effects of a GLP-1  START: Trulicity 0.75 mg weekly  Follow up in 2 months for repeat A1C          Relevant Medications    dulaglutide (TRULICITY) 0.75 MG/0.5ML pen           I am having Ebonie Bowman start on Trulicity. I am also having her maintain her vitamin C, blood glucose, MULTIVITAMIN ORAL, insulin syringe-needle U-100, B Complex Formula 1, Vitamin D3, FreeStyle Saida 14 Day [place], FreeStyle Saida 14 Day Sensor, amLODIPine, metoprolol succinate ER, thin, alcohol swab, traZODone, fish oil-omega-3 fatty acids, Enbrel, methotrexate, folic acid, FreeStyle Saida 14 Day Sensor, nystatin, lisinopril, loratadine, atorvastatin, insulin NPH, oxyCODONE-acetaminophen, and DULoxetine.        Telephone visit length: 10 minutes     No orders of the defined types were placed in this encounter.  Followup: Return in about 2 months (around 5/21/2022) for Follow up. earlier if needed.        Marichuy Rubio, DNP, APRN, CNP            HPI:  Ebonie Bowman is a 72 year old year old who was contacted virtually today for follow up. She was having daytime drowsiness when she took the duloxetine in the morning so she started taking it in the evening. She feels only mild improvement in pain. Her pain level is 8/10.     DM was reviewed. She has been using a method to remember to take her insulin and has been consistent with insulin doses now.       Health Maintenance Review  Health Maintenance   Topic Date Due     PARATHYROID  Never done     PHOSPHORUS  Never done     DIABETIC FOOT EXAM  Never done     ANNUAL REVIEW OF HM ORDERS  Never done     ADVANCE CARE PLANNING  Never done     MEDICARE ANNUAL WELLNESS VISIT  Never done     ZOSTER IMMUNIZATION (2 of 3) 12/31/2014     MAMMO SCREENING  09/27/2018     INFLUENZA VACCINE (1) 09/01/2021     LUNG CANCER SCREENING  11/27/2021     MICROALBUMIN  03/16/2022     EYE EXAM  04/03/2022     A1C  08/14/2022     LIPID  09/16/2022     URINE DRUG SCREEN  09/16/2022     FALL RISK ASSESSMENT  09/16/2022     BMP  10/29/2022     HEMOGLOBIN  02/14/2023     DTAP/TDAP/TD IMMUNIZATION (4 - Td or Tdap) 07/18/2024     COLORECTAL CANCER SCREENING  12/14/2026     DEXA  09/17/2035     HEPATITIS C SCREENING  Completed     PHQ-2 (once per calendar year)  Completed     Pneumococcal Vaccine: 65+ Years  Completed     URINALYSIS  Completed     ALK PHOS  Completed     IPV IMMUNIZATION  Aged Out     MENINGITIS IMMUNIZATION  Aged Out     COVID-19 Vaccine  Discontinued       Current Scheduled Meds:  Outpatient Encounter Medications as of 3/21/2022   Medication Sig Dispense Refill     alcohol swab prep pads Use to swab area of injection/jacquelyn as directed. 100 each 3     amLODIPine (NORVASC) 10 MG tablet [AMLODIPINE (NORVASC) 10 MG TABLET] TAKE 1 TABLET EVERY DAY 90 tablet 2     ascorbic acid (ASCORBIC ACID WITH LATRICIA HIPS) 500 MG tablet Take 500 mg by mouth daily        atorvastatin (LIPITOR) 40 MG tablet Take 1 tablet (40 mg) by mouth  At Bedtime 30 tablet 1     b complex vitamins tablet Take 1 tablet by mouth daily        Continuous Blood Gluc Sensor (FREESTYLE BRE 14 DAY SENSOR) MISC Change every 14 days. Use daily to scan blood sugars, use per manufactures guidelines. Pt already has reader. 2 each 11     dulaglutide (TRULICITY) 0.75 MG/0.5ML pen Inject 0.75 mg Subcutaneous every 7 days 2 mL 0     DULoxetine (CYMBALTA) 60 MG capsule Take 1 capsule (60 mg) by mouth daily 30 capsule 1     etanercept (ENBREL) 50 MG/ML injection Inject 1 mL (50 mg) Subcutaneous once a week Hold for signs of infection, and seek medical attention. 4 mL 3     fish oil-omega-3 fatty acids 1000 MG capsule Take 1 g by mouth daily       flash glucose scanning reader (FREESTYLE BRE 14 DAY READER) Misc [FLASH GLUCOSE SCANNING READER (FREESTYLE BRE 14 DAY READER) MISC] Use 1 each As Directed see administration instructions. Use as directed with sensor 1 each 0     flash glucose sensor (FREESTYLE BRE 14 DAY SENSOR) Kit [FLASH GLUCOSE SENSOR (FREESTYLE BRE 14 DAY SENSOR) KIT] Use 1 each As Directed every 14 (fourteen) days. 2 kit 11     folic acid (FOLVITE) 1 MG tablet Take one tab daily, except the day when taking methotrexate. 90 tablet 1     insulin  UNIT/ML injection Inject 54 Units Subcutaneous every morning AND 30 Units every evening. 15 mL 3     insulin syringe-needle U-100 (BD INSULIN SYRINGE ULT-FINE II) 1 mL 31 gauge x 5/16 Syrg [INSULIN SYRINGE-NEEDLE U-100 (BD INSULIN SYRINGE ULT-FINE II) 1 ML 31 GAUGE X 5/16 SYRG] USE 4 TIMES DAILY AS DIRECTED 500 each 1     lancing device (ACCU-CHEK SOFTCLIX) Misc [LANCING DEVICE (ACCU-CHEK SOFTCLIX) MISC] Use 1 applicator As Directed 4 (four) times a day. 300 each 3     lisinopril (ZESTRIL) 10 MG tablet Take 10 mg by mouth daily       loratadine (CLARITIN) 10 MG tablet Take 1 tablet (10 mg) by mouth At Bedtime 90 tablet 3     methotrexate 2.5 MG tablet Take 3 tablets (7.5 mg) by mouth once a week 36 tablet 0      metoprolol succinate ER (TOPROL-XL) 50 MG 24 hr tablet [METOPROLOL SUCCINATE (TOPROL-XL) 50 MG 24 HR TABLET] TAKE 1 TABLET EVERY DAY 90 tablet 2     MULTIVITAMIN ORAL Take 1 tablet by mouth daily        nystatin (MYCOSTATIN) 321672 UNIT/GM external cream Apply topically 2 times daily 30 g 1     oxyCODONE-acetaminophen (PERCOCET) 5-325 MG tablet Take 1-2 tablets by mouth every 6 hours as needed for severe pain (Max of 6 tablets daily) For 2/20/22- 3/22/22 180 tablet 0     thin (NO BRAND SPECIFIED) lancets Use with lanceting device. To accompany: Blood Glucose Monitor Brands: per insurance. 100 each 11     traZODone (DESYREL) 150 MG tablet Take 1 tablet (150 mg) by mouth At Bedtime 90 tablet 1     Vitamin D3 (VITAMIN D3) 25 mcg (1000 units) tablet Take 1,000 Units by mouth daily        [DISCONTINUED] DULoxetine (CYMBALTA) 60 MG capsule Take 1 capsule (60 mg) by mouth daily 30 capsule 1     No facility-administered encounter medications on file as of 3/21/2022.         Objective / Physical Examination:  There were no vitals filed for this visit.  Wt Readings from Last 3 Encounters:   02/14/22 104.2 kg (229 lb 12.8 oz)   01/10/22 103.9 kg (229 lb)   01/03/22 102.9 kg (226 lb 12.8 oz)     There is no height or weight on file to calculate BMI.     GENERAL: Healthy, alert and no distress  EYES: Eyes grossly normal to inspection.  No discharge or erythema, or obvious scleral/conjunctival abnormalities.  RESP: No audible wheeze, cough, or visible cyanosis.  No visible retractions or increased work of breathing.    SKIN: Visible skin clear. No significant rash, abnormal pigmentation or lesions.  NEURO: Cranial nerves grossly intact.  Mentation and speech appropriate for age.  PSYCH: Mentation appears normal, affect normal/bright, judgement and insight intact, normal speech and appearance well-groomed.

## 2022-03-22 ENCOUNTER — TELEPHONE (OUTPATIENT)
Dept: INTERNAL MEDICINE | Facility: CLINIC | Age: 72
End: 2022-03-22
Payer: COMMERCIAL

## 2022-03-22 NOTE — TELEPHONE ENCOUNTER
Reason for Call:  Medication or medication refill:    Do you use a [facility]?  Name of the pharmacy and phone number for the [facility]    Name of the medication requested: PERCOCET    Other request: follow up with patient    Can we leave a detailed message on this number? YES    Phone number patient can be reached at: Cell number on file:    Telephone Information:   Mobile [phone]       Best Time: any    Call taken on 3/22/2022 at 1:09 PM by Lydia King

## 2022-03-23 ENCOUNTER — TELEPHONE (OUTPATIENT)
Dept: RHEUMATOLOGY | Facility: CLINIC | Age: 72
End: 2022-03-23

## 2022-03-23 ENCOUNTER — TELEPHONE (OUTPATIENT)
Dept: INTERNAL MEDICINE | Facility: CLINIC | Age: 72
End: 2022-03-23
Payer: COMMERCIAL

## 2022-03-23 ENCOUNTER — ALLIED HEALTH/NURSE VISIT (OUTPATIENT)
Dept: OTHER | Facility: CLINIC | Age: 72
End: 2022-03-23
Payer: COMMERCIAL

## 2022-03-23 VITALS
RESPIRATION RATE: 12 BRPM | DIASTOLIC BLOOD PRESSURE: 73 MMHG | SYSTOLIC BLOOD PRESSURE: 136 MMHG | HEART RATE: 61 BPM | TEMPERATURE: 98.6 F | OXYGEN SATURATION: 96 %

## 2022-03-23 DIAGNOSIS — Z79.4 TYPE 2 DIABETES MELLITUS WITH HYPEROSMOLARITY WITHOUT COMA, WITH LONG-TERM CURRENT USE OF INSULIN (H): Primary | ICD-10-CM

## 2022-03-23 DIAGNOSIS — E11.00 TYPE 2 DIABETES MELLITUS WITH HYPEROSMOLARITY WITHOUT COMA, WITH LONG-TERM CURRENT USE OF INSULIN (H): Primary | ICD-10-CM

## 2022-03-23 PROCEDURE — 99207 PR COMMUNITY PARAMEDIC - PATIENT NOT BILLABLE: CPT

## 2022-03-23 NOTE — PROGRESS NOTES
Community Paramedics Follow-up Visit  March 23, 2022  TIME: 1300    Ebonie Bowman is a 72 year old female being seen at home for a follow-up visit.    Present at appointment: Patient, Community Paramedic, patients son - Karri          Chief Complaint   Patient presents with     Outreach       [place] Utilization:      Utilization    Hospital Admissions  1             ED Visits  1             No Show Count (past year)  5                Current as of: 3/23/2022  9:57 AM              /73   Pulse 61   Temp 98.6  F (37  C)   Resp 12   SpO2 96%     Clinical Concerns:  Current Medical Concerns:  Diabetes. HTN    Current Behavioral Concerns: Medication compliance    Education Provided to patient: completed paperwork for [facility]   Medication set up? No  Pill Box issued: No  Scale issued: No  Flu Shot given: No  COVID Vaccine Given: No  Lab draw or specimen collection: No  Food box issued: No  Collaborative visit with PCP: No  Wound Care: No    Health Maintenance Reviewed:      Clinical Pathway: None    No  Face to Face in Home / Community    Recent blood sugars: 338    Review of Symptoms/PE    Skin: negative  Eyes: negative  Ears/Nose/Throat: negative  Respiratory: No shortness of breath, dyspnea on exertion, cough, or hemoptysis  Cardiovascular: negative  Gastrointestinal: negative  Genitourinary: negative  Musculoskeletal: negative  Neurologic: negative  Psychiatric: negative    Time spent with patient: 45    The patient meets one or more of the following criteria:  * Requires services to prevent readmission to a nursing home or hospital    Acute concern/Follow-up recommendations: follow current treatment plan    Next CP visit scheduled: TBD    Issues for Provider to follow up on: Community Paramedic visited with Ebonie at her apartment. Assisted Ebonie with filling out the paperwork for [employer] to start receiving her medications via bubble packs on a monthly basis. Followed up with Ebonie's current medication  set up and she stated she is almost out of one medication however it is ready to be picked up at the pharmacy. Community Paramedic to drop off paperwork at [facility].   Ebonie states she is doing well with watching her carb intake. She is going to continue to work on her diet to help get her A1C lowered.    Provider follow up visit needed: 4/14/22 with Jewell Chávez got Diabetic ED

## 2022-03-23 NOTE — TELEPHONE ENCOUNTER
Yes, I will place an order for an A1c to do before her next office visit.  Please assist with scheduling a lab appointment.

## 2022-03-23 NOTE — TELEPHONE ENCOUNTER
Spoke with patient and relayed below message. She verbalized understanding and states that she was able to  the trulicity rx at a cost of 10.00 per month. She will plan to follow up on 5/18 at next scheduled appt. She did ask if you would like to do labs prior? Please advise   Parag Landry CMA on 3/23/2022 at 2:24 PM

## 2022-03-23 NOTE — TELEPHONE ENCOUNTER
[facility] Call Center    Phone Message    May a detailed message be left on voicemail: yes     Reason for Call: Other: Cover My Med's is calling to notify the team that a new prior authorization is needed for Enbrel starting in April.     Reference Key: [id number]     Action Taken: Message routed to:  Other: Rheumatology Support Pool, [facility]    Travel Screening: Not Applicable

## 2022-03-23 NOTE — TELEPHONE ENCOUNTER
oxyCODONE-acetaminophen (PERCOCET) 5-325 MG tablet  1-2 tablet, EVERY 6 HOURS PRN 0 ordered         Summary: Take 1-2 tablets by mouth every 6 hours as needed for severe pain (Max of 6 tablets daily) For 2/20/22- 3/22/22, Disp-180 tablet, R-0, E-Prescribe     Dose, Route, Frequency: 1-2 tablet, Oral, EVERY 6 HOURS PRN    Start: 3/18/2022    Ord/Sold: 3/18/2022 (O)      Report    Adh:     Taking:     Long-term:       Pharmacy: [facility] [address] [place] [address] [address]    Med Dose History    Change       Patient Sig: Take 1-2 tablets by mouth every 6 hours as needed for severe pain (Max of 6 tablets daily) For 2/20/22- 3/22/22       Ordered on: 3/18/2022       Authorized by: SCOTT LUNA       Dispense: 180 tablet       Admin Instructions: For 2/20/22- 3/22/22

## 2022-03-23 NOTE — TELEPHONE ENCOUNTER
Spoke with Ebonie and relayed that RX was signed 3/18 by Dr. Bagley. Patient verbalized understanding and will contact her pharmacy. No further needs at this time   Parag Landry CMA on 3/23/2022 at 8:44 AM

## 2022-03-23 NOTE — TELEPHONE ENCOUNTER
Call patient: She was going to check whether Trulicity was covered by her insurance after I sent this to her pharmacy.  When she able to  this medication?  If so, she should go ahead and pick it day of the week that she will remember to take the Trulicity and start this medication.  I will plan to see her back in May to do an A1c but before then she will talk with the diabetic educator to see how her numbers are doing with this new medication.

## 2022-03-24 NOTE — TELEPHONE ENCOUNTER
Attempted x2. Could not leave voicemail. Will try again tomorrow.  Nelia James MA on 3/24/2022 at 6:00 PM

## 2022-03-28 DIAGNOSIS — M05.9 SEROPOSITIVE RHEUMATOID ARTHRITIS (H): ICD-10-CM

## 2022-03-28 NOTE — ASSESSMENT & PLAN NOTE
Diabetes is not at goal. She would benefit from the weight loss and glucose stabilizing effects of a GLP-1  START: Trulicity 0.75 mg weekly  Follow up in 2 months for repeat A1C

## 2022-03-28 NOTE — TELEPHONE ENCOUNTER
Refill request from [facility]    Medication: Methotrexate 2.5mg  Last Refill: 1/3/22  Last OV: 1/3/22  Last lab: 2/14/22  Future OV: 5/9/22

## 2022-03-28 NOTE — ASSESSMENT & PLAN NOTE
She is tolerating the duloxetine better since changing the timing, improved drowsiness   Continue current dose of 60 mg daily and re-evaluate pain level at next visit  She will also continue with oxycodone-acetaminophen as prescribed for chronic pain  - Can see pain clinic at any time if she is not seeing adequate relief with the above measures

## 2022-04-01 NOTE — TELEPHONE ENCOUNTER
"Spoke with patient. She is scheduled for lab the same day as her appointment. Writer asked for clarification on the Trulicity because according to notes \"she was able to  the trulicity rx at a cost of 10.00 per month\" patient verbalized that she confused it with the Duloxetine medication that was prescribed as well. So that was for the Duloxetine and not the Trulicity. She has not been able to get the Trulicity yet and has not received any calls to go pick it up from the pharmacy. Writer let patient know that since that is the case, we will be starting a PA for it then.    Writer received fax regarding PA for Trulicity which was verbalized to Marichuy.    Writer will be initiating PA.      "

## 2022-04-04 NOTE — TELEPHONE ENCOUNTER
Central Prior Authorization Team  Phone: [phone]    PA Initiation    Medication: TRULICITY 0.75 MG/0.5ML pen  Insurance Company: [employer] - Phone [phone] Fax [phone]  Pharmacy Filling the Rx: [facility] [address]  Filling Pharmacy Phone: [phone]  Filling Pharmacy Fax:    Start Date: 4/4/2022

## 2022-04-04 NOTE — TELEPHONE ENCOUNTER
Prior Authorization Approval    Authorization Effective Date: 3/5/2022  Authorization Expiration Date: 4/4/2023  Medication: TRULICITY 0.75 MG/0.5ML pen- APPROVED   Approved Dose/Quantity:   Reference #:     Insurance Company: [employer] - Phone [phone] Fax [phone]  Expected CoPay:       CoPay Card Available:      Foundation Assistance Needed:    Which Pharmacy is filling the prescription (Not needed for infusion/clinic administered):    Pharmacy Notified: No- rx was transferred  Patient Notified:  Left a message

## 2022-04-12 ENCOUNTER — TELEPHONE (OUTPATIENT)
Dept: PHYSICAL THERAPY | Facility: REHABILITATION | Age: 72
End: 2022-04-12
Payer: COMMERCIAL

## 2022-04-12 NOTE — TELEPHONE ENCOUNTER
Called pt. And left message that she had an appt today. Asked her to call us and confirm her next appt which is 4/18 or to call and cancel it.

## 2022-04-13 DIAGNOSIS — Z79.4 TYPE 2 DIABETES MELLITUS WITH HYPEROSMOLARITY WITHOUT COMA, WITH LONG-TERM CURRENT USE OF INSULIN (H): ICD-10-CM

## 2022-04-13 DIAGNOSIS — E11.00 TYPE 2 DIABETES MELLITUS WITH HYPEROSMOLARITY WITHOUT COMA, WITH LONG-TERM CURRENT USE OF INSULIN (H): ICD-10-CM

## 2022-04-13 NOTE — TELEPHONE ENCOUNTER
Patient calling to request refill of the attached medication.  Also requesting 90 day supply if possible.

## 2022-04-14 ENCOUNTER — ALLIED HEALTH/NURSE VISIT (OUTPATIENT)
Dept: EDUCATION SERVICES | Facility: CLINIC | Age: 72
End: 2022-04-14
Payer: COMMERCIAL

## 2022-04-14 DIAGNOSIS — E11.9 DIABETES MELLITUS (H): Primary | ICD-10-CM

## 2022-04-14 PROCEDURE — G0108 DIAB MANAGE TRN  PER INDIV: HCPCS | Mod: AE

## 2022-04-14 NOTE — LETTER
4/14/2022         RE: Ebonie Bowman  [address]  [address]  [place] [address]        Dear Colleague,    Thank you for referring your patient, Ebonie Bowman, to the [facility]. Please see a copy of my visit note below.    Diabetes Self-Management Education & Support    Presents for: Follow-up    SUBJECTIVE/OBJECTIVE:  Presents for: Follow-up  Accompanied by: Self  Diabetes education in the past 24mo: Yes  Focus of Visit: Monitoring, Healthy Eating  Diabetes type: Type 2  Date of diagnosis: 1995  Cultural Influences/Ethnic Background:  No    Diabetes Symptoms & Complications:  Fatigue: Yes (afternoons)  Neuropathy: Yes  Polydipsia: Yes  Polyphagia: No  Polyuria: Sometimes  Visual change: Yes  Slow healing wounds: No  Complications assessed today?: Yes  Autonomic neuropathy: Yes  CVA: No  Heart disease:  (family history - father)  Retinopathy: No    Patient Problem List and Family Medical History reviewed for relevant medical history, current medical status, and diabetes risk factors.    Vitals:  There were no vitals taken for this visit.  Estimated body mass index is 31.17 kg/m  as calculated from the following:    Height as of 1/3/22: 1.829 m (6').    Weight as of 2/14/22: 104.2 kg (229 lb 12.8 oz).   Last 3 BP:   BP Readings from Last 3 Encounters:   03/23/22 136/73   03/14/22 139/74   02/16/22 (!) 149/77       History   Smoking Status     Former Smoker     Packs/day: 1.00     Years: 43.00     Start date: 1/1/1965     Quit date: 8/1/2008   Smokeless Tobacco     Never Used       Labs:  Lab Results   Component Value Date    A1C 8.7 02/14/2022     Lab Results   Component Value Date     10/29/2021     10/29/2021     Lab Results   Component Value Date    LDL 57 09/16/2021     Direct Measure HDL   Date Value Ref Range Status   09/16/2021 47 (L) >=50 mg/dL Final     Comment:     HDL Cholesterol Reference Range:     0-2 years:   No reference ranges established for patients under  2 years old  [facility] for lipid analytes.    2-8 years:  Greater than 45 mg/dL     18 years and older:   Female: Greater than or equal to 50 mg/dL   Male:   Greater than or equal to 40 mg/dL   ]  GFR Estimate   Date Value Ref Range Status   02/14/2022 35 (L) >60 mL/min/1.73m2 Final     Comment:     Effective December 21, 2021 eGFRcr in adults is calculated using the 2021 CKD-EPI creatinine equation which includes age and gender (Sondra et al., NE, DOI: 10.1056/OXRIzi3939495)   05/07/2021 40 (L) >60 mL/min/1.73m2 Final     GFR Estimate If Black   Date Value Ref Range Status   05/07/2021 48 (L) >60 mL/min/1.73m2 Final     Lab Results   Component Value Date    CR 1.56 02/14/2022     No results found for: MICROALBUMIN    Healthy Eating:  Healthy Eating Assessed Today: Yes  Meals include: Lunch, Dinner, Afternoon Snack, Evening Snack  Breakfast: has not been eating breakfast  Lunch: 0989-9953: meals on wheels  Dinner: 430-630pm - will cook or son will cook  Snacks: sometimes HS snack, about 50% of the time  Beverages: Water, Tea, Milk, Juice, Soda    Being Active:  Being Active Assessed Today: Yes  Exercise:: Currently not exercising  Barrier to exercise: Physical limitation (back problems)    Monitoring:  Monitoring Assessed Today: Yes  Did patient bring glucose meter to appointment? : Yes  Blood Glucose Meter: CGM    Taking Medications:  Diabetes Medication(s)     Insulin       insulin  UNIT/ML injection    Inject 54 Units Subcutaneous every morning AND 30 Units every evening.    Incretin Mimetic Agents (GLP-1 Receptor Agonists)       dulaglutide (TRULICITY) 0.75 MG/0.5ML pen    Inject 0.75 mg Subcutaneous every 7 days          Taking Medication Assessed Today: Yes  Current Treatments: Insulin Injections  Dose schedule:  (NPH 9am and 9pm)  Problems taking diabetes medications regularly?: No    Problem Solving:  Is the patient at risk for hypoglycemia?: Yes  Hypoglycemia Frequency:  Monthly  Hypoglycemia Treatment: Juice, Candy  Medical ID: No    Hypoglycemia symptoms  Dizziness or Light-Headedness: Yes  Hunger: No  Mood changes: Yes  Nervousness/Anxiety: Yes  Sleepiness: No  Sweats: Yes  Feeling shaky: Yes    Hypoglycemia Complications  Blackouts: No  Hospitalization: No  Nocturnal hypoglycemia: Yes  Required assistance: Yes  Seizures: No    Reducing Risks:  Reducing Risks Assessed Today: Yes  Diabetes Risks: Age over 45 years, Sedentary Lifestyle  CAD Risks: Diabetes Mellitus, Family history, Obesity, Sedentary lifestyle  Has dilated eye exam at least once a year?: Yes  Sees dentist every 6 months?: No    Healthy Coping:  Healthy Coping Assessed Today: Yes  Emotional response to diabetes: Ready to learn  Informal Support system:: Family, Children  Stage of change: PREPARATION (Decided to change - considering how)  Patient Activation Measure Survey Score:  No flowsheet data found.    ASSESSMENT:  Pt seen today for f/u.   Saida uploaded: Avg a little higher than at our last visit at 180 now. Time in range down a little as well.   Pt is taking NPH 54 units in the morning and 34 units in the evening. Noted pt was to decrease to 30 units in the evening w/ last visit w/ MTM. Pt states she must've forgot because she reports taking 34 units. She has not had any low BG or symptoms of low BG but is running close to low sometimes in the mornings. Evenings still higher, as usual. Pt is not eating breakfast. She notes some decreased appetite as she reports taking a weight loss supplement but is still eating lunch and dinner normally.                             Patient's most recent   Lab Results   Component Value Date    A1C 8.7 02/14/2022    is not meeting goal of <8.0    PLAN  Will increase to 58 units in the morning and decrease to 32 units in the evening.   Will f/u with MTM in a couple weeks followed by PCP and with CDE on 6/9/22. Pt will call sooner w/ any concerns.     Time Spent: 30  minutes  Encounter Type: Individual    Any diabetes medication dose changes were made via the CDE Protocol and Collaborative Practice Agreement with the patient's referring provider. A copy of this encounter was shared with the provider.

## 2022-04-14 NOTE — PROGRESS NOTES
Diabetes Self-Management Education & Support    Presents for: Follow-up    SUBJECTIVE/OBJECTIVE:  Presents for: Follow-up  Accompanied by: Self  Diabetes education in the past 24mo: Yes  Focus of Visit: Monitoring, Healthy Eating  Diabetes type: Type 2  Date of diagnosis: 1995  Cultural Influences/Ethnic Background:  No    Diabetes Symptoms & Complications:  Fatigue: Yes (afternoons)  Neuropathy: Yes  Polydipsia: Yes  Polyphagia: No  Polyuria: Sometimes  Visual change: Yes  Slow healing wounds: No  Complications assessed today?: Yes  Autonomic neuropathy: Yes  CVA: No  Heart disease:  (family history - father)  Retinopathy: No    Patient Problem List and Family Medical History reviewed for relevant medical history, current medical status, and diabetes risk factors.    Vitals:  There were no vitals taken for this visit.  Estimated body mass index is 31.17 kg/m  as calculated from the following:    Height as of 1/3/22: 1.829 m (6').    Weight as of 2/14/22: 104.2 kg (229 lb 12.8 oz).   Last 3 BP:   BP Readings from Last 3 Encounters:   03/23/22 136/73   03/14/22 139/74   02/16/22 (!) 149/77       History   Smoking Status     Former Smoker     Packs/day: 1.00     Years: 43.00     Start date: 1/1/1965     Quit date: 8/1/2008   Smokeless Tobacco     Never Used       Labs:  Lab Results   Component Value Date    A1C 8.7 02/14/2022     Lab Results   Component Value Date     10/29/2021     10/29/2021     Lab Results   Component Value Date    LDL 57 09/16/2021     Direct Measure HDL   Date Value Ref Range Status   09/16/2021 47 (L) >=50 mg/dL Final     Comment:     HDL Cholesterol Reference Range:     0-2 years:   No reference ranges established for patients under 2 years old  at [facility] for lipid analytes.    2-8 years:  Greater than 45 mg/dL     18 years and older:   Female: Greater than or equal to 50 mg/dL   Male:   Greater than or equal to 40 mg/dL   ]  GFR Estimate   Date Value Ref Range  Status   02/14/2022 35 (L) >60 mL/min/1.73m2 Final     Comment:     Effective December 21, 2021 eGFRcr in adults is calculated using the 2021 CKD-EPI creatinine equation which includes age and gender (Sondra branch al., NESHYM, DOI: 10.1056/MRJAlt0404711)   05/07/2021 40 (L) >60 mL/min/1.73m2 Final     GFR Estimate If Black   Date Value Ref Range Status   05/07/2021 48 (L) >60 mL/min/1.73m2 Final     Lab Results   Component Value Date    CR 1.56 02/14/2022     No results found for: MICROALBUMIN    Healthy Eating:  Healthy Eating Assessed Today: Yes  Meals include: Lunch, Dinner, Afternoon Snack, Evening Snack  Breakfast: has not been eating breakfast  Lunch: 6401-9553: meals on wheels  Dinner: 430-630pm - will cook or son will cook  Snacks: sometimes HS snack, about 50% of the time  Beverages: Water, Tea, Milk, Juice, Soda    Being Active:  Being Active Assessed Today: Yes  Exercise:: Currently not exercising  Barrier to exercise: Physical limitation (back problems)    Monitoring:  Monitoring Assessed Today: Yes  Did patient bring glucose meter to appointment? : Yes  Blood Glucose Meter: CGM    Taking Medications:  Diabetes Medication(s)     Insulin       insulin  UNIT/ML injection    Inject 54 Units Subcutaneous every morning AND 30 Units every evening.    Incretin Mimetic Agents (GLP-1 Receptor Agonists)       dulaglutide (TRULICITY) 0.75 MG/0.5ML pen    Inject 0.75 mg Subcutaneous every 7 days          Taking Medication Assessed Today: Yes  Current Treatments: Insulin Injections  Dose schedule:  (NPH 9am and 9pm)  Problems taking diabetes medications regularly?: No    Problem Solving:  Is the patient at risk for hypoglycemia?: Yes  Hypoglycemia Frequency: Monthly  Hypoglycemia Treatment: Juice, Candy  Medical ID: No    Hypoglycemia symptoms  Dizziness or Light-Headedness: Yes  Hunger: No  Mood changes: Yes  Nervousness/Anxiety: Yes  Sleepiness: No  Sweats: Yes  Feeling shaky: Yes    Hypoglycemia  Complications  Blackouts: No  Hospitalization: No  Nocturnal hypoglycemia: Yes  Required assistance: Yes  Seizures: No    Reducing Risks:  Reducing Risks Assessed Today: Yes  Diabetes Risks: Age over 45 years, Sedentary Lifestyle  CAD Risks: Diabetes Mellitus, Family history, Obesity, Sedentary lifestyle  Has dilated eye exam at least once a year?: Yes  Sees dentist every 6 months?: No    Healthy Coping:  Healthy Coping Assessed Today: Yes  Emotional response to diabetes: Ready to learn  Informal Support system:: Family, Children  Stage of change: PREPARATION (Decided to change - considering how)  Patient Activation Measure Survey Score:  No flowsheet data found.    ASSESSMENT:  Pt seen today for f/u.   Saida uploaded: Avg a little higher than at our last visit at 180 now. Time in range down a little as well.   Pt is taking NPH 54 units in the morning and 34 units in the evening. Noted pt was to decrease to 30 units in the evening w/ last visit w/ MTM. Pt states she must've forgot because she reports taking 34 units. She has not had any low BG or symptoms of low BG but is running close to low sometimes in the mornings. Evenings still higher, as usual. Pt is not eating breakfast. She notes some decreased appetite as she reports taking a weight loss supplement but is still eating lunch and dinner normally.                             Patient's most recent   Lab Results   Component Value Date    A1C 8.7 02/14/2022    is not meeting goal of <8.0    PLAN  Will increase to 58 units in the morning and decrease to 32 units in the evening.   Will f/u with MTM in a couple weeks followed by PCP and with CDE on 6/9/22. Pt will call sooner w/ any concerns.     Time Spent: 30 minutes  Encounter Type: Individual    Any diabetes medication dose changes were made via the CDE Protocol and Collaborative Practice Agreement with the patient's referring provider. A copy of this encounter was shared with the provider.

## 2022-04-16 NOTE — TELEPHONE ENCOUNTER
"Routing refill request to provider for review/approval because:  Labs out of range:  Cr        Last Written Prescription Date: 3/21/22  Last Fill Quantity: 2mL, # refills: 0  Last office visit provider: 2/14/22        Requested Prescriptions   Pending Prescriptions Disp Refills     dulaglutide (TRULICITY) 0.75 MG/0.5ML pen 2 mL 0     Sig: Inject 0.75 mg Subcutaneous every 7 days       GLP-1 Agonists Protocol Failed - 4/13/2022  9:52 AM        Failed - Normal serum creatinine on file in past 12 months     Recent Labs   Lab Test 02/14/22  1430   CR 1.56*       Ok to refill medication if creatinine is low          Passed - HgbA1C in past 3 or 6 months     If HgbA1C is 8 or greater, it needs to be on file within the past 3 months.  If less than 8, must be on file within the past 6 months.     Recent Labs   Lab Test 02/14/22  1430   A1C 8.7*             Passed - Medication is active on med list        Passed - Patient is age 18 or older        Passed - No active pregnancy on record        Passed - No positive pregnancy test in past 12 months        Passed - Recent (6 mo) or future (30 days) visit within the authorizing provider's specialty     Patient had office visit in the last 6 months or has a visit in the next 30 days with authorizing provider.  See \"Patient Info\" tab in inbasket, or \"Choose Columns\" in Meds & Orders section of the refill encounter.                     Indira Pike 04/16/22 8:14 AM  "

## 2022-04-18 ENCOUNTER — HOSPITAL ENCOUNTER (OUTPATIENT)
Dept: PHYSICAL THERAPY | Facility: REHABILITATION | Age: 72
Discharge: HOME | End: 2022-04-18
Payer: COMMERCIAL

## 2022-04-18 DIAGNOSIS — M53.3 SI (SACROILIAC) JOINT DYSFUNCTION: ICD-10-CM

## 2022-04-18 DIAGNOSIS — M54.50 CHRONIC MIDLINE LOW BACK PAIN WITHOUT SCIATICA: Primary | ICD-10-CM

## 2022-04-18 DIAGNOSIS — G89.29 CHRONIC MIDLINE LOW BACK PAIN WITHOUT SCIATICA: Primary | ICD-10-CM

## 2022-04-18 DIAGNOSIS — M62.81 GENERALIZED MUSCLE WEAKNESS: ICD-10-CM

## 2022-04-18 DIAGNOSIS — M53.3 PAIN IN THE COCCYX: ICD-10-CM

## 2022-04-18 PROCEDURE — 97140 MANUAL THERAPY 1/> REGIONS: CPT | Mod: GP | Performed by: PHYSICAL THERAPIST

## 2022-04-18 PROCEDURE — 97110 THERAPEUTIC EXERCISES: CPT | Mod: GP | Performed by: PHYSICAL THERAPIST

## 2022-04-18 RX ORDER — DULAGLUTIDE 0.75 MG/.5ML
0.75 INJECTION, SOLUTION SUBCUTANEOUS
Qty: 6 ML | Refills: 0 | Status: SHIPPED | OUTPATIENT
Start: 2022-04-18 | End: 2022-06-13

## 2022-04-25 ENCOUNTER — HOSPITAL ENCOUNTER (OUTPATIENT)
Dept: PHYSICAL THERAPY | Facility: REHABILITATION | Age: 72
Discharge: HOME | End: 2022-04-25
Payer: COMMERCIAL

## 2022-04-25 DIAGNOSIS — M53.3 PAIN IN THE COCCYX: ICD-10-CM

## 2022-04-25 DIAGNOSIS — M54.50 CHRONIC MIDLINE LOW BACK PAIN WITHOUT SCIATICA: Primary | ICD-10-CM

## 2022-04-25 DIAGNOSIS — M62.81 GENERALIZED MUSCLE WEAKNESS: ICD-10-CM

## 2022-04-25 DIAGNOSIS — G89.29 CHRONIC MIDLINE LOW BACK PAIN WITHOUT SCIATICA: Primary | ICD-10-CM

## 2022-04-25 DIAGNOSIS — G89.4 CHRONIC PAIN SYNDROME: ICD-10-CM

## 2022-04-25 DIAGNOSIS — M53.3 SI (SACROILIAC) JOINT DYSFUNCTION: ICD-10-CM

## 2022-04-25 PROCEDURE — 97140 MANUAL THERAPY 1/> REGIONS: CPT | Mod: GP | Performed by: PHYSICAL THERAPIST

## 2022-04-25 PROCEDURE — 97110 THERAPEUTIC EXERCISES: CPT | Mod: GP | Performed by: PHYSICAL THERAPIST

## 2022-04-25 RX ORDER — OXYCODONE AND ACETAMINOPHEN 5; 325 MG/1; MG/1
1-2 TABLET ORAL EVERY 6 HOURS PRN
Qty: 180 TABLET | Refills: 0 | Status: SHIPPED | OUTPATIENT
Start: 2022-04-25 | End: 2022-05-02

## 2022-04-25 NOTE — TELEPHONE ENCOUNTER
Controlled Substance Refill Request for     oxyCODONE-acetaminophen (PERCOCET) 5-325 MG tablet    Last refill: 3/18/2022    Last clinic visit: 2/14/2022    Clinic visit frequency required:   Next appt: 5/18/2022        Patient is OUT 4/25/2022    Patient hoping to get this sent in ASAP Today.    Call back   [phone]

## 2022-04-26 ENCOUNTER — TRANSFERRED RECORDS (OUTPATIENT)
Dept: HEALTH INFORMATION MANAGEMENT | Facility: CLINIC | Age: 72
End: 2022-04-26
Payer: COMMERCIAL

## 2022-04-27 NOTE — TELEPHONE ENCOUNTER
Patient calling back to see if her medication refill has been sent in.     Could the covering provider please send this in?    Marichuy doesn't return till 4/28/2022 afternoon.    Patient is out completely     Patient crying over the phone stating she is in pain.

## 2022-05-01 ENCOUNTER — TRANSFERRED RECORDS (OUTPATIENT)
Dept: HEALTH INFORMATION MANAGEMENT | Facility: CLINIC | Age: 72
End: 2022-05-01

## 2022-05-01 LAB — RETINOPATHY: NORMAL

## 2022-05-02 RX ORDER — OXYCODONE AND ACETAMINOPHEN 5; 325 MG/1; MG/1
1-2 TABLET ORAL EVERY 6 HOURS PRN
Qty: 180 TABLET | Refills: 0 | Status: SHIPPED | OUTPATIENT
Start: 2022-05-02 | End: 2022-06-28

## 2022-05-02 NOTE — TELEPHONE ENCOUNTER
Patient calling to request that the below Rx be sent to a different [facility] pharmacy location.  Stating the one that it was sent to on 4/25 is not the [facility] she uses.  Should be sent instead to the [facility] pharmacy on [facility] in [place] (attached).  Patient asking if this can be done before Wednesday of this week.    Any questions, patient can be reached at [phone].

## 2022-05-04 ENCOUNTER — PATIENT OUTREACH (OUTPATIENT)
Dept: OTHER | Facility: CLINIC | Age: 72
End: 2022-05-04
Payer: COMMERCIAL

## 2022-05-09 ENCOUNTER — OFFICE VISIT (OUTPATIENT)
Dept: RHEUMATOLOGY | Facility: CLINIC | Age: 72
End: 2022-05-09
Payer: COMMERCIAL

## 2022-05-09 VITALS
HEART RATE: 72 BPM | DIASTOLIC BLOOD PRESSURE: 74 MMHG | SYSTOLIC BLOOD PRESSURE: 136 MMHG | BODY MASS INDEX: 30.68 KG/M2 | HEIGHT: 72 IN | WEIGHT: 226.5 LBS

## 2022-05-09 DIAGNOSIS — Z79.899 HIGH RISK MEDICATION USE: ICD-10-CM

## 2022-05-09 DIAGNOSIS — M05.9 SEROPOSITIVE RHEUMATOID ARTHRITIS (H): Primary | ICD-10-CM

## 2022-05-09 DIAGNOSIS — N28.9 RENAL INSUFFICIENCY: ICD-10-CM

## 2022-05-09 PROCEDURE — 99214 OFFICE O/P EST MOD 30 MIN: CPT | Performed by: INTERNAL MEDICINE

## 2022-05-09 ASSESSMENT — PAIN SCALES - GENERAL: PAINLEVEL: MODERATE PAIN (5)

## 2022-05-09 NOTE — PATIENT INSTRUCTIONS
Summary of Your Rheumatology Visit    Next Appointment:   4 Months    Medications:    Can also try over-the-counter diclofenac gel for left elbow pains, can apply up to 1-2 times a day, on a when necessary basis.  Please also follow directives on Diclofenac tube on how to utilize.       Referrals:      Tests:     Please have labs performed next week and in about 13-14 weeks.    Injections:      Other:    As discussed, recommend trying an over the counter armband for left elbow pains, they are to be worn with activities that reproduce or worsen elbow pains.

## 2022-05-09 NOTE — PROGRESS NOTES
Sobeida Colby who presents today with a chief complaint of  No chief complaint on file.      Joint Pains: no  Location: n/a  Onset: n/a  Intensity: 5 /10  AM Stiffness: 60 Minutes  Alleviating/Aggravating Factors: n/a  Tolerating Meds: yes  Other: rash around the injection site from Enbrel       ROS:  Patient denies having any chest pain, +shortness of breath (up the stairs)13, cough, abdominal pain, nausea, vomiting, +rashes, fevers, oral ulcers and recent infections.  Patient admits to having a good appetite      Problem List:  Patient Active Problem List   Diagnosis     S/P TKR (total knee replacement) using cement, left     Ataxia     Axonal neuropathy     Type 2 diabetes mellitus with hyperosmolarity without coma, with long-term current use of insulin (H)     Osteoarthrosis     Allergic rhinitis due to cats     Arthropathy of spinal facet joint concurrent with and due to effusion     HTN (hypertension)     Chronic back pain     Bilateral primary osteoarthritis of hip     Chronic pain     Controlled substance agreement signed, 2/14/22- Percocet     Depression     Chronic GERD     High risk medication use     HLD (hyperlipidemia)     Insomnia     Lumbar radiculopathy     Pain in joint, pelvic region and thigh     Positive FIT (fecal immunochemical test)     Prolonged QT interval     Radiculopathy of cervicothoracic region     Rheumatoid arthritis, involving unspecified site, unspecified whether rheumatoid factor present (H)     S/P insertion of spinal cord stimulator     Venous insufficiency     Stage 3b chronic kidney disease (H)     Syncope, unspecified syncope type     Generalized OA     Anxiety     Chronic low back pain with sciatica, sciatica laterality unspecified, unspecified back pain laterality     First degree heart block     Bradycardia        PMH:   Past Medical History:   Diagnosis Date     BBB (bundle branch block)      Chronic back pain      Chronic kidney disease     stage 3     Chronic kidney  disease, unspecified     Created by Conversion      Depression      Diabetes (H)     Type 2     Diabetes mellitus, type 2 (H)     Created by Conversion      Frequent headaches      Ganglion of tendon sheath     Created by Conversion      GERD (gastroesophageal reflux disease)      HLD (hyperlipidemia)      Hypertension     Per H&P dated 1/03/2013     Incarcerated ventral hernia     Per H&P dated 1/03/2013     Osteoarthritis      Rheumatoid arthritis (H)      Shortness of breath      Thrombocytopenia (H)      Venous insufficiency        Surgical History:  Past Surgical History:   Procedure Laterality Date     BACK SURGERY       C UNLISTED PROCEDURE, ABDOMEN/PERITONEUM/OMENTUM      Description: Hernia Repair;  Recorded: 10/23/2013;     CHOLECYSTECTOMY       HERNIA REPAIR       HYSTERECTOMY  1984     IR FINE NEEDLE ASPIRATION  11/29/2020     JOINT REPLACEMENT Left     knee     OTHER SURGICAL HISTORY  1990, 1995    Two left wrist     OTHER SURGICAL HISTORY  1996    FATTY TUMORLT SHOULDER BLADE     PICC MIDLINE INSERTION  11/30/2020          SC IMPLANT SPINAL NEUROSTIM/ Left 4/25/2019    Procedure: LEFT FLANK INCISION REPLACE NEUROSTIMULATOR;  Surgeon: Vishal Pretty MD;  Location: [facility];  Service: Pain     SPINAL CORD STIMULATOR IMPLANT         Family History:  Family History   Problem Relation Age of Onset     Myocardial Infarction Father      Cancer Father      Chronic Obstructive Pulmonary Disease Father      Heart Disease Father      Diabetes Sister      Diabetes Brother      Diabetes Maternal Grandmother      Melanoma Son      Arthritis Mother      Acute Myocardial Infarction Father 62.00     Diabetes Sister      Diabetes Brother      Melanoma Son      Kidney failure Son      Glomerulonephritis Son        Social History:   reports that she quit smoking about 13 years ago. She started smoking about 57 years ago. She has a 43.00 pack-year smoking history. She has never used smokeless  tobacco. She reports current alcohol use. She reports that she does not use drugs.    Allergies:  Allergies   Allergen Reactions     Morphine Nausea and Vomiting     Penicillins Hives     Tolerates ceftriaxone     Sulfa (Sulfonamide Antibiotics) [Sulfa Drugs] Hives        Current Medications:  Current Outpatient Medications   Medication Sig Dispense Refill     alcohol swab prep pads Use to swab area of injection/jacquelyn as directed. 100 each 3     amLODIPine (NORVASC) 10 MG tablet [AMLODIPINE (NORVASC) 10 MG TABLET] TAKE 1 TABLET EVERY DAY 90 tablet 2     ascorbic acid (ASCORBIC ACID WITH LATRICIA HIPS) 500 MG tablet Take 500 mg by mouth daily        atorvastatin (LIPITOR) 40 MG tablet Take 1 tablet (40 mg) by mouth At Bedtime 30 tablet 1     b complex vitamins tablet Take 1 tablet by mouth daily        Continuous Blood Gluc Sensor (FREESTYLE BRE 14 DAY SENSOR) MISC Change every 14 days. Use daily to scan blood sugars, use per manufactures guidelines. Pt already has reader. 2 each 11     dulaglutide (TRULICITY) 0.75 MG/0.5ML pen Inject 0.75 mg Subcutaneous every 7 days 6 mL 0     DULoxetine (CYMBALTA) 60 MG capsule Take 1 capsule (60 mg) by mouth daily 30 capsule 1     etanercept (ENBREL) 50 MG/ML injection Inject 1 mL (50 mg) Subcutaneous once a week Hold for signs of infection, and seek medical attention. 4 mL 3     fish oil-omega-3 fatty acids 1000 MG capsule Take 1 g by mouth daily       flash glucose scanning reader (FREESTYLE BRE 14 DAY READER) Misc [FLASH GLUCOSE SCANNING READER (FREESTYLE BRE 14 DAY READER) MISC] Use 1 each As Directed see administration instructions. Use as directed with sensor 1 each 0     flash glucose sensor (FREESTYLE BRE 14 DAY SENSOR) Kit [FLASH GLUCOSE SENSOR (FREESTYLE BRE 14 DAY SENSOR) KIT] Use 1 each As Directed every 14 (fourteen) days. 2 kit 11     folic acid (FOLVITE) 1 MG tablet Take one tab daily, except the day when taking methotrexate. 90 tablet 1     insulin   UNIT/ML injection Inject 54 Units Subcutaneous every morning AND 30 Units every evening. 15 mL 3     insulin syringe-needle U-100 (BD INSULIN SYRINGE ULT-FINE II) 1 mL 31 gauge x 5/16 Syrg [INSULIN SYRINGE-NEEDLE U-100 (BD INSULIN SYRINGE ULT-FINE II) 1 ML 31 GAUGE X 5/16 SYRG] USE 4 TIMES DAILY AS DIRECTED 500 each 1     lancing device (ACCU-CHEK SOFTCLIX) Misc [LANCING DEVICE (ACCU-CHEK SOFTCLIX) MISC] Use 1 applicator As Directed 4 (four) times a day. 300 each 3     lisinopril (ZESTRIL) 10 MG tablet Take 10 mg by mouth daily       loratadine (CLARITIN) 10 MG tablet Take 1 tablet (10 mg) by mouth At Bedtime 90 tablet 3     methotrexate 2.5 MG tablet Take 3 tablets (7.5 mg) by mouth once a week 36 tablet 0     metoprolol succinate ER (TOPROL-XL) 50 MG 24 hr tablet [METOPROLOL SUCCINATE (TOPROL-XL) 50 MG 24 HR TABLET] TAKE 1 TABLET EVERY DAY 90 tablet 2     MULTIVITAMIN ORAL Take 1 tablet by mouth daily        nystatin (MYCOSTATIN) 955970 UNIT/GM external cream Apply topically 2 times daily 30 g 1     oxyCODONE-acetaminophen (PERCOCET) 5-325 MG tablet Take 1-2 tablets by mouth every 6 hours as needed for severe pain (Max of 6 tablets daily) 180 tablet 0     thin (NO BRAND SPECIFIED) lancets Use with lanceting device. To accompany: Blood Glucose Monitor Brands: per insurance. 100 each 11     traZODone (DESYREL) 150 MG tablet Take 1 tablet (150 mg) by mouth At Bedtime 90 tablet 1     Vitamin D3 (VITAMIN D3) 25 mcg (1000 units) tablet Take 1,000 Units by mouth daily              Physical Exam:  There were no vitals taken for this visit.  General: A & O x 3 in NAD  HEENT: EOMI, Non injected/non icteric sclera, no oral lesions noted  CV: s1s2 with RRR, no rubs appreciated   Lungs: CTA B/L, no wheezing , rales or rhonci appreciated  GI: Soft, NT/ND, no rebound, no guarding noted  MS: Mild discomfort over left medial epicondyle on palpation.  No clear signs of synovitis noted over left elbow joint.  Passive  flexion/extension of left elbow reproduce some mild discomfort over medial epicondyle region.  Otherwise patient demonstrated good passive/active ROM over other joints with no warmth, erythema, tenderness or synovitis noted over these joints.    Summary/Assessment:      History that includes seropositive rheumatoid arthritis, osteoarthritis, renal insufficiency.    Presents for follow-up visit.      States adding Enbrel about 6-7 weeks ago has been helpful, experiencing less joint pains.    Claims compliance with methotrexate 3 tablets weekly (limited dose due to renal insufficiency and thrombocytopenia)    Has some mild discomfort involving left medial elbow.  May have component of medial epicondylitis, no clear signs of synovitis noted involving left elbow joint.    Please see below for management plan.      Pertinent rheumatology/past medical history (please refer to above for more detailed history):      Seropositive rheumatoid arthritis    High risk medication use    Osteoarthritis, multiple joints    Hx left knee replacement    Chronic right knee pain (cortisone injection not helpful)    Chronic neck and back pain (managed by another provider)    Renal insufficiency (established with nephrology)    Chronic opioid use    Diabetes, type II    History of urosepsis    Osteopenia (with nonhigh risk FRAX score).    Chronic low-dose steroid use      Rheumatology medications provided/suggested:    Methotrexate  Folic acid  Enbrel      Pertinent medication from other providers or from otc (please refer to above for more detailed med list):    Percocet  Insulin  Calcium  Vitamin D    Pertinent medications already tried:     Humira (worsened joint pains)  Leflunomide (cost prohibitive)  Hydroxychloroquine (ineffective and costly)      Pertinent lab history:    Positive: CCP antibody    Negative: Rheumatoid factor, HUGH, c- ANCA      Pertinent imaging/test history:    X-ray of right hand from February 2019 that showed some  signs of degenerative joint disease, no erosive changes identified.    9/2020, bone density test showed osteopenia with non-high risk FRAX score.      Other:    Standing order for labs placed every 3 months good through April 2020    Denies tobacco use or regular alcohol beverage intake.  Quit smoking greater than 10 years ago.    Was a patient of Dr. Schofield, last seen July 2018.    Denies any history of macular degeneration, retinal disease or glaucoma.      Plan:      Continue methotrexate 3 tablets weekly (was on 5 tablets weekly, limiting dose given mild renal insufficiency also has thrombocytopenia) .  Hold if develops any infection.     Continue folic acid 1 mg daily, except the day when taking methotrexate.    Continue Enbrel.  Regarding mild erythematous injection site reaction post Enbrel injection, recommend alternating sites.  If experiences persistent reaction  or if pruritic, can try 25 mg of Benadryl about half an hour prior to injection.  Made aware that Benadryl can be sedating, should avoid driving when taking.    Continue calcium and vitamin D daily.  On prior visit suggested 600 mg of calcium daily either via diet or supplements.  Was taking vitamin D 125 mcg equivalent of 5000 units daily.     On a future visit we will consider repeating bone density test (last checked 9/17/2020), currently off of steroids.    Takes Percocet 6-8 tablets daily, established with pain clinic.    For left medial elbow pain likely secondary to medial epicondylitis, suggested she try diclofenac gel 1-2 times a day.  Can also try utilizing armband (displaced on [employer] website and also instructed patient in clinic on how to utilize)    Recheck labs next week and every 12 weeks thereafter.      Follow-up in 4 months, preferably in clinic.        This note was transcribed using Dragon voice recognition software as a result unintentional grammatical errors or word substitutions may have occurred. Please contact our  Rheumatology department if you need any clarification or if you have any related inquiries.    Major side effect profile of medications provided were discussed with the patient.      Raheem Londono DO  ...................  5/9/2022   2:03 PM

## 2022-05-12 PROBLEM — N18.32 STAGE 3B CHRONIC KIDNEY DISEASE (H): Status: ACTIVE | Noted: 2021-09-17

## 2022-05-18 ENCOUNTER — LAB (OUTPATIENT)
Dept: LAB | Facility: CLINIC | Age: 72
End: 2022-05-18
Payer: COMMERCIAL

## 2022-05-18 DIAGNOSIS — G89.4 CHRONIC PAIN SYNDROME: ICD-10-CM

## 2022-05-18 DIAGNOSIS — Z79.4 TYPE 2 DIABETES MELLITUS WITH HYPEROSMOLARITY WITHOUT COMA, WITH LONG-TERM CURRENT USE OF INSULIN (H): ICD-10-CM

## 2022-05-18 DIAGNOSIS — Z79.899 HIGH RISK MEDICATION USE: ICD-10-CM

## 2022-05-18 DIAGNOSIS — E11.00 TYPE 2 DIABETES MELLITUS WITH HYPEROSMOLARITY WITHOUT COMA, WITH LONG-TERM CURRENT USE OF INSULIN (H): ICD-10-CM

## 2022-05-18 LAB
ALBUMIN SERPL-MCNC: 3.5 G/DL (ref 3.5–5)
ALT SERPL W P-5'-P-CCNC: 15 U/L (ref 0–45)
AST SERPL W P-5'-P-CCNC: 14 U/L (ref 0–40)
BASOPHILS # BLD AUTO: 0 10E3/UL (ref 0–0.2)
BASOPHILS NFR BLD AUTO: 1 %
CREAT SERPL-MCNC: 1.56 MG/DL (ref 0.6–1.1)
EOSINOPHIL # BLD AUTO: 0.2 10E3/UL (ref 0–0.7)
EOSINOPHIL NFR BLD AUTO: 3 %
ERYTHROCYTE [DISTWIDTH] IN BLOOD BY AUTOMATED COUNT: 13 % (ref 10–15)
GFR SERPL CREATININE-BSD FRML MDRD: 35 ML/MIN/1.73M2
HBA1C MFR BLD: 7.6 % (ref 0–5.6)
HCT VFR BLD AUTO: 40.6 % (ref 35–47)
HGB BLD-MCNC: 13.7 G/DL (ref 11.7–15.7)
IMM GRANULOCYTES # BLD: 0 10E3/UL
IMM GRANULOCYTES NFR BLD: 0 %
LYMPHOCYTES # BLD AUTO: 2.8 10E3/UL (ref 0.8–5.3)
LYMPHOCYTES NFR BLD AUTO: 33 %
MCH RBC QN AUTO: 30.8 PG (ref 26.5–33)
MCHC RBC AUTO-ENTMCNC: 33.7 G/DL (ref 31.5–36.5)
MCV RBC AUTO: 91 FL (ref 78–100)
MONOCYTES # BLD AUTO: 0.6 10E3/UL (ref 0–1.3)
MONOCYTES NFR BLD AUTO: 7 %
NEUTROPHILS # BLD AUTO: 4.7 10E3/UL (ref 1.6–8.3)
NEUTROPHILS NFR BLD AUTO: 56 %
PLATELET # BLD AUTO: 146 10E3/UL (ref 150–450)
RBC # BLD AUTO: 4.45 10E6/UL (ref 3.8–5.2)
WBC # BLD AUTO: 8.4 10E3/UL (ref 4–11)

## 2022-05-18 PROCEDURE — 82040 ASSAY OF SERUM ALBUMIN: CPT

## 2022-05-18 PROCEDURE — 83036 HEMOGLOBIN GLYCOSYLATED A1C: CPT

## 2022-05-18 PROCEDURE — 36415 COLL VENOUS BLD VENIPUNCTURE: CPT

## 2022-05-18 PROCEDURE — 82565 ASSAY OF CREATININE: CPT

## 2022-05-18 PROCEDURE — 84460 ALANINE AMINO (ALT) (SGPT): CPT

## 2022-05-18 PROCEDURE — 84450 TRANSFERASE (AST) (SGOT): CPT

## 2022-05-18 PROCEDURE — 85025 COMPLETE CBC W/AUTO DIFF WBC: CPT

## 2022-05-18 RX ORDER — DULOXETIN HYDROCHLORIDE 60 MG/1
60 CAPSULE, DELAYED RELEASE ORAL DAILY
Qty: 90 CAPSULE | Refills: 1 | Status: SHIPPED | OUTPATIENT
Start: 2022-05-18 | End: 2022-05-24

## 2022-05-18 NOTE — TELEPHONE ENCOUNTER
Call pt- Trulicity is the new medication for diabetes that we had discussed at her March appointment because her blood sugars have been too high and this medication can help with both weight loss and diabetes. I presume she has not started it yet. I do recommend that she starts the medication OR pick it up from the pharmacy and bring to the next office visit and we can demonstrate how to do the injection while she is in the clinic.     Duloxetine refilled.

## 2022-05-20 ENCOUNTER — OFFICE VISIT (OUTPATIENT)
Dept: PALLIATIVE MEDICINE | Facility: OTHER | Age: 72
End: 2022-05-20
Payer: COMMERCIAL

## 2022-05-20 VITALS
DIASTOLIC BLOOD PRESSURE: 43 MMHG | WEIGHT: 226 LBS | OXYGEN SATURATION: 98 % | SYSTOLIC BLOOD PRESSURE: 149 MMHG | HEART RATE: 70 BPM | BODY MASS INDEX: 30.65 KG/M2

## 2022-05-20 DIAGNOSIS — G89.29 CHRONIC MIDLINE LOW BACK PAIN WITHOUT SCIATICA: ICD-10-CM

## 2022-05-20 DIAGNOSIS — Z79.891 USE OF OPIATES FOR THERAPEUTIC PURPOSES: Primary | ICD-10-CM

## 2022-05-20 DIAGNOSIS — M54.50 CHRONIC MIDLINE LOW BACK PAIN WITHOUT SCIATICA: ICD-10-CM

## 2022-05-20 DIAGNOSIS — M25.551 CHRONIC RIGHT HIP PAIN: ICD-10-CM

## 2022-05-20 DIAGNOSIS — G89.29 CHRONIC RIGHT HIP PAIN: ICD-10-CM

## 2022-05-20 DIAGNOSIS — M06.09 RHEUMATOID ARTHRITIS OF MULTIPLE SITES WITHOUT RHEUMATOID FACTOR (H): ICD-10-CM

## 2022-05-20 LAB
CANNABINOIDS UR QL SCN: NORMAL
CREAT UR-MCNC: 157 MG/DL
CREAT UR-MCNC: 157 MG/DL

## 2022-05-20 PROCEDURE — 80307 DRUG TEST PRSMV CHEM ANLYZR: CPT | Performed by: NURSE PRACTITIONER

## 2022-05-20 PROCEDURE — 99214 OFFICE O/P EST MOD 30 MIN: CPT | Performed by: NURSE PRACTITIONER

## 2022-05-20 ASSESSMENT — PAIN SCALES - GENERAL: PAINLEVEL: EXTREME PAIN (8)

## 2022-05-20 NOTE — LETTER
5/20/2022         RE: Ebonie Bowman  [address]  [address]  [place] [address]        Dear Colleague,    Thank you for referring your patient, Ebonie Bowman, to the [facility]. Please see a copy of my visit note below.    Ebonie Bowman is a 72 year old female     This patient is being seen for transfer of care from previous pain center medical provider who is no longer with [facility] for evaluation of pain issues and recommendations for management.    Current controlled substance medications are being prescribed by [facility] providers.    CHIEF COMPLAINT:  Chronic pain     Last visit with previous pain provider:   Provider: SHY Samano  Date: 10/11/21  Plan:    You may also opt to communicate through [facility].  Rehabilitation: Remain active continue your home exercises and stretches.  Specialists: When you talk with your social work lets see if you can determine if you have access to medical rides.  Then you can use your metro mobility money for other activities. Also lets see if she can assist with getting rides set up for your visits.    MTM: we discussed having you work with a pharmacist to aid with medication set up so you do not run out of the oxycodone your primary doctor has been providing.     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 Did PT, pain slowly progressed. First back surgery was late 80s and 2nd surgery was in 1990. Has done PT, injections, TENS unit.   RA: unsure when she was  diagnosed. Started on Methocarbamol, steroid injections in both knees. Left knee replaced. Has tried several meds over the years. Started Enbrel 1 month ago  Pain quality: Aching and Stabbing, throbbing   Pain timing: Constant    Pain score today: Extreme Pain (8)   Pain rating: intensity ranges from 5/10 to 8/10, and averages 5/10 on a 0-10 scale.  Aggravating factors include: standing, walking, sitting, staying in any position too long, getting out of bed in the morning   Relieving factors include: Percocet, tries to do a little HEP, not much      Past Pain Treatments:   Pain Clinic:  Did an intensive out patient pain program for two weeks. [facility]  Physical therapy: Yes  Made the pain worse  Psychologist: Yes [facility]  Chiropractor: Yes:NH  Acupuncture: Yes NH  Pharmacotherapy:    Opioids: Yes     Non-opioids:  Yes  TENs Unit/electric stim: NH  Injections/clinic procedures:  Surgeries related to pain: Yes   Left knee replaced:   Two lumbar surgeries. 1980 & 1990  Spinal cord stimulator: HI, don't     Annual Requirements last collected: May 20, 2022     Current Pain Relevant Medications:    Duloxetine 60 mg  Methotrexate 7.5 mg weekly   Trazodone 150 mg at HS     Current Controlled Substance Medications:   Percocet 5/325 mg 6 tabs = 45 MME/day    Previous Pain Relevant Medications: (H--helped; HI--Helped initially; [facility]--Somewhat helpful; NH--No help; W--worse; SE--side effects; ?--Unsure if helpful)   NOTE: This medication information taken from patient's intake form, not medical records.   Opiates: Oxycodone: H  NSAIDS:   Migraine medications:   Muscle Relaxants:   Neuropathics:  Anti-depressants for pain:    Anxiety medications:    Topicals:   OTC medications:   Sleep Medications:  Other medications not covered above:     Hx  or current illegal drug use: denies   Hx or current ETOH use: denies   Nicotine/tobacco use: denies   Daily Caffeine intake:daily     THE 4 As OF OPIOID MAINTENANCE ANALGESIA    Analgesia: Is pain  relief clinically significant? YES   Activity: Is patient functional and able to perform Activities of Daily Living? NO   Adverse effects: Is patient free from adverse side effects from opiates? YES   Adherence to Rx protocol: Is patient adhering to Controlled Substance Agreement and taking medications ONLY as ordered? YES    Is Narcan prescribed for opiate use >50 MME daily or concurrent use of opiates and benzodiazepines? N/A    [place] Board of Pharmacy Data Base Reviewed:    YES; As expected, no concern for misuse/abuse of controlled medications based on this report. Reviewed [facility] May 20, 2022- no concerning fills.      CURRENT FAMILY/SOCIAL SITUATION:  Past/Present occupation:   Housing status:   Emotional/Physical support:   Safety Concerns:   Current stressors:       PHYSICAL EXAM    BP (!) 149/43   Pulse 70   Wt 102.5 kg (226 lb)   SpO2 98%   BMI 30.65 kg/m       Appearance:   A&O. Patient is appropriate.   Patient is in NAD.      DIRE Score for ongoing opioid management is calculated as follows:    Diagnosis = 2 pts (slowly progressive; moderate pain/objective findings)    Intractability = 2 pts (most treatments tried; patient not fully engaged/barriers)    Risk        Psych = 3 pts (no significant personality dysfunction/mental illness; good communication with clinic)         Chem Hlth = 3 pts (no history of chemical dependency; not drug-focused)       Reliability = 2 pts (occasional difficulties with compliance; generally reliable)17% NS rate        Social = 2 pts (reduction in some relationships/life rolls)       (Psych + Chem hlth + Reliability + Social) = 14    Efficacy = 1 pt (poor function; minimal pain relief despite mod/high med dose)    DIRE Score = 15        7-13: likely NOT suitable candidate for long-term opioid analgesia       14-21: may be a suitable candidate for long-term opioid analgesia    DIAGNOSTIC RESULTS:    1/22/21: MR CERVICAL SPINE WO CONTRAST  IMPRESSION:  1.  No  high-grade spinal canal stenosis.  2.  Unchanged at least moderate left C3-C4, bilateral C4-C5 and left C5-C6 neural foraminal stenoses.  3.  The patient declined STIR images and axial images.    PAIN RELAVENT CONDITIONS:   1.  Chronic neck pain  2.  Rheumatoid Arthritis  3.  Chronic low back pain       Hypertension: Ebonie to follow up with Primary Care provider regarding elevated blood pressure.     ASSESSMENT AND PLAN:    (Z79.511) Use of opiates for therapeutic purposes   Comment: Annual requirements   Plan: Drug Confirmation Panel Urine with Creat, Ethyl        Glucuronide Screen with Reflex to Confirmation,        Urine    (M54.50,  G89.29) Chronic midline low back pain without sciatica  (M25.551,  G89.29) Chronic right hip pain  (M06.09) Rheumatoid arthritis of multiple sites without rheumatoid factor (H)  Comment: Continue current plan of care.   Plan: Will review treatment options and consider medication options at the next appt.     PATIENT INSTRUCTIONS:     Diagnosis reviewed, treatment option addressed, and risk/benefits discussed.  Self-care instructions given.  I am recommending a multidisciplinary treatment plan to help this patient better manage pain.    Remember to request ALL medication refills 5 days before you run out.       1. 60 min Clinic follow-up with ANUPAM Zavala NP-C in the next 2 months  2. Labs: Annual drug screen   3. Other: Set up meeting [facility] on same day as you see Akila Napoles  4. Medication Management : Have Marichuy continue meds until our next appt.     I have reviewed the note as documented above.  This accurately captures the substance of my conversation with the patient.  A total of 37 minutes of preparation, care, and consultation were spent on this visit today.     ANUPAM Rodriguez NP-C  [facility]     (Information in italics and blue color are taken from previous pain medical providers notes)         Again, thank you for  allowing me to participate in the care of your patient.        Sincerely,        ANUPAM Garcia CNP

## 2022-05-20 NOTE — NURSING NOTE
Patient presents to the clinic today for a follow up with ANUPAM Garcia CNP regarding Pain Management.      PEG Score 12/23/2021 5/20/2022   PEG Total Score 4.67 6.67           UDS/CSA-05/20/2022      Medications-        QUESTIONS:            Libra WOOD [facility] Patient Facilitator

## 2022-05-20 NOTE — PATIENT INSTRUCTIONS
After Visit Instructions:     Thank you for coming to [facility] for your care. It is my goal to partner with you to help you reach your optimal state of health.   Continue daily self-care, identifying contributing factors, and monitoring variations in pain level. Continue to integrate self-care into your life.        60 min Clinic follow-up with ANUPAM Zavala NP-C in the next 2 months  Labs: Annual drug screen   Other: Set up meeting [facility] on same day as you see Akila Napoles, after you see Akila.   Medication Management : Have Marichuy continue meds until our next appt.       ANUPAM Rodriguez NP-C  [facility]  [facility] - Monday and Friday  [facility]) - Tuesday  [place] - Thursday    Be sure to request ALL medication refills 5 days prior to the due date unless you will see your medical provider in an appointment before the due date.  This is YOUR responsibility. Do not expect same day refills. If you do not plan ahead you may run out of medications.     NO early refills are allowed. It is your responsibility to manage your medications responsibility and keep them safely stored. Lost or destroyed medications WILL NOT be replaced    [facility] Scheduling/Clinic Telephone Number:  [phone]    [place] Scheduling/Clinic Telephone Number: [phone]  After Hours On-Call Service:  [phone]    Call with any questions about your care and for scheduling assistance.   Calls are returned Monday through Friday between 8 AM and 4:00 PM. We usually get back to you within 2 business days depending on the issue/request.    If we are prescribing your medications:  For opioid medication refills, call the clinic or send a [place] message 7 days in advance.  Please include:  Your name and date of birth.   Name of requested medication  Name of the pharmacy.  For non-opioid medications, call your pharmacy directly to request a refill. Please  allow 3-4 days to be processed.   Per [place] State Law:  All controlled substance prescriptions must be filled within 30 days of being written.    For those controlled substances allowing refills, pickup must occur within 30 days of last fill.      We believe regular attendance is key to your success in our program!    Any time you are unable to keep your appointment we ask that you call us at least 24 hours in advance to cancel.This will allow us to offer the appointment time to another patient.   Multiple missed appointments may lead to dismissal from the clinic.

## 2022-05-20 NOTE — PROGRESS NOTES
Ebonie Bowman is a 72 year old female     This patient is being seen for transfer of care from previous pain center medical provider who is no longer with [facility] for evaluation of pain issues and recommendations for management.    Current controlled substance medications are being prescribed by [facility] providers.    CHIEF COMPLAINT:  Chronic pain     Last visit with previous pain provider:   Provider: SHY Samano  Date: 10/11/21  Plan:    You may also opt to communicate through [employer].  Rehabilitation: Remain active continue your home exercises and stretches.  Specialists: When you talk with your social work lets see if you can determine if you have access to medical rides.  Then you can use your metro mobility money for other activities. Also lets see if she can assist with getting rides set up for your visits.    MTM: we discussed having you work with a pharmacist to aid with medication set up so you do not run out of the oxycodone your primary doctor has been providing.     Primary Care Provider is Marichuy Rubio    Please see the 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 Did PT, pain slowly progressed. First back surgery was late 80s and 2nd surgery was in 1990. Has done PT, injections, TENS unit.   RA: unsure when she was diagnosed. Started on Methocarbamol, steroid injections in both knees. Left knee replaced. Has tried several meds over the years. Started Enbrel 1 month ago  Pain quality: Aching and Stabbing, throbbing   Pain timing: Constant    Pain score today:  Extreme Pain (8)   Pain rating: intensity ranges from 5/10 to 8/10, and averages 5/10 on a 0-10 scale.  Aggravating factors include: standing, walking, sitting, staying in any position too long, getting out of bed in the morning   Relieving factors include: Percocet, tries to do a little HEP, not much      Past Pain Treatments:   Pain Clinic:  Did an intensive out patient pain program for two weeks. [facility]  Physical therapy: Yes  Made the pain worse  Psychologist: Yes [facility]  Chiropractor: Yes:[facility]  Acupuncture: Yes [facility]  Pharmacotherapy:    Opioids: Yes     Non-opioids:  Yes  TENs Unit/electric stim: NH  Injections/clinic procedures:  Surgeries related to pain: Yes   Left knee replaced:   Two lumbar surgeries. 1980 & 1990  Spinal cord stimulator: HI, don't     Annual Requirements last collected: May 20, 2022     Current Pain Relevant Medications:    Duloxetine 60 mg  Methotrexate 7.5 mg weekly   Trazodone 150 mg at HS     Current Controlled Substance Medications:   Percocet 5/325 mg 6 tabs = 45 MME/day    Previous Pain Relevant Medications: (H--helped; HI--Helped initially; [facility]--Somewhat helpful; NH--No help; W--worse; SE--side effects; ?--Unsure if helpful)   NOTE: This medication information taken from patient's intake form, not medical records.   Opiates: Oxycodone: H  NSAIDS:   Migraine medications:   Muscle Relaxants:   Neuropathics:  Anti-depressants for pain:    Anxiety medications:    Topicals:   OTC medications:   Sleep Medications:  Other medications not covered above:     Hx  or current illegal drug use: denies   Hx or current ETOH use: denies   Nicotine/tobacco use: denies   Daily Caffeine intake:daily     THE 4 As OF OPIOID MAINTENANCE ANALGESIA    Analgesia: Is pain relief clinically significant? YES   Activity: Is patient functional and able to perform Activities of Daily Living? NO   Adverse effects: Is patient free from adverse side effects from opiates? YES   Adherence to Rx protocol: Is patient adhering to  Controlled Substance Agreement and taking medications ONLY as ordered? YES    Is Narcan prescribed for opiate use >50 MME daily or concurrent use of opiates and benzodiazepines? N/A    [place] Board of Pharmacy Data Base Reviewed:    YES; As expected, no concern for misuse/abuse of controlled medications based on this report. Reviewed [facility] May 20, 2022- no concerning fills.      CURRENT FAMILY/SOCIAL SITUATION:  Past/Present occupation:   Housing status:   Emotional/Physical support:   Safety Concerns:   Current stressors:       PHYSICAL EXAM    BP (!) 149/43   Pulse 70   Wt 102.5 kg (226 lb)   SpO2 98%   BMI 30.65 kg/m       Appearance:   A&O. Patient is appropriate.   Patient is in NAD.      DIRE Score for ongoing opioid management is calculated as follows:    Diagnosis = 2 pts (slowly progressive; moderate pain/objective findings)    Intractability = 2 pts (most treatments tried; patient not fully engaged/barriers)    Risk        Psych = 3 pts (no significant personality dysfunction/mental illness; good communication with clinic)         Chem Hlth = 3 pts (no history of chemical dependency; not drug-focused)       Reliability = 2 pts (occasional difficulties with compliance; generally reliable)17% NS rate        Social = 2 pts (reduction in some relationships/life rolls)       (Psych + Chem hlth + Reliability + Social) = 14    Efficacy = 1 pt (poor function; minimal pain relief despite mod/high med dose)    DIRE Score = 15        7-13: likely NOT suitable candidate for long-term opioid analgesia       14-21: may be a suitable candidate for long-term opioid analgesia    DIAGNOSTIC RESULTS:    1/22/21: MR CERVICAL SPINE WO CONTRAST  IMPRESSION:  1.  No high-grade spinal canal stenosis.  2.  Unchanged at least moderate left C3-C4, bilateral C4-C5 and left C5-C6 neural foraminal stenoses.  3.  The patient declined STIR images and axial images.    PAIN RELAVENT CONDITIONS:   1.  Chronic neck pain  2.   Rheumatoid Arthritis  3.  Chronic low back pain       Hypertension: Ebonie to follow up with Primary Care provider regarding elevated blood pressure.     ASSESSMENT AND PLAN:    (Z79.282) Use of opiates for therapeutic purposes   Comment: Annual requirements   Plan: Drug Confirmation Panel Urine with Creat, Ethyl        Glucuronide Screen with Reflex to Confirmation,        Urine    (M54.50,  G89.29) Chronic midline low back pain without sciatica  (M25.551,  G89.29) Chronic right hip pain  (M06.09) Rheumatoid arthritis of multiple sites without rheumatoid factor (H)  Comment: Continue current plan of care.   Plan: Will review treatment options and consider medication options at the next appt.     PATIENT INSTRUCTIONS:     Diagnosis reviewed, treatment option addressed, and risk/benefits discussed.  Self-care instructions given.  I am recommending a multidisciplinary treatment plan to help this patient better manage pain.    Remember to request ALL medication refills 5 days before you run out.       1. 60 min Clinic follow-up with ANUPAM Zavala NP-C in the next 2 months  2. Labs: Annual drug screen   3. Other: Set up meeting [facility] on same day as you see Akila Napoles  4. Medication Management : Have Marichuy continue meds until our next appt.     I have reviewed the note as documented above.  This accurately captures the substance of my conversation with the patient.  A total of 37 minutes of preparation, care, and consultation were spent on this visit today.     ANUPAM Rodriguez, NPEnochC  [facility]     (Information in italics and blue color are taken from previous pain medical providers notes)

## 2022-05-22 LAB — ETHYL GLUCURONIDE UR QL SCN: NEGATIVE NG/ML

## 2022-05-23 DIAGNOSIS — G89.4 CHRONIC PAIN SYNDROME: ICD-10-CM

## 2022-05-23 NOTE — TELEPHONE ENCOUNTER
LMTCB 2nd attempt   Please relay below message if call is returned   Parag Landry CMA on 5/23/2022 at 5:10 PM

## 2022-05-24 LAB
OXYCODONE UR CFM-MCNC: 4020 NG/ML
OXYCODONE/CREAT UR: 2561 NG/MG {CREAT}
OXYMORPHONE UR CFM-MCNC: 800 NG/ML
OXYMORPHONE/CREAT UR: 510 NG/MG {CREAT}

## 2022-05-24 RX ORDER — DULOXETIN HYDROCHLORIDE 60 MG/1
CAPSULE, DELAYED RELEASE ORAL
Qty: 90 CAPSULE | Refills: 1 | Status: SHIPPED | OUTPATIENT
Start: 2022-05-24 | End: 2022-12-08

## 2022-05-27 NOTE — TELEPHONE ENCOUNTER
Spoke with patient and relayed below message. She verbalized understanding and had no further questions.

## 2022-06-02 DIAGNOSIS — I10 PRIMARY HYPERTENSION: Primary | ICD-10-CM

## 2022-06-03 NOTE — TELEPHONE ENCOUNTER
"Outpatient Medication Detail     Disp Refills Start End JULIETH   metoprolol tartrate (LOPRESSOR) 25 MG tablet 90 tablet 3 4/13/2021  No   Sig: TAKE 1 TABLET  BY MOUTH DAILY   Sent to pharmacy as: metoprolol tartrate 25 mg tablet (LOPRESSOR)   E-Prescribing Status: Receipt confirmed by pharmacy (4/13/2021 12:45 PM CDT)       metoprolol tartrate (LOPRESSOR) 25 MG tablet [[id number]]    Electronically signed by: Monroe Hodges MD on 04/13/21 1244 Status: Active   Ordering user: Monroe Hodges MD 04/13/21 1244 Authorized by: Monroe Hodges MD   Frequency:  04/13/21 - Until Discontinued Released by: Ebonie Tiwari LPN 04/13/21 1244   Diagnoses  Essential hypertension [I10]     Routing refill request to provider for review/approval because:  Labs out of range:  Creatinine  A break in medication  BP not in range.     Last office visit provider:  2/14/22     Requested Prescriptions   Pending Prescriptions Disp Refills     lisinopril (ZESTRIL) 10 MG tablet [Pharmacy Med Name: Lisinopril 10 MG Oral Tablet] 90 tablet 0     Sig: TAKE 1 TABLET BY MOUTH AT BEDTIME       ACE Inhibitors (Including Combos) Protocol Failed - 6/3/2022  3:28 PM        Failed - Blood pressure under 140/90 in past 12 months     BP Readings from Last 3 Encounters:   05/20/22 (!) 149/43   05/09/22 136/74   03/23/22 136/73                 Failed - Normal serum creatinine on file in past 12 months     Recent Labs   Lab Test 05/18/22  1336   CR 1.56*       Ok to refill medication if creatinine is low          Passed - Recent (12 mo) or future (30 days) visit within the authorizing provider's specialty     Patient has had an office visit with the authorizing provider or a provider within the authorizing providers department within the previous 12 mos or has a future within next 30 days. See \"Patient Info\" tab in inbasket, or \"Choose Columns\" in Meds & Orders section of the refill encounter.              Passed - Medication is active on med " list        Passed - Patient is age 18 or older        Passed - No active pregnancy on record        Passed - Normal serum potassium on file in past 12 months     Recent Labs   Lab Test 10/29/21  0704 10/26/21  0031 10/12/21  1200   POTASSIUM 3.8   < >  --    41027  --   --  4.5    < > = values in this interval not displayed.             Passed - No positive pregnancy test within past 12 months             Flaco Menendez RN 06/03/22 3:29 PM   Patient tolerated procedure well.

## 2022-06-08 RX ORDER — LISINOPRIL 10 MG/1
TABLET ORAL
Qty: 90 TABLET | Refills: 0 | Status: SHIPPED | OUTPATIENT
Start: 2022-06-08 | End: 2022-06-13

## 2022-06-09 ENCOUNTER — ALLIED HEALTH/NURSE VISIT (OUTPATIENT)
Dept: EDUCATION SERVICES | Facility: CLINIC | Age: 72
End: 2022-06-09
Payer: COMMERCIAL

## 2022-06-09 DIAGNOSIS — E11.9 DIABETES MELLITUS, TYPE 2 (H): Primary | ICD-10-CM

## 2022-06-09 PROCEDURE — G0108 DIAB MANAGE TRN  PER INDIV: HCPCS | Mod: AE

## 2022-06-09 NOTE — PROGRESS NOTES
Diabetes Self-Management Education & Support    Presents for:      SUBJECTIVE/OBJECTIVE:     Cultural Influences/Ethnic Background:  Not  or     Diabetes Symptoms & Complications:       Patient Problem List and Family Medical History reviewed for relevant medical history, current medical status, and diabetes risk factors.    Vitals:  There were no vitals taken for this visit.  Estimated body mass index is 30.65 kg/m  as calculated from the following:    Height as of 5/9/22: 1.829 m (6').    Weight as of 5/20/22: 102.5 kg (226 lb).   Last 3 BP:   BP Readings from Last 3 Encounters:   05/20/22 (!) 149/43   05/09/22 136/74   03/23/22 136/73       History   Smoking Status     Former Smoker     Packs/day: 1.00     Years: 43.00     Start date: 1/1/1965     Quit date: 8/1/2008   Smokeless Tobacco     Never Used       Labs:  Lab Results   Component Value Date    A1C 7.6 05/18/2022     Lab Results   Component Value Date     10/29/2021     10/29/2021     Lab Results   Component Value Date    LDL 57 09/16/2021     Direct Measure HDL   Date Value Ref Range Status   09/16/2021 47 (L) >=50 mg/dL Final     Comment:     HDL Cholesterol Reference Range:     0-2 years:   No reference ranges established for patients under 2 years old  at [facility] for lipid analytes.    2-8 years:  Greater than 45 mg/dL     18 years and older:   Female: Greater than or equal to 50 mg/dL   Male:   Greater than or equal to 40 mg/dL   ]  GFR Estimate   Date Value Ref Range Status   05/18/2022 35 (L) >60 mL/min/1.73m2 Final     Comment:     Effective December 21, 2021 eGFRcr in adults is calculated using the 2021 CKD-EPI creatinine equation which includes age and gender (Sondra branch al., NEJM, DOI: 10.1056/OOFTmv0884620)   05/07/2021 40 (L) >60 mL/min/1.73m2 Final     GFR Estimate If Black   Date Value Ref Range Status   05/07/2021 48 (L) >60 mL/min/1.73m2 Final     Lab Results   Component Value Date    CR 1.56  05/18/2022     No results found for: MICROALBUMIN      Taking Medications:  Diabetes Medication(s)     Insulin       insulin  UNIT/ML injection    Inject 54 Units Subcutaneous every morning AND 30 Units every evening.    Incretin Mimetic Agents (GLP-1 Receptor Agonists)       dulaglutide (TRULICITY) 0.75 MG/0.5ML pen    Inject 0.75 mg Subcutaneous every 7 days     Patient not taking: Reported on 5/20/2022           Patient Activation Measure Survey Score:  No flowsheet data found.    Diabetes knowledge and skills assessment:   Patient is knowledgeable in diabetes management concepts related to: Healthy Eating, Being Active, Monitoring, Taking Medication, Problem Solving, Reducing Risks and Healthy Coping    Patient needs further education on the following diabetes management concepts: Healthy Eating, Being Active, Monitoring, Taking Medication, Problem Solving, Reducing Risks and Healthy Coping    Based on learning assessment above, most appropriate setting for further diabetes education would be: Individual setting.      Education provided today on:  AADE Self-Care Behaviors:  Healthy Eating: consistency in amount, composition, and timing of food intake  Being Active: relationship to blood glucose  Monitoring: individual blood glucose targets  Taking Medication: when to take medications  Problem Solving: high blood glucose - causes, signs/symptoms, treatment and prevention, low blood glucose - causes, signs/symptoms, treatment and prevention, carrying a carbohydrate source at all times and when to call health care provider  Reducing Risks: A1C - goals, relating to blood glucose levels, how often to check  Healthy Coping: benefits of making appropriate lifestyle changes    Opportunities for ongoing education and support in diabetes-self management were discussed.    Pt verbalized understanding of concepts discussed and recommendations provided today.         ASSESSMENT:  Pt seen today for f/u. She is taking  NPH 58 units between 8-10am and 32 units around 8-9pm.   Lower readings overnight/early morning on saida are suspicious if truly low as pt does not have any symptoms and BG comes up w/o eating. Low reading noted on 5/29 at 53 in the evening. Pt reports she does not think she had any symptoms either. However she reports about 3 weeks ago she fell and went unconscious for some time (not sure how long). Pt states she called 911 when she woke up, they came and helped her get up and pt reports her BG and vitals were ok however reports she had symptoms of low BG right before she fell (dizzy, shaky).   Pt is typically not eating breakfast. Notes she is still taking diet/wt loss supplement. Reports her appetite overall is ok however she does have days when she is eating less, she believes this is how she got the reading of 53 on 5/29, by not eating much that day. Discussed of course trying not to do this. However, if she knows ahead of time that appetite is decreased she may decrease insulin as instructed below.   Asida reports are improved since our last visit. Previously in range 46% of the time, now at 53%. Recent A1c also significantly improved.                     Patient's most recent   Lab Results   Component Value Date    A1C 7.6 05/18/2022    is meeting goal of <8.0    PLAN  Continue NPH at 58/32. If decreased appetite may decrease insulin to 52/26.   Pt will f/u with PCP next week. F/u with CDE 8/18/22 and will have A1c that day.     Time Spent: 30 minutes  Encounter Type: Individual    Any diabetes medication dose changes were made via the CDE Protocol and Collaborative Practice Agreement with the patient's referring provider. A copy of this encounter was shared with the provider.

## 2022-06-09 NOTE — LETTER
6/9/2022         RE: Ebonie Bowman  [address]  [address]  [place] [address]        Dear Colleague,    Thank you for referring your patient, Ebonie Bowman, to the [facility]. Please see a copy of my visit note below.    Diabetes Self-Management Education & Support    Presents for:      SUBJECTIVE/OBJECTIVE:     Cultural Influences/Ethnic Background:  Not  or     Diabetes Symptoms & Complications:       Patient Problem List and Family Medical History reviewed for relevant medical history, current medical status, and diabetes risk factors.    Vitals:  There were no vitals taken for this visit.  Estimated body mass index is 30.65 kg/m  as calculated from the following:    Height as of 5/9/22: 1.829 m (6').    Weight as of 5/20/22: 102.5 kg (226 lb).   Last 3 BP:   BP Readings from Last 3 Encounters:   05/20/22 (!) 149/43   05/09/22 136/74   03/23/22 136/73       History   Smoking Status     Former Smoker     Packs/day: 1.00     Years: 43.00     Start date: 1/1/1965     Quit date: 8/1/2008   Smokeless Tobacco     Never Used       Labs:  Lab Results   Component Value Date    A1C 7.6 05/18/2022     Lab Results   Component Value Date     10/29/2021     10/29/2021     Lab Results   Component Value Date    LDL 57 09/16/2021     Direct Measure HDL   Date Value Ref Range Status   09/16/2021 47 (L) >=50 mg/dL Final     Comment:     HDL Cholesterol Reference Range:     0-2 years:   No reference ranges established for patients under 2 years old  at [facility] for lipid analytes.    2-8 years:  Greater than 45 mg/dL     18 years and older:   Female: Greater than or equal to 50 mg/dL   Male:   Greater than or equal to 40 mg/dL   ]  GFR Estimate   Date Value Ref Range Status   05/18/2022 35 (L) >60 mL/min/1.73m2 Final     Comment:     Effective December 21, 2021 eGFRcr in adults is calculated using the 2021 CKD-EPI creatinine equation which includes age and  gender (Sondra branch al., NE, DOI: 10.1056/GEIXtx7165227)   05/07/2021 40 (L) >60 mL/min/1.73m2 Final     GFR Estimate If Black   Date Value Ref Range Status   05/07/2021 48 (L) >60 mL/min/1.73m2 Final     Lab Results   Component Value Date    CR 1.56 05/18/2022     No results found for: MICROALBUMIN      Taking Medications:  Diabetes Medication(s)     Insulin       insulin  UNIT/ML injection    Inject 54 Units Subcutaneous every morning AND 30 Units every evening.    Incretin Mimetic Agents (GLP-1 Receptor Agonists)       dulaglutide (TRULICITY) 0.75 MG/0.5ML pen    Inject 0.75 mg Subcutaneous every 7 days     Patient not taking: Reported on 5/20/2022           Patient Activation Measure Survey Score:  No flowsheet data found.    Diabetes knowledge and skills assessment:   Patient is knowledgeable in diabetes management concepts related to: Healthy Eating, Being Active, Monitoring, Taking Medication, Problem Solving, Reducing Risks and Healthy Coping    Patient needs further education on the following diabetes management concepts: Healthy Eating, Being Active, Monitoring, Taking Medication, Problem Solving, Reducing Risks and Healthy Coping    Based on learning assessment above, most appropriate setting for further diabetes education would be: Individual setting.      Education provided today on:  AADE Self-Care Behaviors:  Healthy Eating: consistency in amount, composition, and timing of food intake  Being Active: relationship to blood glucose  Monitoring: individual blood glucose targets  Taking Medication: when to take medications  Problem Solving: high blood glucose - causes, signs/symptoms, treatment and prevention, low blood glucose - causes, signs/symptoms, treatment and prevention, carrying a carbohydrate source at all times and when to call health care provider  Reducing Risks: A1C - goals, relating to blood glucose levels, how often to check  Healthy Coping: benefits of making appropriate lifestyle  changes    Opportunities for ongoing education and support in diabetes-self management were discussed.    Pt verbalized understanding of concepts discussed and recommendations provided today.         ASSESSMENT:  Pt seen today for f/u. She is taking NPH 58 units between 8-10am and 32 units around 8-9pm.   Lower readings overnight/early morning on saida are suspicious if truly low as pt does not have any symptoms and BG comes up w/o eating. Low reading noted on 5/29 at 53 in the evening. Pt reports she does not think she had any symptoms either. However she reports about 3 weeks ago she fell and went unconscious for some time (not sure how long). Pt states she called 911 when she woke up, they came and helped her get up and pt reports her BG and vitals were ok however reports she had symptoms of low BG right before she fell (dizzy, shaky).   Pt is typically not eating breakfast. Notes she is still taking diet/wt loss supplement. Reports her appetite overall is ok however she does have days when she is eating less, she believes this is how she got the reading of 53 on 5/29, by not eating much that day. Discussed of course trying not to do this. However, if she knows ahead of time that appetite is decreased she may decrease insulin as instructed below.   Saida reports are improved since our last visit. Previously in range 46% of the time, now at 53%. Recent A1c also significantly improved.                     Patient's most recent   Lab Results   Component Value Date    A1C 7.6 05/18/2022    is meeting goal of <8.0    PLAN  Continue NPH at 58/32. If decreased appetite may decrease insulin to 52/26.   Pt will f/u with PCP next week. F/u with CDE 8/18/22 and will have A1c that day.     Time Spent: 30 minutes  Encounter Type: Individual    Any diabetes medication dose changes were made via the CDE Protocol and Collaborative Practice Agreement with the patient's referring provider. A copy of this encounter was shared with  the provider.

## 2022-06-13 ENCOUNTER — OFFICE VISIT (OUTPATIENT)
Dept: INTERNAL MEDICINE | Facility: CLINIC | Age: 72
End: 2022-06-13
Payer: COMMERCIAL

## 2022-06-13 VITALS
SYSTOLIC BLOOD PRESSURE: 135 MMHG | WEIGHT: 226.2 LBS | BODY MASS INDEX: 33.5 KG/M2 | RESPIRATION RATE: 18 BRPM | HEART RATE: 62 BPM | DIASTOLIC BLOOD PRESSURE: 70 MMHG | TEMPERATURE: 98.5 F | HEIGHT: 69 IN

## 2022-06-13 DIAGNOSIS — M54.50 CHRONIC MIDLINE LOW BACK PAIN WITHOUT SCIATICA: ICD-10-CM

## 2022-06-13 DIAGNOSIS — E11.00 TYPE 2 DIABETES MELLITUS WITH HYPEROSMOLARITY WITHOUT COMA, WITH LONG-TERM CURRENT USE OF INSULIN (H): ICD-10-CM

## 2022-06-13 DIAGNOSIS — E78.2 MIXED HYPERLIPIDEMIA: ICD-10-CM

## 2022-06-13 DIAGNOSIS — I10 ESSENTIAL HYPERTENSION: ICD-10-CM

## 2022-06-13 DIAGNOSIS — I10 PRIMARY HYPERTENSION: ICD-10-CM

## 2022-06-13 DIAGNOSIS — Z79.4 TYPE 2 DIABETES MELLITUS WITH HYPEROSMOLARITY WITHOUT COMA, WITH LONG-TERM CURRENT USE OF INSULIN (H): ICD-10-CM

## 2022-06-13 DIAGNOSIS — G56.03 BILATERAL CARPAL TUNNEL SYNDROME: Primary | ICD-10-CM

## 2022-06-13 DIAGNOSIS — G89.29 CHRONIC MIDLINE LOW BACK PAIN WITHOUT SCIATICA: ICD-10-CM

## 2022-06-13 PROCEDURE — 99214 OFFICE O/P EST MOD 30 MIN: CPT | Performed by: NURSE PRACTITIONER

## 2022-06-13 RX ORDER — LISINOPRIL 10 MG/1
10 TABLET ORAL AT BEDTIME
Qty: 90 TABLET | Refills: 1 | Status: SHIPPED | OUTPATIENT
Start: 2022-06-13 | End: 2022-12-06

## 2022-06-13 RX ORDER — ATORVASTATIN CALCIUM 40 MG/1
40 TABLET, FILM COATED ORAL AT BEDTIME
Qty: 90 TABLET | Refills: 1 | Status: SHIPPED | OUTPATIENT
Start: 2022-06-13 | End: 2022-12-06

## 2022-06-13 RX ORDER — AMLODIPINE BESYLATE 10 MG/1
TABLET ORAL
Qty: 90 TABLET | Refills: 2 | Status: SHIPPED | OUTPATIENT
Start: 2022-06-13 | End: 2023-02-28

## 2022-06-13 ASSESSMENT — PAIN SCALES - GENERAL: PAINLEVEL: NO PAIN (0)

## 2022-06-15 ENCOUNTER — TELEPHONE (OUTPATIENT)
Dept: INTERNAL MEDICINE | Facility: CLINIC | Age: 72
End: 2022-06-15
Payer: COMMERCIAL

## 2022-06-15 PROBLEM — G56.03 BILATERAL CARPAL TUNNEL SYNDROME: Status: ACTIVE | Noted: 2022-06-15

## 2022-06-15 NOTE — ASSESSMENT & PLAN NOTE
She has had several hypoglycemic episodes in the morning.  We discussed adding a small high-protein snack at bedtime such as a handful of nuts.  She is open to trying this.  If she has ongoing hypoglycemia, her NPH insulin dose will need to be reduced.  She has a follow-up with the diabetic educator to reassess her glucose readings.

## 2022-06-15 NOTE — ASSESSMENT & PLAN NOTE
She reports that she has a right wrist brace at home so she was fitted with a left wrist brace today.  She is advised to wear these at night.

## 2022-06-15 NOTE — TELEPHONE ENCOUNTER
"Call patient: During her office visit she asked a question about having a \"blockage in her heart\" but that the cardiologist told her not to worry about this.  I was not sure what she was talking about but I realized after her visit that she is likely talking about having a right bundle branch block on her EKG.  Let her know that this just means that there is a delay in the flow of electricity in her heart but that this does not require treatment and does not mean that there is a blockage in her heart blood vessels.  "

## 2022-06-15 NOTE — ASSESSMENT & PLAN NOTE
She has chronic low back pain and just recently reestablished with the pain clinic.  At the present time I am prescribing duloxetine and Percocet for chronic pain but she reports that her pain level is still not manageable.  Follow-up with the pain clinic for further evaluation and treatment options.

## 2022-06-20 NOTE — TELEPHONE ENCOUNTER
Notified pt of Marichuy Rubio message, pt verbalized understanding & did not have any follow up questions

## 2022-06-27 ENCOUNTER — TELEPHONE (OUTPATIENT)
Dept: PALLIATIVE MEDICINE | Facility: CLINIC | Age: 72
End: 2022-06-27

## 2022-06-27 DIAGNOSIS — M05.9 SEROPOSITIVE RHEUMATOID ARTHRITIS (H): Primary | ICD-10-CM

## 2022-06-27 DIAGNOSIS — G89.4 CHRONIC PAIN SYNDROME: ICD-10-CM

## 2022-06-27 DIAGNOSIS — I10 ESSENTIAL HYPERTENSION: ICD-10-CM

## 2022-06-27 RX ORDER — HYDROXYCHLOROQUINE SULFATE 200 MG/1
TABLET, FILM COATED ORAL
COMMUNITY
Start: 2022-04-26 | End: 2022-06-27

## 2022-06-27 NOTE — TELEPHONE ENCOUNTER
Patient leaves a message requesting a refill of Percocet.  Chart Review:  Last visit with Akila Napoles CNP 5/20/22:  1. Medication Management : Have Marichuy continue meds until our next appt.   Next office visit scheduled for 7/25/22    Will route to Marichuy Rubio CNP

## 2022-06-27 NOTE — TELEPHONE ENCOUNTER
Refill request from pharmacy for metoprolol 50mg ER tab      Fabian Cantrell Jr., CMA on 6/27/2022 at 4:01 PM

## 2022-06-28 RX ORDER — OXYCODONE AND ACETAMINOPHEN 5; 325 MG/1; MG/1
1-2 TABLET ORAL EVERY 6 HOURS PRN
Qty: 180 TABLET | Refills: 0 | Status: SHIPPED | OUTPATIENT
Start: 2022-06-28 | End: 2022-07-25

## 2022-06-28 RX ORDER — METOPROLOL SUCCINATE 50 MG/1
TABLET, EXTENDED RELEASE ORAL
Qty: 90 TABLET | Refills: 3 | Status: SHIPPED | OUTPATIENT
Start: 2022-06-28

## 2022-06-28 NOTE — TELEPHONE ENCOUNTER
"Last Written Prescription Date:  9/16/21  Last Fill Quantity: 90,  # refills: 2   Last office visit provider:  6/13/22     Requested Prescriptions   Pending Prescriptions Disp Refills     metoprolol succinate ER (TOPROL XL) 50 MG 24 hr tablet 90 tablet 2     Sig: [METOPROLOL SUCCINATE (TOPROL-XL) 50 MG 24 HR TABLET] TAKE 1 TABLET EVERY DAY       Beta-Blockers Protocol Passed - 6/28/2022  3:06 PM        Passed - Blood pressure under 140/90 in past 12 months     BP Readings from Last 3 Encounters:   06/13/22 135/70   05/20/22 (!) 149/43   05/09/22 136/74                 Passed - Patient is age 6 or older        Passed - Recent (12 mo) or future (30 days) visit within the authorizing provider's specialty     Patient has had an office visit with the authorizing provider or a provider within the authorizing providers department within the previous 12 mos or has a future within next 30 days. See \"Patient Info\" tab in inbasket, or \"Choose Columns\" in Meds & Orders section of the refill encounter.              Passed - Medication is active on med list             Flaco Menendez RN 06/28/22 3:07 PM  "

## 2022-06-28 NOTE — TELEPHONE ENCOUNTER
Please discuss the following with the patient:     Patient apparently has an upcoming visit with me on August 2, 2022, recommend she maintain this appointment to reassess and we can then consider adding back hydroxychloroquine, if deemed appropriate.    Last hydroxychloroquine prescription from us was September 30, 2021, 90-day supply with 1 refill.    Has she been receiving Plaquenil refills from another provider?    Has she seen her eye doctor over the past year?    Any new history of retinal disease, glaucoma or macular degeneration?

## 2022-06-28 NOTE — TELEPHONE ENCOUNTER
Patient is returning call.  Message asked.  Per patient as far as she knows she is still taking medication.     Left message for pt to call back to see if she is still taking HCQ

## 2022-06-28 NOTE — TELEPHONE ENCOUNTER
"I personally did not speak to pt or take this message from pt returning call.     \"Patient is returning call.  Message asked.  Per patient as far as she knows she is still taking medication.\"    "

## 2022-06-28 NOTE — TELEPHONE ENCOUNTER
"Please advise is pt should be taking HCQ. Medication is not listed in Plan from 5/9/22 visit. Note states medication under \"Pertinent medications already tried medication- Hydroxychloroquine (ineffective and costly)\"    Last ov 5/9/22  Last labs-5/18/22  "

## 2022-06-29 NOTE — TELEPHONE ENCOUNTER
Outpatient Medication Detail     Disp Refills Start End JULIETH   hydrOXYchloroQUINE (PLAQUENIL) 200 mg tablet 180 tablet 0 6/30/2021  No   Sig - Route: Take 1 tablet (200 mg total) by mouth 2 (two) times a day. - Oral   Sent to pharmacy as: hydrOXYchloroQUINE 200 mg tablet (PLAQUENIL)   E-Prescribing Status: Receipt confirmed by pharmacy (6/30/2021 10:31 AM CDT)       hydrOXYchloroQUINE (PLAQUENIL) 200 mg tablet [[id number]]    Electronically signed by: Raheem Londono DO on 06/30/21 1030 Status: Active   Ordering user: Raheem Londono DO 06/30/21 1030 Authorized by: Raheem Londono DO   Frequency: BID 06/30/21 - Until Discontinued   Diagnoses  Seropositive rheumatoid arthritis (H) [M05.9]     Routing refill request to provider for review/approval because:  Please see note from Rheumatology  Due to medication information not transferring due to [facility] please review the medication information prior to signing to ensure accuracy.    Last office visit provider:  6/13/22     Requested Prescriptions   Pending Prescriptions Disp Refills     hydroxychloroquine (PLAQUENIL) 200 MG tablet         There is no refill protocol information for this order      Signed Prescriptions Disp Refills    hydroxychloroquine (PLAQUENIL) 200 MG tablet         There is no refill protocol information for this order          Flaco Menendez RN 06/29/22 11:36 AM

## 2022-07-01 NOTE — TELEPHONE ENCOUNTER
Pt states she does still take HCQ twice daily, she is not sure who has been prescribing this if Dr Londono has not. Pt states she gets them in packets from the pharmacy.     Pt states she has not had an eye exam within the last year, she is waiting to have money to be able to get new glasses. Pt denies having any retina issues, glaucoma or macular degeneration in the past.     Please advise HCQ refill    Methotrexate refill sent to pharmacy per RN refill protocol

## 2022-07-08 RX ORDER — HYDROXYCHLOROQUINE SULFATE 200 MG/1
200 TABLET, FILM COATED ORAL 2 TIMES DAILY
Qty: 60 TABLET | Refills: 1 | Status: SHIPPED | OUTPATIENT
Start: 2022-07-08 | End: 2022-08-02

## 2022-07-08 NOTE — TELEPHONE ENCOUNTER
Per Dr Londono- okay to refill HCQ for 30 day supply with one refill. Pt needs to have eye exam done within this time as it is for her safety while taking this medication. Pt can see eye doctor and get eye glass prescription if needed and get eye glasses at a later time when she can afford it as pt stated that was a barrier for her having this eye exam done.     Refilled per RN refill protocol and Dr Londono.     Left message for pt to call back to let her know above info

## 2022-07-09 ENCOUNTER — HEALTH MAINTENANCE LETTER (OUTPATIENT)
Age: 72
End: 2022-07-09

## 2022-07-16 ENCOUNTER — APPOINTMENT (OUTPATIENT)
Dept: RADIOLOGY | Facility: HOSPITAL | Age: 72
End: 2022-07-16
Attending: EMERGENCY MEDICINE
Payer: COMMERCIAL

## 2022-07-16 ENCOUNTER — APPOINTMENT (OUTPATIENT)
Dept: CT IMAGING | Facility: HOSPITAL | Age: 72
End: 2022-07-16
Attending: EMERGENCY MEDICINE
Payer: COMMERCIAL

## 2022-07-16 ENCOUNTER — HOSPITAL ENCOUNTER (OUTPATIENT)
Facility: HOSPITAL | Age: 72
Setting detail: OBSERVATION
Discharge: HOME | End: 2022-07-18
Attending: EMERGENCY MEDICINE | Admitting: STUDENT IN AN ORGANIZED HEALTH CARE EDUCATION/TRAINING PROGRAM
Payer: COMMERCIAL
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
## 2022-07-16 DIAGNOSIS — M62.81 GENERALIZED MUSCLE WEAKNESS: ICD-10-CM

## 2022-07-16 DIAGNOSIS — U07.1 INFECTION DUE TO 2019 NOVEL CORONAVIRUS: ICD-10-CM

## 2022-07-16 LAB
ALBUMIN SERPL-MCNC: 3.6 G/DL (ref 3.5–5)
ALBUMIN UR-MCNC: 10 MG/DL
ALP SERPL-CCNC: 57 U/L (ref 45–120)
ALT SERPL W P-5'-P-CCNC: 13 U/L (ref 0–45)
ANION GAP SERPL CALCULATED.3IONS-SCNC: 11 MMOL/L (ref 5–18)
APPEARANCE UR: CLEAR
APTT PPP: 27 SECONDS (ref 22–38)
AST SERPL W P-5'-P-CCNC: 16 U/L (ref 0–40)
BASOPHILS # BLD AUTO: 0 10E3/UL (ref 0–0.2)
BASOPHILS NFR BLD AUTO: 1 %
BILIRUB SERPL-MCNC: 0.7 MG/DL (ref 0–1)
BILIRUB UR QL STRIP: NEGATIVE
BUN SERPL-MCNC: 18 MG/DL (ref 8–28)
CALCIUM SERPL-MCNC: 9.2 MG/DL (ref 8.5–10.5)
CHLORIDE BLD-SCNC: 100 MMOL/L (ref 98–107)
CO2 SERPL-SCNC: 28 MMOL/L (ref 22–31)
COLOR UR AUTO: ABNORMAL
CREAT SERPL-MCNC: 1.39 MG/DL (ref 0.6–1.1)
CREAT SERPL-MCNC: 1.39 MG/DL (ref 0.6–1.1)
EOSINOPHIL # BLD AUTO: 0.2 10E3/UL (ref 0–0.7)
EOSINOPHIL NFR BLD AUTO: 2 %
ERYTHROCYTE [DISTWIDTH] IN BLOOD BY AUTOMATED COUNT: 12.8 % (ref 10–15)
ETHANOL SERPL-MCNC: <10 MG/DL
FLUAV RNA SPEC QL NAA+PROBE: NEGATIVE
FLUBV RNA RESP QL NAA+PROBE: NEGATIVE
GFR SERPL CREATININE-BSD FRML MDRD: 40 ML/MIN/1.73M2
GFR SERPL CREATININE-BSD FRML MDRD: 40 ML/MIN/1.73M2
GLUCOSE BLD-MCNC: 295 MG/DL (ref 70–125)
GLUCOSE BLDC GLUCOMTR-MCNC: 245 MG/DL (ref 70–99)
GLUCOSE UR STRIP-MCNC: 150 MG/DL
HCT VFR BLD AUTO: 40.7 % (ref 35–47)
HGB BLD-MCNC: 13.9 G/DL (ref 11.7–15.7)
HGB UR QL STRIP: NEGATIVE
IMM GRANULOCYTES # BLD: 0 10E3/UL
IMM GRANULOCYTES NFR BLD: 1 %
INR PPP: 1.15 (ref 0.85–1.15)
KETONES UR STRIP-MCNC: NEGATIVE MG/DL
LACTATE SERPL-SCNC: 2 MMOL/L (ref 0.7–2)
LACTATE SERPL-SCNC: 3.4 MMOL/L (ref 0.7–2)
LEUKOCYTE ESTERASE UR QL STRIP: NEGATIVE
LYMPHOCYTES # BLD AUTO: 0.9 10E3/UL (ref 0.8–5.3)
LYMPHOCYTES NFR BLD AUTO: 12 %
MAGNESIUM SERPL-MCNC: 1.4 MG/DL (ref 1.8–2.6)
MCH RBC QN AUTO: 31.2 PG (ref 26.5–33)
MCHC RBC AUTO-ENTMCNC: 34.2 G/DL (ref 31.5–36.5)
MCV RBC AUTO: 91 FL (ref 78–100)
MONOCYTES # BLD AUTO: 0.8 10E3/UL (ref 0–1.3)
MONOCYTES NFR BLD AUTO: 10 %
NEUTROPHILS # BLD AUTO: 5.8 10E3/UL (ref 1.6–8.3)
NEUTROPHILS NFR BLD AUTO: 74 %
NITRATE UR QL: NEGATIVE
NRBC # BLD AUTO: 0 10E3/UL
NRBC BLD AUTO-RTO: 0 /100
PH UR STRIP: 7 [PH] (ref 5–7)
PLATELET # BLD AUTO: 114 10E3/UL (ref 150–450)
POTASSIUM BLD-SCNC: 4 MMOL/L (ref 3.5–5)
PROT SERPL-MCNC: 6.7 G/DL (ref 6–8)
RBC # BLD AUTO: 4.46 10E6/UL (ref 3.8–5.2)
RBC URINE: 1 /HPF
SARS-COV-2 RNA RESP QL NAA+PROBE: POSITIVE
SODIUM SERPL-SCNC: 139 MMOL/L (ref 136–145)
SP GR UR STRIP: 1.02 (ref 1–1.03)
UROBILINOGEN UR STRIP-MCNC: <2 MG/DL
WBC # BLD AUTO: 7.7 10E3/UL (ref 4–11)
WBC URINE: 1 /HPF

## 2022-07-16 PROCEDURE — 80053 COMPREHEN METABOLIC PANEL: CPT | Performed by: STUDENT IN AN ORGANIZED HEALTH CARE EDUCATION/TRAINING PROGRAM

## 2022-07-16 PROCEDURE — 258N000002 HC RX IP 258 OP 250: Performed by: STUDENT IN AN ORGANIZED HEALTH CARE EDUCATION/TRAINING PROGRAM

## 2022-07-16 PROCEDURE — G0378 HOSPITAL OBSERVATION PER HR: HCPCS

## 2022-07-16 PROCEDURE — 72125 CT NECK SPINE W/O DYE: CPT

## 2022-07-16 PROCEDURE — 83605 ASSAY OF LACTIC ACID: CPT | Performed by: EMERGENCY MEDICINE

## 2022-07-16 PROCEDURE — 82040 ASSAY OF SERUM ALBUMIN: CPT | Performed by: STUDENT IN AN ORGANIZED HEALTH CARE EDUCATION/TRAINING PROGRAM

## 2022-07-16 PROCEDURE — 250N000011 HC RX IP 250 OP 636: Performed by: STUDENT IN AN ORGANIZED HEALTH CARE EDUCATION/TRAINING PROGRAM

## 2022-07-16 PROCEDURE — 99219 PR INITIAL OBSERVATION CARE,LEVEL II: CPT | Performed by: STUDENT IN AN ORGANIZED HEALTH CARE EDUCATION/TRAINING PROGRAM

## 2022-07-16 PROCEDURE — 87040 BLOOD CULTURE FOR BACTERIA: CPT | Performed by: EMERGENCY MEDICINE

## 2022-07-16 PROCEDURE — 96365 THER/PROPH/DIAG IV INF INIT: CPT

## 2022-07-16 PROCEDURE — 85730 THROMBOPLASTIN TIME PARTIAL: CPT | Performed by: STUDENT IN AN ORGANIZED HEALTH CARE EDUCATION/TRAINING PROGRAM

## 2022-07-16 PROCEDURE — 93005 ELECTROCARDIOGRAM TRACING: CPT | Performed by: EMERGENCY MEDICINE

## 2022-07-16 PROCEDURE — 96372 THER/PROPH/DIAG INJ SC/IM: CPT | Performed by: STUDENT IN AN ORGANIZED HEALTH CARE EDUCATION/TRAINING PROGRAM

## 2022-07-16 PROCEDURE — 72128 CT CHEST SPINE W/O DYE: CPT

## 2022-07-16 PROCEDURE — 258N000003 HC RX IP 258 OP 636: Performed by: EMERGENCY MEDICINE

## 2022-07-16 PROCEDURE — 36415 COLL VENOUS BLD VENIPUNCTURE: CPT | Performed by: EMERGENCY MEDICINE

## 2022-07-16 PROCEDURE — 82962 GLUCOSE BLOOD TEST: CPT

## 2022-07-16 PROCEDURE — 71045 X-RAY EXAM CHEST 1 VIEW: CPT

## 2022-07-16 PROCEDURE — 85610 PROTHROMBIN TIME: CPT | Performed by: STUDENT IN AN ORGANIZED HEALTH CARE EDUCATION/TRAINING PROGRAM

## 2022-07-16 PROCEDURE — 70450 CT HEAD/BRAIN W/O DYE: CPT

## 2022-07-16 PROCEDURE — 81001 URINALYSIS AUTO W/SCOPE: CPT | Performed by: EMERGENCY MEDICINE

## 2022-07-16 PROCEDURE — 250N000013 HC RX MED GY IP 250 OP 250 PS 637: Performed by: EMERGENCY MEDICINE

## 2022-07-16 PROCEDURE — 83735 ASSAY OF MAGNESIUM: CPT | Performed by: EMERGENCY MEDICINE

## 2022-07-16 PROCEDURE — 250N000011 HC RX IP 250 OP 636: Performed by: EMERGENCY MEDICINE

## 2022-07-16 PROCEDURE — 96361 HYDRATE IV INFUSION ADD-ON: CPT

## 2022-07-16 PROCEDURE — 99285 EMERGENCY DEPT VISIT HI MDM: CPT | Mod: 25

## 2022-07-16 PROCEDURE — C9803 HOPD COVID-19 SPEC COLLECT: HCPCS

## 2022-07-16 PROCEDURE — 85025 COMPLETE CBC W/AUTO DIFF WBC: CPT | Performed by: STUDENT IN AN ORGANIZED HEALTH CARE EDUCATION/TRAINING PROGRAM

## 2022-07-16 PROCEDURE — 87636 SARSCOV2 & INF A&B AMP PRB: CPT | Performed by: EMERGENCY MEDICINE

## 2022-07-16 PROCEDURE — 82077 ASSAY SPEC XCP UR&BREATH IA: CPT | Performed by: STUDENT IN AN ORGANIZED HEALTH CARE EDUCATION/TRAINING PROGRAM

## 2022-07-16 PROCEDURE — 72131 CT LUMBAR SPINE W/O DYE: CPT

## 2022-07-16 RX ORDER — FOLIC ACID 1 MG/1
1 TABLET ORAL DAILY
Status: DISCONTINUED | OUTPATIENT
Start: 2022-07-17 | End: 2022-07-18 | Stop reason: HOSPADM

## 2022-07-16 RX ORDER — NALOXONE HYDROCHLORIDE 0.4 MG/ML
0.4 INJECTION, SOLUTION INTRAMUSCULAR; INTRAVENOUS; SUBCUTANEOUS
Status: DISCONTINUED | OUTPATIENT
Start: 2022-07-16 | End: 2022-07-18 | Stop reason: HOSPADM

## 2022-07-16 RX ORDER — LIDOCAINE 40 MG/G
CREAM TOPICAL
Status: DISCONTINUED | OUTPATIENT
Start: 2022-07-16 | End: 2022-07-18 | Stop reason: HOSPADM

## 2022-07-16 RX ORDER — METOPROLOL SUCCINATE 25 MG/1
50 TABLET, EXTENDED RELEASE ORAL DAILY
Status: DISCONTINUED | OUTPATIENT
Start: 2022-07-17 | End: 2022-07-18 | Stop reason: HOSPADM

## 2022-07-16 RX ORDER — ATORVASTATIN CALCIUM 40 MG/1
40 TABLET, FILM COATED ORAL AT BEDTIME
Status: DISCONTINUED | OUTPATIENT
Start: 2022-07-16 | End: 2022-07-18 | Stop reason: HOSPADM

## 2022-07-16 RX ORDER — DEXTROSE MONOHYDRATE 25 G/50ML
25-50 INJECTION, SOLUTION INTRAVENOUS
Status: DISCONTINUED | OUTPATIENT
Start: 2022-07-16 | End: 2022-07-18 | Stop reason: HOSPADM

## 2022-07-16 RX ORDER — PROCHLORPERAZINE 25 MG
12.5 SUPPOSITORY, RECTAL RECTAL EVERY 12 HOURS PRN
Status: DISCONTINUED | OUTPATIENT
Start: 2022-07-16 | End: 2022-07-18 | Stop reason: HOSPADM

## 2022-07-16 RX ORDER — LIDOCAINE 40 MG/G
CREAM TOPICAL
Status: DISCONTINUED | OUTPATIENT
Start: 2022-07-16 | End: 2022-07-16 | Stop reason: CLARIF

## 2022-07-16 RX ORDER — SODIUM CHLORIDE 450 MG/100ML
INJECTION, SOLUTION INTRAVENOUS CONTINUOUS
Status: ACTIVE | OUTPATIENT
Start: 2022-07-16 | End: 2022-07-17

## 2022-07-16 RX ORDER — NALOXONE HYDROCHLORIDE 0.4 MG/ML
0.2 INJECTION, SOLUTION INTRAMUSCULAR; INTRAVENOUS; SUBCUTANEOUS
Status: DISCONTINUED | OUTPATIENT
Start: 2022-07-16 | End: 2022-07-18 | Stop reason: HOSPADM

## 2022-07-16 RX ORDER — DULOXETIN HYDROCHLORIDE 60 MG/1
60 CAPSULE, DELAYED RELEASE ORAL DAILY
Status: DISCONTINUED | OUTPATIENT
Start: 2022-07-17 | End: 2022-07-18 | Stop reason: HOSPADM

## 2022-07-16 RX ORDER — ACETAMINOPHEN 325 MG/1
650 TABLET ORAL ONCE
Status: COMPLETED | OUTPATIENT
Start: 2022-07-16 | End: 2022-07-16

## 2022-07-16 RX ORDER — HYDROXYCHLOROQUINE SULFATE 200 MG/1
200 TABLET, FILM COATED ORAL 2 TIMES DAILY
Status: DISCONTINUED | OUTPATIENT
Start: 2022-07-16 | End: 2022-07-18 | Stop reason: HOSPADM

## 2022-07-16 RX ORDER — AMLODIPINE BESYLATE 5 MG/1
10 TABLET ORAL DAILY
Status: DISCONTINUED | OUTPATIENT
Start: 2022-07-17 | End: 2022-07-18 | Stop reason: HOSPADM

## 2022-07-16 RX ORDER — ACETAMINOPHEN 650 MG/1
650 SUPPOSITORY RECTAL EVERY 6 HOURS PRN
Status: DISCONTINUED | OUTPATIENT
Start: 2022-07-16 | End: 2022-07-18 | Stop reason: HOSPADM

## 2022-07-16 RX ORDER — ACETAMINOPHEN 325 MG/1
650 TABLET ORAL EVERY 6 HOURS PRN
Status: DISCONTINUED | OUTPATIENT
Start: 2022-07-16 | End: 2022-07-18 | Stop reason: HOSPADM

## 2022-07-16 RX ORDER — POLYETHYLENE GLYCOL 3350 17 G/17G
17 POWDER, FOR SOLUTION ORAL DAILY PRN
Status: DISCONTINUED | OUTPATIENT
Start: 2022-07-16 | End: 2022-07-18 | Stop reason: HOSPADM

## 2022-07-16 RX ORDER — ENOXAPARIN SODIUM 100 MG/ML
40 INJECTION SUBCUTANEOUS EVERY 24 HOURS
Status: DISCONTINUED | OUTPATIENT
Start: 2022-07-16 | End: 2022-07-18 | Stop reason: HOSPADM

## 2022-07-16 RX ORDER — NICOTINE POLACRILEX 4 MG
15-30 LOZENGE BUCCAL
Status: DISCONTINUED | OUTPATIENT
Start: 2022-07-16 | End: 2022-07-18 | Stop reason: HOSPADM

## 2022-07-16 RX ORDER — MAGNESIUM SULFATE HEPTAHYDRATE 40 MG/ML
2 INJECTION, SOLUTION INTRAVENOUS ONCE
Status: COMPLETED | OUTPATIENT
Start: 2022-07-16 | End: 2022-07-16

## 2022-07-16 RX ORDER — PROCHLORPERAZINE MALEATE 5 MG
5 TABLET ORAL EVERY 6 HOURS PRN
Status: DISCONTINUED | OUTPATIENT
Start: 2022-07-16 | End: 2022-07-18 | Stop reason: HOSPADM

## 2022-07-16 RX ORDER — OXYCODONE AND ACETAMINOPHEN 5; 325 MG/1; MG/1
1-2 TABLET ORAL EVERY 6 HOURS PRN
Status: DISCONTINUED | OUTPATIENT
Start: 2022-07-16 | End: 2022-07-18 | Stop reason: HOSPADM

## 2022-07-16 RX ADMIN — SODIUM CHLORIDE: 4.5 INJECTION, SOLUTION INTRAVENOUS at 22:36

## 2022-07-16 RX ADMIN — ENOXAPARIN SODIUM 40 MG: 40 INJECTION SUBCUTANEOUS at 22:37

## 2022-07-16 RX ADMIN — SODIUM CHLORIDE 500 ML: 9 INJECTION, SOLUTION INTRAVENOUS at 19:15

## 2022-07-16 RX ADMIN — MAGNESIUM SULFATE IN WATER 2 G: 40 INJECTION, SOLUTION INTRAVENOUS at 19:15

## 2022-07-16 RX ADMIN — ACETAMINOPHEN 650 MG: 325 TABLET ORAL at 18:00

## 2022-07-16 ASSESSMENT — ACTIVITIES OF DAILY LIVING (ADL)
TOILETING: 1-->ASSISTANCE (EQUIPMENT/PERSON) NEEDED (NOT DEVELOPMENTALLY APPROPRIATE)
CONCENTRATING,_REMEMBERING_OR_MAKING_DECISIONS_DIFFICULTY: NO
DIFFICULTY_COMMUNICATING: NO
TRANSFERRING: 2-->COMPLETELY DEPENDENT
TRANSFERRING: 2-->COMPLETELY DEPENDENT (NOT DEVELOPMENTALLY APPROPRIATE)
WALKING_OR_CLIMBING_STAIRS_DIFFICULTY: YES
FALL_HISTORY_WITHIN_LAST_SIX_MONTHS: YES
HEARING_DIFFICULTY_OR_DEAF: NO
CHANGE_IN_FUNCTIONAL_STATUS_SINCE_ONSET_OF_CURRENT_ILLNESS/INJURY: YES
WEAR_GLASSES_OR_BLIND: YES
DRESSING/BATHING_DIFFICULTY: NO
EQUIPMENT_CURRENTLY_USED_AT_HOME: WALKER, ROLLING
TOILETING: 1-->ASSISTANCE (EQUIPMENT/PERSON) NEEDED
TOILETING_ISSUES: YES
DIFFICULTY_EATING/SWALLOWING: NO
VISION_MANAGEMENT: GLASSES
TOILETING_ASSISTANCE: TOILETING DIFFICULTY, ASSISTANCE 1 PERSON
DOING_ERRANDS_INDEPENDENTLY_DIFFICULTY: YES

## 2022-07-16 NOTE — ED TRIAGE NOTES
Patient comes from home where she lives with her son  Patient was having increasing weakness over the past couple days with today being the worst. She fell in the bathroom striking her head. Patient was unsure if she lost coconsciousness. Patient reports new neck and increased back pain from her normal.     Temp 100 with EMS  Slightly tachy at 106         Triage Assessment     Row Name 07/16/22 1850       Triage Assessment (Adult)    Airway WDL WDL

## 2022-07-16 NOTE — ED PROVIDER NOTES
EMERGENCY DEPARTMENT ENCOUNTER      NAME: Ebonie Bowman  AGE: 72 year old female  YOB: 1950  MRN: [id number]  EVALUATION DATE & TIME: 2022  5:45 PM    PCP: Marichuy Rubio    ED PROVIDER: Petty Grover M.D.      Chief Complaint   Patient presents with     Generalized Weakness     FINAL IMPRESSION:  1. Infection due to 2019 novel coronavirus    2. Fall, initial encounter    3. Generalized muscle weakness      ED COURSE & MEDICAL DECISION MAKIN:47 PM Met with patient for initial interview and exam. Discussed initial plan for care for their stay in the emergency department.   PPE: Provider wore gloves, N95 mask, surgical cap.  7:46 PM Rechecked patient. Updated her on results. Patient is wishing to go home.   8:25 PM Per nurse they report patient is very weak and will most likely not be able to go home.   8:42 PM Spoke with Dr. Koenig, hospitalist. They have accepted the patient for admission to Obs.    Pertinent Labs & Imaging studies reviewed. (See chart for details)  ED Course as of 22   Sat 2022   1754 Is a 72-year-old female comes in today for evaluation after a fall.  She is on blood thinners.  She is not sure why she fell but she did fall today.  She hit her head.  She also complains of neck and back pain and has multiple areas of tenderness of her neck and her back so we will get CT imaging of her entire spine as well as her head.  Temperature was 100.3 on arrival and I am suspicious for sepsis or other infectious cause of her symptoms.  We will get a portable chest x-ray as well as urine and lactate.  We will check some basic labs.  We will give her some fluids.  She was little bit tachycardic as well.  I discussed all of this with the patient and she is in agreement.  We will start with some Tylenol for pain because that we will also treat the fever.  I suspect she will likely end up needing admission to the hospital.   185 Patient's magnesium is low at 1.4.   Her lactate is elevated at 3.4.  Replacement of magnesium as well as some IV fluids have been ordered.  Patient did come back positive for COVID.  That may explain why she was increasingly weak and tachycardic as well as had a borderline temperature.  We are still waiting for her imaging to come back but she will likely need admission to the hospital.   1941 Imaging all came back unremarkable.  We will see how she is feeling and then can determine whether she can go home.  I suspect she will need to come into the hospital.   1947 Patient feels like she may build to go home.  We discussed her positive COVID test.  She is not vaccinated.  We are still waiting on urine from her and hopefully we can get that shortly.  She is getting fluids right now.  We will recheck her lactic acid but her white count was normal.  I think the COVID explains her weakness and her low-grade fever.  If the urine looks okay and the lactate improves and she ambulates okay I think she could potentially go home.  I discussed this with her.  She would be a candidate for Paxlovid given her age   2029 Despite the patient's request to try to go home she is quite weak on exam and will not be able to safely discharge.  She will need admission to the hospital.  We will work on getting her admitted.   2044 For admission with the hospitalist.  She was in agreement with a med telemetry observation admit.       At the conclusion of the encounter I discussed  the results of all of the tests and the disposition with patient.   All questions were answered.  The patient acknowledged understanding and was involved in the decision making regarding the overall care plan.      MEDICATIONS GIVEN IN THE EMERGENCY:  Medications   acetaminophen (TYLENOL) tablet 650 mg (650 mg Oral Given 7/16/22 1800)   0.9% sodium chloride BOLUS (0 mLs Intravenous Stopped 7/16/22 1953)   magnesium sulfate 2 g in water intermittent infusion (0 g Intravenous Stopped 7/16/22 2023)      =================================================================    HPI    Triage Note:      Patient information was obtained from: EMS, patient    Use of : N/A       Ebonie Bowman is a 72 year old female with a pertinent past medical history of s/p back surgery, s/p insertion of spinal cord stimulator, chronic low back pain with sciatica, RA, CKD stage IIIb, DM II, HTN and HLD, who presents via EMS for the evaluation of a fall.     Per EMS report patient lives at home with her son where she experienced a fall in the bathroom prior to presentation here. Patient is unsure why she fell but endorses having generalized weakness over the past few days which has progressively worsened. Patient is unsure whether or not she lost consciousness but as of now states she was conscious the whole time. Patient does report hitting her head however is unsure where and states she is on blood thinners. She is endorsing neck pain and back pain since the fall. Patient denies any chest pin, nausea, vomiting as well as any other complaints at the time.    Patient is not vaccinated against COVID-19.      REVIEW OF SYSTEMS   Except as stated in the HPI all other systems reviewed and are negative.    PAST MEDICAL HISTORY:  Past Medical History:   Diagnosis Date     BBB (bundle branch block)      Chronic back pain      Chronic kidney disease     stage 3     Chronic kidney disease, unspecified     Created by Conversion      Depression      Diabetes (H)     Type 2     Diabetes mellitus, type 2 (H)     Created by Conversion      Frequent headaches      Ganglion of tendon sheath     Created by Conversion      GERD (gastroesophageal reflux disease)      HLD (hyperlipidemia)      Hypertension     Per H&P dated 1/03/2013     Incarcerated ventral hernia     Per H&P dated 1/03/2013     Osteoarthritis      Rheumatoid arthritis (H)      Shortness of breath      Thrombocytopenia (H)      Venous insufficiency        PAST SURGICAL  HISTORY:  Past Surgical History:   Procedure Laterality Date     BACK SURGERY       C UNLISTED PROCEDURE, ABDOMEN/PERITONEUM/OMENTUM      Description: Hernia Repair;  Recorded: 10/23/2013;     CHOLECYSTECTOMY       HERNIA REPAIR       HYSTERECTOMY  1984     IR FINE NEEDLE ASPIRATION  11/29/2020     JOINT REPLACEMENT Left     knee     OTHER SURGICAL HISTORY  1990, 1995    Two left wrist     OTHER SURGICAL HISTORY  1996    FATTY TUMORLT SHOULDER BLADE     PICC MIDLINE INSERTION  11/30/2020          [place] IMPLANT SPINAL NEUROSTIM/ Left 4/25/2019    Procedure: LEFT FLANK INCISION REPLACE NEUROSTIMULATOR;  Surgeon: Vishal Pretty MD;  Location: [facility];  Service: Pain     SPINAL CORD STIMULATOR IMPLANT         CURRENT MEDICATIONS:    No current facility-administered medications for this encounter.    Current Outpatient Medications:      amLODIPine (NORVASC) 10 MG tablet, [AMLODIPINE (NORVASC) 10 MG TABLET] TAKE 1 TABLET EVERY DAY, Disp: 90 tablet, Rfl: 2     ascorbic acid (ASCORBIC ACID WITH LATRICIA HIPS) 500 MG tablet, Take 500 mg by mouth daily , Disp: , Rfl:      atorvastatin (LIPITOR) 40 MG tablet, Take 1 tablet (40 mg) by mouth At Bedtime, Disp: 90 tablet, Rfl: 1     b complex vitamins tablet, Take 1 tablet by mouth daily , Disp: , Rfl:      DULoxetine (CYMBALTA) 60 MG capsule, TAKE 1 CAPSULE BY MOUTH DAILY, Disp: 90 capsule, Rfl: 1     etanercept (ENBREL) 50 MG/ML injection, Inject 1 mL (50 mg) Subcutaneous once a week Hold for signs of infection, and seek medical attention., Disp: 4 mL, Rfl: 3     fish oil-omega-3 fatty acids 1000 MG capsule, Take 1 g by mouth daily, Disp: , Rfl:      folic acid (FOLVITE) 1 MG tablet, Take one tab daily, except the day when taking methotrexate., Disp: 90 tablet, Rfl: 1     hydroxychloroquine (PLAQUENIL) 200 MG tablet, Take 1 tablet (200 mg) by mouth 2 times daily, Disp: 60 tablet, Rfl: 1     insulin  UNIT/ML injection, Inject 54 Units Subcutaneous every  morning AND 30 Units every evening., Disp: 15 mL, Rfl: 3     lisinopril (ZESTRIL) 10 MG tablet, Take 1 tablet (10 mg) by mouth At Bedtime, Disp: 90 tablet, Rfl: 1     loratadine (CLARITIN) 10 MG tablet, Take 1 tablet (10 mg) by mouth At Bedtime, Disp: 90 tablet, Rfl: 3     methotrexate 2.5 MG tablet, Take 3 tablets (7.5 mg) by mouth once a week, Disp: 36 tablet, Rfl: 0     metoprolol succinate ER (TOPROL XL) 50 MG 24 hr tablet, [METOPROLOL SUCCINATE (TOPROL-XL) 50 MG 24 HR TABLET] TAKE 1 TABLET EVERY DAY, Disp: 90 tablet, Rfl: 3     MULTIVITAMIN ORAL, Take 1 tablet by mouth daily , Disp: , Rfl:      oxyCODONE-acetaminophen (PERCOCET) 5-325 MG tablet, Take 1-2 tablets by mouth every 6 hours as needed for severe pain (Max of 6 tablets daily) For 6/30/22-7/30/22, Disp: 180 tablet, Rfl: 0     traZODone (DESYREL) 150 MG tablet, Take 1 tablet (150 mg) by mouth At Bedtime (Patient taking differently: Take 150 mg by mouth nightly as needed for sleep), Disp: 90 tablet, Rfl: 1     Vitamin D3 (CHOLECALCIFEROL) 25 mcg (1000 units) tablet, Take 1,000 Units by mouth daily , Disp: , Rfl:      alcohol swab prep pads, Use to swab area of injection/jacquelyn as directed., Disp: 100 each, Rfl: 3     Continuous Blood Gluc Sensor (FREESTYLE BRE 14 DAY SENSOR) [facility], Change every 14 days. Use daily to scan blood sugars, use per manufactures guidelines. Pt already has reader., Disp: 2 each, Rfl: 11     flash glucose scanning reader (FREESTYLE BRE 14 DAY READER) Misc, [FLASH GLUCOSE SCANNING READER (FREESTYLE BRE 14 DAY READER) MISC] Use 1 each As Directed see administration instructions. Use as directed with sensor, Disp: 1 each, Rfl: 0     flash glucose sensor (FREESTYLE BRE 14 DAY SENSOR) Kit, [FLASH GLUCOSE SENSOR (FREESTYLE BRE 14 DAY SENSOR) KIT] Use 1 each As Directed every 14 (fourteen) days., Disp: 2 kit, Rfl: 11     insulin syringe-needle U-100 (BD INSULIN SYRINGE ULT-FINE II) 1 mL 31 gauge x 5/16 Syrg, [INSULIN  SYRINGE-NEEDLE U-100 (BD INSULIN SYRINGE ULT-FINE II) 1 ML 31 GAUGE X  SYRG] USE 4 TIMES DAILY AS DIRECTED, Disp: 500 each, Rfl: 1     lancing device (ACCU-CHEK SOFTCLIX) Misc, [LANCING DEVICE (ACCU-CHEK SOFTCLIX) MISC] Use 1 applicator As Directed 4 (four) times a day., Disp: 300 each, Rfl: 3     nystatin (MYCOSTATIN) 016844 UNIT/GM external cream, Apply topically 2 times daily (Patient not taking: Reported on 2022), Disp: 30 g, Rfl: 1     thin (NO BRAND SPECIFIED) lancets, Use with lanceting device. To accompany: Blood Glucose Monitor Brands: per insurance., Disp: 100 each, Rfl: 11    ALLERGIES:  Allergies   Allergen Reactions     Morphine Nausea and Vomiting     Penicillins Hives     Tolerates ceftriaxone     Sulfa (Sulfonamide Antibiotics) [Sulfa Drugs] Hives       FAMILY HISTORY:  Family History   Problem Relation Age of Onset     Myocardial Infarction Father      Cancer Father      Chronic Obstructive Pulmonary Disease Father      Heart Disease Father      Diabetes Sister      Diabetes Brother      Diabetes Maternal Grandmother      Melanoma Son      Arthritis Mother      Acute Myocardial Infarction Father 62.00     Diabetes Sister      Diabetes Brother      Melanoma Son      Kidney failure Son      Glomerulonephritis Son        SOCIAL HISTORY:   Social History     Socioeconomic History     Marital status:    Tobacco Use     Smoking status: Former Smoker     Packs/day: 1.00     Years: 43.00     Pack years: 43.00     Start date: 1965     Quit date: 2008     Years since quittin.9     Smokeless tobacco: Never Used   Substance and Sexual Activity     Alcohol use: Yes     Comment: Alcoholic Drinks/day: rare     Drug use: No     Comment: Drug use: takes percocet for chronic pain     Sexual activity: Not Currently   Social History Narrative    Born in [place]. Lives with her son. Lost one son when he was age 30 due to melanoma. Moved around growing up since her father was in the  ". Play games on phone and watch television.         PHYSICAL EXAM    VITAL SIGNS: BP (!) 142/67   Pulse 99   Temp 97.9  F (36.6  C) (Oral)   Resp (!) 33   Ht 1.727 m (5' 8\")   Wt 102.5 kg (226 lb)   SpO2 97%   BMI 34.36 kg/m     GENERAL: Awake, Alert, answering questions, No acute distress, Well nourished  HEENT: Normal cephalic, Atraumatic, bilateral external ears normal, No scleral icterus, mask in place  NECK: No obvious swelling or abnormality, No stridor  PULMONARY:Normal and symmetric breath sounds, No respiratory distress, Lungs clear to auscultation bilaterally. No wheezing  CARDIOVASCULAR: Regular rate and rhythm, Distal pulses present and normal.  ABDOMINAL: Soft, Nondistended, Nontender, No flank tenderness, No palpable masses  BACK: Multiple areas of tenderness of the cervical, thoracic and lumbar spine.   EXTREMITIES: Moves all extremities spontaneously, warm, no edema, No major deformities  NEURO: No facial droop, normal motor function, Normal speech   PSYCH: Normal mood and affect  SKIN: No rashes on visualized skin, dry, warm     LAB:  All pertinent labs reviewed and interpreted.  Results for orders placed or performed during the hospital encounter of 07/16/22   Head CT w/o contrast    Impression    IMPRESSION:  1.  No CT evidence for acute intracranial process.  2.  Brain atrophy and presumed chronic microvascular ischemic changes as above.   Cervical spine CT w/o contrast    Impression    IMPRESSION:  1.  No acute cervical spine fracture.   CT Thoracic Spine w/o Contrast    Impression    IMPRESSION:  1.  No acute thoracic spine fracture.     Lumbar spine CT w/o contrast    Impression    IMPRESSION:  1.  No acute lumbar spine fracture.   XR Chest Port 1 View    Impression    IMPRESSION: Heart size within normal limits for portable technique. Pulmonary vascularity normal. Curvilinear strands of fibrosis right upper lobe. The lungs are otherwise clear. Epidural stimulation wires mid " thoracic line.   Comprehensive metabolic panel   Result Value Ref Range    Sodium 139 136 - 145 mmol/L    Potassium 4.0 3.5 - 5.0 mmol/L    Chloride 100 98 - 107 mmol/L    Carbon Dioxide (CO2) 28 22 - 31 mmol/L    Anion Gap 11 5 - 18 mmol/L    Urea Nitrogen 18 8 - 28 mg/dL    Creatinine 1.39 (H) 0.60 - 1.10 mg/dL    Calcium 9.2 8.5 - 10.5 mg/dL    Glucose 295 (H) 70 - 125 mg/dL    Alkaline Phosphatase 57 45 - 120 U/L    AST 16 0 - 40 U/L    ALT 13 0 - 45 U/L    Protein Total 6.7 6.0 - 8.0 g/dL    Albumin 3.6 3.5 - 5.0 g/dL    Bilirubin Total 0.7 0.0 - 1.0 mg/dL    GFR Estimate 40 (L) >60 mL/min/1.73m2   Result Value Ref Range    INR 1.15 0.85 - 1.15   Partial thromboplastin time   Result Value Ref Range    aPTT 27 22 - 38 Seconds   Alcohol ethyl   Result Value Ref Range    Alcohol, Blood <10 None detected mg/dL   CBC with platelets and differential   Result Value Ref Range    WBC Count 7.7 4.0 - 11.0 10e3/uL    RBC Count 4.46 3.80 - 5.20 10e6/uL    Hemoglobin 13.9 11.7 - 15.7 g/dL    Hematocrit 40.7 35.0 - 47.0 %    MCV 91 78 - 100 fL    MCH 31.2 26.5 - 33.0 pg    MCHC 34.2 31.5 - 36.5 g/dL    RDW 12.8 10.0 - 15.0 %    Platelet Count 114 (L) 150 - 450 10e3/uL    % Neutrophils 74 %    % Lymphocytes 12 %    % Monocytes 10 %    % Eosinophils 2 %    % Basophils 1 %    % Immature Granulocytes 1 %    NRBCs per 100 WBC 0 <1 /100    Absolute Neutrophils 5.8 1.6 - 8.3 10e3/uL    Absolute Lymphocytes 0.9 0.8 - 5.3 10e3/uL    Absolute Monocytes 0.8 0.0 - 1.3 10e3/uL    Absolute Eosinophils 0.2 0.0 - 0.7 10e3/uL    Absolute Basophils 0.0 0.0 - 0.2 10e3/uL    Absolute Immature Granulocytes 0.0 <=0.4 10e3/uL    Absolute NRBCs 0.0 10e3/uL   Result Value Ref Range    Magnesium 1.4 (L) 1.8 - 2.6 mg/dL   Lactic acid whole blood   Result Value Ref Range    Lactic Acid 3.4 (H) 0.7 - 2.0 mmol/L   UA with Microscopic reflex to Culture    Specimen: Urine, Clean Catch   Result Value Ref Range    Color Urine Light Yellow Colorless, Straw,  Light Yellow, Yellow    Appearance Urine Clear Clear    Glucose Urine 150  (A) Negative mg/dL    Bilirubin Urine Negative Negative    Ketones Urine Negative Negative mg/dL    Specific Gravity Urine 1.017 1.001 - 1.030    Blood Urine Negative Negative    pH Urine 7.0 5.0 - 7.0    Protein Albumin Urine 10  (A) Negative mg/dL    Urobilinogen Urine <2.0 <2.0 mg/dL    Nitrite Urine Negative Negative    Leukocyte Esterase Urine Negative Negative    RBC Urine 1 <=2 /HPF    WBC Urine 1 <=5 /HPF   Symptomatic; Unknown Influenza A/B & SARS-CoV2 (COVID-19) Virus PCR Multiplex Nasopharyngeal    Specimen: Nasopharyngeal; Swab   Result Value Ref Range    Influenza A PCR Negative Negative    Influenza B PCR Negative Negative    SARS CoV2 PCR Positive (A) Negative   Lactic acid whole blood   Result Value Ref Range    Lactic Acid 2.0 0.7 - 2.0 mmol/L       RADIOLOGY:  Lumbar spine CT w/o contrast   Final Result   IMPRESSION:   1.  No acute lumbar spine fracture.      CT Thoracic Spine w/o Contrast   Final Result   IMPRESSION:   1.  No acute thoracic spine fracture.         Cervical spine CT w/o contrast   Final Result   IMPRESSION:   1.  No acute cervical spine fracture.      Head CT w/o contrast   Final Result   IMPRESSION:   1.  No CT evidence for acute intracranial process.   2.  Brain atrophy and presumed chronic microvascular ischemic changes as above.      XR Chest Port 1 View   Final Result   IMPRESSION: Heart size within normal limits for portable technique. Pulmonary vascularity normal. Curvilinear strands of fibrosis right upper lobe. The lungs are otherwise clear. Epidural stimulation wires mid thoracic line.              EKG:    Date and time: July 16, 2022 at 1800  Rate: 105 bpm  Rhythm: Sinus tachycardia  AR interval: 194 ms  QRS interval: 136 ms  QT/QTc: 364/481 ms  ST changes or T wave changes: No acute ST or T wave abnormalities  Change from prior ECG: No significant change from prior  I have independently reviewed  and interpreted this EKG.     I, Kandace Anderson, am serving as a scribe to document services personally performed by Dr. Grover based on my observation and the provider's statements to me. I, Petty Grover MD attest that Kandace Anderson is acting in a scribe capacity, has observed my performance of the services and has documented them in accordance with my direction.    Petty Grover M.D.  Emergency Medicine  [facility] EMERGENCY DEPARTMENT  [address] [place] [id number]  [phone]  Dept: [phone]     Petty Grover MD  07/16/22 2996

## 2022-07-16 NOTE — ED NOTES
Bed: JNED-15  Expected date: 7/16/22  Expected time: 5:36 PM  Means of arrival: Ambulance  Comments:  Kevin 646- 72yof fall weak, hit head, + thinners, temp 101.4 from home

## 2022-07-17 ENCOUNTER — APPOINTMENT (OUTPATIENT)
Dept: PHYSICAL THERAPY | Facility: HOSPITAL | Age: 72
End: 2022-07-17
Attending: STUDENT IN AN ORGANIZED HEALTH CARE EDUCATION/TRAINING PROGRAM
Payer: COMMERCIAL

## 2022-07-17 ENCOUNTER — APPOINTMENT (OUTPATIENT)
Dept: OCCUPATIONAL THERAPY | Facility: HOSPITAL | Age: 72
End: 2022-07-17
Attending: STUDENT IN AN ORGANIZED HEALTH CARE EDUCATION/TRAINING PROGRAM
Payer: COMMERCIAL

## 2022-07-17 ENCOUNTER — APPOINTMENT (OUTPATIENT)
Dept: CARDIOLOGY | Facility: HOSPITAL | Age: 72
End: 2022-07-17
Attending: STUDENT IN AN ORGANIZED HEALTH CARE EDUCATION/TRAINING PROGRAM
Payer: COMMERCIAL

## 2022-07-17 LAB
ALBUMIN SERPL-MCNC: 3.2 G/DL (ref 3.5–5)
ALP SERPL-CCNC: 46 U/L (ref 45–120)
ALT SERPL W P-5'-P-CCNC: 11 U/L (ref 0–45)
ANION GAP SERPL CALCULATED.3IONS-SCNC: 8 MMOL/L (ref 5–18)
AST SERPL W P-5'-P-CCNC: 20 U/L (ref 0–40)
BASOPHILS # BLD AUTO: 0 10E3/UL (ref 0–0.2)
BASOPHILS NFR BLD AUTO: 1 %
BILIRUB SERPL-MCNC: 0.7 MG/DL (ref 0–1)
BUN SERPL-MCNC: 16 MG/DL (ref 8–28)
C REACTIVE PROTEIN LHE: 1.4 MG/DL (ref 0–?)
CALCIUM SERPL-MCNC: 8.4 MG/DL (ref 8.5–10.5)
CHLORIDE BLD-SCNC: 103 MMOL/L (ref 98–107)
CO2 SERPL-SCNC: 27 MMOL/L (ref 22–31)
CREAT SERPL-MCNC: 1.13 MG/DL (ref 0.6–1.1)
D DIMER PPP FEU-MCNC: 1.55 UG/ML FEU (ref 0–0.5)
EOSINOPHIL # BLD AUTO: 0.1 10E3/UL (ref 0–0.7)
EOSINOPHIL NFR BLD AUTO: 1 %
ERYTHROCYTE [DISTWIDTH] IN BLOOD BY AUTOMATED COUNT: 13 % (ref 10–15)
FIBRINOGEN PPP-MCNC: 349 MG/DL (ref 170–490)
GFR SERPL CREATININE-BSD FRML MDRD: 51 ML/MIN/1.73M2
GLUCOSE BLD-MCNC: 174 MG/DL (ref 70–125)
GLUCOSE BLDC GLUCOMTR-MCNC: 222 MG/DL (ref 70–99)
GLUCOSE BLDC GLUCOMTR-MCNC: 230 MG/DL (ref 70–99)
GLUCOSE BLDC GLUCOMTR-MCNC: 234 MG/DL (ref 70–99)
GLUCOSE BLDC GLUCOMTR-MCNC: 246 MG/DL (ref 70–99)
GLUCOSE BLDC GLUCOMTR-MCNC: 286 MG/DL (ref 70–99)
HCT VFR BLD AUTO: 37.3 % (ref 35–47)
HGB BLD-MCNC: 12.7 G/DL (ref 11.7–15.7)
IMM GRANULOCYTES # BLD: 0 10E3/UL
IMM GRANULOCYTES NFR BLD: 0 %
LVEF ECHO: NORMAL
LYMPHOCYTES # BLD AUTO: 1.4 10E3/UL (ref 0.8–5.3)
LYMPHOCYTES NFR BLD AUTO: 21 %
MCH RBC QN AUTO: 31.1 PG (ref 26.5–33)
MCHC RBC AUTO-ENTMCNC: 34 G/DL (ref 31.5–36.5)
MCV RBC AUTO: 91 FL (ref 78–100)
MONOCYTES # BLD AUTO: 1 10E3/UL (ref 0–1.3)
MONOCYTES NFR BLD AUTO: 14 %
NEUTROPHILS # BLD AUTO: 4.2 10E3/UL (ref 1.6–8.3)
NEUTROPHILS NFR BLD AUTO: 63 %
NRBC # BLD AUTO: 0 10E3/UL
NRBC BLD AUTO-RTO: 0 /100
PLATELET # BLD AUTO: 110 10E3/UL (ref 150–450)
POTASSIUM BLD-SCNC: 3.8 MMOL/L (ref 3.5–5)
PROT SERPL-MCNC: 6.2 G/DL (ref 6–8)
RBC # BLD AUTO: 4.08 10E6/UL (ref 3.8–5.2)
SODIUM SERPL-SCNC: 138 MMOL/L (ref 136–145)
WBC # BLD AUTO: 6.7 10E3/UL (ref 4–11)

## 2022-07-17 PROCEDURE — 97535 SELF CARE MNGMENT TRAINING: CPT | Mod: GO

## 2022-07-17 PROCEDURE — 97161 PT EVAL LOW COMPLEX 20 MIN: CPT | Mod: GP

## 2022-07-17 PROCEDURE — 36415 COLL VENOUS BLD VENIPUNCTURE: CPT | Performed by: STUDENT IN AN ORGANIZED HEALTH CARE EDUCATION/TRAINING PROGRAM

## 2022-07-17 PROCEDURE — 82962 GLUCOSE BLOOD TEST: CPT

## 2022-07-17 PROCEDURE — 86140 C-REACTIVE PROTEIN: CPT | Performed by: STUDENT IN AN ORGANIZED HEALTH CARE EDUCATION/TRAINING PROGRAM

## 2022-07-17 PROCEDURE — 97116 GAIT TRAINING THERAPY: CPT | Mod: GP

## 2022-07-17 PROCEDURE — 96361 HYDRATE IV INFUSION ADD-ON: CPT

## 2022-07-17 PROCEDURE — 99226 PR SUBSEQUENT OBSERVATION CARE,LEVEL III: CPT | Performed by: HOSPITALIST

## 2022-07-17 PROCEDURE — 80053 COMPREHEN METABOLIC PANEL: CPT | Performed by: STUDENT IN AN ORGANIZED HEALTH CARE EDUCATION/TRAINING PROGRAM

## 2022-07-17 PROCEDURE — 250N000011 HC RX IP 250 OP 636: Performed by: HOSPITALIST

## 2022-07-17 PROCEDURE — 97166 OT EVAL MOD COMPLEX 45 MIN: CPT | Mod: GO

## 2022-07-17 PROCEDURE — 85384 FIBRINOGEN ACTIVITY: CPT | Performed by: STUDENT IN AN ORGANIZED HEALTH CARE EDUCATION/TRAINING PROGRAM

## 2022-07-17 PROCEDURE — 250N000013 HC RX MED GY IP 250 OP 250 PS 637: Performed by: STUDENT IN AN ORGANIZED HEALTH CARE EDUCATION/TRAINING PROGRAM

## 2022-07-17 PROCEDURE — 250N000013 HC RX MED GY IP 250 OP 250 PS 637: Performed by: HOSPITALIST

## 2022-07-17 PROCEDURE — 85025 COMPLETE CBC W/AUTO DIFF WBC: CPT | Performed by: STUDENT IN AN ORGANIZED HEALTH CARE EDUCATION/TRAINING PROGRAM

## 2022-07-17 PROCEDURE — 96372 THER/PROPH/DIAG INJ SC/IM: CPT | Performed by: STUDENT IN AN ORGANIZED HEALTH CARE EDUCATION/TRAINING PROGRAM

## 2022-07-17 PROCEDURE — G0378 HOSPITAL OBSERVATION PER HR: HCPCS

## 2022-07-17 PROCEDURE — 250N000012 HC RX MED GY IP 250 OP 636 PS 637: Performed by: STUDENT IN AN ORGANIZED HEALTH CARE EDUCATION/TRAINING PROGRAM

## 2022-07-17 PROCEDURE — 258N000003 HC RX IP 258 OP 636: Performed by: HOSPITALIST

## 2022-07-17 PROCEDURE — 93306 TTE W/DOPPLER COMPLETE: CPT

## 2022-07-17 PROCEDURE — 93306 TTE W/DOPPLER COMPLETE: CPT | Mod: 26 | Performed by: INTERNAL MEDICINE

## 2022-07-17 PROCEDURE — 250N000011 HC RX IP 250 OP 636: Performed by: STUDENT IN AN ORGANIZED HEALTH CARE EDUCATION/TRAINING PROGRAM

## 2022-07-17 PROCEDURE — 96375 TX/PRO/DX INJ NEW DRUG ADDON: CPT

## 2022-07-17 PROCEDURE — 85379 FIBRIN DEGRADATION QUANT: CPT | Performed by: STUDENT IN AN ORGANIZED HEALTH CARE EDUCATION/TRAINING PROGRAM

## 2022-07-17 RX ORDER — SENNOSIDES 8.6 MG
8.6 TABLET ORAL 2 TIMES DAILY
Status: DISCONTINUED | OUTPATIENT
Start: 2022-07-17 | End: 2022-07-18 | Stop reason: HOSPADM

## 2022-07-17 RX ADMIN — AMLODIPINE BESYLATE 10 MG: 5 TABLET ORAL at 09:19

## 2022-07-17 RX ADMIN — OXYCODONE HYDROCHLORIDE AND ACETAMINOPHEN 1 TABLET: 5; 325 TABLET ORAL at 12:30

## 2022-07-17 RX ADMIN — HYDROXYCHLOROQUINE SULFATE 200 MG: 200 TABLET ORAL at 20:45

## 2022-07-17 RX ADMIN — INSULIN GLARGINE 10 UNITS: 100 INJECTION, SOLUTION SUBCUTANEOUS at 20:47

## 2022-07-17 RX ADMIN — FOLIC ACID 1 MG: 1 TABLET ORAL at 09:19

## 2022-07-17 RX ADMIN — INSULIN ASPART 1 UNITS: 100 INJECTION, SOLUTION INTRAVENOUS; SUBCUTANEOUS at 00:07

## 2022-07-17 RX ADMIN — HYDROXYCHLOROQUINE SULFATE 200 MG: 200 TABLET ORAL at 00:06

## 2022-07-17 RX ADMIN — DULOXETINE HYDROCHLORIDE 60 MG: 60 CAPSULE, DELAYED RELEASE PELLETS ORAL at 09:19

## 2022-07-17 RX ADMIN — INSULIN ASPART 1 UNITS: 100 INJECTION, SOLUTION INTRAVENOUS; SUBCUTANEOUS at 22:44

## 2022-07-17 RX ADMIN — SENNOSIDES 8.6 MG: 8.6 TABLET, FILM COATED ORAL at 12:30

## 2022-07-17 RX ADMIN — POLYETHYLENE GLYCOL 3350 17 G: 17 POWDER, FOR SOLUTION ORAL at 09:18

## 2022-07-17 RX ADMIN — ATORVASTATIN CALCIUM 40 MG: 40 TABLET, FILM COATED ORAL at 00:06

## 2022-07-17 RX ADMIN — INSULIN GLARGINE 10 UNITS: 100 INJECTION, SOLUTION SUBCUTANEOUS at 00:05

## 2022-07-17 RX ADMIN — ENOXAPARIN SODIUM 40 MG: 40 INJECTION SUBCUTANEOUS at 20:45

## 2022-07-17 RX ADMIN — SODIUM CHLORIDE 50 ML: 9 INJECTION, SOLUTION INTRAVENOUS at 14:16

## 2022-07-17 RX ADMIN — ATORVASTATIN CALCIUM 40 MG: 40 TABLET, FILM COATED ORAL at 20:45

## 2022-07-17 RX ADMIN — METOPROLOL SUCCINATE 50 MG: 25 TABLET, EXTENDED RELEASE ORAL at 09:19

## 2022-07-17 RX ADMIN — ACETAMINOPHEN 650 MG: 325 TABLET ORAL at 20:44

## 2022-07-17 RX ADMIN — REMDESIVIR 200 MG: 100 INJECTION, POWDER, LYOPHILIZED, FOR SOLUTION INTRAVENOUS at 12:31

## 2022-07-17 RX ADMIN — HYDROXYCHLOROQUINE SULFATE 200 MG: 200 TABLET ORAL at 09:21

## 2022-07-17 ASSESSMENT — ACTIVITIES OF DAILY LIVING (ADL)
NUMBER_OF_TIMES_PATIENT_HAS_FALLEN_WITHIN_LAST_SIX_MONTHS: 1
DEPENDENT_IADLS:: TRANSPORTATION

## 2022-07-17 NOTE — PROGRESS NOTES
07/17/22 1315   Quick Adds   Type of Visit Initial PT Evaluation   Living Environment   People in Home child(zulma), adult  (son)   Current Living Arrangements apartment   Home Accessibility stairs to enter home   Number of Stairs, Main Entrance greater than 10 stairs  (13 or so)   Stair Railings, Main Entrance railings safe and in good condition   Transportation Anticipated family or friend will provide   Self-Care   Usual Activity Tolerance moderate   Current Activity Tolerance fair   Equipment Currently Used at Home cane, straight;walker, rolling  (FWW and 4WW)   Fall history within last six months yes   Number of times patient has fallen within last six months 1   General Information   Onset of Illness/Injury or Date of Surgery 07/16/22   Referring Physician Landon Koenig MD   Patient/Family Therapy Goals Statement (PT) return home   Pertinent History of Current Problem (include personal factors and/or comorbidities that impact the POC) Fall, gen weakness, Covid+   Strength (Manual Muscle Testing)   Strength (Manual Muscle Testing) Deficits observed during functional mobility   Bed Mobility   Bed Mobility supine-sit;sit-supine   Supine-Sit [place] (Bed Mobility) supervision   Sit-Supine [place] (Bed Mobility) supervision   Assistive Device (Bed Mobility) bed rails   Transfers   Transfers sit-stand transfer   Sit-Stand Transfer   Sit-Stand [place] (Transfers) contact guard   Assistive Device (Sit-Stand Transfers) walker, front-wheeled   Gait/Stairs (Locomotion)   [place] Level (Gait) contact guard   Assistive Device (Gait) walker, front-wheeled   Distance in Feet (Required for LE Total Joints) 10'   Pattern (Gait) step-through   Deviations/Abnormal Patterns (Gait) gait speed decreased   Clinical Impression   Criteria for Skilled Therapeutic Intervention Yes, treatment indicated   PT Diagnosis (PT) Impaired functional mobility   Influenced by the following impairments Weakness,  fatigue   Functional limitations due to impairments Impaired transfers, impaired gait   Clinical Presentation (PT Evaluation Complexity) Stable/Uncomplicated   Clinical Presentation Rationale Presents as diagnosed   Clinical Decision Making (Complexity) low complexity   Planned Therapy Interventions (PT) bed mobility training;gait training;stair training;strengthening;transfer training   Anticipated Equipment Needs at Discharge (PT) walker, rolling   Risk & Benefits of therapy have been explained patient   PT Discharge Planning   PT Discharge Recommendation (DC Rec) home with assist;home with home care physical therapy   Total Evaluation Time   Total Evaluation Time (Minutes) 8   Physical Therapy Goals   PT Frequency Daily   PT Predicted Duration/Target Date for Goal Attainment 07/22/22   PT Goals Bed Mobility;Transfers;Gait   PT: Bed Mobility Independent;Supine to/from sit   PT: Transfers Modified independent;Sit to/from stand;Bed to/from chair;Assistive device   PT: Gait Supervision/stand-by assist;Rolling walker;100 feet       Susannah Mckeon, PT, DPT  7/17/2022

## 2022-07-17 NOTE — PLAN OF CARE
[facility]      OUTPATIENT OCCUPATIONAL THERAPY  EVALUATION  PLAN OF TREATMENT FOR OUTPATIENT REHABILITATION  (COMPLETE FOR INITIAL CLAIMS ONLY)  Patient's Last Name, First Name, M.I.  YOB: 1950  Ebonie Bowman                          Provider's Name  [facility] Medical Record No.  [id number]                               Onset Date:  07/16/22   Start of Care Date:        Type:     ___PT   _X_OT   ___SLP Medical Diagnosis:                           OT Diagnosis:  decreased ADLs   Visits from SOC:  1   _________________________________________________________________________________  Plan of Treatment/Functional Goals    Planned Interventions: ADL retraining, strengthening, transfer training, progressive activity/exercise   Goals: See Occupational Therapy Goals on Care Plan in [employer] electronic health record.    Therapy Frequency: Daily  Predicted Duration of Therapy Intervention: 07/22/22  _________________________________________________________________________________    I CERTIFY THE NEED FOR THESE SERVICES FURNISHED UNDER        THIS PLAN OF TREATMENT AND WHILE UNDER MY CARE     (Physician co-signature of this document indicates review and certification of the therapy plan).                 ,      Referring Physician: Dr. Landon Koenig            Initial Assessment        See Occupational Therapy evaluation dated   in Epic electronic health record.              Goal Outcome Evaluation:

## 2022-07-17 NOTE — PHARMACY-ADMISSION MEDICATION HISTORY
Pharmacy Note - Admission Medication History  _____________________________________________________________________    Prior To Admission (PTA) med list completed and updated in EMR.       PTA Med List   Medication Sig Note Last Dose     amLODIPine (NORVASC) 10 MG tablet [AMLODIPINE (NORVASC) 10 MG TABLET] TAKE 1 TABLET EVERY DAY  7/16/2022 at am     ascorbic acid (ASCORBIC ACID WITH LATRICIA HIPS) 500 MG tablet Take 500 mg by mouth daily   7/16/2022 at am     atorvastatin (LIPITOR) 40 MG tablet Take 1 tablet (40 mg) by mouth At Bedtime  7/15/2022 at hs     b complex vitamins tablet Take 1 tablet by mouth daily   7/16/2022 at am     DULoxetine (CYMBALTA) 60 MG capsule TAKE 1 CAPSULE BY MOUTH DAILY  7/16/2022 at am     etanercept (ENBREL) 50 MG/ML injection Inject 1 mL (50 mg) Subcutaneous once a week Hold for signs of infection, and seek medical attention.       fish oil-omega-3 fatty acids 1000 MG capsule Take 1 g by mouth daily  7/16/2022 at am     folic acid (FOLVITE) 1 MG tablet Take one tab daily, except the day when taking methotrexate.  7/16/2022 at am     hydroxychloroquine (PLAQUENIL) 200 MG tablet Take 1 tablet (200 mg) by mouth 2 times daily  7/16/2022 at am     insulin  UNIT/ML injection Inject 54 Units Subcutaneous every morning AND 30 Units every evening.  7/16/2022 at am     lisinopril (ZESTRIL) 10 MG tablet Take 1 tablet (10 mg) by mouth At Bedtime  7/15/2022 at hs     loratadine (CLARITIN) 10 MG tablet Take 1 tablet (10 mg) by mouth At Bedtime  7/15/2022 at hs     methotrexate 2.5 MG tablet Take 3 tablets (7.5 mg) by mouth once a week 7/16/2022: Fridays 7/15/2022     metoprolol succinate ER (TOPROL XL) 50 MG 24 hr tablet [METOPROLOL SUCCINATE (TOPROL-XL) 50 MG 24 HR TABLET] TAKE 1 TABLET EVERY DAY  7/16/2022 at am     MULTIVITAMIN ORAL Take 1 tablet by mouth daily   7/16/2022 at am     oxyCODONE-acetaminophen (PERCOCET) 5-325 MG tablet Take 1-2 tablets by mouth every 6 hours as needed for severe  pain (Max of 6 tablets daily) For 6/30/22-7/30/22 7/16/2022 at am     traZODone (DESYREL) 150 MG tablet Take 1 tablet (150 mg) by mouth At Bedtime (Patient taking differently: Take 150 mg by mouth nightly as needed for sleep)  Past Month at hs     Vitamin D3 (CHOLECALCIFEROL) 25 mcg (1000 units) tablet Take 1,000 Units by mouth daily   7/16/2022 at am       Information source(s): Patient, Family member, Clinic records and [facility]  Method of interview communication: in-person and phone    Summary of Changes to PTA Med List  New: nothing   Discontinued: nystatin cream  Changed: trazodone to prn    Patient was asked about OTC/herbal products specifically.  PTA med list reflects this.    In the past week, patient estimated taking medication this percent of the time:  50-90% due to running out and other.    Allergies were reviewed, assessed, and updated with the patient.      Patient did not bring any medications to the hospital and can't retrieve from home. No multi-dose medications are available for use during hospital stay.     The information provided in this note is only as accurate as the sources available at the time of the update(s).    Thank you for the opportunity to participate in the care of this patient.    Dada Castro [facility]  7/16/2022 9:27 PM

## 2022-07-17 NOTE — PLAN OF CARE
Goal Outcome Evaluation:      COVID precautions in place. Started IV remdesivir. Tolerated well. TELE SR with 1st degree AVB, BBB, and prolonged QT. Worked with PT/OT today.     Pt ripped out IV this AM d/t needing to get up to the bathroom. Pt had moderate amount of blood in her bed. New IV placed.     Transfers with A1 to commode.

## 2022-07-17 NOTE — PROGRESS NOTES
[facility]      OUTPATIENT PHYSICAL THERAPY EVALUATION  PLAN OF TREATMENT FOR OUTPATIENT REHABILITATION  (COMPLETE FOR INITIAL CLAIMS ONLY)  Patient's Last Name, First Name, M.I.  YOB: 1950  Ebonie Bowman                        Provider's Name  [facility] Medical Record No.  [id number]                               Onset Date:  07/16/22   Start of Care Date:  07/17/22      Type:     _X_PT   ___OT   ___SLP Medical Diagnosis:  Fall                        PT Diagnosis:  Impaired functional mobility   Visits from SOC:  1   _________________________________________________________________________________  Plan of Treatment/Functional Goals    Planned Interventions: bed mobility training, gait training, stair training, strengthening, transfer training     Goals: See Physical Therapy Goals on Care Plan in [employer] electronic health record.    Therapy Frequency: Daily  Predicted Duration of Therapy Intervention: 07/22/22  _________________________________________________________________________________    I CERTIFY THE NEED FOR THESE SERVICES FURNISHED UNDER        THIS PLAN OF TREATMENT AND WHILE UNDER MY CARE     (Physician co-signature of this document indicates review and certification of the therapy plan).              Certification date from: 07/17/22, Certification date to: 07/24/22    Referring Physician: Landon Koenig MD            Initial Assessment        See Physical Therapy evaluation dated 07/17/22 in Epic electronic health record.

## 2022-07-17 NOTE — CONSULTS
Care Management Initial Consult    General Information  Assessment completed with: Patient, Ebonie Bowman  Type of CM/SW Visit: Initial Assessment    Primary Care Provider verified and updated as needed: Yes   Readmission within the last 30 days: no previous admission in last 30 days      Reason for Consult: discharge planning, other (see comments) (MOON needed)  Advance Care Planning: Advance Care Planning Reviewed: no concerns identified          Communication Assessment  Patient's communication style: spoken language (English or Bilingual)    Hearing Difficulty or Deaf: no   Wear Glasses or Blind: yes    Cognitive  Cognitive/Neuro/Behavioral: WDL        Orientation: disoriented to, time  Mood/Behavior: anxious          Living Environment:   People in home: child(zulma), adult (Son)  Son Karri  Current living Arrangements: apartment (No elevator-stairs up to apartment)      Able to return to prior arrangements: yes       Family/Social Support:  Care provided by: self  Provides care for: no one  Marital Status:   Children          Description of Support System: Supportive, Involved    Support Assessment: Adequate family and caregiver support    Current Resources:   Patient receiving home care services: No     Community Resources: DME  Equipment currently used at home: cane, straight, walker, rolling  Supplies currently used at home: None    Employment/Financial:  Employment Status: retired     Employment/ Comments: None  Financial Concerns:     Referral to Financial Worker: No       Lifestyle & Psychosocial Needs:  Social Determinants of Health     Tobacco Use: Medium Risk     Smoking Tobacco Use: Former Smoker     Smokeless Tobacco Use: Never Used   Alcohol Use: Not on file   Financial Resource Strain: Not on file   Food Insecurity: Not on file   Transportation Needs: Not on file   Physical Activity: Not on file   Stress: Not on file   Social Connections: Not on file   Intimate Partner Violence: Not on  file   Depression: Not at risk     PHQ-2 Score: 0   Housing Stability: Not on file       Functional Status:  Prior to admission patient needed assistance:   Dependent ADLs:: Independent  Dependent IADLs:: Transportation       Mental Health Status:  Mental Health Status: No Current Concerns       Chemical Dependency Status:  Chemical Dependency Status: No Current Concerns             Values/Beliefs:  Spiritual, Cultural Beliefs, Druze Practices, Values that affect care: no               Additional Information:  Spoke with patient over the phone due to COVID-19 positive status. From home with son in an apartment with out elevator access, steps up to apartment. Normally independent at baseline except for transportation. Son typically transports at baseline. PT and OT evaluations pending. Patient hoping to return home. Reviewed and discussed Medicare Outpatient Observation Notice (MOON) with patient; no questions at this time, agreeable to writer signing on her behalf. Copy placed in chart; copy for patient. Care manager to follow.     Spring Vela RN

## 2022-07-17 NOTE — PROGRESS NOTES
PRIMARY DIAGNOSIS: GENERALIZED WEAKNESS    OUTPATIENT/OBSERVATION GOALS TO BE MET BEFORE DISCHARGE  1. Orthostatic performed: No    2. Tolerating PO medications: Yes    3. Return to near baseline physical activity: No    4. Cleared for discharge by consultants (if involved): No    Discharge Planner Nurse   Safe discharge environment identified: No  Barriers to discharge: Yes, weakness.        Entered by: Randee Almonte RN 07/17/2022 5:41 AM     Please review provider order for any additional goals.   Nurse to notify provider when observation goals have been met and patient is ready for discharge    Pt reports no pain overnight. VSS on room air. Good strength, non-productive cough. Lung sounds course and diminished.  Tele is NSR/Stachy with 1*AVB and BBB. AO4.

## 2022-07-17 NOTE — ED NOTES
"[facility] ED Handoff Report    ED Chief Complaint: Weakness    ED Diagnosis:  (U07.1) Infection due to 2019 novel coronavirus  Comment:   Plan:     (W19.XXXA) Fall, initial encounter  Comment:   Plan:     (M62.81) Generalized muscle weakness  Comment:   Plan:        PMH:    Past Medical History:   Diagnosis Date     BBB (bundle branch block)      Chronic back pain      Chronic kidney disease     stage 3     Chronic kidney disease, unspecified     Created by Conversion      Depression      Diabetes (H)     Type 2     Diabetes mellitus, type 2 (H)     Created by Conversion      Frequent headaches      Ganglion of tendon sheath     Created by Conversion      GERD (gastroesophageal reflux disease)      HLD (hyperlipidemia)      Hypertension     Per H&P dated 1/03/2013     Incarcerated ventral hernia     Per H&P dated 1/03/2013     Osteoarthritis      Rheumatoid arthritis (H)      Shortness of breath      Thrombocytopenia (H)      Venous insufficiency         Code Status:  Prior     Falls Risk: Yes Band: Applied    Current Living Situation/Residence: lives with their son     Elimination Status: Continent: Yes - pure which, could stand to commode with assistance     Activity Level: 2 assist    Patients Preferred Language:  English     Needed: No    Vital Signs:  BP (!) 142/67   Pulse 99   Temp 97.9  F (36.6  C) (Oral)   Resp (!) 33   Ht 1.727 m (5' 8\")   Wt 102.5 kg (226 lb)   SpO2 97%   BMI 34.36 kg/m       Cardiac Rhythm:     Pain Score: 5/10    Is the Patient Confused:  Yes    Last Food or Drink: 07/16/22 at before admission to the ER    Focused Assessment:  Patient presented from home where she had fallen in the bathroom due to weakness. Ended up testing Covid Positive. Patient is a cognitively fuzzy having difficult using her phone. Able to follow commands. Patient stood at the side of the bed with RN and was very weak using RN to hold oneself up. Replaced Mag, gave fluids. Send " urine    Tests Performed: Done: Labs and Imaging    Treatments Provided:  Labs and mag     Family Dynamics/Concerns: No    Family Updated On Visitor Policy: Yes    Plan of Care Communicated to Family: Yes    Who Was Updated about Plan of Care: RN called Son    Belongings Checklist Done and Signed by Patient: No    Medications sent with patient: none    Covid: symptomatic , positive    Additional Information: none    RN: Renetta Guillermo RN   7/16/2022 8:52 PM

## 2022-07-17 NOTE — PROGRESS NOTES
.  Hospitalist Progress Note    Assessment/Plan  Active Problems:    Generalized muscle weakness    Fall, initial encounter    Infection due to 2019 novel coronavirus    72 year old female presented to ED after a fall incident at home, unclear of mechanism of fall..  Patient complains of diffuse pain over her neck lower back and left shoulder.  Trauma surveillance imaging did not show any fracture or bleeding.  Past medical history is significant for hypertension, diabetes mellitus, hyperlipidemia, CKD stage III, rheumatoid arthritis, chronic back pain.     S/p fall  -Unclear mechanism of fall.  Patient was admitted with syncope previously in October 2021.    - Imagings done on admission including lumbar, thoracic, cervical spine CT, and head CT did not show fracture or  evidence of acute intracranial process.  -Patient had a history of prolonged QT interval.  -Keep her on cardiac telemonitor.  -ECHO ordered  -Patient complaining of pain over left shoulder area.  Has had chronic pain issues on long-term opioid.     Generalized weakness  -Patient states she felt too weak to go home.  -OT/PT consulted  -On fall precautions     covid-19 infection  -Patient states having mild dry cough.  No history of fever, chills or rigors.  She also denies having shortness of breath.  -So2 stable on room air.  -Will start remedisivir X3 doses. Pt not hypoxic so hold off on decadron.  -Special isolation precaution  -Patient is unvaccinated      Hypomagnesemia  -Replace per protocol     Prolonged QTc interval  -Monitor and replace electrolytes as needed  -Patient has been on long-term Plaquenil and trazodone which are likely to prolong QT  -Avoid other QTc prolonging drugs as able     CKD stage III  -Creatinine is close to baseline.  Monitor input and output     Type 2 diabetes mellitus  -Lantus 10 units subcutaneous daily  -Moderate intensity insulin sliding scale  -Accu-Cheks  -Hypoglycemia protocol  -Hemoglobin A1c is 7.6 in May  2021     Hypertension  -PTA amlodipine 10 mg oral daily  -Metoprolol 50mg daily  -Monitor blood pressure      Barriers to Discharge:  Pain control, PT/OT eval    Anticipated discharge date/Disposition: 12 days    Subjective  .  Patient is new to me. Chart reviewed and events noted.  Patient seen and examined.  Reports back pain, acute on chronic. Some cough. No chest pain, SOB, diarrhea, fever.        Objective    Vital signs in last 24 hours  Temp:  [97.9  F (36.6  C)-100.3  F (37.9  C)] 98.7  F (37.1  C)  Pulse:  [] 105  Resp:  [18-33] 18  BP: (142-199)/(66-84) 161/72  SpO2:  [93 %-97 %] 96 % [unfilled] O2 Device: None (Room air)    Weight:   [unfilled] Weight change:     Intake/Output last 3 shifts  I/O last 3 completed shifts:  In: 0   Out: 400 [Urine:400]  Body mass index is 31.52 kg/m .    Physical Exam    General Appearance:    Alert, cooperative, no distress, appears stated age   Lungs:     CTAB   Cardiovascular:    RRR   Abdomen:     Soft, non-tender, non-distended   Neurologic:   Awake, alert, oriented, moving all extremities,speech fluent     Pertinent Labs   Lab Results: personally reviewed.   Recent Labs   Lab 07/17/22  0456 07/16/22  1800    139   CO2 27 28   BUN 16 18   ALBUMIN 3.2* 3.6   ALKPHOS 46 57   ALT 11 13   AST 20 16     Recent Labs   Lab 07/17/22  0456 07/16/22  1800   WBC 6.7 7.7   HGB 12.7 13.9   HCT 37.3 40.7   * 114*     No results for input(s): CKTOTAL, TROPONINI in the last 168 hours.    Invalid input(s): TROPONINT, CKMBINDEX  Invalid input(s): POCGLUFGR    Medications  Drug and lactation database from the [place] National Library of Medicine:  http://toxnet.nlm.nih.gov/cgi-bin/sis/htmlgen?LACT      Pertinent Radiology   Radiology Results:  Personally reviewed impressions    Reviewed labs in last 24 hours.    Advanced Care Planning:  Discharge Planning discussed with patient  Total time with this patient is 35 min with greater than 50% of time spent in  coordination of care.  Care discussed and coordinated with patient, RN..    This Note is created using dragon voice recognition software.  Errors in spelling or words which seems out of context are unintentional.  Sounds alike errors may have escaped editing.    Daisy Duncan MD  Hospitalist

## 2022-07-17 NOTE — H&P
ADMISSION HISTORY & PHYSICAL        Patient YOB: 1950  Admit date: 7/16/2022  Date of Service: 7/16/2022    ASSESSMENT AND PLAN:  72 year old female presenting to ED after a fall incident at home.  Patient complains of diffuse pain over her neck lower back and left shoulder.  Trauma surveillance imaging did not show any fracture or bleeding.  Past medical history is significant for hypertension, diabetes mellitus, hyperlipidemia, CKD stage III, rheumatoid arthritis, chronic back pain.    S/p fall  -Unclear mechanism of fall.  Patient was admitted with syncope previously in October 2021.    - Imagings done including lumbar, thoracic, cervical spine CT, and head CT did not show fracture or  evidence of acute intracranial process.  -Patient had a history of prolonged QT interval.  -Keep her on cardiac telemonitor. F/u echo  -Patient complaining of pain over left shoulder area.  Has had chronic pain issues on long-term opioid.    Generalized weakness  -Patient states she felt too weak to go home.  -OT/PT    covid-19 infection  -Patient states having mild dry cough.  No history of fever, chills or rigors.  She also denies having shortness of breath.  -We will do inflammatory markers.  Will defer COVID-19 specific treatment for now  -Special isolation precaution  -Patient is unvaccinated    Hypomagnesemia  -Replace per protocol    Prolonged QTc interval  -Monitor and replace electrolytes as needed  -Patient has been on long-term Plaquenil and trazodone which are likely to prolong QT  -Avoid other QTc prolonging drugs as able    CKD stage III  -Creatinine is close to baseline.  Monitor input and output    Type 2 diabetes mellitus  -Lantus 10 units subcutaneous daily  -Moderate intensity insulin sliding scale  -Accu-Cheks  -Hypoglycemia protocol  -Hemoglobin A1c is 7.6 in May 2021    Hypertension  -PTA amlodipine 10 mg oral daily  -Monitor blood pressure    CODE STATUS:  Prior    Disposition:  -Anticipated  Length of Stay in midnights and medical necessity (including a midnight in the Emergency Department after triage if applicable): <2 days       CHIEF COMPLAINT:  S/p fall    HISTORY OF PRESENTING ILLNESS:  Patient is a 72 year old female with PMH significant for hypertension, diabetes mellitus, hyperlipidemia, CKD stage III, rheumatoid arthritis, chronic back pain.  Brought to ED by her daughter for the above complaint.  Unclear mechanism of fall.  Trauma surveillance imaging did not show any fracture or bleeding.  Patient complains of left-sided shoulder.  Shoulder pain currently.  It was initially elevated which came back to normal after IV fluid being administered in ED.  Patient was found to have COVID-19 infection nevertheless does not have significant symptoms of COVID-19 except mild dry cough.  Patient will be admitted for OT PT evaluation due to having generalized muscle weakness.        PMH/PSH:  Patient Active Problem List   Diagnosis     S/P TKR (total knee replacement) using cement, left     Ataxia     Axonal neuropathy     Type 2 diabetes mellitus with hyperosmolarity without coma, with long-term current use of insulin (H)     Osteoarthrosis     Allergic rhinitis due to cats     Arthropathy of spinal facet joint concurrent with and due to effusion     HTN (hypertension)     Chronic back pain     Bilateral primary osteoarthritis of hip     Chronic pain     Controlled substance agreement signed, 2/14/22- Percocet     Depression     Chronic GERD     High risk medication use     HLD (hyperlipidemia)     Insomnia     Lumbar radiculopathy     Pain in joint, pelvic region and thigh     Positive FIT (fecal immunochemical test)     Prolonged QT interval     Radiculopathy of cervicothoracic region     Rheumatoid arthritis, involving unspecified site, unspecified whether rheumatoid factor present (H)     S/P insertion of spinal cord stimulator     Venous insufficiency     Stage 3b chronic kidney disease (H)      Syncope, unspecified syncope type     Generalized OA     Anxiety     Chronic low back pain with sciatica, sciatica laterality unspecified, unspecified back pain laterality     Bradycardia     Bilateral carpal tunnel syndrome     Generalized muscle weakness     Fall, initial encounter     Infection due to 2019 novel coronavirus       Past Medical History:   Diagnosis Date     BBB (bundle branch block)      Chronic back pain      Chronic kidney disease     stage 3     Chronic kidney disease, unspecified     Created by Conversion      Depression      Diabetes (H)     Type 2     Diabetes mellitus, type 2 (H)     Created by Conversion      Frequent headaches      Ganglion of tendon sheath     Created by Conversion      GERD (gastroesophageal reflux disease)      HLD (hyperlipidemia)      Hypertension     Per H&P dated 1/03/2013     Incarcerated ventral hernia     Per H&P dated 1/03/2013     Osteoarthritis      Rheumatoid arthritis (H)      Shortness of breath      Thrombocytopenia (H)      Venous insufficiency         Past Surgical History:   Procedure Laterality Date     BACK SURGERY       C UNLISTED PROCEDURE, ABDOMEN/PERITONEUM/OMENTUM      Description: Hernia Repair;  Recorded: 10/23/2013;     CHOLECYSTECTOMY       HERNIA REPAIR       HYSTERECTOMY  1984     IR FINE NEEDLE ASPIRATION  11/29/2020     JOINT REPLACEMENT Left     knee     OTHER SURGICAL HISTORY  1990, 1995    Two left wrist     OTHER SURGICAL HISTORY  1996    FATTY TUMORLT SHOULDER BLADE     PICC MIDLINE INSERTION  11/30/2020          VT IMPLANT SPINAL NEUROSTIM/ Left 4/25/2019    Procedure: LEFT FLANK INCISION REPLACE NEUROSTIMULATOR;  Surgeon: Vishal Pretty MD;  Location: [facility];  Service: Pain     SPINAL CORD STIMULATOR IMPLANT            ALLERGIES:  Allergies   Allergen Reactions     Morphine Nausea and Vomiting     Penicillins Hives     Tolerates ceftriaxone     Sulfa (Sulfonamide Antibiotics) [Sulfa Drugs] Hives        MEDICATIONS:  Reviewed.  No current facility-administered medications for this encounter.     Current Outpatient Medications   Medication     alcohol swab prep pads     amLODIPine (NORVASC) 10 MG tablet     ascorbic acid (ASCORBIC ACID WITH LATRICIA HIPS) 500 MG tablet     atorvastatin (LIPITOR) 40 MG tablet     b complex vitamins tablet     Continuous Blood Gluc Sensor (FREESTYLE BRE 14 DAY SENSOR) MISC     DULoxetine (CYMBALTA) 60 MG capsule     etanercept (ENBREL) 50 MG/ML injection     fish oil-omega-3 fatty acids 1000 MG capsule     flash glucose scanning reader (FREESTYLE BRE 14 DAY READER) Misc     flash glucose sensor (FREESTYLE BRE 14 DAY SENSOR) Kit     folic acid (FOLVITE) 1 MG tablet     hydroxychloroquine (PLAQUENIL) 200 MG tablet     insulin  UNIT/ML injection     insulin syringe-needle U-100 (BD INSULIN SYRINGE ULT-FINE II) 1 mL 31 gauge x 5/16 Syrg     lancing device (ACCU-CHEK SOFTCLIX) Misc     lisinopril (ZESTRIL) 10 MG tablet     loratadine (CLARITIN) 10 MG tablet     methotrexate 2.5 MG tablet     metoprolol succinate ER (TOPROL XL) 50 MG 24 hr tablet     MULTIVITAMIN ORAL     nystatin (MYCOSTATIN) 884227 UNIT/GM external cream     oxyCODONE-acetaminophen (PERCOCET) 5-325 MG tablet     thin (NO BRAND SPECIFIED) lancets     traZODone (DESYREL) 150 MG tablet     Vitamin D3 (CHOLECALCIFEROL) 25 mcg (1000 units) tablet     No current facility-administered medications for this encounter.    Current Outpatient Medications:      alcohol swab prep pads, Use to swab area of injection/jacquelyn as directed., Disp: 100 each, Rfl: 3     amLODIPine (NORVASC) 10 MG tablet, [AMLODIPINE (NORVASC) 10 MG TABLET] TAKE 1 TABLET EVERY DAY, Disp: 90 tablet, Rfl: 2     ascorbic acid (ASCORBIC ACID WITH LATRICIA HIPS) 500 MG tablet, Take 500 mg by mouth daily , Disp: , Rfl:      atorvastatin (LIPITOR) 40 MG tablet, Take 1 tablet (40 mg) by mouth At Bedtime, Disp: 90 tablet, Rfl: 1     b complex vitamins tablet,  Take 1 tablet by mouth daily , Disp: , Rfl:      Continuous Blood Gluc Sensor (FREESTYLE BRE 14 DAY SENSOR) MISC, Change every 14 days. Use daily to scan blood sugars, use per manufactures guidelines. Pt already has reader., Disp: 2 each, Rfl: 11     DULoxetine (CYMBALTA) 60 MG capsule, TAKE 1 CAPSULE BY MOUTH DAILY, Disp: 90 capsule, Rfl: 1     etanercept (ENBREL) 50 MG/ML injection, Inject 1 mL (50 mg) Subcutaneous once a week Hold for signs of infection, and seek medical attention., Disp: 4 mL, Rfl: 3     fish oil-omega-3 fatty acids 1000 MG capsule, Take 1 g by mouth daily, Disp: , Rfl:      flash glucose scanning reader (FREESTYLE BRE 14 DAY READER) Misc, [FLASH GLUCOSE SCANNING READER (FREESTYLE BRE 14 DAY READER) MISC] Use 1 each As Directed see administration instructions. Use as directed with sensor, Disp: 1 each, Rfl: 0     flash glucose sensor (FREESTYLE BRE 14 DAY SENSOR) Kit, [FLASH GLUCOSE SENSOR (FREESTYLE BRE 14 DAY SENSOR) KIT] Use 1 each As Directed every 14 (fourteen) days., Disp: 2 kit, Rfl: 11     folic acid (FOLVITE) 1 MG tablet, Take one tab daily, except the day when taking methotrexate., Disp: 90 tablet, Rfl: 1     hydroxychloroquine (PLAQUENIL) 200 MG tablet, Take 1 tablet (200 mg) by mouth 2 times daily, Disp: 60 tablet, Rfl: 1     insulin  UNIT/ML injection, Inject 54 Units Subcutaneous every morning AND 30 Units every evening., Disp: 15 mL, Rfl: 3     insulin syringe-needle U-100 (BD INSULIN SYRINGE ULT-FINE II) 1 mL 31 gauge x 5/16 Syrg, [INSULIN SYRINGE-NEEDLE U-100 (BD INSULIN SYRINGE ULT-FINE II) 1 ML 31 GAUGE X 5/16 SYRG] USE 4 TIMES DAILY AS DIRECTED, Disp: 500 each, Rfl: 1     lancing device (ACCU-CHEK SOFTCLIX) Misc, [LANCING DEVICE (ACCU-CHEK SOFTCLIX) MISC] Use 1 applicator As Directed 4 (four) times a day., Disp: 300 each, Rfl: 3     lisinopril (ZESTRIL) 10 MG tablet, Take 1 tablet (10 mg) by mouth At Bedtime, Disp: 90 tablet, Rfl: 1     loratadine (CLARITIN)  10 MG tablet, Take 1 tablet (10 mg) by mouth At Bedtime, Disp: 90 tablet, Rfl: 3     methotrexate 2.5 MG tablet, Take 3 tablets (7.5 mg) by mouth once a week, Disp: 36 tablet, Rfl: 0     metoprolol succinate ER (TOPROL XL) 50 MG 24 hr tablet, [METOPROLOL SUCCINATE (TOPROL-XL) 50 MG 24 HR TABLET] TAKE 1 TABLET EVERY DAY, Disp: 90 tablet, Rfl: 3     MULTIVITAMIN ORAL, Take 1 tablet by mouth daily , Disp: , Rfl:      nystatin (MYCOSTATIN) 267531 UNIT/GM external cream, Apply topically 2 times daily, Disp: 30 g, Rfl: 1     oxyCODONE-acetaminophen (PERCOCET) 5-325 MG tablet, Take 1-2 tablets by mouth every 6 hours as needed for severe pain (Max of 6 tablets daily) For 22-22, Disp: 180 tablet, Rfl: 0     thin (NO BRAND SPECIFIED) lancets, Use with lanceting device. To accompany: Blood Glucose Monitor Brands: per insurance., Disp: 100 each, Rfl: 11     traZODone (DESYREL) 150 MG tablet, Take 1 tablet (150 mg) by mouth At Bedtime, Disp: 90 tablet, Rfl: 1     Vitamin D3 (CHOLECALCIFEROL) 25 mcg (1000 units) tablet, Take 1,000 Units by mouth daily , Disp: , Rfl:    Scheduled Meds:  Continuous Infusions:  PRN Meds:.    SOCIAL HISTORY:  Social History     Socioeconomic History     Marital status:      Spouse name: Not on file     Number of children: Not on file     Years of education: Not on file     Highest education level: Not on file   Occupational History     Not on file   Tobacco Use     Smoking status: Former Smoker     Packs/day: 1.00     Years: 43.00     Pack years: 43.00     Start date: 1965     Quit date: 2008     Years since quittin.9     Smokeless tobacco: Never Used   Substance and Sexual Activity     Alcohol use: Yes     Comment: Alcoholic Drinks/day: rare     Drug use: No     Comment: Drug use: takes percocet for chronic pain     Sexual activity: Not Currently   Other Topics Concern     Not on file   Social History Narrative    Born in [place]. Lives with her son. Lost one son when  "he was age 30 due to melanoma. Moved around growing up since her father was in the . Play games on phone and watch television.       Social Determinants of Health     Financial Resource Strain: Not on file   Food Insecurity: Not on file   Transportation Needs: Not on file   Physical Activity: Not on file   Stress: Not on file   Social Connections: Not on file   Intimate Partner Violence: Not on file   Housing Stability: Not on file       FAMILY HISTORY:  Family History   Problem Relation Age of Onset     Myocardial Infarction Father      Cancer Father      Chronic Obstructive Pulmonary Disease Father      Heart Disease Father      Diabetes Sister      Diabetes Brother      Diabetes Maternal Grandmother      Melanoma Son      Arthritis Mother      Acute Myocardial Infarction Father 62.00     Diabetes Sister      Diabetes Brother      Melanoma Son      Kidney failure Son      Glomerulonephritis Son    Reviewed    ROS:  12 point review of systems reviewed and is negative except for what has already been mentioned in HPI.       PHYSICAL EXAM:  GENRL: No distress noted.    BP (!) 142/67   Pulse 99   Temp 97.9  F (36.6  C) (Oral)   Resp (!) 33   Ht 1.727 m (5' 8\")   Wt 102.5 kg (226 lb)   SpO2 97%   BMI 34.36 kg/m    No intake/output data recorded.  No intake/output data recorded.  HEENT: NC/AT  CHEST: Clear to auscultation bilateral anteriorly, no ronchi or wheezing  HEART: S1S2 normal, regular.   ABDMN: Soft. Non-tender, non-distended.  No guarding or rigidity. Bowel sounds- active  EXTRM: No pedal edema   INTGM: No skin rash, no cyanosis or clubbing  MUSCULOSKELETAL: no joint tenderness or swelling on upper and lower extremities  NEURO: Alert and oriented. No focal neurological deficit.        DIAGNOSTIC DATA:    Recent Labs   Lab 07/16/22  1800   WBC 7.7   HGB 13.9   HCT 40.7   *       Recent Labs   Lab 07/16/22  1800      CO2 28   BUN 18       Recent Labs   Lab 07/16/22  1800   INR 1.15 "       Recent Labs   Lab 07/16/22  1800      CO2 28   BUN 18   ALBUMIN 3.6   ALKPHOS 57   ALT 13   AST 16       [unfilled]    XR Chest Port 1 View    Result Date: 7/16/2022  EXAM: XR CHEST PORT 1 VIEW LOCATION: [facility] DATE/TIME: 7/16/2022 6:15 PM INDICATION: fever, fall COMPARISON: 6/22/2019     IMPRESSION: Heart size within normal limits for portable technique. Pulmonary vascularity normal. Curvilinear strands of fibrosis right upper lobe. The lungs are otherwise clear. Epidural stimulation wires mid thoracic line.    Cervical spine CT w/o contrast    Result Date: 7/16/2022  EXAM: CT CERVICAL SPINE W/O CONTRAST LOCATION: [facility] DATE/TIME: 7/16/2022 6:58 PM INDICATION: Neck pain. COMPARISON: None. TECHNIQUE: Routine CT Cervical Spine without IV contrast. Multiplanar reformats. Dose reduction techniques were used. FINDINGS: VERTEBRA: Osteopenia. There is straightening of usual cervical lordosis. Cervical vertebral body heights are maintained. Ankylosis of the right C2-C3 facet joint. No acute cervical spine fracture. CANAL/FORAMINA: Moderate left neural foraminal stenosis at C3-C4. Moderate left and mild right neural foraminal stenosis at C4-C5. Mild left neural foraminal stenosis at C5-C6. PARASPINAL: No extraspinal abnormality.     IMPRESSION: 1.  No acute cervical spine fracture.    Head CT w/o contrast    Result Date: 7/16/2022  EXAM: CT HEAD W/O CONTRAST LOCATION: [facility] DATE/TIME: 7/16/2022 6:37 PM INDICATION: Head injury COMPARISON: None. TECHNIQUE: Routine CT Head without IV contrast. Multiplanar reformats. Dose reduction techniques were used. FINDINGS: INTRACRANIAL CONTENTS: No intracranial hemorrhage, extraaxial collection, or mass effect.  No CT evidence of acute infarct. Mild presumed chronic small vessel ischemic changes. Mild generalized volume loss. No hydrocephalus. VISUALIZED ORBITS/SINUSES/MASTOIDS:  Prior bilateral cataract surgery. Visualized portions of the orbits are otherwise unremarkable. No paranasal sinus mucosal disease. No middle ear or mastoid effusion. BONES/SOFT TISSUES: No acute abnormality.     IMPRESSION: 1.  No CT evidence for acute intracranial process. 2.  Brain atrophy and presumed chronic microvascular ischemic changes as above.    Lumbar spine CT w/o contrast    Result Date: 7/16/2022  EXAM: CT LUMBAR SPINE W/O CONTRAST LOCATION: [facility] DATE/TIME: 7/16/2022 6:58 PM INDICATION: Back pain. COMPARISON: None. TECHNIQUE: Routine CT Lumbar Spine without IV contrast. Multiplanar reformats. Dose reduction techniques were used. FINDINGS: VERTEBRA: Diffuse osteopenia. Lumbar spine lordosis is maintained. Lumbar vertebral body heights are maintained. No acute lumbar spine fracture. CANAL/FORAMINA: Mild to moderate lumbar spondylitic changes superimposed on mild developmental narrowing of the lumbar spinal canal. There is mild spinal canal stenosis at L1-L2 and L2-L3. PARASPINAL: Right renal cyst. Atherosclerotic vascular calcifications of aorta and bilateral common iliac arteries.     IMPRESSION: 1.  No acute lumbar spine fracture.    CT Thoracic Spine w/o Contrast    Result Date: 7/16/2022  EXAM: CT THORACIC SPINE W/O CONTRAST LOCATION: [facility] DATE/TIME: 7/16/2022 6:58 PM INDICATION: Back pain. COMPARISON: None. TECHNIQUE: Routine CT Thoracic Spine without IV contrast. Multiplanar reformats. Dose reduction techniques were used. FINDINGS: VERTEBRA: Osteopenia. Minimal leftward convex curvature of the upper thoracic spine and minimal rightward convex curvature of the lower thoracic spine. Thoracic vertebral body heights are maintained. Mild to moderate thoracic spondylitic changes. Spinal cord similar device within the dorsal epidural space extending superiorly to the level of the T5 vertebral body. No acute thoracic spine fracture.  CANAL/FORAMINA: No canal or neural foraminal stenosis. PARASPINAL: No extraspinal abnormality.     IMPRESSION: 1.  No acute thoracic spine fracture.       Patient's new lab studies reviewed personally.  Patient's new radiology reports reviewed personally.  I personally viewed and personally interpreted patient's EKG:     Note created using dragon voice recognition software.  Errors in spelling or words which seems out of context are unintentional.  Sounds alike errors may have escaped editing.     07/16/2022      Landon Koenig  [facility]

## 2022-07-17 NOTE — PROGRESS NOTES
07/17/22 1145   Quick Adds   Quick Adds Certification   Type of Visit Initial Occupational Therapy Evaluation   Living Environment   People in Home child(zulma), adult  (son)   Current Living Arrangements apartment   Home Accessibility stairs to enter home   Number of Stairs, Main Entrance greater than 10 stairs   Stair Railings, Main Entrance railings safe and in good condition   Living Environment Comments lives on 2nd floor apt, 13 steps, son works afternoons   Self-Care   Current Activity Tolerance moderate   Equipment Currently Used at Home walker, rolling   Fall history within last six months yes   Number of times patient has fallen within last six months 1   General Information   Onset of Illness/Injury or Date of Surgery 07/16/22   Referring Physician Dr. Landon Koenig   Additional Occupational Profile Info/Pertinent History of Current Problem 72 year old female presenting to ED after a fall incident at home.  Patient complains of diffuse pain over her neck lower back and left shoulder.  Trauma surveillance imaging did not show any fracture or bleeding.  Past medical history is significant for hypertension, diabetes mellitus, hyperlipidemia, CKD stage III, rheumatoid arthritis, chronic back pain.Covid +   Existing Precautions/Restrictions fall   Cognitive Status Examination   Affect/Mental Status (Cognitive) WFL   Follows Commands WFL   Visual Perception   Visual Impairment/Limitations corrective lenses full-time   Range of Motion Comprehensive   General Range of Motion no range of motion deficits identified   Strength Comprehensive (MMT)   General Manual Muscle Testing (MMT) Assessment no strength deficits identified   Coordination   Upper Extremity Coordination No deficits were identified   Bed Mobility   Bed Mobility supine-sit   Supine-Sit [place] (Bed Mobility) supervision   Assistive Device (Bed Mobility) bed rails   Transfers   Transfers bed-chair transfer  (commode)   Transfer Comments  commode trsf CGA   Transfer Skill: Bed to Chair/Chair to Bed   Bed-Chair [place] (Transfers) contact guard   Assistive Device (Bed-Chair Transfers) rolling walker   Balance   Balance Assessment standing balance: dynamic   Balance Comments WFLfor close trsf   Lower Body Dressing Assessment/Training   [place] Level (Lower Body Dressing) independent   Comment, (Lower Body Dressing) donned/doffed socks   Grooming Assessment/Training   [place] Level (Grooming) supervision   Comment, (Grooming) brushed teeth, washed face, hands   Toileting   [place] Level (Toileting) other (see comments)   Comment, (Toileting) sage care SBA, clothing management max A   Clinical Impression   Criteria for Skilled Therapeutic Interventions Met (OT) Yes, treatment indicated   OT Diagnosis decreased ADLs   OT Problem List-Impairments impacting ADL activity tolerance impaired;balance;mobility;strength   Assessment of Occupational Performance 3-5 Performance Deficits   Identified Performance Deficits drsg, toileting, g/h, trsfs, functional mobility   Planned Therapy Interventions (OT) ADL retraining;strengthening;transfer training;progressive activity/exercise   Clinical Decision Making Complexity (OT) moderate complexity   Anticipated Equipment Needs Upon Discharge (OT)   (cont. to assess)   Risk & Benefits of therapy have been explained evaluation/treatment results reviewed;care plan/treatment goals reviewed;risks/benefits reviewed;patient;participants voiced agreement with care plan   OT Discharge Planning   OT Discharge Recommendation (DC Rec) Transitional Care Facility;home with home care occupational therapy   OT Rationale for DC Rec Ax1 for transfers and ADLs,  pending progress w/ pt-has stairs,   Total Evaluation Time (Minutes)   Total Evaluation Time (Minutes) 10   OT Goals   Therapy Frequency (OT) Daily   OT Predicted Duration/Target Date for Goal Attainment 07/22/22   OT Goals Hygiene/Grooming;Transfers;Toilet  Transfer/Toileting;OT Goal 1   OT: Hygiene/Grooming while standing;supervision/stand-by assist   OT: Transfer Supervision/stand-by assist;with assistive device   OT: Toilet Transfer/Toileting Supervision/stand-by assist;cleaning and garment management   OT: Goal 1 Participate in 8 minutes B UE AROM ex to increase strength for ADLs

## 2022-07-18 ENCOUNTER — APPOINTMENT (OUTPATIENT)
Dept: OCCUPATIONAL THERAPY | Facility: HOSPITAL | Age: 72
End: 2022-07-18
Payer: COMMERCIAL

## 2022-07-18 ENCOUNTER — APPOINTMENT (OUTPATIENT)
Dept: PHYSICAL THERAPY | Facility: HOSPITAL | Age: 72
End: 2022-07-18
Payer: COMMERCIAL

## 2022-07-18 VITALS
WEIGHT: 224.3 LBS | OXYGEN SATURATION: 97 % | BODY MASS INDEX: 30.38 KG/M2 | TEMPERATURE: 98.3 F | HEIGHT: 72 IN | SYSTOLIC BLOOD PRESSURE: 136 MMHG | RESPIRATION RATE: 20 BRPM | HEART RATE: 70 BPM | DIASTOLIC BLOOD PRESSURE: 62 MMHG

## 2022-07-18 LAB
ALBUMIN SERPL-MCNC: 3.1 G/DL (ref 3.5–5)
ALP SERPL-CCNC: 47 U/L (ref 45–120)
ALT SERPL W P-5'-P-CCNC: 11 U/L (ref 0–45)
ANION GAP SERPL CALCULATED.3IONS-SCNC: 7 MMOL/L (ref 5–18)
AST SERPL W P-5'-P-CCNC: 15 U/L (ref 0–40)
BILIRUB DIRECT SERPL-MCNC: 0.3 MG/DL
BILIRUB SERPL-MCNC: 0.8 MG/DL (ref 0–1)
BUN SERPL-MCNC: 15 MG/DL (ref 8–28)
C REACTIVE PROTEIN LHE: 2.2 MG/DL (ref 0–?)
CALCIUM SERPL-MCNC: 8.3 MG/DL (ref 8.5–10.5)
CHLORIDE BLD-SCNC: 101 MMOL/L (ref 98–107)
CO2 SERPL-SCNC: 28 MMOL/L (ref 22–31)
CREAT SERPL-MCNC: 1.07 MG/DL (ref 0.6–1.1)
D DIMER PPP FEU-MCNC: 1.56 UG/ML FEU (ref 0–0.5)
ERYTHROCYTE [DISTWIDTH] IN BLOOD BY AUTOMATED COUNT: 13 % (ref 10–15)
FIBRINOGEN PPP-MCNC: 392 MG/DL (ref 170–490)
GFR SERPL CREATININE-BSD FRML MDRD: 54 ML/MIN/1.73M2
GLUCOSE BLD-MCNC: 203 MG/DL (ref 70–125)
GLUCOSE BLDC GLUCOMTR-MCNC: 192 MG/DL (ref 70–99)
GLUCOSE BLDC GLUCOMTR-MCNC: 215 MG/DL (ref 70–99)
GLUCOSE BLDC GLUCOMTR-MCNC: 254 MG/DL (ref 70–99)
HCT VFR BLD AUTO: 38.8 % (ref 35–47)
HGB BLD-MCNC: 13.4 G/DL (ref 11.7–15.7)
MAGNESIUM SERPL-MCNC: 1.7 MG/DL (ref 1.8–2.6)
MCH RBC QN AUTO: 31.2 PG (ref 26.5–33)
MCHC RBC AUTO-ENTMCNC: 34.5 G/DL (ref 31.5–36.5)
MCV RBC AUTO: 90 FL (ref 78–100)
PLATELET # BLD AUTO: 109 10E3/UL (ref 150–450)
POTASSIUM BLD-SCNC: 3.3 MMOL/L (ref 3.5–5)
POTASSIUM BLD-SCNC: 3.6 MMOL/L (ref 3.5–5)
PROT SERPL-MCNC: 6 G/DL (ref 6–8)
RBC # BLD AUTO: 4.3 10E6/UL (ref 3.8–5.2)
SODIUM SERPL-SCNC: 136 MMOL/L (ref 136–145)
WBC # BLD AUTO: 9.2 10E3/UL (ref 4–11)

## 2022-07-18 PROCEDURE — 85379 FIBRIN DEGRADATION QUANT: CPT | Performed by: STUDENT IN AN ORGANIZED HEALTH CARE EDUCATION/TRAINING PROGRAM

## 2022-07-18 PROCEDURE — 250N000013 HC RX MED GY IP 250 OP 250 PS 637: Performed by: HOSPITALIST

## 2022-07-18 PROCEDURE — 250N000011 HC RX IP 250 OP 636: Performed by: HOSPITALIST

## 2022-07-18 PROCEDURE — 83735 ASSAY OF MAGNESIUM: CPT | Performed by: STUDENT IN AN ORGANIZED HEALTH CARE EDUCATION/TRAINING PROGRAM

## 2022-07-18 PROCEDURE — 86140 C-REACTIVE PROTEIN: CPT | Performed by: STUDENT IN AN ORGANIZED HEALTH CARE EDUCATION/TRAINING PROGRAM

## 2022-07-18 PROCEDURE — 96376 TX/PRO/DX INJ SAME DRUG ADON: CPT

## 2022-07-18 PROCEDURE — 36415 COLL VENOUS BLD VENIPUNCTURE: CPT | Performed by: STUDENT IN AN ORGANIZED HEALTH CARE EDUCATION/TRAINING PROGRAM

## 2022-07-18 PROCEDURE — G0378 HOSPITAL OBSERVATION PER HR: HCPCS

## 2022-07-18 PROCEDURE — 258N000003 HC RX IP 258 OP 636: Performed by: HOSPITALIST

## 2022-07-18 PROCEDURE — 82962 GLUCOSE BLOOD TEST: CPT

## 2022-07-18 PROCEDURE — 36415 COLL VENOUS BLD VENIPUNCTURE: CPT | Performed by: HOSPITALIST

## 2022-07-18 PROCEDURE — 85384 FIBRINOGEN ACTIVITY: CPT | Performed by: STUDENT IN AN ORGANIZED HEALTH CARE EDUCATION/TRAINING PROGRAM

## 2022-07-18 PROCEDURE — 97535 SELF CARE MNGMENT TRAINING: CPT | Mod: GO

## 2022-07-18 PROCEDURE — 82248 BILIRUBIN DIRECT: CPT | Performed by: HOSPITALIST

## 2022-07-18 PROCEDURE — 80053 COMPREHEN METABOLIC PANEL: CPT | Performed by: STUDENT IN AN ORGANIZED HEALTH CARE EDUCATION/TRAINING PROGRAM

## 2022-07-18 PROCEDURE — 84132 ASSAY OF SERUM POTASSIUM: CPT | Mod: 91 | Performed by: HOSPITALIST

## 2022-07-18 PROCEDURE — 97116 GAIT TRAINING THERAPY: CPT | Mod: GP

## 2022-07-18 PROCEDURE — 99217 PR OBSERVATION CARE DISCHARGE: CPT | Performed by: HOSPITALIST

## 2022-07-18 PROCEDURE — 97530 THERAPEUTIC ACTIVITIES: CPT | Mod: GP

## 2022-07-18 PROCEDURE — 250N000013 HC RX MED GY IP 250 OP 250 PS 637: Performed by: STUDENT IN AN ORGANIZED HEALTH CARE EDUCATION/TRAINING PROGRAM

## 2022-07-18 PROCEDURE — 85014 HEMATOCRIT: CPT | Performed by: STUDENT IN AN ORGANIZED HEALTH CARE EDUCATION/TRAINING PROGRAM

## 2022-07-18 RX ORDER — POTASSIUM CHLORIDE 1500 MG/1
40 TABLET, EXTENDED RELEASE ORAL ONCE
Status: COMPLETED | OUTPATIENT
Start: 2022-07-18 | End: 2022-07-18

## 2022-07-18 RX ADMIN — REMDESIVIR 100 MG: 100 INJECTION, POWDER, LYOPHILIZED, FOR SOLUTION INTRAVENOUS at 17:13

## 2022-07-18 RX ADMIN — DULOXETINE HYDROCHLORIDE 60 MG: 60 CAPSULE, DELAYED RELEASE PELLETS ORAL at 08:59

## 2022-07-18 RX ADMIN — SENNOSIDES 8.6 MG: 8.6 TABLET, FILM COATED ORAL at 08:59

## 2022-07-18 RX ADMIN — FOLIC ACID 1 MG: 1 TABLET ORAL at 08:59

## 2022-07-18 RX ADMIN — OXYCODONE HYDROCHLORIDE AND ACETAMINOPHEN 2 TABLET: 5; 325 TABLET ORAL at 18:25

## 2022-07-18 RX ADMIN — OXYCODONE HYDROCHLORIDE AND ACETAMINOPHEN 2 TABLET: 5; 325 TABLET ORAL at 09:20

## 2022-07-18 RX ADMIN — HYDROXYCHLOROQUINE SULFATE 200 MG: 200 TABLET ORAL at 09:13

## 2022-07-18 RX ADMIN — METOPROLOL SUCCINATE 50 MG: 25 TABLET, EXTENDED RELEASE ORAL at 08:59

## 2022-07-18 RX ADMIN — AMLODIPINE BESYLATE 10 MG: 5 TABLET ORAL at 08:59

## 2022-07-18 RX ADMIN — SODIUM CHLORIDE 50 ML: 9 INJECTION, SOLUTION INTRAVENOUS at 18:26

## 2022-07-18 RX ADMIN — POTASSIUM CHLORIDE 40 MEQ: 1500 TABLET, EXTENDED RELEASE ORAL at 14:00

## 2022-07-18 NOTE — PLAN OF CARE
Problem: Plan of Care - These are the overarching goals to be used throughout the patient stay.    Goal: Plan of Care Review/Shift Note  Description: The Plan of Care Review/Shift note should be completed every shift.  The Outcome Evaluation is a brief statement about your assessment that the patient is improving, declining, or no change.  This information will be displayed automatically on your shift note.  Outcome: Ongoing, Progressing  Flowsheets (Taken 7/18/2022 1602)  Plan of Care Reviewed With: patient     Problem: Fatigue  Goal: Improved Activity Tolerance  Outcome: Ongoing, Progressing  Intervention: Promote Improved Energy  Recent Flowsheet Documentation  Taken 7/18/2022 1200 by Rosibel Zamudio, RN  Activity Management: activity adjusted per tolerance  Taken 7/18/2022 0800 by Rosibel Zamudio, RN  Activity Management: activity adjusted per tolerance     Problem: Infection  Goal: Absence of Infection Signs and Symptoms  Outcome: Ongoing, Progressing   Goal Outcome Evaluation:    Plan of Care Reviewed With: patient        PRIMARY DIAGNOSIS: GENERALIZED WEAKNESS    OUTPATIENT/OBSERVATION GOALS TO BE MET BEFORE DISCHARGE  1. Orthostatic performed: No    2. Tolerating PO medications: Yes    3. Return to near baseline physical activity: No    4. Cleared for discharge by consultants (if involved): N/A    Discharge Planner Nurse   Safe discharge environment identified: Yes  Barriers to discharge:  PT/OT to see yet today and assess home or TCU upon discharge.       Entered by: Rosibel Zamudio RN 07/18/2022 4:03 PM     Please review provider order for any additional goals.   Nurse to notify provider when observation goals have been met and patient is ready for discharge.

## 2022-07-18 NOTE — PLAN OF CARE
Goal Outcome Evaluation:    Plan of Care Reviewed With: patient        PRIMARY DIAGNOSIS: GENERALIZED WEAKNESS    OUTPATIENT/OBSERVATION GOALS TO BE MET BEFORE DISCHARGE  1. Orthostatic performed: No    2. Tolerating PO medications: Yes    3. Return to near baseline physical activity: Yes    4. Cleared for discharge by consultants (if involved): Patient had not been seen by PT/OT yet    Discharge Planner Nurse   Safe discharge environment identified: Yes  Barriers to discharge: No.  Pt stronger today.  She sat up in chair for meals and is feeling stronger. PT/OT to see yet today       Entered by: Rosibel Zamudio RN 07/18/2022 4:04 PM     Please review provider order for any additional goals.   Nurse to notify provider when observation goals have been met and patient is ready for discharge.

## 2022-07-18 NOTE — UTILIZATION REVIEW
Concurrent stay review; Secondary Review Determination     Under the authority of the Utilization Management Committee, the utilization review process indicated a secondary review on Ebonie Bowman.  The review outcome is based on review of the medical records, discussions with staff, and applying clinical experience noted on the date of the review.        (x) Observation Status Appropriate - Concurrent stay review    RATIONALE FOR DETERMINATION   72-year-old female presented with generalized weakness and a fall.  Was found to have COVID-19 infection but not hypoxic.  Was started on 3 dose regimen of IV remdesivir.  Monitoring prolonged QTC and replacing magnesium.  Close monitoring of blood sugar with type 2 diabetes.    Patient is clinically improving and there is no clear indication to change patient's status to inpatient. The severity of illness, intensity of service provided, expected LOS and risk for adverse outcome make the care appropriate for observation.    The information on this document is developed by the utilization review team in order for the business office to ensure compliance.  This only denotes the appropriateness of proper admission status and does not reflect the quality of care rendered.         The definitions of Inpatient Status and Observation Status used in making the determination above are those provided in the CMS Coverage Manual, Chapter 1 and Chapter 6, section 70.4.      Sincerely,   Ferny Pretty MD  Utilization Review  Physician Advisor  [facility]

## 2022-07-18 NOTE — PLAN OF CARE
Problem: Plan of Care - These are the overarching goals to be used throughout the patient stay.    Goal: Plan of Care Review/Shift Note  Description: The Plan of Care Review/Shift note should be completed every shift.  The Outcome Evaluation is a brief statement about your assessment that the patient is improving, declining, or no change.  This information will be displayed automatically on your shift note.  Outcome: Ongoing, Progressing  Flowsheets (Taken 7/17/2022 1936)  Plan of Care Reviewed With: patient  Goal: Optimal Comfort and Wellbeing  Outcome: Ongoing, Progressing     Problem: Plan of Care - These are the overarching goals to be used throughout the patient stay.    Goal: Absence of Hospital-Acquired Illness or Injury  Intervention: Identify and Manage Fall Risk  Recent Flowsheet Documentation  Taken 7/17/2022 1600 by Rosibel Zamudio RN  Safety Promotion/Fall Prevention:   activity supervised   clutter free environment maintained   bed alarm on   fall prevention program maintained  Intervention: Prevent Skin Injury  Recent Flowsheet Documentation  Taken 7/17/2022 1600 by Rosibel Zamudio RN  Body Position: position changed independently  Intervention: Prevent and Manage VTE (Venous Thromboembolism) Risk  Recent Flowsheet Documentation  Taken 7/17/2022 1600 by Rosibel Zamudio RN  Activity Management: activity adjusted per tolerance     Problem: Fatigue  Goal: Improved Activity Tolerance  Intervention: Promote Improved Energy  Recent Flowsheet Documentation  Taken 7/17/2022 1600 by Rosibel Zamudio RN  Activity Management: activity adjusted per tolerance   Goal Outcome Evaluation:    Plan of Care Reviewed With: patient        PRIMARY DIAGNOSIS: GENERALIZED WEAKNESS    OUTPATIENT/OBSERVATION GOALS TO BE MET BEFORE DISCHARGE  1. Orthostatic performed: N/A    2. Tolerating PO medications: Yes    3. Return to near baseline physical activity: Yes    4. Cleared for discharge by consultants (if  involved): No    Discharge Planner Nurse   Safe discharge environment identified: Yes  Barriers to discharge: No       Entered by: Rosibel Zamudio RN 07/17/2022 7:37 PM     Please review provider order for any additional goals.   Nurse to notify provider when observation goals have been met and patient is ready for discharge.

## 2022-07-18 NOTE — PLAN OF CARE
Problem: Infection  Goal: Absence of Infection Signs and Symptoms  Outcome: Ongoing, Not Progressing   Goal Outcome Evaluation:      Cardiac:  Trending NSR with 1st AVB and BBB.  Rate trending 60-80's.  Respiratory:  Rate: 16-20, non-labored.  Sat's: Maintaining mid 90's at RA.  Neuro: WNL.  GI: X1 fecal incontinence.  BS: Present X4 quadrants.  Passing flatus.  :  Urine incontinence, despite purewick placement.  VSS: Trending hypertensive with 102.5 (oral) temp.  BP trending down during HS/NOC.  Admin  mg PO Tylenol.  Fever broken during NOC.  No further intervention.  House Resident (Kaila) aware.  Agrees no further intervention, continue to monitor and treat as indicated.   Ambulation: Up with assist of X2 to chair and commode.  Plan:  IV Remdesivir.  COVID isolation maintained.

## 2022-07-19 ENCOUNTER — PATIENT OUTREACH (OUTPATIENT)
Dept: CARE COORDINATION | Facility: CLINIC | Age: 72
End: 2022-07-19

## 2022-07-19 DIAGNOSIS — Z71.89 OTHER SPECIFIED COUNSELING: ICD-10-CM

## 2022-07-19 LAB
ATRIAL RATE - MUSE: 105 BPM
DIASTOLIC BLOOD PRESSURE - MUSE: 79 MMHG
INTERPRETATION ECG - MUSE: NORMAL
P AXIS - MUSE: 66 DEGREES
PR INTERVAL - MUSE: 194 MS
QRS DURATION - MUSE: 136 MS
QT - MUSE: 364 MS
QTC - MUSE: 481 MS
R AXIS - MUSE: 130 DEGREES
SYSTOLIC BLOOD PRESSURE - MUSE: 178 MMHG
T AXIS - MUSE: 55 DEGREES
VENTRICULAR RATE- MUSE: 105 BPM

## 2022-07-19 NOTE — DISCHARGE SUMMARY
[facility] MEDICINE  DISCHARGE SUMMARY     Primary Care Physician: Marichuy Rubio  Admission Date: 7/16/2022   Discharge Provider: Daisy Duncan MD Discharge Date: 7/19/2022   Diet:   Active Diet and Nourishment Order   Procedures     Diet       Code Status: Prior   Activity: DCACTIVITY: Activity as tolerated        Condition at Discharge: Stable     REASON FOR PRESENTATION(See Admission Note for Details)     Fall, weakness    PRINCIPAL & ACTIVE DISCHARGE DIAGNOSES     Active Problems:    Generalized muscle weakness    Fall, initial encounter    Infection due to 2019 novel coronavirus      PENDING LABS     Unresulted Labs Ordered in the Past 30 Days of this Admission     Date and Time Order Name Status Description    7/16/2022  5:54 PM Blood Culture Line, venous Preliminary             PROCEDURES ( this hospitalization only)          RECOMMENDATIONS TO OUTPATIENT PROVIDER FOR F/U VISIT     Follow-up Appointments     Follow-up and recommended labs and tests       Follow up with primary care provider, Marichuy Rubio, within 7 days for   hospital follow- up.  No follow up labs or test are needed.         Follow-up and recommended labs and tests       Follow up with primary care provider, Marichuy Rubio, within 7 days for   hospital follow- up.  No follow up labs or test are needed.                 DISPOSITION     Home with home care    SUMMARY OF HOSPITAL COURSE:      72 year old female presented to ED after a fall incident at home, unclear of mechanism of fall..  Patient complains of diffuse pain over her neck lower back and left shoulder.  Trauma surveillance imaging did not show any fracture or bleeding.  Past medical history is significant for hypertension, diabetes mellitus, hyperlipidemia, CKD stage III, rheumatoid arthritis, chronic back pain.     S/p fall  -Unclear mechanism of fall.  Patient was admitted with syncope previously in October 2021.    -Imagings done on admission  including lumbar, thoracic, cervical spine CT, and head CT did not show fracture or  evidence of acute intracranial process.  -ECHO unremarkable.   -Evaluated by PT/OT, recommend home with home cares  -     Generalized weakness  -Patient states she felt too weak to go home.  -OT/PT consulted, recommend home     covid-19 infection on admission  -Patient states having mild dry cough.  No history of fever, chills or rigors.  She also denies having shortness of breath.  -So2 stable on room air.  -Given remdisivir wheile here, not decadron as pt wasn't hypoxic.-Special isolation precaution  -Patient is unvaccinated      Hypomagnesemia  -Replaced per protocol     Prolonged QTc interval  -Monitor and replace electrolytes as needed    CKD stage III  -Creatinine is close to baseline.  Monitor input and output     Type 2 diabetes mellitus  -Hemoglobin A1c is 7.6 in May 2021  -Continue home regimen     Hypertension  -PTA amlodipine 10 mg oral daily  -Metoprolol 50mg daily    .Patient stable to be discharged home with home cares today. Please refer to discharge medications and instructions for more details.      Discharge Medications with Med changes:     Discharge Medication List as of 7/18/2022  4:59 PM      CONTINUE these medications which have NOT CHANGED    Details   amLODIPine (NORVASC) 10 MG tablet [AMLODIPINE (NORVASC) 10 MG TABLET] TAKE 1 TABLET EVERY DAY, Disp-90 tablet, R-2, E-Prescribe      ascorbic acid (ASCORBIC ACID WITH LATRICIA HIPS) 500 MG tablet Take 500 mg by mouth daily , Historical      atorvastatin (LIPITOR) 40 MG tablet Take 1 tablet (40 mg) by mouth At Bedtime, Disp-90 tablet, R-1, E-Prescribe      b complex vitamins tablet Take 1 tablet by mouth daily , Historical      DULoxetine (CYMBALTA) 60 MG capsule TAKE 1 CAPSULE BY MOUTH DAILY, Disp-90 capsule, R-1, E-PrescribeAuthorized Quantity Exceeded      etanercept (ENBREL) 50 MG/ML injection Inject 1 mL (50 mg) Subcutaneous once a week Hold for signs of  infection, and seek medical attention., Disp-4 mL, R-3, [facility]      fish oil-omega-3 fatty acids 1000 MG capsule Take 1 g by mouth daily, Historical      folic acid (FOLVITE) 1 MG tablet Take one tab daily, except the day when taking methotrexate., Disp-90 tablet, R-1, E-Prescribe      hydroxychloroquine (PLAQUENIL) 200 MG tablet Take 1 tablet (200 mg) by mouth 2 times daily, Disp-60 tablet, R-1, E-PrescribeNeeds eye exam within next 60 days for future refills      insulin  UNIT/ML injection Inject 54 Units Subcutaneous every morning AND 30 Units every evening., Disp-15 mL, R-3, E-Prescribe      lisinopril (ZESTRIL) 10 MG tablet Take 1 tablet (10 mg) by mouth At Bedtime, Disp-90 tablet, R-1, E-Prescribe      loratadine (CLARITIN) 10 MG tablet Take 1 tablet (10 mg) by mouth At Bedtime, Disp-90 tablet, R-3, E-Prescribe      methotrexate 2.5 MG tablet Take 3 tablets (7.5 mg) by mouth once a week, Disp-36 tablet, R-0, E-Prescribe      metoprolol succinate ER (TOPROL XL) 50 MG 24 hr tablet [METOPROLOL SUCCINATE (TOPROL-XL) 50 MG 24 HR TABLET] TAKE 1 TABLET EVERY DAY, Disp-90 tablet, R-3, E-Prescribe      MULTIVITAMIN ORAL Take 1 tablet by mouth daily , Historical      oxyCODONE-acetaminophen (PERCOCET) 5-325 MG tablet Take 1-2 tablets by mouth every 6 hours as needed for severe pain (Max of 6 tablets daily) For 6/30/22-7/30/22, Disp-180 tablet, R-0, E-Prescribe      traZODone (DESYREL) 150 MG tablet Take 1 tablet (150 mg) by mouth At Bedtime, Disp-90 tablet, R-1, E-Prescribe      Vitamin D3 (CHOLECALCIFEROL) 25 mcg (1000 units) tablet Take 1,000 Units by mouth daily , Historical      alcohol swab prep pads Use to swab area of injection/jacquelyn as directed.Disp-100 each, [id number]      !! Continuous Blood Gluc Sensor (FREESTYLE BRE 14 DAY SENSOR) MISC Change every 14 days. Use daily to scan blood sugars, use per manufactures guidelines. Pt already has reader., Disp-2 each, R-11,  E-Prescribe      flash glucose scanning reader (FREESTYLE BRE 14 DAY READER) Misc [FLASH GLUCOSE SCANNING READER (FREESTYLE BRE 14 DAY READER) MISC] Use 1 each As Directed see administration instructions. Use as directed with sensor, Disp-1 each, R-0, Normal      !! flash glucose sensor (FREESTYLE BRE 14 DAY SENSOR) Kit [FLASH GLUCOSE SENSOR (FREESTYLE BRE 14 DAY SENSOR) KIT] Use 1 each As Directed every 14 (fourteen) days., Disp-2 kit, R-11, Normal      insulin syringe-needle U-100 (BD INSULIN SYRINGE ULT-FINE II) 1 mL 31 gauge x 5/16 Syrg [INSULIN SYRINGE-NEEDLE U-100 (BD INSULIN SYRINGE ULT-FINE II) 1 ML 31 GAUGE X 5/16 SYRG] USE 4 TIMES DAILY AS DIRECTEDDisp-500 each, R-1Normal      lancing device (ACCU-CHEK SOFTCLIX) Misc [LANCING DEVICE (ACCU-CHEK SOFTCLIX) MISC] Use 1 applicator As Directed 4 (four) times a day.Disp-300 each, R-3NormalAccu Check Soni Lancets      nystatin (MYCOSTATIN) 465393 UNIT/GM external cream Apply topically 2 times dailyDisp-30 g, [id number]      thin (NO BRAND SPECIFIED) lancets Use with lanceting device. To accompany: Blood Glucose Monitor Brands: per insurance., Disp-100 each, R-11, E-Prescribe       !! - Potential duplicate medications found. Please discuss with provider.                Rationale for medication changes:      See med rec        Consults       PHYSICAL THERAPY ADULT IP CONSULT  OCCUPATIONAL THERAPY ADULT IP CONSULT  CARE MANAGEMENT / SOCIAL WORK IP CONSULT    Immunizations given this encounter     Most Recent Immunizations   Administered Date(s) Administered     FLU 6-35 months 09/04/2015     Flu, Unspecified 10/24/2011     Influenza (H1N1) 02/04/2010     Influenza (High Dose) 3 valent vaccine 10/12/2017     Influenza (IIV3) PF 10/06/2014     Influenza Vaccine, 6+MO IM (QUADRIVALENT W/PRESERVATIVES) 10/06/2014     Pneumo Conj 13-V (2010&after) 09/04/2015     Pneumococcal 23 valent 09/13/2016     Pneumococcal, Unspecified 09/23/2010     TD (ADULT, 7+)  12/28/2007     Tdap (Adacel,Boostrix) 07/18/2014     Zoster vaccine, live 11/05/2014           Anticoagulation Information      Recent INR results:   Recent Labs   Lab 07/16/22  1800   INR 1.15     Warfarin doses (if applicable) or name of other anticoagulant: N/A      SIGNIFICANT IMAGING FINDINGS     Results for orders placed or performed during the hospital encounter of 07/16/22   Head CT w/o contrast    Impression    IMPRESSION:  1.  No CT evidence for acute intracranial process.  2.  Brain atrophy and presumed chronic microvascular ischemic changes as above.   Cervical spine CT w/o contrast    Impression    IMPRESSION:  1.  No acute cervical spine fracture.   CT Thoracic Spine w/o Contrast    Impression    IMPRESSION:  1.  No acute thoracic spine fracture.     Lumbar spine CT w/o contrast    Impression    IMPRESSION:  1.  No acute lumbar spine fracture.   XR Chest Port 1 View    Impression    IMPRESSION: Heart size within normal limits for portable technique. Pulmonary vascularity normal. Curvilinear strands of fibrosis right upper lobe. The lungs are otherwise clear. Epidural stimulation wires mid thoracic line.   Echocardiogram Complete   Result Value Ref Range    LVEF  60-65%        SIGNIFICANT LABORATORY FINDINGS         Discharge Orders        Home Care Referral      COVID-19 GetWell Loop Referral      Reason for your hospital stay    S/p fall     Follow-up and recommended labs and tests     Follow up with primary care provider, Marichuy Rubio, within 7 days for hospital follow- up.  No follow up labs or test are needed.     Activity    Your activity upon discharge: activity as tolerated     Reason for your hospital stay    Weakness, s/p fall, COVID     Contact provider    Contact your primary care provider if If increased trouble breathing, new arm/leg swelling, dizziness/passing out, falls, bleeding that doesn't stop, or uncontrolled pain.     Follow-up and recommended labs and tests     Follow up with  primary care provider, Marichuy Rubio, within 7 days for hospital follow- up.  No follow up labs or test are needed.     Discharge - Quarantine/Isolation Instruction    Date of symptom onset:     Date of first positive test:  7/16/22  If you tested positive COVID-19 and show symptoms (fever, cough, body aches or trouble breathing):        Stay home and away from others (self-isolate) until:        At least 10 days have passed since your symptoms started. AND...        You've had no fever-and no medicine that reduces fever for 1 full day (24 hours). AND...         Your other symptoms have resolved (gotten better).  If you tested positive for COVID-19 but don't show symptoms:       Stay home and away from others (self-isolate) until at least 10 days have passed since the date of your first positive COVID-19 test.     Diet    Follow this diet upon discharge: Orders Placed This Encounter      Room Service      Combination Diet Moderate Consistent Carb (60 g CHO per Meal) Diet; Low Saturated Fat Na <2400mg Diet       Examination   Physical Exam   Temp:  [98.3  F (36.8  C)] 98.3  F (36.8  C)  Pulse:  [70] 70  Resp:  [20] 20  BP: (136)/(62) 136/62  SpO2:  [97 %] 97 %  Wt Readings from Last 1 Encounters:   07/18/22 101.7 kg (224 lb 4.8 oz)       General Appearance:    Alert, cooperative, no distress, appears stated age   Lungs:     CTAB   Cardiovascular:    RRR   Abdomen:     Soft, non-tender, non-distended   Neurologic:   Awake, alert, oriented, moving all extremities,speech fluent           Please see EMR for more detailed significant labs, imaging, consultant notes etc.    IDaisy MD, personally saw the patient today and spent greater than 30 minutes discharging this patient.    Daisy Duncan MD  [facility]    CC:Marichuy Rubio

## 2022-07-19 NOTE — PROGRESS NOTES
[facility]    Background: Care Coordination referral placed from [facility] discharge report for reason of patient meeting criteria for a TCM outreach call by [facility] team.    Assessment: Upon chart review, [facility] Team member will cancel/close the referral for TCM outreach due to reason below:    Patient isn't interested in speaking with care team today and disconnected call. Stated she doing okay but cant talk.     Plan: Care Coordination referral for TCM outreach canceled.      Nikki Sharif MA  [facility]    *Connected Care Resource Team does NOT follow patient ongoing. Referrals are identified based on internal discharge reports and the outreach is to ensure patient has an understanding of their discharge instructions.Clinic Care Coordination Contact  [facility]/Voicemail       Clinical Data: Care Coordinator Outreach  Outreach attempted x 1.  Left message on patient's voicemail with call back information and requested return call.   Care Coordinator will try to reach patient again in 1-2 business days.      Nikki Sharif  [phone]  Care

## 2022-07-19 NOTE — PROGRESS NOTES
Occupational Therapy Discharge Summary    Reason for therapy discharge:    Discharged to home with home therapy.    Progress towards therapy goal(s). See goals on Care Plan in [facility] electronic health record for goal details.  Goals not met.  Barriers to achieving goals:   discharge on same date as initial evaluation.    Therapy recommendation(s):    Continued therapy is recommended.  Rationale/Recommendations:  not at baseline for BADLs.

## 2022-07-19 NOTE — PLAN OF CARE
Physical Therapy Discharge Summary    Reason for therapy discharge:    Discharged to home with home therapy.    Progress towards therapy goal(s). See goals on Care Plan in [facility] electronic health record for goal details.  Goals not met.  Barriers to achieving goals:   Pt was working towards the goals.   .    Therapy recommendation(s):    Continued therapy is recommended.  Rationale/Recommendations:  to improve mobility and strength. .    Goal Outcome Evaluation:

## 2022-07-21 LAB — BACTERIA BLD CULT: NO GROWTH

## 2022-07-21 NOTE — PROGRESS NOTES
Ebonie is a 72 year old who is being evaluated via a billable telephone visit.      What phone number would you like to be contacted at? [phone]  How would you like to obtain your AVS? Mail a copy    Assessment & Plan   Problem List Items Addressed This Visit        Nervous and Auditory    Numbness of left hand, pt mentioned 7/2022. Evaluate at next FTF visit      H/o carpal tunnel syndrome. Wrist brace not effective. Will evaluate with FTF visit. Consider EMG               Musculoskeletal and Integumentary    Generalized muscle weakness     Improving. Possibly related to diagnosis of COVID-19 infection but she also has some baseline weakness r/t deconditioning, chronic pain               Infectious/Inflammatory    Infection due to 2019 novel coronavirus     Advised quarantine with mask if around anyone else until 7/26. May take acetaminophen up to an additional 2000mg daily (in addition to the amount that it in the percocet she takes for chronic pain). Follow up if symptoms are worsening or fail to improve.               Other    Fall, initial encounter      Other Visit Diagnoses     Hospital discharge follow-up    -  Primary                BMI:   Estimated body mass index is 30.42 kg/m  as calculated from the following:    Height as of 7/16/22: 1.829 m (6').    Weight as of 7/18/22: 101.7 kg (224 lb 4.8 oz).       Return in about 4 weeks (around 8/22/2022) for Follow up.    Marichuy Rubio NP  [facility]   Ebonie is a 72 year old, presenting for the following health issues:  Hospital F/U (07/16-07/18, [place], went in for a fall, pt states fatty tumor did she do something about that?)      HPI   She had a fall at home, c/o weakness, uncertain the mechanism of the fall. She states that she was talking to her son and the next thing she knew she knew she was on the floor. She has had falls in the past and she was evaluated for syncope in October 2021. She had imaging done this  admission including thoracic, cervical, lumbar, and head CT without acute fracture or acute intracranial process. ECHO unremarkable.     She was also found to have COVID-19 infection on admission. Pt was started on remdesivir. She still c/o a sore throat     She has a question about left hand numbness. She states that she has a fatty tumor on the left shoulder and that when she sleeps on her left side or if she presses her left shoulder against the back of a chair then the numbness will resolve.       Hospital Follow-up Visit:    Hospital/Nursing Home/IP Rehab Facility: [facility]  Date of Admission: 07/16/2022  Date of Discharge: 07/18/2022  Reason(s) for Admission: Fall    Was your hospitalization related to COVID-19? YES   How are you feeling today? pt states I just have a sore throat that's about it  In the past 24 hours have you had shortness of breath when speaking, walking, or climbing stairs? I don't have breathing problems  Do you have a cough? I don't have a cough  When is the last time you had a fever greater than 100? N/A  Are you having any other symptoms? just sore throat   Do you have any other stressors you would like to discuss with your provider? No      Post Medication Reconciliation Status: Discharge medications reconciled, continue medications without change            Objective       EXAM: CT LUMBAR SPINE W/O CONTRAST  LOCATION: [facility]  DATE/TIME: 7/16/2022 6:58 PM     INDICATION: Back pain.  COMPARISON: None.  TECHNIQUE: Routine CT Lumbar Spine without IV contrast. Multiplanar reformats. Dose reduction techniques were used.      FINDINGS:  VERTEBRA: Diffuse osteopenia. Lumbar spine lordosis is maintained. Lumbar vertebral body heights are maintained. No acute lumbar spine fracture.      CANAL/FORAMINA: Mild to moderate lumbar spondylitic changes superimposed on mild developmental narrowing of the lumbar spinal canal. There is mild spinal  canal stenosis at L1-L2 and L2-L3.     PARASPINAL: Right renal cyst. Atherosclerotic vascular calcifications of aorta and bilateral common iliac arteries.                                                                      IMPRESSION:  1.  No acute lumbar spine fracture.    EXAM: CT THORACIC SPINE W/O CONTRAST  LOCATION: [facility]  DATE/TIME: 7/16/2022 6:58 PM     INDICATION: Back pain.  COMPARISON: None.  TECHNIQUE: Routine CT Thoracic Spine without IV contrast. Multiplanar reformats. Dose reduction techniques were used.      FINDINGS:  VERTEBRA: Osteopenia. Minimal leftward convex curvature of the upper thoracic spine and minimal rightward convex curvature of the lower thoracic spine. Thoracic vertebral body heights are maintained. Mild to moderate thoracic spondylitic changes. Spinal   cord similar device within the dorsal epidural space extending superiorly to the level of the T5 vertebral body. No acute thoracic spine fracture.      CANAL/FORAMINA: No canal or neural foraminal stenosis.     PARASPINAL: No extraspinal abnormality.                                                                      IMPRESSION:  1.  No acute thoracic spine fracture.    EXAM: CT CERVICAL SPINE W/O CONTRAST  LOCATION: [facility]  DATE/TIME: 7/16/2022 6:58 PM     INDICATION: Neck pain.  COMPARISON: None.  TECHNIQUE: Routine CT Cervical Spine without IV contrast. Multiplanar reformats. Dose reduction techniques were used.     FINDINGS:  VERTEBRA: Osteopenia. There is straightening of usual cervical lordosis. Cervical vertebral body heights are maintained. Ankylosis of the right C2-C3 facet joint. No acute cervical spine fracture.      CANAL/FORAMINA: Moderate left neural foraminal stenosis at C3-C4. Moderate left and mild right neural foraminal stenosis at C4-C5. Mild left neural foraminal stenosis at C5-C6.     PARASPINAL: No extraspinal abnormality.                                                                       IMPRESSION:  1.  No acute cervical spine fracture.    EXAM: CT HEAD W/O CONTRAST  LOCATION: [facility]  DATE/TIME: 7/16/2022 6:37 PM     INDICATION: Head injury  COMPARISON: None.  TECHNIQUE: Routine CT Head without IV contrast. Multiplanar reformats. Dose reduction techniques were used.     FINDINGS:  INTRACRANIAL CONTENTS: No intracranial hemorrhage, extraaxial collection, or mass effect.  No CT evidence of acute infarct. Mild presumed chronic small vessel ischemic changes. Mild generalized volume loss. No hydrocephalus.      VISUALIZED ORBITS/SINUSES/MASTOIDS: Prior bilateral cataract surgery. Visualized portions of the orbits are otherwise unremarkable. No paranasal sinus mucosal disease. No middle ear or mastoid effusion.     BONES/SOFT TISSUES: No acute abnormality.                                                                      IMPRESSION:  1.  No CT evidence for acute intracranial process.  2.  Brain atrophy and presumed chronic microvascular ischemic changes as above.    EXAM: XR CHEST PORT 1 VIEW  LOCATION: [facility]  DATE/TIME: 7/16/2022 6:15 PM     INDICATION: fever, fall  COMPARISON: 6/22/2019                                                                      IMPRESSION: Heart size within normal limits for portable technique. Pulmonary vascularity normal. Curvilinear strands of fibrosis right upper lobe. The lungs are otherwise clear. Epidural stimulation wires mid thoracic line.      Vitals:  No vitals were obtained today due to virtual visit.    Physical Exam   GENERAL APPEARANCE: healthy, alert and no distress  PSYCH: Alert and oriented times 3; coherent speech, normal   rate and volume, able to articulate logical thoughts, able   to abstract reason, no tangential thoughts, no hallucinations   or delusions  Her affect is normal  RESP: No cough, no audible wheezing, able to talk in full sentences  Remainder  of exam unable to be completed due to telephone visits            Phone call duration: 13 minutes    .  ..

## 2022-07-25 ENCOUNTER — TELEPHONE (OUTPATIENT)
Dept: PALLIATIVE MEDICINE | Facility: OTHER | Age: 72
End: 2022-07-25

## 2022-07-25 ENCOUNTER — VIRTUAL VISIT (OUTPATIENT)
Dept: INTERNAL MEDICINE | Facility: CLINIC | Age: 72
End: 2022-07-25

## 2022-07-25 DIAGNOSIS — U07.1 INFECTION DUE TO 2019 NOVEL CORONAVIRUS: ICD-10-CM

## 2022-07-25 DIAGNOSIS — Z09 HOSPITAL DISCHARGE FOLLOW-UP: Primary | ICD-10-CM
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
## 2022-07-25 DIAGNOSIS — M62.81 GENERALIZED MUSCLE WEAKNESS: ICD-10-CM

## 2022-07-25 DIAGNOSIS — R20.0 NUMBNESS OF LEFT HAND: ICD-10-CM

## 2022-07-25 PROCEDURE — 99442 PR PHYSICIAN TELEPHONE EVALUATION 11-20 MIN: CPT | Performed by: NURSE PRACTITIONER

## 2022-07-25 NOTE — ASSESSMENT & PLAN NOTE
Improving. Possibly related to diagnosis of COVID-19 infection but she also has some baseline weakness r/t deconditioning, chronic pain

## 2022-07-25 NOTE — ASSESSMENT & PLAN NOTE
Advised quarantine with mask if around anyone else until 7/26. May take acetaminophen up to an additional 2000mg daily (in addition to the amount that it in the percocet she takes for chronic pain). Follow up if symptoms are worsening or fail to improve.

## 2022-07-25 NOTE — ASSESSMENT & PLAN NOTE
H/o carpal tunnel syndrome. Wrist brace not effective. Will evaluate with FTF visit. Consider EMG

## 2022-07-25 NOTE — TELEPHONE ENCOUNTER
NILES for a return call at the clinic -please transfer call to Libra at the [facility]

## 2022-08-02 ENCOUNTER — TELEPHONE (OUTPATIENT)
Dept: RHEUMATOLOGY | Facility: CLINIC | Age: 72
End: 2022-08-02

## 2022-08-02 ENCOUNTER — VIRTUAL VISIT (OUTPATIENT)
Dept: RHEUMATOLOGY | Facility: CLINIC | Age: 72
End: 2022-08-02
Payer: COMMERCIAL

## 2022-08-02 DIAGNOSIS — D69.6 THROMBOCYTOPENIA (H): ICD-10-CM

## 2022-08-02 DIAGNOSIS — M05.9 SEROPOSITIVE RHEUMATOID ARTHRITIS (H): Primary | ICD-10-CM

## 2022-08-02 DIAGNOSIS — Z79.899 HIGH RISK MEDICATION USE: ICD-10-CM

## 2022-08-02 DIAGNOSIS — M15.9 OSTEOARTHRITIS OF MULTIPLE JOINTS, UNSPECIFIED OSTEOARTHRITIS TYPE: ICD-10-CM

## 2022-08-02 DIAGNOSIS — N28.9 RENAL INSUFFICIENCY: ICD-10-CM

## 2022-08-02 PROCEDURE — 99214 OFFICE O/P EST MOD 30 MIN: CPT | Mod: 95 | Performed by: INTERNAL MEDICINE

## 2022-08-02 RX ORDER — ETANERCEPT 50 MG/ML
50 SOLUTION SUBCUTANEOUS WEEKLY
Qty: 4 ML | Refills: 5 | Status: SHIPPED | OUTPATIENT
Start: 2022-08-02

## 2022-08-02 RX ORDER — HYDROXYCHLOROQUINE SULFATE 200 MG/1
200 TABLET, FILM COATED ORAL 2 TIMES DAILY
Qty: 60 TABLET | Refills: 1 | Status: SHIPPED | OUTPATIENT
Start: 2022-08-02 | End: 2022-09-16

## 2022-08-02 NOTE — PATIENT INSTRUCTIONS
Summary of Your Rheumatology Visit    Next Appointment:  3-4 Months    Medications:    Please follow directives on pill bottle on how to take medication(s) provided.      Referrals:      Tests:     Please have labs performed in about 10 weeks.    When having labs performed again in 10 weeks recommend contacting us few days thereafter to have a provider comment on results.    As mentioned during visit, recommend discussing low platelet count further with your primary care physician over upcoming visit and see if willing to recheck level with PCP anticipated labs.    Injections:        Other:    Regarding possibly repeating a bone density test, recommend discussing further with your primary care physician.

## 2022-08-02 NOTE — TELEPHONE ENCOUNTER
Plaquenil sent to patients [facility] pharmacy today. Can you please call pharmacy to cancel order. Plaquenil removed from her med list.

## 2022-08-02 NOTE — PROGRESS NOTES
Ebonie Bowman who presents today with a chief complaint of  No chief complaint on file.      Joint Pains: yes  Location: multiple joints  Onset: ongoing  Intensity:  6/10  AM Stiffness: half of the morning  Alleviating/Aggravating Factors: nothing much makes it better  Tolerating Meds: yes  Other:      ROS:  Patient denies having any chest pain, shortness of breath, cough, abdominal pain, nausea, vomiting, rashes, fevers, oral ulcers and recent infections.  Patient admits to having a good appetite      Problem List:  Patient Active Problem List   Diagnosis     S/P TKR (total knee replacement) using cement, left     Ataxia     Axonal neuropathy     Type 2 diabetes mellitus with hyperosmolarity without coma, with long-term current use of insulin (H)     Osteoarthrosis     Allergic rhinitis due to cats     Arthropathy of spinal facet joint concurrent with and due to effusion     HTN (hypertension)     Chronic back pain     Bilateral primary osteoarthritis of hip     Chronic pain     Controlled substance agreement signed, 2/14/22- Percocet     Depression     Chronic GERD     High risk medication use     HLD (hyperlipidemia)     Insomnia     Lumbar radiculopathy     Pain in joint, pelvic region and thigh     Positive FIT (fecal immunochemical test)     Prolonged QT interval     Radiculopathy of cervicothoracic region     Rheumatoid arthritis, involving unspecified site, unspecified whether rheumatoid factor present (H)     S/P insertion of spinal cord stimulator     Venous insufficiency     Stage 3b chronic kidney disease (H)     Syncope, unspecified syncope type     Generalized OA     Anxiety     Chronic low back pain with sciatica, sciatica laterality unspecified, unspecified back pain laterality     Bradycardia     Bilateral carpal tunnel syndrome     Generalized muscle weakness     Fall, initial encounter     Infection due to 2019 novel coronavirus     Numbness of left hand, pt mentioned 7/2022. Evaluate at next FTF  visit         PMH:   Past Medical History:   Diagnosis Date     BBB (bundle branch block)      Chronic back pain      Chronic kidney disease     stage 3     Chronic kidney disease, unspecified     Created by Conversion      Depression      Diabetes (H)     Type 2     Diabetes mellitus, type 2 (H)     Created by Conversion      Frequent headaches      Ganglion of tendon sheath     Created by Conversion      GERD (gastroesophageal reflux disease)      HLD (hyperlipidemia)      Hypertension     Per H&P dated 1/03/2013     Incarcerated ventral hernia     Per H&P dated 1/03/2013     Osteoarthritis      Rheumatoid arthritis (H)      Shortness of breath      Thrombocytopenia (H)      Venous insufficiency        Surgical History:  Past Surgical History:   Procedure Laterality Date     BACK SURGERY       C UNLISTED PROCEDURE, ABDOMEN/PERITONEUM/OMENTUM      Description: Hernia Repair;  Recorded: 10/23/2013;     CHOLECYSTECTOMY       HERNIA REPAIR       HYSTERECTOMY  1984     IR FINE NEEDLE ASPIRATION  11/29/2020     JOINT REPLACEMENT Left     knee     OTHER SURGICAL HISTORY  1990, 1995    Two left wrist     OTHER SURGICAL HISTORY  1996    FATTY TUMORLT SHOULDER BLADE     PICC MIDLINE INSERTION  11/30/2020          KS IMPLANT SPINAL NEUROSTIM/ Left 4/25/2019    Procedure: LEFT FLANK INCISION REPLACE NEUROSTIMULATOR;  Surgeon: Vishal Pretty MD;  Location: [facility];  Service: Pain     SPINAL CORD STIMULATOR IMPLANT         Family History:  Family History   Problem Relation Age of Onset     Myocardial Infarction Father      Cancer Father      Chronic Obstructive Pulmonary Disease Father      Heart Disease Father      Diabetes Sister      Diabetes Brother      Diabetes Maternal Grandmother      Melanoma Son      Arthritis Mother      Acute Myocardial Infarction Father 62.00     Diabetes Sister      Diabetes Brother      Melanoma Son      Kidney failure Son      Glomerulonephritis Son        Social  History:   reports that she quit smoking about 14 years ago. She started smoking about 57 years ago. She has a 43.00 pack-year smoking history. She has never used smokeless tobacco. She reports current alcohol use. She reports that she does not use drugs.    Allergies:  Allergies   Allergen Reactions     Morphine Nausea and Vomiting     Penicillins Hives     Tolerates ceftriaxone     Sulfa (Sulfonamide Antibiotics) [Sulfa Drugs] Hives        Current Medications:  Current Outpatient Medications   Medication Sig Dispense Refill     alcohol swab prep pads Use to swab area of injection/jacquelyn as directed. 100 each 3     amLODIPine (NORVASC) 10 MG tablet [AMLODIPINE (NORVASC) 10 MG TABLET] TAKE 1 TABLET EVERY DAY 90 tablet 2     ascorbic acid (ASCORBIC ACID WITH LATRICIA HIPS) 500 MG tablet Take 500 mg by mouth daily        atorvastatin (LIPITOR) 40 MG tablet Take 1 tablet (40 mg) by mouth At Bedtime 90 tablet 1     b complex vitamins tablet Take 1 tablet by mouth daily        Continuous Blood Gluc Sensor (FREESTYLE BRE 14 DAY SENSOR) MISC Change every 14 days. Use daily to scan blood sugars, use per manufactures guidelines. Pt already has reader. 2 each 11     DULoxetine (CYMBALTA) 60 MG capsule TAKE 1 CAPSULE BY MOUTH DAILY 90 capsule 1     etanercept (ENBREL) 50 MG/ML injection Inject 1 mL (50 mg) Subcutaneous once a week Hold for signs of infection, and seek medical attention. 4 mL 3     fish oil-omega-3 fatty acids 1000 MG capsule Take 1 g by mouth daily       flash glucose scanning reader (FREESTYLE BRE 14 DAY READER) Misc [FLASH GLUCOSE SCANNING READER (FREESTYLE BRE 14 DAY READER) MISC] Use 1 each As Directed see administration instructions. Use as directed with sensor 1 each 0     flash glucose sensor (FREESTYLE BRE 14 DAY SENSOR) Kit [FLASH GLUCOSE SENSOR (FREESTYLE BRE 14 DAY SENSOR) KIT] Use 1 each As Directed every 14 (fourteen) days. 2 kit 11     folic acid (FOLVITE) 1 MG tablet Take one tab daily,  except the day when taking methotrexate. 90 tablet 1     hydroxychloroquine (PLAQUENIL) 200 MG tablet Take 1 tablet (200 mg) by mouth 2 times daily 60 tablet 1     insulin  UNIT/ML injection Inject 54 Units Subcutaneous every morning AND 30 Units every evening. 15 mL 3     insulin syringe-needle U-100 (BD INSULIN SYRINGE ULT-FINE II) 1 mL 31 gauge x 5/16 Syrg [INSULIN SYRINGE-NEEDLE U-100 (BD INSULIN SYRINGE ULT-FINE II) 1 ML 31 GAUGE X 5/16 SYRG] USE 4 TIMES DAILY AS DIRECTED 500 each 1     lancing device (ACCU-CHEK SOFTCLIX) Misc [LANCING DEVICE (ACCU-CHEK SOFTCLIX) MISC] Use 1 applicator As Directed 4 (four) times a day. 300 each 3     lisinopril (ZESTRIL) 10 MG tablet Take 1 tablet (10 mg) by mouth At Bedtime 90 tablet 1     loratadine (CLARITIN) 10 MG tablet Take 1 tablet (10 mg) by mouth At Bedtime 90 tablet 3     methotrexate 2.5 MG tablet Take 3 tablets (7.5 mg) by mouth once a week 36 tablet 0     metoprolol succinate ER (TOPROL XL) 50 MG 24 hr tablet [METOPROLOL SUCCINATE (TOPROL-XL) 50 MG 24 HR TABLET] TAKE 1 TABLET EVERY DAY 90 tablet 3     MULTIVITAMIN ORAL Take 1 tablet by mouth daily        nystatin (MYCOSTATIN) 510377 UNIT/GM external cream Apply topically 2 times daily 30 g 1     oxyCODONE-acetaminophen (PERCOCET) 5-325 MG tablet Take 1-2 tablets by mouth every 6 hours as needed for severe pain (Max of 6 tablets daily) Dispense 7/28/22, start 7/30/22 180 tablet 0     thin (NO BRAND SPECIFIED) lancets Use with lanceting device. To accompany: Blood Glucose Monitor Brands: per insurance. 100 each 11     traZODone (DESYREL) 150 MG tablet Take 1 tablet (150 mg) by mouth At Bedtime (Patient taking differently: Take 150 mg by mouth nightly as needed for sleep) 90 tablet 1     Vitamin D3 (CHOLECALCIFEROL) 25 mcg (1000 units) tablet Take 1,000 Units by mouth daily              Physical Exam:  Following up today via video visit, per Covid-19 pandemic requirements.    Verbal consent has been obtained  for this service by care team member.    Video call start time:  12:32 PM    Video call end time: 12:54 PM    [employer] utilized for video call.    Phone number utilized: [phone]    Patient location for video visit: Home     Provider location for video visit:  Home (working remotely)    Summary/Assessment:      History that includes seropositive rheumatoid arthritis, osteoarthritis, renal insufficiency.    Presents for follow-up visit.      Claims to be doing well on current combination of meds.     Given lowering of platelet count and history of prolonged QT, suggested trial of holding Plaquenil and seeing if benefits from combination of Enbrel and methotrexate alone.  Enbrel added a few months ago has been helpful    States Enbrel now better tolerated, had some injection site erythema in the past.  Currently not taking Benadryl prior.    Latest kidney function and  liver enzymes have been stable.    As noted above diminishing platelet count, remainder of CBC cell count results noted to be stable.    Please see below for management plan.      Pertinent rheumatology/past medical history (please refer to above for more detailed history):      Seropositive rheumatoid arthritis    High risk medication use    Osteoarthritis, multiple joints    Hx left knee replacement    Chronic right knee pain (cortisone injection not helpful)    Chronic neck and back pain (managed by another provider)    Renal insufficiency (established with nephrology)    Chronic opioid use    Diabetes, type II    History of urosepsis    Osteopenia (with nonhigh risk FRAX score).    Chronic low-dose steroid use      Rheumatology medications provided/suggested:    Methotrexate  Folic acid  Enbrel      Pertinent medication from other providers or from otc (please refer to above for more detailed med list):    Percocet  Insulin  Calcium  Vitamin D    Pertinent medications already tried:     Humira (worsened joint pains)  Leflunomide (cost  prohibitive)  Hydroxychloroquine (ineffective and costly)      Pertinent lab history:    Positive: CCP antibody    Negative: Rheumatoid factor, HUGH, c- ANCA      Pertinent imaging/test history:    X-ray of right hand from February 2019 that showed some signs of degenerative joint disease, no erosive changes identified.    9/2020, bone density test showed osteopenia with non-high risk FRAX score.      Other:    Standing order for labs placed every 3 months good through April 2020    Denies tobacco use or regular alcohol beverage intake.  Quit smoking greater than 10 years ago.    Was a patient of Dr. Schofield, last seen July 2018.    Denies any history of macular degeneration, retinal disease or glaucoma.      Plan:      Continue Enbrel 50 mg weekly    Continue methotrexate 3 tablets weekly (was on 5 tablets weekly, limiting dose given mild renal insufficiency also has thrombocytopenia)     Continue folic acid 1 mg daily, except the day when taking methotrexate.    We will try trial of discontinuing Plaquenil.      Has diclofenac gel to utilize as needed.    Continue calcium and vitamin D daily.  Suggested discussing with PCP regarding possibly repeating bone density test.    Takes Percocet 6-8 tablets daily, established with pain clinic.    Recheck labs in 10 weeks. Made aware to call thereafter rheumatology department for team RN or provider to review lab results.    Patient states that she has an upcoming appointment with her PCP in a couple weeks, includes monitoring her diabetes, suggested she discuss diminishing platelet count with PCP and see if can add on with anticipated labs to have performed then.    Follow-up in 3-4 months.            This note was transcribed using Dragon voice recognition software as a result unintentional grammatical errors or word substitutions may have occurred. Please contact our Rheumatology department if you need any clarification or if you have any related inquiries.        Raheem  DO Mele Londono...................  8/2/2022   10:16 AM

## 2022-08-08 ENCOUNTER — HOSPITAL ENCOUNTER (OUTPATIENT)
Dept: PHYSICAL THERAPY | Facility: REHABILITATION | Age: 72
Discharge: HOME | End: 2022-08-08
Payer: COMMERCIAL

## 2022-08-08 DIAGNOSIS — M53.3 SI (SACROILIAC) JOINT DYSFUNCTION: ICD-10-CM

## 2022-08-08 DIAGNOSIS — M62.81 GENERALIZED MUSCLE WEAKNESS: ICD-10-CM

## 2022-08-08 DIAGNOSIS — M54.50 CHRONIC MIDLINE LOW BACK PAIN WITHOUT SCIATICA: Primary | ICD-10-CM

## 2022-08-08 DIAGNOSIS — M53.3 PAIN IN THE COCCYX: ICD-10-CM

## 2022-08-08 DIAGNOSIS — G89.29 CHRONIC MIDLINE LOW BACK PAIN WITHOUT SCIATICA: Primary | ICD-10-CM

## 2022-08-08 PROCEDURE — 97110 THERAPEUTIC EXERCISES: CPT | Mod: GP | Performed by: PHYSICAL THERAPIST

## 2022-08-08 PROCEDURE — 97140 MANUAL THERAPY 1/> REGIONS: CPT | Mod: GP | Performed by: PHYSICAL THERAPIST

## 2022-08-08 NOTE — PROGRESS NOTES
[facility]    OUTPATIENT PHYSICAL THERAPY  PLAN OF TREATMENT FOR OUTPATIENT REHABILITATION AND PROGRESS NOTE           Patient's Last Name, First Name, Ebonie Titus Date of Birth  1950   Provider's Name  [facility] Medical Record No.  [id number]    Onset Date  7/12/21 Start of Care Date  7/12/21   Type:     _X_PT   ___OT   ___SLP Medical Diagnosis  Chronic Pain Syndrome, Sacroiliitis,  Chronic thoracic back pain   PT Diagnosis  Chronic Pain Syndrome, Sacroiliitis,  Chronic thoracic back pain Plan of Treatment  Frequency/Duration:   6 visits  Certification date from 6/15/22 to 9/14/22     Goals:  Goal Identifier HEP/self management   Goal Description Patient will be independent in HEP and self management of condition.   Target Date 09/14/22   Date Met      Progress (detail required for progress note): TENS ( not working) Takes meds (Percocet, Methotrexate, and Duoxetine, Enbrel), Changing positions, stretching and exercises     Goal Identifier Sleeping   Goal Description Patient will be able to sleep more comfortably and wake only 1-2x/night d/t pain   Target Date 09/14/22   Date Met      Progress (detail required for progress note): Pt waking up 2x/night due to pain and then repositions. Also gets up 3x/night to urinate     Goal Identifier Walking   Goal Description Patient will be able to walk 1 block with <5/10 pain in the lower back   Target Date 09/14/22   Date Met      Progress (detail required for progress note): Pt walks up to 10 minute with SEC - must sit down due to 9/10 LBP     Goal Identifier Sitting   Goal Description Patient will be able to sit for 30 minutes with <5/10 pain in the lower back   Target Date 09/14/22   Date Met      Progress (detail required for progress note): Pt able to sit 30 minutes with 8/10 LBP- unchanged     Goal  Identifier     Goal Description     Target Date     Date Met      Progress (detail required for progress note):       Goal Identifier     Goal Description     Target Date     Date Met      Progress (detail required for progress note):       Goal Identifier     Goal Description     Target Date     Date Met      Progress (detail required for progress note):       Goal Identifier     Goal Description     Target Date     Date Met      Progress (detail required for progress note):         Beginning/End Dates of Progress Note Reporting Period:  4/24/22 to 8/8/22    Progress Toward Goals:   Progress limited due to pt unable to get into PT    Client Self (Subjective) Report for Progress Note Reporting Period: My LBP is 8/10, 0/10 thoracic area. Neck pain is 5/10 when reading for a length of time- goes away when I go the opposite way. The joints of my hand have RA- legs, hips and knees also have arthritis.  I was in the hospital with COVID 4 days in July (after I fell at home) and then was in isolation for a total of 10 days.      Objective Measurements:   Objective Measure: Posture  Details: (P) Standing more erect with more ease  Objective Measure: Sit to stand  Details: From 23 inches -  bilat knee pain 4/10 - 4 reps. Was 3 reps April/2022  Objective Measure: Gait  Details: (P) Pt walks with SEC- more normal pace and stance width, Pt uses both hands to transfer sit to stand with one atttempt  Objective Measure: Cranial scan  Details: Mesentary and scar of lumbar and pelvis, + L > R post pelvic neural and entire left leg,  LV of pelvic and back right more than left , Left pelvic viscera, cartilage of right knee, hips and spine.+  Objective Measure: Trunk AROM  Details: Flexion 13 1/2  inches fingertips to floor (was 17 in in April), Extension - now easily erect.  Objective Measure: LE  Details: (P) Unable to perform SL heel raise          I CERTIFY THE NEED FOR THESE SERVICES FURNISHED UNDER        THIS PLAN OF TREATMENT  AND WHILE UNDER MY CARE     (Physician co-signature of this document indicates review and certification of the therapy plan).              Referring Provider: Akila Napoles, MAGGIE Sandoval, PT       08/08/22 6162   Signing Clinician's Name / Credentials   Signing clinician's name / credentials Paris Sandoval PT   Session Number   Session Number 9  (4/10 PN)   Progress Note/Recertification   Progress Note Completed Date 08/08/22   Recertification Due Date 09/14/22   Adult Goals   PT Ortho Eval Goals 1;2;3;4   Ortho Goal 1   Goal Identifier HEP/self management   Goal Description Patient will be independent in HEP and self management of condition.   Goal Progress TENS ( not working) Takes meds (Percocet, Methotrexate, and Duoxetine, Enbrel), Changing positions, stretching and exercises   Target Date 09/14/22   Ortho Goal 2   Goal Identifier Sleeping   Goal Description Patient will be able to sleep more comfortably and wake only 1-2x/night d/t pain   Goal Progress Pt waking up 2x/night due to pain and then repositions. Also gets up 3x/night to urinate   Target Date 09/14/22   Ortho Goal 3   Goal Identifier Walking   Goal Description Patient will be able to walk 1 block with <5/10 pain in the lower back   Goal Progress Pt walks up to 10 minute with SEC - must sit down due to 9/10 LBP   Target Date 09/14/22   Ortho Goal 4   Goal Identifier Sitting   Goal Description Patient will be able to sit for 30 minutes with <5/10 pain in the lower back   Goal Progress Pt able to sit 30 minutes with 8/10 LBP- unchanged   Target Date 09/14/22   Subjective Report   Subjective Report My LBP is 8/10, 0/10 thoracic area. Neck pain is 5/10 when reading for a length of time- goes away when I go the opposite way. The joints of my hand have RA- legs, hips and knees also have arthritis.  I was in the hospital with COVID 4 days in July (after I fell at home) and then was in isolation for a total of 10 days.   Objective Measures   Objective  Measures Objective Measure 1;Objective Measure 2;Objective Measure 3;Objective Measure 4;Objective Measure 5;Objective Measure 6   Objective Measure 1   Objective Measure Posture   Details Standing more erect with more ease   Objective Measure 2   Objective Measure Sit to stand   Details From 23 inches -  bilat knee pain 4/10 - 4 reps. Was 3 reps April/2022   Objective Measure 3   Objective Measure Gait   Details Pt walks with SEC- more normal pace and stance width, Pt uses both hands to transfer sit to stand with one atttempt   Objective Measure 4   Objective Measure Cranial scan   Details Mesentary and scar of lumbar and pelvis, + L > R post pelvic neural and entire left leg,  LV of pelvic and back right more than left , Left pelvic viscera, cartilage of right knee, hips and spine.+   Objective Measure 5   Objective Measure Trunk AROM   Details Flexion 13 1/2  inches fingertips to floor (was 17 in in April), Extension - now easily erect.   Objective Measure 6   Objective Measure LE   Details Unable to perform SL heel raise   Treatment Interventions   Interventions Therapeutic Procedure/Exercise;Manual Therapy   Therapeutic Procedure/exercise   Therapeutic Procedures: strength, endurance, ROM, flexibillity minutes (95931) 40   Skilled Intervention Ther EX   Patient Response Pt understands, she is williing to try things in clinic but loses motivation to do them at home when she is alone   Treatment Detail Performed Supine trunk/lumbar flexibility exercises: Morning trunk extension supine, Single KTC, supine trunk rotation   Manual Therapy   Manual Therapy: Mobilization, MFR, MLD, friction massage minutes ([id number]) 15   Skilled Intervention SCS/ MT to LB and gluteal sidel lie   Treatment Detail Stacked [id number], STM Bilat T10-L2 paraspinals   Progress May need to treat in sidelying at times- pt unable to lay supine for long   Education   Learner Patient   Readiness Nonacceptance   Method  Booklet/handout;Literature;Explanation;Demonstration   Response Needs Reinforcement   Plan   Homework Pt has power lift chair (uses it seldom), suggested use of lumbar roll when sitting   Home program SL stand 15-30 sec, EIS, supine trunk arch, Seated knee isometrics, double heel raises. supine trunk extension, supine trunk rotation, Single KTC   Updates to plan of care Use pillow to raise seat height in all chairs, and use SEC at all times until pain decreased.   Plan for next session . Add MT to LB SUpine/sidelie- hip flexors. LV   Comments   Comments Pt has been unable to attend PT since 4/25/22 due to access issues and recent COVID. LBP continues to be severe with prolonged positions.   Total Session Time   Total Treatment Time (sum of timed and untimed services) 60   AMBULATORY CLINICS ONLY-MEDICAL AND TREATMENT DIAGNOSIS   Medical Diagnosis Chronic Pain Syndrome, sacroilitis, Chronic thoracic  back pain

## 2022-08-08 NOTE — ADDENDUM NOTE
Encounter addended by: Paris Sandoval, PT on: 8/8/2022 4:13 PM   Actions taken: Document created, Document edited, Charge Capture section accepted

## 2022-08-08 NOTE — ADDENDUM NOTE
Encounter addended by: Paris Sandoval, PT on: 8/8/2022 4:10 PM   Actions taken: Flowsheet data copied forward, Clinical Note Signed, Flowsheet accepted

## 2022-08-12 ENCOUNTER — OFFICE VISIT (OUTPATIENT)
Dept: PALLIATIVE MEDICINE | Facility: OTHER | Age: 72
End: 2022-08-12
Payer: COMMERCIAL

## 2022-08-12 VITALS
DIASTOLIC BLOOD PRESSURE: 68 MMHG | WEIGHT: 232 LBS | BODY MASS INDEX: 31.46 KG/M2 | SYSTOLIC BLOOD PRESSURE: 142 MMHG | HEART RATE: 73 BPM | OXYGEN SATURATION: 94 %

## 2022-08-12 DIAGNOSIS — Z79.899 HIGH RISK MEDICATION USE: ICD-10-CM

## 2022-08-12 DIAGNOSIS — G89.29 CHRONIC RIGHT SI JOINT PAIN: ICD-10-CM

## 2022-08-12 DIAGNOSIS — M54.50 CHRONIC MIDLINE LOW BACK PAIN WITHOUT SCIATICA: ICD-10-CM

## 2022-08-12 DIAGNOSIS — G89.29 CHRONIC MIDLINE LOW BACK PAIN WITHOUT SCIATICA: ICD-10-CM

## 2022-08-12 DIAGNOSIS — T85.192D FAILURE OF SPINAL CORD STIMULATOR, SUBSEQUENT ENCOUNTER: ICD-10-CM

## 2022-08-12 DIAGNOSIS — M53.3 CHRONIC RIGHT SI JOINT PAIN: ICD-10-CM

## 2022-08-12 DIAGNOSIS — G89.29 CHRONIC BILATERAL THORACIC BACK PAIN: ICD-10-CM

## 2022-08-12 DIAGNOSIS — G89.29 CHRONIC INTRACTABLE PAIN: Primary | ICD-10-CM

## 2022-08-12 DIAGNOSIS — M06.09 RHEUMATOID ARTHRITIS OF MULTIPLE SITES WITHOUT RHEUMATOID FACTOR (H): ICD-10-CM

## 2022-08-12 DIAGNOSIS — M54.6 CHRONIC BILATERAL THORACIC BACK PAIN: ICD-10-CM

## 2022-08-12 PROCEDURE — 99214 OFFICE O/P EST MOD 30 MIN: CPT | Performed by: NURSE PRACTITIONER

## 2022-08-12 PROCEDURE — G0463 HOSPITAL OUTPT CLINIC VISIT: HCPCS

## 2022-08-12 RX ORDER — OXYCODONE HCL 10 MG/1
10 TABLET, FILM COATED, EXTENDED RELEASE ORAL EVERY 12 HOURS
Qty: 60 TABLET | Refills: 0 | Status: SHIPPED | OUTPATIENT
Start: 2022-08-12 | End: 2022-09-12

## 2022-08-12 RX ORDER — OXYCODONE AND ACETAMINOPHEN 5; 325 MG/1; MG/1
1-2 TABLET ORAL EVERY 6 HOURS PRN
Qty: 180 TABLET | Refills: 0 | Status: SHIPPED | OUTPATIENT
Start: 2022-08-12 | End: 2022-08-12 | Stop reason: DRUGHIGH

## 2022-08-12 RX ORDER — OXYCODONE AND ACETAMINOPHEN 5; 325 MG/1; MG/1
1 TABLET ORAL 3 TIMES DAILY PRN
Qty: 90 TABLET | Refills: 0 | Status: SHIPPED | OUTPATIENT
Start: 2022-08-12 | End: 2022-10-14

## 2022-08-12 ASSESSMENT — PAIN SCALES - GENERAL: PAINLEVEL: EXTREME PAIN (8)

## 2022-08-12 NOTE — PROGRESS NOTES
[facility]    OUTPATIENT PHYSICAL THERAPY  PLAN OF TREATMENT FOR OUTPATIENT REHABILITATION AND PROGRESS NOTE           Patient's Last Name, First Name, Ebonei Titus Date of Birth  1950   Provider's Name  [facility] Medical Record No.  [id number]    Onset Date  7/12/21 Start of Care Date  7/12/21   Type:     _X_PT   ___OT   ___SLP Medical Diagnosis  Chronic pain syndrome, sacroiliitis, Chronic thoracic back pain   PT Diagnosis  Chronic pain syndrome, sacroiliitis, Chronic thoracic back pain Plan of Treatment  Frequency/Duration: 1x/wk  Certification date from 6/15/22 to 9/14/22     Goals:  Goal Identifier HEP/self management   Goal Description Patient will be independent in HEP and self management of condition.   Target Date 09/14/22   Date Met      Progress (detail required for progress note): TENS, Takes meds (Percocet, Methotrexate, and Duoxetine, Enbrel), Changing positions, stretching and exercises      Goal Identifier Sleeping   Goal Description Patient will be able to sleep more comfortably and wake only 1-2x/night d/t pain   Target Date 09/14/22   Date Met      Progress (detail required for progress note): Pt reports she is waking up 2x/night due to pain, also gets up 2x/night to urinate     Goal Identifier Walking   Goal Description Patient will be able to walk 1 block with <5/10 pain in the lower back   Target Date 09/14/22   Date Met      Progress (detail required for progress note): Pt only able to walk 10 min due to back pain 8/10- also feels unsteady     Goal Identifier Sitting   Goal Description Patient will be able to sit for 30 minutes with <5/10 pain in the lower back   Target Date 09/14/22   Date Met      Progress (detail required for progress note): Pt able to sit 30 minutes with 8/10 LBP     Goal Identifier     Goal Description      Target Date     Date Met      Progress (detail required for progress note):       Goal Identifier     Goal Description     Target Date     Date Met      Progress (detail required for progress note):       Goal Identifier     Goal Description     Target Date     Date Met      Progress (detail required for progress note):       Goal Identifier     Goal Description     Target Date     Date Met      Progress (detail required for progress note):         Beginning/End Dates of Progress Note Reporting Period:  4/1j8/22 to 8/8/22    Progress Toward Goals:   Progress limited due to Pt unable to get access/time into PT    Client Self (Subjective) Report for Progress Note Reporting Period: Pt reports she has been trying to do the exercises on her own. She reports she had to cancel her last visit as she was in the hospital 7/19/22 after falling and it was found she had COVID. She has been unable to obtain further PT appointments until now. She reports Bilateral LBP 7-8/10, but denies SI area pain or thoracic pain      Objective Measurements:   Objective Measure: Posture  Details: Pt standing more erect and states she is practicing the standing back arches more since first visit. Still has marked FBP of neck.  Objective Measure: Sit to stand  Details: 4 reps from 23 inches, and increase from 3 reps, Knee pain much better today and only 3/10 as compared to April during last test  Objective Measure: Gait  Details: Pt walks with SEC- more normal stance width and walks more quickly and smoothly  Objective Measure: Cranial scan     Objective Measure: Trunk AROM  Details: Flexion 15 inches fingertips to floor, Extension - barely to neutral. Pt unable to sit comfortably with full lumbar roll due to loss of lumbar extension  Objective Measure: LE  Details: R hip flexion 92, Left hip flexion 100 deg, Calf strength 2/5 bilat and pt unable to do even one SL heel raise- makes her vulnarable to falls and hip flexion tightness increases  demand of LB in ADL's          I CERTIFY THE NEED FOR THESE SERVICES FURNISHED UNDER        THIS PLAN OF TREATMENT AND WHILE UNDER MY CARE     (Physician co-signature of this document indicates review and certification of the therapy plan).              Referring Provider: Akila Napoles, MAGGIE Sandoval, PT       08/12/22 1000   Signing Clinician's Name / Credentials   Signing clinician's name / credentials Paris Sandoval PT   Session Number   Session Number 7  (1/10 PN)   Progress Note/Recertification   Recertification Due Date 09/14/22  (Rcert due 6/15/22 but pt unable to get in for PT and also had COIVD)   Recertification Completed Date  08/08/22   Adult Goals   PT Ortho Eval Goals 1;2;3;4   Ortho Goal 1   Goal Identifier HEP/self management   Goal Description Patient will be independent in HEP and self management of condition.   Goal Progress TENS, Takes meds (Percocet, Methotrexate, and Duoxetine, Enbrel), Changing positions, stretching and exercises    Target Date 09/14/22   Ortho Goal 2   Goal Identifier Sleeping   Goal Description Patient will be able to sleep more comfortably and wake only 1-2x/night d/t pain   Goal Progress Pt reports she is waking up 2x/night due to pain, also gets up 2x/night to urinate   Target Date 09/14/22   Ortho Goal 3   Goal Identifier Walking   Goal Description Patient will be able to walk 1 block with <5/10 pain in the lower back   Goal Progress Pt only able to walk 10 min due to back pain 8/10- also feels unsteady   Target Date 09/14/22   Ortho Goal 4   Goal Identifier Sitting   Goal Description Patient will be able to sit for 30 minutes with <5/10 pain in the lower back   Goal Progress Pt able to sit 30 minutes with 8/10 LBP   Target Date 09/14/22   Subjective Report   Subjective Report Pt reports she has been trying to do the exercises on her own. She reports she had to cancel her last visit as she was in the hospital 7/19/22 after falling and it was found she had COVID. She has  been unable to obtain further PT appointments until now.   Objective Measures   Objective Measures Objective Measure 1;Objective Measure 2;Objective Measure 3;Objective Measure 4;Objective Measure 5;Objective Measure 6   Objective Measure 1   Objective Measure Posture   Details Pt standing more erect and states she is practicing the standing back arches more since first visit. Still has marked FBP of neck.   Objective Measure 2   Objective Measure Sit to stand   Details 4 reps from 23 inches, and increase from 3 reps, Knee pain much better today and only 3/10 as compared to April during last test   Objective Measure 3   Objective Measure Gait   Details Pt walks with SEC- more normal stance width and walks more quickly and smoothly   Objective Measure 4   Objective Measure Cranial scan   Objective Measure 5   Objective Measure Trunk AROM   Details Flexion 15 inches fingertips to floor, Extension - barely to neutral. Pt unable to sit comfortably with full lumbar roll due to loss of lumbar extension   Objective Measure 6   Objective Measure LE   Details R hip flexion 92, Left hip flexion 100 deg, Calf strength 2/5 bilat and pt unable to do even one SL heel raise- makes her vulnarable to falls and hip flexion tightness increases demand of LB in ADL's   Treatment Interventions   Interventions Therapeutic Procedure/Exercise;Manual Therapy   Therapeutic Procedure/exercise   Therapeutic Procedures: strength, endurance, ROM, flexibillity minutes ([id number]) 40   Skilled Intervention Ther EX   Patient Response Pt understands, she is willing to do them and try to avoid falls in future   Treatment Detail Reviewed sitting quad isometrics, standing back arches. double heel raises, Instructed and added SL stand to work both balance and hip abd strength.   Manual Therapy   Manual Therapy: Mobilization, MFR, MLD, friction massage minutes (95543) 15   Skilled Intervention SCS/ MT to Bilat LB   Patient Response Less reactive to MT    Treatment Detail Bilat stacked [id number]   Progress May need to treat in sidelying at times- pt unable to lay supine for long   Education   Learner Patient   Readiness Nonacceptance   Method Booklet/handout;Literature;Explanation;Demonstration   Response Needs Reinforcement   Plan   Homework Pt has power lift chair (uses it seldom), suggested use of lumbar roll when sitting   Home program SL stand, EIS, supine trunk arch, Seated knee isometrics, double heel raises   Updates to plan of care Use pillow to raise seat height in all chairs, and use SEC at all times until pain decreased.   Plan for next session Add hip flexion stretch supine to take stress of lumbar spine when flexing. Add MT to LB SUpine/sidelie   Comments   Comments Pt last in for PT 4/18/22 and has fallen and been hospitalized with PT in the past month. She still has significant LBP, but posture and LE are slightly improved despite recent illness.   Total Session Time   Total Treatment Time (sum of timed and untimed services) 55   AMBULATORY CLINICS ONLY-MEDICAL AND TREATMENT DIAGNOSIS   Medical Diagnosis Chronic Pain Syndrome, sacroilitis, Chronic thoracic  back pain

## 2022-08-12 NOTE — NURSING NOTE
Patient presents to the clinic today for a follow up with ANUPAM Garcia CNP regarding Pain Management.      PEG Score 12/23/2021 5/20/2022 8/12/2022   PEG Total Score 4.67 6.67 8           UDS/CSA-5/20/2022      Medications-      QUESTIONS:      [facility] Visit Facilitator

## 2022-08-12 NOTE — PROGRESS NOTES
[facility]    CHIEF COMPLAINT: Chronic Pain.    INTERVAL HISTORY:  Last seen on 5/20/22.       Recommendations/plan at the last visit included:    1. 60 min Clinic follow-up with ANUPAM Zavala NP-C in the next 2 months  2. Labs: Annual drug screen   3. Other: Set up meeting [facility] on same day as you see Akila Napoles  4. Medication Management : Have Marichuy continue meds until our next appt.       Since last visit:   - Right SI joint has been very painful. Would doron to repeat injection   - Asking about SCS explant since her SCS no longer holds a charge.     Pain Information today: Extreme Pain (8)/10. Location of pain: multiple chronic pain issues.    Annual Requirements last collected: May 20, 2022      Current Pain Relevant Medications:    Duloxetine 60 mg  Methotrexate 7.5 mg weekly   Trazodone 150 mg at HS      Current Controlled Substance Medications:   Percocet 5/325 mg 6 tabs = 45 MME/day     Previous Pain Relevant Medications: (H--helped; HI--Helped initially; SWH--Somewhat helpful; NH--No help; W--worse; SE--side effects; ?--Unsure if helpful)   NOTE: This medication information taken from patient's intake form, not medical records.   Opiates: Oxycodone: H  NSAIDS:   Migraine medications:   Muscle Relaxants:   Neuropathics:  Anti-depressants for pain:         Anxiety medications:     Topicals:   OTC medications:   Sleep Medications:  Other medications not covered above:      Hx  or current illegal drug use: denies   Hx or current ETOH use: denies   Nicotine/tobacco use: denies   Daily Caffeine intake:daily      THE 4 As OF OPIOID MAINTENANCE ANALGESIA    Analgesia: Is pain relief clinically significant? YES   Activity: Is patient functional and able to perform Activities of Daily Living? NO   Adverse effects: Is patient free from adverse side effects from opiates? YES   Adherence to Rx protocol: Is patient adhering to Controlled Substance Agreement and taking  medications ONLY as ordered? YES     Is Narcan prescribed for opiate use >50 MME daily or concurrent use of opiates and benzodiazepines? N/A    [place] Board of Pharmacy Data Base Reviewed:    YES; No concern for abuse or misuse of controlled medications based on this report. Reviewed [facility] August 12, 2022- no concerning fills.      PHYSICAL EXAM    Vitals:    08/12/22 1455   BP: (!) 142/68   Pulse: 73   SpO2: 94%   Weight: 105.2 kg (232 lb)       Constitutional: healthy, alert and no distress A&O.   Patient is appropriate.  Psychiatric/mental status: Alert, without lethargy or stupor. Appropriate affect.     DIRE Score for ongoing opioid management is calculated as follows:    Diagnosis = 2 pts (slowly progressive; moderate pain/objective findings)    Intractability = 2 pts (most treatments tried; patient not fully engaged/barriers)    Risk        Psych = 3 pts (no significant personality dysfunction/mental illness; good communication with clinic)         Chem Hlth = 3 pts (no history of chemical dependency; not drug-focused)       Reliability = 2 pts (occasional difficulties with compliance; generally reliable)17% NS rate        Social = 2 pts (reduction in some relationships/life rolls)       (Psych + Chem hlth + Reliability + Social) = 14    Efficacy = 1 pt (poor function; minimal pain relief despite mod/high med dose)    DIRE Score = 15        7-13: likely NOT suitable candidate for long-term opioid analgesia       14-21: may be a suitable candidate for long-term opioid analgesia     DIAGNOSTIC RESULTS:     1/22/21: MR CERVICAL SPINE WO CONTRAST  IMPRESSION:  1.  No high-grade spinal canal stenosis.  2.  Unchanged at least moderate left C3-C4, bilateral C4-C5 and left C5-C6 neural foraminal stenoses.  3.  The patient declined STIR images and axial images.     PAIN RELAVENT CONDITIONS:   1.  Chronic neck pain  2.  Rheumatoid Arthritis  3.  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 May need a battery replacement because it does not charge. Repeating Right SI joint which was previously very helpful.   Plan: PAIN INJECTION EVAL/TREAT/FOLLOW UP, oxyCODONE         (OXYCONTIN) 10 MG 12 hr tablet          (Z79.899) High risk medication use  Comment: update Narcan refill  Plan: naloxone (NARCAN) 4 MG/0.1ML nasal spray         Hypertension: Ebonie to follow up with Primary Care provider regarding elevated blood pressure.     PATIENT INSTRUCTIONS:     Diagnosis reviewed, treatment option addressed, and risk/benefits discussed.  Self-care instructions given.  I am recommending a multidisciplinary treatment plan to help this patient better manage pain.    Remember to request ALL medication refills 5 BUSINESS days before you run out.       1. Physical therapy: YES Continue per Paris's recommendation   2. 30 minutes Video follow-up with ANUPAM Zavala, NP-C in 1 months.  3. Procedures recommended: Right SI joint injection. We will call you to schedule.    4. Medication Management :   1. OxyContin 10 m tablet tab every 12 hours. When you start this medication, REDUCE percocet to no more than 3 tabs per day. Call Akila if you are having any concerns.   2. Refill of Percocet sent in to be filled on 22 and started 22    I have reviewed the note as documented above.  This accurately captures the substance of my conversation with the patient.  A total of 30 minutes of preparation, care, and consultation were spent on this visit today.     ANUPAM Rodriguez,  BRIANA  [facility]    (Information in italics and blue color are taken from previous pain and consulting medical providers notes and are documented as such)

## 2022-08-12 NOTE — PATIENT INSTRUCTIONS
After Visit Instructions:     Thank you for coming to [facility] for your care. It is my goal to partner with you to help you reach your optimal state of health.   Continue daily self-care, identifying contributing factors, and monitoring variations in pain level. Continue to integrate self-care into your life.      Physical therapy: YES Continue per Paris's recommendation   30 minutes Video follow-up with ANUPAM Zavala NP-C in 1 months.  Procedures recommended: Right SI joint injection. We will call you to schedule.    Medication Management :   OxyContin 10 m tablet tab every 12 hours. When you start this medication, REDUCE percocet to no more than 3 tabs per day. Call Akila if you are having any concerns.   Refill of Percocet sent in to be filled on 22 and started 22      ANUPAM Rodriguez, NP-C  [facility]  [facility] - Monday and Friday  [facility] - Tuesday  [place] - Thursday    Be sure to request ALL medication refills 5 days prior to the due date unless you will see your medical provider in an appointment before the due date.  This is YOUR responsibility. Do not expect same day refills. If you do not plan ahead you may run out of medications.   NO early refills are allowed. It is your responsibility to manage your medications responsibility and keep them safely stored. Lost or destroyed medications WILL NOT be replaced    Scheduling/Clinic telephone Number for ALL locations:  [phone]    After Hours On-Call Service:  [phone]    Call with any questions about your care and for scheduling assistance.   Calls are returned Monday through Friday between 8 AM and 4:00 PM. We usually get back to you within 2 business days depending on the issue/request.    If we are prescribing your medications:  For opioid medication refills, call the clinic or send a [employer] message 7 days in advance.  Please include:  Your name and date of  birth.   Name of requested medication  Name of the pharmacy.  For non-opioid medications, call your pharmacy directly to request a refill. Please allow 3-4 days to be processed.   Per [place] State Law:  All controlled substance prescriptions must be filled within 30 days of being written.    For those controlled substances allowing refills, pickup must occur within 30 days of last fill.      We believe regular attendance is key to your success in our program!    Any time you are unable to keep your appointment we ask that you call us at least 24 hours in advance to cancel.This will allow us to offer the appointment time to another patient.   Multiple missed appointments may lead to dismissal from the clinic.

## 2022-08-15 ENCOUNTER — TELEPHONE (OUTPATIENT)
Dept: PALLIATIVE MEDICINE | Facility: CLINIC | Age: 72
End: 2022-08-15

## 2022-08-15 NOTE — TELEPHONE ENCOUNTER
PA Initiation    Medication: OxyCONTIN 10 mg ER tablets  Insurance Company: [employer] - Phone [phone] Fax [phone]  Pharmacy Filling the Rx: [facility] [address] [place], [place] - [address]  Filling Pharmacy Phone: [phone]  Filling Pharmacy Fax: [phone]  Start Date: 8/15/2022

## 2022-08-15 NOTE — TELEPHONE ENCOUNTER
PA Initiation 8/15/2022    Medication: OxyCONTIN 10 mg ER tablets   Insurance Company:  [employer]  Pharmacy Filling the Rx:  [facility]  Filling Pharmacy Phone:  [phone]  Filling Pharmacy Fax:  [phone]  Start Date:  8/12/2022    Selma Mendoza LPN  [facility]

## 2022-08-15 NOTE — TELEPHONE ENCOUNTER
Prior Authorization Approval    Authorization Effective Date: 7/16/2022  Authorization Expiration Date: 8/15/2023  Medication: OxyCONTIN 10 mg ER tablets  Approved Dose/Quantity:   Reference #: [id number]   Insurance Company: [employer] - Phone [phone] Fax [phone]  Which Pharmacy is filling the prescription (Not needed for infusion/clinic administered): [facility] [id number] [place] [address] - [address]  Pharmacy Notified: Yes  Patient Notified: Yes

## 2022-08-18 ENCOUNTER — ALLIED HEALTH/NURSE VISIT (OUTPATIENT)
Dept: EDUCATION SERVICES | Facility: CLINIC | Age: 72
End: 2022-08-18

## 2022-08-18 ENCOUNTER — LAB (OUTPATIENT)
Dept: LAB | Facility: CLINIC | Age: 72
End: 2022-08-18
Payer: COMMERCIAL

## 2022-08-18 DIAGNOSIS — E11.00 TYPE 2 DIABETES MELLITUS WITH HYPEROSMOLARITY WITHOUT COMA, WITH LONG-TERM CURRENT USE OF INSULIN (H): ICD-10-CM

## 2022-08-18 DIAGNOSIS — E11.9 DIABETES MELLITUS, TYPE 2 (H): ICD-10-CM

## 2022-08-18 DIAGNOSIS — E78.2 MIXED HYPERLIPIDEMIA: ICD-10-CM

## 2022-08-18 DIAGNOSIS — Z79.4 TYPE 2 DIABETES MELLITUS WITH HYPEROSMOLARITY WITHOUT COMA, WITH LONG-TERM CURRENT USE OF INSULIN (H): ICD-10-CM

## 2022-08-18 DIAGNOSIS — E11.9 DIABETES MELLITUS, TYPE 2 (H): Primary | ICD-10-CM

## 2022-08-18 LAB
CHOLEST SERPL-MCNC: 133 MG/DL
HBA1C MFR BLD: 7.9 % (ref 0–5.6)
HDLC SERPL-MCNC: 45 MG/DL
LDLC SERPL CALC-MCNC: 51 MG/DL
NONHDLC SERPL-MCNC: 88 MG/DL
TRIGL SERPL-MCNC: 187 MG/DL

## 2022-08-18 PROCEDURE — G0108 DIAB MANAGE TRN  PER INDIV: HCPCS | Mod: AE

## 2022-08-18 PROCEDURE — 36415 COLL VENOUS BLD VENIPUNCTURE: CPT

## 2022-08-18 PROCEDURE — 80061 LIPID PANEL: CPT

## 2022-08-18 PROCEDURE — 82043 UR ALBUMIN QUANTITATIVE: CPT

## 2022-08-18 PROCEDURE — 83036 HEMOGLOBIN GLYCOSYLATED A1C: CPT

## 2022-08-18 NOTE — LETTER
8/18/2022         RE: Ebonie Bowman  [address]  [address]  [place] [address]        Dear Colleague,    Thank you for referring your patient, Ebonie Bowman, to the [facility]. Please see a copy of my visit note below.    Diabetes Self-Management Education & Support    Presents for:      Type of Visit: In Person      ASSESSMENT:    Pt seen today for f/u. She notes her BG have been running higher recently, this is seen in tamara report. Pt is taking NPH 58 units in the morning (around 8-9am) and 32 units in the evening (around 8-9pm).   Pt reports she has not been good about getting breakfast. Will occasionally have a glass of milk or OJ when waking up. She is eating MOW around 11:30-12 and dinner around 5-6pm. She is having an evening snack, she has been eating coconut/almond cookies at bedtime. Pt reports her appetite has been good. She has not had any severe lows. No other significant changes.                           Patient's most recent   Lab Results   Component Value Date    A1C 7.9 08/18/2022    is meeting goal of <8.0      Education provided today on:  AADE Self-Care Behaviors:  Care Plan: Diabetes   Updates made by Jewell Nicole RN since 8/18/2022 12:00 AM      Problem: HbA1C Not In Goal       Goal: Establish Regular Follow-Ups with PCP       Task: Discuss with PCP the recommended timing for patient's next follow up visit(s)    Responsible User: Jewell Nicole RN      Task: Discuss schedule for PCP visits with patient Completed 8/18/2022   Responsible User: Jewell Nicole RN      Goal: Get HbA1C Level in Goal       Task: Educate patient on diabetes education self-management topics Completed 8/18/2022   Responsible User: Jewell Nicole RN      Task: Educate patient on benefits of regular glucose monitoring Completed 8/18/2022   Responsible User: Jewell Nicole RN      Task: Refer patient to appropriate extended care team member, as needed (Medication Therapy Management,  Behavioral Health, Physical Therapy, etc.)    Responsible User: Jewell Nicole RN      Task: Discuss diabetes treatment plan with patient Completed 8/18/2022   Responsible User: Jewell Nicole RN      Problem: Diabetes Self-Management Education Needed to Optimize Self-Care Behaviors       Goal: Understand diabetes pathophysiology and disease progression       Task: Provide education on diabetes pathophysiology and disease progression specfic to patient's diabetes type    Responsible User: Jewell Nicole RN      Goal: Healthy Eating - follow a healthy eating pattern for diabetes    Start Date: 8/18/2022   This Visit's Progress: 20%   Note:    Try to eat something for breakfast      Task: Provide education on portion control and consistency in amount, composition and timing of food intake Completed 8/18/2022   Responsible User: Jewell Nicole RN      Task: Provide education on managing carbohydrate intake (carbohydrate counting, plate planning method, etc.)    Responsible User: Jewell Nicole RN      Task: Provide education on weight management    Responsible User: Jewell Nicole RN      Task: Provide education on heart healthy eating    Responsible User: Jewell Nicole RN      Task: Provide education on eating out Completed 8/18/2022   Responsible User: Jewell Nicole RN      Task: Develop individualized healthy eating plan with patient    Responsible User: Jewell Nicole RN      Goal: Being Active - get regular physical activity, working up to at least 150 minutes per week       Task: Provide education on relationship of activity to glucose and precautions to take if at risk for low glucose Completed 8/18/2022   Responsible User: Jewell Nicole RN      Task: Discuss barriers to physical activity with patient Completed 8/18/2022   Responsible User: Jewell Nicole RN      Task: Develop physical activity plan with patient    Responsible User: Jewell Nicole RN      Task: Explore community resources  including walking groups, assistance programs, and home videos    Responsible User: Jewell Nicole RN      Goal: Monitoring - monitor glucose and ketones as directed    Start Date: 8/18/2022   This Visit's Progress: On track   Note:    Scan blood sugars before meals and at least every 8 hours during the day      Task: Provide education on blood glucose monitoring (purpose, proper technique, frequency, glucose targets, interpreting results, when to use glucose control solution, sharps disposal)    Responsible User: Jewell Nicole RN      Task: Provide education on continuous glucose monitoring (sensor placement, use of zoya or /reader, understanding glucose trends, alerts and alarms, differences between sensor glucose and blood glucose) Completed 8/18/2022   Responsible User: Jewell Nicole RN      Task: Provide education on ketone monitoring (when to monitor, frequency, etc.)    Responsible User: Jewell Nicole RN      Goal: Taking Medication - patient is consistently taking medications as directed       Task: Provide education on action of prescribed medication, including when to take and possible side effects    Responsible User: Jewell Nicole RN      Task: Provide education on insulin and injectable diabetes medications, including administration, storage, site selection and rotation for injection sites Completed 8/18/2022   Responsible User: Jewell Nicole RN      Task: Discuss barriers to medication adherence with patient and provide management technique ideas as appropriate    Responsible User: Jewell Nicole RN      Task: Provide education on frequency and refill details of medications    Responsible User: Jewell Nicole RN      Goal: Problem Solving - know how to prevent and manage short-term diabetes complications       Task: Provide education on high blood glucose - causes, signs/symptoms, prevention and treatment Completed 8/18/2022   Responsible User: Jewell Nicole RN      Task: Provide  education on low blood glucose - causes, signs/symptoms, prevention, treatment, carrying a carbohydrate source at all times, and medical identification Completed 8/18/2022   Responsible User: Jewell Nicole RN      Task: Provide education on safe travel with diabetes    Responsible User: Jewell Nicole RN      Task: Provide education on how to care for diabetes on sick days    Responsible User: Jewell Nicole RN      Task: Provide education on when to call a health care provider    Responsible User: Jewell Nicole RN      Goal: Reducing Risks - know how to prevent and treat long-term diabetes complications       Task: Provide education on major complications of diabetes, prevention, early diagnostic measures and treatment of complications    Responsible User: Jewell Nicole RN      Task: Provide education on recommended care for dental, eye and foot health    Responsible User: Jewell Nicole RN      Task: Provide education on Hemoglobin A1c - goals and relationship to blood glucose levels Completed 8/18/2022   Responsible User: Jewell Nicole RN      Task: Provide education on recommendations for heart health - lipid levels and goals, blood pressure and goals, and aspirin therapy, if indicated    Responsible User: Jewell Nicole RN      Task: Provide education on tobacco cessation    Responsible User: Jewell Nicole RN      Goal: Healthy Coping - use available resources to cope with the challenges of managing diabetes       Task: Discuss recognizing feelings about having diabetes    Responsible User: Jewell Nicole RN      Task: Provide education on the benefits of making appropriate lifestyle changes       Task: Provide education on benefits of utilizing support systems    Responsible User: Jewell Nicole RN      Task: Discuss methods for coping with stress    Responsible User: Jewell Nicole RN      Task: Provide education on when to seek professional counseling    Responsible User: Jewell Nicole RN           Opportunities for ongoing education and support in diabetes-self management were discussed. Pt verbalized understanding of concepts discussed and recommendations provided today.         PLAN  Will increase to 62 units for morning dose. Continue with 32 units in evening. Will f/u with PCP next week and CDE in 1 month as scheduled.       See Goals Section for co-developed, patient-stated behavior change goals.      SUBJECTIVE / OBJECTIVE:     Cultural Influences/Ethnic Background:  Not  or       Diabetes Symptoms & Complications:          Patient Problem List and Family Medical History reviewed for relevant medical history, current medical status, and diabetes risk factors.    Vitals:  There were no vitals taken for this visit.  Estimated body mass index is 31.46 kg/m  as calculated from the following:    Height as of 7/16/22: 1.829 m (6').    Weight as of 8/12/22: 105.2 kg (232 lb).   Last 3 BP:   BP Readings from Last 3 Encounters:   08/12/22 (!) 142/68   07/18/22 136/62   06/13/22 135/70       History   Smoking Status     Former Smoker     Packs/day: 1.00     Years: 43.00     Start date: 1/1/1965     Quit date: 8/1/2008   Smokeless Tobacco     Never Used       Labs:  Lab Results   Component Value Date    A1C 7.9 08/18/2022     Lab Results   Component Value Date     07/18/2022     07/18/2022     Lab Results   Component Value Date    LDL 57 09/16/2021     Direct Measure HDL   Date Value Ref Range Status   09/16/2021 47 (L) >=50 mg/dL Final     Comment:     HDL Cholesterol Reference Range:     0-2 years:   No reference ranges established for patients under 2 years old  at [facility] for lipid analytes.    2-8 years:  Greater than 45 mg/dL     18 years and older:   Female: Greater than or equal to 50 mg/dL   Male:   Greater than or equal to 40 mg/dL   ]  GFR Estimate   Date Value Ref Range Status   07/18/2022 54 (L) >60 mL/min/1.73m2 Final     Comment:     Effective  December 21, 2021 eGFRcr in adults is calculated using the 2021 CKD-EPI creatinine equation which includes age and gender (Sondra branch al., NEJM, DOI: 10.1056/TNSVkg0191183)   05/07/2021 40 (L) >60 mL/min/1.73m2 Final     GFR Estimate If Black   Date Value Ref Range Status   05/07/2021 48 (L) >60 mL/min/1.73m2 Final     Lab Results   Component Value Date    CR 1.07 07/18/2022     No results found for: MICROALBUMIN      Taking Medications:  Diabetes Medication(s)     Insulin       insulin  UNIT/ML injection    Inject 54 Units Subcutaneous every morning AND 30 Units every evening.                Patient Activation Measure Survey Score:  No flowsheet data found.        Time Spent: 30 minutes  Encounter Type: Individual        Any diabetes medication dose changes were made via the Certified Diabetes Care & Education Protocol in collaboration with the patient's referring provider. A copy of this encounter was shared with the provider.

## 2022-08-18 NOTE — LETTER
August 25, 2022      Ebonie Bowman  [address]    [place] [address]        Dear ,    We are writing to inform you of your test results.    Your A1c is not quite at goal and has increased compared to the last check.  It is now 7.9%.  I recommend that we restart metformin.  This was discontinued during her hospitalization at some time in the past but it is reasonable if you are eating and drinking normally to restart this medication.  I went ahead and sent this to your pharmacy.  It should be taken 1 tablet daily with breakfast.    There is some excess protein in the urine.  This likely relates to having type 2 diabetes.    Your cholesterol profile looks good.    Resulted Orders   **A1C FUTURE 3mo   Result Value Ref Range    Hemoglobin A1C 7.9 (H) 0.0 - 5.6 %      Comment:      Normal <5.7%   Prediabetes 5.7-6.4%    Diabetes 6.5% or higher     Note: Adopted from ADA consensus guidelines.   Albumin Random Urine Quantitative with Creat Ratio   Result Value Ref Range    Albumin Urine mg/L 122.0 mg/L      Comment:      The reference ranges have not been established in urine albumin. The results should be integrated into the clinical context for interpretation.    Albumin Urine mg/g Cr 84.14 (H) 0.00 - 25.00 mg/g Cr      Comment:      Microalbuminuria is defined as an albumin:creatinine ratio of 17 to 299 for males and 25 to 299 for females. A ratio of albumin:creatinine of 300 or higher is indicative of overt proteinuria.  Due to biologic variability, positive results should be confirmed by a second, first-morning random or 24-hour timed urine specimen. If there is discrepancy, a third specimen is recommended. When 2 out of 3 results are in the microalbuminuria range, this is evidence for incipient nephropathy and warrants increased efforts at glucose control, blood pressure control, and institution of therapy with an angiotensin-converting-enzyme (ACE) inhibitor (if the patient can tolerate it).       Creatinine Urine mg/dL 145.0 mg/dL      Comment:      The reference ranges have not been established in urine creatinine. The results should be integrated into the clinical context for interpretation.   Lipid panel reflex to direct LDL Fasting   Result Value Ref Range    Cholesterol 133 <200 mg/dL    Triglycerides 187 (H) <150 mg/dL    Direct Measure HDL 45 (L) >=50 mg/dL    LDL Cholesterol Calculated 51 <=100 mg/dL    Non HDL Cholesterol 88 <130 mg/dL    Narrative    Cholesterol  Desirable:  <200 mg/dL    Triglycerides  Normal:  Less than 150 mg/dL  Borderline High:  150-199 mg/dL  High:  200-499 mg/dL  Very High:  Greater than or equal to 500 mg/dL    Direct Measure HDL  Female:  Greater than or equal to 50 mg/dL   Male:  Greater than or equal to 40 mg/dL    LDL Cholesterol  Desirable:  <100mg/dL  Above Desirable:  100-129 mg/dL   Borderline High:  130-159 mg/dL   High:  160-189 mg/dL   Very High:  >= 190 mg/dL    Non HDL Cholesterol  Desirable:  130 mg/dL  Above Desirable:  130-159 mg/dL  Borderline High:  160-189 mg/dL  High:  190-219 mg/dL  Very High:  Greater than or equal to 220 mg/dL       If you have any questions or concerns, please call the clinic at the number listed above.       Sincerely,      Marichuy Rubio NP

## 2022-08-18 NOTE — PROGRESS NOTES
Diabetes Self-Management Education & Support    Presents for:      Type of Visit: In Person      ASSESSMENT:    Pt seen today for f/u. She notes her BG have been running higher recently, this is seen in tamara report. Pt is taking NPH 58 units in the morning (around 8-9am) and 32 units in the evening (around 8-9pm).   Pt reports she has not been good about getting breakfast. Will occasionally have a glass of milk or OJ when waking up. She is eating MOW around 11:30-12 and dinner around 5-6pm. She is having an evening snack, she has been eating coconut/almond cookies at bedtime. Pt reports her appetite has been good. She has not had any severe lows. No other significant changes.                           Patient's most recent   Lab Results   Component Value Date    A1C 7.9 08/18/2022    is meeting goal of <8.0      Education provided today on:  AADE Self-Care Behaviors:  Care Plan: Diabetes   Updates made by Jewell Nicole RN since 8/18/2022 12:00 AM      Problem: HbA1C Not In Goal       Goal: Establish Regular Follow-Ups with PCP       Task: Discuss with PCP the recommended timing for patient's next follow up visit(s)    Responsible User: Jewell Nicole RN      Task: Discuss schedule for PCP visits with patient Completed 8/18/2022   Responsible User: Jewell Nicole RN      Goal: Get HbA1C Level in Goal       Task: Educate patient on diabetes education self-management topics Completed 8/18/2022   Responsible User: Jewell Nicole RN      Task: Educate patient on benefits of regular glucose monitoring Completed 8/18/2022   Responsible User: Jewell Nicole RN      Task: Refer patient to appropriate extended care team member, as needed (Medication Therapy Management, Behavioral Health, Physical Therapy, etc.)    Responsible User: Jewell Nicole RN      Task: Discuss diabetes treatment plan with patient Completed 8/18/2022   Responsible User: Jewell Nicole RN      Problem: Diabetes Self-Management Education Needed  to Optimize Self-Care Behaviors       Goal: Understand diabetes pathophysiology and disease progression       Task: Provide education on diabetes pathophysiology and disease progression specfic to patient's diabetes type    Responsible User: Jewell Nicole RN      Goal: Healthy Eating - follow a healthy eating pattern for diabetes    Start Date: 8/18/2022   This Visit's Progress: 20%   Note:    Try to eat something for breakfast      Task: Provide education on portion control and consistency in amount, composition and timing of food intake Completed 8/18/2022   Responsible User: Jewell Nicole RN      Task: Provide education on managing carbohydrate intake (carbohydrate counting, plate planning method, etc.)    Responsible User: Jewell Nicole RN      Task: Provide education on weight management    Responsible User: Jewell Nicole RN      Task: Provide education on heart healthy eating    Responsible User: Jewell Nicole RN      Task: Provide education on eating out Completed 8/18/2022   Responsible User: Jewell Nicole RN      Task: Develop individualized healthy eating plan with patient    Responsible User: Jewell Nicole RN      Goal: Being Active - get regular physical activity, working up to at least 150 minutes per week       Task: Provide education on relationship of activity to glucose and precautions to take if at risk for low glucose Completed 8/18/2022   Responsible User: Jewell Nicole RN      Task: Discuss barriers to physical activity with patient Completed 8/18/2022   Responsible User: Jewell Nicole RN      Task: Develop physical activity plan with patient    Responsible User: Jewell Nicole RN      Task: Explore community resources including walking groups, assistance programs, and home videos    Responsible User: Jewell Nicole RN      Goal: Monitoring - monitor glucose and ketones as directed    Start Date: 8/18/2022   This Visit's Progress: On track   Note:    Scan blood sugars before  meals and at least every 8 hours during the day      Task: Provide education on blood glucose monitoring (purpose, proper technique, frequency, glucose targets, interpreting results, when to use glucose control solution, sharps disposal)    Responsible User: Jewell Nicole RN      Task: Provide education on continuous glucose monitoring (sensor placement, use of zoya or /reader, understanding glucose trends, alerts and alarms, differences between sensor glucose and blood glucose) Completed 8/18/2022   Responsible User: Jewell Nicole RN      Task: Provide education on ketone monitoring (when to monitor, frequency, etc.)    Responsible User: Jewell Nicole RN      Goal: Taking Medication - patient is consistently taking medications as directed       Task: Provide education on action of prescribed medication, including when to take and possible side effects    Responsible User: Jewell Nicole RN      Task: Provide education on insulin and injectable diabetes medications, including administration, storage, site selection and rotation for injection sites Completed 8/18/2022   Responsible User: Jewell Nicole RN      Task: Discuss barriers to medication adherence with patient and provide management technique ideas as appropriate    Responsible User: Jweell Nicole RN      Task: Provide education on frequency and refill details of medications    Responsible User: Jewell Nicole RN      Goal: Problem Solving - know how to prevent and manage short-term diabetes complications       Task: Provide education on high blood glucose - causes, signs/symptoms, prevention and treatment Completed 8/18/2022   Responsible User: Jewell Nicole RN      Task: Provide education on low blood glucose - causes, signs/symptoms, prevention, treatment, carrying a carbohydrate source at all times, and medical identification Completed 8/18/2022   Responsible User: Jewell Nicole RN      Task: Provide education on safe travel with  diabetes    Responsible User: Jewell Nicole RN      Task: Provide education on how to care for diabetes on sick days    Responsible User: Jewell Nicole RN      Task: Provide education on when to call a health care provider    Responsible User: Jewell Nicole RN      Goal: Reducing Risks - know how to prevent and treat long-term diabetes complications       Task: Provide education on major complications of diabetes, prevention, early diagnostic measures and treatment of complications    Responsible User: Jewell Nicole RN      Task: Provide education on recommended care for dental, eye and foot health    Responsible User: Jewell Nicole RN      Task: Provide education on Hemoglobin A1c - goals and relationship to blood glucose levels Completed 8/18/2022   Responsible User: Jewell Nicole RN      Task: Provide education on recommendations for heart health - lipid levels and goals, blood pressure and goals, and aspirin therapy, if indicated    Responsible User: Jewell Nicole RN      Task: Provide education on tobacco cessation    Responsible User: Jewell Nicole RN      Goal: Healthy Coping - use available resources to cope with the challenges of managing diabetes       Task: Discuss recognizing feelings about having diabetes    Responsible User: Jewell Nicole RN      Task: Provide education on the benefits of making appropriate lifestyle changes       Task: Provide education on benefits of utilizing support systems    Responsible User: Jewell Nicole RN      Task: Discuss methods for coping with stress    Responsible User: Jewell Nicole RN      Task: Provide education on when to seek professional counseling    Responsible User: Jewell Nicole RN          Opportunities for ongoing education and support in diabetes-self management were discussed. Pt verbalized understanding of concepts discussed and recommendations provided today.         PLAN  Will increase to 62 units for morning dose. Continue with  32 units in evening. Will f/u with PCP next week and CDE in 1 month as scheduled.       See Goals Section for co-developed, patient-stated behavior change goals.      SUBJECTIVE / OBJECTIVE:     Cultural Influences/Ethnic Background:  Not  or       Diabetes Symptoms & Complications:          Patient Problem List and Family Medical History reviewed for relevant medical history, current medical status, and diabetes risk factors.    Vitals:  There were no vitals taken for this visit.  Estimated body mass index is 31.46 kg/m  as calculated from the following:    Height as of 7/16/22: 1.829 m (6').    Weight as of 8/12/22: 105.2 kg (232 lb).   Last 3 BP:   BP Readings from Last 3 Encounters:   08/12/22 (!) 142/68   07/18/22 136/62   06/13/22 135/70       History   Smoking Status     Former Smoker     Packs/day: 1.00     Years: 43.00     Start date: 1/1/1965     Quit date: 8/1/2008   Smokeless Tobacco     Never Used       Labs:  Lab Results   Component Value Date    A1C 7.9 08/18/2022     Lab Results   Component Value Date     07/18/2022     07/18/2022     Lab Results   Component Value Date    LDL 57 09/16/2021     Direct Measure HDL   Date Value Ref Range Status   09/16/2021 47 (L) >=50 mg/dL Final     Comment:     HDL Cholesterol Reference Range:     0-2 years:   No reference ranges established for patients under 2 years old  at [facility] for lipid analytes.    2-8 years:  Greater than 45 mg/dL     18 years and older:   Female: Greater than or equal to 50 mg/dL   Male:   Greater than or equal to 40 mg/dL   ]  GFR Estimate   Date Value Ref Range Status   07/18/2022 54 (L) >60 mL/min/1.73m2 Final     Comment:     Effective December 21, 2021 eGFRcr in adults is calculated using the 2021 CKD-EPI creatinine equation which includes age and gender (Sondra branch al., NEJM, DOI: 10.1056/BRPJyy1222098)   05/07/2021 40 (L) >60 mL/min/1.73m2 Final     GFR Estimate If Black   Date Value Ref  Range Status   05/07/2021 48 (L) >60 mL/min/1.73m2 Final     Lab Results   Component Value Date    CR 1.07 07/18/2022     No results found for: MICROALBUMIN      Taking Medications:  Diabetes Medication(s)     Insulin       insulin  UNIT/ML injection    Inject 54 Units Subcutaneous every morning AND 30 Units every evening.                Patient Activation Measure Survey Score:  No flowsheet data found.        Time Spent: 30 minutes  Encounter Type: Individual        Any diabetes medication dose changes were made via the Certified Diabetes Care & Education Protocol in collaboration with the patient's referring provider. A copy of this encounter was shared with the provider.

## 2022-08-19 LAB
CREAT UR-MCNC: 145 MG/DL
MICROALBUMIN UR-MCNC: 122 MG/L
MICROALBUMIN/CREAT UR: 84.14 MG/G CR (ref 0–25)

## 2022-08-22 ENCOUNTER — HOSPITAL ENCOUNTER (OUTPATIENT)
Dept: PHYSICAL THERAPY | Facility: REHABILITATION | Age: 72
Discharge: HOME | End: 2022-08-22
Payer: COMMERCIAL

## 2022-08-22 DIAGNOSIS — M53.3 PAIN IN THE COCCYX: ICD-10-CM

## 2022-08-22 DIAGNOSIS — M53.3 SI (SACROILIAC) JOINT DYSFUNCTION: ICD-10-CM

## 2022-08-22 DIAGNOSIS — G89.29 CHRONIC MIDLINE LOW BACK PAIN WITHOUT SCIATICA: Primary | ICD-10-CM

## 2022-08-22 DIAGNOSIS — M54.50 CHRONIC MIDLINE LOW BACK PAIN WITHOUT SCIATICA: Primary | ICD-10-CM

## 2022-08-22 DIAGNOSIS — M62.81 GENERALIZED MUSCLE WEAKNESS: ICD-10-CM

## 2022-08-22 PROCEDURE — 97140 MANUAL THERAPY 1/> REGIONS: CPT | Mod: GP | Performed by: PHYSICAL THERAPIST

## 2022-08-24 ENCOUNTER — TELEPHONE (OUTPATIENT)
Dept: PALLIATIVE MEDICINE | Facility: CLINIC | Age: 72
End: 2022-08-24

## 2022-08-24 DIAGNOSIS — G89.4 CHRONIC PAIN SYNDROME: ICD-10-CM

## 2022-08-24 NOTE — TELEPHONE ENCOUNTER
[facility] Call Center    Phone Message    May a detailed message be left on voicemail: yes     Reason for Call: Medication Refill Request    Has the patient contacted the pharmacy for the refill? Yes   Name of medication being requested: oxyCODONE-acetaminophen (PERCOCET) 5-325 MG tablet  Provider who prescribed the medication: Yesika  Pharmacy:      [address]    Date medication is needed: As soon as possible        Action Taken: Other: Pain    Travel Screening: Not Applicable

## 2022-08-25 RX ORDER — METFORMIN HCL 500 MG
500 TABLET, EXTENDED RELEASE 24 HR ORAL
Qty: 90 TABLET | Refills: 1 | Status: SHIPPED | OUTPATIENT
Start: 2022-08-25 | End: 2023-02-03

## 2022-08-25 NOTE — TELEPHONE ENCOUNTER
Per chart, Percocet was ordered by provider on 8/12/22. Will notify patient that Rx was sent to pharmacy.  Left message on numerical id vm to inform of sent Rx per chart .  Makayla/MA  [facility]

## 2022-08-29 ENCOUNTER — HOSPITAL ENCOUNTER (OUTPATIENT)
Dept: PHYSICAL THERAPY | Facility: REHABILITATION | Age: 72
Discharge: HOME | End: 2022-08-29
Payer: COMMERCIAL

## 2022-08-29 DIAGNOSIS — M54.50 CHRONIC MIDLINE LOW BACK PAIN WITHOUT SCIATICA: Primary | ICD-10-CM

## 2022-08-29 DIAGNOSIS — M53.3 PAIN IN THE COCCYX: ICD-10-CM

## 2022-08-29 DIAGNOSIS — M62.81 GENERALIZED MUSCLE WEAKNESS: ICD-10-CM

## 2022-08-29 DIAGNOSIS — M53.3 SI (SACROILIAC) JOINT DYSFUNCTION: ICD-10-CM

## 2022-08-29 DIAGNOSIS — G89.29 CHRONIC MIDLINE LOW BACK PAIN WITHOUT SCIATICA: Primary | ICD-10-CM

## 2022-08-29 PROCEDURE — 97140 MANUAL THERAPY 1/> REGIONS: CPT | Mod: GP | Performed by: PHYSICAL THERAPIST

## 2022-08-29 PROCEDURE — 97110 THERAPEUTIC EXERCISES: CPT | Mod: GP | Performed by: PHYSICAL THERAPIST

## 2022-09-03 ENCOUNTER — HEALTH MAINTENANCE LETTER (OUTPATIENT)
Age: 72
End: 2022-09-03

## 2022-09-12 ENCOUNTER — TELEPHONE (OUTPATIENT)
Dept: PALLIATIVE MEDICINE | Facility: OTHER | Age: 72
End: 2022-09-12

## 2022-09-12 DIAGNOSIS — G89.29 CHRONIC INTRACTABLE PAIN: ICD-10-CM

## 2022-09-12 DIAGNOSIS — M53.3 CHRONIC RIGHT SI JOINT PAIN: ICD-10-CM

## 2022-09-12 DIAGNOSIS — M06.09 RHEUMATOID ARTHRITIS OF MULTIPLE SITES WITHOUT RHEUMATOID FACTOR (H): ICD-10-CM

## 2022-09-12 DIAGNOSIS — M54.6 CHRONIC BILATERAL THORACIC BACK PAIN: ICD-10-CM

## 2022-09-12 DIAGNOSIS — G89.29 CHRONIC MIDLINE LOW BACK PAIN WITHOUT SCIATICA: ICD-10-CM

## 2022-09-12 DIAGNOSIS — G89.29 CHRONIC BILATERAL THORACIC BACK PAIN: ICD-10-CM

## 2022-09-12 DIAGNOSIS — M54.50 CHRONIC MIDLINE LOW BACK PAIN WITHOUT SCIATICA: ICD-10-CM

## 2022-09-12 DIAGNOSIS — G89.29 CHRONIC RIGHT SI JOINT PAIN: ICD-10-CM

## 2022-09-12 NOTE — TELEPHONE ENCOUNTER
Last apt: 8/12/22 with DV    Last dispensed:   Percocet 5/325 mg 8/1/22-30 days(appears to most recent RX will cover through the next apt, to be dispensed 8/27/22)  oxycontin 10 mg ER: 8/15/22-30 days    Next apt: 9/22/22 with DV    Pending Prescriptions:                       Disp   Refills    oxyCODONE (OXYCONTIN) 10 MG 12 hr tablet  60 tab*0            Sig: Take 1 tablet (10 mg) by mouth every 12 hours           When you start OxyContin 10 mg, REDUCE Percocet           5/325 mg to 3 tabs per day. Ok to fill 9/12/22,           start 9/14/22    Martha

## 2022-09-12 NOTE — TELEPHONE ENCOUNTER
[facility] Call Center    Phone Message    May a detailed message be left on voicemail: yes     Reason for Call: Medication Refill Request    Has the patient contacted the pharmacy for the refill? Yes   Name of medication being requested: oxyCODONE-acetaminophen (PERCOCET) 5-325 MG tablet, oxyCODONE (OXYCONTIN) 10 MG 12 hr tablet  Provider who prescribed the medication: Akila Napoles APRN CNP  Pharmacy: [place] [id number]  Date medication is needed: 9/14/2022     Action Taken: Other: [facility] PAIN     Travel Screening: Not Applicable

## 2022-09-14 RX ORDER — OXYCODONE HCL 10 MG/1
10 TABLET, FILM COATED, EXTENDED RELEASE ORAL EVERY 12 HOURS
Qty: 60 TABLET | Refills: 0 | Status: SHIPPED | OUTPATIENT
Start: 2022-09-14 | End: 2022-10-14

## 2022-09-15 ENCOUNTER — OFFICE VISIT (OUTPATIENT)
Dept: INTERNAL MEDICINE | Facility: CLINIC | Age: 72
End: 2022-09-15
Payer: COMMERCIAL

## 2022-09-15 VITALS
HEART RATE: 78 BPM | BODY MASS INDEX: 33.04 KG/M2 | WEIGHT: 230.8 LBS | HEIGHT: 70 IN | OXYGEN SATURATION: 95 % | SYSTOLIC BLOOD PRESSURE: 130 MMHG | DIASTOLIC BLOOD PRESSURE: 70 MMHG

## 2022-09-15 DIAGNOSIS — M06.9 RHEUMATOID ARTHRITIS, INVOLVING UNSPECIFIED SITE, UNSPECIFIED WHETHER RHEUMATOID FACTOR PRESENT (H): ICD-10-CM

## 2022-09-15 DIAGNOSIS — N18.30 TYPE 2 DIABETES MELLITUS WITH STAGE 3 CHRONIC KIDNEY DISEASE, WITH LONG-TERM CURRENT USE OF INSULIN, UNSPECIFIED WHETHER STAGE 3A OR 3B CKD (H): ICD-10-CM

## 2022-09-15 DIAGNOSIS — Z79.4 TYPE 2 DIABETES MELLITUS WITH HYPEROSMOLARITY WITHOUT COMA, WITH LONG-TERM CURRENT USE OF INSULIN (H): ICD-10-CM

## 2022-09-15 DIAGNOSIS — E11.00 TYPE 2 DIABETES MELLITUS WITH HYPEROSMOLARITY WITHOUT COMA, WITH LONG-TERM CURRENT USE OF INSULIN (H): ICD-10-CM

## 2022-09-15 DIAGNOSIS — G89.4 CHRONIC PAIN SYNDROME: ICD-10-CM

## 2022-09-15 DIAGNOSIS — G56.01 CARPAL TUNNEL SYNDROME OF RIGHT WRIST: ICD-10-CM

## 2022-09-15 DIAGNOSIS — I10 PRIMARY HYPERTENSION: ICD-10-CM

## 2022-09-15 DIAGNOSIS — Z12.11 COLON CANCER SCREENING: ICD-10-CM

## 2022-09-15 DIAGNOSIS — E11.22 TYPE 2 DIABETES MELLITUS WITH STAGE 3 CHRONIC KIDNEY DISEASE, WITH LONG-TERM CURRENT USE OF INSULIN, UNSPECIFIED WHETHER STAGE 3A OR 3B CKD (H): ICD-10-CM

## 2022-09-15 DIAGNOSIS — Z79.4 TYPE 2 DIABETES MELLITUS WITH STAGE 3 CHRONIC KIDNEY DISEASE, WITH LONG-TERM CURRENT USE OF INSULIN, UNSPECIFIED WHETHER STAGE 3A OR 3B CKD (H): ICD-10-CM

## 2022-09-15 DIAGNOSIS — Z12.31 VISIT FOR SCREENING MAMMOGRAM: ICD-10-CM

## 2022-09-15 DIAGNOSIS — Z87.891 PERSONAL HISTORY OF TOBACCO USE: ICD-10-CM

## 2022-09-15 DIAGNOSIS — R20.0 NUMBNESS OF LEFT HAND: ICD-10-CM

## 2022-09-15 DIAGNOSIS — N18.32 STAGE 3B CHRONIC KIDNEY DISEASE (H): ICD-10-CM

## 2022-09-15 DIAGNOSIS — Z00.00 ENCOUNTER FOR MEDICARE ANNUAL WELLNESS EXAM: Primary | ICD-10-CM

## 2022-09-15 PROCEDURE — G0439 PPPS, SUBSEQ VISIT: HCPCS | Performed by: NURSE PRACTITIONER

## 2022-09-15 PROCEDURE — G0296 VISIT TO DETERM LDCT ELIG: HCPCS | Performed by: NURSE PRACTITIONER

## 2022-09-15 PROCEDURE — 99214 OFFICE O/P EST MOD 30 MIN: CPT | Mod: 25 | Performed by: NURSE PRACTITIONER

## 2022-09-15 ASSESSMENT — ENCOUNTER SYMPTOMS
JOINT SWELLING: 1
NERVOUS/ANXIOUS: 0
SORE THROAT: 0
MYALGIAS: 0
COUGH: 0
FREQUENCY: 0
HEMATURIA: 0
FEVER: 0
HEMATOCHEZIA: 0
WEAKNESS: 0
PALPITATIONS: 0
EYE PAIN: 0
HEADACHES: 1
ARTHRALGIAS: 1
ABDOMINAL PAIN: 0
CHILLS: 0
BREAST MASS: 0
HEARTBURN: 1
NAUSEA: 0
CONSTIPATION: 0
DIZZINESS: 0
DIARRHEA: 0
PARESTHESIAS: 0
DYSURIA: 0
SHORTNESS OF BREATH: 0

## 2022-09-15 ASSESSMENT — ACTIVITIES OF DAILY LIVING (ADL): CURRENT_FUNCTION: NO ASSISTANCE NEEDED

## 2022-09-15 ASSESSMENT — PAIN SCALES - GENERAL: PAINLEVEL: EXTREME PAIN (8)

## 2022-09-15 NOTE — TELEPHONE ENCOUNTER
Confirmed via  Percocet was filled on 9/13/22.  OxyContin was last filled on 8/15/22, a new order was sent to the pharmacy on 9/14/22 to fill today, 9/15/22.

## 2022-09-15 NOTE — TELEPHONE ENCOUNTER
[facility] Call Center    Phone Message    May a detailed message be left on voicemail: yes     Reason for Call: Medication Refill Request    Has the patient contacted the pharmacy for the refill? Yes     Name of medication being requested: oxyCODONE-acetaminophen (PERCOCET) 5-325 MG tablet    Provider who prescribed the medication: Akila Napoles    Pharmacy: [facility] [id number] [place] [address] [phone]    Date medication is needed: 2 days     The patient is calling back today asking about her medications. She did have one of them refilled yesterday but is also due for this one in 2 days.    Please call the pt with any questions.       Action Taken: Message routed to:  Other: [place] pain    Travel Screening: Not Applicable

## 2022-09-15 NOTE — TELEPHONE ENCOUNTER
Please remind patient of the refill policy, calling 5-7 days before needed.       Script Eprescribed to pharmacy    Signed Prescriptions:                        Disp   Refills    oxyCODONE (OXYCONTIN) 10 MG 12 hr tablet   60 tab*0        Sig: Take 1 tablet (10 mg) by mouth every 12 hours When           you start OxyContin 10 mg, limit Percocet 5/325           mg to 3 tabs per day. Ok to fill 9/15/22  Authorizing Provider: JULIA QUINTANILLA APRN, NP-C  [facility] [place]

## 2022-09-15 NOTE — PROGRESS NOTES
"SUBJECTIVE:   Ebonie is a 72 year old who presents for Preventive Visit.    She has several lipomas. The one on the left shoulder in particular she is concerned is causing numbness in the left and right hand at night when she sleeps.  She was provided with a left wrist brace at her last appointment due to symptoms of carpal tunnel, she is not wearing this every night.    DM ws reviewed. Her readings have been higher recently. She admits she is been eating a lot of cookies that her son brought home.  No hypoglycemia.    She is rheumatoid arthritis which was followed by rheumatology but her provider has since left the clinic.  She has an appointment to establish care with a new provider but will need refills of her medications before then.  She was recently taken off of Plaquenil due to thrombocytopenia and history of prolonged QT. She is taking methotrexate and Enbrel.  Her platelet count has recently been dropping.    She has had a good summer, she spent some time with her son in [place] and along the [facility] with her sister.          Patient has been advised of split billing requirements and indicates understanding: Yes  Are you in the first 12 months of your Medicare coverage?  No    Healthy Habits:     In general, how would you rate your overall health?  Poor    Frequency of exercise:  None    Do you usually eat at least 4 servings of fruit and vegetables a day, include whole grains    & fiber and avoid regularly eating high fat or \"junk\" foods?  No    Taking medications regularly:  Yes    Medication side effects:  None    Ability to successfully perform activities of daily living:  No assistance needed    Home Safety:  No safety concerns identified    Hearing Impairment:  No hearing concerns    In the past 6 months, have you been bothered by leaking of urine? Yes    In general, how would you rate your overall mental or emotional health?  Good      PHQ-2 Total Score: 0    Do you feel safe in your " environment? Yes    Have you ever done Advance Care Planning? (For example, a Health Directive, POLST, or a discussion with a medical provider or your loved ones about your wishes): No, advance care planning information given to patient to review.  Patient declined advance care planning discussion at this time. Pt did say that everything will go to her son during this visit. Does not want to write to fill a Advanced Directive when it all will go to Son.        Fall risk  Fallen 2 or more times in the past year?: Yes  Any fall with injury in the past year?: No    Cognitive Screening   1) Repeat 3 items (Leader, Season, Table)    2) Clock draw: NORMAL  3) 3 item recall: Recalls 3 objects  Results: 3 items recalled: COGNITIVE IMPAIRMENT LESS LIKELY    Mini-CogTM Copyright S Tamara. Licensed by the author for use in [facility]; reprinted with permission (pina@[facility]). All rights reserved.      Do you have sleep apnea, excessive snoring or daytime drowsiness?: yes    Reviewed and updated as needed this visit by clinical staff   Tobacco  Allergies  Meds  Problems  Med Hx  Surg Hx  Fam Hx  Soc   Hx          Reviewed and updated as needed this visit by Provider      Problems   Surg Hx  Fam Hx           Social History     Tobacco Use     Smoking status: Former Smoker     Packs/day: 1.00     Years: 43.00     Pack years: 43.00     Start date: 1965     Quit date: 2008     Years since quittin.1     Smokeless tobacco: Never Used   Substance Use Topics     Alcohol use: Yes     Comment: Alcoholic Drinks/day: rare         Alcohol Use 9/15/2022   Prescreen: >3 drinks/day or >7 drinks/week? No           Current providers sharing in care for this patient include:   Patient Care Team:  Marichuy Rubio NP as PCP - General  Monroe Salas MD as MD (Neurology)  Marichuy Rubio NP as Assigned PCP  Deirdre Samano APRN CNP as Assigned Behavioral Health Provider  Jewell Nicole, DANIEL as Registered  "Nurse (Diabetes Education)  [facility]  Raheem Londono DO as Assigned Rheumatology Provider  Tony Aldana MD as Assigned Heart and Vascular Provider  Nilesh Toussaint, PharmD as Pharmacist (Pharmacist)  Nilesh Toussaint, PharmD as Assigned MTM Pharmacist    The following health maintenance items are reviewed in Epic and correct as of today:  Health Maintenance   Topic Date Due     PARATHYROID  Never done     PHOSPHORUS  Never done     ANNUAL REVIEW OF HM ORDERS  Never done     COLORECTAL CANCER SCREENING  Never done     ZOSTER IMMUNIZATION (1 of 2) 12/31/2014     MAMMO SCREENING  09/27/2018     LUNG CANCER SCREENING  11/27/2021     INFLUENZA VACCINE (1) 09/01/2022     A1C  02/18/2023     MICROALBUMIN  02/18/2023     EYE EXAM  05/01/2023     URINE DRUG SCREEN  05/20/2023     DIABETIC FOOT EXAM  06/13/2023     BMP  07/18/2023     HEMOGLOBIN  07/18/2023     LIPID  08/18/2023     MEDICARE ANNUAL WELLNESS VISIT  09/15/2023     FALL RISK ASSESSMENT  09/15/2023     DTAP/TDAP/TD IMMUNIZATION (4 - Td or Tdap) 07/18/2024     ADVANCE CARE PLANNING  09/15/2027     DEXA  09/17/2035     HEPATITIS C SCREENING  Completed     PHQ-2 (once per calendar year)  Completed     Pneumococcal Vaccine: 65+ Years  Completed     URINALYSIS  Completed     ALK PHOS  Completed     IPV IMMUNIZATION  Aged Out     MENINGITIS IMMUNIZATION  Aged Out     COVID-19 Vaccine  Discontinued         Review of Systems      OBJECTIVE:   /70   Pulse 78   Ht 1.778 m (5' 10\")   Wt 104.7 kg (230 lb 12.8 oz)   SpO2 95%   BMI 33.12 kg/m   Estimated body mass index is 33.12 kg/m  as calculated from the following:    Height as of this encounter: 1.778 m (5' 10\").    Weight as of this encounter: 104.7 kg (230 lb 12.8 oz).  Physical Exam  GENERAL: Alert and no distress        ASSESSMENT / PLAN:     Problem List Items Addressed This Visit        Nervous and Auditory    Chronic pain     " She recently establish care with a pain clinic and is now receiving narcotic prescriptions through the pain clinic who are making adjustments to her medications.           Numbness of left hand     She states that she has not been using the wrist brace.  She will be fit for a right wrist brace and already has a left wrist brace at home.  She is advised to use this nightly.  If ineffective, consider EMG.              Endocrine    Type 2 diabetes mellitus with hyperosmolarity without coma, with long-term current use of insulin (H)     Not at goal at this time.  She is encouraged to cut back on sweets.  We will plan to increase her evening dose of NPH insulin to 34 units nightly.  Follow-up with diabetes education as scheduled.  Repeat A1c in 3 months.           Relevant Medications    insulin  UNIT/ML injection       Circulatory    HTN (hypertension)     Stable              Immune    Rheumatoid arthritis, involving unspecified site, unspecified whether rheumatoid factor present (H)     Can bridge rheum drugs  Hepatic profile and renal labs in mid-October.  Patient has developed thrombocytopenia and was recently taken off of hydroxychloroquine.  Repeat CBC in October as well.  Establish care with rheumatology, appointment is scheduled for later this fall           Relevant Orders    Hepatic panel (Albumin, ALT, AST, Bili, Alk Phos, TP)    Basic metabolic panel    CBC with platelets       Urinary    Stage 3b chronic kidney disease (H)     Followed by nephrology.  Follow-up appointment later this fall.              Behavioral    Personal history of tobacco use     Identified Health Risks:    She is at risk for falling and has been provided with information to reduce the risk of falling at home.  Lung Cancer Screening Shared Decision Making Visit     Ebonie Bowman, a 72 year old female, is eligible for lung cancer screening    History   Smoking Status     Former Smoker     Packs/day: 1.00     Years: 43.00     Start  date: 1[birth year]1/1965     Quit date: 8/1/2008   Smokeless Tobacco     Never Used       I have discussed with patient the risks and benefits of screening for lung cancer with low-dose CT.     The risks include:    radiation exposure: one low dose chest CT has as much ionizing radiation as about 15 chest x-rays, or 6 months of background radiation living in [place]      false positives: most findings/nodules are NOT cancer, but some might still require additional diagnostic evaluation, including biopsy    over-diagnosis: some slow growing cancers that might never have been clinically significant will be detected and treated unnecessarily     The benefit of early detection of lung cancer is contingent upon adherence to annual screening or more frequent follow up if indicated.     Furthermore, to benefit from screening, Ebonie must be willing and able to undergo diagnostic procedures, if indicated. Although no specific guide is available for determining severity of comorbidities, it is reasonable to withhold screening in patients who have greater mortality risk from other diseases.     We did discuss that the best way to prevent lung cancer is to not smoke.             Relevant Orders    Prof fee: Shared Decision Making for Lung Cancer Screening (Completed)    CT Chest Lung Cancer Scrn Low Dose wo      Other Visit Diagnoses     Encounter for Medicare annual wellness exam    -  Primary    Type 2 diabetes mellitus with stage 3 chronic kidney disease, with long-term current use of insulin, unspecified whether stage 3a or 3b CKD (H)        Relevant Medications    insulin  UNIT/ML injection    Carpal tunnel syndrome of right wrist        Relevant Orders    Wrist/Arm/Hand Supplies Order for DME - ONLY FOR DME    Colon cancer screening        Relevant Orders    Adult GI  Referral - Procedure Only    Visit for screening mammogram        Relevant Orders    *MA Screening Digital Bilateral     "          COUNSELING:  Reviewed preventive health counseling, as reflected in patient instructions       Regular exercise       Healthy diet/nutrition    Estimated body mass index is 33.12 kg/m  as calculated from the following:    Height as of this encounter: 1.778 m (5' 10\").    Weight as of this encounter: 104.7 kg (230 lb 12.8 oz).    Weight management plan: Discussed healthy diet and exercise guidelines    She reports that she quit smoking about 14 years ago. She started smoking about 57 years ago. She has a 43.00 pack-year smoking history. She has never used smokeless tobacco.      Appropriate preventive services were discussed with this patient, including applicable screening as appropriate for cardiovascular disease, diabetes, osteopenia/osteoporosis, and glaucoma.  As appropriate for age/gender, discussed screening for colorectal cancer, prostate cancer, breast cancer, and cervical cancer. Checklist reviewing preventive services available has been given to the patient.    Reviewed patients plan of care and provided an AVS. The Basic Care Plan (routine screening as documented in Health Maintenance) for Ebonie meets the Care Plan requirement. This Care Plan has been established and reviewed with the Patient.    Counseling Resources:  ATP IV Guidelines  Pooled Cohorts Equation Calculator  Breast Cancer Risk Calculator  Breast Cancer: Medication to Reduce Risk  FRAX Risk Assessment  ICSI Preventive Guidelines  Dietary Guidelines for Americans, 2010  USDA's MyPlate  ASA Prophylaxis  Lung CA Screening    Marichuy Rubio NP  [facility]    "

## 2022-09-15 NOTE — Clinical Note
Please abstract the following data from this visit with this patient into the appropriate field in Epic:  Tests that can be patient reported without a hard copy:  Eye exam with ophthalmology on this date: 5/1/2022  Other Tests found in the patient's chart through Chart Review/Care Everywhere:  {Abstract Quality List (Optional):174734}  Note to Abstraction: If this section is blank, no results were found via Chart Review/Care Everywhere.

## 2022-09-15 NOTE — ASSESSMENT & PLAN NOTE
Can bridge rheum drugs  Hepatic profile and renal labs in mid-October.  Patient has developed thrombocytopenia and was recently taken off of hydroxychloroquine.  Repeat CBC in October as well.  Establish care with rheumatology, appointment is scheduled for later this fall

## 2022-09-15 NOTE — PATIENT INSTRUCTIONS
Patient Education   Personalized Prevention Plan  You are due for the preventive services outlined below.  Your care team is available to assist you in scheduling these services.  If you have already completed any of these items, please share that information with your care team to update in your medical record.  Health Maintenance Due   Topic Date Due     Parathyroid Lab  Never done     Phosphorous Lab  Never done     ANNUAL REVIEW OF HM ORDERS  Never done     Colorectal Cancer Screening  Never done     Zoster (Shingles) Vaccine (1 of 2) 12/31/2014     Mammogram  09/27/2018     LUNG CANCER SCREENING  11/27/2021     Eye Exam  04/03/2022     Flu Vaccine (1) 09/01/2022       Preventing Falls at Home  A person can fall for many reasons. Older adults may fall because reaction time slows down as we age. Your muscles and joints may get stiff, weak, or less flexible because of illness, medicines, or a physical condition.   Other health problems that make falls more likely include:     Arthritis    Dizziness or lightheadedness when you stand up (orthostatic hypotension)    History of a stroke    Dizziness    Anemia    Certain medicines taken for mental illness or to control blood pressure.    Problems with balance or gait    Bladder or urinary problems    History of falling    Changes in vision (vision impairment)    Changes in thinking skills and memory (cognitive impairment)  Injuries from a fall can include serious injuries such as broken bones, dislocated joints, internal bleeding and cuts. Injuries like these can limit your independence.   Prevention tips  To help prevent falls and fall-related injuries, follow the tips below.    Floors  To make floors safer:     Put nonskid pads under area rugs.    Remove small rugs.    Replace worn floor coverings.    Tack carpets firmly to each step on carpeted stairs. Put nonskid strips on the edges of uncarpeted stairs.    Keep floors and stairs free of clutter and  cords.    Arrange furniture so there are clear pathways.    Clean up any spills right away.  Bathrooms    To make bathrooms safer:     Install grab bars in the tub or shower.    Apply nonskid strips or put a nonskid rubber mat in the tub or shower.    Sit on a bath chair to bathe.    Use bathmats with nonskid backing.  Lighting  To improve visibility in your home:      Keep a flashlight in each room. Or put a lamp next to the bed within easy reach.    Put nightlights in the bedrooms, hallways, kitchen, and bathrooms.    Make sure all stairways have good lighting.    Take your time when going up and down stairs.    Put handrails on both sides of stairs and in walkways for more support. To prevent injury to your wrist or arm, don t use handrails to pull yourself up.    Install grab bars to pull yourself up.    Move or rearrange items that you use often. This will make them easier to find or reach.    Look at your home to find any safety hazards. Especially look at doorways, walkways, and the driveway. Remove or repair any safety problems that you find.  Other changes to make    Look around to find any safety hazards. Look closely at doorways, walkways, and the driveway. Remove or repair any safety problems that you find.    Wear shoes that fit well.    Take your time when going up and down stairs.    Put handrails on both sides of stairs and in walkways for more support. To prevent injury to your wrist or arm, don t use handrails to pull yourself up.    Install grab bars wherever needed to pull yourself up.    Arrange items that you use often. This will make them easier to find or reach.    [employer] last reviewed this educational content on 3/1/2020    9700-0311 [employer]. All rights reserved. This information is not intended as a substitute for professional medical care. Always follow your healthcare professional's instructions.           Lung Cancer Screening   Frequently Asked Questions  If you are  at high-risk for lung cancer, getting screened with low-dose computed tomography (LDCT) every year can help save your life. This handout offers answers to some of the most common questions about lung cancer screening. If you have other questions, please call [phone] ([phone]).     What is it?  Lung cancer screening uses special X-ray technology to create an image of your lung tissue. The exam is quick and easy and takes less than 10 seconds. We don t give you any medicine or use any needles. You can eat before and after the exam. You don t need to change your clothes as long as the clothing on your chest doesn t contain metal. But, you do need to be able to hold your breath for at least 6 seconds during the exam.    What is the goal of lung cancer screening?  The goal of lung cancer screening is to save lives. Many times, lung cancer is not found until a person starts having physical symptoms. Lung cancer screening can help detect lung cancer in the earliest stages when it may be easier to treat.    Who should be screened for lung cancer?  We suggest lung cancer screening for anyone who is at high-risk for lung cancer. You are in the high-risk group if you:      are between the ages of 55 and 79, and    have smoked at least 1 pack of cigarettes a day for 20 or more years, and    still smoke or have quit within the past 15 years.    However, if you have a new cough or shortness of breath, you should talk to your doctor before being screened.    Why does it matter if I have symptoms?  Certain symptoms can be a sign that you have a condition in your lungs that should be checked and treated by your doctor. These symptoms include fever, chest pain, a new or changing cough, shortness of breath that you have never felt before, coughing up blood or unexplained weight loss. Having any of these symptoms can greatly affect the results of lung cancer screening.       Should all smokers get an LDCT lung cancer  screening exam?  It depends. Lung cancer screening is for a very specific group of men and women who have a history of heavy smoking over a long period of time (see  Who should be screened for lung cancer  above).  I am in the high-risk group, but have been diagnosed with cancer in the past. Is LDCT lung cancer screening right for me?  In some cases, you should not have LDCT lung screening, such as when your doctor is already following your cancer with CT scan studies. Your doctor will help you decide if LDCT lung screening is right for you.  Do I need to have a screening exam every year?  Yes. If you are in the high-risk group described earlier, you should get an LDCT lung cancer screening exam every year until you are 79, or are no longer willing or able to undergo screening and possible procedures to diagnose and treat lung cancer.  How effective is LDCT at preventing death from lung cancer?  Studies have shown that LDCT lung cancer screening can lower the risk of death from lung cancer by 20 percent in people who are at high-risk.  What are the risks?  There are some risks and limitations of LDCT lung cancer screening. We want to make sure you understand the risks and benefits, so please let us know if you have any questions. Your doctor may want to talk with you more about these risks.    Radiation exposure: As with any exam that uses radiation, there is a very small increased risk of cancer. The amount of radiation in LDCT is small--about the same amount a person would get from a mammogram. Your doctor orders the exam when he or she feels the potential benefits outweigh the risks.    False negatives: No test is perfect, including LDCT. It is possible that you may have a medical condition, including lung cancer, that is not found during your exam. This is called a false negative result.    False positives and more testing: LDCT very often finds something in the lung that could be cancer, but in fact is not.  This is called a false positive result. False positive tests often cause anxiety. To make sure these findings are not cancer, you may need to have more tests. These tests will be done only if you give us permission. Sometimes patients need a treatment that can have side effects, such as a biopsy. For more information on false positives, see  What can I expect from the results?     Findings not related to lung cancer: Your LDCT exam also takes pictures of areas of your body next to your lungs. In a very small number of cases, the CT scan will show an abnormal finding in one of these areas, such as your kidneys, adrenal glands, liver or thyroid. This finding may not be serious, but you may need more tests. Your doctor can help you decide what other tests you may need, if any.  What can I expect from the results?  About 1 out of 4 LDCT exams will find something that may need more tests. Most of the time, these findings are lung nodules. Lung nodules are very small collections of tissue in the lung. These nodules are very common, and the vast majority--more than 97 percent--are not cancer (benign). Most are normal lymph nodes or small areas of scarring from past infections.  But, if a small lung nodule is found to be cancer, the cancer can be cured more than 90 percent of the time. To know if the nodule is cancer, we may need to get more images before your next yearly screening exam. If the nodule has suspicious features (for example, it is large, has an odd shape or grows over time), we will refer you to a specialist for further testing.  Will my doctor also get the results?  Yes. Your doctor will get a copy of your results.  Is it okay to keep smoking now that there s a cancer screening exam?  No. Tobacco is one of the strongest cancer-causing agents. It causes not only lung cancer, but other cancers and cardiovascular (heart) diseases as well. The damage caused by smoking builds over time. This means that the longer  you smoke, the higher your risk of disease. While it is never too late to quit, the sooner you quit, the better.  Where can I find help to quit smoking?  The best way to prevent lung cancer is to stop smoking. If you have already quit smoking, congratulations and keep it up! For help on quitting smoking, please call [employer] at 5-142-QUITNOW (1-521.295.8741) or the American Cancer Society at 1-388.250.2042 to find local resources near you.  One-on-one health coaching:  If you d prefer to work individually with a health care provider on tobacco cessation, we offer:      Medication Therapy Management:  Our specially trained pharmacists work closely with you and your doctor to help you quit smoking.  Call [phone] or [phone] (toll free).

## 2022-09-16 PROBLEM — M62.81 GENERALIZED MUSCLE WEAKNESS: Status: RESOLVED | Noted: 2022-07-16 | Resolved: 2022-09-16

## 2022-09-16 PROBLEM — U07.1 INFECTION DUE TO 2019 NOVEL CORONAVIRUS: Status: RESOLVED | Noted: 2022-07-16 | Resolved: 2022-09-16

## 2022-09-16 PROBLEM — R00.1 BRADYCARDIA: Status: RESOLVED | Noted: 2021-11-04 | Resolved: 2022-09-16
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
## 2022-09-16 PROBLEM — Z87.891 PERSONAL HISTORY OF TOBACCO USE: Status: ACTIVE | Noted: 2022-09-16

## 2022-09-16 ASSESSMENT — ACTIVITIES OF DAILY LIVING (ADL): CURRENT_FUNCTION: NO ASSISTANCE NEEDED

## 2022-09-16 NOTE — ASSESSMENT & PLAN NOTE
She states that she has not been using the wrist brace.  She will be fit for a right wrist brace and already has a left wrist brace at home.  She is advised to use this nightly.  If ineffective, consider EMG.

## 2022-09-16 NOTE — ASSESSMENT & PLAN NOTE
She recently establish care with a pain clinic and is now receiving narcotic prescriptions through the pain clinic who are making adjustments to her medications.

## 2022-09-16 NOTE — ASSESSMENT & PLAN NOTE
Identified Health Risks:    She is at risk for falling and has been provided with information to reduce the risk of falling at home.  Lung Cancer Screening Shared Decision Making Visit     Ebonie Bowman, a 72 year old female, is eligible for lung cancer screening    History   Smoking Status     Former Smoker     Packs/day: 1.00     Years: 43.00     Start date: 1/1/1965     Quit date: 8/1/2008   Smokeless Tobacco     Never Used       I have discussed with patient the risks and benefits of screening for lung cancer with low-dose CT.     The risks include:    radiation exposure: one low dose chest CT has as much ionizing radiation as about 15 chest x-rays, or 6 months of background radiation living in [place]      false positives: most findings/nodules are NOT cancer, but some might still require additional diagnostic evaluation, including biopsy    over-diagnosis: some slow growing cancers that might never have been clinically significant will be detected and treated unnecessarily     The benefit of early detection of lung cancer is contingent upon adherence to annual screening or more frequent follow up if indicated.     Furthermore, to benefit from screening, Ebonie must be willing and able to undergo diagnostic procedures, if indicated. Although no specific guide is available for determining severity of comorbidities, it is reasonable to withhold screening in patients who have greater mortality risk from other diseases.     We did discuss that the best way to prevent lung cancer is to not smoke.

## 2022-09-16 NOTE — ASSESSMENT & PLAN NOTE
Not at goal at this time.  She is encouraged to cut back on sweets.  We will plan to increase her evening dose of NPH insulin to 34 units nightly.  Follow-up with diabetes education as scheduled.  Repeat A1c in 3 months.

## 2022-09-22 ENCOUNTER — OFFICE VISIT (OUTPATIENT)
Dept: PALLIATIVE MEDICINE | Facility: OTHER | Age: 72
End: 2022-09-22
Payer: COMMERCIAL

## 2022-09-22 VITALS
BODY MASS INDEX: 33.43 KG/M2 | HEART RATE: 83 BPM | DIASTOLIC BLOOD PRESSURE: 81 MMHG | OXYGEN SATURATION: 95 % | SYSTOLIC BLOOD PRESSURE: 179 MMHG | WEIGHT: 233 LBS

## 2022-09-22 DIAGNOSIS — G89.29 CHRONIC INTRACTABLE PAIN: Primary | ICD-10-CM

## 2022-09-22 DIAGNOSIS — M06.09 RHEUMATOID ARTHRITIS OF MULTIPLE SITES WITHOUT RHEUMATOID FACTOR (H): ICD-10-CM

## 2022-09-22 PROCEDURE — G0463 HOSPITAL OUTPT CLINIC VISIT: HCPCS

## 2022-09-22 PROCEDURE — 99213 OFFICE O/P EST LOW 20 MIN: CPT | Performed by: NURSE PRACTITIONER

## 2022-09-22 ASSESSMENT — PAIN SCALES - GENERAL: PAINLEVEL: EXTREME PAIN (9)

## 2022-09-22 NOTE — PATIENT INSTRUCTIONS
After Visit Instructions:     Thank you for coming to [facility] for your care. It is my goal to partner with you to help you reach your optimal state of health.   Continue daily self-care, identifying contributing factors, and monitoring variations in pain level. Continue to integrate self-care into your life.      Physical therapy: YES Try to get more frequent appts if able   30 minutes Clinic or Video follow-up with ANUPAM Zavala NP-C in 2 months.   Medication Management :   Percocet will be delivered this evening.   Call minimum of 5-7 BUSINESS days prior to needing any refills.       ANUPAM Rodriguez NP-C  [facility]  [facility] - Monday and Friday  [facility] - Tuesday  [place] - Thursday    Be sure to request ALL medication refills 5 days prior to the due date unless you will see your medical provider in an appointment before the due date.  This is YOUR responsibility. Do not expect same day refills. If you do not plan ahead you may run out of medications.   NO early refills are allowed. It is your responsibility to manage your medications responsibility and keep them safely stored. Lost or destroyed medications WILL NOT be replaced    Scheduling/Clinic telephone Number for ALL locations:  [phone]    After Hours On-Call Service:  [phone]    Call with any questions about your care and for scheduling assistance.   Calls are returned Monday through Friday between 8 AM and 4:00 PM. We usually get back to you within 2 business days depending on the issue/request.    If we are prescribing your medications:  For opioid medication refills, call the clinic or send a [employer] message 7 days in advance.  Please include:  Your name and date of birth.   Name of requested medication  Name of the pharmacy.  For non-opioid medications, call your pharmacy directly to request a refill. Please allow 3-4 days to be processed.   Per [place] State Law:  All  controlled substance prescriptions must be filled within 30 days of being written.    For those controlled substances allowing refills, pickup must occur within 30 days of last fill.      We believe regular attendance is key to your success in our program!    Any time you are unable to keep your appointment we ask that you call us at least 24 hours in advance to cancel.This will allow us to offer the appointment time to another patient.   Multiple missed appointments may lead to dismissal from the clinic.

## 2022-09-22 NOTE — PROGRESS NOTES
Patient presents to the clinic today for a follow up with ANUPAM Garcia CNP regarding Pain Management.      PEG Score 5/20/2022 8/12/2022 9/22/2022   PEG Total Score 6.67 8 7.33           UDS/CSA-  05.20.2022      Medications-    Oxycontin 09.22.2022 @9:30 am    Percocet   09.19.2022    QUESTIONS: refills for trazodone, metformin and percocet              [facility] Visit Facilitator

## 2022-09-22 NOTE — PROGRESS NOTES
[facility]    CHIEF COMPLAINT: Chronic Pain.    INTERVAL HISTORY:  Last seen on 22.       Recommendations/plan at the last visit included:  1. Physical therapy: YES Continue per Paris's recommendation   2. 30 minutes Video follow-up with ANUPAM Zavala NP-C in 1 months.  3. Procedures recommended: Right SI joint injection. We will call you to schedule.    4. Medication Management :   1. OxyContin 10 m tablet tab every 12 hours. When you start this medication, REDUCE percocet to no more than 3 tabs per day. Call Akila if you are having any concerns.   2. Refill of Percocet sent in to be filled on 22 and started 22       Since last visit:   - having increased pain with colder weather. Working with Paris Sandoval in PT, can't get an appt for months.   - BP pain has been high due to pain. OxyContin works but does not last 12 hours.Unsure about timing with short acting Percocet so has't been taking all three tabs per day.     Pain Information today: Extreme Pain (9)/10. Location of pain: widespread pain     Annual Requirements last collected: May 20, 2022      Current Pain Relevant Medications:    Duloxetine 60 mg  Methotrexate 7.5 mg weekly   Trazodone 150 mg at HS      Current Controlled Substance Medications:   OxyContin 10 mg BID PRN  Percocet 5/325 mg 3 tabs = 22.5 MME/day     Previous Pain Relevant Medications: (H--helped; HI--Helped initially; SWH--Somewhat helpful; NH--No help; W--worse; SE--side effects; ?--Unsure if helpful)   NOTE: This medication information taken from patient's intake form, not medical records.   Opiates: Oxycodone: H  NSAIDS: ?  Migraine medications: ?  Muscle Relaxants: ?  Neuropathics: ?  Anti-depressants for pain:  ?  Anxiety medications:   ?  Topicals: ?  OTC medications: ?  Sleep Medications:?  Other medications not covered above: ?     Hx  or current illegal drug use: denies   Hx or current ETOH use: denies   Nicotine/tobacco  use: denies   Daily Caffeine intake:daily      THE 4 As OF OPIOID MAINTENANCE ANALGESIA    Analgesia: Is pain relief clinically significant? YES   Activity: Is patient functional and able to perform Activities of Daily Living? NO   Adverse effects: Is patient free from adverse side effects from opiates? YES   Adherence to Rx protocol: Is patient adhering to Controlled Substance Agreement and taking medications ONLY as ordered? YES     Is Narcan prescribed for opiate use >50 MME daily or concurrent use of opiates and benzodiazepines? N/A    [place] Board of Pharmacy Data Base Reviewed:    YES; No concern for abuse or misuse of controlled medications based on this report. Reviewed [facility] September 21, 2022- no concerning fills.      PHYSICAL EXAM     Vitals:    09/22/22 1049   BP: (!) 179/81   Pulse: 83   SpO2: 95%   Weight: 105.7 kg (233 lb)       Constitutional: healthy, alert and no distress A&O.   Patient is appropriate.  Psychiatric/mental status: Alert, without lethargy or stupor. Appropriate affect.     Neurologic exam:  CN:  Cranial nerves 2-12 are grossly normal.    MUSCULOSKELETAL:     Posture: Upright, shoulders and pelvis are leveled. Yes  Gait pattern: Patient has an antalgic gait. Uses a cane           DIRE Score for ongoing opioid management is calculated as follows:    Diagnosis = 2 pts (slowly progressive; moderate pain/objective findings)    Intractability = 2 pts (most treatments tried; patient not fully engaged/barriers)    Risk        Psych = 3 pts (no significant personality dysfunction/mental illness; good communication with clinic)         Chem Hlth = 3 pts (no history of chemical dependency; not drug-focused)       Reliability = 2 pts (occasional difficulties with compliance; generally reliable)17% NS rate        Social = 2 pts (reduction in some relationships/life rolls)       (Psych + Chem hlth + Reliability + Social) = 14    Efficacy = 1 pt (poor function; minimal pain relief despite  mod/high med dose)    DIRE Score = 15        7-13: likely NOT suitable candidate for long-term opioid analgesia       14-21: may be a suitable candidate for long-term opioid analgesia     DIAGNOSTIC RESULTS:     1/22/21: MR CERVICAL SPINE WO CONTRAST  IMPRESSION:  1.  No high-grade spinal canal stenosis.  2.  Unchanged at least moderate left C3-C4, bilateral C4-C5 and left C5-C6 neural foraminal stenoses.  3.  The patient declined STIR images and axial images.     PAIN RELAVENT CONDITIONS:   1.  Chronic neck pain  2.  Rheumatoid Arthritis  3.  Chronic low back pain        DIAGNOSIS AND PLAN:     (G89.29) Chronic intractable pain  (primary encounter diagnosis)  (M06.09) Rheumatoid arthritis of multiple sites without rheumatoid factor (H)  Comment: Pharmacy error and Percocet was not delivered on the date shown on . Fill date should be 9/22/22. Will continue current medication plan because Ebonie had not been taking all 3 tabs of Percocet. If pain is still not well controlled we may consider adding a 3rd ER dose or a 4th IR dose.   Plan: As noted above.       shows Percocet filled on 9/13/22. It was NOT filled. DV spoke with pharmacist who confirmed that it did not get delivered to her. All of her medications are delivered. Arranged for delivery between 6-10 pm 9/22/22 by [facility]    Hypertension: Ebonie to follow up with Primary Care provider regarding elevated blood pressure.     PATIENT INSTRUCTIONS:     Diagnosis reviewed, treatment option addressed, and risk/benefits discussed.  Self-care instructions given.  I am recommending a multidisciplinary treatment plan to help this patient better manage pain.    Remember to request ALL medication refills 5-7 BUSINESS days before you run out.     1. Physical therapy: YES Try to get more frequent appts if able   2. 30 minutes Clinic or Video follow-up with ANUPAM Zavala NP-C in 2 months.   3. Medication Management :   1. Percocet will be delivered this  evening.   2. Call minimum of 5-7 BUSINESS days prior to needing any refills.     I have reviewed the note as documented above.  This accurately captures the substance of my conversation with the patient.  A total of 29 minutes of preparation, care, and consultation were spent on this visit today.     ANUPAM Rodriguez, NP-C  [facility]    (Information in italics and blue color are taken from previous pain and consulting medical providers notes and are documented as such)

## 2022-09-27 ENCOUNTER — ANCILLARY PROCEDURE (OUTPATIENT)
Dept: MAMMOGRAPHY | Facility: CLINIC | Age: 72
End: 2022-09-27
Attending: NURSE PRACTITIONER
Payer: COMMERCIAL

## 2022-09-27 DIAGNOSIS — Z12.31 VISIT FOR SCREENING MAMMOGRAM: ICD-10-CM

## 2022-09-27 PROCEDURE — 77067 SCR MAMMO BI INCL CAD: CPT

## 2022-10-04 ENCOUNTER — HOSPITAL ENCOUNTER (OUTPATIENT)
Dept: CT IMAGING | Facility: HOSPITAL | Age: 72
Discharge: HOME | End: 2022-10-04
Attending: NURSE PRACTITIONER | Admitting: NURSE PRACTITIONER
Payer: COMMERCIAL

## 2022-10-04 DIAGNOSIS — Z87.891 PERSONAL HISTORY OF TOBACCO USE: ICD-10-CM

## 2022-10-04 PROCEDURE — 71271 CT THORAX LUNG CANCER SCR C-: CPT

## 2022-10-07 ENCOUNTER — TELEPHONE (OUTPATIENT)
Dept: CARDIOLOGY | Facility: CLINIC | Age: 72
End: 2022-10-07

## 2022-10-07 NOTE — TELEPHONE ENCOUNTER
[place] Community Measures Blood Pressure [place] reviewed.  Patients recent blood pressure is outside of guideline parameters. Sent [employer] message.    Keke Briggs MA

## 2022-10-10 ENCOUNTER — HOSPITAL ENCOUNTER (OUTPATIENT)
Dept: PHYSICAL THERAPY | Facility: REHABILITATION | Age: 72
Discharge: HOME | End: 2022-10-10
Payer: COMMERCIAL

## 2022-10-10 DIAGNOSIS — M62.81 GENERALIZED MUSCLE WEAKNESS: ICD-10-CM

## 2022-10-10 DIAGNOSIS — G89.29 CHRONIC MIDLINE LOW BACK PAIN WITHOUT SCIATICA: Primary | ICD-10-CM

## 2022-10-10 DIAGNOSIS — M54.50 CHRONIC MIDLINE LOW BACK PAIN WITHOUT SCIATICA: Primary | ICD-10-CM

## 2022-10-10 DIAGNOSIS — M53.3 SI (SACROILIAC) JOINT DYSFUNCTION: ICD-10-CM

## 2022-10-10 DIAGNOSIS — M53.3 PAIN IN THE COCCYX: ICD-10-CM

## 2022-10-10 PROCEDURE — 97140 MANUAL THERAPY 1/> REGIONS: CPT | Mod: GP | Performed by: PHYSICAL THERAPIST

## 2022-10-11 NOTE — PROGRESS NOTES
Follow-up Spinal Cord Stimulator  10/16/2019    Ms. Bowman has back and leg pain.  She had a spinal cord stimulator placed 2 and 1/2 years ago.  She had the battery replaced 6 months ago as she was unable to keep it charged.  She now is getting intermittent pain relief and at times the stimulator is too strong.  The [employer] representative is here and was able to program the AdaptiveStim part of the stimulator so she can have better pain control with position changes.  He also gave her some new program settings to try.    She also was wondering if there were any other treatments for her back pain.  On reviewing her lumbar CT, she has disc degeneration with annular tears at L3-4, L4-5 and L5-S1.  I feel she may benefit from a lumbar epidural injection.  She will schedule that on the way out.  We can also assess her response to the SCS changes at that time.      [facility]  CT LUMBAR SPINE WO CONTRAST  12/16/2014 1:10 PM     INDICATION: Low back pain.  TECHNIQUE: Helical images were obtained through the lumbar spine and evaluated in the axial plane with sagittal and coronal reformations. Images were obtained after L3-L4 through L5-S1 discography.  COMPARISON: Lumbar spine MRI 03/24/2014.  FINDINGS: Nomenclature is based on 5 lumbar type vertebral bodies. Normal vertebral heights and alignment. Chronic Schmorl node in the L2 inferior endplate incidentally noted. No pars defect. Status post right L5-S1 medial facetectomy. 3 cm right renal   cyst. Normal paraspinal musculature accounting for the patient's postoperative changes. Normal aortic diameter. Mild to moderate degenerative change of the sacroiliac joints.  T12-L1: Normal disc height. No herniation. Normal facets. No spinal canal or neural foraminal stenosis.  L1-L2: Mild disc height loss. Minimal subligamentous disc herniation. Normal facets. No spinal canal or neural foraminal stenosis.  L2-L3: Mild disc height loss. Mild posterior annular bulge. Mild  bilateral facet arthropathy. Mild spinal canal stenosis. No neural foraminal stenosis.  L3-L4: Mild disc height loss. The nucleus pulposus is largely intact, however, a moderate posterior central annular tear is identified. Contrast extravasates from the annular tear into the remainder of the annulus. Moderate bilateral facet arthropathy.   Mild spinal canal stenosis in a trefoil configuration. No neural foraminal stenosis.  L4-L5: Severe disc height loss. Moderate right anterior annular tear. Mild to moderate circumferential disc osteophyte complex most pronounced on the right. Mild bilateral facet arthropathy. No spinal canal or neural foraminal stenosis.  L5-S1: Severe disc height loss. Mild circumferential osteophytic spurring. Diffuse annular tear. Mild bilateral facet arthropathy. No spinal canal or neural foraminal stenosis.     IMPRESSION:   CONCLUSION:  1.  Disc degeneration from L3-L4 through L5-S1 as described above.  2.  No high-grade spinal canal or neural foraminal stenosis.  3.  Status post right L5-S1 medial facetectomy.      Assistance with ambulation/Assistance OOB with selected safe patient handling equipment/Communicate Risk of Fall with Harm to all staff/Discuss with provider need for PT consult/Monitor gait and stability/Provide patient with walking aids - walker, cane, crutches/Reinforce activity limits and safety measures with patient and family/Tailored Fall Risk Interventions/Visual Cue: Yellow wristband and red socks/Bed in lowest position, wheels locked, appropriate side rails in place/Call bell, personal items and telephone in reach/Instruct patient to call for assistance before getting out of bed or chair/Non-slip footwear when patient is out of bed/[place] to call system/Physically safe environment - no spills, clutter or unnecessary equipment/Purposeful Proactive Rounding/Room/bathroom lighting operational, light cord in reach

## 2022-10-14 ENCOUNTER — TELEPHONE (OUTPATIENT)
Dept: CARDIOLOGY | Facility: CLINIC | Age: 72
End: 2022-10-14

## 2022-10-14 NOTE — TELEPHONE ENCOUNTER
[place] Community Measures Blood [place] reviewed.  Patients recent blood pressure is outside of guideline parameters.  Called pt to review, no answer.  Left voicemail message asking patient to check their blood pressure using a home blood pressure cuff or by going to a [facility].  Patient instructed to then call [phone] ([facility]) and leave a message with their name, date of birth, and blood pressure reading that was completed within the last 24 hours and where it was completed.  Will await call back for further review.  Radha Cardoza MA

## 2022-10-18 ENCOUNTER — TELEPHONE (OUTPATIENT)
Dept: PHYSICAL THERAPY | Facility: REHABILITATION | Age: 72
End: 2022-10-18

## 2022-10-18 NOTE — TELEPHONE ENCOUNTER
Called pt when she failed her 4 pm appt. She wasn't feeling well, and forgot to call to cancel. Reminded pt of her next PT appt and will discuss no show policy at that time.

## 2022-10-25 ENCOUNTER — HOSPITAL ENCOUNTER (OUTPATIENT)
Dept: PHYSICAL THERAPY | Facility: REHABILITATION | Age: 72
Discharge: HOME | End: 2022-10-25
Payer: COMMERCIAL

## 2022-10-25 DIAGNOSIS — M62.81 GENERALIZED MUSCLE WEAKNESS: ICD-10-CM

## 2022-10-25 DIAGNOSIS — G89.29 CHRONIC MIDLINE LOW BACK PAIN WITHOUT SCIATICA: Primary | ICD-10-CM

## 2022-10-25 DIAGNOSIS — M53.3 SI (SACROILIAC) JOINT DYSFUNCTION: ICD-10-CM

## 2022-10-25 DIAGNOSIS — M54.50 CHRONIC MIDLINE LOW BACK PAIN WITHOUT SCIATICA: Primary | ICD-10-CM

## 2022-10-25 DIAGNOSIS — M53.3 PAIN IN THE COCCYX: ICD-10-CM

## 2022-10-25 PROCEDURE — 97140 MANUAL THERAPY 1/> REGIONS: CPT | Mod: GP | Performed by: PHYSICAL THERAPIST

## 2022-11-04 ENCOUNTER — TELEPHONE (OUTPATIENT)
Dept: PHYSICAL THERAPY | Facility: REHABILITATION | Age: 72
End: 2022-11-04

## 2022-11-04 DIAGNOSIS — F51.01 PRIMARY INSOMNIA: ICD-10-CM

## 2022-11-04 DIAGNOSIS — M05.9 SEROPOSITIVE RHEUMATOID ARTHRITIS (H): ICD-10-CM

## 2022-11-04 RX ORDER — TRAZODONE HYDROCHLORIDE 150 MG/1
150 TABLET ORAL AT BEDTIME
Qty: 90 TABLET | Refills: 1 | Status: SHIPPED | OUTPATIENT
Start: 2022-11-04 | End: 2022-11-16

## 2022-11-04 NOTE — TELEPHONE ENCOUNTER
Refill request for methotrexate from [facility]    Pt has appt to establish care with you 12/7/22    Can pt have labs now and refill of methotrexate thereafter until her appt in December?

## 2022-11-04 NOTE — PROGRESS NOTES
[facility]    Outpatient Physical Therapy Discharge Note  Patient: Ebonie Bowman  : 1950    Beginning/End Dates of Reporting Period:   to 22    Referring Provider: Akila Napoles, MAGGIE    Therapy Diagnosis: Chronic pain syndrome, sacroiliitis Chronic thoracic pain     Client Self Report: 7/10 LBP - 30 minutes ago before the pain pill LBP was 9/10 sitting down. Left outer hip pain 5/10    Objective Measurements:  Objective Measure: Posture  Details: Pt standing more erect and states she is practicing the standing back arches more since first visit. Still has marked FHP of neck.  Objective Measure: Sit to stand  Details: 4 reps from 23 inches, and increase from 3 reps, Knee pain much better today and only 3/10 as compared to April during last test  Objective Measure: Gait  Details: Pt walks with SEC- more normal stance width and walks more quickly and smoothly  Objective Measure: SI  Details: R PSIS high, R ASIS low  Objective Measure: Trunk AROM  Details: Flexion 15 inches fingertips to floor, Extension - barely to neutral. Pt unable to sit comfortably with full lumbar roll due to loss of lumbar extension  Objective Measure: LE  Details: R hip flexion 110, Left hip flexion 110 deg, Calf strength 2/5 bilat and pt unable to do even one SL heel raise- makes her vulnarable to falls and hip flexion tightness increases demand of LB in ADL's        Goals:  Goal Identifier HEP/self management   Goal Description Patient will be independent in HEP and self management of condition.   Target Date 22   Date Met      Progress (detail required for progress note): Has a TENS- broken She will be seeing [employer] rep  and retrun the unit), Takes meds (Percocet, Methotrexate, and Duoxetine, Enbrel, Oxycontin-2x/day ), Changing positions, stretching and exercises. Pt taking Metformin but it messes up GI- is  on Insulin     Goal Identifier Sleeping   Goal Description Patient will be able to sleep more comfortably and wake only 1-2x/night d/t pain   Target Date 09/14/22   Date Met  10/10/22 (Goal not likely to be met due to need to urinate more than 2x/night)   Progress (detail required for progress note): Pt reports she is sleeping better with meds, but must wake up to urinate which interupt Pt wakes up every 2 hours to go to the bathroom. Pt still uses Trazedone and Oxycontin. LBP in the morning is 8-9/10.     Goal Identifier Walking   Goal Description Patient will be able to walk 1 block with <5/10 pain in the lower back   Target Date 09/14/22   Date Met  10/10/22 (Not likely to meet this goal)   Progress (detail required for progress note): Not met. Pt only walking in the store for exercise with the cart. She uses medication to control pain when walking     Goal Identifier Sitting   Goal Description Patient will be able to sit for 30 minutes with <5/10 pain in the lower back   Target Date 12/13/22   Date Met      Progress (detail required for progress note): Pt able to sit 60 minutes with 9/10 LBP and left hip pain. Pt reports PT hjas been successful in improving the right hip pain, but now pain is on the left hip.     Goal Identifier     Goal Description     Target Date     Date Met      Progress (detail required for progress note):       Goal Identifier     Goal Description     Target Date     Date Met      Progress (detail required for progress note):       Goal Identifier     Goal Description     Target Date     Date Met      Progress (detail required for progress note):       Goal Identifier     Goal Description     Target Date     Date Met      Progress (detail required for progress note):         Plan:  Discharge from therapy.    Discharge:    Reason for Discharge: Pt has failed more than 2 scheduled PT visits, and per policy will be discharged from care    Equipment Issued: none    Discharge Plan: Patient to  continue home program.Pt will obtain new PT orders if she wishes to return to PT     10/25/22 [id number]   Signing Clinician's Name / Credentials   Signing clinician's name / credentials Paris Sandoval PT   Session Number   Session Number 10  (2/10 PN)   Progress Note/Recertification   Recertification Due Date 12/13/22  (Rcert due 6/15/22 but pt unable to get in for PT and also had COIVD)   Recertification Completed Date  10/10/22   Adult Goals   PT Ortho Eval Goals 1;2;3;4   Ortho Goal 1   Goal Identifier HEP/self management   Goal Description Patient will be independent in HEP and self management of condition.   Goal Progress Has a TENS- broken She will be seeing [employer] rep 9/23 and retrun the unit), Takes meds (Percocet, Methotrexate, and Duoxetine, Enbrel, Oxycontin-2x/day ), Changing positions, stretching and exercises. Pt taking Metformin but it messes up GI- is on Insulin   Target Date 09/14/22   Ortho Goal 2   Goal Identifier Sleeping   Goal Description Patient will be able to sleep more comfortably and wake only 1-2x/night d/t pain   Goal Progress Pt reports she is sleeping better with meds, but must wake up to urinate which interupt Pt wakes up every 2 hours to go to the bathroom. Pt still uses Trazedone and Oxycontin. LBP in the morning is 8-9/10.   Target Date 09/14/22   Date Met 10/10/22  (Goal not likely to be met due to need to urinate more than 2x/night)   Ortho Goal 3   Goal Identifier Walking   Goal Description Patient will be able to walk 1 block with <5/10 pain in the lower back   Goal Progress Not met. Pt only walking in the store for exercise with the cart. She uses medication to control pain when walking   Target Date 09/14/22   Date Met 10/10/22  (Not likely to meet this goal)   Ortho Goal 4   Goal Identifier Sitting   Goal Description Patient will be able to sit for 30 minutes with <5/10 pain in the lower back   Goal Progress Pt able to sit 60 minutes with 9/10 LBP and left hip pain. Pt reports PT  hjas been successful in improving the right hip pain, but now pain is on the left hip.   Target Date 12/13/22   Subjective Report   Subjective Report 7/10 LBP - 30 minutes ago before the pain pill LBP was 9/10 sitting down. Left outer hip pain 5/10   Objective Measures   Objective Measures Objective Measure 1;Objective Measure 2;Objective Measure 3;Objective Measure 4;Objective Measure 5;Objective Measure 6   Objective Measure 1   Objective Measure Posture   Details Pt standing more erect and states she is practicing the standing back arches more since first visit. Still has marked FHP of neck.   Objective Measure 2   Objective Measure Sit to stand   Details 4 reps from 23 inches, and increase from 3 reps, Knee pain much better today and only 3/10 as compared to April during last test   Objective Measure 3   Objective Measure Gait   Details Pt walks with SEC- more normal stance width and walks more quickly and smoothly   Objective Measure 4   Objective Measure SI   Details R PSIS high, R ASIS low   Objective Measure 5   Objective Measure Trunk AROM   Details Flexion 15 inches fingertips to floor, Extension - barely to neutral. Pt unable to sit comfortably with full lumbar roll due to loss of lumbar extension   Objective Measure 6   Objective Measure LE   Details R hip flexion 110, Left hip flexion 110 deg, Calf strength 2/5 bilat and pt unable to do even one SL heel raise- makes her vulnarable to falls and hip flexion tightness increases demand of LB in ADL's   Treatment Interventions   Interventions Manual Therapy   Manual Therapy   Manual Therapy: Mobilization, MFR, MLD, friction massage minutes ([id number]) 55   Skilled Intervention SCS/ MT pelvis and spine in side lying   Patient Response Much less tender to general touch and only reacts to tender points now   Treatment Detail Post pelvic neurals: R PGLS-N, R IMP-N, stacked VS1-2-N, stacked VS3-4-N, stacked SCIL4-5-N   Education   Learner Patient   Readiness  Nonacceptance   Method Booklet/handout;Literature;Explanation;Demonstration   Response Needs Reinforcement   Education Comments Pt reports SL stand exercise is hard- is working on it   Plan   Homework Pt has power lift chair (uses it seldom), suggested use of lumbar roll when sitting   Home program SL stand, EIS, supine trunk arch, Seated knee isometrics, double heel raises   Updates to plan of care Use pillow to raise seat height in all chairs, and use SEC at all times until pain decreased.   Plan for next session Consider 2 min walk test. Recheck ex   Comments   Comments Pt missed PT last time- ill and forgot to call. Pt walking a bit more steadily.   Total Session Time   Total Treatment Time (sum of timed and untimed services) 55   Medicare Claim Information   Medical Diagnosis Chronic Pain Syndrome, sacroilitis, Chronic thoracic  back pain

## 2022-11-04 NOTE — TELEPHONE ENCOUNTER
Patient calling to get a medication refill on medications attached.    Patient is completely out.    Having trouble sleeping, looking for ASAP if possible.    Call Back   [phone]

## 2022-11-04 NOTE — TELEPHONE ENCOUNTER
Patient was called and advised she had a PT appt today. She stated she meant to call- she is out of town. I told her that we need to discharge her per policy and all future PT visits will be removed. If she wishes to receive further PT new PT orders will be needed.

## 2022-11-04 NOTE — ADDENDUM NOTE
Encounter addended by: Paris Sandoval, PT on: 11/4/2022 1:36 PM   Actions taken: Clinical Note Signed, Flowsheet accepted, Episode edited, Episode resolved

## 2022-11-08 DIAGNOSIS — Z79.899 HIGH RISK MEDICATION USE: ICD-10-CM

## 2022-11-08 DIAGNOSIS — M05.9 SEROPOSITIVE RHEUMATOID ARTHRITIS (H): ICD-10-CM

## 2022-11-08 NOTE — TELEPHONE ENCOUNTER
Refill request from [facility]    Medication: Folic Acid 1000 mcg  Last Refill: 1/3/22  PHUONG Dr Londono: 12/7/22  Last OV: 8/2/22  Last lab: 5/18/22

## 2022-11-08 NOTE — TELEPHONE ENCOUNTER
Lilli sent refill to pharmacy on Methotrexate to last until appt in December.    Please call pt to schedule lab only appt- lab orders in chart from PCP

## 2022-11-14 RX ORDER — FOLIC ACID 1 MG/1
TABLET ORAL
Qty: 90 TABLET | Refills: 1 | Status: SHIPPED | OUTPATIENT
Start: 2022-11-14 | End: 2023-05-25

## 2022-11-14 NOTE — TELEPHONE ENCOUNTER
folic acid (FOLVITE) 1 MG tablet      Last Written Prescription Date:  1-3-22  Last Fill Quantity: 90,   # refills: 1  Last Office Visit : 8-2-22 Bry  Future Office visit:  12-7-22 ( New w/ Stephon)    Routing refill request to provider for review/approval because:  Last fill by Raheem Londono, DO [facility]   11-7-22 MXT RF by Stephon SMITH,                                  ? RF or send to  Bry DO

## 2022-11-15 DIAGNOSIS — M53.3 CHRONIC RIGHT SI JOINT PAIN: ICD-10-CM

## 2022-11-15 DIAGNOSIS — G89.4 CHRONIC PAIN SYNDROME: ICD-10-CM

## 2022-11-15 DIAGNOSIS — G89.29 CHRONIC MIDLINE LOW BACK PAIN WITHOUT SCIATICA: ICD-10-CM

## 2022-11-15 DIAGNOSIS — F51.01 PRIMARY INSOMNIA: ICD-10-CM

## 2022-11-15 DIAGNOSIS — M06.09 RHEUMATOID ARTHRITIS OF MULTIPLE SITES WITHOUT RHEUMATOID FACTOR (H): ICD-10-CM

## 2022-11-15 DIAGNOSIS — M54.50 CHRONIC MIDLINE LOW BACK PAIN WITHOUT SCIATICA: ICD-10-CM

## 2022-11-15 DIAGNOSIS — M54.6 CHRONIC BILATERAL THORACIC BACK PAIN: ICD-10-CM

## 2022-11-15 DIAGNOSIS — G89.29 CHRONIC RIGHT SI JOINT PAIN: ICD-10-CM

## 2022-11-15 DIAGNOSIS — G89.29 CHRONIC BILATERAL THORACIC BACK PAIN: ICD-10-CM

## 2022-11-15 DIAGNOSIS — G89.29 CHRONIC INTRACTABLE PAIN: ICD-10-CM

## 2022-11-15 NOTE — TELEPHONE ENCOUNTER
Patient calling to request refills of the attached.  Stating she is moving out of town on the 12/3/22.  Will be transferring care to [facility] going forward.

## 2022-11-16 RX ORDER — OXYCODONE HCL 10 MG/1
10 TABLET, FILM COATED, EXTENDED RELEASE ORAL EVERY 12 HOURS
Qty: 60 TABLET | Refills: 0 | Status: SHIPPED | OUTPATIENT
Start: 2022-11-16 | End: 2022-12-08

## 2022-11-16 RX ORDER — OXYCODONE AND ACETAMINOPHEN 5; 325 MG/1; MG/1
1 TABLET ORAL 3 TIMES DAILY PRN
Qty: 90 TABLET | Refills: 0 | Status: SHIPPED | OUTPATIENT
Start: 2022-11-16 | End: 2022-12-08

## 2022-11-16 RX ORDER — TRAZODONE HYDROCHLORIDE 150 MG/1
150 TABLET ORAL AT BEDTIME
Qty: 90 TABLET | Refills: 1 | Status: SHIPPED | OUTPATIENT
Start: 2022-11-16

## 2022-11-21 DIAGNOSIS — E11.8 TYPE 2 DIABETES MELLITUS WITH COMPLICATION, WITHOUT LONG-TERM CURRENT USE OF INSULIN (H): Primary | ICD-10-CM

## 2022-11-21 RX ORDER — FLASH GLUCOSE SENSOR
KIT MISCELLANEOUS
Qty: 4 EACH | Refills: 11 | Status: SHIPPED | OUTPATIENT
Start: 2022-11-21

## 2022-11-29 NOTE — TELEPHONE ENCOUNTER
No response from pt after multiple attempts. No message sent to pt via [employer] (pt has never logged into her account).   No future lab appt scheduled, still has appt scheduled 12/7 to establish care with Lilli.         Routed to [facility] [place] [employer]

## 2022-12-01 NOTE — TELEPHONE ENCOUNTER
NAME: Jonathan Leger  MR:  [id number]  :   1972  Admit Date:  2022      This is a face to face encounter with Jonathan Leger 44 y.o. female on 22  Elements of this note, including Diagnosis,  Interval History, Past Medical/Surgical/Family/Social Histories, ROS, physical exam, and Assessment and Plan were copied and pasted from Previous. Updates have been made where noted and reflect current exam and medical decision making from the DOS of this encounter. CHIEF COMPLAINT     ID following for   Chief Complaint   Patient presents with    Cellulitis     Left lower leg, recently dx with cellulitis and was admitted x 1 week ago. HISTORY OF PRESENT ILLNESS     Jonathan Leger is a 44 y.o. female who presents with   Chief Complaint   Patient presents with    Cellulitis     Left lower leg, recently dx with cellulitis and was admitted x 1 week ago. and has  has a past medical history of Arthritis, Brain venous angioma (HCC), Bursitis, Cat scratch of left lower leg, Class 3 severe obesity due to excess calories with body mass index (BMI) of 40.0 to 44.9 in adult [facility]), COPD (chronic obstructive pulmonary disease) ([facility] [address] Diverticulitis, GERD (gastroesophageal reflux disease), Incisional hernia with obstruction but no gangrene, and Strep throat. Assessment & Plan   Cellulitis of left lower extremity [L03.116]  Nondisplaced osteochondral fracture medial talar dome. 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 Scheduled 2022  Cont atbx will consider adjusting     linezolid (ZYVOX) tablet 600 mg, 2 times per day  piperacillin-tazobactam (ZOSYN) 3,375 mg in sodium chloride 0.9 % 50 mL IVPB ([id number]), Q8H     Pt seen and examined  In bed   has no c/o f/c     Patient is tolerating medications. No reported adverse drug reactions.   PHYSICAL EXAM Please notify patient that we will try to appeal her insurance. They denied coverage based on information in a telephone encounter from October.    Recent Labs     11/29/22  0646 11/30/22  0524 12/01/22  0554    141 136   K 5.2* 4.6 4.6    108* 103   CO2 28 25 24   BUN 13 21* 17   CREATININE 1.2* 1.4* 1.1*   GLUCOSE 95 81 102*   PROT 6.5 6.3* 7.5   LABALBU 3.7 3.5 4.2   CALCIUM 8.8 8.7 9.3   BILITOT 0.2 <0.2 0.2   ALKPHOS 76 73 82   AST 9 9 10   ALT 8 7 6       Lab Results   Component Value Date    CRP 0.6 (H) 11/28/2022    CRP 11.9 (H) 11/10/2022    CRP 3.7 (H) 07/31/2021     Lab Results   Component Value Date    SEDRATE 31 (H) 11/28/2022    SEDRATE 91 (H) 11/10/2022    SEDRATE 13 07/31/2021     Recent Labs     11/29/22  0646 11/30/22  0524 12/01/22  0554   AST 9 9 10   ALT 8 7 6       Lab Results   Component Value Date/Time    CHOL 115 11/11/2022 03:08 AM    TRIG 68 11/11/2022 03:08 AM    HDL 24 11/11/2022 03:08 AM    LDLCALC 77 11/11/2022 03:08 AM    LABVLDL 14 11/11/2022 03:08 AM     Lab Results   Component Value Date/Time    VITD25 29 (L) 04/26/2021 04:37 AM       REVIEW OF SYSTEMS     As stated above in the chief complaint, otherwise negative.   CURRENT MEDICATIONS     Current Facility-Administered Medications:     [COMPLETED] cefepime (MAXIPIME) 2,000 mg in sodium chloride 0.9 % 50 mL IVPB ([id number]), 2,000 mg, IntraVENous, Once, Stopped at 11/30/22 1826 **FOLLOWED BY** cefepime (MAXIPIME) 2,000 mg in sodium chloride 0.9 % 50 mL IVPB ([id number]), 2,000 mg, IntraVENous, Q24H, Diana Arroyo MD    0.9 % sodium chloride infusion, , IntraVENous, Continuous, Gopi Pastel, DO, Last Rate: 60 mL/hr at 11/30/22 2019, New Bag at 11/30/22 2019    oxyCODONE-acetaminophen (PERCOCET) 5-325 MG per tablet 2 tablet, 2 tablet, Oral, Q4H PRN, Gopi Pastel, DO, 2 tablet at 12/01/22 0900    levETIRAcetam (KEPPRA) tablet 500 mg, 500 mg, Oral, BID, Gopi Pastel, DO, 500 mg at 12/01/22 0900    levothyroxine (SYNTHROID) tablet 50 mcg, 50 mcg, Oral, Daily, Gopi Pastel, DO, 50 mcg at 12/01/22 0516    hydrOXYzine HCl (ATARAX) tablet 50 mg, 50 mg, Oral, Nightly, Denis Torrez Black, DO, 50 mg at 11/30/22 2018    magnesium sulfate 1000 mg in dextrose 5% 100 mL IVPB, 1,000 mg, IntraVENous, PRN, Wilma Staple, DO    sodium phosphate 14.79 mmol in sodium chloride 0.9 % 250 mL IVPB, 0.16 mmol/kg, IntraVENous, PRN **OR** sodium phosphate 29.61 mmol in sodium chloride 0.9 % 250 mL IVPB, 0.32 mmol/kg, IntraVENous, PRN, Wilma Staple, DO    potassium chloride (KLOR-CON M) extended release tablet 40 mEq, 40 mEq, Oral, PRN **OR** potassium bicarb-citric acid (EFFER-K) effervescent tablet 40 mEq, 40 mEq, Oral, PRN **OR** potassium chloride 10 mEq/100 mL IVPB (Peripheral Line), 10 mEq, IntraVENous, PRN, Wilma Staple, DO    sodium chloride flush 0.9 % injection 5-40 mL, 5-40 mL, IntraVENous, 2 times per day, Wilma Knightle, DO, 10 mL at 11/30/22 2018    sodium chloride flush 0.9 % injection 5-40 mL, 5-40 mL, IntraVENous, PRN, Wilma Staple, DO    0.9 % sodium chloride infusion, , IntraVENous, PRN, Wilma Staple, DO    enoxaparin (LOVENOX) injection 40 mg, 40 mg, SubCUTAneous, Daily, Walter Carcamo, DO, 40 mg at 11/30/22 0930    ondansetron (ZOFRAN-ODT) disintegrating tablet 4 mg, 4 mg, Oral, Q8H PRN **OR** ondansetron (ZOFRAN) injection 4 mg, 4 mg, IntraVENous, Q6H PRN, Wilma Staple, DO    polyethylene glycol (GLYCOLAX) packet 17 g, 17 g, Oral, Daily PRN, Wilma Staple, DO    acetaminophen (TYLENOL) tablet 650 mg, 650 mg, Oral, Q6H PRN **OR** acetaminophen (TYLENOL) suppository 650 mg, 650 mg, Rectal, Q6H PRN, Wilma Staple, DO    linezolid (ZYVOX) tablet 600 mg, 600 mg, Oral, 2 times per day, Wilma Potter DO, 600 mg at 12/01/22 0900    albuterol (PROVENTIL) nebulizer solution 2.5 mg, 2.5 mg, Nebulization, Q4H PRN, Wilver Radford APRN - CNP  DIAGNOSTIC RESULTS   Radiology:  CT TIBIA FIBULA LEFT WO CONTRAST    Result Date: 11/28/2022  EXAMINATION: CT OF THE LEFT TIBIA AND FIBULA WITHOUT CONTRAST 11/28/2022 11:39 am TECHNIQUE: CT of the left tibia and fibula was performed without the administration of intravenous contrast. Multiplanar reformatted images are provided for review. Automated exposure control, iterative reconstruction, and/or weight based adjustment of the mA/kV was utilized to reduce the radiation dose to as low as reasonably achievable. COMPARISON: The previous study performed 11/11/2022. HISTORY ORDERING SYSTEM PROVIDED HISTORY: eval gas TECHNOLOGIST PROVIDED HISTORY: Reason for exam:->eval gas Decision Support Exception - unselect if not a suspected or confirmed emergency medical condition->Emergency Medical Condition (MA) FINDINGS: Bones: No evidence of acute fracture or dislocation. No aggressive appearing osseous abnormality. There is again minimal periosteal reaction involving the distal tibia and fibula and can be attributable to chronic venous insufficiency or hyperemia. Soft Tissue: There has been an interval decrease in the subcutaneous soft tissue edema throughout the visualized lower extremity. Better identified on this study is a small popliteal cyst.  It measures 3.2 x 2.4 x 1.4 cm in size. There is no evidence of soft tissue fluid collection/abscess or gas. Joint: No significant degenerative changes. No osseous erosions. 1.  Interval decrease in the subcutaneous soft tissue edema throughout the visualized left lower extremity, when compared to the previous study performed 11/11/2022. 2.  Better identified on this study is a small popliteal cyst. 3.  No evidence of soft tissue fluid collection/abscess or gas. CT TIBIA FIBULA LEFT WO CONTRAST    Result Date: 11/11/2022  EXAMINATION: CT OF THE LEFT TIBIA AND FIBULA WITHOUT CONTRAST 11/11/2022 9:10 am TECHNIQUE: CT of the left tibia and fibula was performed without the administration of intravenous contrast.  Multiplanar reformatted images are provided for review. Automated exposure control, iterative reconstruction, and/or weight based adjustment of the mA/kV was utilized to reduce the radiation dose to as low as reasonably achievable.  COMPARISON: None. HISTORY ORDERING SYSTEM PROVIDED HISTORY: swelling/pain TECHNOLOGIST PROVIDED HISTORY: Reason for exam:->swelling/pain FINDINGS: Bones: No evidence of acute fracture or dislocation. No evidence of bone destruction. Minimal periosteal reaction involving the distal tibia and fibula can be related to chronic venous insufficiency or hyperemia. Soft Tissue: There is moderate diffuse subcutaneous edema throughout the left calf, no extension into the muscular compartment. No evidence of drainable abscess. Joint: Mild degenerative changes at the ankle. Knee joint appears normal.     1.  Moderate diffuse calf edema without drainable abscess. 2.  Mild diffuse periosteal thickening of the distal tibia and fibula can be related to venous insufficiency and chronic inflammation. No evidence of bone destruction to suggest osteomyelitis. CT FOOT LEFT WO CONTRAST    Result Date: 11/28/2022  EXAMINATION: CT OF THE LEFT FOOT WITHOUT CONTRAST 11/28/2022 11:39 am TECHNIQUE: CT of the left foot was performed without the administration of intravenous contrast.  Multiplanar reformatted images are provided for review. Automated exposure control, iterative reconstruction, and/or weight based adjustment of the mA/kV was utilized to reduce the radiation dose to as low as reasonably achievable. COMPARISON: None. HISTORY ORDERING SYSTEM PROVIDED HISTORY: eval gas TECHNOLOGIST PROVIDED HISTORY: Reason for exam:->eval gas Decision Support Exception - unselect if not a suspected or confirmed emergency medical condition->Emergency Medical Condition (MA) FINDINGS: Bones: No evidence of acute fracture or dislocation. There are tiny radiolucencies identified in the subchondral region of the medial talar dome, interspersed with areas of slight increased sclerosis. Rule out AVN. Moderate osteo-degenerative changes are noted involving the midfoot and hindfoot, with osteophyte formation.   Associated joint space narrowing is seen at the calcaneocuboid joint. Tiny subchondral cysts are identified along both sides of the calcaneocuboid joint. Subchondral cyst formation is also noted involving the lateral cuneiform bone at the level of the cuboid-cuneiform joint. Tiny subchondral cyst formation is also seen involving the head of the 1st metatarsal bone. Mild osteo-degenerative changes are present at the medial talonavicular joint. There is associated joint space narrowing. A moderate-sized plantar calcaneal bone spur is noted. Mild periosteal thickening is seen involving the distal fibula, which could be related to venous insufficiency and chronic inflammation. No other significant osseous abnormality is appreciated. Soft Tissue: Mild, diffuse subcutaneous soft tissue edema is noted involving the visualized ankle and foot. No soft tissue fluid collection/abscess or gas is appreciated. Joint: As above. 1.  Mild, diffuse subcutaneous soft tissue edema involving the visualized left ankle and foot. 2.  No evidence of soft tissue fluid collection/abscess or gas. 3.  Osteo-degenerative changes, as described above. 4.  Rule out AVN of the medial talar dome, as described above. 5.  Mild periosteal thickening involving the distal fibula, which could be related to venous insufficiency in chronic inflammation. 6.  Moderate-sized plantar calcaneal bone spur. MRI ANKLE LEFT W WO CONTRAST    Result Date: 11/28/2022  EXAMINATION: MRI OF THE LEFT ANKLE WITHOUT AND WITH CONTRAST, 11/28/2022 4:26 pm TECHNIQUE: Multiplanar multisequence MRI of the left ankle was performed without and with the administration of intravenous contrast. COMPARISON: None.  HISTORY: ORDERING SYSTEM PROVIDED HISTORY: Bony changes in particular overlying the talar dome with recurrent cellulitis and concern for osteomyelitis TECHNOLOGIST PROVIDED HISTORY: Bony changes in particular overlying the talar dome with recurrent cellulitis and concern for osteomyelitis Reason for exam:->Bony changes in particular overlying the talar dome with recurrent cellulitis and concern for osteomyelitis FINDINGS: SYNDESMOTIC LIGAMENTS: Intact anterior inferior tibiofibular ligament and posterior inferior tibiofibular ligament. No significant periligamentous edema or interval widening. LATERAL COLLATERAL LIGAMENT COMPLEX: Intact anterior talofibular ligament, posterior talofibular ligament and calcaneofibular ligament. DELTOID LIGAMENT COMPLEX: Intact superficial and deep components. SINUS TARSI AND SPRING LIGAMENT: Visualized portions of the spring ligament are intact including the tibiospring ligament. Normal appearance of the sinus tarsi. MEDIAL TENDONS: Intact posterior tibial tendon, flexor digitorum and flexor hallucis longus tendons. LATERAL TENDONS: Intact peroneus brevis and longus tendons. ANTERIOR TENDONS: The tibialis anterior, extensor hallucis longus and extensor digitorum longus tendons are normal in position, morphology and signal. ACHILLES TENDON: The Achilles tendon is normal in position, morphology and signal.  No associated bursitis. PLANTAR FASCIA: The medial and lateral bundles of the plantar fascia are normal in morphology and signal. There is no evidence of acute plantar fasciitis or tear. No evidence of plantar fascial nodules. TARSAL TUNNEL: There are no obstructing lesions within the tarsal tunnel. BONE MARROW: 5 mm nondisplaced osteochondral fracture medial talar dome. No osseous erosion to suggest osteomyelitis. JOINT SPACES: Chronic pes planus. Question chronic calcaneo navicular coalition. Focal severe degenerative change of the calcaneocuboid joint with osseous edema on both sides of the joint. 1. No evidence for osteomyelitis. 2. 5 mm nondisplaced osteochondral fracture medial talar dome. 3. 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 4. Ankle ligaments and tendons appear intact.  RECOMMENDATIONS: Careful clinical correlation and follow up recommended. US DUP LOWER EXTREMITY LEFT CALEB    Result Date: 11/28/2022  EXAMINATION: DUPLEX VENOUS ULTRASOUND OF THE LEFT LOWER EXTREMITY 11/28/2022 11:14 am TECHNIQUE: Duplex ultrasound using B-mode/gray scaled imaging and Doppler spectral analysis and color flow was obtained of the deep venous structures of the left lower extremity. COMPARISON: None. HISTORY: ORDERING SYSTEM PROVIDED HISTORY: LEG SWELLING, PAIN, DVT SUSPECTED TECHNOLOGIST PROVIDED HISTORY: Reason for exam:->r/o DVT. current cellulitis What reading provider will be dictating this exam?->CRC FINDINGS: The visualized veins of the left lower extremity are patent and free of echogenic thrombus. The veins demonstrate good compressibility with normal color flow study and spectral analysis. No sonographic evidence of deep venous thrombosis in the left lower extremity. US DUP LOWER EXTREMITY LEFT CALEB    Result Date: 11/10/2022  EXAMINATION: DUPLEX VENOUS ULTRASOUND OF THE LEFT LOWER EXTREMITY 11/10/2022 6:25 am TECHNIQUE: Duplex ultrasound using B-mode/gray scaled imaging and Doppler spectral analysis and color flow was obtained of the deep venous structures of the left lower extremity. COMPARISON: 20 February 2021 HISTORY: ORDERING SYSTEM PROVIDED HISTORY: Discomfort TECHNOLOGIST PROVIDED HISTORY: Reason for exam:->Discomfort What reading provider will be dictating this exam?->CRC FINDINGS: The visualized veins of the left lower extremity are patent and free of echogenic thrombus. The veins demonstrate good compressibility with normal color flow study and spectral analysis. There is left inguinal adenopathy with the largest node measuring 4.2 x 1.2 by 2.6 cm. Consider lymph node biopsy or excision if appropriate. No evidence of DVT in the left lower extremity. Left inguinal adenopathy. Consider lymph node biopsy and or excision if appropriate.             Imaging and labs were reviewed per medical records. POC was discussed with Hermila Hancock. An opportunity to ask questions was given to the patient/FAMILY. Thank you for involving me in the care of Hermila Hancock I will continue to follow. Please do not hesitate to call [phone] for any questions or concerns.     Electronically signed by Diamond Chao MD on 12/1/2022 at 9:50 AM

## 2022-12-02 NOTE — ADDENDUM NOTE
Encounter addended by: Paris Sandoval, PT on: 12/2/2022 4:29 PM   Actions taken: Episode edited, Episode resolved

## 2022-12-05 DIAGNOSIS — E78.2 MIXED HYPERLIPIDEMIA: ICD-10-CM

## 2022-12-05 DIAGNOSIS — I10 PRIMARY HYPERTENSION: ICD-10-CM

## 2022-12-05 NOTE — TELEPHONE ENCOUNTER
Received fax from pharmacy requesting refill for lisinopril & atorvastatin    Last refill: 06/13/2022  Patient last seen: 09/15/2022

## 2022-12-06 ENCOUNTER — TELEPHONE (OUTPATIENT)
Dept: INTERNAL MEDICINE | Facility: CLINIC | Age: 72
End: 2022-12-06

## 2022-12-06 RX ORDER — ATORVASTATIN CALCIUM 40 MG/1
40 TABLET, FILM COATED ORAL AT BEDTIME
Qty: 90 TABLET | Refills: 2 | Status: SHIPPED | OUTPATIENT
Start: 2022-12-06

## 2022-12-06 RX ORDER — LISINOPRIL 10 MG/1
10 TABLET ORAL AT BEDTIME
Qty: 90 TABLET | Refills: 1 | Status: SHIPPED | OUTPATIENT
Start: 2022-12-06

## 2022-12-06 NOTE — TELEPHONE ENCOUNTER
"Routing refill request to provider for review/approval because:  bp out of range    Last Written Prescription Date:  6/13/22  Last Fill Quantity: 90,  # refills: 1   Last office visit provider:  9/15/22     Requested Prescriptions   Pending Prescriptions Disp Refills     lisinopril (ZESTRIL) 10 MG tablet 90 tablet 1     Sig: Take 1 tablet (10 mg) by mouth At Bedtime       ACE Inhibitors (Including Combos) Protocol Failed - 12/5/2022  4:50 PM        Failed - Blood pressure under 140/90 in past 12 months     BP Readings from Last 3 Encounters:   09/22/22 (!) 179/81   09/15/22 130/70   08/12/22 (!) 142/68                 Passed - Recent (12 mo) or future (30 days) visit within the authorizing provider's specialty     Patient has had an office visit with the authorizing provider or a provider within the authorizing providers department within the previous 12 mos or has a future within next 30 days. See \"Patient Info\" tab in inbasket, or \"Choose Columns\" in Meds & Orders section of the refill encounter.              Passed - Medication is active on med list        Passed - Patient is age 18 or older        Passed - No active pregnancy on record        Passed - Normal serum creatinine on file in past 12 months     Recent Labs   Lab Test 07/18/22  0442   CR 1.07       Ok to refill medication if creatinine is low          Passed - Normal serum potassium on file in past 12 months     Recent Labs   Lab Test 07/18/22  1823 10/26/21  0031 10/12/21  1200   POTASSIUM 3.6   < >  --    43491  --   --  4.5    < > = values in this interval not displayed.             Passed - No positive pregnancy test within past 12 months           atorvastatin (LIPITOR) 40 MG tablet 90 tablet 1     Sig: Take 1 tablet (40 mg) by mouth At Bedtime       Statins Protocol Passed - 12/5/2022  4:50 PM        Passed - LDL on file in past 12 months     Recent Labs   Lab Test 08/18/22  1251   LDL 51             Passed - No abnormal creatine kinase in past 12 " "months     Recent Labs   Lab Test 10/26/21  0031   CKT 37                Passed - Recent (12 mo) or future (30 days) visit within the authorizing provider's specialty     Patient has had an office visit with the authorizing provider or a provider within the authorizing providers department within the previous 12 mos or has a future within next 30 days. See \"Patient Info\" tab in inbasket, or \"Choose Columns\" in Meds & Orders section of the refill encounter.              Passed - Medication is active on med list        Passed - Patient is age 18 or older        Passed - No active pregnancy on record        Passed - No positive pregnancy test in past 12 months             Selma Lyon, RN 12/06/22 2:04 PM  "

## 2022-12-06 NOTE — TELEPHONE ENCOUNTER
Call pt- her last blood pressure was very high. Please schedule an appointment with me to review her blood pressure

## 2022-12-08 ENCOUNTER — MEDICAL CORRESPONDENCE (OUTPATIENT)
Dept: HEALTH INFORMATION MANAGEMENT | Facility: CLINIC | Age: 72
End: 2022-12-08

## 2022-12-08 DIAGNOSIS — G89.29 CHRONIC BILATERAL THORACIC BACK PAIN: ICD-10-CM

## 2022-12-08 DIAGNOSIS — M54.50 CHRONIC MIDLINE LOW BACK PAIN WITHOUT SCIATICA: ICD-10-CM

## 2022-12-08 DIAGNOSIS — G89.29 CHRONIC INTRACTABLE PAIN: ICD-10-CM

## 2022-12-08 DIAGNOSIS — G89.29 CHRONIC RIGHT SI JOINT PAIN: ICD-10-CM

## 2022-12-08 DIAGNOSIS — G89.4 CHRONIC PAIN SYNDROME: ICD-10-CM

## 2022-12-08 DIAGNOSIS — M54.6 CHRONIC BILATERAL THORACIC BACK PAIN: ICD-10-CM

## 2022-12-08 DIAGNOSIS — M06.09 RHEUMATOID ARTHRITIS OF MULTIPLE SITES WITHOUT RHEUMATOID FACTOR (H): ICD-10-CM

## 2022-12-08 DIAGNOSIS — M53.3 CHRONIC RIGHT SI JOINT PAIN: ICD-10-CM

## 2022-12-08 DIAGNOSIS — G89.29 CHRONIC MIDLINE LOW BACK PAIN WITHOUT SCIATICA: ICD-10-CM

## 2022-12-08 RX ORDER — OXYCODONE HCL 10 MG/1
10 TABLET, FILM COATED, EXTENDED RELEASE ORAL EVERY 12 HOURS
Qty: 60 TABLET | Refills: 0 | Status: SHIPPED | OUTPATIENT
Start: 2022-12-08 | End: 2023-01-05

## 2022-12-08 RX ORDER — OXYCODONE AND ACETAMINOPHEN 5; 325 MG/1; MG/1
1 TABLET ORAL 3 TIMES DAILY PRN
Qty: 90 TABLET | Refills: 0 | Status: SHIPPED | OUTPATIENT
Start: 2022-12-08 | End: 2023-01-05

## 2022-12-08 NOTE — TELEPHONE ENCOUNTER
Call pt- she is due for follow up this month. No further refills of this medication without an appointment

## 2022-12-09 NOTE — TELEPHONE ENCOUNTER
Left message that requested Rx has been sent in and that appointment is due this month before any further refills.

## 2022-12-14 ENCOUNTER — VIRTUAL VISIT (OUTPATIENT)
Dept: INTERNAL MEDICINE | Facility: CLINIC | Age: 72
End: 2022-12-14
Payer: COMMERCIAL

## 2022-12-14 DIAGNOSIS — Z79.899 HIGH RISK MEDICATION USE: ICD-10-CM

## 2022-12-14 DIAGNOSIS — Z79.4 TYPE 2 DIABETES MELLITUS WITH HYPEROSMOLARITY WITHOUT COMA, WITH LONG-TERM CURRENT USE OF INSULIN (H): Primary | ICD-10-CM

## 2022-12-14 DIAGNOSIS — M06.9 RHEUMATOID ARTHRITIS, INVOLVING UNSPECIFIED SITE, UNSPECIFIED WHETHER RHEUMATOID FACTOR PRESENT (H): ICD-10-CM

## 2022-12-14 DIAGNOSIS — E11.00 TYPE 2 DIABETES MELLITUS WITH HYPEROSMOLARITY WITHOUT COMA, WITH LONG-TERM CURRENT USE OF INSULIN (H): Primary | ICD-10-CM

## 2022-12-14 PROCEDURE — 99442 PR PHYSICIAN TELEPHONE EVALUATION 11-20 MIN: CPT | Mod: 95 | Performed by: NURSE PRACTITIONER

## 2022-12-14 ASSESSMENT — PATIENT HEALTH QUESTIONNAIRE - PHQ9
10. IF YOU CHECKED OFF ANY PROBLEMS, HOW DIFFICULT HAVE THESE PROBLEMS MADE IT FOR YOU TO DO YOUR WORK, TAKE CARE OF THINGS AT HOME, OR GET ALONG WITH OTHER PEOPLE: NOT DIFFICULT AT ALL
SUM OF ALL RESPONSES TO PHQ QUESTIONS 1-9: 0
SUM OF ALL RESPONSES TO PHQ QUESTIONS 1-9: 0

## 2022-12-14 NOTE — PROGRESS NOTES
Ebonie is a 72 year old who is being evaluated via a billable telephone visit.      What phone number would you like to be contacted at? [phone]  How would you like to obtain your AVS? Mail a copy       Distant Location (provider location):  On-site    Assessment & Plan   Problem List Items Addressed This Visit        Endocrine    Type 2 diabetes mellitus with hyperosmolarity without coma, with long-term current use of insulin (H) - Primary       Immune    Rheumatoid arthritis, involving unspecified site, unspecified whether rheumatoid factor present (H)       Other    High risk medication use      Ebonie has now moved to [place], [place] where she and her son have purchased a new home.  She takes several high risk medications including narcotics for pain management and methotrexate which need consistent monitoring and follow-up appointments.  It is not feasible for her to continue to travel back to the [facility] for office visits and lab appointment.  As such, she will need to transfer her care.  She is already overdue for follow-up lab tests and appointment so she is encouraged to do as soon as possible.  She is advised that if she cannot make an appointment within the next 1 month that she may need to travel to an [facility] location where she can at least do lab tests. If there is any difficulty in getting an appointment within the next 1 month with a local provider, she needs to contact the office right away so that we can help to set her up with a lab appointment.    No follow-ups on file.    Marichuy Rubio NP  [facility]    Kobe Loomis is a 72 year old, presenting for the following health issues:  Recheck Medication    Ebonie and her son moved to [place], [place]. She will need to transfer her care to a clinic in this area but has not yet made an appointment to do so. She wondered if I knew of any providers in the area.     DM- Recent post prandial reading of 170s.     History of  Present Illness       Reason for visit:  Going over medications    She eats 2-3 servings of fruits and vegetables daily.She consumes 1 sweetened beverage(s) daily.She exercises with enough effort to increase her heart rate 9 or less minutes per day.  She exercises with enough effort to increase her heart rate 3 or less days per week.   She is taking medications regularly.    Today's PHQ-9         PHQ-9 Total Score: 0    PHQ-9 Q9 Thoughts of better off dead/self-harm past 2 weeks :   Not at all    How difficult have these problems made it for you to do your work, take care of things at home, or get along with other people: Not difficult at all           Objective           Vitals:  No vitals were obtained today due to virtual visit.    Physical Exam   GENERAL: alert and no distress  PSYCH: Alert and oriented times 3; coherent speech, normal   rate and volume, able to articulate logical thoughts, able   to abstract reason, no tangential thoughts, no hallucinations   or delusions  Her affect is normal  RESP: No cough, no audible wheezing, able to talk in full sentences  Remainder of exam unable to be completed due to telephone visits            Phone call duration: 12 minutes

## 2023-01-04 DIAGNOSIS — G89.29 CHRONIC MIDLINE LOW BACK PAIN WITHOUT SCIATICA: ICD-10-CM

## 2023-01-04 DIAGNOSIS — M06.09 RHEUMATOID ARTHRITIS OF MULTIPLE SITES WITHOUT RHEUMATOID FACTOR (H): ICD-10-CM

## 2023-01-04 DIAGNOSIS — G89.29 CHRONIC RIGHT SI JOINT PAIN: ICD-10-CM

## 2023-01-04 DIAGNOSIS — G89.4 CHRONIC PAIN SYNDROME: ICD-10-CM

## 2023-01-04 DIAGNOSIS — G89.29 CHRONIC BILATERAL THORACIC BACK PAIN: ICD-10-CM

## 2023-01-04 DIAGNOSIS — M54.6 CHRONIC BILATERAL THORACIC BACK PAIN: ICD-10-CM

## 2023-01-04 DIAGNOSIS — M53.3 CHRONIC RIGHT SI JOINT PAIN: ICD-10-CM

## 2023-01-04 DIAGNOSIS — M54.50 CHRONIC MIDLINE LOW BACK PAIN WITHOUT SCIATICA: ICD-10-CM

## 2023-01-04 DIAGNOSIS — G89.29 CHRONIC INTRACTABLE PAIN: ICD-10-CM

## 2023-01-04 NOTE — TELEPHONE ENCOUNTER
Patient calling to request refills of attached medication.  She will be establishing care with a new provider closer to her home on 1/11/23, but does not have enough medication to last until that appt.

## 2023-01-05 RX ORDER — OXYCODONE AND ACETAMINOPHEN 5; 325 MG/1; MG/1
1 TABLET ORAL 3 TIMES DAILY PRN
Qty: 90 TABLET | Refills: 0 | Status: SHIPPED | OUTPATIENT
Start: 2023-01-05 | End: 2023-02-01

## 2023-01-05 RX ORDER — OXYCODONE HCL 10 MG/1
10 TABLET, FILM COATED, EXTENDED RELEASE ORAL EVERY 12 HOURS
Qty: 60 TABLET | Refills: 0 | Status: SHIPPED | OUTPATIENT
Start: 2023-01-05 | End: 2023-02-01

## 2023-01-20 ENCOUNTER — TELEPHONE (OUTPATIENT)
Dept: RHEUMATOLOGY | Facility: CLINIC | Age: 73
End: 2023-01-20
Payer: COMMERCIAL

## 2023-01-20 NOTE — TELEPHONE ENCOUNTER
Date: 01.20.2023    Company: [employer]    Phone Number: [phone]    Fax Number: N/A    Rep: Libra    Comments: No active application on file. No new PA needed for renewal.    Call Ref Number: N/A

## 2023-01-20 NOTE — TELEPHONE ENCOUNTER
LVM for patient to call back to discuss PAP renewal. Patient does not have f/u with [facility] provider to re-enroll for PAP.

## 2023-01-20 NOTE — TELEPHONE ENCOUNTER
Prior Authorization Approval    Authorization Effective Date: 12/21/2022  Authorization Expiration Date: 12/31/2023  Medication: Enbrel - Approved  Approved Dose/Quantity: 4ml per 28 days  Reference #: Key: [id number] Case ID: [id number]   Insurance Company: [employer] - Phone [phone] Fax [phone]  Expected CoPay:       CoPay Card Available: No    Foundation Assistance Needed: PAP Patient  Which Pharmacy is filling the prescription (Not needed for infusion/clinic administered): [facility] [address]  Pharmacy Notified:    Patient Notified:

## 2023-01-29 DIAGNOSIS — M05.9 SEROPOSITIVE RHEUMATOID ARTHRITIS (H): ICD-10-CM

## 2023-01-31 DIAGNOSIS — G89.29 CHRONIC MIDLINE LOW BACK PAIN WITHOUT SCIATICA: ICD-10-CM

## 2023-01-31 DIAGNOSIS — G89.4 CHRONIC PAIN SYNDROME: ICD-10-CM

## 2023-01-31 DIAGNOSIS — G89.29 CHRONIC RIGHT SI JOINT PAIN: ICD-10-CM

## 2023-01-31 DIAGNOSIS — M06.09 RHEUMATOID ARTHRITIS OF MULTIPLE SITES WITHOUT RHEUMATOID FACTOR (H): ICD-10-CM

## 2023-01-31 DIAGNOSIS — M54.6 CHRONIC BILATERAL THORACIC BACK PAIN: ICD-10-CM

## 2023-01-31 DIAGNOSIS — G89.29 CHRONIC INTRACTABLE PAIN: ICD-10-CM

## 2023-01-31 DIAGNOSIS — M53.3 CHRONIC RIGHT SI JOINT PAIN: ICD-10-CM

## 2023-01-31 DIAGNOSIS — M54.50 CHRONIC MIDLINE LOW BACK PAIN WITHOUT SCIATICA: ICD-10-CM

## 2023-01-31 DIAGNOSIS — G89.29 CHRONIC BILATERAL THORACIC BACK PAIN: ICD-10-CM

## 2023-01-31 NOTE — TELEPHONE ENCOUNTER
Medication Question or Refill    Contacts       Type Contact Phone/Fax    01/31/2023 10:38 AM CST Phone (Incoming) Ebonie Bowman (Self) [phone] (M)          What medication are you calling about (include dose and sig)?:     OxyCODINE-acetaminophen (PERCOCET) 5-325 mg tablet    oxyCODONE (OXYCONTIN) 10 mg 12 hr tablet    Preferred Pharmacy:    [facility] [address] [address]  Phone: [phone] Fax: [phone]      Controlled Substance Agreement on file:   CSA -- Patient Level:     [Media Unavailable] Controlled Substance Agreement - Opioid - Scan on 2/15/2022  9:24 AM   [Media Unavailable] Controlled Substance Agreement - Opioid - Scan on 4/6/2021   [Media Unavailable] Controlled Substance Agreement - Opioid - Scan on 8/22/2019       Who prescribed the medication?: Marichuy Rubio NP    Do you need a refill? Yes    When did you use the medication last? Yesterday    Patient offered an appointment? Yes: declined due to move to [place].  She had an appointment to establish care at [facility] but the provider informed her that they do not prescribe this medication.    She is trying to locate a new provider that will prescribe these medications. She is requesting for assistance for the next two months.  She will call her insurance as well trying to locate names of providers.     Could we send this information to you in [facility] or would you prefer to receive a phone call?:   Patient would prefer a phone call   Okay to leave a detailed message?: Yes at Cell number on file:    Telephone Information:   Mobile [phone]

## 2023-02-01 RX ORDER — OXYCODONE AND ACETAMINOPHEN 5; 325 MG/1; MG/1
1 TABLET ORAL 3 TIMES DAILY PRN
Qty: 90 TABLET | Refills: 0 | Status: SHIPPED | OUTPATIENT
Start: 2023-02-01 | End: 2023-03-03

## 2023-02-01 RX ORDER — OXYCODONE HCL 10 MG/1
10 TABLET, FILM COATED, EXTENDED RELEASE ORAL EVERY 12 HOURS
Qty: 60 TABLET | Refills: 0 | Status: SHIPPED | OUTPATIENT
Start: 2023-02-01 | End: 2023-03-03

## 2023-02-01 NOTE — TELEPHONE ENCOUNTER
Call pt- her last office visit was in September and because this is a controlled substance I cannot just continue to refill the medication without an appointment. I will send in 1 refill today but no further refills can be dispensed after this without an office visit (face to face). See if she would like to schedule an appointment for March.

## 2023-02-02 DIAGNOSIS — E11.00 TYPE 2 DIABETES MELLITUS WITH HYPEROSMOLARITY WITHOUT COMA, WITH LONG-TERM CURRENT USE OF INSULIN (H): ICD-10-CM

## 2023-02-02 DIAGNOSIS — Z79.4 TYPE 2 DIABETES MELLITUS WITH HYPEROSMOLARITY WITHOUT COMA, WITH LONG-TERM CURRENT USE OF INSULIN (H): ICD-10-CM

## 2023-02-03 RX ORDER — METFORMIN HCL 500 MG
500 TABLET, EXTENDED RELEASE 24 HR ORAL
Qty: 90 TABLET | Refills: 3 | Status: SHIPPED | OUTPATIENT
Start: 2023-02-03

## 2023-02-03 NOTE — TELEPHONE ENCOUNTER
"Last Written Prescription Date:  8/25/2022  Last Fill Quantity: 90,  # refills: 1   Last office visit provider:  12/14/2022     Requested Prescriptions   Pending Prescriptions Disp Refills     metFORMIN (GLUCOPHAGE XR) 500 MG 24 hr tablet 90 tablet 1     Sig: Take 1 tablet (500 mg) by mouth daily (with dinner)       Biguanide Agents Passed - 2/2/2023  6:33 PM        Passed - Patient is age 10 or older        Passed - Patient has documented A1c within the specified period of time.     If HgbA1C is 8 or greater, it needs to be on file within the past 3 months.  If less than 8, must be on file within the past 6 months.     Recent Labs   Lab Test 08/18/22  1251   A1C 7.9*             Passed - Patient's CR is NOT>1.4 OR Patient's EGFR is NOT<45 within past 12 mos.     Recent Labs   Lab Test 07/18/22  0442 10/26/21  0031 10/12/21  1200 09/01/21  1307 05/07/21  1237   GFRESTIMATED 54*   < >  --    < > 40*   GFRESTBLACK  --   --   --   --  48*   41667  --   --  44*  --   --    15237  --   --  38*  --   --     < > = values in this interval not displayed.       Recent Labs   Lab Test 07/18/22  0442   CR 1.07             Passed - Patient does NOT have a diagnosis of CHF.        Passed - Medication is active on med list        Passed - Patient is not pregnant        Passed - Patient has not had a positive pregnancy test within the past 12 mos.         Passed - Recent (6 mo) or future (30 days) visit within the authorizing provider's specialty     Patient had office visit in the last 6 months or has a visit in the next 30 days with authorizing provider or within the authorizing provider's specialty.  See \"Patient Info\" tab in inbasket, or \"Choose Columns\" in Meds & Orders section of the refill encounter.                 Alanis Gunter RN 02/03/23 1:43 PM  "

## 2023-02-03 NOTE — TELEPHONE ENCOUNTER
Received fax from pharmacy requesting refill for metformin    Last refill: 08/25/2022  Patient last seen: 12/14/2022

## 2023-02-15 NOTE — TELEPHONE ENCOUNTER
DATE 02/15/2023    Spoke to patient regarding the free drug Enbrel. She has moved to [place] and has not established care with a rheum MD. She is looking to do so soon and will call me back.      DIEGO Randhawa, [facility]  Specialty Pharmacy Clinic Liaison     [facility]    [email or web address]    Phone: [phone]  Fax: [phone]     Connect with [employer] [place] on social media.

## 2023-02-27 DIAGNOSIS — I10 ESSENTIAL HYPERTENSION: ICD-10-CM

## 2023-02-27 NOTE — TELEPHONE ENCOUNTER
Received fax from pharmacy requesting refill for amlodipine    Last refill: 06/13/2022  Patient last seen: 12/14/2022

## 2023-02-28 RX ORDER — AMLODIPINE BESYLATE 10 MG/1
TABLET ORAL
Qty: 90 TABLET | Refills: 2 | Status: SHIPPED | OUTPATIENT
Start: 2023-02-28

## 2023-02-28 NOTE — TELEPHONE ENCOUNTER
"Routing refill request to provider for review/approval because:  Blood pressure    Last Written Prescription Date:  6/13/2022  Last Fill Quantity: 90,  # refills: 2   Last office visit provider:  12/14/2022     Requested Prescriptions   Pending Prescriptions Disp Refills     amLODIPine (NORVASC) 10 MG tablet 90 tablet 2     Sig: [AMLODIPINE (NORVASC) 10 MG TABLET] TAKE 1 TABLET EVERY DAY       Calcium Channel Blockers Protocol  Failed - 2/27/2023  7:24 AM        Failed - Blood pressure under 140/90 in past 12 months     BP Readings from Last 3 Encounters:   09/22/22 (!) 179/81   09/15/22 130/70   08/12/22 (!) 142/68                 Passed - Recent (12 mo) or future (30 days) visit within the authorizing provider's specialty     Patient has had an office visit with the authorizing provider or a provider within the authorizing providers department within the previous 12 mos or has a future within next 30 days. See \"Patient Info\" tab in inbasket, or \"Choose Columns\" in Meds & Orders section of the refill encounter.              Passed - Medication is active on med list        Passed - Patient is age 18 or older        Passed - No active pregnancy on record        Passed - Normal serum creatinine on file in past 12 months     Recent Labs   Lab Test 07/18/22  0442   CR 1.07       Ok to refill medication if creatinine is low          Passed - No positive pregnancy test in past 12 months             Spring Brown RN 02/28/23 2:11 AM  "

## 2023-03-03 DIAGNOSIS — J30.81 ALLERGIC RHINITIS DUE TO CATS: ICD-10-CM

## 2023-03-03 DIAGNOSIS — M54.50 CHRONIC MIDLINE LOW BACK PAIN WITHOUT SCIATICA: ICD-10-CM

## 2023-03-03 DIAGNOSIS — G89.29 CHRONIC RIGHT SI JOINT PAIN: ICD-10-CM

## 2023-03-03 DIAGNOSIS — G89.29 CHRONIC BILATERAL THORACIC BACK PAIN: ICD-10-CM

## 2023-03-03 DIAGNOSIS — G89.29 CHRONIC INTRACTABLE PAIN: ICD-10-CM

## 2023-03-03 DIAGNOSIS — M53.3 CHRONIC RIGHT SI JOINT PAIN: ICD-10-CM

## 2023-03-03 DIAGNOSIS — M54.6 CHRONIC BILATERAL THORACIC BACK PAIN: ICD-10-CM

## 2023-03-03 DIAGNOSIS — G89.4 CHRONIC PAIN SYNDROME: ICD-10-CM

## 2023-03-03 DIAGNOSIS — M05.9 SEROPOSITIVE RHEUMATOID ARTHRITIS (H): ICD-10-CM

## 2023-03-03 DIAGNOSIS — M06.09 RHEUMATOID ARTHRITIS OF MULTIPLE SITES WITHOUT RHEUMATOID FACTOR (H): ICD-10-CM

## 2023-03-03 DIAGNOSIS — G89.29 CHRONIC MIDLINE LOW BACK PAIN WITHOUT SCIATICA: ICD-10-CM

## 2023-03-03 RX ORDER — OXYCODONE HCL 10 MG/1
10 TABLET, FILM COATED, EXTENDED RELEASE ORAL EVERY 12 HOURS
Qty: 60 TABLET | Refills: 0 | Status: SHIPPED | OUTPATIENT
Start: 2023-03-03

## 2023-03-03 RX ORDER — OXYCODONE AND ACETAMINOPHEN 5; 325 MG/1; MG/1
1 TABLET ORAL 3 TIMES DAILY PRN
Qty: 90 TABLET | Refills: 0 | Status: SHIPPED | OUTPATIENT
Start: 2023-03-03

## 2023-03-03 NOTE — TELEPHONE ENCOUNTER
Patient calling to request refills of attached medication.  Stating she has 2 days of medication at this time.

## 2023-03-03 NOTE — TELEPHONE ENCOUNTER
Routing refill request to provider for review/approval because:  Drug not on the [facility] refill protocol     Last Written Prescription Date:  11/7/2022  Last Fill Quantity: 36,  # refills: 0   Last office visit provider:  12/14/2022     Requested Prescriptions   Pending Prescriptions Disp Refills     methotrexate 2.5 MG tablet 36 tablet 0     Sig: Take 3 tablets (7.5 mg) by mouth once a week       There is no refill protocol information for this order          Alanis Gunter RN 03/03/23 9:56 AM

## 2023-03-06 RX ORDER — LORATADINE 10 MG/1
10 TABLET ORAL AT BEDTIME
Qty: 90 TABLET | Refills: 0 | Status: SHIPPED | OUTPATIENT
Start: 2023-03-06

## 2023-03-08 ENCOUNTER — TELEPHONE (OUTPATIENT)
Dept: INTERNAL MEDICINE | Facility: CLINIC | Age: 73
End: 2023-03-08
Payer: COMMERCIAL

## 2023-03-08 NOTE — TELEPHONE ENCOUNTER
Patient calling back in regards to VM below.  Message from Jose relayed.  Patient states that [place] [facility] is over [place] hour away and that will not work for her.  She is asking if we can check the cost at another local pharmacy in or closer to [place] where she lives.  She notes that there is a pharmacy called [facility] and their number is [phone].    Patient also requesting call back from team when additional pharmacies have been checked.  She can be reached at [phone].

## 2023-03-08 NOTE — TELEPHONE ENCOUNTER
Left message for patient to return call to clinic.  Message included writer calling about Rx cost concern and trying to assist with resolution.        Patient could get this Rx for around $45 at [facility], could be sent to [place] as that is likely closest [facility] to patient.

## 2023-03-08 NOTE — TELEPHONE ENCOUNTER
Will route to covering provider to review.  Appears patient could also get this med at [facility] for much cheaper-

## 2023-03-08 NOTE — TELEPHONE ENCOUNTER
Drug Change Request    Contacts       Type Contact Phone/Fax    03/08/2023 01:46 PM CST Phone (Incoming) [address] (Pharmacy) [phone]        What is the current medication?:  Oxycodone (OXYCONTIN) 10 mg 12 hr tablet    What alternative is being requested?:   Fentanyl patch or  Higher dose more frequent oxycodone fast acting    Why the request to change?:  Patient has expressed concern regarding cost.  Current prescription of 12 hr tablet is $140.00     Patient asked Pharmacist Dev what other options might be available that would be less costly.    Dev can be reached at     Preferred Pharmacy:   [address] [address]  Phone: [phone] Fax: [phone]

## 2023-03-13 NOTE — TELEPHONE ENCOUNTER
No cheaper options found near patient, [facility] best [facility].   Covering provider will not change Rx.  Patient scheduled with PCP for 3/15

## 2023-03-14 ENCOUNTER — TELEPHONE (OUTPATIENT)
Dept: INTERNAL MEDICINE | Facility: CLINIC | Age: 73
End: 2023-03-14
Payer: COMMERCIAL

## 2023-03-14 NOTE — TELEPHONE ENCOUNTER
"Spoke with patient and relayed below message. She states \"I cannot come in person living all the way up here. I will not.\" Writer relayed again that it is required due to the medication being a controlled substance. She states \"I understand Marichuy has to do what she has to do but I guess I will just have to find someone else to help me.\" Writer apologizes and asks if she would like to cancel the appointment for tomorrow since she cannot come in for a face to face visit. She agrees and says, \"so  she is probably not going to refill the medication anymore.\" Writer let her know that unfortunately.. yes no more refills because of the requirements. She states \"I will just have to say goodbye\" Writer apologizes that it's this way..  patient says it's okay and thanked for the call.    Writer cancelled appointment for tomorrow.  "

## 2023-03-14 NOTE — TELEPHONE ENCOUNTER
Call pt- I see that she is scheduled for a virtual visit tomorrow for medication follow up. I presume this is for her controlled substance pain medication. This visit needs to be a face to face visit. She needs to know that if she is still going to receive her narcotic pain medication from this clinic that a face to face visit will be required every 4-6 months and that without this the medication cannot continue to be prescribed because of guidelines to prescribe this controlled medication.

## 2023-03-15 NOTE — TELEPHONE ENCOUNTER
Noted. Of note, this requirement would be true with our pain clinic as well that periodic face to face visits will be required so she will likely need to find a pain clinic in the area where she is currently living or evaluate for a non-narcotic option with her new PCP.

## 2023-03-27 DIAGNOSIS — G89.4 CHRONIC PAIN SYNDROME: ICD-10-CM

## 2023-03-28 RX ORDER — DULOXETIN HYDROCHLORIDE 60 MG/1
60 CAPSULE, DELAYED RELEASE ORAL DAILY
Qty: 90 CAPSULE | Refills: 0 | Status: SHIPPED | OUTPATIENT
Start: 2023-03-28

## 2023-03-28 NOTE — TELEPHONE ENCOUNTER
"Routing refill request to provider for review/approval because:  Labs out of range:  BP    Last Written Prescription Date:  12/12/2022  Last Fill Quantity: 90,  # refills: 0   Last office visit provider:  12/14/2022     Requested Prescriptions   Pending Prescriptions Disp Refills     DULoxetine (CYMBALTA) 60 MG capsule 90 capsule 0     Sig: Take 1 capsule (60 mg) by mouth daily       Serotonin-Norepinephrine Reuptake Inhibitors  Failed - 3/27/2023  9:37 AM        Failed - Blood pressure under 140/90 in past 12 months     BP Readings from Last 3 Encounters:   09/22/22 (!) 179/81   09/15/22 130/70   08/12/22 (!) 142/68                 Passed - Recent (12 mo) or future (30 days) visit within the authorizing provider's specialty     Patient has had an office visit with the authorizing provider or a provider within the authorizing providers department within the previous 12 mos or has a future within next 30 days. See \"Patient Info\" tab in inbasket, or \"Choose Columns\" in Meds & Orders section of the refill encounter.              Passed - Medication is active on med list        Passed - Patient is age 18 or older        Passed - No active pregnancy on record        Passed - No positive pregnancy test in past 12 months             Mouna Jang RN 03/28/23 7:22 AM  "

## 2023-04-03 DIAGNOSIS — M05.9 SEROPOSITIVE RHEUMATOID ARTHRITIS (H): ICD-10-CM

## 2023-04-03 NOTE — TELEPHONE ENCOUNTER
Call pt- I got a refill request for her Methotrexate. She needs to get this prescription refilled by her new primary care provider because there is lab work monitoring that is imperative with this medication. Refill request is declined.

## 2023-04-03 NOTE — TELEPHONE ENCOUNTER
Refill request via pharmacy for methotrexate       Last fill: 03/03/23, 16tabs      Fabian Cantrell Jr., CMA on 4/3/2023 at 11:00 AM

## 2023-04-03 NOTE — TELEPHONE ENCOUNTER
Routing refill request to provider for review/approval because:  Drug not on the [facility] refill protocol     Last Written Prescription Date:  3/3/2023  Last Fill Quantity: 16,  # refills: 0   Last office visit provider:  12/14/2022     Requested Prescriptions   Pending Prescriptions Disp Refills     methotrexate 2.5 MG tablet 16 tablet 0     Sig: Take 3 tablets (7.5 mg) by mouth once a week Follow up with Marichuy Rubio DNP as soon as possible - needs blood work and follow up for this medication.       There is no refill protocol information for this order          LADARIUS BUSTILLOS RN 04/03/23 2:26 PM

## 2023-04-27 NOTE — TELEPHONE ENCOUNTER
DATE 04/24/2023    Spoke to patient she is going to hold off on getting enbrel. She has not established care with a new doctor and has no ability to get in touch with a MD yet      DIEGO Randhawa, [facility]  Specialty Pharmacy Clinic Liaison     [facility]    [email or web address]     Phone: [phone]  Fax: [phone]

## 2023-05-23 DIAGNOSIS — I10 ESSENTIAL HYPERTENSION: ICD-10-CM

## 2023-05-23 DIAGNOSIS — Z79.899 HIGH RISK MEDICATION USE: ICD-10-CM

## 2023-05-23 DIAGNOSIS — J30.81 ALLERGIC RHINITIS DUE TO CATS: ICD-10-CM

## 2023-05-23 DIAGNOSIS — F51.01 PRIMARY INSOMNIA: ICD-10-CM

## 2023-05-23 DIAGNOSIS — I10 PRIMARY HYPERTENSION: ICD-10-CM

## 2023-05-23 DIAGNOSIS — M05.9 SEROPOSITIVE RHEUMATOID ARTHRITIS (H): ICD-10-CM

## 2023-05-23 NOTE — TELEPHONE ENCOUNTER
Received fax from pharmacy requesting refill for       Metoprolol  Last refill: 06/28/2022    Trazodone  Last refill: 11/16/2022    Lisinopril  Last refill: 12/06/2022    Patient last seen: 12/14/2022

## 2023-05-24 NOTE — TELEPHONE ENCOUNTER
"Routing refill request to provider for review/approval because:  Drug not on the [facility] refill protocol   BP out of range and trazadone has warning                     Last Written Prescription Date:  11/16/2022  Last Fill Quantity: 90,  # refills: 1   Last office visit provider:  12/14/2022     Requested Prescriptions   Pending Prescriptions Disp Refills     traZODone (DESYREL) 150 MG tablet 90 tablet 1     Sig: Take 1 tablet (150 mg) by mouth At Bedtime       Serotonin Modulators Passed - 5/24/2023  1:21 PM        Passed - Recent (12 mo) or future (30 days) visit within the authorizing provider's specialty     Patient has had an office visit with the authorizing provider or a provider within the authorizing providers department within the previous 12 mos or has a future within next 30 days. See \"Patient Info\" tab in inbasket, or \"Choose Columns\" in Meds & Orders section of the refill encounter.              Passed - Medication is active on med list        Passed - Patient is age 18 or older        Passed - No active pregnancy on record        Passed - No positive pregnancy test in past 12 months      Last Written Prescription Date:  6/28/2022  Last Fill Quantity: 90,  # refills: 3   Last office visit provider:  12/14/2022      metoprolol succinate ER (TOPROL XL) 50 MG 24 hr tablet 90 tablet 3     Sig: [METOPROLOL SUCCINATE (TOPROL-XL) 50 MG 24 HR TABLET] TAKE 1 TABLET EVERY DAY       Beta-Blockers Protocol Failed - 5/24/2023  1:21 PM        Failed - Blood pressure under 140/90 in past 12 months     BP Readings from Last 3 Encounters:   09/22/22 (!) 179/81   09/15/22 130/70   08/12/22 (!) 142/68                 Passed - Patient is age 6 or older        Passed - Recent (12 mo) or future (30 days) visit within the authorizing provider's specialty     Patient has had an office visit with the authorizing provider or a provider within the authorizing providers department within the previous 12 mos or has a future within " "next 30 days. See \"Patient Info\" tab in inbasket, or \"Choose Columns\" in Meds & Orders section of the refill encounter.              Passed - Medication is active on med list      Last Written Prescription Date:  12/6/2022  Last Fill Quantity: 90,  # refills: 1   Last office visit provider:  12/14/2022      lisinopril (ZESTRIL) 10 MG tablet 90 tablet 1     Sig: Take 1 tablet (10 mg) by mouth At Bedtime       ACE Inhibitors (Including Combos) Protocol Failed - 5/24/2023  1:21 PM        Failed - Blood pressure under 140/90 in past 12 months     BP Readings from Last 3 Encounters:   09/22/22 (!) 179/81   09/15/22 130/70   08/12/22 (!) 142/68                 Passed - Recent (12 mo) or future (30 days) visit within the authorizing provider's specialty     Patient has had an office visit with the authorizing provider or a provider within the authorizing providers department within the previous 12 mos or has a future within next 30 days. See \"Patient Info\" tab in inbasket, or \"Choose Columns\" in Meds & Orders section of the refill encounter.              Passed - Medication is active on med list        Passed - Patient is age 18 or older        Passed - No active pregnancy on record        Passed - Normal serum creatinine on file in past 12 months     Recent Labs   Lab Test 07/18/22  0442   CR 1.07       Ok to refill medication if creatinine is low          Passed - Normal serum potassium on file in past 12 months     Recent Labs   Lab Test 07/18/22  1823 10/26/21  0031 10/12/21  1200   POTASSIUM 3.6   < >  --    50758  --   --  4.5    < > = values in this interval not displayed.             Passed - No positive pregnancy test within past 12 months      Last Written Prescription Date:  3/6/2023  Last Fill Quantity: 90,  # refills: 0   Last office visit provider:  12/14/2022      loratadine (CLARITIN) 10 MG tablet 90 tablet 0     Sig: Take 1 tablet (10 mg) by mouth At Bedtime       There is no refill protocol information " for this order          Catarina Dwyer RN 05/24/23 1:21 PM

## 2023-05-25 ENCOUNTER — TELEPHONE (OUTPATIENT)
Dept: PHARMACY | Facility: CLINIC | Age: 73
End: 2023-05-25
Payer: COMMERCIAL

## 2023-05-25 RX ORDER — METOPROLOL SUCCINATE 50 MG/1
TABLET, EXTENDED RELEASE ORAL
Qty: 90 TABLET | Refills: 3 | OUTPATIENT
Start: 2023-05-25

## 2023-05-25 RX ORDER — LISINOPRIL 10 MG/1
10 TABLET ORAL AT BEDTIME
Qty: 90 TABLET | Refills: 1 | OUTPATIENT
Start: 2023-05-25

## 2023-05-25 RX ORDER — TRAZODONE HYDROCHLORIDE 150 MG/1
150 TABLET ORAL AT BEDTIME
Qty: 90 TABLET | Refills: 1 | OUTPATIENT
Start: 2023-05-25

## 2023-05-25 RX ORDER — FOLIC ACID 1 MG/1
TABLET ORAL
Qty: 24 TABLET | Refills: 6 | Status: SHIPPED | OUTPATIENT
Start: 2023-05-25

## 2023-05-25 RX ORDER — LORATADINE 10 MG/1
10 TABLET ORAL AT BEDTIME
Qty: 90 TABLET | Refills: 0 | OUTPATIENT
Start: 2023-05-25

## 2023-05-25 NOTE — TELEPHONE ENCOUNTER
LVM to schedule visit for MTM/Part D recruitment.  Note - msg went straight to voicemail.  Leanne Driscoll, SidD

## 2023-05-25 NOTE — TELEPHONE ENCOUNTER
"Last Written Prescription Date:  11/14/2022  Last Fill Quantity: 90,  # refills: 2   Last office visit provider:  12/14/2022     Requested Prescriptions   Pending Prescriptions Disp Refills     folic acid (FOLVITE) 1 MG tablet [Pharmacy Med Name: Folic Acid 1MG TABS] 24 tablet      Sig: TAKE 1 TABLET BY MOUTH DAILY EXCEPT ON THE DAY YOU TAKE METHOTREXATE       Vitamin Supplements (Adult) Protocol Passed - 5/25/2023  9:03 AM        Passed - High dose Vitamin D not ordered        Passed - Recent (12 mo) or future (30 days) visit within the authorizing provider's specialty     Patient has had an office visit with the authorizing provider or a provider within the authorizing providers department within the previous 12 mos or has a future within next 30 days. See \"Patient Info\" tab in inbasket, or \"Choose Columns\" in Meds & Orders section of the refill encounter.              Passed - Medication is active on med list             Lyla Yo RN 05/25/23 9:21 AM  "

## 2023-05-26 ENCOUNTER — DOCUMENTATION ONLY (OUTPATIENT)
Dept: INTERNAL MEDICINE | Facility: CLINIC | Age: 73
End: 2023-05-26
Payer: COMMERCIAL

## 2023-05-26 VITALS — DIASTOLIC BLOOD PRESSURE: 60 MMHG | SYSTOLIC BLOOD PRESSURE: 98 MMHG

## 2023-06-16 ENCOUNTER — TELEPHONE (OUTPATIENT)
Dept: PHARMACY | Facility: CLINIC | Age: 73
End: 2023-06-16
Payer: COMMERCIAL

## 2023-06-16 NOTE — TELEPHONE ENCOUNTER
MTM Recruitment: [facility] Part D insurance     Referral outreach attempt #1 on 06/16/23       Outcome:Patient declined   Pt informs me that she has changed primary care providers and no longer sees a [facility] PCP.     Ron Ibarra, Pharm.D.   Medication Therapy Management Pharmacist

## 2023-07-22 ENCOUNTER — HEALTH MAINTENANCE LETTER (OUTPATIENT)
Age: 73
End: 2023-07-22

## 2024-02-17 ENCOUNTER — HEALTH MAINTENANCE LETTER (OUTPATIENT)
Age: 74
End: 2024-02-17

## 2024-06-11 NOTE — PATIENT INSTRUCTIONS - HE
POST PROCEDURE INSTRUCTIONS  PROCEDURE DONE TODAY: L3-4 EPIDURAL STEROID INJECTION    Rest today. Resume activity tomorrow as able.  Patient demonstrates the ability to ambulate independently with a steady gait at the time of discharge.      It is not unusual to have a temporary increase in your pain after a procedure. You can ice the area 24-48 hrs. 15 min at a time.      You received a steroid injection. This medication can take 2-7 days to take effect. Some temporary side effects include:    Heartburn    Flushed-red face    Insomnia-trouble sleeping-jitters    Diabetics may see a rise in blood sugar for a few days    Low back or SI joint (sacroiliac) injection: you may experience numbness in one or both legs. Please walk with help. This is temporary and will wear off in a few hours. Do not drive if you are tingling, numbness or weakness in your hands/arms or legs/feet.    Headache:  If you experience a headache after a lumbar epidural injection, lie down and drink fluids (especially caffeine). The headache will go away gradually. If it persists notify our clinic.    Fever: call if fever 100 or over, redness/warmth/swelling over the injection site.    No public hot tubs/pools for a couple days    Physical therapy: check with pain center provider regarding resuming therapy.    Monitor your response to the injection and report this at your next visit.    If you have been referred for a procedure only, please call your referring provider for a follow up.    IF YOU PLAN TO GET A FLU SHOT YOU MUST WAIT 2 WEEKS AFTER THIS INJECTION.      CALL IF ANY QUESTIONS [phone]    
Performed

## 2024-09-14 ENCOUNTER — HEALTH MAINTENANCE LETTER (OUTPATIENT)
Age: 74
End: 2024-09-14

## 2025-03-30 ENCOUNTER — HEALTH MAINTENANCE LETTER (OUTPATIENT)
Age: 75
End: 2025-03-30